# Patient Record
Sex: FEMALE | Race: WHITE | Employment: OTHER | URBAN - METROPOLITAN AREA
[De-identification: names, ages, dates, MRNs, and addresses within clinical notes are randomized per-mention and may not be internally consistent; named-entity substitution may affect disease eponyms.]

---

## 2017-01-23 ENCOUNTER — TELEPHONE (OUTPATIENT)
Dept: FAMILY MEDICINE CLINIC | Facility: CLINIC | Age: 62
End: 2017-01-23

## 2017-01-23 PROCEDURE — 85025 COMPLETE CBC W/AUTO DIFF WBC: CPT | Performed by: INTERNAL MEDICINE

## 2017-01-23 PROCEDURE — 36415 COLL VENOUS BLD VENIPUNCTURE: CPT | Performed by: INTERNAL MEDICINE

## 2017-01-23 PROCEDURE — 82310 ASSAY OF CALCIUM: CPT | Performed by: INTERNAL MEDICINE

## 2017-01-23 RX ORDER — RANITIDINE 150 MG/1
150 TABLET ORAL 2 TIMES DAILY
Qty: 60 TABLET | Refills: 0 | COMMUNITY
Start: 2017-01-23 | End: 2019-08-29 | Stop reason: ALTCHOICE

## 2017-01-23 NOTE — TELEPHONE ENCOUNTER
Patient notified that it is OK to stop the Omeprazole and to start the Zantac 150 mg BID. Her MAR was updated.

## 2017-02-08 ENCOUNTER — OFFICE VISIT (OUTPATIENT)
Dept: FAMILY MEDICINE CLINIC | Facility: CLINIC | Age: 62
End: 2017-02-08

## 2017-02-08 VITALS
BODY MASS INDEX: 34.72 KG/M2 | SYSTOLIC BLOOD PRESSURE: 122 MMHG | WEIGHT: 216 LBS | DIASTOLIC BLOOD PRESSURE: 76 MMHG | HEART RATE: 62 BPM | RESPIRATION RATE: 16 BRPM | TEMPERATURE: 98 F | HEIGHT: 66 IN

## 2017-02-08 DIAGNOSIS — M05.79 RHEUMATOID ARTHRITIS INVOLVING MULTIPLE SITES WITH POSITIVE RHEUMATOID FACTOR (HCC): ICD-10-CM

## 2017-02-08 DIAGNOSIS — M32.19 OTHER SYSTEMIC LUPUS ERYTHEMATOSUS WITH OTHER ORGAN INVOLVEMENT (HCC): ICD-10-CM

## 2017-02-08 DIAGNOSIS — M35.00 SJOGREN'S SYNDROME, WITH UNSPECIFIED ORGAN INVOLVEMENT (HCC): Chronic | ICD-10-CM

## 2017-02-08 DIAGNOSIS — B00.89 HERPETIC WHITLOW: Primary | ICD-10-CM

## 2017-02-08 DIAGNOSIS — L30.9 ECZEMA OF BOTH HANDS: ICD-10-CM

## 2017-02-08 PROCEDURE — 99214 OFFICE O/P EST MOD 30 MIN: CPT | Performed by: FAMILY MEDICINE

## 2017-02-08 RX ORDER — ACYCLOVIR 50 MG/G
1 CREAM TOPICAL
Qty: 5 G | Refills: 2 | Status: SHIPPED | OUTPATIENT
Start: 2017-02-08 | End: 2017-05-09

## 2017-02-08 RX ORDER — ACYCLOVIR 400 MG/1
400 TABLET ORAL DAILY
Qty: 90 TABLET | Refills: 1 | Status: SHIPPED | OUTPATIENT
Start: 2017-02-08 | End: 2017-07-13

## 2017-02-08 NOTE — PATIENT INSTRUCTIONS
Miriam Eaton you were seen for healing herpetic anabel and dermatitis --continue Acyclovir topically as suggested and get on daily Acyclovir 400mg tabs daily. No antibiotics needed. Have fun traveling.  Continue local care of wounds on hands and consider Derm i

## 2017-02-08 NOTE — PROGRESS NOTES
Jana Evangelista is a 59year old female. HPI:   Pt last seen on 12/5/16 for herpetic anabel on her hands repeatedly breaking out. She was put on oral Acyclovir and topical as well and is much better. She is immunocompromised with lupus, Sjogren's and RA.  Sh spacer/AeroChamber Disp: 1 Inhaler Rfl: 1   Diclofenac Sodium (VOLTAREN) 1 % Transdermal Gel Apply 4 g topically 4 (four) times daily.  Disp: 3 Tube Rfl: 1   EPINEPHrine (EPIPEN 2-RACHELE) 0.3 MG/0.3ML Injection Solution Auto-injector Inject 0.3 mL as directed Dyslipidemia    • Chronic foot pain      right   • Otalgia      left   • Chest pain    • Sinus bradycardia    • Bronchitis, acute, with bronchospasm    • Hematochezia    • H. pylori infection    • Dizziness    • Fasciitis    • GERD (gastroesophageal reflux normocephalic,ears and throat are clear  NECK: supple,no adenopathy,no bruits  LUNGS: clear to auscultation  CARDIO: RRR without murmur  GI: good BS's,no masses, HSM or tenderness  EXTREMITIES: no cyanosis, clubbing or edema    ASSESSMENT AND PLAN:   Eladio Apt

## 2017-03-16 PROCEDURE — 86160 COMPLEMENT ANTIGEN: CPT | Performed by: INTERNAL MEDICINE

## 2017-03-16 PROCEDURE — 82570 ASSAY OF URINE CREATININE: CPT | Performed by: INTERNAL MEDICINE

## 2017-03-16 PROCEDURE — 86225 DNA ANTIBODY NATIVE: CPT | Performed by: INTERNAL MEDICINE

## 2017-03-16 PROCEDURE — 81003 URINALYSIS AUTO W/O SCOPE: CPT | Performed by: INTERNAL MEDICINE

## 2017-03-16 PROCEDURE — 84156 ASSAY OF PROTEIN URINE: CPT | Performed by: INTERNAL MEDICINE

## 2017-03-22 ENCOUNTER — OFFICE VISIT (OUTPATIENT)
Dept: FAMILY MEDICINE CLINIC | Facility: CLINIC | Age: 62
End: 2017-03-22

## 2017-03-22 VITALS
TEMPERATURE: 98 F | HEIGHT: 66 IN | HEART RATE: 76 BPM | SYSTOLIC BLOOD PRESSURE: 138 MMHG | DIASTOLIC BLOOD PRESSURE: 80 MMHG | WEIGHT: 217 LBS | BODY MASS INDEX: 34.87 KG/M2 | RESPIRATION RATE: 16 BRPM

## 2017-03-22 DIAGNOSIS — M54.42 CHRONIC LEFT-SIDED LOW BACK PAIN WITH LEFT-SIDED SCIATICA: ICD-10-CM

## 2017-03-22 DIAGNOSIS — I10 ESSENTIAL HYPERTENSION: Primary | ICD-10-CM

## 2017-03-22 DIAGNOSIS — Z76.0 MEDICATION REFILL: ICD-10-CM

## 2017-03-22 DIAGNOSIS — M17.12 PRIMARY OSTEOARTHRITIS OF LEFT KNEE: ICD-10-CM

## 2017-03-22 DIAGNOSIS — M05.79 RHEUMATOID ARTHRITIS INVOLVING MULTIPLE SITES WITH POSITIVE RHEUMATOID FACTOR (HCC): ICD-10-CM

## 2017-03-22 DIAGNOSIS — G89.29 CHRONIC LEFT-SIDED LOW BACK PAIN WITH LEFT-SIDED SCIATICA: ICD-10-CM

## 2017-03-22 PROCEDURE — 99214 OFFICE O/P EST MOD 30 MIN: CPT | Performed by: FAMILY MEDICINE

## 2017-03-22 RX ORDER — QUINAPRIL 20 MG/1
TABLET ORAL
Qty: 90 TABLET | Refills: 0 | OUTPATIENT
Start: 2017-03-22

## 2017-03-22 RX ORDER — QUINAPRIL 20 MG/1
20 TABLET ORAL NIGHTLY
Qty: 90 TABLET | Refills: 1 | Status: SHIPPED | OUTPATIENT
Start: 2017-03-22 | End: 2017-10-14

## 2017-03-22 RX ORDER — QUINAPRIL 20 MG/1
TABLET ORAL
Qty: 30 TABLET | Refills: 0 | Status: SHIPPED | OUTPATIENT
Start: 2017-03-22 | End: 2017-04-15

## 2017-03-22 NOTE — PROGRESS NOTES
HPI:   Josie Arrington is a 59year old female presents for management of her hypertension and for Quinapril refill.  Pt has been taking medications as instructed, no medication side effects, home BP monitoring in the range of 016C systolic and 89'D diastoli capsule Rfl: 1   leflunomide (ARAVA) 20 MG Oral Tab Take 1 tablet (20 mg total) by mouth daily.  Disp: 90 tablet Rfl: 1   pregabalin (LYRICA) 75 MG Oral Cap 1 TABLET EVERY AM, 2 TABLETS EVERY PM Disp: 270 capsule Rfl: 1   Hydroxychloroquine Sulfate 200 MG O the metatarsals      right foot   • Vitamin D deficiency    • Encounter for long-term (current) use of other medications    • Sebaceous cyst    • Pedal edema      of legs   • Candidiasis of skin and nails    • Vaginitis and vulvovaginitis    • Pain in join decreased appetite; see HPI notes above for further details  SKIN: Denies rashes, skin wounds or ulcers.   EYES: Denies blurred vision or double vision  HENT: Denies congestion, rhinorrhea, sore throat or ear pain  CHEST: Denies chest pain, or palpitations; DOSE)  -     Quinapril HCl 20 MG Oral Tab; Take 1 tablet (20 mg total) by mouth nightly. Rheumatoid arthritis involving multiple sites with positive rheumatoid factor (HCC)  -     Quinapril HCl 20 MG Oral Tab;  Take 1 tablet (20 mg total) by mouth nightl

## 2017-03-22 NOTE — PATIENT INSTRUCTIONS
Sherren Roles you were seen for hypertension management and doing well. Switch pain care doctors based on recent experiences and consider Bridgett Thomas or Dr. Byron Desai locally. See rheumatology for management as well. Your medications were also refilled.   Work · Don’t add salt to your food at the table. · Use only small amounts of salt when cooking. · Begin an exercise program. Talk with your health care provider about the type of exercise program that would be best for you. It doesn't have to be hard.  Even br · Weakness of an arm or leg or one side of the face  · You have problems speaking or seeing   Date Last Reviewed: 11/25/2014  © 2777-6275 [employer] [address] [place] [address], [address] [place] [address]. All rights reserved.  This information is not int · Have a recent history of injury to the area  · Are female  Symptoms of knee pain  This type of knee pain is a dull, aching pain in the front of the knee in the area under and around the kneecap. This pain may start quickly or slowly.  Your pain might be w In some cases, you can prevent knee pain.  To help prevent a flare-up of knee pain, you do these things:  · Regularly do all the exercises your doctor or physical therapist advises  · Support your knee as advised by your doctor or physical therapist  · Incr

## 2017-03-23 ENCOUNTER — TELEPHONE (OUTPATIENT)
Dept: FAMILY MEDICINE CLINIC | Facility: CLINIC | Age: 62
End: 2017-03-23

## 2017-03-23 NOTE — TELEPHONE ENCOUNTER
LMOM and let pt know that [facility] spoke to Leobardo from [employer] and that quinapril has been shipped out and we cannot the order.

## 2017-03-23 NOTE — TELEPHONE ENCOUNTER
Spoke with Danny Clark from [employer] and she states that her Quinapril was shipped out today and there is no way of cancelling the order. Please contact patient.

## 2017-03-27 ENCOUNTER — TELEPHONE (OUTPATIENT)
Dept: FAMILY MEDICINE CLINIC | Facility: CLINIC | Age: 62
End: 2017-03-27

## 2017-03-27 DIAGNOSIS — M17.12 OSTEOARTHRITIS OF LEFT KNEE, UNSPECIFIED OSTEOARTHRITIS TYPE: ICD-10-CM

## 2017-03-27 DIAGNOSIS — M54.42 CHRONIC LEFT-SIDED LOW BACK PAIN WITH LEFT-SIDED SCIATICA: Primary | ICD-10-CM

## 2017-03-27 DIAGNOSIS — M05.79 RHEUMATOID ARTHRITIS INVOLVING MULTIPLE SITES WITH POSITIVE RHEUMATOID FACTOR (HCC): ICD-10-CM

## 2017-03-27 DIAGNOSIS — G89.29 CHRONIC LEFT-SIDED LOW BACK PAIN WITH LEFT-SIDED SCIATICA: Primary | ICD-10-CM

## 2017-03-27 NOTE — TELEPHONE ENCOUNTER
: Pain   PCP: Dustin Alcala   Refer to Provider (first and last name): Dr. Pinky Leone   Specialty: Pain Management   Patient Insurance: Payor: MEDICARE / Plan: MEDICARE PART B ONLY / Product Type: *No Product type* /   Duration/Summary of Symptoms: akbar

## 2017-04-04 ENCOUNTER — HOSPITAL ENCOUNTER (OUTPATIENT)
Dept: MRI IMAGING | Age: 62
Discharge: HOME | End: 2017-04-04
Attending: INTERNAL MEDICINE
Payer: MEDICARE

## 2017-04-04 ENCOUNTER — HOSPITAL ENCOUNTER (OUTPATIENT)
Dept: MRI IMAGING | Age: 62
Discharge: HOME | End: 2017-04-04
Attending: PHYSICIAN ASSISTANT
Payer: MEDICARE

## 2017-04-04 DIAGNOSIS — G89.29 CHRONIC PAIN OF LEFT KNEE: ICD-10-CM

## 2017-04-04 DIAGNOSIS — Z98.1 S/P LUMBAR SPINAL FUSION: ICD-10-CM

## 2017-04-04 DIAGNOSIS — M25.562 CHRONIC PAIN OF LEFT KNEE: ICD-10-CM

## 2017-04-04 DIAGNOSIS — M54.16 LUMBAR RADICULITIS: ICD-10-CM

## 2017-04-04 DIAGNOSIS — M32.19 OTHER SYSTEMIC LUPUS ERYTHEMATOSUS WITH OTHER ORGAN INVOLVEMENT (HCC): ICD-10-CM

## 2017-04-04 DIAGNOSIS — M47.816 LUMBAR FACET ARTHROPATHY: ICD-10-CM

## 2017-04-04 DIAGNOSIS — M05.79 RHEUMATOID ARTHRITIS INVOLVING MULTIPLE SITES WITH POSITIVE RHEUMATOID FACTOR (HCC): ICD-10-CM

## 2017-04-04 PROCEDURE — 73721 MRI JNT OF LWR EXTRE W/O DYE: CPT

## 2017-04-04 PROCEDURE — 72148 MRI LUMBAR SPINE W/O DYE: CPT

## 2017-04-07 NOTE — PROGRESS NOTES
Quick Note:    Mychart message sent to patient. Frederick Landrum,  MRI of the knee shows a significant tear of the meniscus. There is also probably a tear of the ACL. The ostoearthritis is mild-moderate.    And there is swelling of the joint (we had removed some o

## 2017-04-15 DIAGNOSIS — I10 ESSENTIAL HYPERTENSION: Primary | ICD-10-CM

## 2017-04-17 RX ORDER — QUINAPRIL 20 MG/1
TABLET ORAL
Qty: 30 TABLET | Refills: 0 | Status: SHIPPED | OUTPATIENT
Start: 2017-04-17 | End: 2017-05-30

## 2017-05-30 PROBLEM — Z01.810 PRE-OPERATIVE CARDIOVASCULAR EXAMINATION: Status: ACTIVE | Noted: 2017-05-30

## 2017-06-06 ENCOUNTER — OFFICE VISIT (OUTPATIENT)
Dept: FAMILY MEDICINE CLINIC | Facility: CLINIC | Age: 62
End: 2017-06-06

## 2017-06-06 VITALS
HEIGHT: 65 IN | OXYGEN SATURATION: 99 % | SYSTOLIC BLOOD PRESSURE: 116 MMHG | HEART RATE: 66 BPM | RESPIRATION RATE: 16 BRPM | BODY MASS INDEX: 36.32 KG/M2 | TEMPERATURE: 98 F | DIASTOLIC BLOOD PRESSURE: 78 MMHG | WEIGHT: 218 LBS

## 2017-06-06 DIAGNOSIS — M05.79 RHEUMATOID ARTHRITIS INVOLVING MULTIPLE SITES WITH POSITIVE RHEUMATOID FACTOR (HCC): ICD-10-CM

## 2017-06-06 DIAGNOSIS — S83.207S ACUTE MENISCAL TEAR OF LEFT KNEE, SEQUELA: Primary | ICD-10-CM

## 2017-06-06 DIAGNOSIS — D63.8 ANEMIA OF CHRONIC DISEASE: ICD-10-CM

## 2017-06-06 DIAGNOSIS — I10 ESSENTIAL HYPERTENSION: ICD-10-CM

## 2017-06-06 DIAGNOSIS — M81.0 OSTEOPOROSIS, SENILE: ICD-10-CM

## 2017-06-06 DIAGNOSIS — Z01.818 PREOPERATIVE CLEARANCE: ICD-10-CM

## 2017-06-06 DIAGNOSIS — M79.7 FIBROMYALGIA: ICD-10-CM

## 2017-06-06 DIAGNOSIS — M35.00 SJOGREN'S SYNDROME, WITH UNSPECIFIED ORGAN INVOLVEMENT (HCC): Chronic | ICD-10-CM

## 2017-06-06 DIAGNOSIS — M25.562 ACUTE PAIN OF LEFT KNEE: ICD-10-CM

## 2017-06-06 DIAGNOSIS — M17.12 OSTEOARTHRITIS OF LEFT KNEE, UNSPECIFIED OSTEOARTHRITIS TYPE: ICD-10-CM

## 2017-06-06 DIAGNOSIS — M32.19 OTHER SYSTEMIC LUPUS ERYTHEMATOSUS WITH OTHER ORGAN INVOLVEMENT (HCC): ICD-10-CM

## 2017-06-06 PROBLEM — Z01.810 PRE-OPERATIVE CARDIOVASCULAR EXAMINATION: Status: RESOLVED | Noted: 2017-05-30 | Resolved: 2017-06-06

## 2017-06-06 PROCEDURE — 99214 OFFICE O/P EST MOD 30 MIN: CPT | Performed by: FAMILY MEDICINE

## 2017-06-06 RX ORDER — TIZANIDINE 2 MG/1
1 TABLET ORAL 3 TIMES DAILY
Refills: 1 | COMMUNITY
Start: 2017-05-31 | End: 2017-10-18

## 2017-06-06 NOTE — PROGRESS NOTES
Del Davis is a 59year old female who presents for follow up on chronic debilitating left knee and for a pre-operative physical exam. Pt with chronic left knee pain and OA and RA and lupus and saw Dr. Aimee Chou of [employer] at [facility]  told mouth daily. Disp: 90 tablet Rfl: 1   pregabalin (LYRICA) 75 MG Oral Cap 1 TABLET EVERY AM, 2 TABLETS EVERY PM Disp: 270 capsule Rfl: 1   Hydroxychloroquine Sulfate 200 MG Oral Tab Take 1 tablet (200 mg total) by mouth 2 (two) times daily.  Disp: 180 tablet Diagnosis Date   • Asthma    • IBS (irritable bowel syndrome)    • Osteoporosis    • Foot fracture, left 10/08     left foot fx: excessive walking   • RESPIRATORY ABNORM NEC 2/26/2009   • MYALGIA AND MYOSITIS NOS 2/26/2009   • Systemic lupus erythematosu early signs, bilaterally, remained stable   • Duodenitis 12/18/08     peptic   • Irregular Z line of esophagus 12/18/08   • Internal hemorrhoids 12/18/08     Grade II   • Dysphagia    • Trigger finger      right ring   • Cancer ([facility] [address] 9/04     rt breast GENERAL: feels left knee pains day and night and also has OA and RA and saw ortho and told left medial meniscal tear and needs surgery repair. Scheduled for 6/14/17. see HPI notes above for further details. Here for clearance. Saw cardiology already. intact    ASSESSMENT AND PLAN:   Del Davis is a 59year old female who presents for chronic left knee pain due to a meniscal tear and a pre-operative physical exam. Pt with chronic left knee pain and OA and RA and lupus and told left knee meniscal tear rheumatology     Osteoarthritis of left knee, unspecified osteoarthritis type  Per rheumatology and ortho management    Essential hypertension  -     CBC W Differential W Platelet [E]; Future  -     Comp Metabolic Panel (14) [E];  Future  Stable with BP und

## 2017-06-06 NOTE — PATIENT INSTRUCTIONS
Scotty Mendoza you were seen and cleared for left knee surgery for planned menisectomy. Get labs done. Your EKG is stable. See rheumatology after surgery and me in two months.  See your new pain clinic as well for the Norco.   Take care, Dr. Gwendolyn Greenwood · Stay off the injured leg as much as possible until you can walk on it without pain. If you have a lot of pain when walking, crutches or a walker may be prescribed.  (These can be rented or bought at [employer] and surgical or orthopedic supply stores · Check with your healthcare provider before returning to sports or full work duties. Follow-up care  Follow up with your healthcare provider, or as advised. This is usually within 1-2 weeks.  Further testing may be required to check the extent of your inj To look inside your joint, your surgeon will use an arthroscope. This is a slender instrument about the size of a pencil that contains a lens and a light source. The arthroscope and other special tools are inserted into the joint through tiny incisions.  Us

## 2017-06-07 ENCOUNTER — LAB ENCOUNTER (OUTPATIENT)
Dept: LAB | Age: 62
End: 2017-06-07
Attending: FAMILY MEDICINE
Payer: MEDICARE

## 2017-06-07 DIAGNOSIS — M25.562 ACUTE PAIN OF LEFT KNEE: ICD-10-CM

## 2017-06-07 DIAGNOSIS — D63.8 ANEMIA OF CHRONIC DISEASE: ICD-10-CM

## 2017-06-07 DIAGNOSIS — M32.19 OTHER SYSTEMIC LUPUS ERYTHEMATOSUS WITH OTHER ORGAN INVOLVEMENT (HCC): ICD-10-CM

## 2017-06-07 DIAGNOSIS — I10 ESSENTIAL HYPERTENSION: ICD-10-CM

## 2017-06-07 DIAGNOSIS — S83.207S ACUTE MENISCAL TEAR OF LEFT KNEE, SEQUELA: ICD-10-CM

## 2017-06-07 DIAGNOSIS — Z01.818 PREOPERATIVE CLEARANCE: ICD-10-CM

## 2017-06-07 DIAGNOSIS — M05.79 RHEUMATOID ARTHRITIS INVOLVING MULTIPLE SITES WITH POSITIVE RHEUMATOID FACTOR (HCC): ICD-10-CM

## 2017-06-07 PROCEDURE — 85610 PROTHROMBIN TIME: CPT

## 2017-06-07 PROCEDURE — 85730 THROMBOPLASTIN TIME PARTIAL: CPT

## 2017-06-07 PROCEDURE — 80053 COMPREHEN METABOLIC PANEL: CPT

## 2017-06-07 PROCEDURE — 36415 COLL VENOUS BLD VENIPUNCTURE: CPT

## 2017-06-07 PROCEDURE — 85025 COMPLETE CBC W/AUTO DIFF WBC: CPT

## 2017-06-07 NOTE — PROGRESS NOTES
Quick Note:    Please call pt with normal preoperative lab results and print so can be faxed to surgeon in [facility] --- Dr. Lety Mas  ______

## 2017-06-11 PROBLEM — M54.42 CHRONIC LEFT-SIDED LOW BACK PAIN WITH LEFT-SIDED SCIATICA: Status: RESOLVED | Noted: 2017-03-22 | Resolved: 2017-06-11

## 2017-06-11 PROBLEM — G89.29 CHRONIC LEFT-SIDED LOW BACK PAIN WITH LEFT-SIDED SCIATICA: Status: RESOLVED | Noted: 2017-03-22 | Resolved: 2017-06-11

## 2017-06-14 ENCOUNTER — HOSPITAL ENCOUNTER (OUTPATIENT)
Facility: HOSPITAL | Age: 62
Setting detail: HOSPITAL OUTPATIENT SURGERY
Discharge: HOME | End: 2017-06-14
Attending: ORTHOPAEDIC SURGERY | Admitting: ORTHOPAEDIC SURGERY
Payer: MEDICARE

## 2017-06-14 ENCOUNTER — ANESTHESIA EVENT (OUTPATIENT)
Dept: SURGERY | Facility: HOSPITAL | Age: 62
End: 2017-06-14
Payer: MEDICARE

## 2017-06-14 ENCOUNTER — ANESTHESIA (OUTPATIENT)
Dept: SURGERY | Facility: HOSPITAL | Age: 62
End: 2017-06-14
Payer: MEDICARE

## 2017-06-14 ENCOUNTER — SURGERY (OUTPATIENT)
Age: 62
End: 2017-06-14

## 2017-06-14 VITALS
BODY MASS INDEX: 34.39 KG/M2 | HEART RATE: 61 BPM | WEIGHT: 214 LBS | RESPIRATION RATE: 16 BRPM | TEMPERATURE: 98 F | HEIGHT: 66 IN | OXYGEN SATURATION: 93 % | DIASTOLIC BLOOD PRESSURE: 88 MMHG | SYSTOLIC BLOOD PRESSURE: 130 MMHG

## 2017-06-14 DIAGNOSIS — S83.207A ACUTE MENISCAL TEAR OF KNEE, LEFT, INITIAL ENCOUNTER: Primary | ICD-10-CM

## 2017-06-14 PROCEDURE — 0SBD4ZZ EXCISION OF LEFT KNEE JOINT, PERCUTANEOUS ENDOSCOPIC APPROACH: ICD-10-PCS | Performed by: ORTHOPAEDIC SURGERY

## 2017-06-14 RX ORDER — HYDROCODONE BITARTRATE AND ACETAMINOPHEN 5; 325 MG/1; MG/1
1 TABLET ORAL AS NEEDED
Status: DISCONTINUED | OUTPATIENT
Start: 2017-06-14 | End: 2017-06-14

## 2017-06-14 RX ORDER — LABETALOL HYDROCHLORIDE 5 MG/ML
INJECTION, SOLUTION INTRAVENOUS AS NEEDED
Status: DISCONTINUED | OUTPATIENT
Start: 2017-06-14 | End: 2017-06-14 | Stop reason: SURG

## 2017-06-14 RX ORDER — LIDOCAINE HYDROCHLORIDE 10 MG/ML
INJECTION, SOLUTION EPIDURAL; INFILTRATION; INTRACAUDAL; PERINEURAL AS NEEDED
Status: DISCONTINUED | OUTPATIENT
Start: 2017-06-14 | End: 2017-06-14 | Stop reason: SURG

## 2017-06-14 RX ORDER — SODIUM CHLORIDE, SODIUM LACTATE, POTASSIUM CHLORIDE, CALCIUM CHLORIDE 600; 310; 30; 20 MG/100ML; MG/100ML; MG/100ML; MG/100ML
INJECTION, SOLUTION INTRAVENOUS CONTINUOUS
Status: DISCONTINUED | OUTPATIENT
Start: 2017-06-14 | End: 2017-06-14

## 2017-06-14 RX ORDER — HYDROMORPHONE HYDROCHLORIDE 1 MG/ML
0.6 INJECTION, SOLUTION INTRAMUSCULAR; INTRAVENOUS; SUBCUTANEOUS EVERY 5 MIN PRN
Status: DISCONTINUED | OUTPATIENT
Start: 2017-06-14 | End: 2017-06-14

## 2017-06-14 RX ORDER — MORPHINE SULFATE 2 MG/ML
2 INJECTION, SOLUTION INTRAMUSCULAR; INTRAVENOUS EVERY 10 MIN PRN
Status: DISCONTINUED | OUTPATIENT
Start: 2017-06-14 | End: 2017-06-14

## 2017-06-14 RX ORDER — HYDROCODONE BITARTRATE AND ACETAMINOPHEN 5; 325 MG/1; MG/1
2 TABLET ORAL AS NEEDED
Status: DISCONTINUED | OUTPATIENT
Start: 2017-06-14 | End: 2017-06-14

## 2017-06-14 RX ORDER — MORPHINE SULFATE 10 MG/ML
6 INJECTION, SOLUTION INTRAMUSCULAR; INTRAVENOUS EVERY 10 MIN PRN
Status: DISCONTINUED | OUTPATIENT
Start: 2017-06-14 | End: 2017-06-14

## 2017-06-14 RX ORDER — MIDAZOLAM HYDROCHLORIDE 1 MG/ML
INJECTION INTRAMUSCULAR; INTRAVENOUS AS NEEDED
Status: DISCONTINUED | OUTPATIENT
Start: 2017-06-14 | End: 2017-06-14 | Stop reason: SURG

## 2017-06-14 RX ORDER — ASPIRIN 325 MG
325 TABLET ORAL DAILY
Qty: 30 TABLET | Refills: 0 | Status: SHIPPED
Start: 2017-06-14 | End: 2017-10-18

## 2017-06-14 RX ORDER — ONDANSETRON 2 MG/ML
INJECTION INTRAMUSCULAR; INTRAVENOUS AS NEEDED
Status: DISCONTINUED | OUTPATIENT
Start: 2017-06-14 | End: 2017-06-14 | Stop reason: SURG

## 2017-06-14 RX ORDER — METOCLOPRAMIDE 10 MG/1
10 TABLET ORAL ONCE
Status: COMPLETED | OUTPATIENT
Start: 2017-06-14 | End: 2017-06-14

## 2017-06-14 RX ORDER — ONDANSETRON 2 MG/ML
4 INJECTION INTRAMUSCULAR; INTRAVENOUS ONCE AS NEEDED
Status: DISCONTINUED | OUTPATIENT
Start: 2017-06-14 | End: 2017-06-14

## 2017-06-14 RX ORDER — GLYCOPYRROLATE 0.2 MG/ML
INJECTION INTRAMUSCULAR; INTRAVENOUS AS NEEDED
Status: DISCONTINUED | OUTPATIENT
Start: 2017-06-14 | End: 2017-06-14 | Stop reason: SURG

## 2017-06-14 RX ORDER — ACETAMINOPHEN 325 MG/1
650 TABLET ORAL ONCE
Status: DISCONTINUED | OUTPATIENT
Start: 2017-06-14 | End: 2017-06-14 | Stop reason: HOSPADM

## 2017-06-14 RX ORDER — HYDROMORPHONE HYDROCHLORIDE 1 MG/ML
0.2 INJECTION, SOLUTION INTRAMUSCULAR; INTRAVENOUS; SUBCUTANEOUS EVERY 5 MIN PRN
Status: DISCONTINUED | OUTPATIENT
Start: 2017-06-14 | End: 2017-06-14

## 2017-06-14 RX ORDER — BUPIVACAINE HYDROCHLORIDE 2.5 MG/ML
INJECTION, SOLUTION EPIDURAL; INFILTRATION; INTRACAUDAL AS NEEDED
Status: DISCONTINUED | OUTPATIENT
Start: 2017-06-14 | End: 2017-06-14 | Stop reason: HOSPADM

## 2017-06-14 RX ORDER — MORPHINE SULFATE 4 MG/ML
4 INJECTION, SOLUTION INTRAMUSCULAR; INTRAVENOUS EVERY 10 MIN PRN
Status: DISCONTINUED | OUTPATIENT
Start: 2017-06-14 | End: 2017-06-14

## 2017-06-14 RX ORDER — NALOXONE HYDROCHLORIDE 0.4 MG/ML
80 INJECTION, SOLUTION INTRAMUSCULAR; INTRAVENOUS; SUBCUTANEOUS AS NEEDED
Status: DISCONTINUED | OUTPATIENT
Start: 2017-06-14 | End: 2017-06-14

## 2017-06-14 RX ORDER — FAMOTIDINE 20 MG/1
20 TABLET ORAL ONCE
Status: COMPLETED | OUTPATIENT
Start: 2017-06-14 | End: 2017-06-14

## 2017-06-14 RX ORDER — HALOPERIDOL 5 MG/ML
0.25 INJECTION INTRAMUSCULAR ONCE AS NEEDED
Status: DISCONTINUED | OUTPATIENT
Start: 2017-06-14 | End: 2017-06-14

## 2017-06-14 RX ORDER — HYDROMORPHONE HYDROCHLORIDE 1 MG/ML
0.4 INJECTION, SOLUTION INTRAMUSCULAR; INTRAVENOUS; SUBCUTANEOUS EVERY 5 MIN PRN
Status: DISCONTINUED | OUTPATIENT
Start: 2017-06-14 | End: 2017-06-14

## 2017-06-14 RX ORDER — DEXAMETHASONE SODIUM PHOSPHATE 4 MG/ML
VIAL (ML) INJECTION AS NEEDED
Status: DISCONTINUED | OUTPATIENT
Start: 2017-06-14 | End: 2017-06-14 | Stop reason: SURG

## 2017-06-14 RX ORDER — ONDANSETRON 4 MG/1
4 TABLET, FILM COATED ORAL EVERY 8 HOURS PRN
Qty: 10 TABLET | Refills: 1 | Status: SHIPPED
Start: 2017-06-14 | End: 2017-10-18 | Stop reason: ALTCHOICE

## 2017-06-14 RX ADMIN — ONDANSETRON 4 MG: 2 INJECTION INTRAMUSCULAR; INTRAVENOUS at 08:50:00

## 2017-06-14 RX ADMIN — LIDOCAINE HYDROCHLORIDE 20 MG: 10 INJECTION, SOLUTION EPIDURAL; INFILTRATION; INTRACAUDAL; PERINEURAL at 08:51:00

## 2017-06-14 RX ADMIN — DEXAMETHASONE SODIUM PHOSPHATE 4 MG: 4 MG/ML VIAL (ML) INJECTION at 08:50:00

## 2017-06-14 RX ADMIN — MIDAZOLAM HYDROCHLORIDE 2 MG: 1 INJECTION INTRAMUSCULAR; INTRAVENOUS at 08:50:00

## 2017-06-14 RX ADMIN — SODIUM CHLORIDE, SODIUM LACTATE, POTASSIUM CHLORIDE, CALCIUM CHLORIDE: 600; 310; 30; 20 INJECTION, SOLUTION INTRAVENOUS at 08:49:00

## 2017-06-14 RX ADMIN — LABETALOL HYDROCHLORIDE 10 MG: 5 INJECTION, SOLUTION INTRAVENOUS at 09:17:00

## 2017-06-14 RX ADMIN — LIDOCAINE HYDROCHLORIDE 30 MG: 10 INJECTION, SOLUTION EPIDURAL; INFILTRATION; INTRACAUDAL; PERINEURAL at 08:50:00

## 2017-06-14 RX ADMIN — GLYCOPYRROLATE 0.2 MG: 0.2 INJECTION INTRAMUSCULAR; INTRAVENOUS at 08:50:00

## 2017-06-14 NOTE — OPERATIVE REPORT
Pre-Operative Diagnosis: Left Knee medial meniscal tear      Post-Operative Diagnosis: Left Knee medial meniscal tear, patella chondrosis, synovitis     Procedure Performed:   Procedure(s):  Left knee arthroscopic lateral meniscal debridement, patellar

## 2017-06-14 NOTE — ANESTHESIA PREPROCEDURE EVALUATION
Anesthesia PreOp Note    HPI:     Mamadou Trinidad is a 59year old female who presents for preoperative consultation requested by: Kourtney Blake MD    Date of Surgery: 6/14/2017    Procedure(s):  KNEE ARTHROSCOPY  Indication: Knee medial meniscal tear and vagina 09/04/12     resolved   • Postmenopausal atrophic vaginitis    • Gardnerella vaginitis    • Peritonitis ([facility]    • Fibromyalgia 11/07   • Vitamin D deficiency    • Sjogren's syndrome ([facility] 10/07   • Hammertoe      second toe right foot   • Stres OTHER SURGICAL HISTORY  9/25/12    Comment left carpal tunnel surgery    OTHER SURGICAL HISTORY  1/11/13    Comment trigger thumb injections    OTHER SURGICAL HISTORY  4/13    Comment revision right foot surgery    RADIATION RIGHT  2004    Comment MAMMO weeks then PRN flares Disp: 30 g Rfl: 1 6/13/2017 at Unknown time   Probiotic Product (PROBIOTIC DAILY) Oral Cap Take by mouth.  Disp:  Rfl:  6/12/2017 at 0800   Albuterol Sulfate HFA (PROAIR HFA) 108 (90 BASE) MCG/ACT Inhalation Aero Soln Inhale 1-2 puffs Aortic Aneurysm[other] [OTHER]     • Fibromyalgia Sister    • Breast Cancer Self 48   • DCIS Self        Social History   Marital Status:   Spouse Name: N/A    Years of Education: N/A  Number of Children: N/A     Occupational History  None on file Weight: 97.523 kg (215 lb) 97.07 kg (214 lb)   SpO2:  96%        Anesthesia ROS/Med Hx and Physical Exam     Patient summary reviewed and Nursing notes reviewed    History of anesthetic complications   Airway   Mallampati: II  TM distance: >3 FB  Neck RO

## 2017-07-13 DIAGNOSIS — B00.89 HERPETIC WHITLOW: ICD-10-CM

## 2017-07-13 RX ORDER — ACYCLOVIR 400 MG/1
400 TABLET ORAL 2 TIMES DAILY
Qty: 90 TABLET | Refills: 1 | Status: SHIPPED | OUTPATIENT
Start: 2017-07-13 | End: 2017-10-19

## 2017-07-13 NOTE — TELEPHONE ENCOUNTER
ACYCLOVIR 400MG TABLETS  Future Appointments  Date Time Provider Brenda Tesha   8/24/2017 10:00 AM MD Pio Randolph   9/25/2017 11:00 AM Jessi Man,  [facility] [address] [facility] [address]     Medication pended if okay to refill.

## 2017-08-21 PROCEDURE — 86160 COMPLEMENT ANTIGEN: CPT | Performed by: INTERNAL MEDICINE

## 2017-08-21 PROCEDURE — 82570 ASSAY OF URINE CREATININE: CPT | Performed by: INTERNAL MEDICINE

## 2017-08-21 PROCEDURE — 81003 URINALYSIS AUTO W/O SCOPE: CPT | Performed by: INTERNAL MEDICINE

## 2017-08-21 PROCEDURE — 86225 DNA ANTIBODY NATIVE: CPT | Performed by: INTERNAL MEDICINE

## 2017-08-21 PROCEDURE — 84156 ASSAY OF PROTEIN URINE: CPT | Performed by: INTERNAL MEDICINE

## 2017-08-30 ENCOUNTER — OFFICE VISIT (OUTPATIENT)
Dept: FAMILY MEDICINE CLINIC | Facility: CLINIC | Age: 62
End: 2017-08-30

## 2017-08-30 VITALS
BODY MASS INDEX: 35.32 KG/M2 | SYSTOLIC BLOOD PRESSURE: 110 MMHG | WEIGHT: 212 LBS | DIASTOLIC BLOOD PRESSURE: 74 MMHG | HEIGHT: 65 IN | HEART RATE: 72 BPM | TEMPERATURE: 98 F | RESPIRATION RATE: 16 BRPM

## 2017-08-30 DIAGNOSIS — M25.562 CHRONIC PAIN OF LEFT KNEE: Primary | ICD-10-CM

## 2017-08-30 DIAGNOSIS — G89.29 CHRONIC PAIN OF LEFT KNEE: Primary | ICD-10-CM

## 2017-08-30 PROCEDURE — 99213 OFFICE O/P EST LOW 20 MIN: CPT | Performed by: FAMILY MEDICINE

## 2017-08-30 RX ORDER — OXYCODONE AND ACETAMINOPHEN 10; 325 MG/1; MG/1
1 TABLET ORAL 4 TIMES DAILY
Refills: 0 | COMMUNITY
Start: 2017-08-22 | End: 2017-10-18 | Stop reason: ALTCHOICE

## 2017-08-30 NOTE — PROGRESS NOTES
HPI:   Ed Soria is a 57year old female that presents for left knee pain. She had MRI in April showing ACL and PCL tear. She has trouble walking due to pain and she has a limp.   She plans to have surgery this year, TKA, but has to coordinate carefull

## 2017-10-05 ENCOUNTER — HOSPITAL ENCOUNTER (OUTPATIENT)
Age: 62
Discharge: HOME | End: 2017-10-05
Payer: MEDICARE

## 2017-10-05 ENCOUNTER — APPOINTMENT (OUTPATIENT)
Dept: GENERAL RADIOLOGY | Age: 62
End: 2017-10-05
Attending: PHYSICIAN ASSISTANT
Payer: MEDICARE

## 2017-10-05 VITALS
TEMPERATURE: 98 F | RESPIRATION RATE: 20 BRPM | OXYGEN SATURATION: 98 % | DIASTOLIC BLOOD PRESSURE: 83 MMHG | HEART RATE: 64 BPM | SYSTOLIC BLOOD PRESSURE: 144 MMHG

## 2017-10-05 DIAGNOSIS — S22.000G CLOSED COMPRESSION FRACTURE OF THORACIC VERTEBRA WITH DELAYED HEALING, SUBSEQUENT ENCOUNTER: Primary | ICD-10-CM

## 2017-10-05 DIAGNOSIS — M51.34 DEGENERATIVE DISC DISEASE, THORACIC: ICD-10-CM

## 2017-10-05 DIAGNOSIS — M62.838 SPASM OF MUSCLE: ICD-10-CM

## 2017-10-05 PROCEDURE — 99214 OFFICE O/P EST MOD 30 MIN: CPT

## 2017-10-05 PROCEDURE — 71020 XR CHEST PA + LAT CHEST (CPT=71020): CPT | Performed by: PHYSICIAN ASSISTANT

## 2017-10-05 PROCEDURE — 96372 THER/PROPH/DIAG INJ SC/IM: CPT

## 2017-10-05 RX ORDER — MELOXICAM 7.5 MG/1
7.5 TABLET ORAL DAILY
Qty: 30 TABLET | Refills: 0 | Status: SHIPPED | OUTPATIENT
Start: 2017-10-05 | End: 2017-10-05

## 2017-10-05 RX ORDER — KETOROLAC TROMETHAMINE 30 MG/ML
30 INJECTION, SOLUTION INTRAMUSCULAR; INTRAVENOUS ONCE
Status: COMPLETED | OUTPATIENT
Start: 2017-10-05 | End: 2017-10-05

## 2017-10-05 RX ORDER — CYCLOBENZAPRINE HCL 10 MG
10 TABLET ORAL NIGHTLY
Qty: 20 TABLET | Refills: 0 | Status: SHIPPED | OUTPATIENT
Start: 2017-10-05 | End: 2017-10-25

## 2017-10-05 RX ORDER — METHYLPREDNISOLONE 4 MG/1
TABLET ORAL
Qty: 1 PACKAGE | Refills: 0 | Status: SHIPPED | OUTPATIENT
Start: 2017-10-05 | End: 2017-10-18 | Stop reason: ALTCHOICE

## 2017-10-05 NOTE — ED INITIAL ASSESSMENT (HPI)
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 He is in the hospital now but she is still having pain. She said she  does pulling movements. Pt ambulates with wheeled walker, knee brace noted on left knee.

## 2017-10-05 NOTE — ED PROVIDER NOTES
Patient Seen in: [facility]    History   Patient presents with:  Back Pain (musculoskeletal)    Stated Complaint: Back Spasms    HPI      58 yo female here with c/o back spasm x 7-10 days to the mid back that catches her breath. • Localized primary carpometacarpal osteoarthritis 7/30/2012   • Macular degeneration 12/07/10    early signs, bilaterally, remained stable   • Mild mitral regurgitation    • MYALGIA AND MYOSITIS NOS 2/26/2009   • Osteoarthritis     left thumb, severe   • Comment: revision right foot surgery  2004: RADIATION RIGHT      Comment: MAMMOSITE  No date: SPINE SURGERY PROCEDURE UNLISTED  age 13: T&A      Comment: Ashwin Davila  12/18/08: UPPER GI ENDOSCOPY,EXAM    Family History   Problem Relation Age of Onset Left Ear: External ear normal.   Nose: Nose normal.   Mouth/Throat: Oropharynx is clear and moist.   Eyes: Conjunctivae and EOM are normal. Pupils are equal, round, and reactive to light. Neck: Normal range of motion. Neck supple.    Cardiovascular: Esa Moss PROCEDURE:  XR CHEST PA + LAT CHEST (CPT=71020)  INDICATIONS:  Back Spasms  COMPARISON:  [facility] XR CHEST PA + LAT CHEST (CPT=71020), 8/15/2016, 13:53. TECHNIQUE:  PA and lateral chest radiographs were obtained.   PATIENT STATED HISTORY: (As transcrib The patient is in good condition thru out treatment today and remains so upon discharge. I discussed the plan of care with the patient, who expresses understanding.  All questions and concerns are addressed to the patients satisfaction prior to discharge t

## 2017-10-09 ENCOUNTER — OFFICE VISIT (OUTPATIENT)
Dept: FAMILY MEDICINE CLINIC | Facility: CLINIC | Age: 62
End: 2017-10-09

## 2017-10-09 VITALS
DIASTOLIC BLOOD PRESSURE: 76 MMHG | BODY MASS INDEX: 32.78 KG/M2 | WEIGHT: 204 LBS | SYSTOLIC BLOOD PRESSURE: 112 MMHG | HEART RATE: 76 BPM | HEIGHT: 66 IN | RESPIRATION RATE: 16 BRPM | OXYGEN SATURATION: 99 %

## 2017-10-09 DIAGNOSIS — M81.0 OSTEOPOROSIS, SENILE: ICD-10-CM

## 2017-10-09 DIAGNOSIS — Z91.81 AT MODERATE RISK FOR FALL: ICD-10-CM

## 2017-10-09 DIAGNOSIS — S22.000G CLOSED COMPRESSION FRACTURE OF THORACIC VERTEBRA WITH DELAYED HEALING, SUBSEQUENT ENCOUNTER: Primary | ICD-10-CM

## 2017-10-09 DIAGNOSIS — M48.54XG PATHOLOGICAL COMPRESSION FRACTURE OF THORACIC VERTEBRA WITH DELAYED HEALING, SUBSEQUENT ENCOUNTER: ICD-10-CM

## 2017-10-09 DIAGNOSIS — M54.6 ACUTE BILATERAL THORACIC BACK PAIN: ICD-10-CM

## 2017-10-09 DIAGNOSIS — Z63.79 STRESS DUE TO ILLNESS OF FAMILY MEMBER: ICD-10-CM

## 2017-10-09 DIAGNOSIS — Z28.21 IMMUNIZATION REFUSED: ICD-10-CM

## 2017-10-09 DIAGNOSIS — M17.12 PRIMARY OSTEOARTHRITIS OF LEFT KNEE: ICD-10-CM

## 2017-10-09 DIAGNOSIS — Z09 FOLLOW UP: ICD-10-CM

## 2017-10-09 PROBLEM — S22.000A CLOSED COMPRESSION FRACTURE OF THORACIC VERTEBRA (HCC): Status: ACTIVE | Noted: 2017-10-09

## 2017-10-09 PROCEDURE — 99214 OFFICE O/P EST MOD 30 MIN: CPT | Performed by: FAMILY MEDICINE

## 2017-10-09 NOTE — PATIENT INSTRUCTIONS
Demi Espinosa you were seen for follow up after  for multiple thoracic compression fractures. See Dr. Tabatha More and consider kyphoplasty and get the MRI done as well. See me also if symptoms get worse. Stay on current meds including the [facility] as well.   See ortho fo As you improve, you may be given exercises to strengthen your bones. If you have osteoporosis, your healthcare provider may prescribe a medicine to treat it. Sometimes you may have pain even after the bone has healed.  In that case, your healthcare provider · Don’t use a heating pad for more than 15 minutes at a time. Never sleep on a heating pad. Date Last Reviewed: 9/1/2015  © 6391-1707 [address], [address] [place] [address]. All rights reserved.  This information is not intend

## 2017-10-09 NOTE — PROGRESS NOTES
Leidy Faustin is a 57year old female. HPI:   Pt here after going to  for middle back pains. Has been care taking for  Thiago Stoner who has spinal cancer and she is overwhelmed and getting help from the [place].  He is paralyzed from waist down and she has bee • Candidiasis of skin and nails    • Candidiasis of vulva and vagina 09/04/12    resolved   • Carpal tunnel syndrome 5/29/2009    bilateral   • Chest pain    • Chronic foot pain     right   • Colitis     MICROSCOPIC   • Dizziness    • Duodenitis 12/18/08 Social History:  Smoking status: Former Smoker                                                              Packs/day: 0.00      Years: 0.00         Quit date: 6/13/2007  Smokeless tobacco: Never Used                      Comment: quit in 2006  Alcohol u and get the MRI done of her thoracic spine as well. She is asked to see me also if symptoms get worse. She was advised to stay on current meds including the [facility] as well.   She should also consider seeing ortho for the left knee pain and swelling manageme and refused    Dr. Carisa Sullivan

## 2017-10-13 ENCOUNTER — HOSPITAL ENCOUNTER (OUTPATIENT)
Dept: MRI IMAGING | Age: 62
Discharge: HOME | End: 2017-10-13
Attending: FAMILY MEDICINE
Payer: MEDICARE

## 2017-10-13 DIAGNOSIS — M54.6 ACUTE BILATERAL THORACIC BACK PAIN: ICD-10-CM

## 2017-10-13 DIAGNOSIS — M48.54XG PATHOLOGICAL COMPRESSION FRACTURE OF THORACIC VERTEBRA WITH DELAYED HEALING, SUBSEQUENT ENCOUNTER: ICD-10-CM

## 2017-10-13 DIAGNOSIS — S22.000G CLOSED COMPRESSION FRACTURE OF THORACIC VERTEBRA WITH DELAYED HEALING, SUBSEQUENT ENCOUNTER: ICD-10-CM

## 2017-10-13 PROCEDURE — 72146 MRI CHEST SPINE W/O DYE: CPT | Performed by: FAMILY MEDICINE

## 2017-10-14 DIAGNOSIS — M05.79 RHEUMATOID ARTHRITIS INVOLVING MULTIPLE SITES WITH POSITIVE RHEUMATOID FACTOR (HCC): ICD-10-CM

## 2017-10-14 DIAGNOSIS — Z76.0 MEDICATION REFILL: ICD-10-CM

## 2017-10-14 DIAGNOSIS — I10 ESSENTIAL HYPERTENSION: Primary | ICD-10-CM

## 2017-10-16 RX ORDER — QUINAPRIL 20 MG/1
TABLET ORAL
Qty: 90 TABLET | Refills: 1 | Status: SHIPPED | OUTPATIENT
Start: 2017-10-16 | End: 2018-04-16

## 2017-10-16 NOTE — PROGRESS NOTES
Please call pt with ABNORMAL MRI of thoracic spine results:   CONCLUSION:    #1. There is an acute compression fracture of T9 with approximately 45% loss of vertebral body height.  There is mild posterior wall expansion with minimal epidural blood extending

## 2017-10-16 NOTE — TELEPHONE ENCOUNTER
QUINAPRIL TAB 20MG  Will file in chart as: QUINAPRIL HCL 20 MG Oral Tab  TAKE 1 TABLET NIGHTLY       Disp: 90 tablet Refills: 1    Class: Normal Start: 10/14/2017   For: Essential hypertension, Rheumatoid arthritis involving multiple sites with positive rh

## 2017-10-18 ENCOUNTER — TELEPHONE (OUTPATIENT)
Dept: NEUROLOGY | Facility: CLINIC | Age: 62
End: 2017-10-18

## 2017-10-18 ENCOUNTER — OFFICE VISIT (OUTPATIENT)
Dept: SURGERY | Facility: CLINIC | Age: 62
End: 2017-10-18

## 2017-10-18 ENCOUNTER — TELEPHONE (OUTPATIENT)
Dept: SURGERY | Facility: CLINIC | Age: 62
End: 2017-10-18

## 2017-10-18 VITALS — RESPIRATION RATE: 18 BRPM | HEART RATE: 58 BPM | SYSTOLIC BLOOD PRESSURE: 114 MMHG | DIASTOLIC BLOOD PRESSURE: 80 MMHG

## 2017-10-18 DIAGNOSIS — S22.000A COMPRESSION FRACTURE OF BODY OF THORACIC VERTEBRA (HCC): Primary | ICD-10-CM

## 2017-10-18 PROCEDURE — 99214 OFFICE O/P EST MOD 30 MIN: CPT | Performed by: ANESTHESIOLOGY

## 2017-10-18 RX ORDER — SODIUM FLUORIDE 6.1 MG/ML
1 GEL, DENTIFRICE DENTAL DAILY
COMMUNITY

## 2017-10-18 RX ORDER — HYDROCODONE BITARTRATE AND ACETAMINOPHEN 10; 325 MG/1; MG/1
TABLET ORAL
Refills: 0 | COMMUNITY
Start: 2017-10-10 | End: 2018-11-12 | Stop reason: ALTCHOICE

## 2017-10-18 NOTE — TELEPHONE ENCOUNTER
Provided pt with kyphoplaty information via [employer]. Also, per Dr. Ben Saab she should not lift anything for 3 days post procedure and always to wear her back brace even for walking. Encouraged pt to call our office if she has more questions.

## 2017-10-18 NOTE — TELEPHONE ENCOUNTER
Pt called and informed of Kyphoplasty scheduling process and brace ordering process. Pt verbalized understanding and appreciation. No further needs.

## 2017-10-18 NOTE — PROGRESS NOTES
HPI:    Patient ID: Gladys Andrews is a 57year old female.     HPI    Review of Systems         Current Outpatient Prescriptions:  PREVIDENT 5000 DRY MOUTH 1.1 % Dental Gel  Disp:  Rfl:    HYDROcodone-acetaminophen  MG Oral Tab TK 1 T PO  FID PRN Disp: Loratadine (CLARITIN) 10 MG Oral Cap Take  by mouth.  Disp:  Rfl:    VITAMIN D3 2000 UNIT OR CAPS 1 CAPSULE DAILY Disp:  Rfl:    CALCIUM + D 600-200 MG-UNIT OR TABS 1 TABLET TWICE DAILY WITH FOOD Disp:  Rfl:    MULTIVITAMINS OR TABS  1 po qday Disp:  Rfl:

## 2017-10-19 DIAGNOSIS — Z76.0 MEDICATION REFILL: ICD-10-CM

## 2017-10-19 DIAGNOSIS — B00.89 HERPETIC WHITLOW: Primary | ICD-10-CM

## 2017-10-21 RX ORDER — ACYCLOVIR 400 MG/1
400 TABLET ORAL DAILY
Qty: 90 TABLET | Refills: 1 | Status: SHIPPED | OUTPATIENT
Start: 2017-10-21 | End: 2018-04-19

## 2017-10-21 NOTE — TELEPHONE ENCOUNTER
ACYCLOVIR 400MG TABLETS  Will file in chart as: ACYCLOVIR 400 MG Oral Tab  TAKE 1 TABLET(400 MG) BY MOUTH TWICE DAILY       Disp: 90 tablet Refills: 0    Class: Normal Start: 10/19/2017   For: Herpetic anabel  Originally ordered: 8 months ago by Vince Suero

## 2017-10-22 NOTE — PROGRESS NOTES
Name: Rhonda Baer   : 1955   DOS: 10/18/2017     Chief complaint: Lower thoracic pain    History of present illness:  Rhonda Baer is a 57year old female complaining of  pain in the lower lower thoracic area for 1 month.  Pain started after she li Hypercalcemia    • IBS (irritable bowel syndrome)    • Inclusion cyst     epidermal   • Internal hemorrhoids 12/18/08    Grade II   • Irregular Z line of esophagus 12/18/08   • Lipid screening 03/30/12   • Localized primary carpometacarpal osteoarthritis 7 Take 1 tablet (200 mg total) by mouth 2 (two) times daily. Disp: 180 tablet Rfl: 1   RaNITidine HCl (ZANTAC) 150 MG Oral Tab Take 1 tablet (150 mg total) by mouth 2 (two) times daily.  Disp: 60 tablet Rfl: 0   Clobetasol Propionate 0.05 % External Ointment HISTORY      Comment: right carpal tunnel surgery  9/25/12: OTHER SURGICAL HISTORY      Comment: left carpal tunnel surgery  1/11/13: OTHER SURGICAL HISTORY      Comment: trigger thumb injections  4/13: OTHER SURGICAL HISTORY      Comment: revision right f Specifically denies phlebitis, DVT, PE, bleeding problems, hemophilia or any other vascular problems. Dermatology: Negative for all skin problems. Hematolgy/Lymph: negative for all the hematological problems.   Neuropsychiatric:  Denies, anxiety, depressi extension and lateral rotation of the spine makes the pain worse. Cole's test is negative bilaterally. Gowers’ sign is the negative. Dorsally pedis is palpable bilaterally.  Heel walking and toe walking is normal.  Motor Examination:   (R)   (L)   Hip put her on a brace. Thank you very much for referring the patient to me. Aakash Zuñiga M.D.   Pain Management

## 2017-10-25 ENCOUNTER — HOSPITAL ENCOUNTER (OUTPATIENT)
Dept: INTERVENTIONAL RADIOLOGY/VASCULAR | Facility: HOSPITAL | Age: 62
Discharge: HOME | End: 2017-10-25
Attending: ANESTHESIOLOGY | Admitting: ANESTHESIOLOGY
Payer: MEDICARE

## 2017-10-25 VITALS
RESPIRATION RATE: 22 BRPM | SYSTOLIC BLOOD PRESSURE: 146 MMHG | TEMPERATURE: 97 F | HEART RATE: 74 BPM | WEIGHT: 201 LBS | DIASTOLIC BLOOD PRESSURE: 73 MMHG | OXYGEN SATURATION: 93 % | HEIGHT: 65 IN | BODY MASS INDEX: 33.49 KG/M2

## 2017-10-25 DIAGNOSIS — S22.000A COMPRESSION FRACTURE OF BODY OF THORACIC VERTEBRA (HCC): ICD-10-CM

## 2017-10-25 PROCEDURE — 0PS43ZZ REPOSITION THORACIC VERTEBRA, PERCUTANEOUS APPROACH: ICD-10-PCS | Performed by: ANESTHESIOLOGY

## 2017-10-25 PROCEDURE — 0PU43JZ SUPPLEMENT THORACIC VERTEBRA WITH SYNTHETIC SUBSTITUTE, PERCUTANEOUS APPROACH: ICD-10-PCS | Performed by: ANESTHESIOLOGY

## 2017-10-25 PROCEDURE — 22513 PERQ VERTEBRAL AUGMENTATION: CPT | Performed by: ANESTHESIOLOGY

## 2017-10-25 RX ORDER — LABETALOL HYDROCHLORIDE 5 MG/ML
5 INJECTION, SOLUTION INTRAVENOUS EVERY 5 MIN PRN
Status: DISCONTINUED | OUTPATIENT
Start: 2017-10-25 | End: 2017-10-25

## 2017-10-25 RX ORDER — MIDAZOLAM HYDROCHLORIDE 1 MG/ML
1 INJECTION INTRAMUSCULAR; INTRAVENOUS EVERY 5 MIN PRN
Status: DISCONTINUED | OUTPATIENT
Start: 2017-10-25 | End: 2017-10-25

## 2017-10-25 RX ORDER — IBUPROFEN 200 MG
600 TABLET ORAL ONCE AS NEEDED
Status: DISCONTINUED | OUTPATIENT
Start: 2017-10-25 | End: 2017-10-25

## 2017-10-25 RX ORDER — SODIUM CHLORIDE, SODIUM LACTATE, POTASSIUM CHLORIDE, CALCIUM CHLORIDE 600; 310; 30; 20 MG/100ML; MG/100ML; MG/100ML; MG/100ML
INJECTION, SOLUTION INTRAVENOUS CONTINUOUS
Status: DISCONTINUED | OUTPATIENT
Start: 2017-10-25 | End: 2017-10-25

## 2017-10-25 RX ORDER — HYDROCODONE BITARTRATE AND ACETAMINOPHEN 5; 325 MG/1; MG/1
1 TABLET ORAL AS NEEDED
Status: DISCONTINUED | OUTPATIENT
Start: 2017-10-25 | End: 2017-10-25

## 2017-10-25 RX ORDER — NALOXONE HYDROCHLORIDE 0.4 MG/ML
80 INJECTION, SOLUTION INTRAMUSCULAR; INTRAVENOUS; SUBCUTANEOUS AS NEEDED
Status: DISCONTINUED | OUTPATIENT
Start: 2017-10-25 | End: 2017-10-25

## 2017-10-25 RX ORDER — HYDROCODONE BITARTRATE AND ACETAMINOPHEN 5; 325 MG/1; MG/1
2 TABLET ORAL AS NEEDED
Status: DISCONTINUED | OUTPATIENT
Start: 2017-10-25 | End: 2017-10-25

## 2017-10-25 RX ORDER — HYDROMORPHONE HYDROCHLORIDE 1 MG/ML
0.4 INJECTION, SOLUTION INTRAMUSCULAR; INTRAVENOUS; SUBCUTANEOUS EVERY 5 MIN PRN
Status: DISCONTINUED | OUTPATIENT
Start: 2017-10-25 | End: 2017-10-25

## 2017-10-25 RX ORDER — CEFAZOLIN SODIUM 1 G/3ML
2 INJECTION, POWDER, FOR SOLUTION INTRAMUSCULAR; INTRAVENOUS ONCE
Status: DISCONTINUED | OUTPATIENT
Start: 2017-10-25 | End: 2017-10-25

## 2017-10-25 RX ORDER — LIDOCAINE HYDROCHLORIDE 10 MG/ML
INJECTION, SOLUTION INFILTRATION; PERINEURAL
Status: COMPLETED
Start: 2017-10-25 | End: 2017-10-25

## 2017-10-25 NOTE — PROGRESS NOTES
Pt alert and stable for discharge. Pt ambulated and states that she \"feels great. \" Dressing on her back has one drop of old blood. IV d/adelina and pt escorted to lobby via wheelchair.

## 2017-10-26 NOTE — OPERATIVE REPORT
[facility]  Operative Report  10/26/2017     Pam Johnson Patient Status:  Outpatient in a Bed    1955 MRN [id number]   Location [address] [facility] Attending No att. providers found   [address] Day # 0 PCP Rosa Townsend (Inactive) guide pin was inserted through the ll-gauge needle to a 0.3 mm from the anterior cortex. AP and lateral images were taken to verify position and trajectory. Alongside of the guide pin a 0.5 cm paramedian incision was made.  The needle was then removed lejeevan incisions were closed with Dermabond. The patient was kept in the prone position for approximately 10 minutes post cement injection. The patient was then turned supine, monitored briefly and returned to the floor.  The patient was moving both his lower extr

## 2017-10-27 NOTE — H&P
Name: Michelle Milton   : 1955   DOS: 10/25/2017      Chief complaint: Lower thoracic pain     History of present illness:  Michelle Milton is a 57year old female complaining of  pain in the lower lower thoracic area for 1 month.  Pain started after she History of bone density study 10/08/12   • Hypercalcemia     • IBS (irritable bowel syndrome)     • Inclusion cyst       epidermal   • Internal hemorrhoids 12/18/08     Grade II   • Irregular Z line of esophagus 12/18/08   • Lipid screening 03/30/12   • Lo EVERY PM Disp: 270 capsule Rfl: 1   Hydroxychloroquine Sulfate 200 MG Oral Tab Take 1 tablet (200 mg total) by mouth 2 (two) times daily.  Disp: 180 tablet Rfl: 1   RaNITidine HCl (ZANTAC) 150 MG Oral Tab Take 1 tablet (150 mg total) by mouth 2 (two) times HISTORY      Comment: Shave biopsy, skin, right shoulder  9/11/12: OTHER SURGICAL HISTORY      Comment: right carpal tunnel surgery  9/25/12: OTHER SURGICAL HISTORY      Comment: left carpal tunnel surgery  1/11/13: OTHER SURGICAL HISTORY      Comment: tri hematuria. Musculoskeletal:  Negative for all musculoskeletal problems. Vascular: Negative. Specifically denies phlebitis, DVT, PE, bleeding problems, hemophilia or any other vascular problems. Dermatology: Negative for all skin problems.   Hematolgy/Lym 5     Deep Tendon Reflexes:                                                                                                                                  Biceps:                                                     5 (R)                                 (L)               Hip Abduction:                                      5                                     5               Hip Flexion:                               5                                     5 N                                 N               L5:                                                N                                 N                S1:                                                N                                 N

## 2017-11-07 ENCOUNTER — OFFICE VISIT (OUTPATIENT)
Dept: FAMILY MEDICINE CLINIC | Facility: CLINIC | Age: 62
End: 2017-11-07

## 2017-11-07 ENCOUNTER — LAB ENCOUNTER (OUTPATIENT)
Dept: LAB | Age: 62
End: 2017-11-07
Attending: FAMILY MEDICINE
Payer: MEDICARE

## 2017-11-07 VITALS
BODY MASS INDEX: 31.69 KG/M2 | TEMPERATURE: 98 F | HEART RATE: 64 BPM | HEIGHT: 66 IN | WEIGHT: 197.19 LBS | DIASTOLIC BLOOD PRESSURE: 70 MMHG | SYSTOLIC BLOOD PRESSURE: 130 MMHG | RESPIRATION RATE: 16 BRPM

## 2017-11-07 DIAGNOSIS — R94.6 ABNORMAL THYROID FUNCTION TEST: ICD-10-CM

## 2017-11-07 DIAGNOSIS — Z12.39 BREAST CANCER SCREENING: ICD-10-CM

## 2017-11-07 DIAGNOSIS — Z00.00 ANNUAL PHYSICAL EXAM: Primary | ICD-10-CM

## 2017-11-07 DIAGNOSIS — Z11.59 NEED FOR HEPATITIS C SCREENING TEST: ICD-10-CM

## 2017-11-07 DIAGNOSIS — Z00.00 ANNUAL PHYSICAL EXAM: ICD-10-CM

## 2017-11-07 DIAGNOSIS — Z28.21 INFLUENZA VACCINATION DECLINED BY PATIENT: ICD-10-CM

## 2017-11-07 PROCEDURE — 85025 COMPLETE CBC W/AUTO DIFF WBC: CPT

## 2017-11-07 PROCEDURE — 84443 ASSAY THYROID STIM HORMONE: CPT

## 2017-11-07 PROCEDURE — 86803 HEPATITIS C AB TEST: CPT

## 2017-11-07 PROCEDURE — 36415 COLL VENOUS BLD VENIPUNCTURE: CPT

## 2017-11-07 PROCEDURE — 80061 LIPID PANEL: CPT

## 2017-11-07 PROCEDURE — G0439 PPPS, SUBSEQ VISIT: HCPCS | Performed by: FAMILY MEDICINE

## 2017-11-07 PROCEDURE — 84439 ASSAY OF FREE THYROXINE: CPT

## 2017-11-07 NOTE — PROGRESS NOTES
HPI:   Roscoe Jennings is a 57year old female who presents for a Medicare Subsequent Annual Wellness visit (Pt already had Initial Annual Wellness). She is doing well. She is primary caregiver for ill . She is working to get home healthy aid. WBC 4.47 08/21/2017   HGB 11.5 (L) 08/21/2017    08/21/2017        ALLERGIES:   She is allergic to ioxaglate; radiology contrast iodinated dyes; erythromycin base; and allergy.     CURRENT MEDICATIONS:     Outpatient Prescriptions Marked as Taking Anesthesia complication; Asthma; Atelectasis; Atherosclerosis; Bilateral wrist pain; Bronchitis, acute, with bronchospasm; BV (bacterial vaginosis); Cancer ([facility] (9/04); Candidiasis of skin and nails; Candidiasis of vulva and vagina (09/04/12);  Carpal tunn surgical history (9/25/12); other surgical history (1/11/13); other surgical history (4/13); radiation right (2004); lumpectomy right (2004); barry biopsy stereotactic nodule 1 site right (2004); back surgery (8/31/16); spine surgery procedure unlisted; and without obvious abnormality, atraumatic   Eyes:  PERRL, conjunctiva/corneas clear, EOM's intact both eyes   Ears:  Normal TM's and external ear canals, both ears   Nose: Nares normal, septum midline,mucosa normal, no drainage or sinus tenderness   Throat: and all orders for this visit:    Annual physical exam  -     CBC WITH DIFFERENTIAL WITH PLATELET; Future  -     LIPID PANEL; Future  -     Cancel: HEMOGLOBIN A1C; Future    Breast cancer screening  -     LUDY SCREENING BILAT (CPT=77067);  Future    Need for state?: Good    How do you maintain positive mental well-being?: Social Interaction    If you are a male age 38-65 or a female age 47-67, do you take aspirin?: No    Have you had any immunizations at another office such as Influenza, Hepatitis B, Tetanus, or Procedure   Diabetes Screening      HbgA1C   Annually No results found for: A1C No flowsheet data found.     Fasting Blood Sugar (FSB)Annually   Glucose (mg/dL)   Date Value   10/25/2017 90   10/25/2012 88   ----------  GLUCOSE (mg/dL)   Date Value   03/ 23 (Pneumovax)  Covered Once after 65 10/01/2008 Please get once after your 65th birthday    Hepatitis B for Moderate/High Risk No vaccine history found Medium/high risk factors:   End-stage renal disease   Hemophiliacs who received Factor VIII or IX armaan Annually No flowsheet data found. No flowsheet data found. COPD      Spirometry Testing Annually Spirometry date:  No flowsheet data found.          Template: RAUL NORTON MEDICARE ANNUAL ASSESSMENT FEMALE [[id number]]

## 2017-11-07 NOTE — PATIENT INSTRUCTIONS
Thank you for allowing me to participate in your care today. I will contact you with any results from today's visit. Lab results are typically available in 2-3 days for blood tests, and 3-5 days for any cultures or Paps.    Please let me know if you hav increased risk for infection At routine exams   Hepatitis C Anyone at increased risk; 1 time for those born between [facility] At routine exams   High cholesterol or triglycerides All women in this age group who are at risk for coronary artery disease A vaccine (PCV13) and pneumococcal polysaccharide vaccine (PPSV23) Women at increased risk for infection – talk with your healthcare provider PCV13: 1 dose ages 23 to 72 (protects against 13 types of pneumococcal bacteria)  PPSV23: 1 to 2 doses through age 10

## 2017-11-20 ENCOUNTER — TELEPHONE (OUTPATIENT)
Dept: SURGERY | Facility: CLINIC | Age: 62
End: 2017-11-20

## 2017-11-27 NOTE — TELEPHONE ENCOUNTER
Addended chart note faxed to Rajwinder Umaña at [facility] for braced necessity. Confirmation received.

## 2017-11-28 ENCOUNTER — HOSPITAL ENCOUNTER (OUTPATIENT)
Dept: MAMMOGRAPHY | Age: 62
Discharge: HOME | End: 2017-11-28
Attending: FAMILY MEDICINE
Payer: MEDICARE

## 2017-11-28 DIAGNOSIS — Z12.39 BREAST CANCER SCREENING: ICD-10-CM

## 2017-11-28 PROCEDURE — 77067 SCR MAMMO BI INCL CAD: CPT | Performed by: FAMILY MEDICINE

## 2018-01-23 ENCOUNTER — MED REC SCAN ONLY (OUTPATIENT)
Dept: FAMILY MEDICINE CLINIC | Facility: CLINIC | Age: 63
End: 2018-01-23

## 2018-01-29 ENCOUNTER — LAB REQUISITION (OUTPATIENT)
Dept: LAB | Facility: HOSPITAL | Age: 63
End: 2018-01-29
Payer: MEDICARE

## 2018-01-29 DIAGNOSIS — M86.9 OSTEOMYELITIS (HCC): ICD-10-CM

## 2018-01-29 LAB
ALBUMIN SERPL-MCNC: 3.3 G/DL (ref 3.5–4.8)
ALP LIVER SERPL-CCNC: 112 U/L (ref 50–130)
ALT SERPL-CCNC: 6 U/L (ref 14–54)
AST SERPL-CCNC: 17 U/L (ref 15–41)
BASOPHILS # BLD AUTO: 0.08 X10(3) UL (ref 0–0.1)
BASOPHILS NFR BLD AUTO: 1.5 %
BILIRUB SERPL-MCNC: 0.2 MG/DL (ref 0.1–2)
BUN BLD-MCNC: 15 MG/DL (ref 8–20)
C-REACTIVE PROTEIN: 0.58 MG/DL (ref ?–1)
CALCIUM BLD-MCNC: 9.2 MG/DL (ref 8.3–10.3)
CHLORIDE: 104 MMOL/L (ref 101–111)
CO2: 29 MMOL/L (ref 22–32)
CREAT BLD-MCNC: 0.73 MG/DL (ref 0.55–1.02)
EOSINOPHIL # BLD AUTO: 0.59 X10(3) UL (ref 0–0.3)
EOSINOPHIL NFR BLD AUTO: 11.1 %
ERYTHROCYTE [DISTWIDTH] IN BLOOD BY AUTOMATED COUNT: 15.5 % (ref 11.5–16)
GLUCOSE BLD-MCNC: 79 MG/DL (ref 70–99)
HCT VFR BLD AUTO: 36.3 % (ref 34–50)
HGB BLD-MCNC: 11.2 G/DL (ref 12–16)
IMMATURE GRANULOCYTE COUNT: 0.01 X10(3) UL (ref 0–1)
IMMATURE GRANULOCYTE RATIO %: 0.2 %
LYMPHOCYTES # BLD AUTO: 2.06 X10(3) UL (ref 0.9–4)
LYMPHOCYTES NFR BLD AUTO: 38.8 %
M PROTEIN MFR SERPL ELPH: 7.6 G/DL (ref 6.1–8.3)
MCH RBC QN AUTO: 28.3 PG (ref 27–33.2)
MCHC RBC AUTO-ENTMCNC: 30.9 G/DL (ref 31–37)
MCV RBC AUTO: 91.7 FL (ref 81–100)
MONOCYTES # BLD AUTO: 0.63 X10(3) UL (ref 0.1–0.6)
MONOCYTES NFR BLD AUTO: 11.9 %
NEUTROPHIL ABS PRELIM: 1.94 X10 (3) UL (ref 1.3–6.7)
NEUTROPHILS # BLD AUTO: 1.94 X10(3) UL (ref 1.3–6.7)
NEUTROPHILS NFR BLD AUTO: 36.5 %
PLATELET # BLD AUTO: 201 10(3)UL (ref 150–450)
POTASSIUM SERPL-SCNC: 4.5 MMOL/L (ref 3.6–5.1)
RBC # BLD AUTO: 3.96 X10(6)UL (ref 3.8–5.1)
RED CELL DISTRIBUTION WIDTH-SD: 51.3 FL (ref 35.1–46.3)
SED RATE-ML: 31 MM/HR (ref 0–25)
SODIUM SERPL-SCNC: 139 MMOL/L (ref 136–144)
WBC # BLD AUTO: 5.3 X10(3) UL (ref 4–13)

## 2018-01-29 PROCEDURE — 86140 C-REACTIVE PROTEIN: CPT | Performed by: FAMILY MEDICINE

## 2018-01-29 PROCEDURE — 80053 COMPREHEN METABOLIC PANEL: CPT | Performed by: FAMILY MEDICINE

## 2018-01-29 PROCEDURE — 85025 COMPLETE CBC W/AUTO DIFF WBC: CPT | Performed by: FAMILY MEDICINE

## 2018-01-29 PROCEDURE — 85652 RBC SED RATE AUTOMATED: CPT | Performed by: FAMILY MEDICINE

## 2018-02-03 NOTE — PROGRESS NOTES
Pt has OV on 2/5/18:   Sed rate up to 31 which is an inflammation marker. CMP normal other than low liver enzymes and albumin due to fasting state. CRP stable. CBC unchanged in past two months with anemia of chronic disease noted.  Will review with pt at [facility]

## 2018-02-05 ENCOUNTER — LAB REQUISITION (OUTPATIENT)
Dept: LAB | Age: 63
End: 2018-02-05
Attending: FAMILY MEDICINE
Payer: MEDICARE

## 2018-02-05 DIAGNOSIS — I10 ESSENTIAL (PRIMARY) HYPERTENSION: ICD-10-CM

## 2018-02-05 DIAGNOSIS — T84.418D: ICD-10-CM

## 2018-02-05 DIAGNOSIS — B95.61 METHICILLIN SUSCEPTIBLE STAPHYLOCOCCUS AUREUS INFECTION AS THE CAUSE OF DISEASES CLASSIFIED ELSEWHERE: ICD-10-CM

## 2018-02-05 DIAGNOSIS — M86.9 OSTEOMYELITIS (HCC): ICD-10-CM

## 2018-02-05 LAB
ALBUMIN SERPL-MCNC: 3.4 G/DL (ref 3.5–4.8)
ALP LIVER SERPL-CCNC: 103 U/L (ref 50–130)
ALT SERPL-CCNC: 10 U/L (ref 14–54)
AST SERPL-CCNC: 20 U/L (ref 15–41)
BASOPHILS # BLD AUTO: 0.09 X10(3) UL (ref 0–0.1)
BASOPHILS NFR BLD AUTO: 1.9 %
BILIRUB SERPL-MCNC: 0.3 MG/DL (ref 0.1–2)
BUN BLD-MCNC: 17 MG/DL (ref 8–20)
C-REACTIVE PROTEIN: 0.9 MG/DL (ref ?–1)
CALCIUM BLD-MCNC: 9.6 MG/DL (ref 8.3–10.3)
CHLORIDE: 103 MMOL/L (ref 101–111)
CO2: 30 MMOL/L (ref 22–32)
CREAT BLD-MCNC: 0.85 MG/DL (ref 0.55–1.02)
EOSINOPHIL # BLD AUTO: 0.42 X10(3) UL (ref 0–0.3)
EOSINOPHIL NFR BLD AUTO: 9 %
ERYTHROCYTE [DISTWIDTH] IN BLOOD BY AUTOMATED COUNT: 15.6 % (ref 11.5–16)
GLUCOSE BLD-MCNC: 84 MG/DL (ref 70–99)
HCT VFR BLD AUTO: 37.5 % (ref 34–50)
HGB BLD-MCNC: 11.6 G/DL (ref 12–16)
IMMATURE GRANULOCYTE COUNT: 0.01 X10(3) UL (ref 0–1)
IMMATURE GRANULOCYTE RATIO %: 0.2 %
LYMPHOCYTES # BLD AUTO: 1.56 X10(3) UL (ref 0.9–4)
LYMPHOCYTES NFR BLD AUTO: 33.5 %
M PROTEIN MFR SERPL ELPH: 7.6 G/DL (ref 6.1–8.3)
MCH RBC QN AUTO: 28.4 PG (ref 27–33.2)
MCHC RBC AUTO-ENTMCNC: 30.9 G/DL (ref 31–37)
MCV RBC AUTO: 91.9 FL (ref 81–100)
MONOCYTES # BLD AUTO: 0.58 X10(3) UL (ref 0.1–0.6)
MONOCYTES NFR BLD AUTO: 12.5 %
NEUTROPHIL ABS PRELIM: 1.99 X10 (3) UL (ref 1.3–6.7)
NEUTROPHILS # BLD AUTO: 1.99 X10(3) UL (ref 1.3–6.7)
NEUTROPHILS NFR BLD AUTO: 42.9 %
PLATELET # BLD AUTO: 221 10(3)UL (ref 150–450)
POTASSIUM SERPL-SCNC: 4.2 MMOL/L (ref 3.6–5.1)
RBC # BLD AUTO: 4.08 X10(6)UL (ref 3.8–5.1)
RED CELL DISTRIBUTION WIDTH-SD: 51.7 FL (ref 35.1–46.3)
SED RATE-ML: 28 MM/HR (ref 0–25)
SODIUM SERPL-SCNC: 139 MMOL/L (ref 136–144)
WBC # BLD AUTO: 4.7 X10(3) UL (ref 4–13)

## 2018-02-05 PROCEDURE — 85025 COMPLETE CBC W/AUTO DIFF WBC: CPT | Performed by: FAMILY MEDICINE

## 2018-02-05 PROCEDURE — 85652 RBC SED RATE AUTOMATED: CPT | Performed by: FAMILY MEDICINE

## 2018-02-05 PROCEDURE — 86140 C-REACTIVE PROTEIN: CPT | Performed by: FAMILY MEDICINE

## 2018-02-05 PROCEDURE — 80053 COMPREHEN METABOLIC PANEL: CPT | Performed by: FAMILY MEDICINE

## 2018-02-13 ENCOUNTER — LAB REQUISITION (OUTPATIENT)
Dept: LAB | Facility: HOSPITAL | Age: 63
End: 2018-02-13
Payer: MEDICARE

## 2018-02-13 DIAGNOSIS — I10 ESSENTIAL (PRIMARY) HYPERTENSION: ICD-10-CM

## 2018-02-13 DIAGNOSIS — M32.9 SYSTEMIC LUPUS ERYTHEMATOSUS (HCC): ICD-10-CM

## 2018-02-13 DIAGNOSIS — M86.9 OSTEOMYELITIS (HCC): ICD-10-CM

## 2018-02-13 LAB
ALBUMIN SERPL-MCNC: 3.4 G/DL (ref 3.5–4.8)
ALP LIVER SERPL-CCNC: 108 U/L (ref 50–130)
ALT SERPL-CCNC: <6 U/L (ref 14–54)
AST SERPL-CCNC: 17 U/L (ref 15–41)
BASOPHILS # BLD AUTO: 0.1 X10(3) UL (ref 0–0.1)
BASOPHILS NFR BLD AUTO: 1.8 %
BILIRUB SERPL-MCNC: 0.2 MG/DL (ref 0.1–2)
BUN BLD-MCNC: 21 MG/DL (ref 8–20)
C-REACTIVE PROTEIN: 0.83 MG/DL (ref ?–1)
CALCIUM BLD-MCNC: 9.4 MG/DL (ref 8.3–10.3)
CHLORIDE: 102 MMOL/L (ref 101–111)
CO2: 31 MMOL/L (ref 22–32)
CREAT BLD-MCNC: 0.91 MG/DL (ref 0.55–1.02)
EOSINOPHIL # BLD AUTO: 0.57 X10(3) UL (ref 0–0.3)
EOSINOPHIL NFR BLD AUTO: 10.4 %
ERYTHROCYTE [DISTWIDTH] IN BLOOD BY AUTOMATED COUNT: 15.8 % (ref 11.5–16)
GLUCOSE BLD-MCNC: 87 MG/DL (ref 70–99)
HCT VFR BLD AUTO: 35.3 % (ref 34–50)
HGB BLD-MCNC: 11.1 G/DL (ref 12–16)
IMMATURE GRANULOCYTE COUNT: 0 X10(3) UL (ref 0–1)
IMMATURE GRANULOCYTE RATIO %: 0 %
LYMPHOCYTES # BLD AUTO: 1.79 X10(3) UL (ref 0.9–4)
LYMPHOCYTES NFR BLD AUTO: 32.7 %
M PROTEIN MFR SERPL ELPH: 7.6 G/DL (ref 6.1–8.3)
MCH RBC QN AUTO: 28.9 PG (ref 27–33.2)
MCHC RBC AUTO-ENTMCNC: 31.4 G/DL (ref 31–37)
MCV RBC AUTO: 91.9 FL (ref 81–100)
MONOCYTES # BLD AUTO: 0.68 X10(3) UL (ref 0.1–1)
MONOCYTES NFR BLD AUTO: 12.4 %
NEUTROPHIL ABS PRELIM: 2.34 X10 (3) UL (ref 1.3–6.7)
NEUTROPHILS # BLD AUTO: 2.34 X10(3) UL (ref 1.3–6.7)
NEUTROPHILS NFR BLD AUTO: 42.7 %
PLATELET # BLD AUTO: 268 10(3)UL (ref 150–450)
POTASSIUM SERPL-SCNC: 4.2 MMOL/L (ref 3.6–5.1)
RBC # BLD AUTO: 3.84 X10(6)UL (ref 3.8–5.1)
RED CELL DISTRIBUTION WIDTH-SD: 53.3 FL (ref 35.1–46.3)
SED RATE-ML: 33 MM/HR (ref 0–25)
SODIUM SERPL-SCNC: 139 MMOL/L (ref 136–144)
WBC # BLD AUTO: 5.5 X10(3) UL (ref 4–13)

## 2018-02-13 PROCEDURE — 86140 C-REACTIVE PROTEIN: CPT | Performed by: ORTHOPAEDIC SURGERY

## 2018-02-13 PROCEDURE — 80053 COMPREHEN METABOLIC PANEL: CPT | Performed by: ORTHOPAEDIC SURGERY

## 2018-02-13 PROCEDURE — 85025 COMPLETE CBC W/AUTO DIFF WBC: CPT | Performed by: ORTHOPAEDIC SURGERY

## 2018-02-13 PROCEDURE — 85652 RBC SED RATE AUTOMATED: CPT | Performed by: ORTHOPAEDIC SURGERY

## 2018-02-23 ENCOUNTER — TELEPHONE (OUTPATIENT)
Dept: FAMILY MEDICINE CLINIC | Facility: CLINIC | Age: 63
End: 2018-02-23

## 2018-02-23 ENCOUNTER — OFFICE VISIT (OUTPATIENT)
Dept: FAMILY MEDICINE CLINIC | Facility: CLINIC | Age: 63
End: 2018-02-23

## 2018-02-23 VITALS
HEART RATE: 70 BPM | RESPIRATION RATE: 16 BRPM | SYSTOLIC BLOOD PRESSURE: 126 MMHG | WEIGHT: 193 LBS | BODY MASS INDEX: 31.02 KG/M2 | HEIGHT: 66 IN | TEMPERATURE: 97 F | DIASTOLIC BLOOD PRESSURE: 82 MMHG

## 2018-02-23 DIAGNOSIS — M86.171 ACUTE OSTEOMYELITIS OF METATARSAL BONE OF RIGHT FOOT (HCC): Primary | ICD-10-CM

## 2018-02-23 PROCEDURE — 86160 COMPLEMENT ANTIGEN: CPT | Performed by: INTERNAL MEDICINE

## 2018-02-23 PROCEDURE — 86225 DNA ANTIBODY NATIVE: CPT | Performed by: INTERNAL MEDICINE

## 2018-02-23 PROCEDURE — 99213 OFFICE O/P EST LOW 20 MIN: CPT | Performed by: FAMILY MEDICINE

## 2018-02-23 NOTE — PROGRESS NOTES
[facility] Visit Note  2/23/2018      Subjective:      Patient ID: Dominic Hernandez is a 57year old female. Chief Complaint:  Patient presents with:  Post-Op: S/P Right Foot surgery, Hardware Removal 1/4/18 with Dr. Rachna Oquendo.    Imm/Inj: declines medical conditions.

## 2018-02-23 NOTE — TELEPHONE ENCOUNTER
Completed in office parking placard form. Copies made by Mao Pavon, one given to pt, one sent to scan and one placed in triage.

## 2018-02-27 PROCEDURE — 36415 COLL VENOUS BLD VENIPUNCTURE: CPT | Performed by: INTERNAL MEDICINE

## 2018-02-27 PROCEDURE — 82570 ASSAY OF URINE CREATININE: CPT | Performed by: INTERNAL MEDICINE

## 2018-02-27 PROCEDURE — 84156 ASSAY OF PROTEIN URINE: CPT | Performed by: INTERNAL MEDICINE

## 2018-02-27 PROCEDURE — 81003 URINALYSIS AUTO W/O SCOPE: CPT | Performed by: INTERNAL MEDICINE

## 2018-03-19 ENCOUNTER — LAB ENCOUNTER (OUTPATIENT)
Dept: LAB | Age: 63
End: 2018-03-19
Attending: INTERNAL MEDICINE
Payer: MEDICARE

## 2018-03-19 DIAGNOSIS — M86.671 CHRONIC OSTEOMYELITIS OF HINDFOOT, RIGHT (HCC): Primary | ICD-10-CM

## 2018-03-19 LAB
ALBUMIN SERPL-MCNC: 3.3 G/DL (ref 3.5–4.8)
ALP LIVER SERPL-CCNC: 90 U/L (ref 50–130)
ALT SERPL-CCNC: 14 U/L (ref 14–54)
AST SERPL-CCNC: 14 U/L (ref 15–41)
BASOPHILS # BLD AUTO: 0.07 X10(3) UL (ref 0–0.1)
BASOPHILS NFR BLD AUTO: 1.7 %
BILIRUB SERPL-MCNC: 0.3 MG/DL (ref 0.1–2)
BUN BLD-MCNC: 16 MG/DL (ref 8–20)
C-REACTIVE PROTEIN: 0.55 MG/DL (ref ?–1)
CALCIUM BLD-MCNC: 8.6 MG/DL (ref 8.3–10.3)
CHLORIDE: 108 MMOL/L (ref 101–111)
CO2: 27 MMOL/L (ref 22–32)
CREAT BLD-MCNC: 0.71 MG/DL (ref 0.55–1.02)
EOSINOPHIL # BLD AUTO: 0.47 X10(3) UL (ref 0–0.3)
EOSINOPHIL NFR BLD AUTO: 11.7 %
ERYTHROCYTE [DISTWIDTH] IN BLOOD BY AUTOMATED COUNT: 16 % (ref 11.5–16)
GLUCOSE BLD-MCNC: 78 MG/DL (ref 70–99)
HCT VFR BLD AUTO: 34.2 % (ref 34–50)
HGB BLD-MCNC: 10.7 G/DL (ref 12–16)
IMMATURE GRANULOCYTE COUNT: 0.01 X10(3) UL (ref 0–1)
IMMATURE GRANULOCYTE RATIO %: 0.2 %
LYMPHOCYTES # BLD AUTO: 1.32 X10(3) UL (ref 0.9–4)
LYMPHOCYTES NFR BLD AUTO: 32.8 %
M PROTEIN MFR SERPL ELPH: 7 G/DL (ref 6.1–8.3)
MCH RBC QN AUTO: 29.2 PG (ref 27–33.2)
MCHC RBC AUTO-ENTMCNC: 31.3 G/DL (ref 31–37)
MCV RBC AUTO: 93.2 FL (ref 81–100)
MONOCYTES # BLD AUTO: 0.58 X10(3) UL (ref 0.1–1)
MONOCYTES NFR BLD AUTO: 14.4 %
NEUTROPHIL ABS PRELIM: 1.57 X10 (3) UL (ref 1.3–6.7)
NEUTROPHILS # BLD AUTO: 1.57 X10(3) UL (ref 1.3–6.7)
NEUTROPHILS NFR BLD AUTO: 39.2 %
PLATELET # BLD AUTO: 228 10(3)UL (ref 150–450)
POTASSIUM SERPL-SCNC: 4.6 MMOL/L (ref 3.6–5.1)
RBC # BLD AUTO: 3.67 X10(6)UL (ref 3.8–5.1)
RED CELL DISTRIBUTION WIDTH-SD: 55 FL (ref 35.1–46.3)
SED RATE-ML: 33 MM/HR (ref 0–25)
SODIUM SERPL-SCNC: 140 MMOL/L (ref 136–144)
WBC # BLD AUTO: 4 X10(3) UL (ref 4–13)

## 2018-03-19 PROCEDURE — 85652 RBC SED RATE AUTOMATED: CPT

## 2018-03-19 PROCEDURE — 85025 COMPLETE CBC W/AUTO DIFF WBC: CPT

## 2018-03-19 PROCEDURE — 86140 C-REACTIVE PROTEIN: CPT

## 2018-03-19 PROCEDURE — 80053 COMPREHEN METABOLIC PANEL: CPT

## 2018-04-13 ENCOUNTER — LAB ENCOUNTER (OUTPATIENT)
Dept: LAB | Age: 63
End: 2018-04-13
Attending: INTERNAL MEDICINE
Payer: MEDICARE

## 2018-04-13 DIAGNOSIS — M86.071 ACUTE HEMATOGENOUS OSTEOMYELITIS OF RIGHT FOOT (HCC): Primary | ICD-10-CM

## 2018-04-13 PROCEDURE — 85652 RBC SED RATE AUTOMATED: CPT

## 2018-04-13 PROCEDURE — 85025 COMPLETE CBC W/AUTO DIFF WBC: CPT

## 2018-04-13 PROCEDURE — 80053 COMPREHEN METABOLIC PANEL: CPT

## 2018-04-13 PROCEDURE — 86140 C-REACTIVE PROTEIN: CPT

## 2018-04-16 ENCOUNTER — TELEPHONE (OUTPATIENT)
Dept: FAMILY MEDICINE CLINIC | Facility: CLINIC | Age: 63
End: 2018-04-16

## 2018-04-16 DIAGNOSIS — I10 ESSENTIAL HYPERTENSION: ICD-10-CM

## 2018-04-16 DIAGNOSIS — Z76.0 MEDICATION REFILL: ICD-10-CM

## 2018-04-16 RX ORDER — QUINAPRIL 20 MG/1
TABLET ORAL
Qty: 90 TABLET | Refills: 1 | Status: SHIPPED | OUTPATIENT
Start: 2018-04-16 | End: 2018-12-07

## 2018-04-16 NOTE — TELEPHONE ENCOUNTER
Requesting Quinapril 20 mg  LOV: 2/23/18  RTC: 6 months  Last Relevant Labs: 4/13/18  Filled: 10/16/17 #90 with 1 refills    Future Appointments  Date Time Provider Brenda Parkinson   8/23/2018 11:00 AM Deann Hernandez MD [facility] [address] [facility] [address]   8/28/2

## 2018-04-16 NOTE — TELEPHONE ENCOUNTER
Received refill request from [facility] for Quinapril Tab 20 mg for quantity of 90. Reference number [id number].

## 2018-05-07 NOTE — TELEPHONE ENCOUNTER
acyclovir 400 MG Oral Tab  Take 1 tablet (400 mg total) by mouth every 4 (four) hours while awake. Disp: 90 tablet Refills: 0    Class: Normal Start: 5/7/2018     Medication last refilled on 10/21/2017 with a QTY: 90 with 1 refills.     LYI:9/87/6401 mary/

## 2018-05-08 RX ORDER — ACYCLOVIR 400 MG/1
400 TABLET ORAL
Qty: 90 TABLET | Refills: 0 | Status: SHIPPED | OUTPATIENT
Start: 2018-05-08 | End: 2018-05-17

## 2018-05-16 ENCOUNTER — TELEPHONE (OUTPATIENT)
Dept: FAMILY MEDICINE CLINIC | Facility: CLINIC | Age: 63
End: 2018-05-16

## 2018-05-16 NOTE — TELEPHONE ENCOUNTER
Pt called in regards to getting a refill on     acyclovir 400 MG Oral Tab 90 tablet 0 5/8/2018    Sig :  Take 1 tablet (400 mg total) by mouth every 4 (four) hours while awake.      Route: Hilario Prima     Order #: [id number]       Pt stated that brent tried

## 2018-05-16 NOTE — TELEPHONE ENCOUNTER
Patient states that she will contact her mail order for a refill since the rx was sent to her mail order instead of her local pharmacy. PT states it will be cheaper for her if she gets it through mail order.  Patient will call if she has any issues

## 2018-05-17 ENCOUNTER — TELEPHONE (OUTPATIENT)
Dept: FAMILY MEDICINE CLINIC | Facility: CLINIC | Age: 63
End: 2018-05-17

## 2018-05-17 NOTE — TELEPHONE ENCOUNTER
Requesting Acyclovir 400 mg  LOV: 2/23/18  RTC: 6 months  Last Relevant Labs: n/a  Filled: 5/8/18 #90 with 0 refills    acyclovir 400 MG Oral Tab 90 tablet 0 5/8/2018     Sig - Route: Take 1 tablet (400 mg total) by mouth every 4 (four) hours while awake.

## 2018-05-17 NOTE — TELEPHONE ENCOUNTER
Please clarify dosaging as pt was given #90, but sig states to take 4x a day while awake. Per PT she is taking Acyclovir once a day.      Pended RX for once a day with note to pharmacy to cancel previous rx

## 2018-05-17 NOTE — TELEPHONE ENCOUNTER
Pt is stating the [facility] has sent us refill requests for acyclovir 400 MG Oral Tab #90 but I am not seeing it.  She is asking if we can refill her medicaiton to the [facility]. - [phone]

## 2018-05-17 NOTE — TELEPHONE ENCOUNTER
Patient will need a refill of the Acyclovir sent to [facility]. She called the pharmacy who told her that they only received the prescription for 15 days, not 90 days.

## 2018-05-18 RX ORDER — ACYCLOVIR 400 MG/1
400 TABLET ORAL DAILY
Qty: 90 TABLET | Refills: 0 | Status: SHIPPED | OUTPATIENT
Start: 2018-05-18 | End: 2018-05-21

## 2018-05-21 ENCOUNTER — TELEPHONE (OUTPATIENT)
Dept: FAMILY MEDICINE CLINIC | Facility: CLINIC | Age: 63
End: 2018-05-21

## 2018-05-21 RX ORDER — ACYCLOVIR 400 MG/1
400 TABLET ORAL 2 TIMES DAILY
Qty: 180 TABLET | Refills: 0 | Status: SHIPPED | OUTPATIENT
Start: 2018-05-21 | End: 2018-07-29

## 2018-05-21 NOTE — TELEPHONE ENCOUNTER
Patient notified, verbalized understanding. Patient states she currently has a break out so she definitely needs it right now.

## 2018-05-21 NOTE — TELEPHONE ENCOUNTER
If used for daily suppression BID dosing is correct. Also if a person has frequent recurrence this should be used, but should reassess 1yr later if still needed.  If she hasn't done a trial off the med after 1 yr on it, to do so for a couple months, if stil

## 2018-05-21 NOTE — TELEPHONE ENCOUNTER
Patient gets topical herpetic sores (hands and fingers)  and uses acylovir 400 mg bid for years. I check the med history and it has been given by Dr. Miguelangel Thomas as daily and bid? I called her to clarify and she said she uses bid.   She told me she had an old

## 2018-07-06 DIAGNOSIS — I10 ESSENTIAL HYPERTENSION: ICD-10-CM

## 2018-07-06 DIAGNOSIS — Z76.0 MEDICATION REFILL: ICD-10-CM

## 2018-07-09 RX ORDER — QUINAPRIL 20 MG/1
TABLET ORAL
Qty: 90 TABLET | Refills: 1 | OUTPATIENT
Start: 2018-07-09

## 2018-07-09 NOTE — TELEPHONE ENCOUNTER
Pharmacy     [facility], [address] [facility], [address] [phone], [phone]   Medication Detail      Disp Refills Start End    Quinapril HCl 20 MG Oral Tab 90 tablet 1 4/16/2018     Sig: TAKE 1 TABLET NIGHTLY    E-Prescribin

## 2018-07-19 NOTE — ANESTHESIA POSTPROCEDURE EVALUATION
Patient: Pam Johnson    Procedure Summary     Date Anesthesia Start Anesthesia Stop Room / Location    06/14/17 0849  [facility] OR 10 / [facility] MAIN OR       Procedure Diagnosis Surgeon Responsible Provider    KNEE ARTHROSCOPY (Left ) (Knee medial meniscal tea Pt states that he was driving back from [place] and it was dark and no problem. States once the sun came out he noticed that he had blurred vision. Denies any history of blurred vision in the past.  Pt also states that he feels weak and lethargic, states onset several days ago. States that he was driving back from [place] today and the lines on the road were real wavy and he could barely keep his eyes open for hundreds of miles. States went to [facility] and was told that he needed a head CT and sent to ER. States he fell 12 feet back in June and landed on head and denies loc at that time or any s/s afterwards.

## 2018-07-29 ENCOUNTER — HOSPITAL ENCOUNTER (EMERGENCY)
Age: 63
Discharge: HOME | End: 2018-07-29
Attending: EMERGENCY MEDICINE
Payer: MEDICARE

## 2018-07-29 ENCOUNTER — APPOINTMENT (OUTPATIENT)
Dept: GENERAL RADIOLOGY | Age: 63
End: 2018-07-29
Attending: EMERGENCY MEDICINE
Payer: MEDICARE

## 2018-07-29 VITALS
SYSTOLIC BLOOD PRESSURE: 129 MMHG | HEIGHT: 65 IN | WEIGHT: 210 LBS | RESPIRATION RATE: 18 BRPM | OXYGEN SATURATION: 97 % | HEART RATE: 58 BPM | BODY MASS INDEX: 34.99 KG/M2 | TEMPERATURE: 98 F | DIASTOLIC BLOOD PRESSURE: 80 MMHG

## 2018-07-29 DIAGNOSIS — M54.6 ACUTE LEFT-SIDED THORACIC BACK PAIN: Primary | ICD-10-CM

## 2018-07-29 LAB
ANION GAP SERPL CALC-SCNC: 7 MMOL/L (ref 0–18)
ATRIAL RATE: 64 BPM
BASOPHILS # BLD AUTO: 0.1 X10(3) UL (ref 0–0.1)
BASOPHILS NFR BLD AUTO: 2 %
BUN BLD-MCNC: 12 MG/DL (ref 8–20)
BUN/CREAT SERPL: 17.6 (ref 10–20)
CALCIUM BLD-MCNC: 8.6 MG/DL (ref 8.3–10.3)
CHLORIDE SERPL-SCNC: 108 MMOL/L (ref 101–111)
CO2 SERPL-SCNC: 28 MMOL/L (ref 22–32)
CREAT BLD-MCNC: 0.68 MG/DL (ref 0.55–1.02)
EOSINOPHIL # BLD AUTO: 0.84 X10(3) UL (ref 0–0.3)
EOSINOPHIL NFR BLD AUTO: 16.5 %
ERYTHROCYTE [DISTWIDTH] IN BLOOD BY AUTOMATED COUNT: 15.9 % (ref 11.5–16)
GLUCOSE BLD-MCNC: 93 MG/DL (ref 70–99)
HCT VFR BLD AUTO: 35.9 % (ref 34–50)
HGB BLD-MCNC: 10.7 G/DL (ref 12–16)
IMMATURE GRANULOCYTE COUNT: 0.01 X10(3) UL (ref 0–1)
IMMATURE GRANULOCYTE RATIO %: 0.2 %
LYMPHOCYTES # BLD AUTO: 1.63 X10(3) UL (ref 0.9–4)
LYMPHOCYTES NFR BLD AUTO: 32 %
MCH RBC QN AUTO: 28 PG (ref 27–33.2)
MCHC RBC AUTO-ENTMCNC: 29.8 G/DL (ref 31–37)
MCV RBC AUTO: 94 FL (ref 81–100)
MONOCYTES # BLD AUTO: 0.57 X10(3) UL (ref 0.1–1)
MONOCYTES NFR BLD AUTO: 11.2 %
NEUTROPHIL ABS PRELIM: 1.94 X10 (3) UL (ref 1.3–6.7)
NEUTROPHILS # BLD AUTO: 1.94 X10(3) UL (ref 1.3–6.7)
NEUTROPHILS NFR BLD AUTO: 38.1 %
OSMOLALITY SERPL CALC.SUM OF ELEC: 295 MOSM/KG (ref 275–295)
P AXIS: 16 DEGREES
P-R INTERVAL: 144 MS
PLATELET # BLD AUTO: 218 10(3)UL (ref 150–450)
POTASSIUM SERPL-SCNC: 3.9 MMOL/L (ref 3.6–5.1)
Q-T INTERVAL: 414 MS
QRS DURATION: 90 MS
QTC CALCULATION (BEZET): 427 MS
R AXIS: 17 DEGREES
RBC # BLD AUTO: 3.82 X10(6)UL (ref 3.8–5.1)
RED CELL DISTRIBUTION WIDTH-SD: 54.6 FL (ref 35.1–46.3)
SODIUM SERPL-SCNC: 143 MMOL/L (ref 136–144)
T AXIS: 53 DEGREES
TROPONIN I SERPL-MCNC: <0.046 NG/ML (ref ?–0.05)
VENTRICULAR RATE: 64 BPM
WBC # BLD AUTO: 5.1 X10(3) UL (ref 4–13)

## 2018-07-29 PROCEDURE — 93005 ELECTROCARDIOGRAM TRACING: CPT

## 2018-07-29 PROCEDURE — 93010 ELECTROCARDIOGRAM REPORT: CPT

## 2018-07-29 PROCEDURE — 85025 COMPLETE CBC W/AUTO DIFF WBC: CPT | Performed by: EMERGENCY MEDICINE

## 2018-07-29 PROCEDURE — 99285 EMERGENCY DEPT VISIT HI MDM: CPT

## 2018-07-29 PROCEDURE — 71046 X-RAY EXAM CHEST 2 VIEWS: CPT | Performed by: EMERGENCY MEDICINE

## 2018-07-29 PROCEDURE — 84484 ASSAY OF TROPONIN QUANT: CPT | Performed by: EMERGENCY MEDICINE

## 2018-07-29 PROCEDURE — 80048 BASIC METABOLIC PNL TOTAL CA: CPT | Performed by: EMERGENCY MEDICINE

## 2018-07-29 PROCEDURE — 94640 AIRWAY INHALATION TREATMENT: CPT

## 2018-07-29 PROCEDURE — 36415 COLL VENOUS BLD VENIPUNCTURE: CPT

## 2018-07-29 RX ORDER — PREDNISONE 20 MG/1
40 TABLET ORAL DAILY
Qty: 10 TABLET | Refills: 0 | Status: SHIPPED | OUTPATIENT
Start: 2018-07-29 | End: 2018-08-03

## 2018-07-29 RX ORDER — IPRATROPIUM BROMIDE AND ALBUTEROL SULFATE 2.5; .5 MG/3ML; MG/3ML
3 SOLUTION RESPIRATORY (INHALATION) ONCE
Status: COMPLETED | OUTPATIENT
Start: 2018-07-29 | End: 2018-07-29

## 2018-07-29 NOTE — ED PROVIDER NOTES
Patient Seen in: THE [facility] Emergency Department In [place]    History   Patient presents with:  Cough/URI    Stated Complaint: cough, left-sided rib pain, denies chest pain    HPI    52-year-old female here for evaluation of left-sided rib pain.   She has Ashwin Davila  8/31/16: BACK SURGERY      Comment: L45 posterior inerbody fusion   No date: CARPAL TUNNEL RELEASE  09/2011: FOOT/TOES SURGERY PROC UNLISTED      Comment: first MTM joint fusion with ORIF of second and               third MTs with correction SpO2 95%   BMI 34.95 kg/m²         Physical Exam      Constitutional: Pt is oriented to person, place, and time. Appears well-developed and well-nourished. Head: Normocephalic and atraumatic.    Nose: Nose normal.   Eyes: EOM are normal. Pupils are [place] I will consider ACS ruled out. There is no pleuritic component to chest pain, doubt DVT or PE. Most likely it is muscle skeletal chest pain as result of coughing.   She is given albuterol per nebulizer treatment and did help her cough, advised to continue

## 2018-07-29 NOTE — ED INITIAL ASSESSMENT (HPI)
Pt states she was having back pain that now radiates to left chest pt has also had a cough.  No nvd, not sure of fever

## 2018-07-31 ENCOUNTER — OFFICE VISIT (OUTPATIENT)
Dept: FAMILY MEDICINE CLINIC | Facility: CLINIC | Age: 63
End: 2018-07-31
Payer: MEDICARE

## 2018-07-31 VITALS
SYSTOLIC BLOOD PRESSURE: 130 MMHG | DIASTOLIC BLOOD PRESSURE: 80 MMHG | BODY MASS INDEX: 34.2 KG/M2 | HEIGHT: 66 IN | HEART RATE: 67 BPM | WEIGHT: 212.81 LBS | RESPIRATION RATE: 16 BRPM | OXYGEN SATURATION: 97 % | TEMPERATURE: 98 F

## 2018-07-31 DIAGNOSIS — J06.9 VIRAL URI WITH COUGH: ICD-10-CM

## 2018-07-31 DIAGNOSIS — J06.9 VIRAL URI WITH COUGH: Primary | ICD-10-CM

## 2018-07-31 DIAGNOSIS — M35.00 SJOGREN'S SYNDROME, WITH UNSPECIFIED ORGAN INVOLVEMENT (HCC): Chronic | ICD-10-CM

## 2018-07-31 DIAGNOSIS — J84.10 FIBROSIS OF LUNG (HCC): ICD-10-CM

## 2018-07-31 PROCEDURE — 99213 OFFICE O/P EST LOW 20 MIN: CPT | Performed by: FAMILY MEDICINE

## 2018-07-31 RX ORDER — FLUTICASONE PROPIONATE 50 MCG
2 SPRAY, SUSPENSION (ML) NASAL DAILY
Qty: 1 BOTTLE | Refills: 0 | Status: SHIPPED | OUTPATIENT
Start: 2018-07-31 | End: 2018-11-12

## 2018-07-31 RX ORDER — FLUTICASONE PROPIONATE 50 MCG
SPRAY, SUSPENSION (ML) NASAL
Refills: 0 | OUTPATIENT
Start: 2018-07-31

## 2018-07-31 NOTE — PROGRESS NOTES
[facility] Group Visit Note  7/31/2018      Subjective:      Patient ID: Natalee Kelly is a 70year old female. Chief Complaint:  Patient presents with:  Sinus Problem: C/O sinus headache, ears feel full, sinus drainage & SOB since Saturday.   States Suspension; 2 sprays by Each Nare route daily. Dispense: 1 Bottle; Refill: 0  -cont steroid, albuterol inhaler, antihistamine prn    2. Fibrosis of lung ([facility]  - PULMONARY - INTERNAL  -?2/2 Sjogren's?  And may be why pt [facility] having [facility]

## 2018-07-31 NOTE — TELEPHONE ENCOUNTER
Requesting fluticasone  LOV: 7/31/18  RTC: 3 months  Last Relevant Labs: n/a  Filled: 7/31/18 #1 with 0 refills    Future Appointments  11/9/2018 8:00 AM Prasanna Hudson MD [facility] [address] [facility] [address]       90 day request from pharmacy - denied

## 2018-08-06 ENCOUNTER — OFFICE VISIT (OUTPATIENT)
Dept: FAMILY MEDICINE CLINIC | Facility: CLINIC | Age: 63
End: 2018-08-06
Payer: MEDICARE

## 2018-08-06 VITALS
WEIGHT: 212.63 LBS | HEIGHT: 63 IN | DIASTOLIC BLOOD PRESSURE: 82 MMHG | BODY MASS INDEX: 37.68 KG/M2 | TEMPERATURE: 99 F | HEART RATE: 72 BPM | SYSTOLIC BLOOD PRESSURE: 126 MMHG | RESPIRATION RATE: 16 BRPM | OXYGEN SATURATION: 97 %

## 2018-08-06 DIAGNOSIS — Z12.39 SCREENING FOR MALIGNANT NEOPLASM OF BREAST: ICD-10-CM

## 2018-08-06 DIAGNOSIS — J84.10 FIBROSIS OF LUNG (HCC): ICD-10-CM

## 2018-08-06 DIAGNOSIS — Z85.3 HISTORY OF BREAST CANCER: ICD-10-CM

## 2018-08-06 DIAGNOSIS — M86.9 OSTEOMYELITIS OF RIGHT FOOT, UNSPECIFIED TYPE (HCC): ICD-10-CM

## 2018-08-06 DIAGNOSIS — F17.210 SMOKING GREATER THAN 25 PACK YEARS: ICD-10-CM

## 2018-08-06 DIAGNOSIS — J45.21 MILD INTERMITTENT ASTHMA WITH EXACERBATION: Primary | ICD-10-CM

## 2018-08-06 PROCEDURE — 99214 OFFICE O/P EST MOD 30 MIN: CPT | Performed by: FAMILY MEDICINE

## 2018-08-06 RX ORDER — PREDNISONE 20 MG/1
TABLET ORAL
Qty: 30 TABLET | Refills: 0 | Status: SHIPPED | OUTPATIENT
Start: 2018-08-06 | End: 2018-08-06

## 2018-08-06 RX ORDER — PREDNISONE 20 MG/1
TABLET ORAL
Qty: 30 TABLET | Refills: 0 | Status: SHIPPED | OUTPATIENT
Start: 2018-08-06 | End: 2018-10-25 | Stop reason: ALTCHOICE

## 2018-08-06 NOTE — PATIENT INSTRUCTIONS
May use Mucinex DM 12 hour extended relief or generic equivalent 1 tablet every 12 hours to help with cough and phlegm  Take your prednisone  with a full meal and early in the day. Take  prednisone 20 mg steroid taper-follow instructions on the bottle.   D is delivered to all of the cells of your body. This makes it hard to breathe.     Damage to cilia    Cilia are small hairs that line and protect the airways. Smoking damages the cilia. Damaged cilia can’t sweep mucus and particles away.  Some of the cilia a healthcare provider about ways to help you quit smoking. · Avoiding infections. Infections, like a cold or the flu, can cause your symptoms to worsen. Try to stay away from people who are sick. Wash your hands often.  And, ask your healthcare provider abou Tell your healthcare provider about your symptoms. Also tell him or her how long you have had them. · Past medical and surgical history. Share other health problems and surgery you have had. · Family history.  Report serious health problems in close famil airways to become irritated and narrower. This makes it harder for you to breathe. Emphysema and chronic bronchitis are both types of COPD. This is a chronic condition, which means you always have it. Sometimes it gets worse.  When this happens, it is muñoz given prednisone or another steroid, finish it even if you feel better. Preventing a flare-up  Even though flare-ups happen, the best way to treat one is to prevent it before it starts.  Here are some pointers:  · Don’t smoke or be around others who are sm healthcare professional's instructions.

## 2018-08-07 NOTE — PROGRESS NOTES
CHIEF COMPLAINT: Patient presents with:  Establish Care: pulmonary fibrosis    HPI:     Ruth Barnhart is a 70year old female presents for establish care and follow-up on ICU visit/pulmonary fibrosis.   Has an appointment to see a pulmonologist in a few week Osteoarthritis     left thumb, severe   • Osteomyelitis of right foot ([facility] 01/2018    Balaji OSCAR, Dr. Bryan Lowry   • Osteoporosis    • Postmenopausal atrophic vaginitis    • Pulmonary fibrosis ([facility] 07/2018    referring to pulm, possible Sjogren's involvement Sister    • Breast Cancer Self 46   • DCIS Self    • No Known Problems Son    • No Known Problems Brother       Social History: Smoking status: Former Smoker                                                              Packs/day: 0.00      Years: 0.00 Gel Apply 4 g topically 4 (four) times daily. Disp: 3 Tube Rfl: 1   EPINEPHrine (EPIPEN 2-RACHELE) 0.3 MG/0.3ML Injection Solution Auto-injector Inject 0.3 mL as directed as needed.  Disp: 1 each Rfl: 1   Loratadine (CLARITIN) 10 MG Oral Cap Take 1 tablet by mo rhonchi or wheezes. Good inspiratory and expiratory effort. No costosternal retractions. Abdomen: soft, nontender, nondistended.   No hepatosplenomegaly  Extremities: No cyanosis, clubbing, or edema bilaterally   Skin: no acute  rashes      LABS     Admiss cough, left-sided rib pain, denies chest pain  COMPARISON:  TIMA, XR CHEST PA + LAT CHEST (CPT=71020), 10/05/2017, 16:22. TIMA, XR CHEST PA + LAT CHEST (CPT=71020), 8/15/2016, 13:53.   TECHNIQUE:  PA and lateral chest radiographs were obtaine greater than 25 pack years  Suspect COPD with asthma-keep appointment with pulmonologist    4.  Fibrosis of lung ([facility]  Suspect due to smoking reviewed common causes-idiopathic, toxic or occupational exposures, infection, autoimmune etc.  Patient has mild c Fibrosis of lung ([facility]     Smoking greater than 25 pack years      Imaging & Referrals:  [facility] LOIDA 2D+3D SCREENING BILAT (UCB=66752/50331)     8/6/2018  Jack Gentile, DO      Patient understands plan and follow-up.   Return in about 3 weeks (around 8/27/201

## 2018-08-27 ENCOUNTER — OFFICE VISIT (OUTPATIENT)
Dept: FAMILY MEDICINE CLINIC | Facility: CLINIC | Age: 63
End: 2018-08-27
Payer: MEDICARE

## 2018-08-27 VITALS
BODY MASS INDEX: 35.59 KG/M2 | RESPIRATION RATE: 15 BRPM | TEMPERATURE: 98 F | SYSTOLIC BLOOD PRESSURE: 132 MMHG | OXYGEN SATURATION: 95 % | HEIGHT: 64.5 IN | WEIGHT: 211 LBS | HEART RATE: 77 BPM | DIASTOLIC BLOOD PRESSURE: 82 MMHG

## 2018-08-27 DIAGNOSIS — J40 BRONCHITIS: ICD-10-CM

## 2018-08-27 DIAGNOSIS — M54.6 ACUTE LEFT-SIDED THORACIC BACK PAIN: Primary | ICD-10-CM

## 2018-08-27 DIAGNOSIS — J06.9 VIRAL URI WITH COUGH: ICD-10-CM

## 2018-08-27 DIAGNOSIS — K58.0 IRRITABLE BOWEL SYNDROME WITH DIARRHEA: ICD-10-CM

## 2018-08-27 PROBLEM — J84.9 ILD (INTERSTITIAL LUNG DISEASE) (HCC): Status: ACTIVE | Noted: 2018-08-27

## 2018-08-27 LAB
APPEARANCE: CLEAR
BILIRUBIN: NEGATIVE
GLUCOSE (URINE DIPSTICK): NEGATIVE MG/DL
KETONES (URINE DIPSTICK): NEGATIVE MG/DL
LEUKOCYTES: NEGATIVE
MULTISTIX LOT#: ABNORMAL NUMERIC
NITRITE, URINE: NEGATIVE
OCCULT BLOOD: NEGATIVE
PH, URINE: 5.5 (ref 4.5–8)
PROTEIN (URINE DIPSTICK): 30 MG/DL
SPECIFIC GRAVITY: 1.03 (ref 1–1.03)
UROBILINOGEN,SEMI-QN: 0.2 MG/DL (ref 0–1.9)

## 2018-08-27 PROCEDURE — 86160 COMPLEMENT ANTIGEN: CPT | Performed by: INTERNAL MEDICINE

## 2018-08-27 PROCEDURE — 86225 DNA ANTIBODY NATIVE: CPT | Performed by: INTERNAL MEDICINE

## 2018-08-27 PROCEDURE — 81003 URINALYSIS AUTO W/O SCOPE: CPT | Performed by: EMERGENCY MEDICINE

## 2018-08-27 PROCEDURE — 99214 OFFICE O/P EST MOD 30 MIN: CPT | Performed by: EMERGENCY MEDICINE

## 2018-08-27 RX ORDER — CYCLOBENZAPRINE HCL 10 MG
10 TABLET ORAL 3 TIMES DAILY PRN
Qty: 20 TABLET | Refills: 0 | Status: SHIPPED | OUTPATIENT
Start: 2018-08-27 | End: 2018-09-06

## 2018-08-27 RX ORDER — HYDROCODONE BITARTRATE AND ACETAMINOPHEN 5; 325 MG/1; MG/1
1 TABLET ORAL EVERY 6 HOURS PRN
Qty: 30 TABLET | Refills: 0 | Status: SHIPPED | OUTPATIENT
Start: 2018-08-27 | End: 2018-10-25 | Stop reason: ALTCHOICE

## 2018-08-27 RX ORDER — DICYCLOMINE HYDROCHLORIDE 10 MG/1
10 CAPSULE ORAL EVERY EVENING
Qty: 30 CAPSULE | Refills: 2 | Status: SHIPPED | OUTPATIENT
Start: 2018-08-27 | End: 2018-10-02

## 2018-08-27 NOTE — PATIENT INSTRUCTIONS
Thank you for choosing [facility]  To Do:  FOR PJ BROWN    · Xray today  · Arrange for physical therapy if not better in 2 weeks  · Take flexeril as needed for muscle spasm  · May continue with norco for severe pain  · Follow up with Dr. Javier Dk to disk disease, arthritis in the spinal joints or spinal stenosis (narrowing of the spinal canal) can become chronic and last for months or years.   Unless you had a physical injury (for example, a car accident or fall) X-rays are usually not needed for th safe lifting methods and do not lift anything without stretching first.  Medicines  Talk to your doctor before using medicine, especially if you have other medical problems or are taking other medicines.   · You may use over-the-counter medicine as directed pain.  · Exercise is an important part of recovery from most types of back pain. The muscles behind and in front of the spine support the back.  This means strengthening both the back muscles and the abdominal muscles will provide better support for your sp only as prescribed by your healthcare provider. Lumbar stretch  Here is a simple stretching exercise that will help relax muscle spasm and keep your back more limber. If exercise makes your back pain worse, don’t do it.   · Lie on your back with your knees advised. If X-rays, a CT scan or an MRI scan were taken, they will be reviewed by a radiologist. Mark Bhatti will be notified of any new findings that may affect your care.   Call 911  Call 911 if any of the following occur:  · Trouble breathing  · Confusion  · Ve

## 2018-08-27 NOTE — PROGRESS NOTES
Chief Complaint:   Patient presents with:  Bronchitis: NP, shortness of breath   Spasms: Pt c/o muscle spasms on mid back     HPI:   This is a 70year old female       SOB/CONGESTION  Seen recently for cough and congestion.  Currently on abx for foot infect referring to pulm, possible Sjogren's involvement?    • Rheumatoid arthritis(714.0)    • Sicca syndrome ([facility] 2/26/2009   • Sjogren's syndrome ([facility] 10/07   • Stress fracture of the metatarsals     right foot   • Systemic lupus erythematosus ([facility] 2/26/2 Pulmonary Embolism [OTHER] Father    • Diabetes Mother    • Asthma Mother    • Other Ez Dy Mother    • Abdominal Aortic Aneurysm [OTHER] Other    • Fibromyalgia Sister    • Asthma Sister    • Breast Cancer Self 46   • DCIS Self    • No Known Problems Son Inhalation Aero Soln Inhale 1-2 puffs into the lungs every 4 (four) hours as needed for Wheezing or Shortness of Breath (or cough).  Use with spacer/AeroChamber Disp: 1 Inhaler Rfl: 1   Diclofenac Sodium (VOLTAREN) 1 % Transdermal Gel Apply 4 g topically 4 index is 35.66 kg/m² as calculated from the following:    Height as of this encounter: 64.5\". Weight as of this encounter: 211 lb. Vital signs reviewed. Appears stated age, well groomed.   GENERAL: well developed, well nourished, well hydrated, no dist Hematocrit 41.4 34.0 - 50.0 %   MCV 93.7 81.0 - 100.0 fL   MCH 29.0 27.0 - 33.2 pg   MCHC 30.9 (L) 31.0 - 37.0 g/dL   Platelet Count 058 613 - 450 10*3/uL   RDW 15.3 11.5 - 16.0 %   MPV 10.4 7.0 - 11.5 fL   Neutrophils Absolute 3.72 1.30 - 6.70 10ˆ3/µL

## 2018-08-28 RX ORDER — FLUTICASONE PROPIONATE 50 MCG
SPRAY, SUSPENSION (ML) NASAL
Refills: 0 | OUTPATIENT
Start: 2018-08-28

## 2018-09-05 ENCOUNTER — TELEPHONE (OUTPATIENT)
Dept: FAMILY MEDICINE CLINIC | Facility: CLINIC | Age: 63
End: 2018-09-05

## 2018-09-05 NOTE — TELEPHONE ENCOUNTER
Please read message below. Pt requesting a referral to an endocrinologist in the area (pt was referred to endo by rheum). Please advise.  Thank you

## 2018-09-05 NOTE — TELEPHONE ENCOUNTER
Patient Hamden Mino kwong stating that her rheumatologist has suggested she see endocrinologist Dr Ivan Moreau, however, patient does not want to drive to where this provider sees patients, she is calling to see if there is endocrinologist in the local area that she can b

## 2018-09-05 NOTE — TELEPHONE ENCOUNTER
Sandie Molina MD, [address], [facility] [place], [address]  [phone]

## 2018-09-05 NOTE — TELEPHONE ENCOUNTER
Called and spoke with pt. Pt informed of MD recommendation below. Pt states understanding and has no further questions at this time.

## 2018-09-06 DIAGNOSIS — M54.6 ACUTE LEFT-SIDED THORACIC BACK PAIN: ICD-10-CM

## 2018-09-06 PROCEDURE — 82330 ASSAY OF CALCIUM: CPT | Performed by: INTERNAL MEDICINE

## 2018-09-06 PROCEDURE — 83883 ASSAY NEPHELOMETRY NOT SPEC: CPT | Performed by: INTERNAL MEDICINE

## 2018-09-06 PROCEDURE — 84165 PROTEIN E-PHORESIS SERUM: CPT | Performed by: INTERNAL MEDICINE

## 2018-09-06 PROCEDURE — 84445 ASSAY OF TSI GLOBULIN: CPT | Performed by: INTERNAL MEDICINE

## 2018-09-06 PROCEDURE — 86334 IMMUNOFIX E-PHORESIS SERUM: CPT | Performed by: INTERNAL MEDICINE

## 2018-09-06 RX ORDER — CYCLOBENZAPRINE HCL 10 MG
TABLET ORAL
Qty: 20 TABLET | Refills: 0 | Status: SHIPPED | OUTPATIENT
Start: 2018-09-06 | End: 2018-10-25 | Stop reason: ALTCHOICE

## 2018-09-06 NOTE — TELEPHONE ENCOUNTER
Medication(s) to Refill:   Pending Prescriptions Disp Refills    CYCLOBENZAPRINE HCL 10 MG Oral Tab [Pharmacy Med Name: CYCLOBENZAPRINE 10MG TABLETS] 20 tablet 0     Sig: TAKE 1 TABLET(10 MG) BY MOUTH THREE TIMES DAILY AS NEEDED FOR MUSCLE SPASMS

## 2018-09-11 ENCOUNTER — HOSPITAL ENCOUNTER (OUTPATIENT)
Dept: CT IMAGING | Age: 63
Discharge: HOME | End: 2018-09-11
Attending: INTERNAL MEDICINE
Payer: MEDICARE

## 2018-09-11 DIAGNOSIS — R06.00 DYSPNEA, UNSPECIFIED TYPE: ICD-10-CM

## 2018-09-11 DIAGNOSIS — J45.20 MILD INTERMITTENT ASTHMA WITHOUT COMPLICATION: ICD-10-CM

## 2018-09-11 DIAGNOSIS — Z87.891 HISTORY OF TOBACCO ABUSE: ICD-10-CM

## 2018-09-11 DIAGNOSIS — J84.9 ILD (INTERSTITIAL LUNG DISEASE) (HCC): ICD-10-CM

## 2018-09-11 PROCEDURE — 36415 COLL VENOUS BLD VENIPUNCTURE: CPT | Performed by: INTERNAL MEDICINE

## 2018-09-11 PROCEDURE — 82570 ASSAY OF URINE CREATININE: CPT | Performed by: INTERNAL MEDICINE

## 2018-09-11 PROCEDURE — 82340 ASSAY OF CALCIUM IN URINE: CPT | Performed by: INTERNAL MEDICINE

## 2018-09-11 PROCEDURE — 71250 CT THORAX DX C-: CPT | Performed by: INTERNAL MEDICINE

## 2018-09-26 ENCOUNTER — MED REC SCAN ONLY (OUTPATIENT)
Dept: FAMILY MEDICINE CLINIC | Facility: CLINIC | Age: 63
End: 2018-09-26

## 2018-09-26 NOTE — PROGRESS NOTES
Patient Result Comments     Viewed by Michelle Milton on 9/19/2018  3:30 PM   Written by Maribeth Klein MD on 9/18/2018  1:02 PM

## 2018-10-02 ENCOUNTER — LAB ENCOUNTER (OUTPATIENT)
Dept: LAB | Age: 63
End: 2018-10-02
Attending: INTERNAL MEDICINE
Payer: MEDICARE

## 2018-10-02 DIAGNOSIS — A09 INFECTIOUS COLITIS, ENTERITIS, AND GASTROENTERITIS: Primary | ICD-10-CM

## 2018-10-02 DIAGNOSIS — K58.0 IRRITABLE BOWEL SYNDROME WITH DIARRHEA: ICD-10-CM

## 2018-10-02 PROCEDURE — 87493 C DIFF AMPLIFIED PROBE: CPT

## 2018-10-02 RX ORDER — DICYCLOMINE HYDROCHLORIDE 10 MG/1
10 CAPSULE ORAL EVERY EVENING
Qty: 30 CAPSULE | Refills: 2 | Status: SHIPPED | OUTPATIENT
Start: 2018-10-02 | End: 2018-12-26

## 2018-10-02 NOTE — TELEPHONE ENCOUNTER
Patient needs the following medication sent to the [facility]:   Dicyclomine HCl 10 MG Oral Cap 30 capsule 2 8/27/2018    Sig :  Take 1 capsule (10 mg total) by mouth every evening.

## 2018-10-03 ENCOUNTER — LAB ENCOUNTER (OUTPATIENT)
Dept: LAB | Age: 63
End: 2018-10-03
Attending: INTERNAL MEDICINE
Payer: MEDICARE

## 2018-10-03 DIAGNOSIS — A09 INFECTIOUS COLITIS, ENTERITIS, AND GASTROENTERITIS: Primary | ICD-10-CM

## 2018-10-03 PROCEDURE — 87045 FECES CULTURE AEROBIC BACT: CPT

## 2018-10-03 PROCEDURE — 87046 STOOL CULTR AEROBIC BACT EA: CPT

## 2018-10-03 PROCEDURE — 87272 CRYPTOSPORIDIUM AG IF: CPT

## 2018-10-03 PROCEDURE — 87329 GIARDIA AG IA: CPT

## 2018-10-22 ENCOUNTER — HOSPITAL ENCOUNTER (OUTPATIENT)
Dept: BONE DENSITY | Age: 63
Discharge: HOME | End: 2018-10-22
Attending: INTERNAL MEDICINE
Payer: MEDICARE

## 2018-10-22 DIAGNOSIS — R79.89 HIGH SERUM PARATHYROID HORMONE (PTH): ICD-10-CM

## 2018-10-22 DIAGNOSIS — E55.9 VITAMIN D DEFICIENCY: ICD-10-CM

## 2018-10-22 DIAGNOSIS — R79.89 LOW TSH LEVEL: ICD-10-CM

## 2018-10-22 DIAGNOSIS — M81.8 OTHER OSTEOPOROSIS WITHOUT CURRENT PATHOLOGICAL FRACTURE: ICD-10-CM

## 2018-10-22 PROCEDURE — 77080 DXA BONE DENSITY AXIAL: CPT | Performed by: INTERNAL MEDICINE

## 2018-10-23 NOTE — PROGRESS NOTES
Primary patient preferred number  [phone] Home    Patient informed of Dr. Miriam Plunkett note. Patient verbalized understanding and agrees with plan.

## 2018-11-12 ENCOUNTER — OFFICE VISIT (OUTPATIENT)
Dept: FAMILY MEDICINE CLINIC | Facility: CLINIC | Age: 63
End: 2018-11-12
Payer: MEDICARE

## 2018-11-12 VITALS
HEART RATE: 70 BPM | DIASTOLIC BLOOD PRESSURE: 90 MMHG | HEIGHT: 64 IN | TEMPERATURE: 98 F | RESPIRATION RATE: 15 BRPM | BODY MASS INDEX: 38.24 KG/M2 | SYSTOLIC BLOOD PRESSURE: 142 MMHG | OXYGEN SATURATION: 95 % | WEIGHT: 224 LBS

## 2018-11-12 DIAGNOSIS — Z13.0 SCREENING FOR ENDOCRINE, NUTRITIONAL, METABOLIC AND IMMUNITY DISORDER: ICD-10-CM

## 2018-11-12 DIAGNOSIS — M81.0 OSTEOPOROSIS, SENILE: Chronic | ICD-10-CM

## 2018-11-12 DIAGNOSIS — J84.9 ILD (INTERSTITIAL LUNG DISEASE) (HCC): ICD-10-CM

## 2018-11-12 DIAGNOSIS — Z23 NEEDS FLU SHOT: ICD-10-CM

## 2018-11-12 DIAGNOSIS — Z13.228 SCREENING FOR ENDOCRINE, NUTRITIONAL, METABOLIC AND IMMUNITY DISORDER: ICD-10-CM

## 2018-11-12 DIAGNOSIS — J01.90 ACUTE NON-RECURRENT SINUSITIS, UNSPECIFIED LOCATION: ICD-10-CM

## 2018-11-12 DIAGNOSIS — Z13.29 SCREENING FOR ENDOCRINE, NUTRITIONAL, METABOLIC AND IMMUNITY DISORDER: ICD-10-CM

## 2018-11-12 DIAGNOSIS — M06.9 RHEUMATOID ARTHRITIS, INVOLVING UNSPECIFIED SITE, UNSPECIFIED RHEUMATOID FACTOR PRESENCE: Chronic | ICD-10-CM

## 2018-11-12 DIAGNOSIS — Z23 NEED FOR VACCINATION FOR STREP PNEUMONIAE: ICD-10-CM

## 2018-11-12 DIAGNOSIS — Z13.21 SCREENING FOR ENDOCRINE, NUTRITIONAL, METABOLIC AND IMMUNITY DISORDER: ICD-10-CM

## 2018-11-12 DIAGNOSIS — Z12.39 SCREENING FOR BREAST CANCER: ICD-10-CM

## 2018-11-12 DIAGNOSIS — Z00.00 MEDICARE ANNUAL WELLNESS VISIT, SUBSEQUENT: Primary | ICD-10-CM

## 2018-11-12 PROBLEM — M17.12 PRIMARY OSTEOARTHRITIS OF LEFT KNEE: Status: ACTIVE | Noted: 2018-05-21

## 2018-11-12 PROBLEM — J30.2 SEASONAL ALLERGIC REACTION: Status: ACTIVE | Noted: 2018-05-21

## 2018-11-12 PROBLEM — Z96.659 S/P TOTAL KNEE ARTHROPLASTY: Status: ACTIVE | Noted: 2018-05-24

## 2018-11-12 PROCEDURE — 90670 PCV13 VACCINE IM: CPT | Performed by: EMERGENCY MEDICINE

## 2018-11-12 PROCEDURE — G0444 DEPRESSION SCREEN ANNUAL: HCPCS | Performed by: EMERGENCY MEDICINE

## 2018-11-12 PROCEDURE — G0008 ADMIN INFLUENZA VIRUS VAC: HCPCS | Performed by: EMERGENCY MEDICINE

## 2018-11-12 PROCEDURE — G0009 ADMIN PNEUMOCOCCAL VACCINE: HCPCS | Performed by: EMERGENCY MEDICINE

## 2018-11-12 PROCEDURE — G0439 PPPS, SUBSEQ VISIT: HCPCS | Performed by: EMERGENCY MEDICINE

## 2018-11-12 PROCEDURE — 90653 IIV ADJUVANT VACCINE IM: CPT | Performed by: EMERGENCY MEDICINE

## 2018-11-12 RX ORDER — AZITHROMYCIN 250 MG/1
TABLET, FILM COATED ORAL
Qty: 1 PACKAGE | Refills: 0 | Status: SHIPPED | OUTPATIENT
Start: 2018-11-12 | End: 2018-12-12 | Stop reason: ALTCHOICE

## 2018-11-12 RX ORDER — FLUTICASONE PROPIONATE 50 MCG
2 SPRAY, SUSPENSION (ML) NASAL DAILY
Qty: 1 BOTTLE | Refills: 0 | Status: SHIPPED | OUTPATIENT
Start: 2018-11-12 | End: 2019-03-04 | Stop reason: ALTCHOICE

## 2018-11-12 RX ORDER — FLUTICASONE PROPIONATE 50 MCG
2 SPRAY, SUSPENSION (ML) NASAL DAILY
Qty: 16 G | Refills: 2 | Status: SHIPPED | OUTPATIENT
Start: 2018-11-12 | End: 2019-02-08

## 2018-11-12 NOTE — PATIENT INSTRUCTIONS
Thank you for choosing [facility]  To Do:  FOR PJ BROWN    · Flu shot yearly  · Check about Tetanus shot, ok to get tenatus with local pharmacy, or may come back for nurse visit  · Follow up yearly or as needed  · Arrange for mammogram after Gonorrhea Sexually active women at increased risk for infection At routine exams   Hepatitis C Anyone at increased risk; 1 time for those born between [facility] At routine exams   High cholesterol or triglycerides All women in this age group who are women in this age group Once a year   Pneumococcal conjugate vaccine (PCV13) and pneumococcal polysaccharide vaccine (PPSV23) All women in this age group 1 dose of each vaccine   Tetanus/diphtheria/pertussis (Td/Tdap) booster All women in this age group Td yogurt, plain   415 mg/8 oz.   Sardines, Atlantic, canned, with bones   351 mg/3 oz.   Oatmeal, instant, fortified   215 mg/1 cup   Nonfat milk   302 mg/1 cup   [place], sockeye, canned, with bones   239 mg/3 oz.   Tofu made with calcium sulfate   204 mg/3 aren't active are at a high risk for osteoporosis. · Certain medicines, such as cortisone, increase bone loss. They also decrease bone growth. Ask your healthcare provider about any side effects of your medicines, and how to prevent them.   · Protein-rich block nasal passages. This is often the result of an injury. Date Last Reviewed: 11/1/2016  © 8438-4666 [employer] [address]. [address] [facility], [address] [place] [address]. All rights reserved.  This information is not intended as a substitute for profess shoulder, and hip. That’s why bone density testing may be done at one or more of these sites. Understanding your results  The results of your test may seem confusing at first. Don’t be afraid to ask your provider to explain.  Your healthcare provider will

## 2018-11-12 NOTE — PROGRESS NOTES
HPI:   Dalia Fernandez is a 70year old female who presents for a Medicare Subsequent Annual Wellness visit (Pt already had Initial Annual Wellness). Congestion and runny nose for 1-2 weeks. Persistent post nasal drip. No fever. + chills but none recently. Quit date: 2007        Years since quittin.4      Smokeless tobacco: Never Used      Tobacco comment: quit in          CAGE Alcohol screening   Elida Brewer was screened for Alcohol abuse and had a score of 0 so is at low risk.     Patient Ca TSH 0.476 10/23/2018    CREATSERUM 0.87 11/16/2018    GLU 91 11/16/2018        CBC  (most recent labs)   Lab Results   Component Value Date    WBC 5.61 08/27/2018    HGB 12.8 08/27/2018     08/27/2018        ALLERGIES:   She is allergic to allergy; EPINEPHrine (EPIPEN 2-RACHELE) 0.3 MG/0.3ML Injection Solution Auto-injector Inject 0.3 mL as directed as needed. Loratadine (CLARITIN) 10 MG Oral Cap Take 1 tablet by mouth daily as needed.      VITAMIN D3 2000 UNIT OR CAPS 1 CAPSULE DAILY   CALCIUM + D 60 ARTHROSCOPY (Left, 6/14/2017); KNEE JOINT INJECTION UNDER FLUOROSCOPY RIGHT OR LEFT (Right, 1/8/2015); TRANSFORAMINAL LUMBAR EPIDURAL STEROID INJECTION MULTIPLE LEVEL (Right, 11/10/2014);  TRANSFORAMINAL LUMBAR EPIDURAL STEROID INJECTION MULTIPLE LEVEL (Rig this encounter: 64\". Weight as of this encounter: 224 lb.     Medicare Hearing Assessment  (Required for AWV/SWV)    Whispered Voice         General Appearance:  Alert, cooperative, no distress, appears stated age   Head:  Normocephalic, without obvious Pneumococcal (Prevnar 13) 11/12/2018   • Pneumovax 10/01/2008   • Prolia Im Inj, Up To 60 Mg 11/17/2016, 05/19/2017, 02/27/2018, 08/28/2018   • TDAP 09/13/2006   • Zoster Vaccine Live (Zostavax) 10/19/2016        ASSESSMENT AND OTHER RELEVANT CHRONIC CONDI 10 pounds without trying?: 2 - No  Has your appetite been poor?: No  How does the patient maintain a good energy level?: Daily Walks  How would you describe your daily physical activity?: Moderate  How would you describe your current health state?: Good  H reminders to display for this patient. Update Health Maintenance if applicable    Chlamydia  Annually if high risk No results found for: CHLAMYDIA No flowsheet data found.     Screening Mammogram      Mammogram Annually to 74, then as discussed Mammogram du data found.                Template: RAUL NORTON MEDICARE ANNUAL ASSESSMENT FEMALE [[id number]]

## 2018-11-16 ENCOUNTER — APPOINTMENT (OUTPATIENT)
Dept: LAB | Age: 63
End: 2018-11-16
Attending: EMERGENCY MEDICINE
Payer: MEDICARE

## 2018-11-16 DIAGNOSIS — Z13.228 SCREENING FOR ENDOCRINE, NUTRITIONAL, METABOLIC AND IMMUNITY DISORDER: ICD-10-CM

## 2018-11-16 DIAGNOSIS — Z13.21 SCREENING FOR ENDOCRINE, NUTRITIONAL, METABOLIC AND IMMUNITY DISORDER: ICD-10-CM

## 2018-11-16 DIAGNOSIS — Z13.29 SCREENING FOR ENDOCRINE, NUTRITIONAL, METABOLIC AND IMMUNITY DISORDER: ICD-10-CM

## 2018-11-16 DIAGNOSIS — Z13.0 SCREENING FOR ENDOCRINE, NUTRITIONAL, METABOLIC AND IMMUNITY DISORDER: ICD-10-CM

## 2018-11-16 PROCEDURE — 80061 LIPID PANEL: CPT

## 2018-11-16 PROCEDURE — 80053 COMPREHEN METABOLIC PANEL: CPT

## 2018-11-16 PROCEDURE — 83036 HEMOGLOBIN GLYCOSYLATED A1C: CPT

## 2018-11-16 PROCEDURE — 36415 COLL VENOUS BLD VENIPUNCTURE: CPT

## 2018-11-29 ENCOUNTER — HOSPITAL ENCOUNTER (OUTPATIENT)
Dept: CT IMAGING | Age: 63
Discharge: HOME | End: 2018-11-29
Attending: INTERNAL MEDICINE
Payer: MEDICARE

## 2018-11-29 DIAGNOSIS — J84.9 ILD (INTERSTITIAL LUNG DISEASE) (HCC): ICD-10-CM

## 2018-11-29 PROCEDURE — 71250 CT THORAX DX C-: CPT | Performed by: INTERNAL MEDICINE

## 2018-11-30 DIAGNOSIS — I10 ESSENTIAL HYPERTENSION: ICD-10-CM

## 2018-11-30 DIAGNOSIS — Z76.0 MEDICATION REFILL: ICD-10-CM

## 2018-11-30 PROCEDURE — 81003 URINALYSIS AUTO W/O SCOPE: CPT | Performed by: INTERNAL MEDICINE

## 2018-11-30 PROCEDURE — 84156 ASSAY OF PROTEIN URINE: CPT | Performed by: INTERNAL MEDICINE

## 2018-11-30 PROCEDURE — 82570 ASSAY OF URINE CREATININE: CPT | Performed by: INTERNAL MEDICINE

## 2018-11-30 RX ORDER — QUINAPRIL 20 MG/1
TABLET ORAL
Qty: 90 TABLET | Refills: 1 | OUTPATIENT
Start: 2018-11-30

## 2018-12-04 ENCOUNTER — TELEPHONE (OUTPATIENT)
Dept: FAMILY MEDICINE CLINIC | Facility: CLINIC | Age: 63
End: 2018-12-04

## 2018-12-04 DIAGNOSIS — Z76.0 MEDICATION REFILL: ICD-10-CM

## 2018-12-04 DIAGNOSIS — I10 ESSENTIAL HYPERTENSION: ICD-10-CM

## 2018-12-04 NOTE — TELEPHONE ENCOUNTER
Patient called she is requesting a new prescription for Dr Terry Saavedra to approve. Patient [facility] to be [facility] that prescribed this medication QUINAPRIL HCL 20 MG Oral Tab. Patient just starting seeing Dr Terry Saavedra on 11/12/18.  Patient would like

## 2018-12-04 NOTE — TELEPHONE ENCOUNTER
LOV:11/12/18  LF:Rx has not been prescribed by provider  Please approve or deny pending Rx request.   Thank you

## 2018-12-07 RX ORDER — QUINAPRIL 20 MG/1
TABLET ORAL
Qty: 90 TABLET | Refills: 0 | Status: SHIPPED | OUTPATIENT
Start: 2018-12-07 | End: 2019-03-04

## 2018-12-26 ENCOUNTER — HOSPITAL ENCOUNTER (OUTPATIENT)
Dept: MAMMOGRAPHY | Age: 63
Discharge: HOME | End: 2018-12-26
Attending: FAMILY MEDICINE
Payer: MEDICARE

## 2018-12-26 DIAGNOSIS — K58.0 IRRITABLE BOWEL SYNDROME WITH DIARRHEA: ICD-10-CM

## 2018-12-26 DIAGNOSIS — Z85.3 HISTORY OF BREAST CANCER: ICD-10-CM

## 2018-12-26 DIAGNOSIS — Z12.39 SCREENING FOR MALIGNANT NEOPLASM OF BREAST: ICD-10-CM

## 2018-12-26 PROCEDURE — 77063 BREAST TOMOSYNTHESIS BI: CPT | Performed by: FAMILY MEDICINE

## 2018-12-26 PROCEDURE — 77067 SCR MAMMO BI INCL CAD: CPT | Performed by: FAMILY MEDICINE

## 2018-12-26 RX ORDER — DICYCLOMINE HYDROCHLORIDE 10 MG/1
CAPSULE ORAL
Qty: 30 CAPSULE | Refills: 0 | Status: SHIPPED | OUTPATIENT
Start: 2018-12-26 | End: 2019-01-29

## 2019-01-02 ENCOUNTER — MED REC SCAN ONLY (OUTPATIENT)
Dept: FAMILY MEDICINE CLINIC | Facility: CLINIC | Age: 64
End: 2019-01-02

## 2019-01-07 ENCOUNTER — ANESTHESIA EVENT (OUTPATIENT)
Dept: ENDOSCOPY | Facility: HOSPITAL | Age: 64
End: 2019-01-07
Payer: MEDICARE

## 2019-01-07 ENCOUNTER — APPOINTMENT (OUTPATIENT)
Dept: GENERAL RADIOLOGY | Facility: HOSPITAL | Age: 64
End: 2019-01-07
Attending: INTERNAL MEDICINE
Payer: MEDICARE

## 2019-01-07 ENCOUNTER — ANESTHESIA (OUTPATIENT)
Dept: ENDOSCOPY | Facility: HOSPITAL | Age: 64
End: 2019-01-07
Payer: MEDICARE

## 2019-01-07 ENCOUNTER — HOSPITAL ENCOUNTER (OUTPATIENT)
Facility: HOSPITAL | Age: 64
Setting detail: HOSPITAL OUTPATIENT SURGERY
Discharge: HOME | End: 2019-01-07
Attending: INTERNAL MEDICINE | Admitting: INTERNAL MEDICINE
Payer: MEDICARE

## 2019-01-07 VITALS
DIASTOLIC BLOOD PRESSURE: 74 MMHG | SYSTOLIC BLOOD PRESSURE: 94 MMHG | OXYGEN SATURATION: 95 % | WEIGHT: 220 LBS | RESPIRATION RATE: 18 BRPM | HEIGHT: 64 IN | HEART RATE: 59 BPM | BODY MASS INDEX: 37.56 KG/M2 | TEMPERATURE: 98 F

## 2019-01-07 DIAGNOSIS — I10 ESSENTIAL (PRIMARY) HYPERTENSION: Primary | ICD-10-CM

## 2019-01-07 LAB
BASOPHIL BRONCHIAL WASHING: 0 %
EOSINOPHIL BRONCHIAL WASHING: 6 %
LYMPHOCYTE BRONCHIAL WASHING: 55 %
MON/MACROPHAGE BRONCHIAL WASH: 26 %
NEUTROPHILS BRONCHIAL WASHING: 13 %
RBC BRONCHIAL WASHING: 50 /MM3
TOTAL CELLS COUNTED: 100
WBC BRONCHIAL WASHING: 56 /MM3

## 2019-01-07 PROCEDURE — 87070 CULTURE OTHR SPECIMN AEROBIC: CPT | Performed by: INTERNAL MEDICINE

## 2019-01-07 PROCEDURE — 89050 BODY FLUID CELL COUNT: CPT | Performed by: INTERNAL MEDICINE

## 2019-01-07 PROCEDURE — 88305 TISSUE EXAM BY PATHOLOGIST: CPT | Performed by: INTERNAL MEDICINE

## 2019-01-07 PROCEDURE — 0BBC8ZX EXCISION OF RIGHT UPPER LUNG LOBE, VIA NATURAL OR ARTIFICIAL OPENING ENDOSCOPIC, DIAGNOSTIC: ICD-10-PCS | Performed by: INTERNAL MEDICINE

## 2019-01-07 PROCEDURE — 87206 SMEAR FLUORESCENT/ACID STAI: CPT | Performed by: INTERNAL MEDICINE

## 2019-01-07 PROCEDURE — 87176 TISSUE HOMOGENIZATION CULTR: CPT | Performed by: INTERNAL MEDICINE

## 2019-01-07 PROCEDURE — 87102 FUNGUS ISOLATION CULTURE: CPT | Performed by: INTERNAL MEDICINE

## 2019-01-07 PROCEDURE — 89051 BODY FLUID CELL COUNT: CPT | Performed by: INTERNAL MEDICINE

## 2019-01-07 PROCEDURE — 87205 SMEAR GRAM STAIN: CPT | Performed by: INTERNAL MEDICINE

## 2019-01-07 PROCEDURE — 87075 CULTR BACTERIA EXCEPT BLOOD: CPT | Performed by: INTERNAL MEDICINE

## 2019-01-07 PROCEDURE — 87071 CULTURE AEROBIC QUANT OTHER: CPT | Performed by: INTERNAL MEDICINE

## 2019-01-07 PROCEDURE — 88104 CYTOPATH FL NONGYN SMEARS: CPT | Performed by: INTERNAL MEDICINE

## 2019-01-07 PROCEDURE — 0B9C8ZX DRAINAGE OF RIGHT UPPER LUNG LOBE, VIA NATURAL OR ARTIFICIAL OPENING ENDOSCOPIC, DIAGNOSTIC: ICD-10-PCS | Performed by: INTERNAL MEDICINE

## 2019-01-07 PROCEDURE — 87076 CULTURE ANAEROBE IDENT EACH: CPT | Performed by: INTERNAL MEDICINE

## 2019-01-07 PROCEDURE — 87116 MYCOBACTERIA CULTURE: CPT | Performed by: INTERNAL MEDICINE

## 2019-01-07 PROCEDURE — 71045 X-RAY EXAM CHEST 1 VIEW: CPT | Performed by: INTERNAL MEDICINE

## 2019-01-07 RX ORDER — HYDROMORPHONE HYDROCHLORIDE 1 MG/ML
0.4 INJECTION, SOLUTION INTRAMUSCULAR; INTRAVENOUS; SUBCUTANEOUS EVERY 5 MIN PRN
Status: DISCONTINUED | OUTPATIENT
Start: 2019-01-07 | End: 2019-01-07

## 2019-01-07 RX ORDER — SODIUM CHLORIDE, SODIUM LACTATE, POTASSIUM CHLORIDE, CALCIUM CHLORIDE 600; 310; 30; 20 MG/100ML; MG/100ML; MG/100ML; MG/100ML
INJECTION, SOLUTION INTRAVENOUS CONTINUOUS
Status: DISCONTINUED | OUTPATIENT
Start: 2019-01-07 | End: 2019-01-07

## 2019-01-07 NOTE — ANESTHESIA PREPROCEDURE EVALUATION
PRE-OP EVALUATION    Patient Name: Karin Hall    Pre-op Diagnosis: interstitial lung disease    Procedure(s):  BRONCHOSCOPY WITH BRONCHIAL ALVEOLAR LAVAGE AND TRANSBRONCHIAL BIOPSIES    Surgeon(s) and Role:     * Olga Tineo MD - Primary    Pre Transdermal Gel Apply 4 g topically 4 (four) times daily. Disp: 3 Tube Rfl: 1   EPINEPHrine (EPIPEN 2-RACHELE) 0.3 MG/0.3ML Injection Solution Auto-injector Inject 0.3 mL as directed as needed.  Disp: 1 each Rfl: 1   Loratadine (CLARITIN) 10 MG Oral Cap Take 1 JOINT INJECTIONS UNDER FLUOROSCOPY BILATERAL Bilateral 9/8/2014    Performed by David Silver MD at [facility] MAIN OR   • LUMPECTOMY RIGHT  2004    rt breast lumpectomy, [facility]   • LUDY BIOPSY STEREOTACTIC NODULE 1 SITE RIGHT  2004   • OTHER SURGICAL history. Pulmonary    Pulmonary exam normal.  Breath sounds clear to auscultation bilaterally. Other findings            ASA: 3   Plan: MAC  NPO status verified and patient meets guidelines.           Plan/risks discussed with: patient

## 2019-01-07 NOTE — ANESTHESIA POSTPROCEDURE EVALUATION
[facility] Patient Status:  Hospital Outpatient Surgery   Age/Gender 70year old female MRN [id number]   Location [facility]. Attending Sidney Freeman MD   Hosp Day # 0 PCP Corinne Orr MD       Anesthesia Post

## 2019-01-07 NOTE — OPERATIVE REPORT
Bronchoscopy procedure report    Preop diagnosis: abnl CT chest  Postop diagnosis:  abnl CT chest  Procedure performed: Bronchoscopy, Diagnostic  Bronchoalveolar lavage, BAL  Transbronchial biopsy    Sedation used: MAC- please see their documentation  Mode

## 2019-01-07 NOTE — H&P
Clinic note from 10/25 reviewed  No significant changes in health over the past 2 mths. Still notes some WEST related to activity and sometimes influenced by the weather.   Denies chronic cough    /85 (BP Location: Left arm)   Pulse 76   Temp 98.1 °F

## 2019-01-08 LAB — NON GYNE INTERPRETATION: NEGATIVE

## 2019-01-29 DIAGNOSIS — K58.0 IRRITABLE BOWEL SYNDROME WITH DIARRHEA: ICD-10-CM

## 2019-01-29 RX ORDER — DICYCLOMINE HYDROCHLORIDE 10 MG/1
CAPSULE ORAL
Qty: 30 CAPSULE | Refills: 0 | Status: SHIPPED | OUTPATIENT
Start: 2019-01-29 | End: 2020-10-28

## 2019-02-08 DIAGNOSIS — J01.90 ACUTE NON-RECURRENT SINUSITIS, UNSPECIFIED LOCATION: ICD-10-CM

## 2019-02-08 RX ORDER — FLUTICASONE PROPIONATE 50 MCG
SPRAY, SUSPENSION (ML) NASAL
Qty: 1 BOTTLE | Refills: 2 | Status: SHIPPED | OUTPATIENT
Start: 2019-02-08 | End: 2019-03-04

## 2019-02-26 DIAGNOSIS — I10 ESSENTIAL HYPERTENSION: ICD-10-CM

## 2019-02-26 DIAGNOSIS — Z76.0 MEDICATION REFILL: ICD-10-CM

## 2019-02-27 PROCEDURE — 84156 ASSAY OF PROTEIN URINE: CPT | Performed by: INTERNAL MEDICINE

## 2019-02-27 PROCEDURE — 86160 COMPLEMENT ANTIGEN: CPT | Performed by: INTERNAL MEDICINE

## 2019-02-27 PROCEDURE — 86225 DNA ANTIBODY NATIVE: CPT | Performed by: INTERNAL MEDICINE

## 2019-02-27 PROCEDURE — 81003 URINALYSIS AUTO W/O SCOPE: CPT | Performed by: INTERNAL MEDICINE

## 2019-02-27 RX ORDER — QUINAPRIL 20 MG/1
TABLET ORAL
Qty: 90 TABLET | Refills: 0 | OUTPATIENT
Start: 2019-02-27

## 2019-03-04 ENCOUNTER — OFFICE VISIT (OUTPATIENT)
Dept: FAMILY MEDICINE CLINIC | Facility: CLINIC | Age: 64
End: 2019-03-04
Payer: MEDICARE

## 2019-03-04 VITALS
RESPIRATION RATE: 15 BRPM | OXYGEN SATURATION: 97 % | HEART RATE: 70 BPM | HEIGHT: 64 IN | BODY MASS INDEX: 39.44 KG/M2 | DIASTOLIC BLOOD PRESSURE: 85 MMHG | WEIGHT: 231 LBS | SYSTOLIC BLOOD PRESSURE: 125 MMHG

## 2019-03-04 DIAGNOSIS — I10 ESSENTIAL HYPERTENSION: ICD-10-CM

## 2019-03-04 PROCEDURE — 99213 OFFICE O/P EST LOW 20 MIN: CPT | Performed by: EMERGENCY MEDICINE

## 2019-03-04 RX ORDER — QUINAPRIL 20 MG/1
TABLET ORAL
Qty: 90 TABLET | Refills: 1 | Status: SHIPPED | OUTPATIENT
Start: 2019-03-04 | End: 2019-07-28

## 2019-03-04 NOTE — PATIENT INSTRUCTIONS
Thank you for choosing [facility]  To Do:  FOR PJ BROWN    · Continue with quinipril  · Low salt diet  · Follow up in 6 months for reheck  · Recommend SHINGRIX vaccine for shingles x 2 doses, check with local pharmacy  · Continue follow up wi [facility], [address]. All rights reserved. This information is not intended as a substitute for professional medical care. Always follow your healthcare professional's instructions.         Step-by-Step  Checking Your Blood Pressure    Date

## 2019-03-04 NOTE — PROGRESS NOTES
Chief Complaint:   Patient presents with:  Blood Pressure: Med f/u     HPI:   This is a 70year old female       HYPERTENSION  On QUinipri 20 mg. No Home BP readings. Feels well. No new Sx. No COugh.  Compliant with meds        Blood Pressure 3/4/2019 3/4/ Procedure Laterality Date   • ADENOIDECTOMY     • APPENDECTOMY  age 6    [place]   • BRONCHOSCOPY N/A 1/7/2019    Performed by Bartolo Guerrero MD at [address] [place] [address] Bilateral    • DERMATOLOGY SHAVE BIOPSY (D1K.1)  12/29 Years since quittin.7      Smokeless tobacco: Never Used      Tobacco comment: quit in     Alcohol use:  Yes      Alcohol/week: 0.0 - 0.6 oz    Drug use: No    Family History:  Family History   Problem Relation Age of Onset   • Heart Disorder Fath then PRN flares Disp: 30 g Rfl: 1   Albuterol Sulfate HFA (PROAIR HFA) 108 (90 BASE) MCG/ACT Inhalation Aero Soln Inhale 1-2 puffs into the lungs every 4 (four) hours as needed for Wheezing or Shortness of Breath (or cough).  Use with spacer/AeroChamber Dis Estimated body mass index is 39.65 kg/m² as calculated from the following:    Height as of this encounter: 64\". Weight as of this encounter: 231 lb. Vital signs reviewed. Appears stated age, well groomed.   GENERAL: well developed, well nourished, well Urine Negative Negative    Leukocyte Esterase Urine Negative Negative    Microscopic Microscopic not indicated    DSDNA (CRITHIDIA LUCILIAE) ANTIBODY IGG BY IFA    Collection Time: 02/27/19 11:13 AM   Result Value Ref Range    Double-Stranded DNA (dsDNA) A INSTRUCTIONS  · Continue with quinipril  · Low salt diet  · Follow up in 6 months for reheck  · Recommend SHINGRIX vaccine for shingles x 2 doses, check with local pharmacy   · Continue follow up with Dr. Basil Escobedo and Dr. Melissa Gabriel.   · Monitor BP at home 2-3 times a

## 2019-03-13 PROBLEM — R79.89 LOW TSH LEVEL: Status: ACTIVE | Noted: 2019-03-13

## 2019-03-13 PROBLEM — R79.89 HIGH SERUM PARATHYROID HORMONE (PTH): Status: ACTIVE | Noted: 2019-03-13

## 2019-03-13 PROBLEM — E83.52 FHH (FAMILIAL HYPOCALCIURIC HYPERCALCEMIA): Status: ACTIVE | Noted: 2019-03-13

## 2019-03-13 PROBLEM — M81.8 OTHER OSTEOPOROSIS WITHOUT CURRENT PATHOLOGICAL FRACTURE: Status: ACTIVE | Noted: 2019-03-13

## 2019-03-16 ENCOUNTER — OFFICE VISIT (OUTPATIENT)
Dept: FAMILY MEDICINE CLINIC | Facility: CLINIC | Age: 64
End: 2019-03-16
Payer: MEDICARE

## 2019-03-16 VITALS
HEART RATE: 64 BPM | HEIGHT: 64 IN | RESPIRATION RATE: 16 BRPM | OXYGEN SATURATION: 96 % | DIASTOLIC BLOOD PRESSURE: 80 MMHG | WEIGHT: 230.63 LBS | BODY MASS INDEX: 39.37 KG/M2 | TEMPERATURE: 98 F | SYSTOLIC BLOOD PRESSURE: 138 MMHG

## 2019-03-16 DIAGNOSIS — J01.00 ACUTE NON-RECURRENT MAXILLARY SINUSITIS: Primary | ICD-10-CM

## 2019-03-16 PROCEDURE — 99213 OFFICE O/P EST LOW 20 MIN: CPT | Performed by: FAMILY MEDICINE

## 2019-03-16 RX ORDER — AMOXICILLIN AND CLAVULANATE POTASSIUM 875; 125 MG/1; MG/1
1 TABLET, FILM COATED ORAL 2 TIMES DAILY
Qty: 20 TABLET | Refills: 0 | Status: SHIPPED | OUTPATIENT
Start: 2019-03-16 | End: 2019-03-26

## 2019-03-16 NOTE — PATIENT INSTRUCTIONS
Sinusitis (Antibiotic Treatment)    The sinuses are air-filled spaces within the bones of the face. They connect to the inside of the nose. Sinusitis is an inflammation of the tissue that lines the sinuses. Sinusitis can occur during a cold.  It can also · Do not use nasal rinses or irrigation during an acute sinus infection, unless your healthcare provider tells you to. Rinsing may spread the infection to other areas in your sinuses.   · Use acetaminophen or ibuprofen to control pain, unless another pain m Colds, flu, and allergies make it more likely for you to get sinusitis. Do your best to prevent sinusitis by preventing these problems. Do what you can to avoid getting colds and other infections. Stay away from things that cause allergies (allergens).  Fifi Borrego · Stay away from all types of smoke, which dries out sinus linings. This includes tobacco smoke and chemical smoke in workplace settings. · Use saltwater rinses. Date Last Reviewed: 10/1/2016  © 5434-7648 [employer] [address].  [facility]

## 2019-03-16 NOTE — PROGRESS NOTES
Tanmay Medeiros is a 70year old female. S:  Patient presents today with the following concerns:  · Nasal congestion for weeks. Slight sneezing. 3 days of sinus pain and pressure and bilateral ear pain. Thick PND. No cough or fever. Some nausea.   No vo Oral Cap Take 1 tablet by mouth daily as needed.    Disp:  Rfl:    VITAMIN D3 2000 UNIT OR CAPS 1 CAPSULE DAILY Disp:  Rfl:    CALCIUM + D 600-200 MG-UNIT OR TABS 1 TABLET TWICE DAILY WITH FOOD Disp:  Rfl:    MULTIVITAMINS OR TABS  1 po qday Disp:  Rfl: denies dysuria  MUSCULOSKELETAL: denies back pain  NEURO: denies headaches    EXAM:  /80 (BP Location: Right arm, Patient Position: Sitting, Cuff Size: large)   Pulse 64   Temp 97.7 °F (36.5 °C) (Oral)   Resp 16   Ht 64\"   Wt 230 lb 9.6 oz   SpO2 96

## 2019-06-03 PROCEDURE — 84156 ASSAY OF PROTEIN URINE: CPT | Performed by: INTERNAL MEDICINE

## 2019-06-03 PROCEDURE — 86160 COMPLEMENT ANTIGEN: CPT | Performed by: INTERNAL MEDICINE

## 2019-06-03 PROCEDURE — 81003 URINALYSIS AUTO W/O SCOPE: CPT | Performed by: INTERNAL MEDICINE

## 2019-06-06 ENCOUNTER — MED REC SCAN ONLY (OUTPATIENT)
Dept: FAMILY MEDICINE CLINIC | Facility: CLINIC | Age: 64
End: 2019-06-06

## 2019-06-13 ENCOUNTER — OFFICE VISIT (OUTPATIENT)
Dept: FAMILY MEDICINE CLINIC | Facility: CLINIC | Age: 64
End: 2019-06-13
Payer: MEDICARE

## 2019-06-13 VITALS
BODY MASS INDEX: 38.07 KG/M2 | HEIGHT: 64 IN | RESPIRATION RATE: 16 BRPM | SYSTOLIC BLOOD PRESSURE: 124 MMHG | WEIGHT: 223 LBS | OXYGEN SATURATION: 98 % | HEART RATE: 68 BPM | DIASTOLIC BLOOD PRESSURE: 80 MMHG

## 2019-06-13 DIAGNOSIS — M54.6 DORSALGIA OF THORACIC REGION: Primary | ICD-10-CM

## 2019-06-13 PROCEDURE — 99214 OFFICE O/P EST MOD 30 MIN: CPT | Performed by: NURSE PRACTITIONER

## 2019-06-13 RX ORDER — METHOCARBAMOL 500 MG/1
500 TABLET, FILM COATED ORAL 3 TIMES DAILY
Qty: 60 TABLET | Refills: 0 | Status: SHIPPED | OUTPATIENT
Start: 2019-06-13 | End: 2019-07-19

## 2019-06-13 RX ORDER — METHYLPREDNISOLONE 4 MG/1
TABLET ORAL
Qty: 1 KIT | Refills: 0 | Status: SHIPPED | OUTPATIENT
Start: 2019-06-13 | End: 2019-06-24 | Stop reason: ALTCHOICE

## 2019-06-13 NOTE — PROGRESS NOTES
Patient presents with:  Spasms: back spasms, right side        HPI:  Upper back has been hurting for 2 months dull , spasms   in character, radiates to right side . Reports have been doing lifting and walking the dog.  Reports dog sometimes pulls her right MG/0.3ML Injection Solution Auto-injector Inject 0.3 mL as directed as needed. Disp: 1 each Rfl: 1   Loratadine (CLARITIN) 10 MG Oral Cap Take 1 tablet by mouth daily as needed.    Disp:  Rfl:    VITAMIN D3 2000 UNIT OR CAPS 1 CAPSULE DAILY Disp:  Rfl:    C deficiency       Past Surgical History:   Procedure Laterality Date   • ADENOIDECTOMY     • APPENDECTOMY  age 6    [place]   • BRONCHOSCOPY N/A 1/7/2019    Performed by Amanda Zepeda MD at [address] [place] [address] Bilateral    • years: 27        Quit date: 2007        Years since quittin.0      Smokeless tobacco: Never Used      Tobacco comment: quit in     Alcohol use:  Yes      Alcohol/week: 0.0 - 0.6 oz    Drug use: No            ROS:  GEN: no fever, no chills, no

## 2019-06-14 ENCOUNTER — TELEPHONE (OUTPATIENT)
Dept: FAMILY MEDICINE CLINIC | Facility: CLINIC | Age: 64
End: 2019-06-14

## 2019-06-14 ENCOUNTER — HOSPITAL ENCOUNTER (OUTPATIENT)
Dept: GENERAL RADIOLOGY | Age: 64
Discharge: HOME | End: 2019-06-14
Attending: NURSE PRACTITIONER
Payer: MEDICARE

## 2019-06-14 DIAGNOSIS — M54.6 DORSALGIA OF THORACIC REGION: ICD-10-CM

## 2019-06-14 PROCEDURE — 71046 X-RAY EXAM CHEST 2 VIEWS: CPT | Performed by: NURSE PRACTITIONER

## 2019-06-14 PROCEDURE — 72072 X-RAY EXAM THORAC SPINE 3VWS: CPT | Performed by: NURSE PRACTITIONER

## 2019-06-14 NOTE — TELEPHONE ENCOUNTER
Spoke with pharmacist. Patient is on leflunomide-prescribed by her rheumatologist. Was prescribed Medrol Dosepak yesterday for back pain. WBC on 6/3 was 3.38. Prednisone could make this worse.  Please advise if you would like patient to proceed with Medrol

## 2019-06-14 NOTE — TELEPHONE ENCOUNTER
LEFLUNOMIDE 20 MG Oral Tab      We just ordered Medrol for the patient however there is a drug interaction between the two. Pharmacy would like to talk to a nurse.

## 2019-06-14 NOTE — TELEPHONE ENCOUNTER
Received call from [facility] at [facility] office. She says that per  it is OK for patient to take both Medrol and leflunomide together. The WBC should not be an issue.    I spoke to pharmacist Gay Peng at [place] and advised him OK to take Medrol an

## 2019-06-14 NOTE — TELEPHONE ENCOUNTER
Discussed with Pritesh Stafford. Patient may take the Medrol Dosepak if OK with rheumatology. However, Medrol would raise her WBC. Patient should continue with muscle relaxant. Patient can not take anti-inflammatory.   Rheumatologist is   I did call her office

## 2019-06-18 ENCOUNTER — TELEPHONE (OUTPATIENT)
Dept: FAMILY MEDICINE CLINIC | Facility: CLINIC | Age: 64
End: 2019-06-18

## 2019-06-18 NOTE — TELEPHONE ENCOUNTER
Spoke with pt. Pt informed of test results below. Pt states understanding and agrees to plan. Pt is scheduled for OV.    Future Appointments   Date Time Provider Brenda Parkinson   6/20/2019 11:40 AM Barbara Benedict MD [facility]   8/29/2019  9

## 2019-06-18 NOTE — TELEPHONE ENCOUNTER
----- Message from NAGI Horta sent at 6/17/2019  7:39 AM CDT -----  Chest xray - no acute disease , thoracic spine moderate arthritic -schedule appointment with Dr. Juan Jose Bustillos to discuss further in detail Chest Xray results.

## 2019-06-18 NOTE — TELEPHONE ENCOUNTER
----- Message from NAGI Velasco sent at 6/17/2019  7:36 AM CDT -----  No new findings in comparison to CT scan - arthritic changes

## 2019-06-24 ENCOUNTER — OFFICE VISIT (OUTPATIENT)
Dept: FAMILY MEDICINE CLINIC | Facility: CLINIC | Age: 64
End: 2019-06-24
Payer: MEDICARE

## 2019-06-24 VITALS
HEART RATE: 69 BPM | WEIGHT: 216 LBS | DIASTOLIC BLOOD PRESSURE: 88 MMHG | OXYGEN SATURATION: 95 % | BODY MASS INDEX: 37 KG/M2 | SYSTOLIC BLOOD PRESSURE: 116 MMHG | RESPIRATION RATE: 17 BRPM

## 2019-06-24 DIAGNOSIS — Z13.29 SCREENING FOR ENDOCRINE, NUTRITIONAL, METABOLIC AND IMMUNITY DISORDER: ICD-10-CM

## 2019-06-24 DIAGNOSIS — I77.1 TORTUOUS AORTA (HCC): ICD-10-CM

## 2019-06-24 DIAGNOSIS — Z13.228 SCREENING FOR ENDOCRINE, NUTRITIONAL, METABOLIC AND IMMUNITY DISORDER: ICD-10-CM

## 2019-06-24 DIAGNOSIS — I10 ESSENTIAL (PRIMARY) HYPERTENSION: Primary | ICD-10-CM

## 2019-06-24 DIAGNOSIS — Z13.0 SCREENING FOR ENDOCRINE, NUTRITIONAL, METABOLIC AND IMMUNITY DISORDER: ICD-10-CM

## 2019-06-24 DIAGNOSIS — Z13.21 SCREENING FOR ENDOCRINE, NUTRITIONAL, METABOLIC AND IMMUNITY DISORDER: ICD-10-CM

## 2019-06-24 DIAGNOSIS — E55.9 VITAMIN D DEFICIENCY: Chronic | ICD-10-CM

## 2019-06-24 DIAGNOSIS — Z91.030 BEE STING ALLERGY: ICD-10-CM

## 2019-06-24 PROBLEM — R79.89 HIGH SERUM PARATHYROID HORMONE (PTH): Status: RESOLVED | Noted: 2019-03-13 | Resolved: 2019-06-24

## 2019-06-24 PROCEDURE — 99214 OFFICE O/P EST MOD 30 MIN: CPT | Performed by: EMERGENCY MEDICINE

## 2019-06-24 RX ORDER — EPINEPHRINE 0.3 MG/.3ML
0.3 INJECTION SUBCUTANEOUS AS NEEDED
Qty: 1 EACH | Refills: 1 | Status: SHIPPED | OUTPATIENT
Start: 2019-06-24

## 2019-06-24 NOTE — PROGRESS NOTES
Chief Complaint:   Patient presents with:  Test Results: F/u labs and XRay results     HPI:   This is a 75year old female     [facility] on Quinapril. No new symptoms feels well. No new complaints.   Recently seen for back pa Procedure Laterality Date   • ADENOIDECTOMY     • APPENDECTOMY  age 6    [place]   • BRONCHOSCOPY N/A 1/7/2019    Performed by Amanda Zepeda MD at [address] [place] [address] Bilateral    • DERMATOLOGY SHAVE BIOPSY (D1K.1)  12/29 Years since quittin.0      Smokeless tobacco: Never Used      Tobacco comment: quit in     Alcohol use:  Yes      Alcohol/week: 0.0 - 0.6 oz    Drug use: No    Family History:  Family History   Problem Relation Age of Onset   • Heart Disorder Fath RaNITidine HCl (ZANTAC) 150 MG Oral Tab Take 1 tablet (150 mg total) by mouth 2 (two) times daily.  Disp: 60 tablet Rfl: 0   Clobetasol Propionate 0.05 % External Ointment Apply to the AA of hands BID x 2 weeks then PRN flares Disp: 30 g Rfl: 1   Albutero significant for improving back pain  The rest of the review of systems is negative except those stated as above    PHYSICAL EXAM:   /88   Pulse 69   Resp 17   Wt 216 lb   SpO2 95%   BMI 37.08 kg/m²  Estimated body mass index is 37.08 kg/m² as calcula Collection Time: 06/03/19  9:55 AM   Result Value Ref Range    Urine Color Straw Yellow    Clarity Urine Clear Clear    Spec Gravity 1.008 1.001 - 1.030    Glucose Urine Negative Negative mg/dl    Bilirubin Urine Negative Negative    Ketones Urine Negative Result Value Ref Range    Total Protein Urine Random <5.0 mg/dL   CREATININE, URINE, RANDOM    Collection Time: 06/03/19  9:55 AM   Result Value Ref Range    Creatinine Ur Random 35.08 15.00 - 278.00 mg/dL     Xr Chest Pa + Lat Chest (cpt=71046)    Resul and have been progressively worsening. Patient had a kyphoplasty 2 years ago in the thoracic region. FINDINGS:    Alignment and positioning of the thoracic spine appear similar to the CT scan, with accentuation of the thoracic kyphosis.   No new subluxat

## 2019-06-24 NOTE — PATIENT INSTRUCTIONS
Thank you for choosing [facility]  To Do:  FOR PJ BROWN    · Continue follow up with Dr. Ge Heredia regarding osteoporosis  · Follow up in November for annual [address]  · Arrange for abdominal US  · Have blood tests done be [address], [address] [place] [address]. All rights reserved. This information is not intended as a substitute for professional medical care. Always follow your healthcare professional's instructions.

## 2019-07-12 ENCOUNTER — APPOINTMENT (OUTPATIENT)
Dept: GENERAL RADIOLOGY | Age: 64
End: 2019-07-12
Attending: EMERGENCY MEDICINE
Payer: MEDICARE

## 2019-07-12 ENCOUNTER — HOSPITAL ENCOUNTER (EMERGENCY)
Age: 64
Discharge: HOME | End: 2019-07-12
Attending: EMERGENCY MEDICINE
Payer: MEDICARE

## 2019-07-12 VITALS
WEIGHT: 213 LBS | HEART RATE: 87 BPM | DIASTOLIC BLOOD PRESSURE: 75 MMHG | OXYGEN SATURATION: 94 % | HEIGHT: 66 IN | SYSTOLIC BLOOD PRESSURE: 134 MMHG | RESPIRATION RATE: 16 BRPM | BODY MASS INDEX: 34.23 KG/M2 | TEMPERATURE: 98 F

## 2019-07-12 DIAGNOSIS — S90.111A CONTUSION OF RIGHT GREAT TOE WITHOUT DAMAGE TO NAIL, INITIAL ENCOUNTER: Primary | ICD-10-CM

## 2019-07-12 PROCEDURE — 99283 EMERGENCY DEPT VISIT LOW MDM: CPT

## 2019-07-12 PROCEDURE — 73630 X-RAY EXAM OF FOOT: CPT | Performed by: EMERGENCY MEDICINE

## 2019-07-12 NOTE — ED PROVIDER NOTES
Patient Seen in: THE [facility] Emergency Department In [place]    History   Patient presents with:  Lower Extremity Injury (musculoskeletal)    Stated Complaint: right foot great toe injury    HPI    24-year-old female comes to the hospital complaint of havi Sjogren's syndrome ([facility] 10/07   • Stress fracture of the metatarsals     right foot   • Systemic lupus erythematosus ([facility] 2/26/2009   • Trochanteric bursitis 5/29/2009   • Visual impairment     glasses   • Vitamin D deficiency        Past Surgical Histor by Lugenia Cowden, MD at [facility] MAIN OR   • UPPER GI ENDOSCOPY,EXAM  12/18/08           Social History    Tobacco Use      Smoking status: Former Smoker        Packs/day: 1.00        Years: 30.00        Pack years: 27        Quit date: 6/13/2007        Years pneumothorax. No lobar consolidation. Stable chronic interstitial changes in the lungs. Moderate tortuosity of the thoracic aorta with calcified plaque      CONCLUSION:  No lobar pneumonia or overt congestive failure.   Moderate tortuosity of the thoraci arthropathy.    Dictated by: Ewa Faustin MD on 7/12/2019 at 13:53     Approved by: Ewa Faustin MD            Xr Thoracic Spine (3 Views) (cpt=72072)    Result Date: 6/14/2019  PROCEDURE:  XR ROUTINE THORACIC SPINE (3 VIEWS) (CPT=72072)  TECHNIQUE:  AP

## 2019-07-12 NOTE — ED INITIAL ASSESSMENT (HPI)
Pt c/o right great toe pain from injury stating, \"I got caught up with my puppy's leash\". +CMS noted.

## 2019-07-19 ENCOUNTER — OFFICE VISIT (OUTPATIENT)
Dept: FAMILY MEDICINE CLINIC | Facility: CLINIC | Age: 64
End: 2019-07-19
Payer: MEDICARE

## 2019-07-19 VITALS
HEART RATE: 68 BPM | BODY MASS INDEX: 35.85 KG/M2 | OXYGEN SATURATION: 97 % | SYSTOLIC BLOOD PRESSURE: 106 MMHG | RESPIRATION RATE: 17 BRPM | WEIGHT: 210 LBS | TEMPERATURE: 99 F | HEIGHT: 64 IN | DIASTOLIC BLOOD PRESSURE: 64 MMHG

## 2019-07-19 DIAGNOSIS — R19.7 DIARRHEA, UNSPECIFIED TYPE: Primary | ICD-10-CM

## 2019-07-19 PROCEDURE — 99214 OFFICE O/P EST MOD 30 MIN: CPT | Performed by: EMERGENCY MEDICINE

## 2019-07-19 NOTE — PATIENT INSTRUCTIONS
Thank you for choosing [facility]  To Do:  FOR PJ BROWN    · Have stool tests done  · May take OTC Immodium as needed  · Continue with probiotic  · Trial of stopping cefadroxil for 1 week.  Resume after 1 errk  · To ER if with any worsening of medicines:  · Over-the-counter nausea and diarrhea medicines are generally OK unless you experience fever or blood stool. Check with your doctor first in those circumstances.   · You may use acetaminophen or NSAID medicines like ibuprofen or naproxen to red are more important than food right now. · Drink only small amounts of liquids at a time. · Don’t force yourself to eat, especially if you are having cramping, vomiting, or diarrhea. Don’t eat large amounts at a time, even if you are hungry.   · If you eat advice  Call your healthcare provider right away if any of these occur:  · Abdominal pain that gets worse  · Constant lower right abdominal pain  · Continued vomiting and inability to keep liquids down  · Diarrhea more than 5 times a day  · Blood in vomit any other foods you should avoid. · If you were prescribed antibiotics, take them as directed.   · Do not take anti-diarrhea medicines without asking your healthcare provider first.  Call your healthcare provider   Call your healthcare provider if you have

## 2019-07-19 NOTE — PROGRESS NOTES
Chief Complaint:   Patient presents with:  Change of Bowel Habits: watery stool, gas x3-4 days. Denies cramping, mucous or blood in stool. HPI:   This is a 75year old female     Watery stools x 3-4 days. Diarrhea x 2-3 times a day. NO blood.  No feve • Stress fracture of the metatarsals     right foot   • Systemic lupus erythematosus ([facility] 2/26/2009   • Trochanteric bursitis 5/29/2009   • Visual impairment     glasses   • Vitamin D deficiency      Past Surgical History:   Procedure Laterality Date OR   • UPPER GI ENDOSCOPY,EXAM  08     Social History:  Social History    Tobacco Use      Smoking status: Former Smoker        Packs/day: 1.00        Years: 30.00        Pack years: 27        Quit date: 2007        Years since quittin.1 Disp:  Rfl:    CEVIMELINE HCL 30 MG Oral Cap TAKE 1 CAPSULE 3 TIMES A   DAY Disp: 270 capsule Rfl: 1   PREVIDENT 5000 DRY MOUTH 1.1 % Dental Gel  Disp:  Rfl:    RaNITidine HCl (ZANTAC) 150 MG Oral Tab Take 1 tablet (150 mg total) by mouth 2 (two) times randi ILD (interstitial lung disease) (HCC)     Seasonal allergic reaction     Obesity (BMI 30-39. 9)     Primary osteoarthritis of left knee     S/P total knee arthroplasty     Other osteoporosis without current pathological fracture     Low TSH level     [address] (f Basophils Absolute 0.09 0.00 - 0.10 10ˆ3/µL    nRBC Absolute 0.000 0.000 - 0.012 10ˆ3/µL    Neutrophils % 37.4 %    Lymphocytes % 36.1 %    Monocytes % 17.3 %    Eosinophils % 6.9 %    Basophils % 2.3 %    nRBC/100 WBC 0.00 %           ASSESSMENT AND PLAN:

## 2019-07-22 ENCOUNTER — LAB ENCOUNTER (OUTPATIENT)
Dept: LAB | Age: 64
End: 2019-07-22
Attending: EMERGENCY MEDICINE
Payer: MEDICARE

## 2019-07-22 DIAGNOSIS — R19.7 DIARRHEA, UNSPECIFIED TYPE: ICD-10-CM

## 2019-07-22 PROCEDURE — 87046 STOOL CULTR AEROBIC BACT EA: CPT

## 2019-07-22 PROCEDURE — 87493 C DIFF AMPLIFIED PROBE: CPT

## 2019-07-22 PROCEDURE — 87329 GIARDIA AG IA: CPT

## 2019-07-22 PROCEDURE — 87177 OVA AND PARASITES SMEARS: CPT

## 2019-07-22 PROCEDURE — 89055 LEUKOCYTE ASSESSMENT FECAL: CPT

## 2019-07-22 PROCEDURE — 87427 SHIGA-LIKE TOXIN AG IA: CPT

## 2019-07-22 PROCEDURE — 87045 FECES CULTURE AEROBIC BACT: CPT

## 2019-07-22 PROCEDURE — 87272 CRYPTOSPORIDIUM AG IF: CPT

## 2019-07-22 PROCEDURE — 87209 SMEAR COMPLEX STAIN: CPT

## 2019-07-22 PROCEDURE — 87269 GIARDIA AG IF: CPT

## 2019-07-23 LAB
C DIFF TOX B STL QL: NEGATIVE
CRYPTOSP AG STL QL IA: NEGATIVE
G LAMBLIA AG STL QL IA: NEGATIVE

## 2019-07-25 ENCOUNTER — TELEPHONE (OUTPATIENT)
Dept: FAMILY MEDICINE CLINIC | Facility: CLINIC | Age: 64
End: 2019-07-25

## 2019-07-25 NOTE — TELEPHONE ENCOUNTER
Patient received a letter via My Chart regarding outstanding labs, she said that she already has discussed this with Dr. Sp Alaniz at the last office visit, she will have these labs done in October, prior to her next OV.

## 2019-07-26 LAB
OVA AND PARASITE, TRICHROME STAIN: NEGATIVE
OVA AND PARASITE, WET MOUNT: NEGATIVE

## 2019-07-28 DIAGNOSIS — I10 ESSENTIAL HYPERTENSION: ICD-10-CM

## 2019-07-29 ENCOUNTER — TELEPHONE (OUTPATIENT)
Dept: FAMILY MEDICINE CLINIC | Facility: CLINIC | Age: 64
End: 2019-07-29

## 2019-07-29 DIAGNOSIS — K52.9 CHRONIC DIARRHEA: Primary | ICD-10-CM

## 2019-07-29 RX ORDER — QUINAPRIL 20 MG/1
TABLET ORAL
Qty: 90 TABLET | Refills: 1 | Status: SHIPPED | OUTPATIENT
Start: 2019-07-29 | End: 2019-12-23

## 2019-07-29 NOTE — TELEPHONE ENCOUNTER
----- Message from Earl Davidson MD sent at 7/26/2019  7:10 PM CDT -----  All stool cultures negative  c diff negative  Ok to continue with antibiotics  Pls refer patient to Gi for chronic diarrhea, Dr. Leeann Espinoza

## 2019-09-04 PROCEDURE — 81003 URINALYSIS AUTO W/O SCOPE: CPT | Performed by: INTERNAL MEDICINE

## 2019-09-04 PROCEDURE — 86160 COMPLEMENT ANTIGEN: CPT | Performed by: INTERNAL MEDICINE

## 2019-09-04 PROCEDURE — 84156 ASSAY OF PROTEIN URINE: CPT | Performed by: INTERNAL MEDICINE

## 2019-09-04 PROCEDURE — 82570 ASSAY OF URINE CREATININE: CPT | Performed by: INTERNAL MEDICINE

## 2019-11-20 ENCOUNTER — OFFICE VISIT (OUTPATIENT)
Dept: FAMILY MEDICINE CLINIC | Facility: CLINIC | Age: 64
End: 2019-11-20
Payer: MEDICARE

## 2019-11-20 VITALS
BODY MASS INDEX: 34.57 KG/M2 | OXYGEN SATURATION: 95 % | WEIGHT: 205 LBS | SYSTOLIC BLOOD PRESSURE: 130 MMHG | RESPIRATION RATE: 18 BRPM | HEIGHT: 64.5 IN | DIASTOLIC BLOOD PRESSURE: 80 MMHG | TEMPERATURE: 98 F | HEART RATE: 77 BPM

## 2019-11-20 DIAGNOSIS — B97.89 ACUTE VIRAL SINUSITIS: Primary | ICD-10-CM

## 2019-11-20 DIAGNOSIS — J01.90 ACUTE VIRAL SINUSITIS: Primary | ICD-10-CM

## 2019-11-20 PROCEDURE — 99213 OFFICE O/P EST LOW 20 MIN: CPT | Performed by: NURSE PRACTITIONER

## 2019-11-20 NOTE — PROGRESS NOTES
HPI:   Jose Santana is a 75year old female who presents with ill symptoms for  2  days.  Patient reports sore throat only at the beginning of sx's, congestion, OTC cold meds have not been helping, prior history of sinusitis, cough just in the morning with Clobetasol Propionate 0.05 % External Ointment, Apply to the AA of hands BID x 2 weeks then PRN flares, Disp: 30 g, Rfl: 1  Albuterol Sulfate HFA (PROAIR HFA) 108 (90 BASE) MCG/ACT Inhalation Aero Soln, Inhale 1-2 puffs into the lungs every 4 (four) hours left thumb, severe   • Osteomyelitis of right foot ([facility] 01/2018    Balaji OSCAR, Dr. Park Less   • Osteoporosis    • Pain in joints    • Postmenopausal atrophic vaginitis    • Pulmonary fibrosis ([facility] 07/2018    referring to pulm, possible Sjogren's involveme L4-5   • TONSILLECTOMY     • TOTAL KNEE REPLACEMENT Left 05/24/2018   • TRANSFORAMINAL LUMBAR EPIDURAL STEROID INJECTION MULTIPLE LEVEL Right 11/10/2014    Performed by Katalina Mcleod MD at [facility] MAIN OR   • TRANSFORAMINAL LUMBAR EPIDURAL STEROID INJECTIO HEENT: atraumatic, normocephalic,ears clear with full appearing TMs bilaterally, nares with minimal mucus, throat with no erythema, edema or exudate. Uvuvla midline. Mild frontal sinus tenderness with palpation.   NECK: supple,no adenopathy  LUNGS: clear t · Saline nasal spray in the nasal passages may help remove post nasal drainage and soothe the nose. Humidity in the bedroom for sleep and elevating the head of the bed will help reduce drip.   · Over-the-counter antihistamines may help if allergies contribu

## 2019-11-20 NOTE — PATIENT INSTRUCTIONS
Sinusitis (No Antibiotics)    The sinuses are air-filled spaces within the bones of the face. They connect to the inside of the nose. Sinusitis is an inflammation of the tissue that lines the sinuses.  Sinusitis can occur during a cold. It can also happen · Facial pain or headache that gets worse  · Stiff neck  · Unusual drowsiness or confusion  · Swelling of your forehead or eyelids  · Vision problems, such as blurred or double vision  · Fever of 100.4ºF (38ºC) or higher, or as directed by your healthcare

## 2019-12-03 ENCOUNTER — LAB ENCOUNTER (OUTPATIENT)
Dept: LAB | Age: 64
End: 2019-12-03
Attending: EMERGENCY MEDICINE
Payer: MEDICARE

## 2019-12-03 ENCOUNTER — HOSPITAL ENCOUNTER (OUTPATIENT)
Dept: ULTRASOUND IMAGING | Age: 64
Discharge: HOME | End: 2019-12-03
Attending: EMERGENCY MEDICINE
Payer: MEDICARE

## 2019-12-03 DIAGNOSIS — I77.1 TORTUOUS AORTA (HCC): ICD-10-CM

## 2019-12-03 DIAGNOSIS — Z13.29 SCREENING FOR ENDOCRINE, NUTRITIONAL, METABOLIC AND IMMUNITY DISORDER: ICD-10-CM

## 2019-12-03 DIAGNOSIS — Z13.228 SCREENING FOR ENDOCRINE, NUTRITIONAL, METABOLIC AND IMMUNITY DISORDER: ICD-10-CM

## 2019-12-03 DIAGNOSIS — E55.9 VITAMIN D DEFICIENCY: Chronic | ICD-10-CM

## 2019-12-03 DIAGNOSIS — Z13.0 SCREENING FOR ENDOCRINE, NUTRITIONAL, METABOLIC AND IMMUNITY DISORDER: ICD-10-CM

## 2019-12-03 DIAGNOSIS — Z13.21 SCREENING FOR ENDOCRINE, NUTRITIONAL, METABOLIC AND IMMUNITY DISORDER: ICD-10-CM

## 2019-12-03 DIAGNOSIS — R19.7 DIARRHEA, UNSPECIFIED TYPE: ICD-10-CM

## 2019-12-03 PROCEDURE — 80053 COMPREHEN METABOLIC PANEL: CPT

## 2019-12-03 PROCEDURE — 84443 ASSAY THYROID STIM HORMONE: CPT

## 2019-12-03 PROCEDURE — 85025 COMPLETE CBC W/AUTO DIFF WBC: CPT

## 2019-12-03 PROCEDURE — 82306 VITAMIN D 25 HYDROXY: CPT

## 2019-12-03 PROCEDURE — 76770 US EXAM ABDO BACK WALL COMP: CPT | Performed by: EMERGENCY MEDICINE

## 2019-12-03 PROCEDURE — 80061 LIPID PANEL: CPT

## 2019-12-03 PROCEDURE — 36415 COLL VENOUS BLD VENIPUNCTURE: CPT

## 2019-12-03 PROCEDURE — 86003 ALLG SPEC IGE CRUDE XTRC EA: CPT

## 2019-12-10 ENCOUNTER — OFFICE VISIT (OUTPATIENT)
Dept: FAMILY MEDICINE CLINIC | Facility: CLINIC | Age: 64
End: 2019-12-10
Payer: MEDICARE

## 2019-12-10 VITALS
SYSTOLIC BLOOD PRESSURE: 136 MMHG | HEART RATE: 73 BPM | TEMPERATURE: 98 F | WEIGHT: 215 LBS | BODY MASS INDEX: 35.82 KG/M2 | HEIGHT: 65 IN | RESPIRATION RATE: 16 BRPM | DIASTOLIC BLOOD PRESSURE: 88 MMHG

## 2019-12-10 DIAGNOSIS — J84.9 ILD (INTERSTITIAL LUNG DISEASE) (HCC): ICD-10-CM

## 2019-12-10 DIAGNOSIS — M35.09 SJOGREN'S SYNDROME WITH OTHER ORGAN INVOLVEMENT (HCC): Chronic | ICD-10-CM

## 2019-12-10 DIAGNOSIS — M15.9 PRIMARY OSTEOARTHRITIS INVOLVING MULTIPLE JOINTS: Chronic | ICD-10-CM

## 2019-12-10 DIAGNOSIS — J30.2 SEASONAL ALLERGIC REACTION: ICD-10-CM

## 2019-12-10 DIAGNOSIS — Z01.419 ENCOUNTER FOR GYNECOLOGICAL EXAMINATION WITHOUT ABNORMAL FINDING: ICD-10-CM

## 2019-12-10 DIAGNOSIS — Z12.31 ENCOUNTER FOR SCREENING MAMMOGRAM FOR HIGH-RISK PATIENT: ICD-10-CM

## 2019-12-10 DIAGNOSIS — M05.741 RHEUMATOID ARTHRITIS INVOLVING BOTH HANDS WITH POSITIVE RHEUMATOID FACTOR (HCC): Chronic | ICD-10-CM

## 2019-12-10 DIAGNOSIS — F17.210 SMOKING GREATER THAN 25 PACK YEARS: ICD-10-CM

## 2019-12-10 DIAGNOSIS — M81.0 OSTEOPOROSIS, SENILE: Chronic | ICD-10-CM

## 2019-12-10 DIAGNOSIS — Z23 NEED FOR VACCINATION: ICD-10-CM

## 2019-12-10 DIAGNOSIS — M05.742 RHEUMATOID ARTHRITIS INVOLVING BOTH HANDS WITH POSITIVE RHEUMATOID FACTOR (HCC): Chronic | ICD-10-CM

## 2019-12-10 DIAGNOSIS — I34.0 MILD MITRAL REGURGITATION: ICD-10-CM

## 2019-12-10 DIAGNOSIS — Z13.31 DEPRESSION SCREENING: ICD-10-CM

## 2019-12-10 DIAGNOSIS — K58.0 IRRITABLE BOWEL SYNDROME WITH DIARRHEA: Chronic | ICD-10-CM

## 2019-12-10 DIAGNOSIS — S22.000S CLOSED COMPRESSION FRACTURE OF THORACIC VERTEBRA, SEQUELA: ICD-10-CM

## 2019-12-10 DIAGNOSIS — I10 ESSENTIAL (PRIMARY) HYPERTENSION: ICD-10-CM

## 2019-12-10 DIAGNOSIS — M79.7 FIBROMYALGIA: Chronic | ICD-10-CM

## 2019-12-10 DIAGNOSIS — M32.9 SYSTEMIC LUPUS ERYTHEMATOSUS, UNSPECIFIED SLE TYPE, UNSPECIFIED ORGAN INVOLVEMENT STATUS (HCC): Chronic | ICD-10-CM

## 2019-12-10 DIAGNOSIS — E55.9 VITAMIN D DEFICIENCY: Chronic | ICD-10-CM

## 2019-12-10 DIAGNOSIS — D63.8 ANEMIA OF CHRONIC DISEASE: ICD-10-CM

## 2019-12-10 DIAGNOSIS — Z00.00 ENCOUNTER FOR ANNUAL HEALTH EXAMINATION: Primary | ICD-10-CM

## 2019-12-10 DIAGNOSIS — D70.9 NEUTROPENIA, UNSPECIFIED TYPE (HCC): ICD-10-CM

## 2019-12-10 DIAGNOSIS — I70.90 ATHEROSCLEROSIS: ICD-10-CM

## 2019-12-10 PROBLEM — R19.7 DIARRHEA: Status: RESOLVED | Noted: 2019-07-19 | Resolved: 2019-12-10

## 2019-12-10 PROBLEM — M86.9 OSTEOMYELITIS OF RIGHT FOOT (HCC): Status: RESOLVED | Noted: 2018-01-06 | Resolved: 2019-12-10

## 2019-12-10 PROBLEM — E83.52 FHH (FAMILIAL HYPOCALCIURIC HYPERCALCEMIA): Status: RESOLVED | Noted: 2019-03-13 | Resolved: 2019-12-10

## 2019-12-10 PROBLEM — J84.10 FIBROSIS OF LUNG (HCC): Status: RESOLVED | Noted: 2018-07-31 | Resolved: 2019-12-10

## 2019-12-10 PROBLEM — R79.89 LOW TSH LEVEL: Status: RESOLVED | Noted: 2019-03-13 | Resolved: 2019-12-10

## 2019-12-10 PROBLEM — M17.12 PRIMARY OSTEOARTHRITIS OF LEFT KNEE: Status: RESOLVED | Noted: 2018-05-21 | Resolved: 2019-12-10

## 2019-12-10 PROBLEM — Z96.659 S/P TOTAL KNEE ARTHROPLASTY: Status: RESOLVED | Noted: 2018-05-24 | Resolved: 2019-12-10

## 2019-12-10 PROBLEM — M81.8 OTHER OSTEOPOROSIS WITHOUT CURRENT PATHOLOGICAL FRACTURE: Status: RESOLVED | Noted: 2019-03-13 | Resolved: 2019-12-10

## 2019-12-10 PROCEDURE — 90662 IIV NO PRSV INCREASED AG IM: CPT | Performed by: FAMILY MEDICINE

## 2019-12-10 PROCEDURE — G0008 ADMIN INFLUENZA VIRUS VAC: HCPCS | Performed by: FAMILY MEDICINE

## 2019-12-10 PROCEDURE — G0439 PPPS, SUBSEQ VISIT: HCPCS | Performed by: FAMILY MEDICINE

## 2019-12-10 PROCEDURE — 99213 OFFICE O/P EST LOW 20 MIN: CPT | Performed by: FAMILY MEDICINE

## 2019-12-10 PROCEDURE — G0444 DEPRESSION SCREEN ANNUAL: HCPCS | Performed by: FAMILY MEDICINE

## 2019-12-10 PROCEDURE — G0101 CA SCREEN;PELVIC/BREAST EXAM: HCPCS | Performed by: FAMILY MEDICINE

## 2019-12-10 NOTE — PROGRESS NOTES
HPI:   Roscoe Jennings is a 75year old female who presents for a Medicare Subsequent Annual Wellness visit (Pt already had Initial Annual Wellness).     Pt also here to discuss     Systemic lupus erythematosus ([address]     Vitamin D deficiency     Osteoporosis Packs/day: 1.00        Years: 37.00        Pack years: 40        Start date: 1970        Quit date: 2007        Years since quittin.5      Smokeless tobacco: Never Used      Tobacco comment: quit in          CAGE Alcohol screening kg)     Last Cholesterol Labs:   Lab Results   Component Value Date    CHOLEST 162 12/03/2019    HDL 79 (H) 12/03/2019    LDL 63 12/03/2019    TRIG 102 12/03/2019          Last Chemistry Labs:   Lab Results   Component Value Date    AST 17 12/03/2019    AL (5/29/2009), Visual impairment, and Vitamin D deficiency.     She  has a past surgical history that includes appendectomy (age 6); foot/toes surgery proc unlisted (09/2011); upper gi endoscopy,exam (12/18/08); other surgical history (1/11/13); radiation ri Ht 65\"   Wt 215 lb (97.5 kg)   BMI 35.78 kg/m²  Estimated body mass index is 35.78 kg/m² as calculated from the following:    Height as of this encounter: 65\". Weight as of this encounter: 215 lb (97.5 kg).     Medicare Hearing Assessment  (Required f abnormality or discharge and uterus normal size, shape, and consistency    Vaccination History     Immunization History   Administered Date(s) Administered   • >=9 YRS AFLURIA TRI PRESERV FREE SINGLE DOSE (57390) FLU CLINIC 10/07/2015   • Depo-Medrol 80mg, involving multiple joints- stable monitor   -     OFFICE/OUTPT VISIT,XIAO NAVARRETE III    Sjogren's syndrome with other organ involvement ([facility]- see rheum   -     OFFICE/OUTPT VISIT,XIAO NAVARRETE III    Essential (primary) hypertension- stable cpm   -     OFFICE/OUT Interaction; Visiting Family; Visiting Friends      This section provided for quick review of chart, separate sheet to patient  [address] Internal Lab or Procedure External Lab or Procedure   Diabetes Screening      HbgA applicable     Immunizations (Update Immunization Activity if applicable)     Influenza  Covered Annually 11/12/2018 Please get every year    Pneumococcal 13 (Prevnar)  Covered Once after 65 11/12/2018 Please get once after your 65th birthday    Luis Enriquez

## 2019-12-10 NOTE — PATIENT INSTRUCTIONS
Irvin Collazo's SCREENING SCHEDULE   Tests on this list are recommended by your physician but may not be covered, or covered at this frequency, by your insurer. Please check with your insurance carrier before scheduling to verify coverage.    PREVENTATIVE 65-75) IPPE only No results found for this or any previous visit.  Limited to patients who meet one of the following criteria:   • Men who are 73-68 years old and have smoked more than 100 cigarettes in their lifetime   • Anyone with a family history    Col needed after 75 Mammogram due on 12/26/2019 Please get this Mammogram regularly   Immunizations      Influenza  Covered Annually Orders placed or performed in visit on 10/19/16   • FLU VACC [address] ANTIG   Orders placed or performed in [facility]. [email or web address]  A link to the [employer]  This site has a lot of good information including definitions of the different types of Advance Directive

## 2019-12-20 DIAGNOSIS — I10 ESSENTIAL HYPERTENSION: ICD-10-CM

## 2019-12-21 RX ORDER — QUINAPRIL 20 MG/1
TABLET ORAL
Qty: 90 TABLET | Refills: 1 | OUTPATIENT
Start: 2019-12-21

## 2019-12-23 ENCOUNTER — TELEPHONE (OUTPATIENT)
Dept: FAMILY MEDICINE CLINIC | Facility: CLINIC | Age: 64
End: 2019-12-23

## 2019-12-23 DIAGNOSIS — I10 ESSENTIAL HYPERTENSION: ICD-10-CM

## 2019-12-23 RX ORDER — QUINAPRIL 20 MG/1
20 TABLET ORAL DAILY
Qty: 90 TABLET | Refills: 1 | Status: SHIPPED | OUTPATIENT
Start: 2019-12-23 | End: 2020-03-05

## 2020-01-07 ENCOUNTER — HOSPITAL ENCOUNTER (OUTPATIENT)
Dept: MAMMOGRAPHY | Age: 65
Discharge: HOME | End: 2020-01-07
Attending: FAMILY MEDICINE
Payer: MEDICARE

## 2020-01-07 ENCOUNTER — HOSPITAL ENCOUNTER (OUTPATIENT)
Dept: CV DIAGNOSTICS | Age: 65
Discharge: HOME | End: 2020-01-07
Attending: FAMILY MEDICINE
Payer: MEDICARE

## 2020-01-07 DIAGNOSIS — Z12.31 ENCOUNTER FOR SCREENING MAMMOGRAM FOR HIGH-RISK PATIENT: ICD-10-CM

## 2020-01-07 DIAGNOSIS — I34.0 MILD MITRAL REGURGITATION: ICD-10-CM

## 2020-01-07 PROCEDURE — 93306 TTE W/DOPPLER COMPLETE: CPT | Performed by: FAMILY MEDICINE

## 2020-01-07 PROCEDURE — 77063 BREAST TOMOSYNTHESIS BI: CPT | Performed by: FAMILY MEDICINE

## 2020-01-07 PROCEDURE — 77067 SCR MAMMO BI INCL CAD: CPT | Performed by: FAMILY MEDICINE

## 2020-03-04 DIAGNOSIS — I10 ESSENTIAL HYPERTENSION: ICD-10-CM

## 2020-03-05 RX ORDER — QUINAPRIL 20 MG/1
TABLET ORAL
Qty: 90 TABLET | Refills: 1 | Status: ON HOLD | OUTPATIENT
Start: 2020-03-05 | End: 2020-03-30

## 2020-03-29 ENCOUNTER — HOSPITAL ENCOUNTER (INPATIENT)
Facility: HOSPITAL | Age: 65
LOS: 1 days | Discharge: HOME | DRG: 195 | End: 2020-03-30
Attending: EMERGENCY MEDICINE | Admitting: INTERNAL MEDICINE
Payer: MEDICARE

## 2020-03-29 ENCOUNTER — APPOINTMENT (OUTPATIENT)
Dept: GENERAL RADIOLOGY | Age: 65
DRG: 195 | End: 2020-03-29
Attending: EMERGENCY MEDICINE
Payer: MEDICARE

## 2020-03-29 DIAGNOSIS — J18.9 PNEUMONIA DUE TO INFECTIOUS ORGANISM, UNSPECIFIED LATERALITY, UNSPECIFIED PART OF LUNG: Primary | ICD-10-CM

## 2020-03-29 PROBLEM — R73.9 HYPERGLYCEMIA: Status: ACTIVE | Noted: 2020-03-29

## 2020-03-29 PROCEDURE — 99223 1ST HOSP IP/OBS HIGH 75: CPT | Performed by: HOSPITALIST

## 2020-03-29 PROCEDURE — 71045 X-RAY EXAM CHEST 1 VIEW: CPT | Performed by: EMERGENCY MEDICINE

## 2020-03-29 RX ORDER — PREGABALIN 75 MG/1
150 CAPSULE ORAL NIGHTLY
Status: DISCONTINUED | OUTPATIENT
Start: 2020-03-29 | End: 2020-03-30

## 2020-03-29 RX ORDER — CEPHALEXIN 250 MG/1
250 CAPSULE ORAL EVERY 6 HOURS
Status: DISCONTINUED | OUTPATIENT
Start: 2020-03-29 | End: 2020-03-29

## 2020-03-29 RX ORDER — PREGABALIN 75 MG/1
75 CAPSULE ORAL DAILY
Status: DISCONTINUED | OUTPATIENT
Start: 2020-03-30 | End: 2020-03-30

## 2020-03-29 RX ORDER — ALBUTEROL SULFATE 90 UG/1
4 AEROSOL, METERED RESPIRATORY (INHALATION)
Status: DISCONTINUED | OUTPATIENT
Start: 2020-03-29 | End: 2020-03-30

## 2020-03-29 RX ORDER — AZITHROMYCIN 250 MG/1
250 TABLET, FILM COATED ORAL DAILY
Status: DISCONTINUED | OUTPATIENT
Start: 2020-03-30 | End: 2020-03-29

## 2020-03-29 RX ORDER — METOCLOPRAMIDE HYDROCHLORIDE 5 MG/ML
10 INJECTION INTRAMUSCULAR; INTRAVENOUS EVERY 8 HOURS PRN
Status: DISCONTINUED | OUTPATIENT
Start: 2020-03-29 | End: 2020-03-30

## 2020-03-29 RX ORDER — METHYLPREDNISOLONE SODIUM SUCCINATE 40 MG/ML
80 INJECTION, POWDER, LYOPHILIZED, FOR SOLUTION INTRAMUSCULAR; INTRAVENOUS ONCE
Status: COMPLETED | OUTPATIENT
Start: 2020-03-29 | End: 2020-03-29

## 2020-03-29 RX ORDER — ONDANSETRON 2 MG/ML
4 INJECTION INTRAMUSCULAR; INTRAVENOUS EVERY 6 HOURS PRN
Status: DISCONTINUED | OUTPATIENT
Start: 2020-03-29 | End: 2020-03-30

## 2020-03-29 RX ORDER — CEVIMELINE HYDROCHLORIDE 30 MG/1
CAPSULE ORAL 2 TIMES DAILY
Status: DISCONTINUED | OUTPATIENT
Start: 2020-03-29 | End: 2020-03-30

## 2020-03-29 RX ORDER — AZITHROMYCIN 250 MG/1
500 TABLET, FILM COATED ORAL ONCE
Status: COMPLETED | OUTPATIENT
Start: 2020-03-29 | End: 2020-03-29

## 2020-03-29 RX ORDER — LISINOPRIL 40 MG/1
40 TABLET ORAL DAILY
Status: DISCONTINUED | OUTPATIENT
Start: 2020-03-29 | End: 2020-03-30

## 2020-03-29 RX ORDER — ACETAMINOPHEN 325 MG/1
650 TABLET ORAL EVERY 6 HOURS PRN
Status: DISCONTINUED | OUTPATIENT
Start: 2020-03-29 | End: 2020-03-30

## 2020-03-29 RX ORDER — CEPHALEXIN 250 MG/1
250 CAPSULE ORAL EVERY 6 HOURS
Status: DISCONTINUED | OUTPATIENT
Start: 2020-03-29 | End: 2020-03-30

## 2020-03-29 RX ORDER — HYDROXYCHLOROQUINE SULFATE 200 MG/1
200 TABLET, FILM COATED ORAL 2 TIMES DAILY
Status: DISCONTINUED | OUTPATIENT
Start: 2020-03-29 | End: 2020-03-30

## 2020-03-29 RX ORDER — ACETAMINOPHEN 160 MG
2000 TABLET,DISINTEGRATING ORAL
Status: DISCONTINUED | OUTPATIENT
Start: 2020-03-30 | End: 2020-03-30

## 2020-03-29 NOTE — PLAN OF CARE
Pt alert and oriented x4. On RA, afebrile. Pt c/o headache, tylenol given as ordered. Non-productive cough. Diminished lung sounds. SOB on exertion. IS encouraged hourly while awake. COVID results pending. BP elevated, medication given (see MAR). Tele NSR.

## 2020-03-29 NOTE — PROGRESS NOTES
NURSING ADMISSION NOTE      Patient admitted via Ambulance  Oriented to room. BP elevated, HR stable on RA. Isolation for R/O COVID19 initiated. Safety precautions initiated. Bed in low position. Call light in reach.

## 2020-03-29 NOTE — PROGRESS NOTES
Paged Dr. Alina Martinez to clarify if ID consult is needed for r/o COVID19. Awaiting response at this time. ID MD aware of pt.

## 2020-03-29 NOTE — ED PROVIDER NOTES
Patient Seen in: [address] [facility] In [place]      History   Patient presents with:  Hypertension    Stated Complaint: elevated BP after taking allegra for past 2 days    HPI    This is a 69-year-old woman, here for evaluation of elevated bl • Hammertoe     second toe right foot   • Heart valve disease    • Hemorrhoids    • High blood pressure    • Hypercalcemia    • IBS (irritable bowel syndrome)    • Internal hemorrhoids 12/18/08    Grade II   • Irregular Z line of esophagus 12/18/08   • [facility] MAIN OR   • KNEE JOINT INJECTIONS UNDER FLUOROSCOPY BILATERAL Bilateral 9/8/2014    Performed by Catherine Perez MD at [facility] MAIN OR   • KNEE REPLACEMENT SURGERY     • LUMPECTOMY RIGHT  2004    rt breast lumpectomy, [facility]   • LUDY BIOPSY STEREO kg/m²         Physical Exam        Physical Exam  Vitals signs and nursing note reviewed. General: This is well-appearing woman sitting in the bed in no acute distress. Head: Normocephalic and atraumatic.    Mouth/Throat:  Mucous membranes are moist.   C COMPARISON:  [facility], XR, XR CHEST PA + LAT CHEST (CPT=71046), 6/14/2019, 10:43 AM.  INDICATIONS:  elevated BP after taking allegra for past 2 days  PATIENT STATED HISTORY: (As transcribed by Technologist)  For past 2 weeks elevated blood pressure, when due to infectious organism, unspecified laterality, unspecified part of lung  (primary encounter diagnosis)    Disposition:  Admit  3/29/2020 10:42 am    Follow-up:  No follow-up provider specified.       Medications Prescribed:  Current Discharge Medicatio

## 2020-03-30 VITALS
OXYGEN SATURATION: 94 % | HEIGHT: 65 IN | WEIGHT: 218.5 LBS | TEMPERATURE: 98 F | SYSTOLIC BLOOD PRESSURE: 126 MMHG | DIASTOLIC BLOOD PRESSURE: 67 MMHG | BODY MASS INDEX: 36.4 KG/M2 | RESPIRATION RATE: 21 BRPM | HEART RATE: 69 BPM

## 2020-03-30 PROCEDURE — 99239 HOSP IP/OBS DSCHRG MGMT >30: CPT | Performed by: HOSPITALIST

## 2020-03-30 RX ORDER — QUINAPRIL 20 MG/1
40 TABLET ORAL NIGHTLY
Refills: 0 | Status: SHIPPED | COMMUNITY
Start: 2020-03-30 | End: 2020-04-13

## 2020-03-30 RX ORDER — PREGABALIN 75 MG/1
75 CAPSULE ORAL EVERY MORNING
COMMUNITY
End: 2020-06-10

## 2020-03-30 RX ORDER — PREGABALIN 75 MG/1
150 CAPSULE ORAL NIGHTLY
COMMUNITY
End: 2020-09-03 | Stop reason: ALTCHOICE

## 2020-03-30 RX ORDER — LEFLUNOMIDE 20 MG/1
20 TABLET ORAL DAILY
COMMUNITY
End: 2020-09-03 | Stop reason: ALTCHOICE

## 2020-03-30 RX ORDER — GUARN/MA-HUANG/P.GIN/S.GINSENG
1 TABLET ORAL 2 TIMES DAILY
COMMUNITY

## 2020-03-30 RX ORDER — MULTIVITAMIN WITH FOLIC ACID 400 MCG
1 TABLET ORAL DAILY
COMMUNITY

## 2020-03-30 RX ORDER — CHOLECALCIFEROL (VITAMIN D3) 125 MCG
1 CAPSULE ORAL DAILY
COMMUNITY

## 2020-03-30 RX ORDER — QUINAPRIL 20 MG/1
20 TABLET ORAL NIGHTLY
Status: ON HOLD | COMMUNITY
End: 2020-03-30

## 2020-03-30 RX ORDER — ENOXAPARIN SODIUM 100 MG/ML
40 INJECTION SUBCUTANEOUS DAILY
Status: DISCONTINUED | OUTPATIENT
Start: 2020-03-30 | End: 2020-03-30

## 2020-03-30 RX ORDER — CEVIMELINE HYDROCHLORIDE 30 MG/1
30 CAPSULE ORAL 3 TIMES DAILY
COMMUNITY
End: 2020-08-03

## 2020-03-30 NOTE — PLAN OF CARE
Assumed care at [address] pt on bed, on room air  Antibiotics  SBP 140s  Afebrile  Up ad champ  Labs in am  Will continue to monitor    Problem: Patient/Family Goals  Goal: Patient/Family Long Term Goal  Description  Patient's Long Term Goal: \"To get well\ for coordinating discharge planning if the patient needs post-hospital services based on physician/LIP order or complex needs related to functional status, cognitive ability or social support system  Outcome: Progressing     Problem: CARDIOVASCULAR - ADULT signs/symptoms of CO2 retention  Outcome: Progressing

## 2020-03-30 NOTE — PROGRESS NOTES
03/30/20 0998   Clinical Encounter Type   Visited With Patient   Routine Visit   (Patient anticipates discharge today.)   Continue Visiting No   Patient's Supportive Strategies/Resources Followers of [employer] in [place].   Patient's past

## 2020-03-30 NOTE — PROGRESS NOTES
[facility]                INFECTIOUS DISEASE PROGRESS NOTE    Karin Hall Patient Status:  Inpatient    1955 MRN [id number]   [facility] 3SW-A Attending Lamar Franks MD   [facility] Day # 1 PCP DO Ar[facility])     Lumbar facet arthropathy     Essential (primary) hypertension     Anemia of chronic disease     Closed compression fracture of thoracic vertebra (HCC)     Smoking greater than 25 pack years     ILD (interstitial lung disease) ([facility]     Seasonal a

## 2020-03-30 NOTE — PLAN OF CARE
Pt alert and oriented x4. On RA, afebrile. Pt c/o headache, tylenol given as ordered. Non-productive cough. Diminished lung sounds. SOB on exertion. IS encouraged hourly while awake. COVID results pending. BP elevated this AM, medication given (see MAR).  B

## 2020-03-30 NOTE — DISCHARGE SUMMARY
LOI HOSPITALIST  DISCHARGE SUMMARY     Sonu Bro Patient Status:  Inpatient    1955 MRN [id number]   [facility] 3SW-A Attending Job Dial, MD   [facility] Day # 1 PCP Damian Aldana DO     Date of Admission: 3/29/2020  Date of Disc new instructions. TAKE 1 CAPSULE(10 MG) BY MOUTH EVERY EVENING   Quantity:  30 capsule  Refills:  0     Quinapril HCl 20 MG Tabs  Commonly known as:  ACCUPRIL  What changed:  how much to take      Take 2 tablets (40 mg total) by mouth nightly.    Refil tablet  Refills:  2     leflunomide 20 MG Tabs  Commonly known as:  ARAVA      Take 20 mg by mouth daily. Refills:  0     pregabalin 75 MG Caps  Commonly known as:  LYRICA      Take 75 mg by mouth every morning.    Refills:  0     pregabalin 75 MG Caps  C

## 2020-03-30 NOTE — CONSULTS
INFECTIOUS DISEASE [address] Patient Status:  Inpatient    1955 MRN [id number]   [facility] 3SW-A Attending Silvio Castro MD   Hosp Day # 0 PCP Snow Tran DO arthritis(714.0)    • Sicca syndrome ([facility] 2/26/2009   • Sjogren's syndrome ([facility] 10/07   • Stress fracture of the metatarsals     right foot   • Systemic lupus erythematosus ([facility] 2/26/2009   • Trochanteric bursitis 5/29/2009   • Visual impairment     gla Nya Young MD at [facility] MAIN OR   • TRANSFORAMINAL LUMBAR EPIDURAL STEROID INJECTION MULTIPLE LEVEL Right 10/20/2014    Performed by Becka Seth MD at [facility] MAIN OR   • UPPER GI ENDOSCOPY,EXAM  12/18/08     Family History   Problem Relation Age of Onset ondansetron HCl (ZOFRAN) injection 4 mg, 4 mg, Intravenous, Q6H PRN  •  Metoclopramide HCl (REGLAN) injection 10 mg, 10 mg, Intravenous, Q8H PRN  •  cephALEXin (KEFLEX) cap 250 mg, 250 mg, Oral, Q6H  •  [START ON 3/30/2020] azithromycin (ZITHROMAX) tab 250 NEEDED FOR MORE REFILLS), Disp: 3 Inhaler, Rfl: 3  Denosumab 60 MG/ML Subcutaneous Solution Prefilled Syringe, Inject 1 mL (60 mg total) into the skin once.  Historical documentation - see Epic Immunization Activity for administration details, Disp: 1 mL, R 4. 4   .0       Recent Labs   Lab 03/29/20  0946   *   BUN 13   CREATSERUM 0.73   GFRAA 99   GFRNAA 86   CA 9.2   ALB 3.5      K 3.9      CO2 30.0   ALKPHO 86   AST 18   ALT 22   BILT 0.4   TP 7.1         Lab Results   Component Va

## 2020-03-30 NOTE — PROGRESS NOTES
LOI HOSPITALIST  Progress Note     Rhonda Baer Patient Status:  Inpatient    1955 MRN [id number]   [facility] 3SW-A Attending Harry Pizarro MD   Hosp Day # 1 PCP Arnel Bullard DO     Chief Complaint: Hypertension    S: Patient de 1 puff Inhalation Daily   • Cevimeline HCl   Oral BID   • Hydroxychloroquine Sulfate  200 mg Oral BID   • pregabalin  75 mg Oral Daily   • pregabalin  150 mg Oral Nightly   • lisinopril  40 mg Oral Daily   • Vitamin D3  2,000 Units Oral Daily   • cephALEXi

## 2020-03-30 NOTE — PROGRESS NOTES
Pt cleared by all MD's for discharge. Discharge education completed at bedside, all questions answered. PIV removed, belongings packed. Pt discharging to home via wheelchair.

## 2020-03-31 ENCOUNTER — TELEPHONE (OUTPATIENT)
Dept: FAMILY MEDICINE CLINIC | Facility: CLINIC | Age: 65
End: 2020-03-31

## 2020-03-31 ENCOUNTER — VIRTUAL PHONE E/M (OUTPATIENT)
Dept: FAMILY MEDICINE CLINIC | Facility: CLINIC | Age: 65
End: 2020-03-31
Payer: MEDICARE

## 2020-03-31 ENCOUNTER — PATIENT OUTREACH (OUTPATIENT)
Dept: CASE MANAGEMENT | Age: 65
End: 2020-03-31

## 2020-03-31 DIAGNOSIS — J15.211 PNEUMONIA OF RIGHT LOWER LOBE DUE TO METHICILLIN SUSCEPTIBLE STAPHYLOCOCCUS AUREUS (MSSA) (HCC): Primary | ICD-10-CM

## 2020-03-31 DIAGNOSIS — Z02.9 ENCOUNTERS FOR ADMINISTRATIVE PURPOSE: ICD-10-CM

## 2020-03-31 PROCEDURE — G2012 BRIEF CHECK IN BY MD/QHP: HCPCS | Performed by: FAMILY MEDICINE

## 2020-03-31 RX ORDER — AMOXICILLIN AND CLAVULANATE POTASSIUM 500; 125 MG/1; MG/1
1 TABLET, FILM COATED ORAL 2 TIMES DAILY
Qty: 20 TABLET | Refills: 0 | Status: SHIPPED | OUTPATIENT
Start: 2020-03-31 | End: 2020-04-10

## 2020-03-31 NOTE — TELEPHONE ENCOUNTER
Pt would like billable telephone call to discuss cough- post hospital  Added to [facility] schedule    Pt released from hospital for pneumonia, tested for corona virus-negative  On exertion still with SOB and cough  Using albuterol with no relief.

## 2020-03-31 NOTE — PROGRESS NOTES
Virtual/Telephone Check-In    Sonu Bro verbally consents to a Virtual/Telephone Check-In service on 03/30/20. Patient understands and accepts financial responsibility for any deductible, co-insurance and/or co-pays associated with this service.     Dur

## 2020-03-31 NOTE — TELEPHONE ENCOUNTER
Released form hospital with pneumonia, was on covid floor.  She says her breathing still  is not the best, please call

## 2020-03-31 NOTE — PROGRESS NOTES
1st attempt [facility] apt request     [facility]   [phone].  [address], [address]   ( parking available, M – F, 8 am – 6 pm)*

## 2020-04-13 ENCOUNTER — VIRTUAL PHONE E/M (OUTPATIENT)
Dept: FAMILY MEDICINE CLINIC | Facility: CLINIC | Age: 65
End: 2020-04-13
Payer: MEDICARE

## 2020-04-13 DIAGNOSIS — I10 ESSENTIAL HYPERTENSION: Primary | ICD-10-CM

## 2020-04-13 DIAGNOSIS — J15.211 PNEUMONIA OF RIGHT LOWER LOBE DUE TO METHICILLIN SUSCEPTIBLE STAPHYLOCOCCUS AUREUS (MSSA) (HCC): ICD-10-CM

## 2020-04-13 PROCEDURE — 99441 PHONE E/M BY PHYS 5-10 MIN: CPT | Performed by: FAMILY MEDICINE

## 2020-04-13 RX ORDER — QUINAPRIL 40 MG/1
40 TABLET ORAL NIGHTLY
Qty: 60 TABLET | Refills: 0 | Status: SHIPPED | OUTPATIENT
Start: 2020-04-13 | End: 2020-04-15

## 2020-04-13 NOTE — PROGRESS NOTES
Virtual Telephone Check-In    Rachel Paredes verbally consents to a Virtual/Telephone Check-In visit on 04/13/20. Patient understands and accepts financial responsibility for any deductible, co-insurance and/or co-pays associated with this service.     Dur

## 2020-04-15 ENCOUNTER — TELEPHONE (OUTPATIENT)
Dept: FAMILY MEDICINE CLINIC | Facility: CLINIC | Age: 65
End: 2020-04-15

## 2020-04-15 DIAGNOSIS — I10 ESSENTIAL HYPERTENSION: ICD-10-CM

## 2020-04-15 RX ORDER — QUINAPRIL 40 MG/1
40 TABLET ORAL 2 TIMES DAILY
Qty: 180 TABLET | Refills: 0 | Status: SHIPPED | OUTPATIENT
Start: 2020-04-15 | End: 2020-06-18

## 2020-04-15 NOTE — TELEPHONE ENCOUNTER
Patient called and stated she had a televisit with LE regarding her BP. Stated LE increased her med from once a day to twice a day. Directions on medication do no say that.   She would like to confirm instructions and if she has doubled her dose, can

## 2020-04-15 NOTE — TELEPHONE ENCOUNTER
See previous message. Please verify correct dose of Quinapril and should it be daily or BID? Please advise

## 2020-05-05 ENCOUNTER — HOSPITAL ENCOUNTER (OUTPATIENT)
Dept: GENERAL RADIOLOGY | Age: 65
Discharge: HOME | End: 2020-05-05
Attending: FAMILY MEDICINE
Payer: MEDICARE

## 2020-05-05 DIAGNOSIS — J15.211 PNEUMONIA OF RIGHT LOWER LOBE DUE TO METHICILLIN SUSCEPTIBLE STAPHYLOCOCCUS AUREUS (MSSA) (HCC): ICD-10-CM

## 2020-05-05 PROCEDURE — 71046 X-RAY EXAM CHEST 2 VIEWS: CPT | Performed by: FAMILY MEDICINE

## 2020-05-06 ENCOUNTER — VIRTUAL PHONE E/M (OUTPATIENT)
Dept: FAMILY MEDICINE CLINIC | Facility: CLINIC | Age: 65
End: 2020-05-06
Payer: MEDICARE

## 2020-05-06 DIAGNOSIS — I10 ESSENTIAL HYPERTENSION: ICD-10-CM

## 2020-05-06 DIAGNOSIS — J42 CHRONIC BRONCHITIS, UNSPECIFIED CHRONIC BRONCHITIS TYPE (HCC): Primary | ICD-10-CM

## 2020-05-06 PROCEDURE — G2012 BRIEF CHECK IN BY MD/QHP: HCPCS | Performed by: FAMILY MEDICINE

## 2020-05-06 RX ORDER — AZITHROMYCIN 250 MG/1
TABLET, FILM COATED ORAL
Qty: 6 TABLET | Refills: 0 | Status: SHIPPED | OUTPATIENT
Start: 2020-05-06 | End: 2020-05-11

## 2020-05-06 NOTE — PROGRESS NOTES
Virtual Telephone Check-In    Jose Santana verbally consents to a Virtual/Telephone Check-In visit on 05/06/20. Patient understands and accepts financial responsibility for any deductible, co-insurance and/or co-pays associated with this service.     Dur

## 2020-05-13 ENCOUNTER — VIRTUAL PHONE E/M (OUTPATIENT)
Dept: FAMILY MEDICINE CLINIC | Facility: CLINIC | Age: 65
End: 2020-05-13
Payer: MEDICARE

## 2020-05-13 ENCOUNTER — TELEPHONE (OUTPATIENT)
Dept: FAMILY MEDICINE CLINIC | Facility: CLINIC | Age: 65
End: 2020-05-13

## 2020-05-13 DIAGNOSIS — L27.0 DRUG RASH: Primary | ICD-10-CM

## 2020-05-13 DIAGNOSIS — J30.9 ALLERGIC RHINITIS, UNSPECIFIED SEASONALITY, UNSPECIFIED TRIGGER: ICD-10-CM

## 2020-05-13 PROCEDURE — 99441 PHONE E/M BY PHYS 5-10 MIN: CPT | Performed by: FAMILY MEDICINE

## 2020-05-13 RX ORDER — MONTELUKAST SODIUM 10 MG/1
10 TABLET ORAL NIGHTLY
Qty: 90 TABLET | Refills: 0 | COMMUNITY
Start: 2020-05-13 | End: 2020-08-25

## 2020-05-13 RX ORDER — PREDNISONE 20 MG/1
40 TABLET ORAL DAILY
Qty: 8 TABLET | Refills: 0 | Status: SHIPPED | OUTPATIENT
Start: 2020-05-13 | End: 2020-05-17

## 2020-05-13 RX ORDER — MONTELUKAST SODIUM 10 MG/1
10 TABLET ORAL NIGHTLY
Qty: 30 TABLET | Refills: 0 | Status: SHIPPED | OUTPATIENT
Start: 2020-05-13 | End: 2020-05-13

## 2020-05-13 NOTE — PROGRESS NOTES
Virtual Telephone Check-In    Michelle Milton verbally consents to a Virtual/Telephone Check-In visit on 05/13/20. Patient understands and accepts financial responsibility for any deductible, co-insurance and/or co-pays associated with this service.     Dur

## 2020-05-13 NOTE — TELEPHONE ENCOUNTER
Pt calling to let Dr MOON know how she is doing with her bronchitis. Pt feels she is allergic to Z Pack. Has a rash after taking the med. Rash on arms and chest and itchy. Pt is done with Z Pack 2 days ago. Wants med for the itch.     Pt states she is

## 2020-05-13 NOTE — TELEPHONE ENCOUNTER
Spoke with patient regarding possible medication reaction:  Patient states rash has been gradually showing up over the past 4 days. Denies swelling in face, tolerating food/fluids. Using Benadryl cream, improvement.  Did not want to take Benadryl due to p

## 2020-06-15 DIAGNOSIS — I10 ESSENTIAL HYPERTENSION: ICD-10-CM

## 2020-06-17 ENCOUNTER — TELEPHONE (OUTPATIENT)
Dept: FAMILY MEDICINE CLINIC | Facility: CLINIC | Age: 65
End: 2020-06-17

## 2020-06-17 DIAGNOSIS — I10 ESSENTIAL HYPERTENSION: ICD-10-CM

## 2020-06-17 RX ORDER — QUINAPRIL 40 MG/1
40 TABLET ORAL 2 TIMES DAILY
Qty: 180 TABLET | Refills: 0 | Status: CANCELLED | OUTPATIENT
Start: 2020-06-17

## 2020-06-18 RX ORDER — QUINAPRIL 40 MG/1
80 TABLET ORAL NIGHTLY
Qty: 60 TABLET | Refills: 0 | Status: SHIPPED | OUTPATIENT
Start: 2020-06-18 | End: 2020-06-29

## 2020-06-18 NOTE — TELEPHONE ENCOUNTER
5-13-20    LR 4-15-20      Next Appt:    With [address] [place] [address] Narcisa Nicole DO)  12/01/2020 at 10:00 AM

## 2020-06-29 NOTE — PROGRESS NOTES
Virtual Telephone Check-In    Jose Santana verbally consents to a Virtual/Telephone Check-In visit on 06/29/20. Patient has been referred to the [facility] website at [email or web address] to review the yearly Consent to Treat document.     Patient understand

## 2020-07-24 ENCOUNTER — TELEPHONE (OUTPATIENT)
Dept: FAMILY MEDICINE CLINIC | Facility: CLINIC | Age: 65
End: 2020-07-24

## 2020-07-24 NOTE — TELEPHONE ENCOUNTER
Spoke with patient herpetic whitow outbreak:  Reports she went to Dermatologist- was prescribed two antibiotics and 5 weeks of prednisone titration dose. Patient states her Index finger has a cut, because she was stressed yesterday. Not starting with draining pimples like before. Patient will try calling dermatologist too.

## 2020-07-25 NOTE — TELEPHONE ENCOUNTER
Pt states she did not get a hold of derm. She does not want RX, she states it has cleared up. She will call Monday with update.

## 2020-08-07 DIAGNOSIS — I10 ESSENTIAL HYPERTENSION: ICD-10-CM

## 2020-08-10 RX ORDER — QUINAPRIL 40 MG/1
TABLET ORAL
Qty: 180 TABLET | Refills: 0 | Status: SHIPPED | OUTPATIENT
Start: 2020-08-10 | End: 2020-10-08 | Stop reason: ALTCHOICE

## 2020-08-14 ENCOUNTER — TELEPHONE (OUTPATIENT)
Dept: FAMILY MEDICINE CLINIC | Facility: CLINIC | Age: 65
End: 2020-08-14

## 2020-08-14 NOTE — TELEPHONE ENCOUNTER
[facility] Mailorder     3 month supply  Voltaren Gel. Stated she stated she just ran out and doesn't think DAVE has ever ordered this for her.

## 2020-08-14 NOTE — TELEPHONE ENCOUNTER
See previous message pt states she takes Voltaren gel for aches and pains due to arthritis . Approve/deny  Virtual visit 6/2020

## 2020-08-18 ENCOUNTER — TELEPHONE (OUTPATIENT)
Dept: FAMILY MEDICINE CLINIC | Facility: CLINIC | Age: 65
End: 2020-08-18

## 2020-08-18 NOTE — TELEPHONE ENCOUNTER
[phone]  Option 2  For call back    Reference [id number]    Diclofenac Sodium (VOLTAREN) 1 % Transdermal Gel    Sensitive to NSAIDS    Ok to dispense

## 2020-08-22 ENCOUNTER — HOSPITAL ENCOUNTER (OUTPATIENT)
Age: 65
Discharge: HOME | End: 2020-08-22
Attending: EMERGENCY MEDICINE
Payer: MEDICARE

## 2020-08-22 VITALS
SYSTOLIC BLOOD PRESSURE: 183 MMHG | HEIGHT: 65 IN | RESPIRATION RATE: 20 BRPM | OXYGEN SATURATION: 97 % | DIASTOLIC BLOOD PRESSURE: 88 MMHG | HEART RATE: 88 BPM | TEMPERATURE: 98 F | WEIGHT: 230 LBS | BODY MASS INDEX: 38.32 KG/M2

## 2020-08-22 DIAGNOSIS — Z51.89 ENCOUNTER FOR WOUND RE-CHECK: Primary | ICD-10-CM

## 2020-08-22 PROCEDURE — 87077 CULTURE AEROBIC IDENTIFY: CPT | Performed by: EMERGENCY MEDICINE

## 2020-08-22 PROCEDURE — 87186 SC STD MICRODIL/AGAR DIL: CPT | Performed by: EMERGENCY MEDICINE

## 2020-08-22 PROCEDURE — 99214 OFFICE O/P EST MOD 30 MIN: CPT

## 2020-08-22 PROCEDURE — 87070 CULTURE OTHR SPECIMN AEROBIC: CPT | Performed by: EMERGENCY MEDICINE

## 2020-08-22 PROCEDURE — 87205 SMEAR GRAM STAIN: CPT | Performed by: EMERGENCY MEDICINE

## 2020-08-22 NOTE — ED INITIAL ASSESSMENT (HPI)
Pt presents with laceration cutting on  door 1 week ago, area around laceration redened approx lime size, no drainage noted

## 2020-08-22 NOTE — ED PROVIDER NOTES
Patient Seen in: THE [facility] In [facility]      History   Patient presents with:  Laceration Abrasion    Stated Complaint: Right ankle laceration 1 wk    HPI    Patient is a 49-year-old female who presents for evaluation of a wound on her righ Dr. Kareen Bernal   • Osteoporosis    • Pain in joints    • Postmenopausal atrophic vaginitis    • Pulmonary fibrosis ([email or web address] 07/2018    referring to pulm, possible Sjogren's involvement?    • Rheumatoid arthritis ([email or web address]    • Rheumatoid arthritis(714.0)    • Sicca TRANSFORAMINAL LUMBAR EPIDURAL STEROID INJECTION MULTIPLE LEVEL Right 11/10/2014    Performed by Shima Montilla MD at [address]   • TRANSFORAMINAL LUMBAR EPIDURAL STEROID INJECTION MULTIPLE LEVEL Right 10/27/2014    Performed by Shima Montilla MD at Heart sounds: Normal heart sounds. Pulmonary:      Effort: Pulmonary effort is normal.      Breath sounds: Normal breath sounds. Abdominal:      General: Bowel sounds are normal.      Palpations: Abdomen is soft.    Musculoskeletal: Normal range of medhat diagnosis)    Disposition:  Discharge  8/22/2020  3:55 pm    Follow-up:  Abram Sharp DO  [address] [facility] [address] [facility] [address] [address]  [phone]                Medications Prescribed:  Current Discharge Medication List    START taking these medications

## 2020-08-24 RX ORDER — CIPROFLOXACIN 500 MG/1
500 TABLET, FILM COATED ORAL 2 TIMES DAILY
Qty: 14 TABLET | Refills: 0 | Status: SHIPPED | OUTPATIENT
Start: 2020-08-24 | End: 2020-08-25 | Stop reason: ALTCHOICE

## 2020-08-25 DIAGNOSIS — J30.9 ALLERGIC RHINITIS, UNSPECIFIED SEASONALITY, UNSPECIFIED TRIGGER: ICD-10-CM

## 2020-08-25 RX ORDER — MONTELUKAST SODIUM 10 MG/1
10 TABLET ORAL NIGHTLY
Qty: 90 TABLET | Refills: 0 | Status: SHIPPED | OUTPATIENT
Start: 2020-08-25 | End: 2020-08-27

## 2020-08-25 RX ORDER — SULFAMETHOXAZOLE AND TRIMETHOPRIM 800; 160 MG/1; MG/1
1 TABLET ORAL 2 TIMES DAILY
Qty: 14 TABLET | Refills: 0 | Status: SHIPPED | OUTPATIENT
Start: 2020-08-25 | End: 2020-09-01

## 2020-08-25 NOTE — ED NOTES
Patient contacted and aware of Aerobic culture results- MRSA and Pseudomonas aeruginosa. Patient informed to continue on Mupirocin but should be switched to Bactrim DS BID for 7 days and informed to stop Ciprofloxacin as it is resistant.     Patient verb

## 2020-08-25 NOTE — ED NOTES
I spoke with Adry Rios and informed her of her preliminary report. We will be starting her on cipro until the final comes back, Md spoke with Pt as well to explain the course of treatment.   Medication sent to her pharmacy

## 2020-08-27 DIAGNOSIS — J30.9 ALLERGIC RHINITIS, UNSPECIFIED SEASONALITY, UNSPECIFIED TRIGGER: ICD-10-CM

## 2020-08-27 RX ORDER — MONTELUKAST SODIUM 10 MG/1
TABLET ORAL
Qty: 90 TABLET | Refills: 0 | Status: SHIPPED | OUTPATIENT
Start: 2020-08-27 | End: 2020-10-26

## 2020-08-27 NOTE — TELEPHONE ENCOUNTER
Rx Request  Montelukast Sodium (SINGULAIR) 10 MG Oral Tab    Disp:   90                 R: 0    Associated Dx: Aleergy rhinitis    Last Visit: 06/29/20 (Virtual)    Last Refilled: 08/25/2020    Protocol Passed?  Yes[  ]       No[ x ]

## 2020-08-31 DIAGNOSIS — J30.9 ALLERGIC RHINITIS, UNSPECIFIED SEASONALITY, UNSPECIFIED TRIGGER: ICD-10-CM

## 2020-08-31 RX ORDER — MONTELUKAST SODIUM 10 MG/1
TABLET ORAL
Qty: 90 TABLET | Refills: 0 | OUTPATIENT
Start: 2020-08-31

## 2020-09-07 ENCOUNTER — HOSPITAL ENCOUNTER (OUTPATIENT)
Age: 65
Discharge: HOME | End: 2020-09-07
Payer: MEDICARE

## 2020-09-07 VITALS
OXYGEN SATURATION: 99 % | SYSTOLIC BLOOD PRESSURE: 151 MMHG | TEMPERATURE: 99 F | HEIGHT: 65 IN | WEIGHT: 230 LBS | RESPIRATION RATE: 20 BRPM | HEART RATE: 79 BPM | BODY MASS INDEX: 38.32 KG/M2 | DIASTOLIC BLOOD PRESSURE: 85 MMHG

## 2020-09-07 DIAGNOSIS — T30.0 BURN: Primary | ICD-10-CM

## 2020-09-07 PROCEDURE — 99214 OFFICE O/P EST MOD 30 MIN: CPT

## 2020-09-07 PROCEDURE — 99213 OFFICE O/P EST LOW 20 MIN: CPT

## 2020-09-07 RX ORDER — CLINDAMYCIN HYDROCHLORIDE 300 MG/1
300 CAPSULE ORAL 3 TIMES DAILY
Qty: 15 CAPSULE | Refills: 0 | Status: SHIPPED | OUTPATIENT
Start: 2020-09-07 | End: 2020-09-12

## 2020-09-07 RX ORDER — CEFADROXIL 500 MG/1
500 CAPSULE ORAL 2 TIMES DAILY
Qty: 10 CAPSULE | Refills: 0 | Status: SHIPPED | OUTPATIENT
Start: 2020-09-07 | End: 2020-09-07

## 2020-09-07 NOTE — ED PROVIDER NOTES
Patient Seen in: [facility] In [facility]      History   Patient presents with:  Burn    Stated Complaint: RIGHT HAND BURN    89year-old female presents the immediate care for a burn to her right hand.   Patient states she burned her hand on • Rheumatoid arthritis(714.0)    • Sicca syndrome ([facility] 2/26/2009   • Sjogren's syndrome ([facility] 10/07   • Stress fracture of the metatarsals     right foot   • Systemic lupus erythematosus ([facility] 2/26/2009   • Trochanteric bursitis 5/29/2009   • Visual impai • TRANSFORAMINAL LUMBAR EPIDURAL STEROID INJECTION MULTIPLE LEVEL Right 10/27/2014    Performed by Jennifer Barrios MD at [address]   • TRANSFORAMINAL LUMBAR EPIDURAL STEROID INJECTION MULTIPLE LEVEL Right 10/20/2014    Performed by Jennifer Barrios MD Effort: Pulmonary effort is normal.      Breath sounds: Normal breath sounds. Abdominal:      General: Abdomen is flat. Bowel sounds are normal.      Palpations: Abdomen is soft. Musculoskeletal: Normal range of motion.    Skin:     General: Skin is

## 2020-09-15 PROBLEM — D84.9 IMMUNOSUPPRESSED STATUS (HCC): Status: ACTIVE | Noted: 2020-09-15

## 2020-10-08 ENCOUNTER — OFFICE VISIT (OUTPATIENT)
Dept: PAIN CLINIC | Facility: CLINIC | Age: 65
End: 2020-10-08
Payer: MEDICARE

## 2020-10-08 VITALS
DIASTOLIC BLOOD PRESSURE: 88 MMHG | SYSTOLIC BLOOD PRESSURE: 144 MMHG | WEIGHT: 230 LBS | HEIGHT: 65 IN | HEART RATE: 67 BPM | OXYGEN SATURATION: 95 % | BODY MASS INDEX: 38.32 KG/M2

## 2020-10-08 DIAGNOSIS — M54.16 LUMBAR RADICULITIS: Primary | ICD-10-CM

## 2020-10-08 PROCEDURE — 99214 OFFICE O/P EST MOD 30 MIN: CPT | Performed by: ANESTHESIOLOGY

## 2020-10-08 RX ORDER — METHOCARBAMOL 500 MG/1
500 TABLET, FILM COATED ORAL 3 TIMES DAILY
Qty: 90 TABLET | Refills: 0 | Status: SHIPPED | OUTPATIENT
Start: 2020-10-08 | End: 2020-10-28

## 2020-10-08 RX ORDER — MOMETASONE FUROATE 1 MG/G
OINTMENT TOPICAL
COMMUNITY
Start: 2020-09-28

## 2020-10-08 NOTE — PROGRESS NOTES
PHYSICAL EXAM:   Physical Exam   Constitutional:           Patient presents in office today with reported pain in lower back, right groin, right leg and knee, thoracic back spasms    Current pain level reported = 2/10    Location of Pain:  lower back, ri

## 2020-10-15 ENCOUNTER — HOSPITAL ENCOUNTER (OUTPATIENT)
Dept: MRI IMAGING | Age: 65
Discharge: HOME | End: 2020-10-15
Attending: ANESTHESIOLOGY
Payer: MEDICARE

## 2020-10-15 DIAGNOSIS — M54.16 LUMBAR RADICULITIS: ICD-10-CM

## 2020-10-15 PROCEDURE — 72148 MRI LUMBAR SPINE W/O DYE: CPT | Performed by: ANESTHESIOLOGY

## 2020-10-15 NOTE — PROGRESS NOTES
Name: Rhonda Baer   : 1955   DOS: 10/08/2020     Chief complaint: Low back pain    History of present illness:  Rhonda Baer is a 77year old female complaining of  pain in the lower back for 4 years.   Pain started after she lifted her  who Hematochezia     • High blood pressure     • History of bone density study 10/08/12   • Hypercalcemia     • IBS (irritable bowel syndrome)     • Inclusion cyst       epidermal   • Internal hemorrhoids 12/18/08     Grade II   • Irregular Z line of esophagus MG Oral Cap 1 TABLET EVERY AM, 2 TABLETS EVERY PM Disp: 270 capsule Rfl: 1   Hydroxychloroquine Sulfate 200 MG Oral Tab Take 1 tablet (200 mg total) by mouth 2 (two) times daily.  Disp: 180 tablet Rfl: 1   RaNITidine HCl (ZANTAC) 150 MG Oral Tab Take 1 tabl SITE RIGHT  12/29/11: OTHER SURGICAL HISTORY      Comment: Shave biopsy, skin, right shoulder  9/11/12: OTHER SURGICAL HISTORY      Comment: right carpal tunnel surgery  9/25/12: OTHER SURGICAL HISTORY      Comment: left carpal tunnel surgery  1/11/13: OTKIMBERLEE problems. Genitourinary:  Denies dysuria, hematuria. Musculoskeletal:  Negative for all musculoskeletal problems. Vascular: Negative. Specifically denies phlebitis, DVT, PE, bleeding problems, hemophilia or any other vascular problems.   Dermatology: Justyna Camarillo 5                                     5     Deep Tendon Reflexes:                                                                                                                                  Biceps: Examination:                              (R)                                 (L)               Hip Abduction:                                      5                                     5               Hip Flexion:                               5 L4:                                                N                                 N               L5:                                                N                                 N                S1:                                                N

## 2020-10-22 ENCOUNTER — TELEPHONE (OUTPATIENT)
Dept: PAIN CLINIC | Facility: CLINIC | Age: 65
End: 2020-10-22

## 2020-10-22 DIAGNOSIS — M54.16 LUMBAR RADICULITIS: Primary | ICD-10-CM

## 2020-10-22 NOTE — TELEPHONE ENCOUNTER
Dr. Laura Ray has reviewed lumbar MRI which reveals bulging disks which are likely contributing to her pain. Dr. Laura Ray recommends right-sided transforaminal lumbar epidural steroid injection at L2-L3 and L3-L4, up to a series of 3. We will contact you to schedule the injections. Results and recommendations sent to patient by my chart. Cases placed.

## 2020-10-22 NOTE — TELEPHONE ENCOUNTER
Medical clearance needed- No    Implanted cardiac device/SCS/PNS: NA    Pt seen in OV 10/8 by Dr. Zoie Kellogg and after reviewing MRI results and recommended for TLESI (X 3) with Dr. Zoie Kellogg. Please begin PA process for procedure(s).      Laterality: Right  Level(s): L2-3, L3-4    Pt informed callback will be given when PA feedback received and procedure date to be scheduled after approval.

## 2020-10-23 NOTE — TELEPHONE ENCOUNTER
Spoke with patient to advise on ability to schedule injections. Patient stated she just recently had a herpes anabel flare-up on the base of her spine on her low back, now starting to crust over. Patient stated she is also having \"minor eye surgery\" (blepharoplasty) on 11/9. Patient was advised the provider would need to be contacted to advise on when patient can proceed with injections and once we receive the information she will be contacted to schedule. Patient verbalized understanding and was appreciative of the call. No further needs.

## 2020-10-26 NOTE — TELEPHONE ENCOUNTER
I would recommend that she wait at least 6 weeks after her eye surgery. She should make sure that she is completely healed from the eye surgery as well as no herpes lesions left at that time prior to proceeding with injections.   Thank you

## 2020-10-26 NOTE — TELEPHONE ENCOUNTER
Spoke with patient to relay recommendation from Ada Alarcon on needing to wait 6 weeks after eye surgery to schedule injections, also there needs to be no flare of the herpes anabel. Patient advised if it appears complete healing will have occurred within the 6 weeks she can call to schedule injections in advance. Patient stated she will wait until the first of the year to schedule if she chooses to proceed with injections, states she has had some for back and knee pain in the past and none have relieved her pain. Patient stated \"I have some time to think about it I guess\". Patient request OV with Dr. Miya Terrell for 10/27/2020 to discuss MRI results be cancelled. Stated \"since I am not going to be having the injections at this time I don't thin I need to come in to discuss the MRI\". Appointment was cancelled, per patient request.     Patient verbalized understanding of all information and was appreciative of the call. No further needs.

## 2020-10-27 ENCOUNTER — TELEPHONE (OUTPATIENT)
Dept: FAMILY MEDICINE CLINIC | Facility: CLINIC | Age: 65
End: 2020-10-27

## 2020-10-28 ENCOUNTER — OFFICE VISIT (OUTPATIENT)
Dept: FAMILY MEDICINE CLINIC | Facility: CLINIC | Age: 65
End: 2020-10-28
Payer: MEDICARE

## 2020-10-28 VITALS
WEIGHT: 230 LBS | OXYGEN SATURATION: 95 % | DIASTOLIC BLOOD PRESSURE: 86 MMHG | HEIGHT: 65 IN | BODY MASS INDEX: 38.32 KG/M2 | RESPIRATION RATE: 18 BRPM | HEART RATE: 84 BPM | SYSTOLIC BLOOD PRESSURE: 138 MMHG

## 2020-10-28 DIAGNOSIS — Z86.14 HISTORY OF MRSA INFECTION: ICD-10-CM

## 2020-10-28 DIAGNOSIS — M79.7 FIBROMYALGIA: ICD-10-CM

## 2020-10-28 DIAGNOSIS — M05.741 RHEUMATOID ARTHRITIS INVOLVING BOTH HANDS WITH POSITIVE RHEUMATOID FACTOR (HCC): ICD-10-CM

## 2020-10-28 DIAGNOSIS — H02.831 DERMATOCHALASIS OF BOTH UPPER EYELIDS: ICD-10-CM

## 2020-10-28 DIAGNOSIS — H02.834 DERMATOCHALASIS OF BOTH UPPER EYELIDS: ICD-10-CM

## 2020-10-28 DIAGNOSIS — M32.9 SYSTEMIC LUPUS ERYTHEMATOSUS, UNSPECIFIED SLE TYPE, UNSPECIFIED ORGAN INVOLVEMENT STATUS (HCC): ICD-10-CM

## 2020-10-28 DIAGNOSIS — M35.09 SJOGREN'S SYNDROME WITH OTHER ORGAN INVOLVEMENT (HCC): ICD-10-CM

## 2020-10-28 DIAGNOSIS — I70.90 ATHEROSCLEROSIS: ICD-10-CM

## 2020-10-28 DIAGNOSIS — I10 ESSENTIAL HYPERTENSION: ICD-10-CM

## 2020-10-28 DIAGNOSIS — M05.742 RHEUMATOID ARTHRITIS INVOLVING BOTH HANDS WITH POSITIVE RHEUMATOID FACTOR (HCC): ICD-10-CM

## 2020-10-28 DIAGNOSIS — I34.0 MILD MITRAL REGURGITATION: ICD-10-CM

## 2020-10-28 DIAGNOSIS — Z01.818 PREOPERATIVE CLEARANCE: Primary | ICD-10-CM

## 2020-10-28 DIAGNOSIS — J84.9 ILD (INTERSTITIAL LUNG DISEASE) (HCC): ICD-10-CM

## 2020-10-28 PROBLEM — J18.9 PNEUMONIA DUE TO INFECTIOUS ORGANISM, UNSPECIFIED LATERALITY, UNSPECIFIED PART OF LUNG: Status: RESOLVED | Noted: 2020-03-29 | Resolved: 2020-10-28

## 2020-10-28 PROBLEM — J18.9 PNEUMONIA DUE TO INFECTIOUS ORGANISM: Status: RESOLVED | Noted: 2020-03-29 | Resolved: 2020-10-28

## 2020-10-28 PROCEDURE — 99214 OFFICE O/P EST MOD 30 MIN: CPT | Performed by: FAMILY MEDICINE

## 2020-10-28 RX ORDER — ERYTHROMYCIN 5 MG/G
OINTMENT OPHTHALMIC
COMMUNITY
Start: 2020-10-19 | End: 2021-03-11

## 2020-10-28 RX ORDER — VALACYCLOVIR HYDROCHLORIDE 500 MG/1
500 TABLET, FILM COATED ORAL DAILY
COMMUNITY
Start: 2020-10-15 | End: 2021-03-11

## 2020-10-28 NOTE — PROGRESS NOTES
Elida Brewer is a 77year old female who presents for preop clearance  Dr. Nikko Kenyon reqesting clearance for bilateral upper eyelid blepharoplasty under local / mac sedation.   11/9/2020  At [facility] [place] in [facility]  HPI:   Pt complains of loss of vis MCG/INH Inhalation Aerosol Powder, Breath Activated Inhale 1 puff into the lungs daily.  1 each 1   • mometasone 0.1 % External Ointment APPLY TO RIGHT HAND BID UTD     • Denosumab 60 MG/ML Subcutaneous Solution Prefilled Syringe Inject 1 mL (60 mg total) i tunnel syndrome 5/29/2009    bilateral   • Chronic foot pain     right   • Diarrhea, unspecified    • Duodenitis 12/18/08    peptic   • Dyslipidemia    • Dysphagia    • Essential hypertension    • Exposure to radiation    • Fasciitis    • Fatigue    • Fibr the second hammertoe of the right foot by Dr. Phillip Che Bilateral 9/15/2014    Performed by Gabriel Lagos MD at [facility] MAIN OR   • KNEE ARTHROSCOPY Left 6/14/2017    Performed by Francine Sim MD at [place] [address] since quittin.3      Smokeless tobacco: Never Used      Tobacco comment: quit in 2006    Alcohol use:  Yes      Alcohol/week: 0.0 - 1.0 standard drinks      Comment: Rare    Drug use: No     Occ: Retired  Exercise: minimal.  Diet: watches minimally ([facility]      5. Rheumatoid arthritis involving both hands with positive rheumatoid factor (HCC)      6. Sjogren's syndrome with other organ involvement ([facility]      7. Fibromyalgia      8. ILD (interstitial lung disease) ([facility]      9. Atherosclerosis      10.

## 2020-11-06 ENCOUNTER — TELEPHONE (OUTPATIENT)
Dept: FAMILY MEDICINE CLINIC | Facility: CLINIC | Age: 65
End: 2020-11-06

## 2020-11-06 NOTE — TELEPHONE ENCOUNTER
Dr. Morro Yung, please see update from patient, would you like virtual visit to follow up on patient?

## 2020-11-06 NOTE — TELEPHONE ENCOUNTER
FYI - Pt tested +COVID - she's okay except for some sinus discomfort - she will call for an appt if things worsen and needs advise. She will go to the ER if she starts to experience any breathing difficulty.

## 2020-11-09 ENCOUNTER — VIRTUAL PHONE E/M (OUTPATIENT)
Dept: FAMILY MEDICINE CLINIC | Facility: CLINIC | Age: 65
End: 2020-11-09
Payer: MEDICARE

## 2020-11-09 ENCOUNTER — PATIENT MESSAGE (OUTPATIENT)
Dept: FAMILY MEDICINE CLINIC | Facility: CLINIC | Age: 65
End: 2020-11-09

## 2020-11-09 DIAGNOSIS — U07.1 COVID-19 VIRUS INFECTION: Primary | ICD-10-CM

## 2020-11-09 PROCEDURE — 99213 OFFICE O/P EST LOW 20 MIN: CPT | Performed by: FAMILY MEDICINE

## 2020-11-09 RX ORDER — PREDNISONE 20 MG/1
60 TABLET ORAL DAILY
Qty: 15 TABLET | Refills: 0 | Status: SHIPPED | OUTPATIENT
Start: 2020-11-09 | End: 2020-11-14

## 2020-11-09 RX ORDER — AZITHROMYCIN 250 MG/1
TABLET, FILM COATED ORAL
Qty: 6 TABLET | Refills: 0 | Status: SHIPPED | OUTPATIENT
Start: 2020-11-09 | End: 2020-11-14

## 2020-11-09 NOTE — PROGRESS NOTES
Virtual Telephone Check-In    Tanmaygelacio Medeiros verbally consents to a Virtual/Telephone Check-In visit on 11/09/20. Patient has been referred to the [facility] website at [email or web address] to review the yearly Consent to Treat document.     Patient understand [facility] is committed to the safety and well-being of our patients, members, employees, and communities.  As concerns arise about the new strain of coronavirus that causes COVID-19, Tara Cartwright is here to provide community members reliable provider ahead of time and tell them that you have or may have COVID-19.  5. For medical emergencies, call 911 and notify the dispatch personnel that you have or may have COVID-19.   6. Cover your cough and sneezes.    7. Wash your hands often with soap and appeared OR if asymptomatic patient or date of symptom onset is unclear then use 10 days post COVID test date. · At least 20 days have passed for severe illness (requiring hospitalization) OR if you are immunocompromised.   If you have a fever with cough in order to be eligible to donate. If you’re instructed by Mona Form that a repeat test is required, please contact the [facility] COVID-19 Nurse Triage Line at [phone].     Additional Information      You can also get more information at the follow

## 2020-11-10 ENCOUNTER — PATIENT MESSAGE (OUTPATIENT)
Dept: FAMILY MEDICINE CLINIC | Facility: CLINIC | Age: 65
End: 2020-11-10

## 2020-11-10 NOTE — TELEPHONE ENCOUNTER
From: Michelle Milton  To: Paz Turner DO  Sent: 11/9/2020 5:48 PM CST  Subject: Prescription Question    Unless I misunderstood you, in our telephone call today at 1:30pm, you prescribed for me Zithromax and Prednisone plus I thought you mentioned that you

## 2020-11-10 NOTE — TELEPHONE ENCOUNTER
From: Zion Berman  To: Kaitlin Feliz DO  Sent: 11/10/2020 11:49 AM CST  Subject: Other    Oops! I found the list in the after visit summary. My apologies.

## 2020-12-01 ENCOUNTER — OFFICE VISIT (OUTPATIENT)
Dept: FAMILY MEDICINE CLINIC | Facility: CLINIC | Age: 65
End: 2020-12-01
Payer: MEDICARE

## 2020-12-01 VITALS
HEART RATE: 94 BPM | SYSTOLIC BLOOD PRESSURE: 134 MMHG | HEIGHT: 65 IN | OXYGEN SATURATION: 96 % | TEMPERATURE: 98 F | RESPIRATION RATE: 18 BRPM | BODY MASS INDEX: 38.49 KG/M2 | WEIGHT: 231 LBS | DIASTOLIC BLOOD PRESSURE: 78 MMHG

## 2020-12-01 DIAGNOSIS — M15.8 OTHER OSTEOARTHRITIS INVOLVING MULTIPLE JOINTS: Chronic | ICD-10-CM

## 2020-12-01 DIAGNOSIS — D63.8 ANEMIA OF CHRONIC DISEASE: ICD-10-CM

## 2020-12-01 DIAGNOSIS — M79.7 FIBROMYALGIA: Chronic | ICD-10-CM

## 2020-12-01 DIAGNOSIS — J84.9 ILD (INTERSTITIAL LUNG DISEASE) (HCC): ICD-10-CM

## 2020-12-01 DIAGNOSIS — D84.9 IMMUNOSUPPRESSED STATUS (HCC): ICD-10-CM

## 2020-12-01 DIAGNOSIS — Z13.820 SCREENING FOR OSTEOPOROSIS: ICD-10-CM

## 2020-12-01 DIAGNOSIS — U07.1 COVID-19 VIRUS INFECTION: ICD-10-CM

## 2020-12-01 DIAGNOSIS — S22.000S CLOSED COMPRESSION FRACTURE OF THORACIC VERTEBRA, SEQUELA: ICD-10-CM

## 2020-12-01 DIAGNOSIS — E55.9 VITAMIN D DEFICIENCY: Chronic | ICD-10-CM

## 2020-12-01 DIAGNOSIS — I34.0 MILD MITRAL REGURGITATION: ICD-10-CM

## 2020-12-01 DIAGNOSIS — R73.9 HYPERGLYCEMIA: ICD-10-CM

## 2020-12-01 DIAGNOSIS — R53.83 OTHER FATIGUE: ICD-10-CM

## 2020-12-01 DIAGNOSIS — I70.90 ATHEROSCLEROSIS: ICD-10-CM

## 2020-12-01 DIAGNOSIS — Z78.0 ASYMPTOMATIC MENOPAUSE: ICD-10-CM

## 2020-12-01 DIAGNOSIS — M45.9 RHEUMATOID ARTHRITIS INVOLVING VERTEBRA, UNSPECIFIED WHETHER RHEUMATOID FACTOR PRESENT (HCC): Chronic | ICD-10-CM

## 2020-12-01 DIAGNOSIS — K58.8 OTHER IRRITABLE BOWEL SYNDROME: Chronic | ICD-10-CM

## 2020-12-01 DIAGNOSIS — D70.9 NEUTROPENIA, UNSPECIFIED TYPE (HCC): ICD-10-CM

## 2020-12-01 DIAGNOSIS — M81.0 OSTEOPOROSIS, SENILE: Chronic | ICD-10-CM

## 2020-12-01 DIAGNOSIS — Z00.00 ENCOUNTER FOR ANNUAL HEALTH EXAMINATION: Primary | ICD-10-CM

## 2020-12-01 DIAGNOSIS — Z12.31 ENCOUNTER FOR SCREENING MAMMOGRAM FOR HIGH-RISK PATIENT: ICD-10-CM

## 2020-12-01 PROBLEM — I77.1 TORTUOUS AORTA: Status: ACTIVE | Noted: 2020-12-01

## 2020-12-01 PROBLEM — I77.1 TORTUOUS AORTA (HCC): Status: ACTIVE | Noted: 2020-12-01

## 2020-12-01 PROCEDURE — G0439 PPPS, SUBSEQ VISIT: HCPCS | Performed by: FAMILY MEDICINE

## 2020-12-01 PROCEDURE — 99213 OFFICE O/P EST LOW 20 MIN: CPT | Performed by: FAMILY MEDICINE

## 2020-12-01 RX ORDER — FLUTICASONE PROPIONATE 50 MCG
2 SPRAY, SUSPENSION (ML) NASAL DAILY
Qty: 3 BOTTLE | Refills: 0 | Status: SHIPPED | OUTPATIENT
Start: 2020-12-01 | End: 2021-08-17 | Stop reason: ALTCHOICE

## 2020-12-01 RX ORDER — FLUTICASONE PROPIONATE 50 MCG
2 SPRAY, SUSPENSION (ML) NASAL DAILY
Qty: 1 BOTTLE | Refills: 0 | Status: SHIPPED | OUTPATIENT
Start: 2020-12-01 | End: 2020-12-01

## 2020-12-01 RX ORDER — AMOXICILLIN AND CLAVULANATE POTASSIUM 875; 125 MG/1; MG/1
1 TABLET, FILM COATED ORAL 2 TIMES DAILY
Qty: 20 TABLET | Refills: 0 | Status: SHIPPED | OUTPATIENT
Start: 2020-12-01 | End: 2020-12-09

## 2020-12-01 RX ORDER — FLUTICASONE FUROATE AND VILANTEROL TRIFENATATE 100; 25 UG/1; UG/1
1 POWDER RESPIRATORY (INHALATION) DAILY
Qty: 1 EACH | Refills: 0 | Status: SHIPPED | OUTPATIENT
Start: 2020-12-01 | End: 2020-12-01

## 2020-12-01 NOTE — PATIENT INSTRUCTIONS
Ede Collazo's SCREENING SCHEDULE   Tests on this list are recommended by your physician but may not be covered, or covered at this frequency, by your insurer. Please check with your insurance carrier before scheduling to verify coverage.    PREVENTATIVE only No results found for this or any previous visit.  Limited to patients who meet one of the following criteria:   • Men who are 73-68 years old and have smoked more than 100 cigarettes in their lifetime   • Anyone with a family history    Colorectal Canc 75 Mammogram due on 01/07/2021 Please get this Mammogram regularly   Immunizations      Influenza  Covered Annually Orders placed or performed in visit on 09/03/20   • FLU VACC HIGH DOSE PRSV FREE   • ADMIN INFLUENZA VIRUS VAC   Orders placed or performed prescription benefits     Recommended Websites for Advanced Directives    [email or web address]  An information packet, including necessary form from the [employer] website. http://[email or web address] idph.stat

## 2020-12-01 NOTE — PROGRESS NOTES
HPI:   Dominic Hernandez is a 77year old female who presents for a Medicare Subsequent Annual Wellness visit (Pt already had Initial Annual Wellness).     Pt also here with    Systemic lupus erythematosus ([facility]     Vitamin D deficiency     Osteoporosis, senil available to patient in AVS       She smoked tobacco in the past but quit greater than 12 months ago.   Social History    Tobacco Use      Smoking status: Former Smoker        Packs/day: 1.00        Years: 37.00        Pack years: 40        Start date: 1/26 Hyperglycemia- monitor      Immunosuppressed status ([address]- see rheum      Tortuous aorta ([address]- monitor     Wt Readings from Last 3 Encounters:  12/01/20 : 231 lb (104.8 kg)  10/28/20 : 230 lb (104.3 kg)  10/26/20 : 228 lb 9.6 oz (103.7 kg)     Last Choles and 2 cap qhs    •  Multiple Vitamin (TAB-A-RIYA) Oral Tab, Take 1 tablet by mouth daily. •  Cholecalciferol (VITAMIN D3) 50 MCG (2000 UT) Oral Tab, Take 1 tablet by mouth daily.     •  Calcium 600-200 MG-UNIT Oral Tab, Take 1 tablet by mouth 2 (two) tammi (2/26/2009), Visual impairment, and Vitamin D deficiency.     She  has a past surgical history that includes appendectomy (age 6); foot/toes surgery proc unlisted (09/2011); upper gi endoscopy,exam (12/18/08); other surgical history (1/11/13); radiation ri 16   Ht 5' 5\" (1.651 m)   Wt 231 lb (104.8 kg)   SpO2 96%   BMI 38.44 kg/m²  Estimated body mass index is 38.44 kg/m² as calculated from the following:    Height as of this encounter: 5' 5\" (1.651 m). Weight as of this encounter: 231 lb (104.8 kg). VAC High Dose 65 YRS & Older PRSV Free (95748) 12/10/2019, 09/03/2020   • FLUAD High Dose 65 yr and older (68826) 11/12/2018   • FLUZONE 6 months and older PFS 0.5 ml (27116) 10/09/2014   • HIGH DOSE FLU 65 YRS AND OLDER PRSV FREE SINGLE D (29679) FLU CLIN III    Neutropenia, unspecified type ([facility]- monitor   -     CBC WITH DIFFERENTIAL WITH PLATELET;  Future  -     OFFICE/OUTPT VISIT,ESTLEVL III    Other osteoarthritis involving multiple joints- monitor   -     OFFICE/OUTPT VISIT,ESTLEVL III    Osteoporosi No  Has your appetite been poor?: No  How does the patient maintain a good energy level?: Appropriate Exercise  How would you describe your daily physical activity?: Light  How would you describe your current health state?: Fair  How do you maintain positi patient. Update Health Maintenance if applicable    Chlamydia  Annually if high risk No results found for: CHLAMYDIA No flowsheet data found.     Screening Mammogram      Mammogram Annually to 74, then as discussed Mammogram due on 01/07/2021 Update Health [63562]

## 2020-12-07 ENCOUNTER — LAB ENCOUNTER (OUTPATIENT)
Dept: LAB | Age: 65
End: 2020-12-07
Attending: FAMILY MEDICINE
Payer: MEDICARE

## 2020-12-07 DIAGNOSIS — D63.8 ANEMIA OF CHRONIC DISEASE: ICD-10-CM

## 2020-12-07 DIAGNOSIS — R80.9 PROTEINURIA, UNSPECIFIED TYPE: ICD-10-CM

## 2020-12-07 DIAGNOSIS — Z79.899 ENCOUNTER FOR LONG-TERM (CURRENT) USE OF HIGH-RISK MEDICATION: ICD-10-CM

## 2020-12-07 DIAGNOSIS — M25.531 BILATERAL WRIST PAIN: ICD-10-CM

## 2020-12-07 DIAGNOSIS — Z23 NEED FOR INFLUENZA VACCINATION: ICD-10-CM

## 2020-12-07 DIAGNOSIS — M81.0 OSTEOPOROSIS, SENILE: ICD-10-CM

## 2020-12-07 DIAGNOSIS — I70.90 ATHEROSCLEROSIS: ICD-10-CM

## 2020-12-07 DIAGNOSIS — D70.9 NEUTROPENIA, UNSPECIFIED TYPE (HCC): ICD-10-CM

## 2020-12-07 DIAGNOSIS — E55.9 VITAMIN D DEFICIENCY: Chronic | ICD-10-CM

## 2020-12-07 DIAGNOSIS — M79.7 FIBROMYALGIA: ICD-10-CM

## 2020-12-07 DIAGNOSIS — M32.19 OTHER SYSTEMIC LUPUS ERYTHEMATOSUS WITH OTHER ORGAN INVOLVEMENT (HCC): ICD-10-CM

## 2020-12-07 DIAGNOSIS — M35.00 SICCA SYNDROME (HCC): ICD-10-CM

## 2020-12-07 DIAGNOSIS — R53.83 OTHER FATIGUE: ICD-10-CM

## 2020-12-07 DIAGNOSIS — R73.9 HYPERGLYCEMIA: ICD-10-CM

## 2020-12-07 DIAGNOSIS — M25.532 BILATERAL WRIST PAIN: ICD-10-CM

## 2020-12-07 PROCEDURE — 86160 COMPLEMENT ANTIGEN: CPT

## 2020-12-07 PROCEDURE — 85652 RBC SED RATE AUTOMATED: CPT

## 2020-12-07 PROCEDURE — 82607 VITAMIN B-12: CPT

## 2020-12-07 PROCEDURE — 84439 ASSAY OF FREE THYROXINE: CPT

## 2020-12-07 PROCEDURE — 80053 COMPREHEN METABOLIC PANEL: CPT

## 2020-12-07 PROCEDURE — 36415 COLL VENOUS BLD VENIPUNCTURE: CPT

## 2020-12-07 PROCEDURE — 82248 BILIRUBIN DIRECT: CPT

## 2020-12-07 PROCEDURE — 85025 COMPLETE CBC W/AUTO DIFF WBC: CPT

## 2020-12-07 PROCEDURE — 86225 DNA ANTIBODY NATIVE: CPT

## 2020-12-07 PROCEDURE — 86140 C-REACTIVE PROTEIN: CPT

## 2020-12-07 PROCEDURE — 80061 LIPID PANEL: CPT

## 2020-12-07 PROCEDURE — 84443 ASSAY THYROID STIM HORMONE: CPT

## 2020-12-07 PROCEDURE — 82306 VITAMIN D 25 HYDROXY: CPT

## 2020-12-08 NOTE — PROGRESS NOTES
[employer] message sent to patient. Raisaruss orozco, your labs are overall stable. arava labs are stable. One inflammation marker is mildly elevated but the other is normal.   One test (dsDNA) is still pending; will send you the result when it returns.   Pl

## 2020-12-09 ENCOUNTER — TELEPHONE (OUTPATIENT)
Dept: FAMILY MEDICINE CLINIC | Facility: CLINIC | Age: 65
End: 2020-12-09

## 2020-12-09 ENCOUNTER — OFFICE VISIT (OUTPATIENT)
Dept: FAMILY MEDICINE CLINIC | Facility: CLINIC | Age: 65
End: 2020-12-09
Payer: MEDICARE

## 2020-12-09 VITALS
OXYGEN SATURATION: 96 % | BODY MASS INDEX: 38.32 KG/M2 | WEIGHT: 230 LBS | HEART RATE: 62 BPM | TEMPERATURE: 97 F | RESPIRATION RATE: 16 BRPM | HEIGHT: 65 IN

## 2020-12-09 DIAGNOSIS — H02.831 DERMATOCHALASIS OF BOTH UPPER EYELIDS: ICD-10-CM

## 2020-12-09 DIAGNOSIS — M32.9 SYSTEMIC LUPUS ERYTHEMATOSUS, UNSPECIFIED SLE TYPE, UNSPECIFIED ORGAN INVOLVEMENT STATUS (HCC): ICD-10-CM

## 2020-12-09 DIAGNOSIS — Z86.14 HISTORY OF MRSA INFECTION: ICD-10-CM

## 2020-12-09 DIAGNOSIS — I10 ESSENTIAL HYPERTENSION: ICD-10-CM

## 2020-12-09 DIAGNOSIS — M35.09 SJOGREN'S SYNDROME WITH OTHER ORGAN INVOLVEMENT (HCC): ICD-10-CM

## 2020-12-09 DIAGNOSIS — H02.834 DERMATOCHALASIS OF BOTH UPPER EYELIDS: ICD-10-CM

## 2020-12-09 DIAGNOSIS — Z01.818 PREOP EXAMINATION: Primary | ICD-10-CM

## 2020-12-09 DIAGNOSIS — M05.741 RHEUMATOID ARTHRITIS INVOLVING BOTH HANDS WITH POSITIVE RHEUMATOID FACTOR (HCC): ICD-10-CM

## 2020-12-09 DIAGNOSIS — I34.0 MILD MITRAL REGURGITATION: ICD-10-CM

## 2020-12-09 DIAGNOSIS — M79.7 FIBROMYALGIA: ICD-10-CM

## 2020-12-09 DIAGNOSIS — I70.90 ATHEROSCLEROSIS: ICD-10-CM

## 2020-12-09 DIAGNOSIS — J84.9 ILD (INTERSTITIAL LUNG DISEASE) (HCC): ICD-10-CM

## 2020-12-09 DIAGNOSIS — M05.742 RHEUMATOID ARTHRITIS INVOLVING BOTH HANDS WITH POSITIVE RHEUMATOID FACTOR (HCC): ICD-10-CM

## 2020-12-09 PROCEDURE — 99214 OFFICE O/P EST MOD 30 MIN: CPT | Performed by: FAMILY MEDICINE

## 2020-12-09 NOTE — PROGRESS NOTES
Josie Arrington is a 77year old female who presents for preop clearance  Dr. Sidra Williamson reqesting clearance for bilateral upper eyelid blepharoplasty under local / mac sedation.   12/14/2020  At [facility] surgery center in [place]  HPI:   Pt complains of loss of vi Please let patient know that we can refer to neurology.  Please send chart back so I can make a referral. mouth 2 (two) times daily for 10 days. 20 tablet 0   • Fluticasone Propionate 50 MCG/ACT Nasal Suspension 2 sprays by Each Nare route daily. 3 Bottle 0   • valACYclovir HCl 500 MG Oral Tab Take 500 mg by mouth daily.      • erythromycin 5 MG/GM Ophthalmic O surgery lumbar   • [facility]    rt breast dcis   • Carpal tunnel syndrome 5/29/2009    bilateral   • Chronic foot pain     right   • Diarrhea, unspecified    • Duodenitis 12/18/08    peptic   • Dyslipidemia    • Dysphagia    • Essential hypertensio and third MTs with correction of the second hammertoe of the right foot by Dr. Claudean Stabler Bilateral 9/15/2014    Performed by Nicole Ch MD at [facility] MAIN OR   • KNEE ARTHROSCOPY Left 6/14/2017    Performed date: 2007        Years since quittin.5      Smokeless tobacco: Never Used      Tobacco comment: quit in     Alcohol use:  Yes      Alcohol/week: 0.0 - 1.0 standard drinks      Comment: Rare    Drug use: No     Occ: Retired  Exercise: minimal. unspecified SLE type, unspecified organ involvement status ([facility]      5. Rheumatoid arthritis involving both hands with positive rheumatoid factor (HCC)      6. Sjogren's syndrome with other organ involvement ([facility]      7. Fibromyalgia      8.  ILD (interst

## 2021-01-04 ENCOUNTER — HOSPITAL ENCOUNTER (OUTPATIENT)
Dept: BONE DENSITY | Age: 66
Discharge: HOME | End: 2021-01-04
Attending: FAMILY MEDICINE
Payer: MEDICARE

## 2021-01-04 DIAGNOSIS — Z78.0 ASYMPTOMATIC MENOPAUSE: ICD-10-CM

## 2021-01-04 DIAGNOSIS — Z13.820 SCREENING FOR OSTEOPOROSIS: ICD-10-CM

## 2021-01-04 PROCEDURE — 77080 DXA BONE DENSITY AXIAL: CPT | Performed by: FAMILY MEDICINE

## 2021-01-12 ENCOUNTER — VIRTUAL PHONE E/M (OUTPATIENT)
Dept: FAMILY MEDICINE CLINIC | Facility: CLINIC | Age: 66
End: 2021-01-12
Payer: MEDICARE

## 2021-01-12 DIAGNOSIS — J01.40 SUBACUTE PANSINUSITIS: Primary | ICD-10-CM

## 2021-01-12 PROCEDURE — 99213 OFFICE O/P EST LOW 20 MIN: CPT | Performed by: FAMILY MEDICINE

## 2021-01-12 RX ORDER — AMOXICILLIN AND CLAVULANATE POTASSIUM 875; 125 MG/1; MG/1
1 TABLET, FILM COATED ORAL 2 TIMES DAILY
Qty: 20 TABLET | Refills: 0 | Status: SHIPPED | OUTPATIENT
Start: 2021-01-12 | End: 2021-01-22

## 2021-01-12 NOTE — PROGRESS NOTES
Jose Max verbally consents to a Virtual/Telephone Check-In service on 01/12/21. Duration of Service:  12 minutes    Patient has been referred to the [facility] website at [email or web address] to review the yearly Consent to Treat document.     Patient

## 2021-01-22 ENCOUNTER — LAB ENCOUNTER (OUTPATIENT)
Dept: LAB | Age: 66
End: 2021-01-22
Attending: FAMILY MEDICINE
Payer: MEDICARE

## 2021-01-22 DIAGNOSIS — U07.1 COVID-19 VIRUS INFECTION: ICD-10-CM

## 2021-01-22 LAB — SARS-COV-2 IGG+IGM SERPL QL IA: NONREACTIVE

## 2021-01-22 PROCEDURE — 36415 COLL VENOUS BLD VENIPUNCTURE: CPT

## 2021-01-22 PROCEDURE — 86769 SARS-COV-2 COVID-19 ANTIBODY: CPT

## 2021-01-23 ENCOUNTER — PATIENT MESSAGE (OUTPATIENT)
Dept: FAMILY MEDICINE CLINIC | Facility: CLINIC | Age: 66
End: 2021-01-23

## 2021-01-25 NOTE — TELEPHONE ENCOUNTER
From: Jonathan Gutierrez  To: Anthony Crawley DO  Sent: 1/23/2021 4:25 PM CST  Subject: Non-Urgent Medical Question    I have a question about SARS-COV-2 TOTAL ANTIBODY resulted on 1/22/21, 4:01 PM.    Is it a blood test? If so and Medicare will cover, I'll get th

## 2021-02-03 ENCOUNTER — HOSPITAL ENCOUNTER (OUTPATIENT)
Dept: MAMMOGRAPHY | Age: 66
Discharge: HOME | End: 2021-02-03
Attending: FAMILY MEDICINE
Payer: MEDICARE

## 2021-02-03 DIAGNOSIS — Z23 NEED FOR VACCINATION: ICD-10-CM

## 2021-02-03 DIAGNOSIS — Z12.31 ENCOUNTER FOR SCREENING MAMMOGRAM FOR HIGH-RISK PATIENT: ICD-10-CM

## 2021-02-03 PROCEDURE — 77067 SCR MAMMO BI INCL CAD: CPT | Performed by: FAMILY MEDICINE

## 2021-02-03 PROCEDURE — 77063 BREAST TOMOSYNTHESIS BI: CPT | Performed by: FAMILY MEDICINE

## 2021-02-10 ENCOUNTER — VIRTUAL PHONE E/M (OUTPATIENT)
Dept: FAMILY MEDICINE CLINIC | Facility: CLINIC | Age: 66
End: 2021-02-10
Payer: MEDICARE

## 2021-02-10 DIAGNOSIS — Z91.030 BEE STING ALLERGY: ICD-10-CM

## 2021-02-10 DIAGNOSIS — J01.40 SUBACUTE PANSINUSITIS: Primary | ICD-10-CM

## 2021-02-10 PROCEDURE — 99213 OFFICE O/P EST LOW 20 MIN: CPT | Performed by: FAMILY MEDICINE

## 2021-02-10 RX ORDER — CEFDINIR 300 MG/1
300 CAPSULE ORAL 2 TIMES DAILY
Qty: 28 CAPSULE | Refills: 0 | Status: SHIPPED | OUTPATIENT
Start: 2021-02-10 | End: 2021-02-24

## 2021-02-10 RX ORDER — EPINEPHRINE 0.3 MG/.3ML
0.3 INJECTION SUBCUTANEOUS AS NEEDED
Qty: 1 EACH | Refills: 1 | Status: SHIPPED | OUTPATIENT
Start: 2021-02-10 | End: 2021-04-07 | Stop reason: ALTCHOICE

## 2021-02-10 NOTE — PROGRESS NOTES
Virtual Telephone Check-In    Elida Been verbally consents to a Virtual/Telephone Check-In visit on 02/10/21. Patient has been referred to the [facility] website at [email or web address] to review the yearly Consent to Treat document.     Patient understand each total) as directed as needed. Dispense: 1 each;  Refill: 1      RTC  in one week if not better or sooner if needed  Questions answered and pt expressed understanding

## 2021-03-01 ENCOUNTER — PATIENT MESSAGE (OUTPATIENT)
Dept: FAMILY MEDICINE CLINIC | Facility: CLINIC | Age: 66
End: 2021-03-01

## 2021-03-01 NOTE — TELEPHONE ENCOUNTER
From: Michelle Milton  To: Paz Turner DO  Sent: 3/1/2021 3:03 PM CST  Subject: Non-Urgent Medical Question    I'm sorry to bother you but I need your advice on whether I should get the vaccine even though I had COVID.

## 2021-03-04 ENCOUNTER — IMMUNIZATION (OUTPATIENT)
Dept: LAB | Facility: HOSPITAL | Age: 66
End: 2021-03-04
Attending: HOSPITALIST
Payer: MEDICARE

## 2021-03-04 DIAGNOSIS — Z23 NEED FOR VACCINATION: Primary | ICD-10-CM

## 2021-03-04 PROCEDURE — 0011A SARSCOV2 VAC 100MCG/0.5ML IM: CPT

## 2021-03-11 ENCOUNTER — OFFICE VISIT (OUTPATIENT)
Dept: RHEUMATOLOGY | Facility: CLINIC | Age: 66
End: 2021-03-11
Payer: MEDICARE

## 2021-03-11 VITALS
HEART RATE: 60 BPM | HEIGHT: 65 IN | TEMPERATURE: 98 F | BODY MASS INDEX: 39.99 KG/M2 | RESPIRATION RATE: 16 BRPM | SYSTOLIC BLOOD PRESSURE: 128 MMHG | WEIGHT: 240 LBS | DIASTOLIC BLOOD PRESSURE: 88 MMHG

## 2021-03-11 DIAGNOSIS — Z79.899 HIGH RISK MEDICATION USE: ICD-10-CM

## 2021-03-11 DIAGNOSIS — M35.00 SJOGREN'S SYNDROME WITHOUT EXTRAGLANDULAR INVOLVEMENT (HCC): ICD-10-CM

## 2021-03-11 DIAGNOSIS — D84.9 IMMUNOSUPPRESSED STATUS (HCC): ICD-10-CM

## 2021-03-11 DIAGNOSIS — M79.7 FIBROMYALGIA: ICD-10-CM

## 2021-03-11 DIAGNOSIS — M81.0 OSTEOPOROSIS, SENILE: ICD-10-CM

## 2021-03-11 DIAGNOSIS — M06.9 RHEUMATOID ARTHRITIS INVOLVING BOTH HANDS, UNSPECIFIED WHETHER RHEUMATOID FACTOR PRESENT (HCC): ICD-10-CM

## 2021-03-11 DIAGNOSIS — E55.9 VITAMIN D DEFICIENCY: ICD-10-CM

## 2021-03-11 DIAGNOSIS — M32.19 OTHER SYSTEMIC LUPUS ERYTHEMATOSUS WITH OTHER ORGAN INVOLVEMENT (HCC): Primary | ICD-10-CM

## 2021-03-11 DIAGNOSIS — J84.9 ILD (INTERSTITIAL LUNG DISEASE) (HCC): ICD-10-CM

## 2021-03-11 DIAGNOSIS — M35.00 SICCA SYNDROME (HCC): ICD-10-CM

## 2021-03-11 PROCEDURE — 99205 OFFICE O/P NEW HI 60 MIN: CPT | Performed by: INTERNAL MEDICINE

## 2021-03-11 RX ORDER — CEVIMELINE HYDROCHLORIDE 30 MG/1
30 CAPSULE ORAL 3 TIMES DAILY
Qty: 270 CAPSULE | Refills: 2 | Status: SHIPPED | OUTPATIENT
Start: 2021-03-11 | End: 2021-06-10

## 2021-03-11 RX ORDER — HYDROXYCHLOROQUINE SULFATE 200 MG/1
200 TABLET, FILM COATED ORAL 2 TIMES DAILY
Qty: 180 TABLET | Refills: 1 | Status: SHIPPED | OUTPATIENT
Start: 2021-03-11 | End: 2021-06-10

## 2021-03-11 RX ORDER — LEFLUNOMIDE 20 MG/1
20 TABLET ORAL DAILY
Qty: 90 TABLET | Refills: 1 | Status: SHIPPED | OUTPATIENT
Start: 2021-03-11 | End: 2021-06-10

## 2021-03-11 RX ORDER — PREGABALIN 75 MG/1
CAPSULE ORAL
Qty: 270 CAPSULE | Refills: 1 | Status: SHIPPED | OUTPATIENT
Start: 2021-03-11 | End: 2021-06-10

## 2021-03-11 NOTE — PROGRESS NOTES
?  RHEUMATOLOGY NEW PATIENT   Date of visit: 3/11/2021  ? Patient presents with:  Establish Care: new pt. Saw Dr. Anatoliy Salmeron for about 10 years while pt was working out by her. Now that retired, needed one closer to home.  Diagnosed with lupus, sjogrens, RA, f I will be out of the office so I recommended follow up with one of my partners in 3 months and then with me in 6 months     Patient verbalized understanding of above instructions. No further questions at this time.     Code selection for this visit was base present ([facility]  -     RHEUMATOID ARTHRITIS FACTOR; Future  -     CYCLIC CITRULLINATE PEP. IGG; Future  -     SEVERO BY IFA, IGG; Future  -     SED RATE, JASBIRREN (AUTOMATED);  Future  -     C-REACTIVE PROTEIN; Future  -     COMPLEMENT C3, SERUM; Future  - referred rheumatologist but didn't go initially. Has been diagnosed with rheumatoid arthritis, lupus, sjogren's as well as osteoarthritis and osteoporosis. As well as fibromyalgia.   Hx of possible ILD/pulmonary fibrosis     Currently taking:  Leflunomide lymphadenopathy, night sweats, unexpected weight loss, or easy bruising or bleeding. Denies epistaxis. Denies chronic cough or hemoptysis. Denies obvious blood in the urine.      Past Medical History:  Past Medical History:   Diagnosis Date   • Achilles SURGERY     • BRONCHOSCOPY N/A 1/7/2019    Performed by Danny Laura MD at [address] Bilateral    • COLONOSCOPY     • DERMATOLOGY SHAVE BIOPSY (D1K.1)  12/29/11    Shave biopsy, skin, right shoulder   • FOOT SURGERY  4 Other (Other) Mother    • Other (Abdominal Aortic Aneurysm) Other    • Fibromyalgia Sister    • Asthma Sister    • Breast Cancer Self 48   • DCIS Self    • No Known Problems Son    • No Known Problems Brother      Social History:  Social History    Tobacco (two) times daily as needed. , Disp: 150 g, Rfl: 0  Multiple Vitamin (TAB-A-RIYA) Oral Tab, Take 1 tablet by mouth daily. , Disp: , Rfl:   Cholecalciferol (VITAMIN D3) 50 MCG (2000 UT) Oral Tab, Take 1 tablet by mouth daily. , Disp: , Rfl:   Calcium 600-200 M reaction(s):  Other (See Comments)             Affects kidneys  Radiology Contrast *    DIZZINESS  Erythromycin Base       NAUSEA ONLY  Vicryl Sutures          OTHER (SEE COMMENTS)    Comment:Cat gut sutures  Azithromycin            RASH  Ciprofloxacin light.   Neck:      Vascular: No JVD. Trachea: No tracheal deviation. Cardiovascular:      Rate and Rhythm: Normal rate and regular rhythm. Heart sounds: Normal heart sounds. No murmur.    Pulmonary:      Effort: Pulmonary effort is normal. No r comparison cannot be made.  [facility]) classification of osteopenia                Left TOTAL HIP ANALYSIS RESULTS:         Bone mineral density (BMD) (g/cm2):  1.017       Total Hip T-Score:  0.6       % young n VIEWS) (CPT=72072), 6/14/2019, 10:34 AM. Alena Arellano, , MRI SPINE LUMBAR (CPT=72148), 4/04/2017, 3:03 PM.       INDICATIONS:  M54.16 Radiculopathy, lumbar region       TECHNIQUE:  Multiplanar T1 and T2 weighted images including fat suppression sequences stenosis and moderate central canal stenosis. 2. Degenerative changes at other levels are described above.    3. Previous kyphoplasty treatment at T9.             Dictated by (CST): Landon Hou MD on 10/15/2020 at 5:48 PM      PROCEDURE:  CT CHEST (CPT= WBC 4.2 03/13/2021    RBC 4.07 03/13/2021    HGB 12.4 03/13/2021    HCT 39.8 03/13/2021    .0 03/13/2021    MPV 11.0 (H) 09/16/2011    MCV 97.8 03/13/2021    MCH 30.5 03/13/2021    MCHC 31.2 03/13/2021    RDW 15.0 03/13/2021    NEPRELIM 1.83 03/13/2

## 2021-03-13 ENCOUNTER — LAB ENCOUNTER (OUTPATIENT)
Dept: LAB | Age: 66
End: 2021-03-13
Attending: INTERNAL MEDICINE
Payer: MEDICARE

## 2021-03-13 DIAGNOSIS — M32.19 OTHER SYSTEMIC LUPUS ERYTHEMATOSUS WITH OTHER ORGAN INVOLVEMENT (HCC): ICD-10-CM

## 2021-03-13 DIAGNOSIS — M35.00 SJOGREN'S SYNDROME WITHOUT EXTRAGLANDULAR INVOLVEMENT (HCC): ICD-10-CM

## 2021-03-13 DIAGNOSIS — M06.9 RHEUMATOID ARTHRITIS INVOLVING BOTH HANDS, UNSPECIFIED WHETHER RHEUMATOID FACTOR PRESENT (HCC): ICD-10-CM

## 2021-03-13 DIAGNOSIS — J84.9 ILD (INTERSTITIAL LUNG DISEASE) (HCC): ICD-10-CM

## 2021-03-13 DIAGNOSIS — Z79.899 HIGH RISK MEDICATION USE: ICD-10-CM

## 2021-03-13 DIAGNOSIS — M81.0 OSTEOPOROSIS, SENILE: ICD-10-CM

## 2021-03-13 DIAGNOSIS — E55.9 VITAMIN D DEFICIENCY: ICD-10-CM

## 2021-03-13 LAB
ALBUMIN SERPL-MCNC: 3.6 G/DL (ref 3.4–5)
ALBUMIN/GLOB SERPL: 1 {RATIO} (ref 1–2)
ALP LIVER SERPL-CCNC: 70 U/L
ALT SERPL-CCNC: 25 U/L
ANION GAP SERPL CALC-SCNC: 4 MMOL/L (ref 0–18)
AST SERPL-CCNC: 24 U/L (ref 15–37)
BASOPHILS # BLD AUTO: 0.07 X10(3) UL (ref 0–0.2)
BASOPHILS NFR BLD AUTO: 1.7 %
BILIRUB SERPL-MCNC: 0.5 MG/DL (ref 0.1–2)
BILIRUB UR QL STRIP.AUTO: NEGATIVE
BUN BLD-MCNC: 13 MG/DL (ref 7–18)
BUN/CREAT SERPL: 16.7 (ref 10–20)
C3 SERPL-MCNC: 129 MG/DL (ref 90–180)
C4 SERPL-MCNC: 38.2 MG/DL (ref 10–40)
CALCIUM BLD-MCNC: 9.4 MG/DL (ref 8.5–10.1)
CHLORIDE SERPL-SCNC: 106 MMOL/L (ref 98–112)
CO2 SERPL-SCNC: 31 MMOL/L (ref 21–32)
COLOR UR AUTO: YELLOW
CREAT BLD-MCNC: 0.78 MG/DL
CRP SERPL-MCNC: <0.29 MG/DL (ref ?–0.3)
DEPRECATED RDW RBC AUTO: 54.2 FL (ref 35.1–46.3)
EOSINOPHIL # BLD AUTO: 0.47 X10(3) UL (ref 0–0.7)
EOSINOPHIL NFR BLD AUTO: 11.1 %
ERYTHROCYTE [DISTWIDTH] IN BLOOD BY AUTOMATED COUNT: 15 % (ref 11–15)
GLOBULIN PLAS-MCNC: 3.5 G/DL (ref 2.8–4.4)
GLUCOSE BLD-MCNC: 91 MG/DL (ref 70–99)
GLUCOSE UR STRIP.AUTO-MCNC: NEGATIVE MG/DL
HAV IGM SER QL: 2.2 MG/DL (ref 1.6–2.6)
HCT VFR BLD AUTO: 39.8 %
HGB BLD-MCNC: 12.4 G/DL
IMM GRANULOCYTES # BLD AUTO: 0 X10(3) UL (ref 0–1)
IMM GRANULOCYTES NFR BLD: 0 %
KETONES UR STRIP.AUTO-MCNC: NEGATIVE MG/DL
LEUKOCYTE ESTERASE UR QL STRIP.AUTO: NEGATIVE
LYMPHOCYTES # BLD AUTO: 1.17 X10(3) UL (ref 1–4)
LYMPHOCYTES NFR BLD AUTO: 27.7 %
M PROTEIN MFR SERPL ELPH: 7.1 G/DL (ref 6.4–8.2)
MCH RBC QN AUTO: 30.5 PG (ref 26–34)
MCHC RBC AUTO-ENTMCNC: 31.2 G/DL (ref 31–37)
MCV RBC AUTO: 97.8 FL
MONOCYTES # BLD AUTO: 0.68 X10(3) UL (ref 0.1–1)
MONOCYTES NFR BLD AUTO: 16.1 %
NEUTROPHILS # BLD AUTO: 1.83 X10 (3) UL (ref 1.5–7.7)
NEUTROPHILS # BLD AUTO: 1.83 X10(3) UL (ref 1.5–7.7)
NEUTROPHILS NFR BLD AUTO: 43.4 %
NITRITE UR QL STRIP.AUTO: NEGATIVE
OSMOLALITY SERPL CALC.SUM OF ELEC: 292 MOSM/KG (ref 275–295)
PATIENT FASTING Y/N/NP: NO
PH UR STRIP.AUTO: 5 [PH] (ref 5–8)
PHOSPHATE SERPL-MCNC: 3.3 MG/DL (ref 2.5–4.9)
PLATELET # BLD AUTO: 178 10(3)UL (ref 150–450)
POTASSIUM SERPL-SCNC: 3.9 MMOL/L (ref 3.5–5.1)
PROT UR STRIP.AUTO-MCNC: NEGATIVE MG/DL
RBC # BLD AUTO: 4.07 X10(6)UL
RBC UR QL AUTO: NEGATIVE
RHEUMATOID FACT SERPL-ACNC: <10 IU/ML (ref ?–15)
SED RATE-ML: 14 MM/HR
SODIUM SERPL-SCNC: 141 MMOL/L (ref 136–145)
SP GR UR STRIP.AUTO: 1.02 (ref 1–1.03)
UROBILINOGEN UR STRIP.AUTO-MCNC: <2 MG/DL
VIT D+METAB SERPL-MCNC: 57.1 NG/ML (ref 30–100)
WBC # BLD AUTO: 4.2 X10(3) UL (ref 4–11)

## 2021-03-13 PROCEDURE — 83516 IMMUNOASSAY NONANTIBODY: CPT

## 2021-03-13 PROCEDURE — 85025 COMPLETE CBC W/AUTO DIFF WBC: CPT

## 2021-03-13 PROCEDURE — 86235 NUCLEAR ANTIGEN ANTIBODY: CPT

## 2021-03-13 PROCEDURE — 86160 COMPLEMENT ANTIGEN: CPT

## 2021-03-13 PROCEDURE — 86200 CCP ANTIBODY: CPT

## 2021-03-13 PROCEDURE — 85652 RBC SED RATE AUTOMATED: CPT

## 2021-03-13 PROCEDURE — 83735 ASSAY OF MAGNESIUM: CPT

## 2021-03-13 PROCEDURE — 36415 COLL VENOUS BLD VENIPUNCTURE: CPT

## 2021-03-13 PROCEDURE — 86140 C-REACTIVE PROTEIN: CPT

## 2021-03-13 PROCEDURE — 81001 URINALYSIS AUTO W/SCOPE: CPT

## 2021-03-13 PROCEDURE — 86038 ANTINUCLEAR ANTIBODIES: CPT

## 2021-03-13 PROCEDURE — 82306 VITAMIN D 25 HYDROXY: CPT

## 2021-03-13 PROCEDURE — 84100 ASSAY OF PHOSPHORUS: CPT

## 2021-03-13 PROCEDURE — 80053 COMPREHEN METABOLIC PANEL: CPT

## 2021-03-13 PROCEDURE — 86431 RHEUMATOID FACTOR QUANT: CPT

## 2021-03-15 LAB — ANA SCREEN: NEGATIVE

## 2021-03-16 LAB — CCP IGG SERPL-ACNC: 0.5 U/ML (ref 0–6.9)

## 2021-03-24 ENCOUNTER — TELEPHONE (OUTPATIENT)
Dept: RHEUMATOLOGY | Facility: CLINIC | Age: 66
End: 2021-03-24

## 2021-03-24 NOTE — TELEPHONE ENCOUNTER
Called patient. Discussed test results in detail  Labs all grossly negative. No sign of active inflammation. Also serologies for RA/lupus all negative.   Seems like PI was started for Prolia however no update yet I will have my nurses look into this and

## 2021-03-25 ENCOUNTER — NURSE ONLY (OUTPATIENT)
Dept: RHEUMATOLOGY | Facility: CLINIC | Age: 66
End: 2021-03-25
Payer: MEDICARE

## 2021-03-25 VITALS — TEMPERATURE: 98 F

## 2021-03-25 DIAGNOSIS — M06.9 RHEUMATOID ARTHRITIS INVOLVING BOTH HANDS, UNSPECIFIED WHETHER RHEUMATOID FACTOR PRESENT (HCC): ICD-10-CM

## 2021-03-25 DIAGNOSIS — Z79.899 HIGH RISK MEDICATION USE: ICD-10-CM

## 2021-03-25 DIAGNOSIS — M81.0 OSTEOPOROSIS, SENILE: Primary | ICD-10-CM

## 2021-03-25 DIAGNOSIS — M32.19 OTHER SYSTEMIC LUPUS ERYTHEMATOSUS WITH OTHER ORGAN INVOLVEMENT (HCC): Primary | ICD-10-CM

## 2021-03-25 PROCEDURE — 96372 THER/PROPH/DIAG INJ SC/IM: CPT | Performed by: INTERNAL MEDICINE

## 2021-03-28 LAB
EJ (GLYCYL - TRNA SYNTHETASE) ANTIBODY: NEGATIVE
FIBRILLARIN (U3 RNP) AB, IGG: NEGATIVE
JO-1 (HISTIDY1-TRNA SYNTHETASE) AB, IGG: 0 AU/ML
KU ANTIBODY: NEGATIVE
MDA5 (CADM-140) ANTIBODY: NEGATIVE
MI-2 (NUCLEAR HELICASE PROTEIN) ANTIBODY: NEGATIVE
NXP-2 (NUCLEAR MATRIX PROTEIN-2) AB: NEGATIVE
OJ (ISOLEUCYL-TRNA SYNTHETASE) ANTIBODY: NEGATIVE
P155/140 (TIF-1 GAMMA) ANTIBODY: NEGATIVE
PL-12 (ALANYL-TRNA SYNTHETASE) ANTIBODY: NEGATIVE
PL-7 (THREONYL-TRNA SYNTHETASE) ANTIBODY: NEGATIVE
PM_SCL 100 ANTIBODY, IGG: NEGATIVE
SAE1 (SUMO ACTIVATING ENZYME) ANTIBODY: NEGATIVE
SRP (SINGLE RECOGNITION PARTICLE) AB: NEGATIVE
SSA 52 (RO) (ENA) ANTIBODY, IGG: 1 AU/ML
SSA 60 (RO) (ENA) ANTIBODY, IGG: 2 AU/ML
TIF1-GAMMA ANTIBODY: NEGATIVE

## 2021-03-29 ENCOUNTER — TELEPHONE (OUTPATIENT)
Dept: RHEUMATOLOGY | Facility: CLINIC | Age: 66
End: 2021-03-29

## 2021-03-29 NOTE — TELEPHONE ENCOUNTER
Pt called back at the [place] office and I read her her test results. Documented in result notes.

## 2021-04-01 ENCOUNTER — IMMUNIZATION (OUTPATIENT)
Dept: LAB | Facility: HOSPITAL | Age: 66
End: 2021-04-01
Attending: EMERGENCY MEDICINE
Payer: MEDICARE

## 2021-04-01 DIAGNOSIS — Z23 NEED FOR VACCINATION: Primary | ICD-10-CM

## 2021-04-01 PROCEDURE — 0012A SARSCOV2 VAC 100MCG/0.5ML IM: CPT

## 2021-04-18 ENCOUNTER — E-VISIT (OUTPATIENT)
Dept: TELEHEALTH | Age: 66
End: 2021-04-18

## 2021-04-18 DIAGNOSIS — Z02.9 ADMINISTRATIVE ENCOUNTER: Primary | ICD-10-CM

## 2021-04-18 NOTE — PROGRESS NOTES
Patient requested an E-Visit. Unable to (1) prescribed narcotic and (2) Unable to prescribe Physical therapy consult. No Charges assessed for this E-Visit.

## 2021-04-19 ENCOUNTER — VIRTUAL PHONE E/M (OUTPATIENT)
Dept: FAMILY MEDICINE CLINIC | Facility: CLINIC | Age: 66
End: 2021-04-19
Payer: MEDICARE

## 2021-04-19 DIAGNOSIS — M48.061 SPINAL STENOSIS OF LUMBAR REGION, UNSPECIFIED WHETHER NEUROGENIC CLAUDICATION PRESENT: Primary | ICD-10-CM

## 2021-04-19 DIAGNOSIS — M47.816 ARTHRITIS OF FACET JOINT OF LUMBAR SPINE: ICD-10-CM

## 2021-04-19 PROCEDURE — 99442 PHONE E/M BY PHYS 11-20 MIN: CPT | Performed by: FAMILY MEDICINE

## 2021-04-19 RX ORDER — HYDROCODONE BITARTRATE AND ACETAMINOPHEN 5; 325 MG/1; MG/1
1 TABLET ORAL EVERY 6 HOURS PRN
Qty: 30 TABLET | Refills: 0 | Status: SHIPPED | OUTPATIENT
Start: 2021-04-19 | End: 2021-04-28

## 2021-04-19 RX ORDER — PREDNISONE 20 MG/1
60 TABLET ORAL DAILY
Qty: 15 TABLET | Refills: 0 | Status: SHIPPED | OUTPATIENT
Start: 2021-04-19 | End: 2021-04-24

## 2021-04-19 NOTE — PROGRESS NOTES
Virtual Telephone Check-In    Anajosefina Odonnell verbally consents to a Virtual/Telephone Check-In visit on 04/19/21. Patient has been referred to the [facility] website at [email or web address] to review the yearly Consent to Treat document.     Patient understand BEDTIME 270 capsule 1   • leflunomide 20 MG Oral Tab Take 1 tablet (20 mg total) by mouth daily. 90 tablet 1   • Hydroxychloroquine Sulfate 200 MG Oral Tab Take 1 tablet (200 mg total) by mouth 2 (two) times daily.  180 tablet 1   • Cevimeline HCl 30 MG Ora Cancer ([facility] 9[facility]    rt breast dcis   • Carpal tunnel syndrome 5/29/2009    bilateral   • Chronic foot pain     right   • Diarrhea, unspecified    • Duodenitis 12/18/08    peptic   • Dyslipidemia    • Dysphagia    • Essential hypertension    • Exposure to SURGERY     • LUMPECTOMY RIGHT  2004    rt breast lumpectomy, [facility]   • LUDY BIOPSY STEREO NODULE 1 SITE RIGHT (WBS=44786)  2004   • OTHER SURGICAL HISTORY  1/11/13    trigger thumb injections   • RADIATION RIGHT  2004    MAMMOSITE   • SPINE SURGE normal. No rashes or lesions and age appropriate, normal, logical connections, person, place and time/date and no suicidal ideation      ASSESSMENT AND PLAN:   Dalia Fernandez is a 77year old female who presents with     1. Spinal stenosis of lumbar region,

## 2021-04-22 ENCOUNTER — LAB ENCOUNTER (OUTPATIENT)
Dept: LAB | Age: 66
End: 2021-04-22
Attending: INTERNAL MEDICINE
Payer: MEDICARE

## 2021-04-22 DIAGNOSIS — N17.9 AKI (ACUTE KIDNEY INJURY) (HCC): ICD-10-CM

## 2021-04-22 DIAGNOSIS — D84.9 IMMUNOSUPPRESSED STATUS (HCC): ICD-10-CM

## 2021-04-22 DIAGNOSIS — I95.2 HYPOTENSION DUE TO DRUGS: ICD-10-CM

## 2021-04-22 DIAGNOSIS — I10 ESSENTIAL HYPERTENSION: ICD-10-CM

## 2021-04-22 DIAGNOSIS — Z79.899 HIGH RISK MEDICATION USE: ICD-10-CM

## 2021-04-22 DIAGNOSIS — M32.19 OTHER SYSTEMIC LUPUS ERYTHEMATOSUS WITH OTHER ORGAN INVOLVEMENT (HCC): ICD-10-CM

## 2021-04-22 DIAGNOSIS — M35.09 SJOGREN'S SYNDROME WITH OTHER ORGAN INVOLVEMENT (HCC): ICD-10-CM

## 2021-04-22 DIAGNOSIS — M06.9 RHEUMATOID ARTHRITIS INVOLVING BOTH HANDS, UNSPECIFIED WHETHER RHEUMATOID FACTOR PRESENT (HCC): ICD-10-CM

## 2021-04-22 DIAGNOSIS — M32.9 SYSTEMIC LUPUS ERYTHEMATOSUS, UNSPECIFIED SLE TYPE, UNSPECIFIED ORGAN INVOLVEMENT STATUS (HCC): ICD-10-CM

## 2021-04-22 PROCEDURE — 36415 COLL VENOUS BLD VENIPUNCTURE: CPT

## 2021-04-22 PROCEDURE — 80053 COMPREHEN METABOLIC PANEL: CPT

## 2021-04-22 PROCEDURE — 85025 COMPLETE CBC W/AUTO DIFF WBC: CPT

## 2021-04-26 ENCOUNTER — OFFICE VISIT (OUTPATIENT)
Dept: PHYSICAL THERAPY | Age: 66
End: 2021-04-26
Attending: FAMILY MEDICINE
Payer: MEDICARE

## 2021-04-26 ENCOUNTER — TELEPHONE (OUTPATIENT)
Dept: PHYSICAL THERAPY | Facility: HOSPITAL | Age: 66
End: 2021-04-26

## 2021-04-26 PROCEDURE — 97110 THERAPEUTIC EXERCISES: CPT | Performed by: PHYSICAL THERAPIST

## 2021-04-26 PROCEDURE — 97162 PT EVAL MOD COMPLEX 30 MIN: CPT | Performed by: PHYSICAL THERAPIST

## 2021-04-26 NOTE — PROGRESS NOTES
SPINE EVALUATION:   Referring Physician: Dr. Kassidy High  Diagnosis:  Spinal stenosis of lumbar region, unspecified whether neurogenic claudication present (M48.061)  Arthritis of facet joint of lumbar spine ([facility] (M46.96)     Date of Service: 4/26/2021     P foot pain. Altered gait may also be contributing to lumbar symptoms. She was unable to perform single leg balance right or left LE. Difficulty with right leg relates to right foot pain.   Functional deficits include but are not limited to difficulty perfor lumbar pain   Bilateral quad tightness  Special tests:   Slump/SLR/femoral nerve - negative     Gait: pt ambulates on level ground with antalgia with stance right LE  Balance: SLS: unable to perform right or left LE     Today’s Treatment and Response:   Pt treatment and while under my care.     X___________________________________________________ Date____________________    Certification From: 2/85/8190  To:7/25/2021

## 2021-04-28 ENCOUNTER — OFFICE VISIT (OUTPATIENT)
Dept: PHYSICAL THERAPY | Age: 66
End: 2021-04-28
Attending: FAMILY MEDICINE
Payer: MEDICARE

## 2021-04-28 DIAGNOSIS — M48.061 SPINAL STENOSIS OF LUMBAR REGION, UNSPECIFIED WHETHER NEUROGENIC CLAUDICATION PRESENT: ICD-10-CM

## 2021-04-28 DIAGNOSIS — M47.816 ARTHRITIS OF FACET JOINT OF LUMBAR SPINE: ICD-10-CM

## 2021-04-28 PROCEDURE — 97110 THERAPEUTIC EXERCISES: CPT | Performed by: PHYSICAL THERAPIST

## 2021-04-28 PROCEDURE — 97140 MANUAL THERAPY 1/> REGIONS: CPT | Performed by: PHYSICAL THERAPIST

## 2021-04-28 RX ORDER — HYDROCODONE BITARTRATE AND ACETAMINOPHEN 5; 325 MG/1; MG/1
1 TABLET ORAL EVERY 6 HOURS PRN
Qty: 30 TABLET | Refills: 0 | Status: SHIPPED | OUTPATIENT
Start: 2021-04-28 | End: 2021-05-10

## 2021-04-28 NOTE — TELEPHONE ENCOUNTER
Pt started PT - she is asking if Dr. Marisa Short would refill her pain medication for another week or two?  [address]

## 2021-04-28 NOTE — TELEPHONE ENCOUNTER
Last refill:  HYDROcodone-acetaminophen (NORCO) 5-325 MG Oral Tab 30 tablet 0 4/19/2021    Sig:   Take 1 tablet by mouth every 6 (six) hours as needed for Pain.        4/19/21 last telemed: Spinal stenosis of lumbar region, unspecified whether neurogenic cl

## 2021-04-28 NOTE — PROGRESS NOTES
Dx: Spinal stenosis of lumbar region, unspecified whether neurogenic claudication present (M48.061)  Arthritis of facet joint of lumbar spine ([facility] (M46.96)           Authorized # of Visits:  8  Fall Risk: standard         Precautions: n/a             Subj

## 2021-05-03 ENCOUNTER — OFFICE VISIT (OUTPATIENT)
Dept: PHYSICAL THERAPY | Age: 66
End: 2021-05-03
Attending: FAMILY MEDICINE
Payer: MEDICARE

## 2021-05-03 PROCEDURE — 97110 THERAPEUTIC EXERCISES: CPT | Performed by: PHYSICAL THERAPIST

## 2021-05-03 NOTE — PROGRESS NOTES
Dx: Spinal stenosis of lumbar region, unspecified whether neurogenic claudication present (M48.061)  Arthritis of facet joint of lumbar spine ([facility] (M46.96)           Authorized # of Visits:  8  Fall Risk: standard         Precautions: n/a             Subj reps each         SL rhythmic stabilization contact points pelvis and shoulders 20 each right and left sides Squats 10 reps x 2  (HEP)        hooklying transverse plane isometric holds 15 reps each  Single leg stance with UE support right and left LE

## 2021-05-06 ENCOUNTER — OFFICE VISIT (OUTPATIENT)
Dept: PHYSICAL THERAPY | Age: 66
End: 2021-05-06
Attending: FAMILY MEDICINE
Payer: MEDICARE

## 2021-05-06 PROCEDURE — 97110 THERAPEUTIC EXERCISES: CPT | Performed by: PHYSICAL THERAPIST

## 2021-05-06 NOTE — PROGRESS NOTES
Dx: Spinal stenosis of lumbar region, unspecified whether neurogenic claudication present (M48.061)  Arthritis of facet joint of lumbar spine ([facility] (M46.96)           Authorized # of Visits:  8  Fall Risk: standard         Precautions: n/a             Subj isometric hold - 5 reps each hip flexion        SL rhythmic stabilization contact points pelvis and shoulders 20 each right and left sides Squats 10 reps x 2  (HEP)        hooklying transverse plane isometric holds 15 reps each  Single leg stance with UE s

## 2021-05-10 ENCOUNTER — OFFICE VISIT (OUTPATIENT)
Dept: PHYSICAL THERAPY | Age: 66
End: 2021-05-10
Attending: FAMILY MEDICINE
Payer: MEDICARE

## 2021-05-10 DIAGNOSIS — M47.816 ARTHRITIS OF FACET JOINT OF LUMBAR SPINE: ICD-10-CM

## 2021-05-10 DIAGNOSIS — M48.061 SPINAL STENOSIS OF LUMBAR REGION, UNSPECIFIED WHETHER NEUROGENIC CLAUDICATION PRESENT: ICD-10-CM

## 2021-05-10 PROCEDURE — 97110 THERAPEUTIC EXERCISES: CPT | Performed by: PHYSICAL THERAPIST

## 2021-05-10 RX ORDER — HYDROCODONE BITARTRATE AND ACETAMINOPHEN 5; 325 MG/1; MG/1
1 TABLET ORAL EVERY 6 HOURS PRN
Qty: 30 TABLET | Refills: 0 | Status: SHIPPED | OUTPATIENT
Start: 2021-05-10 | End: 2021-05-11

## 2021-05-10 NOTE — PROGRESS NOTES
Dx: Spinal stenosis of lumbar region, unspecified whether neurogenic claudication present (M48.061)  Arthritis of facet joint of lumbar spine ([facility] (M46.96)           Authorized # of Visits:  8  Fall Risk: standard         Precautions: n/a             Subj Thoracic mobility transverse plane in SL (HEP) Seated rhythmic stabilization with contact points at shoulders 15 reps each  Rhythmic stabilization contact points shoulders, pelvis, shoulders/pelvis 10 reps each  Scap retraction with green band 10 rep

## 2021-05-11 ENCOUNTER — TELEPHONE (OUTPATIENT)
Dept: FAMILY MEDICINE CLINIC | Facility: CLINIC | Age: 66
End: 2021-05-11

## 2021-05-11 DIAGNOSIS — M47.816 ARTHRITIS OF FACET JOINT OF LUMBAR SPINE: ICD-10-CM

## 2021-05-11 DIAGNOSIS — M48.061 SPINAL STENOSIS OF LUMBAR REGION, UNSPECIFIED WHETHER NEUROGENIC CLAUDICATION PRESENT: ICD-10-CM

## 2021-05-11 RX ORDER — HYDROCODONE BITARTRATE AND ACETAMINOPHEN 5; 325 MG/1; MG/1
1 TABLET ORAL EVERY 6 HOURS PRN
Qty: 30 TABLET | Refills: 0 | OUTPATIENT
Start: 2021-05-11

## 2021-05-11 RX ORDER — HYDROCODONE BITARTRATE AND ACETAMINOPHEN 5; 325 MG/1; MG/1
1 TABLET ORAL EVERY 6 HOURS PRN
Qty: 30 TABLET | Refills: 0 | Status: SHIPPED | OUTPATIENT
Start: 2021-05-11 | End: 2021-05-19

## 2021-05-11 NOTE — TELEPHONE ENCOUNTER
I called [facility] and cancelled mail order. Dr. Cherie Boogie, please sign off on medication order for local pharmacy.

## 2021-05-11 NOTE — TELEPHONE ENCOUNTER
Please cancel the [employer] mail order and call it to  [facility]    [address] Mail order will take too long to get to her. Pls call her back to advise.

## 2021-05-12 ENCOUNTER — APPOINTMENT (OUTPATIENT)
Dept: PHYSICAL THERAPY | Age: 66
End: 2021-05-12
Attending: FAMILY MEDICINE
Payer: MEDICARE

## 2021-05-17 ENCOUNTER — OFFICE VISIT (OUTPATIENT)
Dept: PHYSICAL THERAPY | Age: 66
End: 2021-05-17
Attending: FAMILY MEDICINE
Payer: MEDICARE

## 2021-05-17 PROCEDURE — 97110 THERAPEUTIC EXERCISES: CPT | Performed by: PHYSICAL THERAPIST

## 2021-05-17 NOTE — PROGRESS NOTES
Dx: Spinal stenosis of lumbar region, unspecified whether neurogenic claudication present (M48.061)  Arthritis of facet joint of lumbar spine ([facility] (M46.96)           Authorized # of Visits:  8  Fall Risk: standard         Precautions: n/a             Subj Split stance thoracic rotation 10 reps each with SB UE support single leg stance with opposite leg swings across midline 10 reps each      SL right quadratus soft tissue release, manual distraction pelvis  Hooklying hip abd/add 10 reps each  Ascend 6 inch

## 2021-05-19 ENCOUNTER — OFFICE VISIT (OUTPATIENT)
Dept: PHYSICAL THERAPY | Age: 66
End: 2021-05-19
Attending: FAMILY MEDICINE
Payer: MEDICARE

## 2021-05-19 ENCOUNTER — OFFICE VISIT (OUTPATIENT)
Dept: FAMILY MEDICINE CLINIC | Facility: CLINIC | Age: 66
End: 2021-05-19
Payer: MEDICARE

## 2021-05-19 VITALS
WEIGHT: 241 LBS | RESPIRATION RATE: 16 BRPM | HEART RATE: 71 BPM | HEIGHT: 65 IN | BODY MASS INDEX: 40.15 KG/M2 | DIASTOLIC BLOOD PRESSURE: 70 MMHG | OXYGEN SATURATION: 96 % | SYSTOLIC BLOOD PRESSURE: 128 MMHG

## 2021-05-19 DIAGNOSIS — Z20.822 CLOSE EXPOSURE TO COVID-19 VIRUS: Primary | ICD-10-CM

## 2021-05-19 DIAGNOSIS — M48.061 SPINAL STENOSIS OF LUMBAR REGION, UNSPECIFIED WHETHER NEUROGENIC CLAUDICATION PRESENT: ICD-10-CM

## 2021-05-19 DIAGNOSIS — R52 PAIN MANAGEMENT: ICD-10-CM

## 2021-05-19 DIAGNOSIS — J30.9 ALLERGIC RHINITIS, UNSPECIFIED SEASONALITY, UNSPECIFIED TRIGGER: ICD-10-CM

## 2021-05-19 DIAGNOSIS — M47.816 ARTHRITIS OF FACET JOINT OF LUMBAR SPINE: ICD-10-CM

## 2021-05-19 PROCEDURE — 99214 OFFICE O/P EST MOD 30 MIN: CPT | Performed by: FAMILY MEDICINE

## 2021-05-19 PROCEDURE — 97110 THERAPEUTIC EXERCISES: CPT | Performed by: PHYSICAL THERAPIST

## 2021-05-19 RX ORDER — FLUTICASONE PROPIONATE 50 MCG
2 SPRAY, SUSPENSION (ML) NASAL NIGHTLY
Qty: 3 BOTTLE | Refills: 0 | Status: SHIPPED | OUTPATIENT
Start: 2021-05-19 | End: 2021-08-16

## 2021-05-19 RX ORDER — HYDROCODONE BITARTRATE AND ACETAMINOPHEN 5; 325 MG/1; MG/1
1 TABLET ORAL EVERY 6 HOURS PRN
Qty: 30 TABLET | Refills: 0 | Status: SHIPPED | OUTPATIENT
Start: 2021-05-19 | End: 2021-05-27

## 2021-05-19 NOTE — PROGRESS NOTES
Dx: Spinal stenosis of lumbar region, unspecified whether neurogenic claudication present (M48.061)  Arthritis of facet joint of lumbar spine ([email or web address] (M46.96)           Authorized # of Visits:  8  Fall Risk: standard         Precautions: n/a             Subj 3 Forward step 6 inch  Step touch only 5 reps each without UE support  Split stance alternate arm raises (HEP)    hooklying hip abduction 10 reps each  SLR 10 reps each  Horizontal abduction green band in sitting 10 reps x 3 (HEP) Split stance thoracic rot Total Timed Treatment: 45 min  Total Treatment Time: 45 min

## 2021-05-19 NOTE — PROGRESS NOTES
HPI:   Dominic Hernandez is a 77year old female who presents with radicular lumbar pain, pain management and HTN     Pt is much better after starting PT   Pt is very happy with the therapists and the results  Does not want to return to pain specialist   Pt f administration details 1 mL 0   • Diclofenac Sodium (VOLTAREN) 1 % Transdermal Gel Apply 2 g topically 2 (two) times daily as needed. 150 g 0   • Multiple Vitamin (TAB-A-RIYA) Oral Tab Take 1 tablet by mouth daily.      • Cholecalciferol (VITAMIN D3) 50 MCG Osteoporosis    • Pain in joints    • Postmenopausal atrophic vaginitis    • [facility] 07[facility]2018    referring to pulm, possible Sjogren's involvement?    • Rheumatoid arthritis ([facility]    • Rheumatoid arthritis(714.0)    • Sicca syndrome ([facility] 2/ 1970        Quit date: 2007        Years since quittin.9      Smokeless tobacco: Never Used      Tobacco comment: quit in     Vaping Use      Vaping Use: Never used    Alcohol use:  Yes      Alcohol/week: 0.0 - 1.0 standard drinks      Co 0    3. Close exposure to COVID-19 virus  Check due to h/o immunocompromise   - SARS-COV-2 TOTAL ANTIBODY; Future    4.  Allergic rhinitis, unspecified seasonality, unspecified trigger    - Fluticasone Propionate 50 MCG/ACT Nasal Suspension; 2 sprays by Darrick

## 2021-05-21 ENCOUNTER — LAB ENCOUNTER (OUTPATIENT)
Dept: LAB | Age: 66
End: 2021-05-21
Attending: INTERNAL MEDICINE
Payer: MEDICARE

## 2021-05-21 DIAGNOSIS — Z20.822 CLOSE EXPOSURE TO COVID-19 VIRUS: ICD-10-CM

## 2021-05-21 PROCEDURE — 36415 COLL VENOUS BLD VENIPUNCTURE: CPT

## 2021-05-21 PROCEDURE — 86769 SARS-COV-2 COVID-19 ANTIBODY: CPT

## 2021-05-24 ENCOUNTER — OFFICE VISIT (OUTPATIENT)
Dept: PHYSICAL THERAPY | Age: 66
End: 2021-05-24
Attending: FAMILY MEDICINE
Payer: MEDICARE

## 2021-05-24 PROCEDURE — 97110 THERAPEUTIC EXERCISES: CPT | Performed by: PHYSICAL THERAPIST

## 2021-05-24 NOTE — PROGRESS NOTES
Dx: Spinal stenosis of lumbar region, unspecified whether neurogenic claudication present (M48.061)  Arthritis of facet joint of lumbar spine [facility] (M46.96)           Authorized # of Visits:  8  Fall Risk: standard         Precautions: n/a            Progr pain with left hip ROM - partially Met  · Patient will be able to squat without UE support - no pain - partially Met  · Independent with HEP - Met      Plan: Continue skilled Physical Therapy 2 x/week or a total of 4-6 visits over a 90 day period.  Treatmen Forward step 6 inch  Step touch only 5 reps each without UE support  Split stance alternate arm raises (HEP) Forward step bilateral same side reach shoulder level 10 reps each    hooklying hip abduction 10 reps each  SLR 10 reps each  Horizontal abduction plane  Bridge 20 reps  Bilateral shoulder pulldown isometric 10 reps seated  Bridge 20 reps    hooklying transverse plane isometric holds 15 reps each  Single leg stance with UE support right and left LE     Resisted forward walk with superband at waist in

## 2021-05-26 ENCOUNTER — TELEPHONE (OUTPATIENT)
Dept: PHYSICAL THERAPY | Age: 66
End: 2021-05-26

## 2021-05-26 ENCOUNTER — APPOINTMENT (OUTPATIENT)
Dept: PHYSICAL THERAPY | Age: 66
End: 2021-05-26
Attending: FAMILY MEDICINE
Payer: MEDICARE

## 2021-05-27 DIAGNOSIS — R52 PAIN MANAGEMENT: ICD-10-CM

## 2021-05-27 DIAGNOSIS — M47.816 ARTHRITIS OF FACET JOINT OF LUMBAR SPINE: ICD-10-CM

## 2021-05-27 DIAGNOSIS — M48.061 SPINAL STENOSIS OF LUMBAR REGION, UNSPECIFIED WHETHER NEUROGENIC CLAUDICATION PRESENT: ICD-10-CM

## 2021-05-27 RX ORDER — HYDROCODONE BITARTRATE AND ACETAMINOPHEN 5; 325 MG/1; MG/1
1 TABLET ORAL EVERY 6 HOURS PRN
Qty: 30 TABLET | Refills: 0 | Status: SHIPPED | OUTPATIENT
Start: 2021-05-27 | End: 2021-06-05

## 2021-06-01 ENCOUNTER — OFFICE VISIT (OUTPATIENT)
Dept: PHYSICAL THERAPY | Age: 66
End: 2021-06-01
Attending: FAMILY MEDICINE
Payer: MEDICARE

## 2021-06-01 PROCEDURE — 97110 THERAPEUTIC EXERCISES: CPT | Performed by: PHYSICAL THERAPIST

## 2021-06-01 NOTE — PROGRESS NOTES
Dx: Spinal stenosis of lumbar region, unspecified whether neurogenic claudication present (M48.061)  Arthritis of facet joint of lumbar spine [facility] (M46.96)           Authorized # of Visits:  14  Fall Risk: standard         Precautions: n/a             Sub (HEP) Forward step bilateral same side reach shoulder level 10 reps each  Bilateral UE support single leg stance 15 seconds x 3 each    hooklying hip abduction 10 reps each  SLR 10 reps each  Horizontal abduction green band in sitting 10 reps x 3 (HEP) Spl stabilization contact points pelvis and shoulders 20 each right and left sides Squats 10 reps x 2  (HEP)  Perturbations with superband parallel stance and split stance transverse plane  Bridge 20 reps  Bilateral shoulder pulldown isometric 10 reps seated

## 2021-06-04 DIAGNOSIS — M48.061 SPINAL STENOSIS OF LUMBAR REGION, UNSPECIFIED WHETHER NEUROGENIC CLAUDICATION PRESENT: ICD-10-CM

## 2021-06-04 DIAGNOSIS — M47.816 ARTHRITIS OF FACET JOINT OF LUMBAR SPINE: ICD-10-CM

## 2021-06-04 DIAGNOSIS — R52 PAIN MANAGEMENT: ICD-10-CM

## 2021-06-05 RX ORDER — HYDROCODONE BITARTRATE AND ACETAMINOPHEN 5; 325 MG/1; MG/1
1 TABLET ORAL EVERY 6 HOURS PRN
Qty: 30 TABLET | Refills: 0 | Status: SHIPPED | OUTPATIENT
Start: 2021-06-05 | End: 2021-06-15

## 2021-06-09 ENCOUNTER — APPOINTMENT (OUTPATIENT)
Dept: PHYSICAL THERAPY | Age: 66
End: 2021-06-09
Attending: FAMILY MEDICINE
Payer: MEDICARE

## 2021-06-09 ENCOUNTER — OFFICE VISIT (OUTPATIENT)
Dept: PHYSICAL THERAPY | Age: 66
End: 2021-06-09
Attending: FAMILY MEDICINE
Payer: MEDICARE

## 2021-06-09 PROCEDURE — 97110 THERAPEUTIC EXERCISES: CPT | Performed by: PHYSICAL THERAPIST

## 2021-06-09 NOTE — PROGRESS NOTES
Dx: Spinal stenosis of lumbar region, unspecified whether neurogenic claudication present (M48.061)  Arthritis [facility] facet [facility] lumbar [facility] (M46.96)           Authorized # of Visits:  14  Fall Risk: standard         Precautions: n/a             Sub arm raises (HEP) Forward step bilateral same side reach shoulder level 10 reps each  Bilateral UE support single leg stance 15 seconds x 3 each  Single leg stance with UE support - no pelvic drop with stance right LE (HEP)   hooklying hip abduction 10 reps fallout 10 reps bilat, 10 reps right and left LE Seated transverse plane core training through UE isometrics 10 reps x 2  Rhythmic stabilization contact points shoulders and pelvis in SL 12 reps each  Single knee to chest (HEP) Lateral step with opposite r

## 2021-06-10 ENCOUNTER — OFFICE VISIT (OUTPATIENT)
Dept: RHEUMATOLOGY | Facility: CLINIC | Age: 66
End: 2021-06-10
Payer: MEDICARE

## 2021-06-10 VITALS
WEIGHT: 242 LBS | RESPIRATION RATE: 18 BRPM | HEART RATE: 80 BPM | BODY MASS INDEX: 41.32 KG/M2 | HEIGHT: 64 IN | SYSTOLIC BLOOD PRESSURE: 132 MMHG | DIASTOLIC BLOOD PRESSURE: 84 MMHG | TEMPERATURE: 98 F

## 2021-06-10 DIAGNOSIS — Z51.81 THERAPEUTIC DRUG MONITORING: ICD-10-CM

## 2021-06-10 DIAGNOSIS — M32.19 OTHER SYSTEMIC LUPUS ERYTHEMATOSUS WITH OTHER ORGAN INVOLVEMENT (HCC): ICD-10-CM

## 2021-06-10 DIAGNOSIS — J84.9 ILD (INTERSTITIAL LUNG DISEASE) (HCC): ICD-10-CM

## 2021-06-10 DIAGNOSIS — R68.2 DRY MOUTH: ICD-10-CM

## 2021-06-10 DIAGNOSIS — M35.00 SJOGREN'S SYNDROME WITHOUT EXTRAGLANDULAR INVOLVEMENT (HCC): ICD-10-CM

## 2021-06-10 DIAGNOSIS — M79.7 FIBROMYALGIA: ICD-10-CM

## 2021-06-10 DIAGNOSIS — M06.9 RHEUMATOID ARTHRITIS INVOLVING BOTH HANDS, UNSPECIFIED WHETHER RHEUMATOID FACTOR PRESENT (HCC): Primary | ICD-10-CM

## 2021-06-10 PROCEDURE — 99214 OFFICE O/P EST MOD 30 MIN: CPT | Performed by: INTERNAL MEDICINE

## 2021-06-10 RX ORDER — PREGABALIN 75 MG/1
CAPSULE ORAL
Qty: 270 CAPSULE | Refills: 1 | Status: SHIPPED | OUTPATIENT
Start: 2021-06-10 | End: 2021-09-24

## 2021-06-10 RX ORDER — HYDROXYCHLOROQUINE SULFATE 200 MG/1
200 TABLET, FILM COATED ORAL 2 TIMES DAILY
Qty: 180 TABLET | Refills: 1 | Status: SHIPPED | OUTPATIENT
Start: 2021-06-10 | End: 2021-09-24

## 2021-06-10 RX ORDER — CEVIMELINE HYDROCHLORIDE 30 MG/1
30 CAPSULE ORAL 3 TIMES DAILY
Qty: 270 CAPSULE | Refills: 2 | Status: SHIPPED | OUTPATIENT
Start: 2021-06-10 | End: 2021-09-24

## 2021-06-10 RX ORDER — LEFLUNOMIDE 20 MG/1
20 TABLET ORAL DAILY
Qty: 90 TABLET | Refills: 0 | Status: SHIPPED | OUTPATIENT
Start: 2021-06-10 | End: 2021-09-24

## 2021-06-10 NOTE — PROGRESS NOTES
Rheumatology Follow-up Note  =====================================================================================================      Date of visit: 6/10/2021  ? Patient presents with:  SLE: 3 month f/u of dr. Marian Young pt. Feeling fatigued.  Starting t 2  leflunomide 20 MG Oral Tab, Take 1 tablet (20 mg total) by mouth daily. , Disp: 90 tablet, Rfl: 0  Hydroxychloroquine Sulfate 200 MG Oral Tab, Take 1 tablet (200 mg total) by mouth 2 (two) times daily. , Disp: 180 tablet, Rfl: 1  pregabalin 75 MG Oral Cap Disp: 1 each, Rfl: 1      Modified Medications    Modified Medication Previous Medication    CEVIMELINE HCL 30 MG ORAL CAP Cevimeline HCl 30 MG Oral Cap       Take 1 capsule (30 mg total) by mouth 3 (three) times daily.     Take 1 capsule (30 mg total) by m carpometacarpal osteoarthritis 7/30/2012   • Lupus ([facility]    • Macular degeneration 12/07/10    early signs, bilaterally, remained stable   • Osteoarthritis     left thumb, severe   • Osteomyelitis of right foot ([facility] 01/2018    [place] ID, Dr. Frank Saavedra   • Ely Jose Sister    • Asthma Sister    • Breast Cancer Self 46   • DCIS Self    • No Known Problems Son    • No Known Problems Brother      Social History:  Social History    Tobacco Use      Smoking status: Former Smoker        Packs/day: 1.00        Years: 37.00 04/22/2021    MPV 11.0 (H) 09/16/2011    MCV 94.7 04/22/2021    MCH 30.6 04/22/2021    MCHC 32.4 04/22/2021    RDW 14.8 04/22/2021    NEPRELIM 2.85 04/22/2021    NEPERCENT 62.7 04/22/2021    LYPERCENT 26.9 04/22/2021    MOPERCENT 8.4 04/22/2021    EOPERCEN elevated     09/2019  C3 and C4 normal  ESR and CRP normal  dsDNA negative     02/2009  GOSIA by IFA negative  LUISA panel negative     11/07 (per prior rheum chart)  Gosia +  DsDNA 275 (rr 0-99)  Ssa 947  Ssb 727  Histone 173  Epperson, rnp, scl 70, jo1 neg  Ccp, ([facility]  Sjogren's syndrome without extraglandular involvement ([facility]  Fibromyalgia  Dry mouth    In terms of overlap RA and SLE, patient appears to be doing well from arthritis standpoint. Some lethargy in the morning.   Dyspnea on exertion subjectively wors DIFFERENTIAL WITH PLATELET; Future  -     C-REACTIVE PROTEIN; Future  -     SED RATE, WESTERGREN (AUTOMATED); Future    Other systemic lupus erythematosus with other organ involvement (HCC)  -     leflunomide 20 MG Oral Tab;  Take 1 tablet (20 mg total) by

## 2021-06-10 NOTE — PATIENT INSTRUCTIONS
1) Get bloodwork in mid/end of July  2) Make sure to come back for prolia in Sept  3) schedule CT Chest. Max Rodriguez scheduling line to set the appointment up. Phone number is [phone].  4) morning dose of Lyrica continue nightly 150 mg of Lyrica.   Cont
Yes

## 2021-06-15 DIAGNOSIS — M48.061 SPINAL STENOSIS OF LUMBAR REGION, UNSPECIFIED WHETHER NEUROGENIC CLAUDICATION PRESENT: ICD-10-CM

## 2021-06-15 DIAGNOSIS — R52 PAIN MANAGEMENT: ICD-10-CM

## 2021-06-15 DIAGNOSIS — M47.816 ARTHRITIS OF FACET JOINT OF LUMBAR SPINE: ICD-10-CM

## 2021-06-15 RX ORDER — HYDROCODONE BITARTRATE AND ACETAMINOPHEN 5; 325 MG/1; MG/1
1 TABLET ORAL EVERY 6 HOURS PRN
Qty: 30 TABLET | Refills: 0 | Status: SHIPPED | OUTPATIENT
Start: 2021-06-15 | End: 2021-06-23

## 2021-06-15 NOTE — TELEPHONE ENCOUNTER
HYDROcodone-acetaminophen (NORCO) 5-325 MG Oral Tab    [facility] [place] [address] [address] [facility]  & , [phone], [phone]    Patient said she is getting better, taking less but still needs them.

## 2021-06-16 ENCOUNTER — OFFICE VISIT (OUTPATIENT)
Dept: PHYSICAL THERAPY | Age: 66
End: 2021-06-16
Attending: FAMILY MEDICINE
Payer: MEDICARE

## 2021-06-16 PROCEDURE — 97110 THERAPEUTIC EXERCISES: CPT | Performed by: PHYSICAL THERAPIST

## 2021-06-16 NOTE — PROGRESS NOTES
Dx: Spinal stenosis of lumbar region, unspecified whether neurogenic claudication present (M48.061)  Arthritis of facet joint of lumbar spine ([facility] (M46.96)           Authorized # of Visits:  14  Fall Risk: standard         Precautions: n/a             Sub reps each  Wall push ups - 10 reps x 2 (HEP) TRX squats 10 reps x 3 Forward step 6 inch  Step touch only 5 reps each without UE support  Split stance alternate arm raises (HEP) Forward step bilateral same side reach shoulder level 10 reps each  Bilateral U reach knee level opposite arm support 5 reps each (HEP) Forward step with same side reach 10 reps each  Bent knee fallouts - right no pain, left pain at lateral leg - mild improvement with abdominal bracing    Standing split stance alternate arm swings (HE

## 2021-06-18 ENCOUNTER — LAB ENCOUNTER (OUTPATIENT)
Dept: LAB | Age: 66
End: 2021-06-18
Attending: INTERNAL MEDICINE
Payer: MEDICARE

## 2021-06-18 DIAGNOSIS — M06.9 RHEUMATOID ARTHRITIS INVOLVING BOTH HANDS, UNSPECIFIED WHETHER RHEUMATOID FACTOR PRESENT (HCC): ICD-10-CM

## 2021-06-18 DIAGNOSIS — Z51.81 THERAPEUTIC DRUG MONITORING: ICD-10-CM

## 2021-06-18 DIAGNOSIS — J84.9 ILD (INTERSTITIAL LUNG DISEASE) (HCC): ICD-10-CM

## 2021-06-18 DIAGNOSIS — M32.9 SYSTEMIC LUPUS ERYTHEMATOSUS, UNSPECIFIED SLE TYPE, UNSPECIFIED ORGAN INVOLVEMENT STATUS (HCC): ICD-10-CM

## 2021-06-18 DIAGNOSIS — I10 ESSENTIAL HYPERTENSION: ICD-10-CM

## 2021-06-18 DIAGNOSIS — M35.09 SJOGREN'S SYNDROME WITH OTHER ORGAN INVOLVEMENT (HCC): ICD-10-CM

## 2021-06-18 DIAGNOSIS — N17.9 AKI (ACUTE KIDNEY INJURY) (HCC): ICD-10-CM

## 2021-06-18 DIAGNOSIS — D84.9 IMMUNOSUPPRESSED STATUS (HCC): ICD-10-CM

## 2021-06-18 DIAGNOSIS — I95.2 HYPOTENSION DUE TO DRUGS: ICD-10-CM

## 2021-06-18 PROCEDURE — 81003 URINALYSIS AUTO W/O SCOPE: CPT

## 2021-06-18 PROCEDURE — 85025 COMPLETE CBC W/AUTO DIFF WBC: CPT

## 2021-06-18 PROCEDURE — 36415 COLL VENOUS BLD VENIPUNCTURE: CPT

## 2021-06-18 PROCEDURE — 80053 COMPREHEN METABOLIC PANEL: CPT

## 2021-06-18 PROCEDURE — 86140 C-REACTIVE PROTEIN: CPT

## 2021-06-18 PROCEDURE — 82570 ASSAY OF URINE CREATININE: CPT

## 2021-06-18 PROCEDURE — 84156 ASSAY OF PROTEIN URINE: CPT

## 2021-06-18 PROCEDURE — 86160 COMPLEMENT ANTIGEN: CPT

## 2021-06-18 PROCEDURE — 82043 UR ALBUMIN QUANTITATIVE: CPT

## 2021-06-18 PROCEDURE — 86256 FLUORESCENT ANTIBODY TITER: CPT

## 2021-06-18 PROCEDURE — 85652 RBC SED RATE AUTOMATED: CPT

## 2021-06-18 PROCEDURE — 84100 ASSAY OF PHOSPHORUS: CPT

## 2021-06-23 ENCOUNTER — OFFICE VISIT (OUTPATIENT)
Dept: PHYSICAL THERAPY | Age: 66
End: 2021-06-23
Attending: FAMILY MEDICINE
Payer: MEDICARE

## 2021-06-23 DIAGNOSIS — R52 PAIN MANAGEMENT: ICD-10-CM

## 2021-06-23 DIAGNOSIS — M48.061 SPINAL STENOSIS OF LUMBAR REGION, UNSPECIFIED WHETHER NEUROGENIC CLAUDICATION PRESENT: ICD-10-CM

## 2021-06-23 DIAGNOSIS — M47.816 ARTHRITIS OF FACET JOINT OF LUMBAR SPINE: ICD-10-CM

## 2021-06-23 PROCEDURE — 97110 THERAPEUTIC EXERCISES: CPT | Performed by: PHYSICAL THERAPIST

## 2021-06-23 PROCEDURE — 97112 NEUROMUSCULAR REEDUCATION: CPT | Performed by: PHYSICAL THERAPIST

## 2021-06-23 RX ORDER — HYDROCODONE BITARTRATE AND ACETAMINOPHEN 5; 325 MG/1; MG/1
1 TABLET ORAL EVERY 6 HOURS PRN
Qty: 30 TABLET | Refills: 0 | Status: SHIPPED | OUTPATIENT
Start: 2021-06-23 | End: 2021-07-01

## 2021-06-23 NOTE — TELEPHONE ENCOUNTER
Dr. Ivon Quiles,  Patient requesting refill of Norco.  Last refill  06/15/21 #30  Last office visit  5/19/21    Medication pended

## 2021-06-23 NOTE — PROGRESS NOTES
Dx: Spinal stenosis of lumbar region, unspecified whether neurogenic claudication present (M48.061)  Arthritis of facet joint of lumbar spine [facility] (M46.96)           Authorized # of Visits:  14  Fall Risk: standard         Precautions: n/a             Sub with UE support - no pelvic drop with stance right LE (HEP) Split stance transverse plane isometrics though UE 12 reps each  Neruo re-ed step matrix, step over 6 inch obstacle    Split stance thoracic rotation 10 reps each with SB UE support single leg sta seconds each  hookying transverse plane isometrics 12 reps each       Resisted forward walk with superband at waist in parallel bars     SL rhythmic stabilization contact points pelvis shoulders 12 each right and left SL        Charges: Neuro re-ed  2.  The

## 2021-06-24 ENCOUNTER — HOSPITAL ENCOUNTER (OUTPATIENT)
Dept: CT IMAGING | Age: 66
Discharge: HOME | End: 2021-06-24
Attending: INTERNAL MEDICINE
Payer: MEDICARE

## 2021-06-24 DIAGNOSIS — M06.9 RHEUMATOID ARTHRITIS INVOLVING BOTH HANDS, UNSPECIFIED WHETHER RHEUMATOID FACTOR PRESENT (HCC): ICD-10-CM

## 2021-06-24 DIAGNOSIS — Z51.81 THERAPEUTIC DRUG MONITORING: ICD-10-CM

## 2021-06-24 DIAGNOSIS — J84.9 ILD (INTERSTITIAL LUNG DISEASE) (HCC): ICD-10-CM

## 2021-06-24 PROCEDURE — 71250 CT THORAX DX C-: CPT | Performed by: INTERNAL MEDICINE

## 2021-06-25 ENCOUNTER — TELEPHONE (OUTPATIENT)
Dept: RHEUMATOLOGY | Facility: CLINIC | Age: 66
End: 2021-06-25

## 2021-06-25 NOTE — TELEPHONE ENCOUNTER
Called pt, ILD is minimal, and only minimally worse than before. Chronic compression fractures are stable.

## 2021-06-30 ENCOUNTER — OFFICE VISIT (OUTPATIENT)
Dept: PHYSICAL THERAPY | Age: 66
End: 2021-06-30
Attending: FAMILY MEDICINE
Payer: MEDICARE

## 2021-06-30 PROCEDURE — 97110 THERAPEUTIC EXERCISES: CPT | Performed by: PHYSICAL THERAPIST

## 2021-06-30 NOTE — PROGRESS NOTES
Dx: Spinal stenosis of lumbar region, unspecified whether neurogenic claudication present (M48.061)  Arthritis [facility] facet [facility] (M46.96)           Authorized # of Visits:  14  Fall Risk: standard         Precautions: n/a             Sub reps x 3 Forward step 6 inch  Step touch only 5 reps each without UE support  Split stance alternate arm raises (HEP) Forward step bilateral same side reach shoulder level 10 reps each  Bilateral UE support single leg stance 15 seconds x 3 each  Single leg knee fallout 10 reps bilat, 10 reps right and left LE Seated transverse plane core training through UE isometrics 10 reps x 2  Rhythmic stabilization contact points shoulders and pelvis in SL 12 reps each  Single knee to chest (HEP) Lateral step with oppos

## 2021-07-01 ENCOUNTER — PATIENT MESSAGE (OUTPATIENT)
Dept: FAMILY MEDICINE CLINIC | Facility: CLINIC | Age: 66
End: 2021-07-01

## 2021-07-01 DIAGNOSIS — R52 PAIN MANAGEMENT: ICD-10-CM

## 2021-07-01 DIAGNOSIS — M47.816 ARTHRITIS OF FACET JOINT OF LUMBAR SPINE: ICD-10-CM

## 2021-07-01 DIAGNOSIS — M48.061 SPINAL STENOSIS OF LUMBAR REGION, UNSPECIFIED WHETHER NEUROGENIC CLAUDICATION PRESENT: ICD-10-CM

## 2021-07-01 RX ORDER — HYDROCODONE BITARTRATE AND ACETAMINOPHEN 5; 325 MG/1; MG/1
1 TABLET ORAL EVERY 6 HOURS PRN
Qty: 30 TABLET | Refills: 0 | Status: SHIPPED | OUTPATIENT
Start: 2021-07-01 | End: 2021-07-08

## 2021-07-01 NOTE — TELEPHONE ENCOUNTER
From: Rhonda Baer  To: Arnel Bullard DO  Sent: 7/1/2021 9:21 AM CDT  Subject: Prescription Question    Could you please renew my subscription for [address] at [place], [address]. I am continuing to go to physical therapy with some results.  I'm havi

## 2021-07-08 ENCOUNTER — PATIENT MESSAGE (OUTPATIENT)
Dept: FAMILY MEDICINE CLINIC | Facility: CLINIC | Age: 66
End: 2021-07-08

## 2021-07-08 DIAGNOSIS — M47.816 ARTHRITIS OF FACET JOINT OF LUMBAR SPINE: ICD-10-CM

## 2021-07-08 DIAGNOSIS — M48.061 SPINAL STENOSIS OF LUMBAR REGION, UNSPECIFIED WHETHER NEUROGENIC CLAUDICATION PRESENT: ICD-10-CM

## 2021-07-08 DIAGNOSIS — R52 PAIN MANAGEMENT: ICD-10-CM

## 2021-07-08 RX ORDER — HYDROCODONE BITARTRATE AND ACETAMINOPHEN 5; 325 MG/1; MG/1
1 TABLET ORAL EVERY 6 HOURS PRN
Qty: 30 TABLET | Refills: 0 | Status: SHIPPED | OUTPATIENT
Start: 2021-07-08 | End: 2021-07-09

## 2021-07-08 NOTE — TELEPHONE ENCOUNTER
Approve/deny ? Last filled 7/1/21  Pt taking 3-4 per day, wants to be sure she has enough for weekend.

## 2021-07-08 NOTE — TELEPHONE ENCOUNTER
From: Gladys Andrews  To: Ayla Baptiste DO  Sent: 7/8/2021 5:31 AM CDT  Subject: Prescription Question    Please renew my subscription for [facility] at [place].  I continue to experience back pain but am gaining my strength back and balance has improved with th

## 2021-07-09 ENCOUNTER — PATIENT MESSAGE (OUTPATIENT)
Dept: FAMILY MEDICINE CLINIC | Facility: CLINIC | Age: 66
End: 2021-07-09

## 2021-07-09 DIAGNOSIS — R52 PAIN MANAGEMENT: ICD-10-CM

## 2021-07-09 DIAGNOSIS — M48.061 SPINAL STENOSIS OF LUMBAR REGION, UNSPECIFIED WHETHER NEUROGENIC CLAUDICATION PRESENT: ICD-10-CM

## 2021-07-09 DIAGNOSIS — M47.816 ARTHRITIS OF FACET JOINT OF LUMBAR SPINE: ICD-10-CM

## 2021-07-09 RX ORDER — HYDROCODONE BITARTRATE AND ACETAMINOPHEN 5; 325 MG/1; MG/1
1 TABLET ORAL EVERY 6 HOURS PRN
Qty: 30 TABLET | Refills: 0 | Status: SHIPPED | OUTPATIENT
Start: 2021-07-09 | End: 2021-07-30

## 2021-07-09 NOTE — TELEPHONE ENCOUNTER
Rx was sent to mail order, She needs it sent it her local pharmacy. It will be 10 days before she receives her Rx.

## 2021-07-09 NOTE — TELEPHONE ENCOUNTER
From: Pham Griggs  To: Diane Salter DO  Sent: 7/9/2021 6:01 AM CDT  Subject: Prescription Question    Could Dr. José Miguel Ramirez please call my prescription into [facility] on [address]. I thank Dr. José Miguel Ramirez for renewing my prescription but she renewed it

## 2021-07-12 ENCOUNTER — OFFICE VISIT (OUTPATIENT)
Dept: PHYSICAL THERAPY | Age: 66
End: 2021-07-12
Attending: FAMILY MEDICINE
Payer: MEDICARE

## 2021-07-12 PROCEDURE — 97110 THERAPEUTIC EXERCISES: CPT | Performed by: PHYSICAL THERAPIST

## 2021-07-12 NOTE — PROGRESS NOTES
Dx: Spinal stenosis of lumbar region, unspecified whether neurogenic claudication present (M48.061)  Arthritis of facet joint of lumbar spine ([facility] (M46.96)           Authorized # of Visits:  14  Fall Risk: standard         Precautions: n/a             Sub LE (HEP) Split stance transverse plane isometrics though UE 12 reps each  Neruo re-ed step matrix, step over 6 inch obstacle  Shuttle 5 bands squats 25 reps  Tandem stand (HEP)   Lateral step with opposite side reach 10 reps each Lateral step up/down 6 inc min

## 2021-07-14 ENCOUNTER — OFFICE VISIT (OUTPATIENT)
Dept: PHYSICAL THERAPY | Age: 66
End: 2021-07-14
Attending: FAMILY MEDICINE
Payer: MEDICARE

## 2021-07-14 PROCEDURE — 97110 THERAPEUTIC EXERCISES: CPT | Performed by: PHYSICAL THERAPIST

## 2021-07-14 NOTE — PROGRESS NOTES
Dx: Spinal stenosis of lumbar region, unspecified whether neurogenic claudication present (M48.061)  Arth[facility] (M46.96)           Authorized # of Visits:  14  Fall Risk: standard         Precautions: n/a            Disc patient has a comprehensive HEP. She appears motivated to continued with exercises.      Goals: (to be met in 8 visits)   · Patient will report improved ability to perform household chores without increased pain - partially Met  · No lumbar pain with left h (HEP) Rhythmic stabilization contact points pelvis, shoulders, pelvis/shoulders    Lateral step with opposite side reach 10 reps each Lateral step up/down 6 inch 5 reps x 5 each  Shoulder flexion on wall (HEP) Squat with bilateral EU support 10 reps   Rhyt min

## 2021-07-19 ENCOUNTER — APPOINTMENT (OUTPATIENT)
Dept: PHYSICAL THERAPY | Age: 66
End: 2021-07-19
Attending: FAMILY MEDICINE
Payer: MEDICARE

## 2021-07-21 ENCOUNTER — APPOINTMENT (OUTPATIENT)
Dept: PHYSICAL THERAPY | Age: 66
End: 2021-07-21
Attending: FAMILY MEDICINE
Payer: MEDICARE

## 2021-07-29 ENCOUNTER — PATIENT MESSAGE (OUTPATIENT)
Dept: FAMILY MEDICINE CLINIC | Facility: CLINIC | Age: 66
End: 2021-07-29

## 2021-07-29 DIAGNOSIS — R52 PAIN MANAGEMENT: ICD-10-CM

## 2021-07-29 DIAGNOSIS — M48.061 SPINAL STENOSIS OF LUMBAR REGION, UNSPECIFIED WHETHER NEUROGENIC CLAUDICATION PRESENT: ICD-10-CM

## 2021-07-29 DIAGNOSIS — M47.816 ARTHRITIS OF FACET JOINT OF LUMBAR SPINE: ICD-10-CM

## 2021-07-30 RX ORDER — HYDROCODONE BITARTRATE AND ACETAMINOPHEN 5; 325 MG/1; MG/1
1 TABLET ORAL EVERY 6 HOURS PRN
Qty: 30 TABLET | Refills: 0 | Status: SHIPPED | OUTPATIENT
Start: 2021-07-30 | End: 2021-08-10

## 2021-07-30 NOTE — TELEPHONE ENCOUNTER
From: Ruth Barnhart  To: Marisel Minor DO  Sent: 7/29/2021 6:25 PM CDT  Subject: Prescription Question    I am writing to ask if Dr. Cherie Boogie will call in a renewal for my prescription for [place] 5mg to [facility] on [address].     Two weeks

## 2021-08-09 ENCOUNTER — PATIENT MESSAGE (OUTPATIENT)
Dept: FAMILY MEDICINE CLINIC | Facility: CLINIC | Age: 66
End: 2021-08-09

## 2021-08-09 DIAGNOSIS — M47.816 ARTHRITIS OF FACET JOINT OF LUMBAR SPINE: ICD-10-CM

## 2021-08-09 DIAGNOSIS — R52 PAIN MANAGEMENT: ICD-10-CM

## 2021-08-09 DIAGNOSIS — M48.061 SPINAL STENOSIS OF LUMBAR REGION, UNSPECIFIED WHETHER NEUROGENIC CLAUDICATION PRESENT: ICD-10-CM

## 2021-08-09 NOTE — TELEPHONE ENCOUNTER
From: Lee Ann Sagastume  To: Rhett Potts DO  Sent: 8/9/2021 6:49 AM CDT  Subject: Prescription Question    Would you please renew my [place] at [place] on [address]. Thank you.

## 2021-08-10 RX ORDER — HYDROCODONE BITARTRATE AND ACETAMINOPHEN 5; 325 MG/1; MG/1
1 TABLET ORAL EVERY 6 HOURS PRN
Qty: 30 TABLET | Refills: 0 | Status: SHIPPED | OUTPATIENT
Start: 2021-08-10 | End: 2021-08-17

## 2021-08-14 DIAGNOSIS — J30.9 ALLERGIC RHINITIS, UNSPECIFIED SEASONALITY, UNSPECIFIED TRIGGER: ICD-10-CM

## 2021-08-16 RX ORDER — FLUTICASONE PROPIONATE 50 MCG
SPRAY, SUSPENSION (ML) NASAL
Qty: 48 G | Refills: 0 | Status: SHIPPED | OUTPATIENT
Start: 2021-08-16

## 2021-08-17 ENCOUNTER — OFFICE VISIT (OUTPATIENT)
Dept: FAMILY MEDICINE CLINIC | Facility: CLINIC | Age: 66
End: 2021-08-17
Payer: MEDICARE

## 2021-08-17 VITALS
SYSTOLIC BLOOD PRESSURE: 132 MMHG | WEIGHT: 240 LBS | DIASTOLIC BLOOD PRESSURE: 80 MMHG | BODY MASS INDEX: 40.97 KG/M2 | RESPIRATION RATE: 18 BRPM | HEART RATE: 62 BPM | TEMPERATURE: 98 F | HEIGHT: 64 IN | OXYGEN SATURATION: 92 %

## 2021-08-17 DIAGNOSIS — M47.816 ARTHRITIS OF FACET JOINT OF LUMBAR SPINE: ICD-10-CM

## 2021-08-17 DIAGNOSIS — I73.9 CLAUDICATION (HCC): Primary | ICD-10-CM

## 2021-08-17 DIAGNOSIS — Z20.822 CLOSE EXPOSURE TO COVID-19 VIRUS: ICD-10-CM

## 2021-08-17 DIAGNOSIS — M48.061 SPINAL STENOSIS OF LUMBAR REGION, UNSPECIFIED WHETHER NEUROGENIC CLAUDICATION PRESENT: ICD-10-CM

## 2021-08-17 DIAGNOSIS — R52 PAIN MANAGEMENT: ICD-10-CM

## 2021-08-17 DIAGNOSIS — M79.89 LEG SWELLING: ICD-10-CM

## 2021-08-17 PROCEDURE — 99214 OFFICE O/P EST MOD 30 MIN: CPT | Performed by: FAMILY MEDICINE

## 2021-08-17 RX ORDER — HYDROCODONE BITARTRATE AND ACETAMINOPHEN 5; 325 MG/1; MG/1
1 TABLET ORAL EVERY 6 HOURS PRN
Qty: 30 TABLET | Refills: 0 | Status: SHIPPED | OUTPATIENT
Start: 2021-08-17 | End: 2021-08-20

## 2021-08-17 NOTE — PROGRESS NOTES
HPI:   June Ulloa is a 74year old female who presents with radicular lumbar pain, pain management and HTN     PT helped the pain radiating into left leg  Pt will has lumbar pain   Strength and balance are better   Pt not crazy about being on norco   S Transdermal Gel Apply 2 g topically 2 (two) times daily as needed. 150 g 0   • Multiple Vitamin (TAB-A-RIYA) Oral Tab Take 1 tablet by mouth daily. • Cholecalciferol (VITAMIN D3) 50 MCG (2000 UT) Oral Tab Take 1 tablet by mouth daily.      • Calcium 600 Pulmonary fibrosis ([facility] 07/2018    referring to pulm, possible Sjogren's involvement?    • Rheumatoid arthritis ([facility]    • Rheumatoid arthritis(714.0)    • Sicca syndrome ([facility] 2/26/2009   • Sjogren's syndrome ([facility] 10/07   • Stress fracture of the metatar Smokeless tobacco: Never Used      Tobacco comment: quit in 2006    Vaping Use      Vaping Use: Never used    Alcohol use:  Yes      Alcohol/week: 0.0 - 1.0 standard drinks      Comment: Rare    Drug use: No       REVIEW OF SYSTEMS:   GENERAL: feels well ot 5-325 MG Oral Tab; Take 1 tablet by mouth every 6 (six) hours as needed for Pain. Dispense: 30 tablet; Refill: 0    3. Pain management    - PHYSIATRY - INTERNAL  - OP REFERRAL PAIN MANGEMENT  - HYDROcodone-acetaminophen (NORCO) 5-325 MG Oral Tab;  Take 1 t

## 2021-08-20 ENCOUNTER — LAB ENCOUNTER (OUTPATIENT)
Dept: LAB | Age: 66
End: 2021-08-20
Attending: FAMILY MEDICINE
Payer: MEDICARE

## 2021-08-20 DIAGNOSIS — M48.061 SPINAL STENOSIS OF LUMBAR REGION, UNSPECIFIED WHETHER NEUROGENIC CLAUDICATION PRESENT: ICD-10-CM

## 2021-08-20 DIAGNOSIS — Z20.822 CLOSE EXPOSURE TO COVID-19 VIRUS: ICD-10-CM

## 2021-08-20 DIAGNOSIS — R52 PAIN MANAGEMENT: ICD-10-CM

## 2021-08-20 DIAGNOSIS — M47.816 ARTHRITIS OF FACET JOINT OF LUMBAR SPINE: ICD-10-CM

## 2021-08-20 LAB — SARS-COV-2 IGG+IGM SERPL QL IA: REACTIVE

## 2021-08-20 PROCEDURE — 36415 COLL VENOUS BLD VENIPUNCTURE: CPT

## 2021-08-20 PROCEDURE — 86769 SARS-COV-2 COVID-19 ANTIBODY: CPT

## 2021-08-20 RX ORDER — HYDROCODONE BITARTRATE AND ACETAMINOPHEN 5; 325 MG/1; MG/1
1 TABLET ORAL EVERY 6 HOURS PRN
Qty: 30 TABLET | Refills: 0 | Status: SHIPPED | OUTPATIENT
Start: 2021-08-20 | End: 2021-08-30

## 2021-08-20 NOTE — TELEPHONE ENCOUNTER
Last refill:  HYDROcodone-acetaminophen (NORCO) 5-325 MG Oral Tab 30 tablet 0 8/17/2021    Sig:   Take 1 tablet by mouth every 6 (six) hours as needed for Pain       Last OV: 8/17/21.     Approve/deny:

## 2021-08-30 ENCOUNTER — PATIENT MESSAGE (OUTPATIENT)
Dept: FAMILY MEDICINE CLINIC | Facility: CLINIC | Age: 66
End: 2021-08-30

## 2021-08-30 DIAGNOSIS — M48.061 SPINAL STENOSIS OF LUMBAR REGION, UNSPECIFIED WHETHER NEUROGENIC CLAUDICATION PRESENT: ICD-10-CM

## 2021-08-30 DIAGNOSIS — M47.816 ARTHRITIS OF FACET JOINT OF LUMBAR SPINE: ICD-10-CM

## 2021-08-30 DIAGNOSIS — R52 PAIN MANAGEMENT: ICD-10-CM

## 2021-08-30 RX ORDER — HYDROCODONE BITARTRATE AND ACETAMINOPHEN 5; 325 MG/1; MG/1
1 TABLET ORAL EVERY 6 HOURS PRN
Qty: 30 TABLET | Refills: 0 | Status: SHIPPED | OUTPATIENT
Start: 2021-08-30 | End: 2021-09-08

## 2021-08-30 NOTE — TELEPHONE ENCOUNTER
From: Tanmay Medeiros  To: Narcisa Nicole DO  Sent: 8/30/2021 11:20 AM CDT  Subject: Prescription Question    Hi Dr. Bel Rice,   Please renew my [place] prescription at the [facility] on [address]. Thanks and have a great day.

## 2021-09-02 ENCOUNTER — TELEPHONE (OUTPATIENT)
Dept: PAIN CLINIC | Facility: CLINIC | Age: 66
End: 2021-09-02

## 2021-09-02 ENCOUNTER — OFFICE VISIT (OUTPATIENT)
Dept: PAIN CLINIC | Facility: CLINIC | Age: 66
End: 2021-09-02
Payer: MEDICARE

## 2021-09-02 VITALS
HEART RATE: 68 BPM | DIASTOLIC BLOOD PRESSURE: 86 MMHG | BODY MASS INDEX: 40.97 KG/M2 | WEIGHT: 240 LBS | RESPIRATION RATE: 16 BRPM | HEIGHT: 64 IN | SYSTOLIC BLOOD PRESSURE: 140 MMHG

## 2021-09-02 DIAGNOSIS — F11.90 CHRONIC NARCOTIC USE: ICD-10-CM

## 2021-09-02 DIAGNOSIS — M71.38 SYNOVIAL CYST OF LUMBAR FACET JOINT: Primary | ICD-10-CM

## 2021-09-02 DIAGNOSIS — M54.16 LUMBAR RADICULITIS: Primary | ICD-10-CM

## 2021-09-02 DIAGNOSIS — M54.16 LUMBAR RADICULITIS: ICD-10-CM

## 2021-09-02 PROCEDURE — 99215 OFFICE O/P EST HI 40 MIN: CPT | Performed by: ANESTHESIOLOGY

## 2021-09-02 RX ORDER — CEFADROXIL 500 MG/1
500 CAPSULE ORAL 2 TIMES DAILY
COMMUNITY
Start: 2021-08-10

## 2021-09-02 NOTE — TELEPHONE ENCOUNTER
Patient scheduled for procedure, pre-procedure instructions reviewed. Patient prefers local sedation. Reviewed sedation instructions including no need to fast and no  required. Patient advised to hold Omega 3 for 5 prior to procedure.  Patient verbal Aggrenox 10 days   • Agrylin (Anagrelide) 10 days  • Brilinta (Ticagrelor) 7 days  • Imbruvica (Ibrutinib) 3 days   • Enbrel (Etanercept) 24 hours   • Fragmin (Dalteparin) 24 hours   • Pletal (Cilostazol) 7 days  • Effient (Prasugrel) 7 days  • Pradaxa 10 diabetic, please increase the frequency of your glucose monitoring after the procedure as this may cause a temporary increase in your blood sugar. Contact your primary care physician if your blood sugar rises as you may require some medication adjustment.

## 2021-09-02 NOTE — PROGRESS NOTES
Patient presents in office today with reported pain in Low back pain radiating down left leg    Current pain level reported = 8/10    Last reported dose of 6 am Norco      Narcotic Contract renewal     Urine Drug screen

## 2021-09-08 ENCOUNTER — PATIENT MESSAGE (OUTPATIENT)
Dept: FAMILY MEDICINE CLINIC | Facility: CLINIC | Age: 66
End: 2021-09-08

## 2021-09-08 DIAGNOSIS — R52 PAIN MANAGEMENT: ICD-10-CM

## 2021-09-08 DIAGNOSIS — M48.061 SPINAL STENOSIS OF LUMBAR REGION, UNSPECIFIED WHETHER NEUROGENIC CLAUDICATION PRESENT: ICD-10-CM

## 2021-09-08 DIAGNOSIS — M47.816 ARTHRITIS OF FACET JOINT OF LUMBAR SPINE: ICD-10-CM

## 2021-09-08 RX ORDER — HYDROCODONE BITARTRATE AND ACETAMINOPHEN 5; 325 MG/1; MG/1
1 TABLET ORAL EVERY 6 HOURS PRN
Qty: 30 TABLET | Refills: 0 | Status: SHIPPED | OUTPATIENT
Start: 2021-09-08 | End: 2021-09-17

## 2021-09-08 NOTE — TELEPHONE ENCOUNTER
LOV 8/17/21  Last Rx 8/30/21 #30 + 0     Pt would like refill of Norco. Please advise. Pended if agreeable.

## 2021-09-08 NOTE — TELEPHONE ENCOUNTER
From: Josie Arrington  To: Jackson Coffman DO  Sent: 9/8/2021 9:01 AM CDT  Subject: Prescription Question    Hi Dr. Kassidy High. ..would you please renew my prescription for [place] at [place] on [address].  I seen Dr. Luis Armando Rosenthal last week who is going to perform

## 2021-09-16 ENCOUNTER — TELEPHONE (OUTPATIENT)
Dept: RHEUMATOLOGY | Facility: CLINIC | Age: 66
End: 2021-09-16

## 2021-09-16 NOTE — TELEPHONE ENCOUNTER
Phoned pt, upcoming appt for prolia. Reminder call to have blood work completed. PT will be having steroid injection from Dr. Blanca Sullivan on Monday. Would like to make sure this would not interfere with the [address] [facility], [address].

## 2021-09-17 ENCOUNTER — PATIENT MESSAGE (OUTPATIENT)
Dept: FAMILY MEDICINE CLINIC | Facility: CLINIC | Age: 66
End: 2021-09-17

## 2021-09-17 DIAGNOSIS — R52 PAIN MANAGEMENT: ICD-10-CM

## 2021-09-17 DIAGNOSIS — M47.816 ARTHRITIS OF FACET JOINT OF LUMBAR SPINE: ICD-10-CM

## 2021-09-17 DIAGNOSIS — M48.061 SPINAL STENOSIS OF LUMBAR REGION, UNSPECIFIED WHETHER NEUROGENIC CLAUDICATION PRESENT: ICD-10-CM

## 2021-09-17 RX ORDER — HYDROCODONE BITARTRATE AND ACETAMINOPHEN 5; 325 MG/1; MG/1
1 TABLET ORAL EVERY 6 HOURS PRN
Qty: 30 TABLET | Refills: 0 | Status: SHIPPED | OUTPATIENT
Start: 2021-09-17

## 2021-09-17 NOTE — PROGRESS NOTES
Name: Zion Berman   : 1955   DOS: 2020     Follow-up visit note:  Zion Berman is a 74year old female complaining of  pain in the lower back for 4 years. Pain started after she lifted her  who is paralyzed.  Pain is sharp shooting in n History of bone density study 10/08/12   • Hypercalcemia     • IBS (irritable bowel syndrome)     • Inclusion cyst       epidermal   • Internal hemorrhoids 12/18/08     Grade II   • Irregular Z line of esophagus 12/18/08   • Lipid screening 03/30/12   • Lo EVERY PM Disp: 270 capsule Rfl: 1   Hydroxychloroquine Sulfate 200 MG Oral Tab Take 1 tablet (200 mg total) by mouth 2 (two) times daily.  Disp: 180 tablet Rfl: 1   RaNITidine HCl (ZANTAC) 150 MG Oral Tab Take 1 tablet (150 mg total) by mouth 2 (two) times HISTORY      Comment: Shave biopsy, skin, right shoulder  9/11/12: OTHER SURGICAL HISTORY      Comment: right carpal tunnel surgery  9/25/12: OTHER SURGICAL HISTORY      Comment: left carpal tunnel surgery  1/11/13: OTHER SURGICAL HISTORY      Comment: tri Examination:                                           (R)                                 (L)  Deltoid:                                                                5                                     5  Biceps: N                                    N     Cardiovascular system: Regular rate and rhythm. S1, S2 normal. No murmurs. Respiratory system: Breath sounds equal bilaterally. No crackles, rhonchi.   Abdomen: Soft 1  Pathological Reflexes:                                     (R)                                 (L)               Babinski:                                   N                                  N               Gardner: L5-S1:  Diffuse disc bulge, endplate osteophyte and facet hypertrophy cause mild central canal and bilateral foraminal stenosis.        Assessment:  Lumbar facet cyst  Lumbar radiculitis  Lumbar facet arthropathy  Sacroiliitis        Plan: I had long disc

## 2021-09-17 NOTE — TELEPHONE ENCOUNTER
Last refill:  HYDROcodone-acetaminophen (NORCO) 5-325 MG Oral Tab 30 tablet 0 9/8/2021    Sig:   Take 1 tablet by mouth every 6 (six) hours as needed for Pain.      Last OV: 8.17.21  Approve/deny:

## 2021-09-17 NOTE — TELEPHONE ENCOUNTER
From: Ruth Barnhart  To: Marisel Minor DO  Sent: 9/17/2021 2:45 PM CDT  Subject: Maynor Hilario    Please renew my Norco prescription , I will run short by the end of the weekend. Thank you.

## 2021-09-20 ENCOUNTER — HOSPITAL ENCOUNTER (OUTPATIENT)
Facility: HOSPITAL | Age: 66
Setting detail: HOSPITAL OUTPATIENT SURGERY
Discharge: HOME | End: 2021-09-20
Attending: ANESTHESIOLOGY | Admitting: ANESTHESIOLOGY
Payer: MEDICARE

## 2021-09-20 ENCOUNTER — APPOINTMENT (OUTPATIENT)
Dept: GENERAL RADIOLOGY | Facility: HOSPITAL | Age: 66
End: 2021-09-20
Attending: ANESTHESIOLOGY
Payer: MEDICARE

## 2021-09-20 VITALS
OXYGEN SATURATION: 91 % | TEMPERATURE: 98 F | HEIGHT: 64 IN | BODY MASS INDEX: 40.46 KG/M2 | WEIGHT: 237 LBS | DIASTOLIC BLOOD PRESSURE: 90 MMHG | HEART RATE: 77 BPM | RESPIRATION RATE: 18 BRPM | SYSTOLIC BLOOD PRESSURE: 171 MMHG

## 2021-09-20 DIAGNOSIS — M54.16 LUMBAR RADICULITIS: ICD-10-CM

## 2021-09-20 DIAGNOSIS — M71.38 SYNOVIAL CYST OF LUMBAR FACET JOINT: ICD-10-CM

## 2021-09-20 PROCEDURE — 0S903ZZ DRAINAGE OF LUMBAR VERTEBRAL JOINT, PERCUTANEOUS APPROACH: ICD-10-PCS | Performed by: ANESTHESIOLOGY

## 2021-09-20 PROCEDURE — 3E0R3BZ INTRODUCTION OF ANESTHETIC AGENT INTO SPINAL CANAL, PERCUTANEOUS APPROACH: ICD-10-PCS | Performed by: ANESTHESIOLOGY

## 2021-09-20 PROCEDURE — 3E0R33Z INTRODUCTION OF ANTI-INFLAMMATORY INTO SPINAL CANAL, PERCUTANEOUS APPROACH: ICD-10-PCS | Performed by: ANESTHESIOLOGY

## 2021-09-20 RX ORDER — DEXAMETHASONE SODIUM PHOSPHATE 10 MG/ML
INJECTION, SOLUTION INTRAMUSCULAR; INTRAVENOUS
Status: DISCONTINUED | OUTPATIENT
Start: 2021-09-20 | End: 2021-09-20

## 2021-09-20 RX ORDER — ONDANSETRON 2 MG/ML
4 INJECTION INTRAMUSCULAR; INTRAVENOUS ONCE AS NEEDED
Status: CANCELLED | OUTPATIENT
Start: 2021-09-20 | End: 2021-09-20

## 2021-09-20 RX ORDER — DIPHENHYDRAMINE HYDROCHLORIDE 50 MG/ML
50 INJECTION INTRAMUSCULAR; INTRAVENOUS ONCE AS NEEDED
Status: CANCELLED | OUTPATIENT
Start: 2021-09-20 | End: 2021-09-20

## 2021-09-20 RX ORDER — SODIUM CHLORIDE, SODIUM LACTATE, POTASSIUM CHLORIDE, CALCIUM CHLORIDE 600; 310; 30; 20 MG/100ML; MG/100ML; MG/100ML; MG/100ML
100 INJECTION, SOLUTION INTRAVENOUS CONTINUOUS
Status: CANCELLED | OUTPATIENT
Start: 2021-09-20

## 2021-09-20 RX ORDER — LIDOCAINE HYDROCHLORIDE 10 MG/ML
INJECTION, SOLUTION EPIDURAL; INFILTRATION; INTRACAUDAL; PERINEURAL
Status: DISCONTINUED | OUTPATIENT
Start: 2021-09-20 | End: 2021-09-20

## 2021-09-20 NOTE — OPERATIVE REPORT
[facility]  Operative Report  2021     Gogo Orr Patient Status:  Hospital Outpatient Surgery    1955 MRN [id number]   Location [address] [place] [address] Attending No att. providers found   Hosp Day # 0 PCP Helene Kennedy DO approximately 2 cc of 1% lidocaine. A 22-gauge 5\" Quincke spinal needle was introduced toward the inferior aspect of the junction between the transverse process and pedicle of the right L3-L4 level atraumatically under fluoroscopic guidance.  The needle w

## 2021-09-20 NOTE — H&P
History & Physical Examination    Patient Name: Michelle Milton  MRN: [id number]  [facility]: [id number]  YOB: 1955    Pre-Operative Diagnosis:  Synovial cyst of lumbar facet joint [M71.38]  Lumbar radiculitis [M54.16]    Present Illness: 24-year-old fe 0  Multiple Vitamin (TAB-A-RIYA) Oral Tab, Take 1 tablet by mouth daily. , Disp: , Rfl:   Cholecalciferol (VITAMIN D3) 50 MCG (2000 UT) Oral Tab, Take 1 tablet by mouth daily. , Disp: , Rfl:   Calcium 600-200 MG-UNIT Oral Tab, Take 1 tablet by mouth 2 (two) Hemorrhoids    • High blood pressure    • Hypercalcemia    • IBS (irritable bowel syndrome)    • Internal hemorrhoids 12/18/08    Grade II   • Irregular Z line of esophagus 12/18/08   • Localized primary carpometacarpal osteoarthritis 7/30/2012   • Lupus ( (Pulmonary Embolism) Father    • Diabetes Mother    • Asthma Mother    • Other (Other) Mother    • Other (Abdominal Aortic Aneurysm) Other    • Fibromyalgia Sister    • Asthma Sister    • Breast Cancer Self 46   • DCIS Self    • No Known Problems Son    •

## 2021-09-20 NOTE — H&P (VIEW-ONLY)
History & Physical Examination    Patient Name: Nanda Moseley  MRN: [id number]  CSN: [id number]  YOB: 1955    Pre-Operative Diagnosis:  Synovial cyst of lumbar facet joint [M71.38]  Lumbar radiculitis [M54.16]    Present Illness: 68-year-old fe 0  Multiple Vitamin (TAB-A-RIYA) Oral Tab, Take 1 tablet by mouth daily. , Disp: , Rfl:   Cholecalciferol (VITAMIN D3) 50 MCG (2000 UT) Oral Tab, Take 1 tablet by mouth daily. , Disp: , Rfl:   Calcium 600-200 MG-UNIT Oral Tab, Take 1 tablet by mouth 2 (two) Hemorrhoids    • High blood pressure    • Hypercalcemia    • IBS (irritable bowel syndrome)    • Internal hemorrhoids 12/18/08    Grade II   • Irregular Z line of esophagus 12/18/08   • Localized primary carpometacarpal osteoarthritis 7/30/2012   • Lupus ( (Pulmonary Embolism) Father    • Diabetes Mother    • Asthma Mother    • Other (Other) Mother    • Other (Abdominal Aortic Aneurysm) Other    • Fibromyalgia Sister    • Asthma Sister    • Breast Cancer Self 48   • DCIS Self    • No Known Problems Son    •

## 2021-09-22 ENCOUNTER — LAB ENCOUNTER (OUTPATIENT)
Dept: LAB | Age: 66
End: 2021-09-22
Attending: INTERNAL MEDICINE
Payer: MEDICARE

## 2021-09-22 DIAGNOSIS — M81.0 OSTEOPOROSIS, SENILE: ICD-10-CM

## 2021-09-22 LAB
CALCIUM BLD-MCNC: 9.3 MG/DL (ref 8.5–10.1)
MAGNESIUM SERPL-MCNC: 2.1 MG/DL (ref 1.6–2.6)
PHOSPHATE SERPL-MCNC: 3.5 MG/DL (ref 2.5–4.9)
VIT D+METAB SERPL-MCNC: 63.9 NG/ML (ref 30–100)

## 2021-09-22 PROCEDURE — 82306 VITAMIN D 25 HYDROXY: CPT

## 2021-09-22 PROCEDURE — 84100 ASSAY OF PHOSPHORUS: CPT

## 2021-09-22 PROCEDURE — 83735 ASSAY OF MAGNESIUM: CPT

## 2021-09-22 PROCEDURE — 82310 ASSAY OF CALCIUM: CPT

## 2021-09-22 PROCEDURE — 36415 COLL VENOUS BLD VENIPUNCTURE: CPT

## 2021-09-24 ENCOUNTER — OFFICE VISIT (OUTPATIENT)
Dept: RHEUMATOLOGY | Facility: CLINIC | Age: 66
End: 2021-09-24
Payer: MEDICARE

## 2021-09-24 ENCOUNTER — NURSE ONLY (OUTPATIENT)
Dept: RHEUMATOLOGY | Facility: CLINIC | Age: 66
End: 2021-09-24
Payer: MEDICARE

## 2021-09-24 VITALS
DIASTOLIC BLOOD PRESSURE: 90 MMHG | HEIGHT: 64 IN | SYSTOLIC BLOOD PRESSURE: 150 MMHG | BODY MASS INDEX: 41.32 KG/M2 | WEIGHT: 242 LBS | TEMPERATURE: 98 F | OXYGEN SATURATION: 90 % | HEART RATE: 84 BPM

## 2021-09-24 DIAGNOSIS — M81.0 OSTEOPOROSIS, SENILE: ICD-10-CM

## 2021-09-24 DIAGNOSIS — R68.2 DRY MOUTH: ICD-10-CM

## 2021-09-24 DIAGNOSIS — E55.9 VITAMIN D DEFICIENCY: ICD-10-CM

## 2021-09-24 DIAGNOSIS — M32.19 OTHER SYSTEMIC LUPUS ERYTHEMATOSUS WITH OTHER ORGAN INVOLVEMENT (HCC): ICD-10-CM

## 2021-09-24 DIAGNOSIS — M35.00 SJOGREN'S SYNDROME WITHOUT EXTRAGLANDULAR INVOLVEMENT (HCC): ICD-10-CM

## 2021-09-24 DIAGNOSIS — Z79.899 HIGH RISK MEDICATION USE: ICD-10-CM

## 2021-09-24 DIAGNOSIS — M79.7 FIBROMYALGIA: ICD-10-CM

## 2021-09-24 DIAGNOSIS — M06.9 RHEUMATOID ARTHRITIS INVOLVING BOTH HANDS, UNSPECIFIED WHETHER RHEUMATOID FACTOR PRESENT (HCC): ICD-10-CM

## 2021-09-24 DIAGNOSIS — I27.20 PULMONARY HYPERTENSION (HCC): ICD-10-CM

## 2021-09-24 DIAGNOSIS — M81.0 OSTEOPOROSIS, SENILE: Primary | ICD-10-CM

## 2021-09-24 DIAGNOSIS — J84.9 ILD (INTERSTITIAL LUNG DISEASE) (HCC): Primary | ICD-10-CM

## 2021-09-24 PROCEDURE — 96372 THER/PROPH/DIAG INJ SC/IM: CPT | Performed by: INTERNAL MEDICINE

## 2021-09-24 PROCEDURE — 99215 OFFICE O/P EST HI 40 MIN: CPT | Performed by: INTERNAL MEDICINE

## 2021-09-24 RX ORDER — PREGABALIN 75 MG/1
75 CAPSULE ORAL 2 TIMES DAILY
Qty: 180 CAPSULE | Refills: 0 | Status: SHIPPED | OUTPATIENT
Start: 2021-09-24 | End: 2021-12-29

## 2021-09-24 RX ORDER — HYDROXYCHLOROQUINE SULFATE 200 MG/1
200 TABLET, FILM COATED ORAL 2 TIMES DAILY
Qty: 180 TABLET | Refills: 1 | Status: SHIPPED | OUTPATIENT
Start: 2021-09-24 | End: 2022-02-07

## 2021-09-24 RX ORDER — CEVIMELINE HYDROCHLORIDE 30 MG/1
30 CAPSULE ORAL 3 TIMES DAILY
Qty: 270 CAPSULE | Refills: 2 | Status: SHIPPED | OUTPATIENT
Start: 2021-09-24

## 2021-09-24 RX ORDER — LEFLUNOMIDE 20 MG/1
20 TABLET ORAL DAILY
Qty: 90 TABLET | Refills: 0 | Status: SHIPPED | OUTPATIENT
Start: 2021-09-24 | End: 2021-12-10

## 2021-09-24 NOTE — PROGRESS NOTES
?  RHEUMATOLOGY FOLLOW UP   Date of visit: 09/24/2021  ? Patient presents with: Follow - Up: Pt having lots of chronic back pain. Had PT and did not work. Pt have had 2 cortisone shots last Monday. Balance is getting better.  shoulders hands and wrist s verbalized understanding of above instructions. No further questions at this time.     Code selection for this visit was based on time spent (40-54min) on date of service in preparing to see the patient, obtaining and/or reviewing separately obtained histor capsule (75 mg total) by mouth 2 (two) times daily. Dry mouth  -     Cevimeline HCl 30 MG Oral Cap; Take 1 capsule (30 mg total) by mouth 3 (three) times daily.     Osteoporosis, senile    Vitamin D deficiency    Pulmonary hypertension ([facility]  -     CARD for rheumatologic evaluation due to transition of care to rheumatologist closer to home. States she started to have knee and shoulder pain during a stressful time of her life. Was seen by orthopedics and was given medrol dose pack.  Was also referred rh or lungs, strokes or ischemic phenomenon, prior miscarriages. Denies nonhealing ulcers on the fingertips, trouble swallowing, or severe acid reflux. The patient denies any history of uveitis or nodular painful shin bruises.   No fevers, chills, lymphadeno lupus erythematosus ([facility] 2/26/2009   • Visual impairment     glasses   • Vitamin D deficiency      Past Surgical History:  Past Surgical History:   Procedure Laterality Date   • ADENOIDECTOMY     • APPENDECTOMY  age 6    [place]   • BACK SURGERY Cap, Take 1 capsule (30 mg total) by mouth 3 (three) times daily. , Disp: 270 capsule, Rfl: 2  Hydroxychloroquine Sulfate 200 MG Oral Tab, Take 1 tablet (200 mg total) by mouth 2 (two) times daily. , Disp: 180 tablet, Rfl: 1  leflunomide 20 MG Oral Tab, Take 2-RACHELE) 0.3 MG/0.3ML Injection Solution Auto-injector, Inject 0.3 mL (1 each total) as directed as needed. , Disp: 1 each, Rfl: 1  PREVIDENT 5000 DRY MOUTH 1.1 % Dental Gel, Place 1 Application onto teeth daily.   , Disp: , Rfl:       Modified Medications Eyes: Negative for blurred vision, double vision and photophobia. Respiratory: Negative for cough, hemoptysis, shortness of breath and wheezing. Cardiovascular: Positive for leg swelling (improved since wearing compression socks over right leg).  Neg Normal breath sounds. No wheezing. Musculoskeletal:         General: Tenderness and deformity present. No swelling. Cervical back: Neck supple. No tenderness. Comments: Scattered OA changes of the hands.   Chronic enlargement of PIPs and MCPs wi Health Organization (WHO) classification of osteopenia                Left TOTAL HIP ANALYSIS RESULTS:         Bone mineral density (BMD) (g/cm2):  1.017       Total Hip T-Score:  0.6       % young normals:  108       % age matched controls:  80       Tsering SPINE LUMBAR (CPT=72148), 4/04/2017, 3:03 PM.       INDICATIONS:  M54.16 Radiculopathy, lumbar region       TECHNIQUE:  Multiplanar T1 and T2 weighted images including fat suppression sequences.  Images acquired in sagittal and axial planes.         ASHLEY changes at other levels are described above.    3. Previous kyphoplasty treatment at T9.             Dictated by (CST): Aamir Gibbs MD on 10/15/2020 at 5:48 PM      PROCEDURE:  CT CHEST (CPT=71250)       COMPARISON:  [place], CT CHEST (CPT=71250), 9/11 HCT 40.7 06/18/2021    .0 06/18/2021    MPV 11.0 (H) 09/16/2011    MCV 94.4 06/18/2021    MCH 29.5 06/18/2021    MCHC 31.2 06/18/2021    RDW 15.3 (H) 06/18/2021    NEPRELIM 1.84 06/18/2021    NEPERCENT 42.8 06/18/2021    LYPERCENT 29.1 06/18/2021 DO  [facility]  09/24/2021

## 2021-10-06 ENCOUNTER — LAB ENCOUNTER (OUTPATIENT)
Dept: LAB | Age: 66
End: 2021-10-06
Attending: INTERNAL MEDICINE
Payer: MEDICARE

## 2021-10-06 DIAGNOSIS — M06.9 RHEUMATOID ARTHRITIS INVOLVING BOTH HANDS, UNSPECIFIED WHETHER RHEUMATOID FACTOR PRESENT (HCC): ICD-10-CM

## 2021-10-06 DIAGNOSIS — M32.19 OTHER SYSTEMIC LUPUS ERYTHEMATOSUS WITH OTHER ORGAN INVOLVEMENT (HCC): ICD-10-CM

## 2021-10-06 DIAGNOSIS — Z79.899 HIGH RISK MEDICATION USE: ICD-10-CM

## 2021-10-06 PROCEDURE — 36415 COLL VENOUS BLD VENIPUNCTURE: CPT

## 2021-10-06 PROCEDURE — 85025 COMPLETE CBC W/AUTO DIFF WBC: CPT

## 2021-10-06 PROCEDURE — 80053 COMPREHEN METABOLIC PANEL: CPT

## 2021-10-12 ENCOUNTER — OFFICE VISIT (OUTPATIENT)
Dept: PAIN CLINIC | Facility: CLINIC | Age: 66
End: 2021-10-12
Payer: MEDICARE

## 2021-10-12 ENCOUNTER — TELEPHONE (OUTPATIENT)
Dept: PAIN CLINIC | Facility: CLINIC | Age: 66
End: 2021-10-12

## 2021-10-12 VITALS
DIASTOLIC BLOOD PRESSURE: 88 MMHG | OXYGEN SATURATION: 95 % | WEIGHT: 242 LBS | HEART RATE: 72 BPM | BODY MASS INDEX: 42 KG/M2 | SYSTOLIC BLOOD PRESSURE: 140 MMHG

## 2021-10-12 DIAGNOSIS — M47.819 FACET ARTHROPATHY: Primary | ICD-10-CM

## 2021-10-12 DIAGNOSIS — M47.816 LUMBAR FACET ARTHROPATHY: Primary | ICD-10-CM

## 2021-10-12 PROCEDURE — 99213 OFFICE O/P EST LOW 20 MIN: CPT | Performed by: ANESTHESIOLOGY

## 2021-10-12 NOTE — PROGRESS NOTES
Name: Ed Soria   : 1955   DOS:10/12/2021      Follow-up visit note:    Ed Soria is a 74year old female patient with chronic low back pain is being followed in the pain clinic.   The patient is status post aspiration of bilateral L3-L4 lumba PCP: Charly Santos NP     Last appt: 4/23/2020   No future appointments.      Requested Prescriptions     Pending Prescriptions Disp Refills    pregabalin (LYRICA) 75 mg capsule 60 Cap 0 toe right foot   • Hematochezia     • High blood pressure     • History of bone density study 10/08/12   • Hypercalcemia     • IBS (irritable bowel syndrome)     • Inclusion cyst       epidermal   • Internal hemorrhoids 12/18/08     Grade II   • Irregular pregabalin (LYRICA) 75 MG Oral Cap 1 TABLET EVERY AM, 2 TABLETS EVERY PM Disp: 270 capsule Rfl: 1   Hydroxychloroquine Sulfate 200 MG Oral Tab Take 1 tablet (200 mg total) by mouth 2 (two) times daily.  Disp: 180 tablet Rfl: 1   RaNITidine HCl (ZANTAC) 15 STEREOTACTIC NODULE 1 SITE RIGHT  12/29/11: OTHER SURGICAL HISTORY      Comment: Shave biopsy, skin, right shoulder  9/11/12: OTHER SURGICAL HISTORY      Comment: right carpal tunnel surgery  9/25/12: OTHER SURGICAL HISTORY      Comment: left carpal tunnel negative.  Lhermitte’s test is negative.     Motor Examination:                                           (R)                                 (L)  Deltoid:                                                                5 N               T1:                                                           N                                    N     Cardiovascular system: Regular rate and rhythm. S1, S2 normal. No murmurs.   Respiratory system: Breath sounds equal 1  Pathological Reflexes:                                     (R)                                 (L)               Babinski:                                   N                                  N               Gardner: L5-S1:  Diffuse disc bulge, endplate osteophyte and facet hypertrophy cause mild central canal and bilateral foraminal stenosis.        Assessment:  Thoracic facet arthropathy  Lumbar facet cyst  Lumbar radiculitis  Lumbar facet arthropathy  Sacroiliitis

## 2021-10-12 NOTE — TELEPHONE ENCOUNTER
Question Answer   Anesthesia Type Local   Provider [facility]   Procedure Facet   Laterality/Level Right diagnostic thoracic facet joint injection at T9-T10, T10-T11 and T11-T12 level under fluoroscopy with local anesthesia   Implants No   Medical

## 2021-10-12 NOTE — TELEPHONE ENCOUNTER
Patient scheduled for procedure, pre-procedure instructions reviewed. Patient prefers local anesthetic. Reviewed sedation instructions including no fasting, no  and no COVID test are required.  Patient advised to hold Omega 3 for 5 days prior to proce off for procedure.         Medication:   Number of days you need to be off for the following medications:  • Aggrenox 10 days   • Agrylin (Anagrelide) 10 days  • Brilinta (Ticagrelor) 7 days  • Imbruvica (Ibrutinib) 3 days   • Enbrel (Etanercept) 24 hours call our office with any questions or concerns before or after your procedure at  [phone]. If you are a diabetic, please increase the frequency of your glucose monitoring after the procedure as this may cause a temporary increase in your blood sugar.

## 2021-10-12 NOTE — PROGRESS NOTES
Last procedure: ASPIRATION OF BILATERAL L3-L4 LUMBAR FACET CYST AND BILATERAL TRANSFORAMINAL LUMBAR EPIDURAL STEROID INJECTION AT L3-L4 LEVEL UNDER FLUOROSCOPY WITH LOCAL  Date: 09/20/21    Percentage of relief obtained: LESI 75%, aspiration 100%  Duration

## 2021-10-20 ENCOUNTER — APPOINTMENT (OUTPATIENT)
Dept: GENERAL RADIOLOGY | Facility: HOSPITAL | Age: 66
End: 2021-10-20
Attending: ANESTHESIOLOGY
Payer: MEDICARE

## 2021-10-20 ENCOUNTER — HOSPITAL ENCOUNTER (OUTPATIENT)
Facility: HOSPITAL | Age: 66
Setting detail: HOSPITAL OUTPATIENT SURGERY
Discharge: HOME | End: 2021-10-20
Attending: ANESTHESIOLOGY | Admitting: ANESTHESIOLOGY
Payer: MEDICARE

## 2021-10-20 VITALS
SYSTOLIC BLOOD PRESSURE: 156 MMHG | HEART RATE: 72 BPM | RESPIRATION RATE: 16 BRPM | DIASTOLIC BLOOD PRESSURE: 85 MMHG | TEMPERATURE: 98 F | OXYGEN SATURATION: 94 %

## 2021-10-20 DIAGNOSIS — M47.819 FACET ARTHROPATHY: ICD-10-CM

## 2021-10-20 PROCEDURE — 3E0U33Z INTRODUCTION OF ANTI-INFLAMMATORY INTO JOINTS, PERCUTANEOUS APPROACH: ICD-10-PCS | Performed by: ANESTHESIOLOGY

## 2021-10-20 PROCEDURE — 3E0U3BZ INTRODUCTION OF ANESTHETIC AGENT INTO JOINTS, PERCUTANEOUS APPROACH: ICD-10-PCS | Performed by: ANESTHESIOLOGY

## 2021-10-20 RX ORDER — SODIUM CHLORIDE, SODIUM LACTATE, POTASSIUM CHLORIDE, CALCIUM CHLORIDE 600; 310; 30; 20 MG/100ML; MG/100ML; MG/100ML; MG/100ML
100 INJECTION, SOLUTION INTRAVENOUS CONTINUOUS
Status: DISCONTINUED | OUTPATIENT
Start: 2021-10-20 | End: 2021-10-20

## 2021-10-20 RX ORDER — METHYLPREDNISOLONE ACETATE 40 MG/ML
INJECTION, SUSPENSION INTRA-ARTICULAR; INTRALESIONAL; INTRAMUSCULAR; SOFT TISSUE
Status: DISCONTINUED | OUTPATIENT
Start: 2021-10-20 | End: 2021-10-20

## 2021-10-20 RX ORDER — ONDANSETRON 2 MG/ML
4 INJECTION INTRAMUSCULAR; INTRAVENOUS ONCE AS NEEDED
Status: DISCONTINUED | OUTPATIENT
Start: 2021-10-20 | End: 2021-10-20

## 2021-10-20 RX ORDER — LIDOCAINE HYDROCHLORIDE 10 MG/ML
INJECTION, SOLUTION EPIDURAL; INFILTRATION; INTRACAUDAL; PERINEURAL
Status: DISCONTINUED | OUTPATIENT
Start: 2021-10-20 | End: 2021-10-20

## 2021-10-20 RX ORDER — DIPHENHYDRAMINE HYDROCHLORIDE 50 MG/ML
50 INJECTION INTRAMUSCULAR; INTRAVENOUS ONCE AS NEEDED
Status: DISCONTINUED | OUTPATIENT
Start: 2021-10-20 | End: 2021-10-20

## 2021-10-20 NOTE — OPERATIVE REPORT
[facility]  Operative Report  10/20/2021     Clementina Trotter Patient Status:  Hospital Outpatient Surgery    1955 MRN [id number]   Location [address] [address] Attending No att. providers found   Hosp Day # 0 PCP Farida Bailon DO patient tolerated the procedure very well. The patient has complete understanding of the risks and benefits of the procedure. Complications: None. Follow up: Patient will be followed in the pain clinic as needed basis.     Ann Marie Gibbs MD

## 2021-10-20 NOTE — INTERVAL H&P NOTE
Pre-op Diagnosis: Facet arthropathy [M47.819]    The above referenced H&P was reviewed by Liza Pryor MD on 10/20/2021, the patient was examined and no significant changes have occurred in the patient's condition since the H&P was performed.   I discus

## 2021-11-02 ENCOUNTER — OFFICE VISIT (OUTPATIENT)
Dept: PAIN CLINIC | Facility: CLINIC | Age: 66
End: 2021-11-02
Payer: MEDICARE

## 2021-11-02 VITALS
BODY MASS INDEX: 42 KG/M2 | OXYGEN SATURATION: 93 % | WEIGHT: 242 LBS | SYSTOLIC BLOOD PRESSURE: 140 MMHG | RESPIRATION RATE: 16 BRPM | DIASTOLIC BLOOD PRESSURE: 100 MMHG | HEART RATE: 73 BPM

## 2021-11-02 DIAGNOSIS — M47.816 LUMBAR FACET ARTHROPATHY: Primary | ICD-10-CM

## 2021-11-02 PROCEDURE — 99212 OFFICE O/P EST SF 10 MIN: CPT | Performed by: ANESTHESIOLOGY

## 2021-11-02 NOTE — PROGRESS NOTES
Last procedure: RIGHT THORACIC FACET JOINT INJECTION T9-10, T10-11, T11-12 LEVEL UNDER FLUOROSCOPY WITH LOCAL  Date: 10/20/21  Percentage of relief obtained: 100%  Duration of relief: current     Current Pain Score:0

## 2021-11-07 NOTE — PROGRESS NOTES
Name: Mynor Baez   : 1955   DOS:2021      Follow-up visit note:    Mynor Baez is a 74year old female patient with chronic low back pain is being followed in the pain clinic.   The patient is status post aspiration of bilateral L3-L4 lumbar Grade II   • Irregular Z line of esophagus 12/18/08   • Lipid screening 03/30/12   • Localized primary carpometacarpal osteoarthritis 7/30/2012   • Macular degeneration 12/07/10     early signs, bilaterally, remained stable   • Mild mitral regurgitation   RaNITidine HCl (ZANTAC) 150 MG Oral Tab Take 1 tablet (150 mg total) by mouth 2 (two) times daily.  Disp: 60 tablet Rfl: 0   Clobetasol Propionate 0.05 % External Ointment Apply to the AA of hands BID x 2 weeks then PRN flares Disp: 30 g Rfl: 1   Probioti Comment: left carpal tunnel surgery  1/11/13: OTHER SURGICAL HISTORY      Comment: trigger thumb injections  4/13: OTHER SURGICAL HISTORY      Comment: revision right foot surgery  2004: RADIATION RIGHT      Comment: MAMMOSITE  No date: [address]  Biceps:                                                                   5                                     5  Triceps:                                                                5                                     5  W system: Breath sounds equal bilaterally. No crackles, rhonchi. Abdomen: Soft, nontender, no organomegaly. Bowel sounds positive. Neurologic:  Cranial nerves II through XII are grossly intact.        Examination of the lower and mid back:   It reveals no t Gardner:                                    N                                    N               Ankle Clonus:                            N                                  N  Sensory Examination:                            (R) arthropathy  Sacroiliitis        Plan: I had long discussion with the patient about her management. As her pain went away completely with the interventional treatment, the patient will be followed in the pain clinic as needed basis.   I recommended the pat

## 2021-11-16 ENCOUNTER — HOSPITAL ENCOUNTER (OUTPATIENT)
Dept: CV DIAGNOSTICS | Age: 66
Discharge: HOME | End: 2021-11-16
Attending: INTERNAL MEDICINE
Payer: MEDICARE

## 2021-11-16 DIAGNOSIS — J84.9 ILD (INTERSTITIAL LUNG DISEASE) (HCC): ICD-10-CM

## 2021-11-16 DIAGNOSIS — I27.20 PULMONARY HYPERTENSION (HCC): ICD-10-CM

## 2021-11-16 PROCEDURE — 93306 TTE W/DOPPLER COMPLETE: CPT | Performed by: INTERNAL MEDICINE

## 2021-11-16 NOTE — Clinical Note
Called pt and left vm for pt to call back.    KARINA, TCM call made, see [facility] notes. [facility] Attempted to schedule PCP office TCM appointment with patient, patient wants to see Dr Behery, ortho surgeon first, then schedule a TCM with Dr Valera. Message sent to MD's office.

## 2021-12-05 ENCOUNTER — OFFICE VISIT (OUTPATIENT)
Dept: FAMILY MEDICINE CLINIC | Facility: CLINIC | Age: 66
End: 2021-12-05
Payer: MEDICARE

## 2021-12-05 VITALS
OXYGEN SATURATION: 95 % | BODY MASS INDEX: 40.97 KG/M2 | HEIGHT: 64 IN | RESPIRATION RATE: 16 BRPM | WEIGHT: 240 LBS | HEART RATE: 74 BPM | SYSTOLIC BLOOD PRESSURE: 142 MMHG | TEMPERATURE: 98 F | DIASTOLIC BLOOD PRESSURE: 86 MMHG

## 2021-12-05 DIAGNOSIS — Z11.52 ENCOUNTER FOR SCREENING FOR COVID-19: ICD-10-CM

## 2021-12-05 DIAGNOSIS — J01.00 ACUTE NON-RECURRENT MAXILLARY SINUSITIS: Primary | ICD-10-CM

## 2021-12-05 DIAGNOSIS — R03.0 ELEVATED BLOOD PRESSURE READING: ICD-10-CM

## 2021-12-05 PROCEDURE — 99213 OFFICE O/P EST LOW 20 MIN: CPT | Performed by: NURSE PRACTITIONER

## 2021-12-05 RX ORDER — AMOXICILLIN AND CLAVULANATE POTASSIUM 875; 125 MG/1; MG/1
1 TABLET, FILM COATED ORAL 2 TIMES DAILY
Qty: 20 TABLET | Refills: 0 | Status: SHIPPED | OUTPATIENT
Start: 2021-12-05 | End: 2021-12-08

## 2021-12-05 NOTE — PROGRESS NOTES
CHIEF COMPLAINT:   Patient presents with:  Sinus Problem      HPI:   Zion Berman is a 74year old female who presents for upper respiratory symptoms for  8 days. Patient reports sinus congestion, ear pressure, sinus pressure. Denies cough or fever.  Sympto Cholecalciferol (VITAMIN D3) 50 MCG (2000 UT) Oral Tab Take 1 tablet by mouth daily. • Calcium 600-200 MG-UNIT Oral Tab Take 1 tablet by mouth 2 (two) times daily.      • ALBUTEROL SULFATE  (90 Base) MCG/ACT Inhalation Aero Soln USE 2 INHALATIONS • Sicca syndrome ([facility] 2/26/2009   • Sjogren's syndrome ([facility] 10/07   • Stress fracture of the metatarsals     right foot   • Systemic lupus erythematosus ([facility] 2/26/2009   • Visual impairment     glasses   • Vitamin D deficiency       Past Surgical Hist Resp 16   Ht 5' 4\" (1.626 m)   Wt 240 lb (108.9 kg)   SpO2 95%   BMI 41.20 kg/m²   GENERAL: well developed, well nourished,in no apparent distress  SKIN: no rashes,no suspicious lesions  HEAD: atraumatic, normocephalic.  + tenderness on palpation of max other particles in the air. Sinusitis can cause symptoms of sinus congestion and a feeling of fullness. A sinus infection causes fever, headache, and facial pain.  There is often green or yellow fluid draining from the nose or into the back of the throat (p the medicine. .  · Don't use nasal rinses or irrigation during an acute sinus infection, unless your healthcare provider tells you to. Rinsing may spread the infection to other areas in your sinuses.   · Use acetaminophen or ibuprofen to control pain, unless concerns arise about the new strain of coronavirus that causes COVID-19, [facility] is here to provide community members reliable answers to any questions they may have. Please review the entirety of this informational document.   It includes symptoms until 14 days after exposure. • If you have symptoms, immediately self-isolate and contact your local public health authority or healthcare provider.   • Wear a mask, stay at least 6 feet from others, wash your hands, avoid crowds, and take other breathing, or unrelenting fevers greater than 100.4 degrees Fahrenheit, you should contact your health care provider before seeking further care. Process measures to keep everyone safe in this difficult time are changing frequently.  Your healthcare provid discharge to arrange for a telehealth follow-up.  CDC does not recommend repeat testing after a positive test.  Convalescent Plasma Donation Program  Tara Cartwright, in conjunction with Zachery Stoner., is looking for patients who have recovered from [employer]nl. pdf  [employer].cy  http://www.[facility].[place].gov/topics-services/diseases-and-conditions/dise https://health.[facility]/coronavirus/covid-19-information/covid-19-long-haulers. html  Long-term effects of covid-19. (n.d.).  Retrieved May 11, 2021, from [employer].  What it means to be A Coronavirus

## 2021-12-05 NOTE — PATIENT INSTRUCTIONS
Antibiotic as prescribed. Stay home pending COVID results. Your blood pressure was slightly elevated today. Follow up with your doctor for a recheck of your blood pressure. Follow up for any new or worsening symptoms.          Sinusitis (Antibiotic may also take pills that contain pseudoephedrine. Don’t use products that combine multiple medicines. This is because side effects may be increased. Read labels. You can also ask the pharmacist for help.  (People with high blood pressure should not use deco infections. · Don’t smoke, and stay away from secondhand smoke. · Stay up to date with of your vaccines. Dustin last reviewed this educational content on 12/1/2019  © 0109-1362 [employer]. All rights reserved.  This information is not in public health authorities make the final decisions about how long quarantine should last, based on local conditions and needs. Follow the recommendations of your local public health department if you need to quarantine.  Options they will consider include s you need to be around other people in or outside of the home, wear a facemask. 9. Avoid sharing personal items with other people in your household, like dishes, towels, and bedding   10.  Clean all surfaces that are touched often, like counters, tabletops getting better, whichever is longer. Patients with pending COVID-19 test results should follow all care and home isolation instructions. Your test results will be called to you from an [facility] representative.  If you have not received a call withi [facility] COVID-19 Nurse Triage Line at [phone].     Additional Information      You can also get more information at the following websites:   Centers for Disease Control & Prevention (CDC)  What to do if you are sick with coronavirus disease 20 Important ways to slow the spread of COVID 19 are:   • Get the COVID 19 vaccine and encourage others to get the COVID 19 vaccine   • Wear a mask in public  • Avoid large gatherings  • Wash your hands frequently  • Stay at least 6 feet away from other peo

## 2021-12-08 ENCOUNTER — TELEPHONE (OUTPATIENT)
Dept: FAMILY MEDICINE CLINIC | Facility: CLINIC | Age: 66
End: 2021-12-08

## 2021-12-08 RX ORDER — CEFDINIR 300 MG/1
300 CAPSULE ORAL 2 TIMES DAILY
Qty: 20 CAPSULE | Refills: 0 | Status: SHIPPED | OUTPATIENT
Start: 2021-12-08 | End: 2021-12-18

## 2021-12-08 NOTE — TELEPHONE ENCOUNTER
Pt was seen in [address] [place] [address] 12/5/21 and diagnosed with sinus infection Augmentin was prescribed . Pt c/o diarrhea and gas and would like a different ABX. Please advise.

## 2021-12-09 DIAGNOSIS — M32.19 OTHER SYSTEMIC LUPUS ERYTHEMATOSUS WITH OTHER ORGAN INVOLVEMENT (HCC): ICD-10-CM

## 2021-12-09 DIAGNOSIS — M06.9 RHEUMATOID ARTHRITIS INVOLVING BOTH HANDS, UNSPECIFIED WHETHER RHEUMATOID FACTOR PRESENT (HCC): ICD-10-CM

## 2021-12-09 DIAGNOSIS — M35.00 SJOGREN'S SYNDROME WITHOUT EXTRAGLANDULAR INVOLVEMENT (HCC): ICD-10-CM

## 2021-12-10 RX ORDER — LEFLUNOMIDE 20 MG/1
TABLET ORAL
Qty: 90 TABLET | Refills: 0 | Status: SHIPPED | OUTPATIENT
Start: 2021-12-10

## 2021-12-20 ENCOUNTER — TELEPHONE (OUTPATIENT)
Dept: FAMILY MEDICINE CLINIC | Facility: CLINIC | Age: 66
End: 2021-12-20

## 2021-12-20 ENCOUNTER — TELEMEDICINE (OUTPATIENT)
Dept: FAMILY MEDICINE CLINIC | Facility: CLINIC | Age: 66
End: 2021-12-20

## 2021-12-20 DIAGNOSIS — J34.89 SINUS PRESSURE: Primary | ICD-10-CM

## 2021-12-20 PROCEDURE — 99213 OFFICE O/P EST LOW 20 MIN: CPT | Performed by: FAMILY MEDICINE

## 2021-12-20 RX ORDER — DOXYCYCLINE HYCLATE 100 MG
100 TABLET ORAL 2 TIMES DAILY
Qty: 20 TABLET | Refills: 0 | Status: SHIPPED | OUTPATIENT
Start: 2021-12-20 | End: 2021-12-29

## 2021-12-20 NOTE — PROGRESS NOTES
This visit is conducted using Telemedicine with live, interactive video and audio.     Telehealth outside of [address] Verbal Consent   I conducted a telehealth visit with Rhonda Baer today, 12/20/21, which was completed using two-way, real-time social history reviewed    Exam   alert, appears stated age and cooperative, Normocephalic, without obvious abnormality, atraumatic, lips, mucosa, and tongue normal; teeth and gums normal, Speaking in full sentences comfortably, Normal work of breathing, S Detail Level: Detailed

## 2021-12-20 NOTE — TELEPHONE ENCOUNTER
Pt states she is still not feeling well. She still has sinus pressure and yellow drainage. States she has a scratchy throat. Today is her last day of cefdinir. Do you want to rx something else or follow up appt?

## 2021-12-27 ENCOUNTER — LAB ENCOUNTER (OUTPATIENT)
Dept: LAB | Age: 66
End: 2021-12-27
Attending: INTERNAL MEDICINE
Payer: MEDICARE

## 2021-12-27 DIAGNOSIS — M06.9 RHEUMATOID ARTHRITIS INVOLVING BOTH HANDS, UNSPECIFIED WHETHER RHEUMATOID FACTOR PRESENT (HCC): ICD-10-CM

## 2021-12-27 DIAGNOSIS — Z79.899 HIGH RISK MEDICATION USE: ICD-10-CM

## 2021-12-27 DIAGNOSIS — M32.19 OTHER SYSTEMIC LUPUS ERYTHEMATOSUS WITH OTHER ORGAN INVOLVEMENT (HCC): ICD-10-CM

## 2021-12-27 PROCEDURE — 36415 COLL VENOUS BLD VENIPUNCTURE: CPT

## 2021-12-27 PROCEDURE — 85025 COMPLETE CBC W/AUTO DIFF WBC: CPT

## 2021-12-27 PROCEDURE — 80053 COMPREHEN METABOLIC PANEL: CPT

## 2021-12-29 ENCOUNTER — OFFICE VISIT (OUTPATIENT)
Dept: RHEUMATOLOGY | Facility: CLINIC | Age: 66
End: 2021-12-29
Payer: MEDICARE

## 2021-12-29 ENCOUNTER — TELEPHONE (OUTPATIENT)
Dept: RHEUMATOLOGY | Facility: CLINIC | Age: 66
End: 2021-12-29

## 2021-12-29 VITALS
BODY MASS INDEX: 41.15 KG/M2 | TEMPERATURE: 98 F | HEIGHT: 64 IN | DIASTOLIC BLOOD PRESSURE: 80 MMHG | HEART RATE: 82 BPM | SYSTOLIC BLOOD PRESSURE: 140 MMHG | WEIGHT: 241 LBS | OXYGEN SATURATION: 90 % | RESPIRATION RATE: 16 BRPM

## 2021-12-29 DIAGNOSIS — Z79.899 HIGH RISK MEDICATION USE: ICD-10-CM

## 2021-12-29 DIAGNOSIS — J84.9 ILD (INTERSTITIAL LUNG DISEASE) (HCC): ICD-10-CM

## 2021-12-29 DIAGNOSIS — M79.7 FIBROMYALGIA: ICD-10-CM

## 2021-12-29 DIAGNOSIS — M32.19 OTHER SYSTEMIC LUPUS ERYTHEMATOSUS WITH OTHER ORGAN INVOLVEMENT (HCC): Primary | ICD-10-CM

## 2021-12-29 DIAGNOSIS — M81.0 OSTEOPOROSIS, SENILE: ICD-10-CM

## 2021-12-29 DIAGNOSIS — M06.9 RHEUMATOID ARTHRITIS INVOLVING BOTH HANDS, UNSPECIFIED WHETHER RHEUMATOID FACTOR PRESENT (HCC): ICD-10-CM

## 2021-12-29 DIAGNOSIS — M35.00 SJOGREN'S SYNDROME WITHOUT EXTRAGLANDULAR INVOLVEMENT (HCC): ICD-10-CM

## 2021-12-29 DIAGNOSIS — E55.9 VITAMIN D DEFICIENCY: ICD-10-CM

## 2021-12-29 DIAGNOSIS — R68.2 DRY MOUTH: ICD-10-CM

## 2021-12-29 PROCEDURE — 99214 OFFICE O/P EST MOD 30 MIN: CPT | Performed by: INTERNAL MEDICINE

## 2021-12-29 RX ORDER — PREGABALIN 75 MG/1
75 CAPSULE ORAL NIGHTLY
Qty: 90 CAPSULE | Refills: 0 | Status: SHIPPED | OUTPATIENT
Start: 2021-12-29 | End: 2022-03-29

## 2021-12-29 RX ORDER — PREGABALIN 50 MG/1
50 CAPSULE ORAL EVERY MORNING
Qty: 90 CAPSULE | Refills: 0 | Status: SHIPPED | OUTPATIENT
Start: 2021-12-29

## 2021-12-29 NOTE — PROGRESS NOTES
RHEUMATOLOGY FOLLOW UP   Date of visit: 12/29/2021  ? Patient presents with:  SLE: 3 month f/u. Feeling achy. Arthritis pain. Muscle pain. Just got over sinus infection. Dry eyes and dry mouth have been managable.  Shortness of breath and treating with i as needed. Next prolia in March. And continue every 6 months. Next BMD not due until 01/2023    Patient verbalized understanding of above instructions. No further questions at this time.     Code selection for this visit was based on time spent (30-39min) active problems who was initially seen as new patient for transition of care. She has a long standing history of seropositive RA, Sjogren's, lupus as well as fibromyalgia and osteoarthritis as well as osteoporosis    Since her last visit, she feels okay. BID  Cevimeline BID  pregabalin 75mg TID     Hx of lumbar laminectomy in 2016  Hx of kyphoplasty in 2019    Has been on disability for about 6 years    Was previously on methotrexate but had significant nasea  Was previously on oral bisphosphonates and was • Asthma    • Atherosclerosis     with ectasia   • Back problem     back surgery lumbar   • Cancer ([facility] 9/04    rt breast dcis   • Carpal tunnel syndrome 5/29/2009    bilateral   • Chronic foot pain     right   • Diarrhea, unspecified    • Duodenitis 12 of second and third MTs with correction of the second hammertoe of the right foot by Dr. Greta Anderson     • LUMPECTOMY RIGHT  2004    rt breast lumpectomy, [facility]   • LUDY BIOPSY STEREO NODULE 1 SITE RIGHT (IEH=51380)  2004 1 tablet (200 mg total) by mouth 2 (two) times daily. , Disp: 180 tablet, Rfl: 1  Cefadroxil 500 MG Oral Cap, Take 500 mg by mouth 2 (two) times daily. , Disp: , Rfl:   FLUTICASONE PROPIONATE 50 MCG/ACT Nasal Suspension, SHAKE LIQUID AND USE 2 SPRAYS IN [facility] ?  ?  Allergies:     Allergy                 ANAPHYLAXIS, SWELLING    Comment:Bee Stings, Catgut Sutures  Bee Sting [Bee Veno*    SWELLING    Comment:Bee sting  Ioxaglate               DIZZINESS  Nsaids                      Comment:Other reaction(s): Oth meters squared. Physical Exam  Vitals and nursing note reviewed. Constitutional:       General: She is not in acute distress. Appearance: Normal appearance. She is well-developed. She is not diaphoretic.    HENT:      Head: Normocephalic and a PROCEDURE:  XR DEXA BONE DENSITOMETRY (CPT=77080)       COMPARISON:  None.       INDICATIONS:    Z78.0 Asymptomatic menopause Z13.820 Screening for osteoporosis       PATIENT STATED HISTORY: (As transcribed by Technologist)  Dexa bone density   Screening examination   This places patient at [address] [facility]) classification of normal for the forearm           ADDITIONAL FINDINGS: No significant additional findings                        Impression   CONCLUSION: WHO Classification as described and bilateral foraminal stenosis.       PARASPINAL AREA:  Normal with no visible mass.     BONY STRUCTURES: Arnav Miller has been previous kyphoplasty treatment at T9 with severe compression deformity at this level.  There is no acute compression fracture.    COR PLEURA:  No pneumothorax or effusion.     THORACIC AORTA:  Mild ectasia of the ascending aorta measuring up to 3.9 cm. Barnetta Netter    LIMITED ABDOMEN:  Normal caliber adrenal glands.     BONES:  Stable CT appearance of the thoracic spine with T9 compression fracture    No diagnostic evidence for regional wall motion abnormalities. Doppler      parameters are consistent with abnormal left ventricular relaxation -      grade 1 diastolic dysfunction. 2. Mitral valve: Mildly calcified annulus.  There was mild regurgita

## 2022-01-06 ENCOUNTER — OFFICE VISIT (OUTPATIENT)
Dept: FAMILY MEDICINE CLINIC | Facility: CLINIC | Age: 67
End: 2022-01-06
Payer: MEDICARE

## 2022-01-06 VITALS
SYSTOLIC BLOOD PRESSURE: 116 MMHG | RESPIRATION RATE: 18 BRPM | WEIGHT: 242 LBS | HEART RATE: 72 BPM | DIASTOLIC BLOOD PRESSURE: 86 MMHG | OXYGEN SATURATION: 95 % | BODY MASS INDEX: 41.32 KG/M2 | HEIGHT: 64 IN

## 2022-01-06 DIAGNOSIS — J84.9 ILD (INTERSTITIAL LUNG DISEASE) (HCC): ICD-10-CM

## 2022-01-06 DIAGNOSIS — K58.8 OTHER IRRITABLE BOWEL SYNDROME: Chronic | ICD-10-CM

## 2022-01-06 DIAGNOSIS — R73.9 HYPERGLYCEMIA: ICD-10-CM

## 2022-01-06 DIAGNOSIS — M45.9 RHEUMATOID ARTHRITIS INVOLVING VERTEBRA, UNSPECIFIED WHETHER RHEUMATOID FACTOR PRESENT (HCC): Chronic | ICD-10-CM

## 2022-01-06 DIAGNOSIS — R53.83 OTHER FATIGUE: ICD-10-CM

## 2022-01-06 DIAGNOSIS — Z12.31 ENCOUNTER FOR SCREENING MAMMOGRAM FOR HIGH-RISK PATIENT: ICD-10-CM

## 2022-01-06 DIAGNOSIS — D63.8 ANEMIA OF CHRONIC DISEASE: ICD-10-CM

## 2022-01-06 DIAGNOSIS — J42 CHRONIC BRONCHITIS, UNSPECIFIED CHRONIC BRONCHITIS TYPE (HCC): ICD-10-CM

## 2022-01-06 DIAGNOSIS — M35.00 SJOGREN'S SYNDROME, WITH UNSPECIFIED ORGAN INVOLVEMENT (HCC): Chronic | ICD-10-CM

## 2022-01-06 DIAGNOSIS — I73.9 CLAUDICATION (HCC): ICD-10-CM

## 2022-01-06 DIAGNOSIS — J32.0 CHRONIC MAXILLARY SINUSITIS: ICD-10-CM

## 2022-01-06 DIAGNOSIS — M79.7 FIBROMYALGIA: Chronic | ICD-10-CM

## 2022-01-06 DIAGNOSIS — I70.90 ATHEROSCLEROSIS: ICD-10-CM

## 2022-01-06 DIAGNOSIS — M15.8 OTHER OSTEOARTHRITIS INVOLVING MULTIPLE JOINTS: Chronic | ICD-10-CM

## 2022-01-06 DIAGNOSIS — M47.816 LUMBAR FACET ARTHROPATHY: ICD-10-CM

## 2022-01-06 DIAGNOSIS — M32.19 OTHER SYSTEMIC LUPUS ERYTHEMATOSUS WITH OTHER ORGAN INVOLVEMENT (HCC): ICD-10-CM

## 2022-01-06 DIAGNOSIS — D70.9 NEUTROPENIA, UNSPECIFIED TYPE (HCC): ICD-10-CM

## 2022-01-06 DIAGNOSIS — E66.01 OBESITY, MORBID (HCC): ICD-10-CM

## 2022-01-06 DIAGNOSIS — M81.0 OSTEOPOROSIS, SENILE: Chronic | ICD-10-CM

## 2022-01-06 DIAGNOSIS — J30.2 SEASONAL ALLERGIC REACTION: ICD-10-CM

## 2022-01-06 DIAGNOSIS — D84.9 IMMUNOSUPPRESSED STATUS (HCC): ICD-10-CM

## 2022-01-06 DIAGNOSIS — I34.0 MILD MITRAL REGURGITATION: ICD-10-CM

## 2022-01-06 DIAGNOSIS — I77.1 TORTUOUS AORTA (HCC): ICD-10-CM

## 2022-01-06 DIAGNOSIS — I27.20 PULMONARY HTN (HCC): ICD-10-CM

## 2022-01-06 DIAGNOSIS — Z87.81 HISTORY OF COMPRESSION FRACTURE OF SPINE: ICD-10-CM

## 2022-01-06 DIAGNOSIS — Z00.00 ENCOUNTER FOR ANNUAL HEALTH EXAMINATION: Primary | ICD-10-CM

## 2022-01-06 DIAGNOSIS — E55.9 VITAMIN D DEFICIENCY: Chronic | ICD-10-CM

## 2022-01-06 PROBLEM — S22.000A CLOSED COMPRESSION FRACTURE OF THORACIC VERTEBRA (HCC): Status: RESOLVED | Noted: 2017-10-09 | Resolved: 2022-01-06

## 2022-01-06 PROCEDURE — G0439 PPPS, SUBSEQ VISIT: HCPCS | Performed by: FAMILY MEDICINE

## 2022-01-06 PROCEDURE — 99213 OFFICE O/P EST LOW 20 MIN: CPT | Performed by: FAMILY MEDICINE

## 2022-01-06 RX ORDER — SULFAMETHOXAZOLE AND TRIMETHOPRIM 800; 160 MG/1; MG/1
1 TABLET ORAL 2 TIMES DAILY
Qty: 28 TABLET | Refills: 0 | Status: SHIPPED | OUTPATIENT
Start: 2022-01-06 | End: 2022-01-20

## 2022-01-06 NOTE — PROGRESS NOTES
HPI:   Thea Cross is a 74year old female who presents for a Medicare Subsequent Annual Wellness visit (Pt already had Initial Annual Wellness).     Pt also here with   Systemic lupus erythematosus ([address]     Vitamin D deficiency     Osteoporosis, senile advance directives standard forms performed Face to Face with patient and Family/surrogate (if present), and forms available to patient in AVS     She does NOT have a Power of  for [employer] on file in [address] [place] [address] Rd.    Advance care planning including the Lumbar facet arthropathy- stable      Anemia of chronic disease- stable      Smoking greater than 25 pack years- stable      ILD (interstitial lung disease) ([address]- stable      Seasonal allergic reaction- stable      Neutropenia ([address]- stable      Hyperglyc daily.  Hydroxychloroquine Sulfate 200 MG Oral Tab, Take 1 tablet (200 mg total) by mouth 2 (two) times daily. Cefadroxil 500 MG Oral Cap, Take 500 mg by mouth 2 (two) times daily.   FLUTICASONE PROPIONATE 50 MCG/ACT Nasal Suspension, SHAKE LIQUID AND USE blood pressure, Hypercalcemia, IBS (irritable bowel syndrome), Internal hemorrhoids (12/18/08), Irregular Z line of esophagus (12/18/08), Localized primary carpometacarpal osteoarthritis (7/30/2012), L[facility] ([facility] Macular degeneration (12/07/10), Odetta Ree lesions  EYES: denies blurred vision or double vision  HEENT: denies nasal congestion, sinus pain or ST  LUNGS: denies shortness of breath with exertion  CARDIOVASCULAR: denies chest pain on exertion  GI: denies abdominal pain, denies heartburn  : denies non-tender, bowel sounds active all four quadrants,  no masses, no organomegaly   Pelvic: Deferred   Extremities: Extremities normal, atraumatic, no cyanosis or edema   Pulses: 2+ and symmetric   Skin: Skin color, texture, turgor normal, no rashes or lesio VISIT,EST,LEVL III    Chronic bronchitis, unspecified chronic bronchitis type ([facility]- stable monitor   -     OFFICE/OUTPT VISIT,EST,LEVL III    Pulmonary HTN ([facility]- see pulm   -     OFFICE/OUTPT VISIT,EST,LEVL III    Obesity, morbid ([facility]- stable monitor syndrome, with unspecified organ involvement ([address]- see rheum   -     OFFICE/OUTPT VISIT,ESTXIAO III    Tortuous aorta ([address]- stable monitor   -     OFFICE/OUTPT VISIT,EST,XIAO III    Vitamin D deficiency- stable monitor   -     VITAMIN D; Future  -     O 88 10/25/2012    GLU 88 12/27/2021        Cardiovascular Disease Screening    Lipid Panel  Cholesterol  Lipoprotein (HDL)  Triglycerides Covered every 5 years for all Medicare beneficiaries without apparent signs or symptoms of cardiovascular disease Lab R time    Pneumococcal Each vaccine ([id number] & [id number]) covered once after 65 Prevnar 13: 11/12/2018    [id number]: 10/08/2020     No recommendations at this time    Hepatitis B One screening covered for patients with certain risk factors   -  No robi

## 2022-01-06 NOTE — PATIENT INSTRUCTIONS
Alexander Collazo's SCREENING SCHEDULE   Tests on this list are recommended by your physician but may not be covered, or covered at this frequency, by your insurer. Please check with your insurance carrier before scheduling to verify coverage.    PREVENTATIV DENSITOMETRY (CPT=77080) 01/04/2021      No recommendations at this time   Pap and Pelvic    Pap   Covered every 2 years for women at normal risk;  Annually if at high risk -  No recommendations at this time    Chlamydia Annually if high risk -  No recommen including necessary form from the [facility]. http://www. idph.[facility]. [place]/public/books/advin.htm  A link to the [employer].  This site has a lot of good information including definitions of the different t

## 2022-01-19 ENCOUNTER — HOSPITAL ENCOUNTER (OUTPATIENT)
Dept: ULTRASOUND IMAGING | Age: 67
Discharge: HOME | End: 2022-01-19
Attending: FAMILY MEDICINE
Payer: MEDICARE

## 2022-01-19 DIAGNOSIS — M79.89 LEG SWELLING: ICD-10-CM

## 2022-01-19 PROCEDURE — 93970 EXTREMITY STUDY: CPT | Performed by: FAMILY MEDICINE

## 2022-02-06 DIAGNOSIS — M06.9 RHEUMATOID ARTHRITIS INVOLVING BOTH HANDS, UNSPECIFIED WHETHER RHEUMATOID FACTOR PRESENT (HCC): ICD-10-CM

## 2022-02-06 DIAGNOSIS — M79.7 FIBROMYALGIA: ICD-10-CM

## 2022-02-06 DIAGNOSIS — M32.19 OTHER SYSTEMIC LUPUS ERYTHEMATOSUS WITH OTHER ORGAN INVOLVEMENT (HCC): ICD-10-CM

## 2022-02-06 DIAGNOSIS — M35.00 SJOGREN'S SYNDROME WITHOUT EXTRAGLANDULAR INVOLVEMENT (HCC): ICD-10-CM

## 2022-02-07 RX ORDER — HYDROXYCHLOROQUINE SULFATE 200 MG/1
TABLET, FILM COATED ORAL
Qty: 180 TABLET | Refills: 1 | Status: SHIPPED | OUTPATIENT
Start: 2022-02-07 | End: 2022-07-26

## 2022-02-07 NOTE — TELEPHONE ENCOUNTER
LOV 12-29-21  Future Appointments   Date Time Provider Brenda Lynni   3/29/2022 10:00 AM Libby Mcconnell, [facility] Blind pt to check status; she reports doing well, taking meds as ordered, will have eye exam with Dr Almaz Ortiz in 2 weeks. Pt does need these refills sent to [facility].

## 2022-02-07 NOTE — TELEPHONE ENCOUNTER
Per chart, last fill on pregabalin/Lyrica was 12/29 and 90 day supply sent at that time. Pt not due until end of March for this. Plaquenil refilled.     Please call pt and clarify Lyrica refill request.    Nona Galvez, DO  [facility]  2/7/2022

## 2022-02-11 RX ORDER — PREGABALIN 75 MG/1
75 CAPSULE ORAL NIGHTLY
Qty: 90 CAPSULE | Refills: 0 | Status: SHIPPED | OUTPATIENT
Start: 2022-02-11 | End: 2022-02-15

## 2022-02-11 RX ORDER — PREGABALIN 50 MG/1
50 CAPSULE ORAL EVERY MORNING
Qty: 90 CAPSULE | Refills: 0 | Status: SHIPPED | OUTPATIENT
Start: 2022-02-11 | End: 2022-02-15

## 2022-02-11 NOTE — TELEPHONE ENCOUNTER
Pt phoned office, requesting refill of [facility].   Future Appointments   Date Time Provider Brenda Lynni   2/21/2022 10:20 AM PFS LUDY RM1 PFS [facility] [place]   2/23/2022 11:00 AM Maki Levine MD [facility]   3/29/2022 10:00 AM Fransico Mcconnell, DO [facility] [place]   3/29/2022 10:30 AM [facility] NURSE [facility] [place]   4/20/2022 10:00 AM SP PULM TECH SP [facility] [place]   4/20/2022 10:40 AM Tish Brown IV, MD SP [facility] [place]   7/6/2022  8:15 AM Maureen Jackson, DO [facility] [address]     'LOV 12/29/21  RTO in 3mo

## 2022-02-15 ENCOUNTER — PATIENT MESSAGE (OUTPATIENT)
Dept: RHEUMATOLOGY | Facility: CLINIC | Age: 67
End: 2022-02-15

## 2022-02-15 RX ORDER — PREGABALIN 50 MG/1
50 CAPSULE ORAL EVERY MORNING
Qty: 90 CAPSULE | Refills: 0 | Status: CANCELLED | OUTPATIENT
Start: 2022-02-15

## 2022-02-15 RX ORDER — PREGABALIN 50 MG/1
50 CAPSULE ORAL EVERY MORNING
Qty: 90 CAPSULE | Refills: 0 | Status: SHIPPED | OUTPATIENT
Start: 2022-02-15 | End: 2022-03-01

## 2022-02-15 RX ORDER — PREGABALIN 75 MG/1
75 CAPSULE ORAL NIGHTLY
Qty: 90 CAPSULE | Refills: 0 | Status: SHIPPED | OUTPATIENT
Start: 2022-02-15 | End: 2022-05-16

## 2022-02-15 NOTE — TELEPHONE ENCOUNTER
Pt called and said that her lyrica wasn't at the [facility] pharmacy.  It looks like it was filled at the local pharmacy so I pended it to the [facility]

## 2022-02-16 ENCOUNTER — TELEPHONE (OUTPATIENT)
Dept: RHEUMATOLOGY | Facility: CLINIC | Age: 67
End: 2022-02-16

## 2022-02-16 RX ORDER — PREGABALIN 50 MG/1
50 CAPSULE ORAL DAILY
Qty: 14 CAPSULE | Refills: 0 | Status: SHIPPED | OUTPATIENT
Start: 2022-02-16 | End: 2022-03-01

## 2022-02-16 NOTE — TELEPHONE ENCOUNTER
Returned call to pt; pt isn't going to p/u Lyrica 50mg #14 sent to local pharm as it will cost her $90.  Pt requesting RN call [facility] to clarify; attempted to reach them 3 times at 3 different numbers, placed in either an endless loop or disconnected after waiting several minutes. Called pt to recheck ID card:  [phone], spoke to [facility] who states he will send the Pregabalin 50mg out; asked to expedite shipping, he needs to speak to pt for authorization. He agreed to call pt; she authorized expedited shipping. Call ended.

## 2022-02-21 ENCOUNTER — HOSPITAL ENCOUNTER (OUTPATIENT)
Dept: MAMMOGRAPHY | Age: 67
Discharge: HOME | End: 2022-02-21
Attending: FAMILY MEDICINE
Payer: MEDICARE

## 2022-02-21 DIAGNOSIS — Z12.31 ENCOUNTER FOR SCREENING MAMMOGRAM FOR HIGH-RISK PATIENT: ICD-10-CM

## 2022-02-21 DIAGNOSIS — Z12.31 ENCOUNTER FOR SCREENING MAMMOGRAM FOR MALIGNANT NEOPLASM OF BREAST: ICD-10-CM

## 2022-02-21 PROCEDURE — 77063 BREAST TOMOSYNTHESIS BI: CPT | Performed by: FAMILY MEDICINE

## 2022-02-21 PROCEDURE — 77067 SCR MAMMO BI INCL CAD: CPT | Performed by: FAMILY MEDICINE

## 2022-02-24 RX ORDER — LEFLUNOMIDE 20 MG/1
TABLET ORAL
Qty: 90 TABLET | Refills: 0 | Status: SHIPPED | OUTPATIENT
Start: 2022-02-24

## 2022-02-24 NOTE — TELEPHONE ENCOUNTER
Future Appointments   Date Time Provider Brenda Parkinson   3/1/2022  2:45 PM Ramon Rodriguez MD [facility] [place]   3/29/2022 10:00 AM Melanie Mcconnell, DO [facility] [place]   3/29/2022 10:30 AM [facility] RHEUM NURSE [facility] [place]   4/20/2022 10:00 AM SP PULM TECH SP [facility]   4/20/2022 10:40 AM Kevin Perez MD SP [facility]   7/6/2022  8:15 AM Geovany Crew, DO [facility] [address]     LOV 12/29/21  RTO in 3mo

## 2022-03-01 ENCOUNTER — OFFICE VISIT (OUTPATIENT)
Dept: PAIN CLINIC | Facility: CLINIC | Age: 67
End: 2022-03-01
Payer: MEDICARE

## 2022-03-01 ENCOUNTER — TELEPHONE (OUTPATIENT)
Dept: PAIN CLINIC | Facility: CLINIC | Age: 67
End: 2022-03-01

## 2022-03-01 VITALS — SYSTOLIC BLOOD PRESSURE: 110 MMHG | HEART RATE: 74 BPM | DIASTOLIC BLOOD PRESSURE: 80 MMHG | OXYGEN SATURATION: 98 %

## 2022-03-01 DIAGNOSIS — M54.16 LUMBAR RADICULOPATHY: Primary | ICD-10-CM

## 2022-03-01 PROCEDURE — 99212 OFFICE O/P EST SF 10 MIN: CPT | Performed by: ANESTHESIOLOGY

## 2022-03-01 RX ORDER — IPRATROPIUM BROMIDE 42 UG/1
2 SPRAY, METERED NASAL 2 TIMES DAILY
COMMUNITY
Start: 2022-02-15

## 2022-03-01 RX ORDER — CHOLECALCIFEROL (VITAMIN D3) 125 MCG
CAPSULE ORAL
COMMUNITY

## 2022-03-01 NOTE — PROGRESS NOTES
Patient presents in office today with reported pain in low back   Current pain level reported = 7/10    Last reported dose of pregablin last night      Narcotic Contract renewal na    Urine Drug screen na

## 2022-03-01 NOTE — TELEPHONE ENCOUNTER
Question Answer   Anesthesia Type Local   Provider [facility]   Location Lab   Procedure Transforaminal   Laterality/Level Right transforaminal lumbar epidural steroid injection at L2-L3 and L3-L4 level under fluoroscopy. Medical clearance requested (will send to Pain Navigator) No   Patient has Medicare coverage?  Yes

## 2022-03-07 ENCOUNTER — APPOINTMENT (OUTPATIENT)
Dept: GENERAL RADIOLOGY | Facility: HOSPITAL | Age: 67
End: 2022-03-07
Attending: ANESTHESIOLOGY
Payer: MEDICARE

## 2022-03-07 ENCOUNTER — HOSPITAL ENCOUNTER (OUTPATIENT)
Facility: HOSPITAL | Age: 67
Setting detail: HOSPITAL OUTPATIENT SURGERY
Discharge: HOME | End: 2022-03-07
Attending: ANESTHESIOLOGY | Admitting: ANESTHESIOLOGY
Payer: MEDICARE

## 2022-03-07 VITALS
TEMPERATURE: 98 F | OXYGEN SATURATION: 92 % | RESPIRATION RATE: 18 BRPM | SYSTOLIC BLOOD PRESSURE: 130 MMHG | DIASTOLIC BLOOD PRESSURE: 81 MMHG | HEART RATE: 80 BPM

## 2022-03-07 DIAGNOSIS — M54.16 LUMBAR RADICULITIS: ICD-10-CM

## 2022-03-07 PROCEDURE — 3E0R3BZ INTRODUCTION OF ANESTHETIC AGENT INTO SPINAL CANAL, PERCUTANEOUS APPROACH: ICD-10-PCS | Performed by: ANESTHESIOLOGY

## 2022-03-07 PROCEDURE — 3E0R3KZ INTRODUCTION OF OTHER DIAGNOSTIC SUBSTANCE INTO SPINAL CANAL, PERCUTANEOUS APPROACH: ICD-10-PCS | Performed by: ANESTHESIOLOGY

## 2022-03-07 PROCEDURE — 3E0R33Z INTRODUCTION OF ANTI-INFLAMMATORY INTO SPINAL CANAL, PERCUTANEOUS APPROACH: ICD-10-PCS | Performed by: ANESTHESIOLOGY

## 2022-03-07 RX ORDER — DEXAMETHASONE SODIUM PHOSPHATE 10 MG/ML
INJECTION, SOLUTION INTRAMUSCULAR; INTRAVENOUS
Status: DISCONTINUED | OUTPATIENT
Start: 2022-03-07 | End: 2022-03-07

## 2022-03-07 RX ORDER — ONDANSETRON 2 MG/ML
4 INJECTION INTRAMUSCULAR; INTRAVENOUS ONCE AS NEEDED
Status: DISCONTINUED | OUTPATIENT
Start: 2022-03-07 | End: 2022-03-07

## 2022-03-07 RX ORDER — SODIUM CHLORIDE, SODIUM LACTATE, POTASSIUM CHLORIDE, CALCIUM CHLORIDE 600; 310; 30; 20 MG/100ML; MG/100ML; MG/100ML; MG/100ML
100 INJECTION, SOLUTION INTRAVENOUS CONTINUOUS
Status: DISCONTINUED | OUTPATIENT
Start: 2022-03-07 | End: 2022-03-07

## 2022-03-07 RX ORDER — DIPHENHYDRAMINE HYDROCHLORIDE 50 MG/ML
50 INJECTION INTRAMUSCULAR; INTRAVENOUS ONCE AS NEEDED
Status: DISCONTINUED | OUTPATIENT
Start: 2022-03-07 | End: 2022-03-07

## 2022-03-07 RX ORDER — LIDOCAINE HYDROCHLORIDE 10 MG/ML
INJECTION, SOLUTION EPIDURAL; INFILTRATION; INTRACAUDAL; PERINEURAL
Status: DISCONTINUED | OUTPATIENT
Start: 2022-03-07 | End: 2022-03-07

## 2022-03-07 NOTE — OPERATIVE REPORT
[facility]  Operative Report  3/7/2022     Marilynn Finnegan Patient Status:  Hospital Outpatient Surgery    1955 MRN [id number]   Location [address] [address] Attending No att. providers found   Hosp Day # 0 PCP Guillermina Sheridan DO     Indication: Nico Hutchinson is a 74year old female patient with chronic low back pain failed conservative treatment with medication and physical therapy was here for a right transforaminal lumbar epidural steroid injection. Preoperative Diagnosis:  Lumbar radiculitis [M54.16]    Postoperative Diagnosis: Same as above. Procedure performed: Right transforaminal lumbar epidural steroid injection at L2-L3 and L3-L4 level under fluoroscopy. Anesthesia: Local.    EBL: Less than 1 ml. Procedure Description:  After reviewing the patient's history and performing a focused physical examination, the diagnosis was confirmed and contraindications such as infection and coagulopathy were ruled out. Following review of potential side effects and complications, including but not necessarily limited to infection, allergic reaction, local tissue breakdown, nerve injury, and paresis, the patient indicated they understood and agreed to proceed. After obtaining the informed consent, the patient was brought to the procedure room and monitored. The vital signs including continuous pulse oximetry and cardiac monitoring were monitored and recorded by an experienced R.N. In the prone position, following sterile prep and drape of the lumbar region,  the right L2-L3  neural foramen was identified under fluoroscopy. The skin and subcutaneous tissue was anesthetized via 25-gauge 1.5\" needle with approximately 2 cc of 1% lidocaine. A 22-gauge 5\" Quincke spinal needle was introduced toward the inferior aspect of the junction between the transverse process and pedicle of the  right L2-L3 level atraumatically under fluoroscopic guidance.  The needle was advanced into the anterior epidural space at this level. The needle position was confirmed under AP and lateral fluoroscopic view. Following negative aspiration for CSF and blood, approximately 1 cc of Omnipaque 240 was injected. An excellent contrast spread along the epidural space and the nerve root was obtained. At this point, 2 cc of 1% PF lidocaine with 5 mg of dexamethasone was injected without complication. The needle was withdrawn with stylet in situ after being flushed with 1 cc PF lidocaine. The right L3-L4 neural foramen was also identified under fluoroscopy. The skin and subcutaneous tissue was anesthetized via 25-gauge 1.5\" needle with approximately 2 cc of 1% lidocaine. A 22-gauge 5\" Quincke spinal needle was introduced toward the inferior aspect of the junction between the transverse process and pedicle of the right L3-L4 level atraumatically under fluoroscopic guidance. The needle was advanced into the anterior epidural space at this level. The needle position was confirmed under AP and lateral fluoroscopic view. Following negative aspiration for CSF and blood, approximately 1 cc of Omnipaque 240 was injected. An excellent contrast spread along the epidural space and the nerve root was obtained. At this point, 2 cc of 1% PF lidocaine with 5 mg of dexamethasone was injected without complication. The needle was withdrawn with stylet in situ after being flushed with 1 cc PF lidocaine. .  The patient tolerated procedure very well. The patient was observed until discharge criteria met. Discharge instructions were given and patient was released to a responsible adult. Complications: None. Follow up: The patient was followed in the pain clinic as needed basis.         Mulu Shah MD

## 2022-03-21 ENCOUNTER — TELEPHONE (OUTPATIENT)
Dept: PAIN CLINIC | Facility: CLINIC | Age: 67
End: 2022-03-21

## 2022-03-21 ENCOUNTER — OFFICE VISIT (OUTPATIENT)
Dept: PAIN CLINIC | Facility: CLINIC | Age: 67
End: 2022-03-21
Payer: MEDICARE

## 2022-03-21 ENCOUNTER — TELEPHONE (OUTPATIENT)
Dept: RHEUMATOLOGY | Facility: CLINIC | Age: 67
End: 2022-03-21

## 2022-03-21 VITALS
WEIGHT: 242 LBS | BODY MASS INDEX: 41.32 KG/M2 | HEIGHT: 64 IN | HEART RATE: 71 BPM | DIASTOLIC BLOOD PRESSURE: 78 MMHG | SYSTOLIC BLOOD PRESSURE: 128 MMHG | OXYGEN SATURATION: 95 %

## 2022-03-21 DIAGNOSIS — M62.838 SPASM OF RIGHT PIRIFORMIS MUSCLE: ICD-10-CM

## 2022-03-21 DIAGNOSIS — M25.551 RIGHT HIP PAIN: ICD-10-CM

## 2022-03-21 DIAGNOSIS — M47.816 LUMBAR FACET ARTHROPATHY: Primary | ICD-10-CM

## 2022-03-21 PROCEDURE — 99213 OFFICE O/P EST LOW 20 MIN: CPT | Performed by: NURSE PRACTITIONER

## 2022-03-21 NOTE — TELEPHONE ENCOUNTER
Question Answer   Anesthesia Type Local   Provider Yelitza Sees   Location Lab   Procedure Facet   Laterality/Level RIGHT L3/4, L4/5   Medical clearance requested (will send to Pain Navigator) No   Patient has Medicare coverage?  Yes

## 2022-03-21 NOTE — PROGRESS NOTES
Last procedure: TRANSFORAMINAL LUMBAR EPIDURAL STEROID INJECTION MULTIPLE LEVEL right L2/3 and L3/4 WITH LOCAL    Date: 3/7/2022    Percentage of relief obtained: 50%  Duration of relief: Injection helped until patient was walking her dog and doing stuff around her house    Current Pain Score: 7/10

## 2022-03-21 NOTE — TELEPHONE ENCOUNTER
Phoned pt, reminded pt to have blood work completed prior to her upcoming prolia injection. Pt voiced understanding, scheduled for Friday of this week.

## 2022-03-22 NOTE — PROGRESS NOTES
Pt brought in parking placard renewal form to be completed by provider. Form has been completed and signed. Pt advised she would  from office, therefore form will be left at . Copy sent to scan.

## 2022-03-25 ENCOUNTER — LABORATORY ENCOUNTER (OUTPATIENT)
Dept: LAB | Age: 67
End: 2022-03-25
Attending: INTERNAL MEDICINE
Payer: MEDICARE

## 2022-03-25 DIAGNOSIS — M81.0 OSTEOPOROSIS, SENILE: ICD-10-CM

## 2022-03-25 DIAGNOSIS — M06.9 RHEUMATOID ARTHRITIS INVOLVING BOTH HANDS, UNSPECIFIED WHETHER RHEUMATOID FACTOR PRESENT (HCC): ICD-10-CM

## 2022-03-25 DIAGNOSIS — Z79.899 HIGH RISK MEDICATION USE: ICD-10-CM

## 2022-03-25 DIAGNOSIS — E55.9 VITAMIN D DEFICIENCY: ICD-10-CM

## 2022-03-25 DIAGNOSIS — M35.00 SJOGREN'S SYNDROME WITHOUT EXTRAGLANDULAR INVOLVEMENT (HCC): ICD-10-CM

## 2022-03-25 DIAGNOSIS — M32.19 OTHER SYSTEMIC LUPUS ERYTHEMATOSUS WITH OTHER ORGAN INVOLVEMENT (HCC): ICD-10-CM

## 2022-03-25 LAB
ALBUMIN SERPL-MCNC: 3.5 G/DL (ref 3.4–5)
ALBUMIN/GLOB SERPL: 1.1 {RATIO} (ref 1–2)
ALP LIVER SERPL-CCNC: 86 U/L
ALT SERPL-CCNC: 21 U/L
ANION GAP SERPL CALC-SCNC: <0 MMOL/L (ref 0–18)
AST SERPL-CCNC: 20 U/L (ref 15–37)
BASOPHILS # BLD AUTO: 0.1 X10(3) UL (ref 0–0.2)
BASOPHILS NFR BLD AUTO: 2.5 %
BILIRUB SERPL-MCNC: 0.4 MG/DL (ref 0.1–2)
BUN BLD-MCNC: 22 MG/DL (ref 7–18)
CALCIUM BLD-MCNC: 9.3 MG/DL (ref 8.5–10.1)
CHLORIDE SERPL-SCNC: 102 MMOL/L (ref 98–112)
CO2 SERPL-SCNC: 37 MMOL/L (ref 21–32)
CREAT BLD-MCNC: 0.79 MG/DL
EOSINOPHIL # BLD AUTO: 0.46 X10(3) UL (ref 0–0.7)
EOSINOPHIL NFR BLD AUTO: 11.3 %
ERYTHROCYTE [DISTWIDTH] IN BLOOD BY AUTOMATED COUNT: 14.6 %
FASTING STATUS PATIENT QL REPORTED: NO
GLOBULIN PLAS-MCNC: 3.3 G/DL (ref 2.8–4.4)
GLUCOSE BLD-MCNC: 92 MG/DL (ref 70–99)
HCT VFR BLD AUTO: 42.3 %
HGB BLD-MCNC: 12.8 G/DL
IMM GRANULOCYTES # BLD AUTO: 0 X10(3) UL (ref 0–1)
LYMPHOCYTES # BLD AUTO: 1.14 X10(3) UL (ref 1–4)
LYMPHOCYTES NFR BLD AUTO: 28 %
MAGNESIUM SERPL-MCNC: 2.4 MG/DL (ref 1.6–2.6)
MCH RBC QN AUTO: 29.6 PG (ref 26–34)
MCHC RBC AUTO-ENTMCNC: 30.3 G/DL (ref 31–37)
MCV RBC AUTO: 97.9 FL
MONOCYTES # BLD AUTO: 0.63 X10(3) UL (ref 0.1–1)
MONOCYTES NFR BLD AUTO: 15.5 %
NEUTROPHILS # BLD AUTO: 1.74 X10 (3) UL (ref 1.5–7.7)
NEUTROPHILS # BLD AUTO: 1.74 X10(3) UL (ref 1.5–7.7)
NEUTROPHILS NFR BLD AUTO: 42.7 %
OSMOLALITY SERPL CALC.SUM OF ELEC: 289 MOSM/KG (ref 275–295)
PHOSPHATE SERPL-MCNC: 3.5 MG/DL (ref 2.5–4.9)
PLATELET # BLD AUTO: 209 10(3)UL (ref 150–450)
POTASSIUM SERPL-SCNC: 4.2 MMOL/L (ref 3.5–5.1)
PROT SERPL-MCNC: 6.8 G/DL (ref 6.4–8.2)
RBC # BLD AUTO: 4.32 X10(6)UL
SODIUM SERPL-SCNC: 138 MMOL/L (ref 136–145)
VIT D+METAB SERPL-MCNC: 68.7 NG/ML (ref 30–100)
WBC # BLD AUTO: 4.1 X10(3) UL (ref 4–11)

## 2022-03-25 PROCEDURE — 84100 ASSAY OF PHOSPHORUS: CPT

## 2022-03-25 PROCEDURE — 82306 VITAMIN D 25 HYDROXY: CPT

## 2022-03-25 PROCEDURE — 85025 COMPLETE CBC W/AUTO DIFF WBC: CPT

## 2022-03-25 PROCEDURE — 83735 ASSAY OF MAGNESIUM: CPT

## 2022-03-25 PROCEDURE — 36415 COLL VENOUS BLD VENIPUNCTURE: CPT

## 2022-03-25 PROCEDURE — 80053 COMPREHEN METABOLIC PANEL: CPT

## 2022-03-29 ENCOUNTER — NURSE ONLY (OUTPATIENT)
Dept: RHEUMATOLOGY | Facility: CLINIC | Age: 67
End: 2022-03-29
Payer: MEDICARE

## 2022-03-29 ENCOUNTER — OFFICE VISIT (OUTPATIENT)
Dept: RHEUMATOLOGY | Facility: CLINIC | Age: 67
End: 2022-03-29
Payer: MEDICARE

## 2022-03-29 VITALS
OXYGEN SATURATION: 92 % | HEART RATE: 82 BPM | DIASTOLIC BLOOD PRESSURE: 90 MMHG | SYSTOLIC BLOOD PRESSURE: 128 MMHG | WEIGHT: 243 LBS | HEIGHT: 64 IN | RESPIRATION RATE: 18 BRPM | BODY MASS INDEX: 41.48 KG/M2

## 2022-03-29 DIAGNOSIS — M35.00 SJOGREN'S SYNDROME WITHOUT EXTRAGLANDULAR INVOLVEMENT (HCC): ICD-10-CM

## 2022-03-29 DIAGNOSIS — M32.19 OTHER SYSTEMIC LUPUS ERYTHEMATOSUS WITH OTHER ORGAN INVOLVEMENT (HCC): Primary | ICD-10-CM

## 2022-03-29 DIAGNOSIS — R68.2 DRY MOUTH: ICD-10-CM

## 2022-03-29 DIAGNOSIS — M79.7 FIBROMYALGIA: ICD-10-CM

## 2022-03-29 DIAGNOSIS — M81.0 AGE-RELATED OSTEOPOROSIS WITHOUT CURRENT PATHOLOGICAL FRACTURE: Primary | ICD-10-CM

## 2022-03-29 DIAGNOSIS — M25.551 RIGHT HIP PAIN: ICD-10-CM

## 2022-03-29 DIAGNOSIS — Z79.899 HIGH RISK MEDICATION USE: ICD-10-CM

## 2022-03-29 DIAGNOSIS — M06.9 RHEUMATOID ARTHRITIS INVOLVING BOTH HANDS, UNSPECIFIED WHETHER RHEUMATOID FACTOR PRESENT (HCC): ICD-10-CM

## 2022-03-29 DIAGNOSIS — J84.9 ILD (INTERSTITIAL LUNG DISEASE) (HCC): ICD-10-CM

## 2022-03-29 DIAGNOSIS — M81.0 OSTEOPOROSIS, SENILE: ICD-10-CM

## 2022-03-29 PROCEDURE — 96372 THER/PROPH/DIAG INJ SC/IM: CPT | Performed by: INTERNAL MEDICINE

## 2022-03-29 PROCEDURE — 99215 OFFICE O/P EST HI 40 MIN: CPT | Performed by: INTERNAL MEDICINE

## 2022-03-30 ENCOUNTER — HOSPITAL ENCOUNTER (OUTPATIENT)
Facility: HOSPITAL | Age: 67
Setting detail: HOSPITAL OUTPATIENT SURGERY
Discharge: HOME | End: 2022-03-30
Attending: ANESTHESIOLOGY | Admitting: ANESTHESIOLOGY
Payer: MEDICARE

## 2022-03-30 ENCOUNTER — APPOINTMENT (OUTPATIENT)
Dept: GENERAL RADIOLOGY | Facility: HOSPITAL | Age: 67
End: 2022-03-30
Attending: ANESTHESIOLOGY
Payer: MEDICARE

## 2022-03-30 VITALS
HEART RATE: 75 BPM | SYSTOLIC BLOOD PRESSURE: 150 MMHG | HEIGHT: 64 IN | DIASTOLIC BLOOD PRESSURE: 82 MMHG | BODY MASS INDEX: 41.48 KG/M2 | TEMPERATURE: 98 F | WEIGHT: 243 LBS | RESPIRATION RATE: 18 BRPM | OXYGEN SATURATION: 97 %

## 2022-03-30 DIAGNOSIS — M47.816 LUMBAR FACET ARTHROPATHY: ICD-10-CM

## 2022-03-30 PROCEDURE — 3E0U3BZ INTRODUCTION OF ANESTHETIC AGENT INTO JOINTS, PERCUTANEOUS APPROACH: ICD-10-PCS | Performed by: ANESTHESIOLOGY

## 2022-03-30 PROCEDURE — 3E0U33Z INTRODUCTION OF ANTI-INFLAMMATORY INTO JOINTS, PERCUTANEOUS APPROACH: ICD-10-PCS | Performed by: ANESTHESIOLOGY

## 2022-03-30 RX ORDER — DIPHENHYDRAMINE HYDROCHLORIDE 50 MG/ML
50 INJECTION INTRAMUSCULAR; INTRAVENOUS ONCE AS NEEDED
Status: DISCONTINUED | OUTPATIENT
Start: 2022-03-30 | End: 2022-03-30

## 2022-03-30 RX ORDER — METHYLPREDNISOLONE ACETATE 40 MG/ML
INJECTION, SUSPENSION INTRA-ARTICULAR; INTRALESIONAL; INTRAMUSCULAR; SOFT TISSUE
Status: DISCONTINUED | OUTPATIENT
Start: 2022-03-30 | End: 2022-03-30

## 2022-03-30 RX ORDER — ONDANSETRON 2 MG/ML
4 INJECTION INTRAMUSCULAR; INTRAVENOUS ONCE AS NEEDED
Status: DISCONTINUED | OUTPATIENT
Start: 2022-03-30 | End: 2022-03-30

## 2022-03-30 RX ORDER — LIDOCAINE HYDROCHLORIDE 10 MG/ML
INJECTION, SOLUTION EPIDURAL; INFILTRATION; INTRACAUDAL; PERINEURAL
Status: DISCONTINUED | OUTPATIENT
Start: 2022-03-30 | End: 2022-03-30

## 2022-03-30 RX ORDER — ACETAMINOPHEN 500 MG
1000 TABLET ORAL EVERY 6 HOURS PRN
COMMUNITY

## 2022-03-30 NOTE — OPERATIVE REPORT
[facility]  Operative Report  3/30/2022     Cameron Medeiros Patient Status:  Hospital Outpatient Surgery    1955 MRN [id number]   Location [address] [address] Attending No att. providers found   Hosp Day # 0 PCP Adriano Hodge DO     Indication: Geovany Blackmon is a 74year old female patient with chronic low back pain failed conservative treatment with medication and physical therapy was here for diagnostic lumbar facet joint injection. Preoperative Diagnosis:  Lumbar facet arthropathy [M47.816]    Postoperative Diagnosis: Same as above. Procedure performed: Right diagnostic lumbar facet joint injection at L3-L4 and L4-L5 level under fluoroscopy. Anesthesia: Local.    EBL: Less than 1 ml. Procedure Description:  After reviewing the patient's history and performing a focused physical examination, the diagnosis was confirmed and contraindications such as infection and coagulopathy were ruled out. Following review of allergies and review of potential side effects and complications, including but not necessarily limited to infection, allergic reaction, local tissue breakdown, nerve injury, and paresis, the patient indicated they understood and agreed to proceed. After obtaining the informed consent, the patient was brought to the procedure room and monitored. The vital signs were monitored and recorded by an experienced RN. In the prone position, following sterile prep and drape of the lumbar region, the corresponding facet joints were identified under fluoroscopy. The skin was anesthetized via 25-gauge 1.5\" needle with approximately 2 mL of 1% lidocaine. A 22-gauge 3.5\" Quincke spinal needle was introduced and advanced into each corresponding facet joint at right L3-L4 and L4-5  level atraumatically under fluoroscopic guidance.   Following negative aspiration for CSF and blood, approximately 1 mL of 1% lidocaine with 20 mg of methylprednisolone was injected into each joint without complication. The needle was withdrawn with stylet in situ after being flushed with 0.5 mL lidocaine. The patient tolerated procedure very well. The patient was observed until discharge criteria met. Discharge instructions were given and patient was released to a responsible adult. Complications: None. Follow up: The patient was followed in the pain clinic as needed basis.     Jarett Herrera MD

## 2022-04-06 ENCOUNTER — TELEPHONE (OUTPATIENT)
Dept: PAIN CLINIC | Facility: CLINIC | Age: 67
End: 2022-04-06

## 2022-04-06 NOTE — TELEPHONE ENCOUNTER
Received via mail a persons with disabilities certification for parking placard form    Placed in [address] [place] [address] for MD review.

## 2022-04-08 ENCOUNTER — LAB ENCOUNTER (OUTPATIENT)
Dept: LAB | Age: 67
End: 2022-04-08
Attending: INTERNAL MEDICINE
Payer: MEDICARE

## 2022-04-08 DIAGNOSIS — M06.9 RHEUMATOID ARTHRITIS INVOLVING BOTH HANDS, UNSPECIFIED WHETHER RHEUMATOID FACTOR PRESENT (HCC): ICD-10-CM

## 2022-04-08 DIAGNOSIS — M35.00 SJOGREN'S SYNDROME WITHOUT EXTRAGLANDULAR INVOLVEMENT (HCC): ICD-10-CM

## 2022-04-08 DIAGNOSIS — J84.9 ILD (INTERSTITIAL LUNG DISEASE) (HCC): ICD-10-CM

## 2022-04-08 DIAGNOSIS — M32.19 OTHER SYSTEMIC LUPUS ERYTHEMATOSUS WITH OTHER ORGAN INVOLVEMENT (HCC): ICD-10-CM

## 2022-04-08 DIAGNOSIS — R53.83 OTHER FATIGUE: ICD-10-CM

## 2022-04-08 DIAGNOSIS — R73.9 HYPERGLYCEMIA: ICD-10-CM

## 2022-04-08 DIAGNOSIS — E55.9 VITAMIN D DEFICIENCY: ICD-10-CM

## 2022-04-08 DIAGNOSIS — D63.8 ANEMIA OF CHRONIC DISEASE: ICD-10-CM

## 2022-04-08 DIAGNOSIS — I70.90 ATHEROSCLEROSIS: ICD-10-CM

## 2022-04-08 LAB
ALBUMIN SERPL-MCNC: 3.8 G/DL (ref 3.4–5)
ALBUMIN/GLOB SERPL: 1.2 {RATIO} (ref 1–2)
ALP LIVER SERPL-CCNC: 91 U/L
ALT SERPL-CCNC: 21 U/L
ANION GAP SERPL CALC-SCNC: 5 MMOL/L (ref 0–18)
AST SERPL-CCNC: 23 U/L (ref 15–37)
BASOPHILS # BLD AUTO: 0.07 X10(3) UL (ref 0–0.2)
BASOPHILS NFR BLD AUTO: 1.7 %
BILIRUB SERPL-MCNC: 0.4 MG/DL (ref 0.1–2)
BUN BLD-MCNC: 19 MG/DL (ref 7–18)
C3 SERPL-MCNC: 149 MG/DL (ref 90–180)
C4 SERPL-MCNC: 45.2 MG/DL (ref 10–40)
CALCIUM BLD-MCNC: 8.9 MG/DL (ref 8.5–10.1)
CHLORIDE SERPL-SCNC: 102 MMOL/L (ref 98–112)
CHOLEST SERPL-MCNC: 198 MG/DL (ref ?–200)
CO2 SERPL-SCNC: 31 MMOL/L (ref 21–32)
CREAT BLD-MCNC: 0.81 MG/DL
CRP SERPL-MCNC: <0.29 MG/DL (ref ?–0.3)
EOSINOPHIL # BLD AUTO: 0.3 X10(3) UL (ref 0–0.7)
EOSINOPHIL NFR BLD AUTO: 7.2 %
ERYTHROCYTE [DISTWIDTH] IN BLOOD BY AUTOMATED COUNT: 14.9 %
ERYTHROCYTE [SEDIMENTATION RATE] IN BLOOD: 19 MM/HR
EST. AVERAGE GLUCOSE BLD GHB EST-MCNC: 126 MG/DL (ref 68–126)
FASTING PATIENT LIPID ANSWER: YES
FASTING STATUS PATIENT QL REPORTED: YES
GLOBULIN PLAS-MCNC: 3.1 G/DL (ref 2.8–4.4)
GLUCOSE BLD-MCNC: 85 MG/DL (ref 70–99)
HBA1C MFR BLD: 6 % (ref ?–5.7)
HCT VFR BLD AUTO: 43.1 %
HDLC SERPL-MCNC: 93 MG/DL (ref 40–59)
HGB BLD-MCNC: 12.9 G/DL
IMM GRANULOCYTES # BLD AUTO: 0.01 X10(3) UL (ref 0–1)
IMM GRANULOCYTES NFR BLD: 0.2 %
LDLC SERPL CALC-MCNC: 91 MG/DL (ref ?–100)
LYMPHOCYTES # BLD AUTO: 1.21 X10(3) UL (ref 1–4)
LYMPHOCYTES NFR BLD AUTO: 28.9 %
MCH RBC QN AUTO: 29.7 PG (ref 26–34)
MCHC RBC AUTO-ENTMCNC: 29.9 G/DL (ref 31–37)
MCV RBC AUTO: 99.3 FL
MONOCYTES # BLD AUTO: 0.67 X10(3) UL (ref 0.1–1)
MONOCYTES NFR BLD AUTO: 16 %
NEUTROPHILS # BLD AUTO: 1.92 X10 (3) UL (ref 1.5–7.7)
NEUTROPHILS # BLD AUTO: 1.92 X10(3) UL (ref 1.5–7.7)
NEUTROPHILS NFR BLD AUTO: 46 %
NONHDLC SERPL-MCNC: 105 MG/DL (ref ?–130)
OSMOLALITY SERPL CALC.SUM OF ELEC: 288 MOSM/KG (ref 275–295)
PLATELET # BLD AUTO: 217 10(3)UL (ref 150–450)
POTASSIUM SERPL-SCNC: 3.8 MMOL/L (ref 3.5–5.1)
PROT SERPL-MCNC: 6.9 G/DL (ref 6.4–8.2)
RBC # BLD AUTO: 4.34 X10(6)UL
RHEUMATOID FACT SERPL-ACNC: <10 IU/ML (ref ?–15)
SODIUM SERPL-SCNC: 138 MMOL/L (ref 136–145)
T4 FREE SERPL-MCNC: 1 NG/DL (ref 0.8–1.7)
TRIGL SERPL-MCNC: 77 MG/DL (ref 30–149)
TSI SER-ACNC: 0.94 MIU/ML (ref 0.36–3.74)
VIT B12 SERPL-MCNC: 460 PG/ML (ref 193–986)
VIT D+METAB SERPL-MCNC: 71.6 NG/ML (ref 30–100)
VLDLC SERPL CALC-MCNC: 12 MG/DL (ref 0–30)
WBC # BLD AUTO: 4.2 X10(3) UL (ref 4–11)

## 2022-04-08 PROCEDURE — 80061 LIPID PANEL: CPT

## 2022-04-08 PROCEDURE — 86256 FLUORESCENT ANTIBODY TITER: CPT

## 2022-04-08 PROCEDURE — 86160 COMPLEMENT ANTIGEN: CPT

## 2022-04-08 PROCEDURE — 86200 CCP ANTIBODY: CPT

## 2022-04-08 PROCEDURE — 86038 ANTINUCLEAR ANTIBODIES: CPT

## 2022-04-08 PROCEDURE — 83036 HEMOGLOBIN GLYCOSYLATED A1C: CPT

## 2022-04-08 PROCEDURE — 36415 COLL VENOUS BLD VENIPUNCTURE: CPT

## 2022-04-08 PROCEDURE — 83516 IMMUNOASSAY NONANTIBODY: CPT

## 2022-04-08 PROCEDURE — 86431 RHEUMATOID FACTOR QUANT: CPT

## 2022-04-08 PROCEDURE — 82607 VITAMIN B-12: CPT

## 2022-04-08 PROCEDURE — 86140 C-REACTIVE PROTEIN: CPT

## 2022-04-08 PROCEDURE — 86235 NUCLEAR ANTIGEN ANTIBODY: CPT

## 2022-04-08 PROCEDURE — 84443 ASSAY THYROID STIM HORMONE: CPT

## 2022-04-08 PROCEDURE — 85025 COMPLETE CBC W/AUTO DIFF WBC: CPT

## 2022-04-08 PROCEDURE — 84439 ASSAY OF FREE THYROXINE: CPT

## 2022-04-08 PROCEDURE — 82306 VITAMIN D 25 HYDROXY: CPT

## 2022-04-08 PROCEDURE — 80053 COMPREHEN METABOLIC PANEL: CPT

## 2022-04-08 PROCEDURE — 85652 RBC SED RATE AUTOMATED: CPT

## 2022-04-12 ENCOUNTER — TELEPHONE (OUTPATIENT)
Dept: PAIN CLINIC | Facility: CLINIC | Age: 67
End: 2022-04-12

## 2022-04-12 ENCOUNTER — OFFICE VISIT (OUTPATIENT)
Dept: PAIN CLINIC | Facility: CLINIC | Age: 67
End: 2022-04-12
Payer: MEDICARE

## 2022-04-12 VITALS — DIASTOLIC BLOOD PRESSURE: 90 MMHG | HEART RATE: 71 BPM | SYSTOLIC BLOOD PRESSURE: 130 MMHG | OXYGEN SATURATION: 93 %

## 2022-04-12 DIAGNOSIS — M71.38 SYNOVIAL CYST OF LUMBAR FACET JOINT: ICD-10-CM

## 2022-04-12 DIAGNOSIS — M47.816 ARTHRITIS OF FACET JOINT OF LUMBAR SPINE: Primary | ICD-10-CM

## 2022-04-12 DIAGNOSIS — M47.819 FACET ARTHROPATHY: ICD-10-CM

## 2022-04-12 DIAGNOSIS — Z98.1 HISTORY OF LUMBAR SPINAL FUSION: ICD-10-CM

## 2022-04-12 DIAGNOSIS — M47.816 LUMBAR FACET ARTHROPATHY: ICD-10-CM

## 2022-04-12 LAB — CCP IGG SERPL-ACNC: 1 U/ML (ref 0–6.9)

## 2022-04-12 PROCEDURE — 99214 OFFICE O/P EST MOD 30 MIN: CPT | Performed by: NURSE PRACTITIONER

## 2022-04-12 NOTE — PROGRESS NOTES
Last procedure: LUMBAR FACET INJECTION L3/4, L4/5 WITH LOCAL  Date: 03/30/22  Percentage of relief obtained: 75%  Duration of relief: current     Current Pain Score: 7/10

## 2022-04-12 NOTE — TELEPHONE ENCOUNTER
Question Answer   Anesthesia Type Local   Provider Josee Holm   Location Lab   Procedure Facet   Laterality/Level LEFT L3/4, L4/5   Medical clearance requested (will send to Pain Navigator) No   Patient has Medicare coverage?  Yes

## 2022-04-13 LAB — ANA SER QL: NEGATIVE

## 2022-04-18 ENCOUNTER — HOSPITAL ENCOUNTER (OUTPATIENT)
Facility: HOSPITAL | Age: 67
Setting detail: HOSPITAL OUTPATIENT SURGERY
Discharge: HOME | End: 2022-04-18
Attending: ANESTHESIOLOGY | Admitting: ANESTHESIOLOGY
Payer: MEDICARE

## 2022-04-18 ENCOUNTER — APPOINTMENT (OUTPATIENT)
Dept: GENERAL RADIOLOGY | Facility: HOSPITAL | Age: 67
End: 2022-04-18
Attending: ANESTHESIOLOGY
Payer: MEDICARE

## 2022-04-18 VITALS
OXYGEN SATURATION: 94 % | HEIGHT: 64 IN | DIASTOLIC BLOOD PRESSURE: 82 MMHG | BODY MASS INDEX: 41.48 KG/M2 | RESPIRATION RATE: 18 BRPM | HEART RATE: 84 BPM | SYSTOLIC BLOOD PRESSURE: 157 MMHG | WEIGHT: 243 LBS | TEMPERATURE: 98 F

## 2022-04-18 DIAGNOSIS — M47.816 ARTHRITIS OF FACET JOINT OF LUMBAR SPINE: ICD-10-CM

## 2022-04-18 PROCEDURE — 3E0U33Z INTRODUCTION OF ANTI-INFLAMMATORY INTO JOINTS, PERCUTANEOUS APPROACH: ICD-10-PCS | Performed by: ANESTHESIOLOGY

## 2022-04-18 RX ORDER — ONDANSETRON 2 MG/ML
4 INJECTION INTRAMUSCULAR; INTRAVENOUS ONCE AS NEEDED
Status: DISCONTINUED | OUTPATIENT
Start: 2022-04-18 | End: 2022-04-18

## 2022-04-18 RX ORDER — LIDOCAINE HYDROCHLORIDE 10 MG/ML
INJECTION, SOLUTION EPIDURAL; INFILTRATION; INTRACAUDAL; PERINEURAL
Status: DISCONTINUED | OUTPATIENT
Start: 2022-04-18 | End: 2022-04-18

## 2022-04-18 RX ORDER — DIPHENHYDRAMINE HYDROCHLORIDE 50 MG/ML
50 INJECTION INTRAMUSCULAR; INTRAVENOUS ONCE AS NEEDED
Status: DISCONTINUED | OUTPATIENT
Start: 2022-04-18 | End: 2022-04-18

## 2022-04-18 RX ORDER — METHYLPREDNISOLONE ACETATE 40 MG/ML
INJECTION, SUSPENSION INTRA-ARTICULAR; INTRALESIONAL; INTRAMUSCULAR; SOFT TISSUE
Status: DISCONTINUED | OUTPATIENT
Start: 2022-04-18 | End: 2022-04-18

## 2022-04-18 NOTE — OPERATIVE REPORT
[facility]  Operative Report  2022     Thea Cross Patient Status:  Hospital Outpatient Surgery    1955 MRN [id number]   Location [address] [address] Attending No att. providers found   Hosp Day # 0 PCP Todd Rocha DO     Indication: Rosalio Crawford is a 74year old female patient with chronic low back pain failed conservative treatment with medication and physical therapy was here for diagnostic lumbar facet joint injection. Preoperative Diagnosis:  Arthritis of facet joint of lumbar spine [M47.816]    Postoperative Diagnosis: Same as above. Procedure performed: Left diagnostic lumbar facet joint injection at L3-L4 and L4-L5 level under fluoroscopy. Anesthesia: Local.    EBL: Less than 1 ml. Procedure Description:  After reviewing the patient's history and performing a focused physical examination, the diagnosis was confirmed and contraindications such as infection and coagulopathy were ruled out. Following review of allergies and review of potential side effects and complications, including but not necessarily limited to infection, allergic reaction, local tissue breakdown, nerve injury, and paresis, the patient indicated they understood and agreed to proceed. After obtaining the informed consent, the patient was brought to the procedure room and monitored. The vital signs were monitored and recorded by an experienced RN. In the prone position, following sterile prep and drape of the lumbar region, the corresponding facet joints were identified under fluoroscopy. The skin was anesthetized via 25-gauge 1.5\" needle with approximately 2 mL of 1% lidocaine. A 22-gauge 3.5\" Quincke spinal needle was introduced and advanced into each corresponding facet joint at left L3-L4 and L4-5 level atraumatically under fluoroscopic guidance.   Following negative aspiration for CSF and blood, approximately 1 mL of 1% lidocaine with 20 mg of methylprednisolone was injected into each joint without complication. The needle was withdrawn with stylet in situ after being flushed with 0.5 mL lidocaine. The patient tolerated procedure very well. The patient was observed until discharge criteria met. Discharge instructions were given and patient was released to a responsible adult. Complications: None. Follow up: The patient was followed in the pain clinic as needed basis.     Gisel Varghese MD

## 2022-04-21 ENCOUNTER — HOSPITAL ENCOUNTER (OUTPATIENT)
Dept: CT IMAGING | Age: 67
Discharge: HOME | End: 2022-04-21
Attending: INTERNAL MEDICINE
Payer: MEDICARE

## 2022-04-21 DIAGNOSIS — J84.9 INTERSTITIAL LUNG DISEASE (HCC): ICD-10-CM

## 2022-04-21 LAB
EJ (GLYCYL - TRNA SYNTHETASE) ANTIBODY: NEGATIVE
FIBRILLARIN (U3 RNP) AB, IGG: NEGATIVE
JO-1 (HISTIDY1-TRNA SYNTHETASE) AB, IGG: 0 AU/ML
KU ANTIBODY: NEGATIVE
MDA5 (CADM-140) ANTIBODY: NEGATIVE
MI-2 (NUCLEAR HELICASE PROTEIN) ANTIBODY: NEGATIVE
NXP-2 (NUCLEAR MATRIX PROTEIN-2) AB: NEGATIVE
OJ (ISOLEUCYL-TRNA SYNTHETASE) ANTIBODY: NEGATIVE
P155/140 (TIF-1 GAMMA) ANTIBODY: NEGATIVE
PL-12 (ALANYL-TRNA SYNTHETASE) ANTIBODY: NEGATIVE
PL-7 (THREONYL-TRNA SYNTHETASE) ANTIBODY: NEGATIVE
PM_SCL 100 ANTIBODY, IGG: NEGATIVE
RIBONUCLEIC PROTEIN (U1) (ENA) AB, IGG: 2 UNITS
SAE1 (SUMO ACTIVATING ENZYME) ANTIBODY: NEGATIVE
SRP (SINGLE RECOGNITION PARTICLE) AB: NEGATIVE
SSA 52 (RO) (ENA) ANTIBODY, IGG: 0 AU/ML
SSA 60 (RO) (ENA) ANTIBODY, IGG: 1 AU/ML
TIF1-GAMMA ANTIBODY: NEGATIVE

## 2022-04-21 PROCEDURE — 71250 CT THORAX DX C-: CPT | Performed by: INTERNAL MEDICINE

## 2022-05-03 ENCOUNTER — OFFICE VISIT (OUTPATIENT)
Dept: PAIN CLINIC | Facility: CLINIC | Age: 67
End: 2022-05-03
Payer: MEDICARE

## 2022-05-03 VITALS
DIASTOLIC BLOOD PRESSURE: 84 MMHG | HEART RATE: 73 BPM | WEIGHT: 237 LBS | BODY MASS INDEX: 41 KG/M2 | SYSTOLIC BLOOD PRESSURE: 122 MMHG

## 2022-05-03 DIAGNOSIS — M47.816 LUMBAR FACET ARTHROPATHY: ICD-10-CM

## 2022-05-03 DIAGNOSIS — M47.816 ARTHRITIS OF FACET JOINT OF LUMBAR SPINE: Primary | ICD-10-CM

## 2022-05-03 PROCEDURE — 99213 OFFICE O/P EST LOW 20 MIN: CPT | Performed by: NURSE PRACTITIONER

## 2022-05-03 NOTE — PROGRESS NOTES
Last procedure: LEFT LUMBAR FACET INJECTION L3/4, L4/5 WITH LOCAL  Date: 4/18/22  Percentage of relief obtained: 30%  Duration of relief: 5 days. .. slowly got better.  Today 80% now, \"as long as I stay off my feet\" per pt    Current Pain Score: 2/10

## 2022-05-05 ENCOUNTER — PATIENT MESSAGE (OUTPATIENT)
Dept: PAIN CLINIC | Facility: CLINIC | Age: 67
End: 2022-05-05

## 2022-05-05 NOTE — TELEPHONE ENCOUNTER
From: Esthela Blount  To: TANIA Gonzalez  Sent: 5/5/2022 10:10 AM CDT  Subject: Pain returns    Hi Lenkadrake Tuesday afternoon, shortly after my appointment with you my son came by with his new 4 month old puppy. Not thinking I picked him up while I was sitting and the next day and ever since I am having the right-sided back/groin pain that is slightly going down my leg. This is the same issue I had with my first injection. Darn! Should I have another? Wait?   Bobby Pennington

## 2022-05-09 ENCOUNTER — PATIENT MESSAGE (OUTPATIENT)
Dept: PAIN CLINIC | Facility: CLINIC | Age: 67
End: 2022-05-09

## 2022-05-19 ENCOUNTER — OFFICE VISIT (OUTPATIENT)
Dept: FAMILY MEDICINE CLINIC | Facility: CLINIC | Age: 67
End: 2022-05-19
Payer: MEDICARE

## 2022-05-19 VITALS
HEART RATE: 91 BPM | BODY MASS INDEX: 40.46 KG/M2 | RESPIRATION RATE: 18 BRPM | WEIGHT: 237 LBS | HEIGHT: 64 IN | DIASTOLIC BLOOD PRESSURE: 80 MMHG | SYSTOLIC BLOOD PRESSURE: 128 MMHG | OXYGEN SATURATION: 96 %

## 2022-05-19 DIAGNOSIS — M45.9 RHEUMATOID ARTHRITIS INVOLVING VERTEBRA, UNSPECIFIED WHETHER RHEUMATOID FACTOR PRESENT (HCC): ICD-10-CM

## 2022-05-19 DIAGNOSIS — Z87.81 HISTORY OF COMPRESSION FRACTURE OF SPINE: ICD-10-CM

## 2022-05-19 DIAGNOSIS — Z98.1 HISTORY OF LUMBAR SPINAL FUSION: ICD-10-CM

## 2022-05-19 DIAGNOSIS — M47.816 LUMBAR FACET ARTHROPATHY: ICD-10-CM

## 2022-05-19 DIAGNOSIS — Z79.899 MEDICATION MANAGEMENT: ICD-10-CM

## 2022-05-19 DIAGNOSIS — G89.29 OTHER CHRONIC PAIN: Primary | ICD-10-CM

## 2022-05-19 DIAGNOSIS — M48.062 SPINAL STENOSIS OF LUMBAR REGION WITH NEUROGENIC CLAUDICATION: ICD-10-CM

## 2022-05-19 PROCEDURE — 99214 OFFICE O/P EST MOD 30 MIN: CPT | Performed by: FAMILY MEDICINE

## 2022-05-19 RX ORDER — MELOXICAM 7.5 MG/1
7.5 TABLET ORAL DAILY
Qty: 30 TABLET | Refills: 0 | Status: SHIPPED | OUTPATIENT
Start: 2022-05-19

## 2022-06-03 ENCOUNTER — PATIENT MESSAGE (OUTPATIENT)
Dept: FAMILY MEDICINE CLINIC | Facility: CLINIC | Age: 67
End: 2022-06-03

## 2022-06-03 DIAGNOSIS — M79.7 FIBROMYALGIA: ICD-10-CM

## 2022-06-03 RX ORDER — PREGABALIN 75 MG/1
CAPSULE ORAL
Qty: 90 CAPSULE | Refills: 0 | Status: SHIPPED | OUTPATIENT
Start: 2022-06-03

## 2022-06-03 NOTE — TELEPHONE ENCOUNTER
Future Appointments   Date Time Provider Brenda Parkinson   7/6/2022  8:15 AM Aniceto De La Torre DO [facility] [address]   7/26/2022 11:30 AM Melanie Mcconnell DO [facility] [place]   9/30/2022  9:00 AM [facility] RHEUM NURSE GIACOMO [facility] [place]     LOV 3/29/22  RTO in 3-4mo

## 2022-06-14 ENCOUNTER — ORDER TRANSCRIPTION (OUTPATIENT)
Dept: ADMINISTRATIVE | Facility: HOSPITAL | Age: 67
End: 2022-06-14

## 2022-06-14 DIAGNOSIS — Z01.818 PREOP EXAMINATION: Primary | ICD-10-CM

## 2022-06-14 DIAGNOSIS — J84.9 INTERSTITIAL LUNG DISEASE (HCC): Primary | ICD-10-CM

## 2022-06-14 DIAGNOSIS — I27.20 PULMONARY HTN (HCC): ICD-10-CM

## 2022-06-14 DIAGNOSIS — Z11.59 ENCOUNTER FOR SCREENING FOR OTHER VIRAL DISEASES: ICD-10-CM

## 2022-06-21 ENCOUNTER — OFFICE VISIT (OUTPATIENT)
Dept: FAMILY MEDICINE CLINIC | Facility: CLINIC | Age: 67
End: 2022-06-21
Payer: MEDICARE

## 2022-06-21 VITALS
DIASTOLIC BLOOD PRESSURE: 92 MMHG | TEMPERATURE: 98 F | WEIGHT: 234 LBS | OXYGEN SATURATION: 93 % | RESPIRATION RATE: 24 BRPM | HEART RATE: 78 BPM | HEIGHT: 64 IN | SYSTOLIC BLOOD PRESSURE: 140 MMHG | BODY MASS INDEX: 39.95 KG/M2

## 2022-06-21 DIAGNOSIS — J34.89 SINUS PRESSURE: Primary | ICD-10-CM

## 2022-06-21 LAB
OPERATOR ID: NORMAL
RAPID SARS-COV-2 BY PCR: NOT DETECTED

## 2022-06-21 PROCEDURE — 99213 OFFICE O/P EST LOW 20 MIN: CPT | Performed by: NURSE PRACTITIONER

## 2022-06-21 PROCEDURE — U0002 COVID-19 LAB TEST NON-CDC: HCPCS | Performed by: NURSE PRACTITIONER

## 2022-06-21 RX ORDER — PREDNISONE 20 MG/1
40 TABLET ORAL DAILY
Qty: 10 TABLET | Refills: 0 | Status: SHIPPED | OUTPATIENT
Start: 2022-06-21 | End: 2022-06-26

## 2022-06-28 ENCOUNTER — LAB ENCOUNTER (OUTPATIENT)
Dept: LAB | Age: 67
End: 2022-06-28
Attending: INTERNAL MEDICINE
Payer: MEDICARE

## 2022-06-28 DIAGNOSIS — Z11.59 ENCOUNTER FOR SCREENING FOR OTHER VIRAL DISEASES: ICD-10-CM

## 2022-06-28 DIAGNOSIS — Z01.818 PREOP EXAMINATION: ICD-10-CM

## 2022-06-29 LAB — SARS-COV-2 RNA RESP QL NAA+PROBE: NOT DETECTED

## 2022-07-01 ENCOUNTER — RT VISIT (OUTPATIENT)
Dept: RESPIRATORY THERAPY | Facility: HOSPITAL | Age: 67
End: 2022-07-01
Attending: INTERNAL MEDICINE
Payer: MEDICARE

## 2022-07-01 DIAGNOSIS — J84.9 INTERSTITIAL LUNG DISEASE (HCC): ICD-10-CM

## 2022-07-01 LAB
ARTERIAL PATENCY WRIST A: POSITIVE
BASE EXCESS BLDA CALC-SCNC: 6.2 MMOL/L (ref ?–2)
BODY TEMPERATURE: 98.6 F
CA-I BLD-SCNC: 1.21 MMOL/L (ref 0.95–1.32)
COHGB MFR BLD: 1.7 % SAT (ref 0–3)
FIO2: 21 %
HCO3 BLDA-SCNC: 29.7 MEQ/L (ref 21–27)
HGB BLD-MCNC: 12.6 G/DL
LACTATE BLD-SCNC: 0.5 MMOL/L (ref 0.5–2)
METHGB MFR BLD: 0 % SAT (ref 0.4–1.5)
OXYHGB MFR BLDA: 93.3 % (ref 92–100)
PCO2 BLDA: 49 MM HG (ref 35–45)
PH BLDA: 7.42 [PH] (ref 7.35–7.45)
PO2 BLDA: 73 MM HG (ref 80–100)
POTASSIUM BLD-SCNC: 4 MMOL/L (ref 3.6–5.1)
SODIUM BLD-SCNC: 136 MMOL/L (ref 135–145)

## 2022-07-01 PROCEDURE — 94060 EVALUATION OF WHEEZING: CPT

## 2022-07-01 PROCEDURE — 36600 WITHDRAWAL OF ARTERIAL BLOOD: CPT | Performed by: INTERNAL MEDICINE

## 2022-07-01 PROCEDURE — 82330 ASSAY OF CALCIUM: CPT | Performed by: INTERNAL MEDICINE

## 2022-07-01 PROCEDURE — 83050 HGB METHEMOGLOBIN QUAN: CPT | Performed by: INTERNAL MEDICINE

## 2022-07-01 PROCEDURE — 82375 ASSAY CARBOXYHB QUANT: CPT | Performed by: INTERNAL MEDICINE

## 2022-07-01 PROCEDURE — 83605 ASSAY OF LACTIC ACID: CPT | Performed by: INTERNAL MEDICINE

## 2022-07-01 PROCEDURE — 94726 PLETHYSMOGRAPHY LUNG VOLUMES: CPT

## 2022-07-01 PROCEDURE — 94729 DIFFUSING CAPACITY: CPT

## 2022-07-01 PROCEDURE — 82803 BLOOD GASES ANY COMBINATION: CPT | Performed by: INTERNAL MEDICINE

## 2022-07-01 PROCEDURE — 84132 ASSAY OF SERUM POTASSIUM: CPT | Performed by: INTERNAL MEDICINE

## 2022-07-01 PROCEDURE — 85018 HEMOGLOBIN: CPT | Performed by: INTERNAL MEDICINE

## 2022-07-01 PROCEDURE — 84295 ASSAY OF SERUM SODIUM: CPT | Performed by: INTERNAL MEDICINE

## 2022-07-02 NOTE — PROCEDURES
Findings:  Postbronchodilator FEV1 is 1.04L, 45% predicted. Postbronchodilator FVC is 1.34L, 45% predicted. FEV1/ FVC ratio is 0.78. There is no significant bronchodilator response after   administration of albuterol. The flow-volume loop suggests a restrictive pattern. The TLC is 4.43L, 87% predicted. The residual volume 3.02L, 145% predicted. The diffusion capacity is 37% predicted and 70% predicted when corrected for alveolar volume. Arterial blood gas:  PH-7.42  PCO2-49  PO2-73  HCO3-30  Impression:  There is severe airway obstruction on spirometry and visualized on flow-volume loop. Of note, spirometric values may be underestimated as noted by the reduced maximal voluntary ventilation of 34% suggesting poor effort and/or weakness. Clinical correlation required. There is no significant bronchodilator response, but this does not preclude treatment with bronchodilators. There is evidence of air trapping (residual volume of 3.02L, 145% predicted). Diffusion capacity is severely reduced with DLCO of 37% in association with the severe airway obstruction. Given the severity of the reduced diffusion capacity, would recommend evaluation for hypoxia and supplemental oxygenation if not already performed. ABG suggests respiratory acidosis and metabolic alkalosis. There is no significant A-a gradient    When compared to previous pulmonary function testing dated 7/8/2020, there has been an increase in total lung capacity (previously 3.84L, 77% predicted), but a decrease in diffusion capacity (previously DLCO 74% predicted).

## 2022-07-06 ENCOUNTER — OFFICE VISIT (OUTPATIENT)
Dept: FAMILY MEDICINE CLINIC | Facility: CLINIC | Age: 67
End: 2022-07-06
Payer: MEDICARE

## 2022-07-06 VITALS
BODY MASS INDEX: 40.97 KG/M2 | HEART RATE: 75 BPM | SYSTOLIC BLOOD PRESSURE: 136 MMHG | HEIGHT: 64 IN | RESPIRATION RATE: 16 BRPM | DIASTOLIC BLOOD PRESSURE: 86 MMHG | OXYGEN SATURATION: 97 % | WEIGHT: 240 LBS

## 2022-07-06 DIAGNOSIS — I77.1 TORTUOUS AORTA (HCC): ICD-10-CM

## 2022-07-06 DIAGNOSIS — M32.19 OTHER SYSTEMIC LUPUS ERYTHEMATOSUS WITH OTHER ORGAN INVOLVEMENT (HCC): ICD-10-CM

## 2022-07-06 DIAGNOSIS — Z87.81 HISTORY OF COMPRESSION FRACTURE OF SPINE: ICD-10-CM

## 2022-07-06 DIAGNOSIS — J42 CHRONIC BRONCHITIS, UNSPECIFIED CHRONIC BRONCHITIS TYPE (HCC): ICD-10-CM

## 2022-07-06 DIAGNOSIS — M45.9 RHEUMATOID ARTHRITIS INVOLVING VERTEBRA, UNSPECIFIED WHETHER RHEUMATOID FACTOR PRESENT (HCC): ICD-10-CM

## 2022-07-06 DIAGNOSIS — I27.20 PULMONARY HTN (HCC): ICD-10-CM

## 2022-07-06 DIAGNOSIS — Z01.818 PREOPERATIVE CLEARANCE: Primary | ICD-10-CM

## 2022-07-06 DIAGNOSIS — D84.9 IMMUNOSUPPRESSED STATUS (HCC): ICD-10-CM

## 2022-07-06 DIAGNOSIS — I34.0 MILD MITRAL REGURGITATION: ICD-10-CM

## 2022-07-06 DIAGNOSIS — G89.29 OTHER CHRONIC PAIN: ICD-10-CM

## 2022-07-06 DIAGNOSIS — H25.9 AGE-RELATED CATARACT OF BOTH EYES, UNSPECIFIED AGE-RELATED CATARACT TYPE: ICD-10-CM

## 2022-07-06 DIAGNOSIS — M35.00 SJOGREN'S SYNDROME, WITH UNSPECIFIED ORGAN INVOLVEMENT (HCC): ICD-10-CM

## 2022-07-06 DIAGNOSIS — M47.816 LUMBAR FACET ARTHROPATHY: ICD-10-CM

## 2022-07-06 DIAGNOSIS — J84.9 ILD (INTERSTITIAL LUNG DISEASE) (HCC): ICD-10-CM

## 2022-07-06 PROCEDURE — 99214 OFFICE O/P EST MOD 30 MIN: CPT | Performed by: FAMILY MEDICINE

## 2022-07-08 DIAGNOSIS — M79.7 FIBROMYALGIA: ICD-10-CM

## 2022-07-08 RX ORDER — PREGABALIN 50 MG/1
CAPSULE ORAL
Qty: 90 CAPSULE | Refills: 0 | Status: SHIPPED | OUTPATIENT
Start: 2022-07-08

## 2022-07-12 ENCOUNTER — TELEPHONE (OUTPATIENT)
Dept: FAMILY MEDICINE CLINIC | Facility: CLINIC | Age: 67
End: 2022-07-12

## 2022-07-21 ENCOUNTER — TELEPHONE (OUTPATIENT)
Dept: FAMILY MEDICINE CLINIC | Facility: CLINIC | Age: 67
End: 2022-07-21

## 2022-07-21 NOTE — TELEPHONE ENCOUNTER
WE RECEIVED PREOP PAPPERWORK FOR HER RIGHT EYE, BUT SHE JUST HAD PREOP FOR THE LEFT ON 7-6. I CALLED DR. Agus Castillo OFFICE BUT THEY WERE CLOSED. LM ABOUT IF SHE NEEDS ANOTHER PREOP SINCE SHE JUST HAD ONE.     PAPERWORK IS IN [facility] DESK

## 2022-07-25 ENCOUNTER — OFFICE VISIT (OUTPATIENT)
Dept: PAIN CLINIC | Facility: CLINIC | Age: 67
End: 2022-07-25
Payer: MEDICARE

## 2022-07-25 ENCOUNTER — TELEPHONE (OUTPATIENT)
Dept: PAIN CLINIC | Facility: CLINIC | Age: 67
End: 2022-07-25

## 2022-07-25 VITALS — HEART RATE: 68 BPM | OXYGEN SATURATION: 98 % | SYSTOLIC BLOOD PRESSURE: 130 MMHG | DIASTOLIC BLOOD PRESSURE: 90 MMHG

## 2022-07-25 DIAGNOSIS — Z98.1 HISTORY OF LUMBAR SPINAL FUSION: ICD-10-CM

## 2022-07-25 DIAGNOSIS — M47.816 LUMBAR FACET ARTHROPATHY: Primary | ICD-10-CM

## 2022-07-25 PROCEDURE — 99214 OFFICE O/P EST MOD 30 MIN: CPT | Performed by: NURSE PRACTITIONER

## 2022-07-25 RX ORDER — PREDNISOLONE ACETATE 10 MG/ML
1 SUSPENSION/ DROPS OPHTHALMIC 4 TIMES DAILY
COMMUNITY
Start: 2022-07-22

## 2022-07-25 RX ORDER — OFLOXACIN 3 MG/ML
SOLUTION/ DROPS OPHTHALMIC
COMMUNITY
Start: 2022-06-08

## 2022-07-25 RX ORDER — KETOROLAC TROMETHAMINE 5 MG/ML
SOLUTION OPHTHALMIC
COMMUNITY
Start: 2022-06-08

## 2022-07-25 NOTE — PROGRESS NOTES
Patient presents in office today with reported pain in thoracic left side radiating into  low back     Current pain level reported = 5/10    Last reported dose of norco a couple days ago     Narcotic Contract renewal na    Urine Drug screen na

## 2022-07-25 NOTE — TELEPHONE ENCOUNTER
Question Answer   Anesthesia Type Local   Provider [facility]   Location Lab   Procedure RFA   Laterality/Level LEFT L3/4, L4/5   Medical clearance requested (will send to Pain Navigator) No   Patient has Medicare coverage?  Yes   Comments (Please list entire procedure name here.) FOLLOW UP IN 4 WEEKS

## 2022-07-25 NOTE — TELEPHONE ENCOUNTER
Question Answer   Anesthesia Type Local   Provider [facility]   Location Lab   Procedure RFA   Laterality/Level RIGHT L3/4, L4/5   Medical clearance requested (will send to Pain Navigator) No   Patient has Medicare coverage?  Yes

## 2022-07-26 ENCOUNTER — OFFICE VISIT (OUTPATIENT)
Dept: RHEUMATOLOGY | Facility: CLINIC | Age: 67
End: 2022-07-26
Payer: MEDICARE

## 2022-07-26 VITALS
DIASTOLIC BLOOD PRESSURE: 88 MMHG | OXYGEN SATURATION: 94 % | HEART RATE: 76 BPM | HEIGHT: 64 IN | BODY MASS INDEX: 40.8 KG/M2 | SYSTOLIC BLOOD PRESSURE: 132 MMHG | WEIGHT: 239 LBS | RESPIRATION RATE: 16 BRPM

## 2022-07-26 DIAGNOSIS — Z79.899 HIGH RISK MEDICATION USE: ICD-10-CM

## 2022-07-26 DIAGNOSIS — M81.0 AGE-RELATED OSTEOPOROSIS WITHOUT CURRENT PATHOLOGICAL FRACTURE: ICD-10-CM

## 2022-07-26 DIAGNOSIS — J84.9 ILD (INTERSTITIAL LUNG DISEASE) (HCC): ICD-10-CM

## 2022-07-26 DIAGNOSIS — M32.19 OTHER SYSTEMIC LUPUS ERYTHEMATOSUS WITH OTHER ORGAN INVOLVEMENT (HCC): ICD-10-CM

## 2022-07-26 DIAGNOSIS — M06.9 RHEUMATOID ARTHRITIS INVOLVING BOTH HANDS, UNSPECIFIED WHETHER RHEUMATOID FACTOR PRESENT (HCC): Primary | ICD-10-CM

## 2022-07-26 DIAGNOSIS — M35.00 SJOGREN'S SYNDROME WITHOUT EXTRAGLANDULAR INVOLVEMENT (HCC): ICD-10-CM

## 2022-07-26 DIAGNOSIS — M79.7 FIBROMYALGIA: ICD-10-CM

## 2022-07-26 DIAGNOSIS — R68.2 DRY MOUTH: ICD-10-CM

## 2022-07-26 PROCEDURE — 99214 OFFICE O/P EST MOD 30 MIN: CPT | Performed by: INTERNAL MEDICINE

## 2022-07-26 RX ORDER — CEVIMELINE HYDROCHLORIDE 30 MG/1
30 CAPSULE ORAL 3 TIMES DAILY
Qty: 270 CAPSULE | Refills: 2 | Status: SHIPPED | OUTPATIENT
Start: 2022-07-26

## 2022-07-26 RX ORDER — LEFLUNOMIDE 20 MG/1
20 TABLET ORAL DAILY
Qty: 90 TABLET | Refills: 1 | Status: SHIPPED | OUTPATIENT
Start: 2022-07-26

## 2022-07-26 RX ORDER — HYDROXYCHLOROQUINE SULFATE 200 MG/1
200 TABLET, FILM COATED ORAL 2 TIMES DAILY
Qty: 180 TABLET | Refills: 1 | Status: SHIPPED | OUTPATIENT
Start: 2022-07-26

## 2022-08-01 ENCOUNTER — LAB ENCOUNTER (OUTPATIENT)
Dept: LAB | Age: 67
End: 2022-08-01
Attending: INTERNAL MEDICINE
Payer: MEDICARE

## 2022-08-01 DIAGNOSIS — Z98.1 HISTORY OF LUMBAR SPINAL FUSION: ICD-10-CM

## 2022-08-01 DIAGNOSIS — G89.29 OTHER CHRONIC PAIN: ICD-10-CM

## 2022-08-01 DIAGNOSIS — M45.9 RHEUMATOID ARTHRITIS INVOLVING VERTEBRA, UNSPECIFIED WHETHER RHEUMATOID FACTOR PRESENT (HCC): ICD-10-CM

## 2022-08-01 DIAGNOSIS — M47.816 LUMBAR FACET ARTHROPATHY: ICD-10-CM

## 2022-08-01 DIAGNOSIS — M48.062 SPINAL STENOSIS OF LUMBAR REGION WITH NEUROGENIC CLAUDICATION: ICD-10-CM

## 2022-08-01 DIAGNOSIS — Z87.81 HISTORY OF COMPRESSION FRACTURE OF SPINE: ICD-10-CM

## 2022-08-01 DIAGNOSIS — Z79.899 MEDICATION MANAGEMENT: ICD-10-CM

## 2022-08-01 LAB
ALBUMIN SERPL-MCNC: 3.5 G/DL (ref 3.4–5)
ALBUMIN/GLOB SERPL: 0.9 {RATIO} (ref 1–2)
ALP LIVER SERPL-CCNC: 85 U/L
ALT SERPL-CCNC: 21 U/L
ANION GAP SERPL CALC-SCNC: 7 MMOL/L (ref 0–18)
AST SERPL-CCNC: 22 U/L (ref 15–37)
BASOPHILS # BLD AUTO: 0.07 X10(3) UL (ref 0–0.2)
BASOPHILS NFR BLD AUTO: 1.4 %
BILIRUB SERPL-MCNC: 0.4 MG/DL (ref 0.1–2)
BUN BLD-MCNC: 14 MG/DL (ref 7–18)
CALCIUM BLD-MCNC: 9.5 MG/DL (ref 8.5–10.1)
CHLORIDE SERPL-SCNC: 104 MMOL/L (ref 98–112)
CO2 SERPL-SCNC: 30 MMOL/L (ref 21–32)
CREAT BLD-MCNC: 0.72 MG/DL
EOSINOPHIL # BLD AUTO: 0.34 X10(3) UL (ref 0–0.7)
EOSINOPHIL NFR BLD AUTO: 6.7 %
ERYTHROCYTE [DISTWIDTH] IN BLOOD BY AUTOMATED COUNT: 16.1 %
FASTING STATUS PATIENT QL REPORTED: NO
GLOBULIN PLAS-MCNC: 4.1 G/DL (ref 2.8–4.4)
GLUCOSE BLD-MCNC: 100 MG/DL (ref 70–99)
HCT VFR BLD AUTO: 40.6 %
HGB BLD-MCNC: 12.7 G/DL
IMM GRANULOCYTES # BLD AUTO: 0.01 X10(3) UL (ref 0–1)
IMM GRANULOCYTES NFR BLD: 0.2 %
LYMPHOCYTES # BLD AUTO: 1.31 X10(3) UL (ref 1–4)
LYMPHOCYTES NFR BLD AUTO: 25.9 %
MCH RBC QN AUTO: 30 PG (ref 26–34)
MCHC RBC AUTO-ENTMCNC: 31.3 G/DL (ref 31–37)
MCV RBC AUTO: 96 FL
MONOCYTES # BLD AUTO: 0.73 X10(3) UL (ref 0.1–1)
MONOCYTES NFR BLD AUTO: 14.5 %
NEUTROPHILS # BLD AUTO: 2.59 X10 (3) UL (ref 1.5–7.7)
NEUTROPHILS # BLD AUTO: 2.59 X10(3) UL (ref 1.5–7.7)
NEUTROPHILS NFR BLD AUTO: 51.3 %
OSMOLALITY SERPL CALC.SUM OF ELEC: 293 MOSM/KG (ref 275–295)
PLATELET # BLD AUTO: 202 10(3)UL (ref 150–450)
POTASSIUM SERPL-SCNC: 3.7 MMOL/L (ref 3.5–5.1)
PROT SERPL-MCNC: 7.6 G/DL (ref 6.4–8.2)
RBC # BLD AUTO: 4.23 X10(6)UL
SODIUM SERPL-SCNC: 141 MMOL/L (ref 136–145)
WBC # BLD AUTO: 5.1 X10(3) UL (ref 4–11)

## 2022-08-01 RX ORDER — PREGABALIN 75 MG/1
CAPSULE ORAL
Qty: 90 CAPSULE | Refills: 0 | OUTPATIENT
Start: 2022-08-01

## 2022-08-01 RX ORDER — PREGABALIN 50 MG/1
CAPSULE ORAL
Qty: 90 CAPSULE | Refills: 0 | OUTPATIENT
Start: 2022-08-01

## 2022-08-02 ENCOUNTER — TELEPHONE (OUTPATIENT)
Dept: RHEUMATOLOGY | Facility: CLINIC | Age: 67
End: 2022-08-02

## 2022-08-02 DIAGNOSIS — M06.9 RHEUMATOID ARTHRITIS INVOLVING BOTH HANDS, UNSPECIFIED WHETHER RHEUMATOID FACTOR PRESENT (HCC): ICD-10-CM

## 2022-08-02 DIAGNOSIS — M35.00 SJOGREN'S SYNDROME WITHOUT EXTRAGLANDULAR INVOLVEMENT (HCC): Primary | ICD-10-CM

## 2022-08-02 DIAGNOSIS — M81.0 AGE-RELATED OSTEOPOROSIS WITHOUT CURRENT PATHOLOGICAL FRACTURE: ICD-10-CM

## 2022-08-02 DIAGNOSIS — M81.0 OSTEOPOROSIS, SENILE: ICD-10-CM

## 2022-08-02 DIAGNOSIS — Z79.899 HIGH RISK MEDICATION USE: ICD-10-CM

## 2022-08-02 NOTE — TELEPHONE ENCOUNTER
Pt called back and I informed her that her labs were normal. Will repeat labs before next prolia injection in September.      Put lab orders in the system

## 2022-08-02 NOTE — TELEPHONE ENCOUNTER
----- Message from Radha Beavers DO sent at 8/2/2022  8:43 AM CDT -----  Please let pt know that CBC and CMP grossly normal. Please place orders to be done before her Prolia injection in September.      Radha Beavers DO  [facility]  8/2/2022

## 2022-08-11 DIAGNOSIS — M79.7 FIBROMYALGIA: ICD-10-CM

## 2022-08-11 RX ORDER — PREGABALIN 50 MG/1
CAPSULE ORAL
Qty: 90 CAPSULE | Refills: 0 | OUTPATIENT
Start: 2022-08-11

## 2022-08-22 ENCOUNTER — HOSPITAL ENCOUNTER (OUTPATIENT)
Facility: HOSPITAL | Age: 67
Setting detail: HOSPITAL OUTPATIENT SURGERY
Discharge: HOME | End: 2022-08-22
Attending: ANESTHESIOLOGY | Admitting: ANESTHESIOLOGY
Payer: MEDICARE

## 2022-08-22 ENCOUNTER — APPOINTMENT (OUTPATIENT)
Dept: GENERAL RADIOLOGY | Facility: HOSPITAL | Age: 67
End: 2022-08-22
Attending: ANESTHESIOLOGY
Payer: MEDICARE

## 2022-08-22 VITALS
HEIGHT: 64 IN | RESPIRATION RATE: 18 BRPM | BODY MASS INDEX: 40.8 KG/M2 | DIASTOLIC BLOOD PRESSURE: 90 MMHG | SYSTOLIC BLOOD PRESSURE: 157 MMHG | HEART RATE: 70 BPM | WEIGHT: 239 LBS | OXYGEN SATURATION: 98 % | TEMPERATURE: 98 F

## 2022-08-22 DIAGNOSIS — M47.816 LUMBAR FACET ARTHROPATHY: ICD-10-CM

## 2022-08-22 PROCEDURE — 64635 DESTROY LUMB/SAC FACET JNT: CPT | Performed by: ANESTHESIOLOGY

## 2022-08-22 PROCEDURE — BR16ZZZ FLUOROSCOPY OF LUMBAR FACET JOINT(S): ICD-10-PCS | Performed by: ANESTHESIOLOGY

## 2022-08-22 PROCEDURE — 64636 DESTROY L/S FACET JNT ADDL: CPT | Performed by: ANESTHESIOLOGY

## 2022-08-22 PROCEDURE — 3E0T3TZ INTRODUCTION OF DESTRUCTIVE AGENT INTO PERIPHERAL NERVES AND PLEXI, PERCUTANEOUS APPROACH: ICD-10-PCS | Performed by: ANESTHESIOLOGY

## 2022-08-22 RX ORDER — METHYLPREDNISOLONE ACETATE 40 MG/ML
INJECTION, SUSPENSION INTRA-ARTICULAR; INTRALESIONAL; INTRAMUSCULAR; SOFT TISSUE
Status: DISCONTINUED | OUTPATIENT
Start: 2022-08-22 | End: 2022-08-22

## 2022-08-22 RX ORDER — LIDOCAINE HYDROCHLORIDE 10 MG/ML
INJECTION, SOLUTION EPIDURAL; INFILTRATION; INTRACAUDAL; PERINEURAL
Status: DISCONTINUED | OUTPATIENT
Start: 2022-08-22 | End: 2022-08-22

## 2022-08-22 NOTE — OPERATIVE REPORT
[facility]  Operative Report  2022     Esthela Blount Patient Status:  Hospital Outpatient Surgery    1955 MRN [id number]   Location [address] [address] Attending No att. providers found   [facility] Day # 0 PCP Osvaldo Mason DO     Indication: Nydia Davis is a 69year old female patient with chronic low back pain failed conservative treatment with medication and physical therapy was here for a radiofrequency ablation of right lumbar medial branch for long-term pain relief. Preoperative Diagnosis:  Lumbar facet arthropathy [M47.816]  Lumbosacral spondylosis    Postoperative Diagnosis: Same as above. Procedure performed: Radiofrequency ablation of right lumbar medial branch at L3-L4 and L4-L5 level under fluoroscopy. Anesthesia: Local.    EBL: Less than 1 ml. Procedure Description:   After reviewing the patient's history and performing a focused physical examination, the diagnosis was confirmed and contraindications such as infection and coagulopathy were ruled out. Following review of potential side effects and complications, including but not necessarily limited to infection, allergic reaction, local tissue breakdown, nerve injury, and paresis, the patient indicated they understood and agreed to proceed. After obtaining the informed consent, the patient was brought to the procedure room and monitored. The vital signs were monitored and recorded by an experienced RN. The patient was placed prone on the table. The patient's back was prepped and draped in sterile fashion. The skin was anesthetized via 25-gauge 1.5\" needle with approximately 2 mL of 1% lidocaine for local anesthesia. Under fluoroscopic guidance, a 22-gauge, 100-mm SMK needle was advanced to the junction of the superior aspect of the right transverse process and the lateral aspect of the same level superior articular process at right L3-L4 AND L4-L5 level.  The needle was then walked off the bony tissue and advanced 2 to 3 mm to lie along the path of the right L3, L4 and L5 medial branch nerve. AP and lateral radiographs were obtained to document proper needle position. Sensory and motor stimulation were then performed, which elicited deep local back discomfort but no evidence of motor stimulation in the gluteal muscles or extremities. At this point, 0.5 mL of 1% lidocaine was injected to the tissues around the tip of the SMK needle. Subsequently, a medial branch nerve denervation was performed for 90 seconds at 80 degrees centigrade without complication. The radiofrequency probe was removed with the needle left in place and 1% lidocaine and 20 mg methylprednisolone was injected through each needle. The needles were removed with tips intact. The patient tolerated the procedure very well. There was no subjective or objective loss of motor strength. The patient was observed until discharge criteria met. Discharge instructions were given and patient was released to a responsible adult. Complications: None. Follow up: The patient will be followed in the pain clinic as needed basis.       Ute Nuñez MD

## 2022-08-23 ENCOUNTER — OFFICE VISIT (OUTPATIENT)
Dept: FAMILY MEDICINE CLINIC | Facility: CLINIC | Age: 67
End: 2022-08-23
Payer: MEDICARE

## 2022-08-23 VITALS
WEIGHT: 239 LBS | DIASTOLIC BLOOD PRESSURE: 80 MMHG | BODY MASS INDEX: 40.8 KG/M2 | TEMPERATURE: 98 F | HEIGHT: 64 IN | SYSTOLIC BLOOD PRESSURE: 128 MMHG | OXYGEN SATURATION: 99 % | HEART RATE: 78 BPM | RESPIRATION RATE: 18 BRPM

## 2022-08-23 DIAGNOSIS — M45.9 RHEUMATOID ARTHRITIS INVOLVING VERTEBRA, UNSPECIFIED WHETHER RHEUMATOID FACTOR PRESENT (HCC): ICD-10-CM

## 2022-08-23 DIAGNOSIS — M32.19 OTHER SYSTEMIC LUPUS ERYTHEMATOSUS WITH OTHER ORGAN INVOLVEMENT (HCC): ICD-10-CM

## 2022-08-23 DIAGNOSIS — D84.9 IMMUNOSUPPRESSED STATUS (HCC): ICD-10-CM

## 2022-08-23 DIAGNOSIS — M35.00 SJOGREN'S SYNDROME, WITH UNSPECIFIED ORGAN INVOLVEMENT (HCC): ICD-10-CM

## 2022-08-23 DIAGNOSIS — G89.29 OTHER CHRONIC PAIN: Primary | ICD-10-CM

## 2022-08-23 PROCEDURE — 99213 OFFICE O/P EST LOW 20 MIN: CPT | Performed by: FAMILY MEDICINE

## 2022-08-29 ENCOUNTER — HOSPITAL ENCOUNTER (OUTPATIENT)
Facility: HOSPITAL | Age: 67
Setting detail: HOSPITAL OUTPATIENT SURGERY
Discharge: HOME | End: 2022-08-29
Attending: ANESTHESIOLOGY | Admitting: ANESTHESIOLOGY
Payer: MEDICARE

## 2022-08-29 ENCOUNTER — APPOINTMENT (OUTPATIENT)
Dept: GENERAL RADIOLOGY | Facility: HOSPITAL | Age: 67
End: 2022-08-29
Attending: ANESTHESIOLOGY
Payer: MEDICARE

## 2022-08-29 VITALS
TEMPERATURE: 99 F | OXYGEN SATURATION: 97 % | DIASTOLIC BLOOD PRESSURE: 88 MMHG | WEIGHT: 239 LBS | HEIGHT: 64 IN | HEART RATE: 74 BPM | BODY MASS INDEX: 40.8 KG/M2 | RESPIRATION RATE: 16 BRPM | SYSTOLIC BLOOD PRESSURE: 160 MMHG

## 2022-08-29 DIAGNOSIS — M47.816 LUMBAR FACET ARTHROPATHY: ICD-10-CM

## 2022-08-29 PROCEDURE — 3E0T3TZ INTRODUCTION OF DESTRUCTIVE AGENT INTO PERIPHERAL NERVES AND PLEXI, PERCUTANEOUS APPROACH: ICD-10-PCS | Performed by: ANESTHESIOLOGY

## 2022-08-29 RX ORDER — METHYLPREDNISOLONE ACETATE 40 MG/ML
INJECTION, SUSPENSION INTRA-ARTICULAR; INTRALESIONAL; INTRAMUSCULAR; SOFT TISSUE
Status: DISCONTINUED | OUTPATIENT
Start: 2022-08-29 | End: 2022-08-29

## 2022-08-29 RX ORDER — LIDOCAINE HYDROCHLORIDE 10 MG/ML
INJECTION, SOLUTION EPIDURAL; INFILTRATION; INTRACAUDAL; PERINEURAL
Status: DISCONTINUED | OUTPATIENT
Start: 2022-08-29 | End: 2022-08-29

## 2022-08-29 NOTE — OPERATIVE REPORT
[facility]  Operative Report  2022     Lindsey Moran Patient Status:  Hospital Outpatient Surgery    1955 MRN [id number]   Location [address] [address] Attending No att. providers found   Hosp Day # 0 PCP Jacques Riley DO     Indication: Alexander Perry is a 69year old female patient with chronic low back pain failed conservative treatment with medication and physical therapy was here for radiofrequency ablation of lumbar medial branch for long-term pain relief. Preoperative Diagnosis:  Lumbar facet arthropathy [M47.816]  Failed back surgical syndrome    Postoperative Diagnosis: Same as above. Procedure performed: Radiofrequency ablation of left lumbar medial branch at L3-L4 and L4-L5 level under fluoroscopy. Anesthesia: Local.    EBL: Less than 1 ml. Procedure Description:   After reviewing the patient's history and performing a focused physical examination, the diagnosis was confirmed and contraindications such as infection and coagulopathy were ruled out. Following review of potential side effects and complications, including but not necessarily limited to infection, allergic reaction, local tissue breakdown, nerve injury, and paresis, the patient indicated they understood and agreed to proceed. After obtaining the informed consent, the patient was brought to the procedure room and monitored. The vital signs were monitored and recorded by an experienced RN. The patient was placed prone on the table. The patient's back was prepped and draped in sterile fashion. The skin was anesthetized via 25-gauge 1.5\" needle with approximately 2 mL of 1% lidocaine for local anesthesia. Under fluoroscopic guidance, a 22-gauge, 100-mm SMK needle was advanced to the junction of the superior aspect of the left transverse process and the lateral aspect of the same level superior articular process at left L4-L5 and L5-S1 level .   The needle was then walked off the bony tissue and advanced 2 to 3 mm to lie along the path of the left L3, L4 and L5 medial branch nerve. AP and lateral radiographs were obtained to document proper needle position. Sensory and motor stimulation were then performed, which elicited deep local back discomfort but no evidence of motor stimulation in the gluteal muscles or extremities. At this point, 0.5 mL of 1% lidocaine was injected to the tissues around the tip of the SMK needle. Subsequently, a medial branch nerve denervation was performed for 90 seconds at 80 degrees centigrade without complication. The radiofrequency probe was removed with the needle left in place and 1% lidocaine and 20 mg methylprednisolone was injected through each needle. The needles were removed with tips intact. The patient tolerated the procedure very well. There was no subjective or objective loss of motor strength. The patient was observed until discharge criteria met. Discharge instructions were given and patient was released to a responsible adult. Complications: None. Follow up: The patient will be followed in the pain clinic as needed basis.       Brett Drew MD

## 2022-08-29 NOTE — INTERVAL H&P NOTE
Pre-op Diagnosis: Lumbar facet arthropathy [M47.816]    The above referenced H&P was reviewed by Monalisa Barbour MD on 8/29/2022, the patient was examined and no significant changes have occurred in the patient's condition since the H&P was performed. I discussed with the patient and/or legal representative the potential benefits, risks and side effects of this procedure; the likelihood of the patient achieving goals; and potential problems that might occur during recuperation. I discussed reasonable alternatives to the procedure, including risks, benefits and side effects related to the alternatives and risks related to not receiving this procedure. We will proceed with procedure as planned.

## 2022-09-09 DIAGNOSIS — G47.33 OSA (OBSTRUCTIVE SLEEP APNEA): Primary | ICD-10-CM

## 2022-09-13 DIAGNOSIS — M79.7 FIBROMYALGIA: ICD-10-CM

## 2022-09-13 RX ORDER — PREGABALIN 50 MG/1
CAPSULE ORAL
Qty: 90 CAPSULE | Refills: 0 | OUTPATIENT
Start: 2022-09-13

## 2022-09-26 ENCOUNTER — OFFICE VISIT (OUTPATIENT)
Dept: PAIN CLINIC | Facility: CLINIC | Age: 67
End: 2022-09-26

## 2022-09-26 ENCOUNTER — TELEPHONE (OUTPATIENT)
Dept: NEUROLOGY | Facility: CLINIC | Age: 67
End: 2022-09-26

## 2022-09-26 VITALS
OXYGEN SATURATION: 93 % | BODY MASS INDEX: 41 KG/M2 | HEART RATE: 70 BPM | DIASTOLIC BLOOD PRESSURE: 64 MMHG | WEIGHT: 239 LBS | SYSTOLIC BLOOD PRESSURE: 132 MMHG

## 2022-09-26 DIAGNOSIS — Z98.1 HISTORY OF LUMBAR SPINAL FUSION: ICD-10-CM

## 2022-09-26 DIAGNOSIS — M54.16 LUMBAR RADICULOPATHY, RIGHT: Primary | ICD-10-CM

## 2022-09-26 DIAGNOSIS — M48.061 SPINAL STENOSIS OF LUMBAR REGION, UNSPECIFIED WHETHER NEUROGENIC CLAUDICATION PRESENT: ICD-10-CM

## 2022-09-26 DIAGNOSIS — Z98.1 S/P LUMBAR FUSION: ICD-10-CM

## 2022-09-26 PROCEDURE — 99214 OFFICE O/P EST MOD 30 MIN: CPT | Performed by: NURSE PRACTITIONER

## 2022-09-26 NOTE — PROGRESS NOTES
Last procedure: right side, RADIOFREQUENCY ABLATION OF LUMBAR MEDIAL BRANCH right l3/4, l4/5 with local  Date: 08/22/22  Percentage of relief obtained: 100%  Duration of relief: 1 month        Last procedure: left side, RADIOFREQUENCY ABLATION OF LUMBAR MEDIAL BRANCH LEFT L3/4, L4/5 with local  Date: 08/29/22  Percentage of relief obtained: 50% gradually increasing relief  Duration of relief: current    Current Pain Score: 7

## 2022-09-26 NOTE — TELEPHONE ENCOUNTER
Question Answer   Anesthesia Type Local   Provider [facility]   Location Lab   Procedure Transforaminal   Laterality/Level RIGHT L5 TFESI   Medical clearance requested (will send to Pain Navigator) No   Patient has Medicare coverage?  Yes

## 2022-09-28 DIAGNOSIS — G47.33 OSA (OBSTRUCTIVE SLEEP APNEA): Primary | ICD-10-CM

## 2022-09-30 ENCOUNTER — TELEPHONE (OUTPATIENT)
Dept: RHEUMATOLOGY | Facility: CLINIC | Age: 67
End: 2022-09-30

## 2022-09-30 NOTE — TELEPHONE ENCOUNTER
Phoned pt, reminder to have blood work completed prior to prolia injection. Pt states she will have completed tomorrow.

## 2022-09-30 NOTE — TELEPHONE ENCOUNTER
Approve/deny Sulaiman  Last filled 5/26/22 #30  Pt seeing pain clinic do you want them to refill?please advise

## 2022-10-01 ENCOUNTER — HOSPITAL ENCOUNTER (OUTPATIENT)
Dept: GENERAL RADIOLOGY | Age: 67
Discharge: HOME | End: 2022-10-01
Attending: NURSE PRACTITIONER
Payer: MEDICARE

## 2022-10-01 ENCOUNTER — LAB ENCOUNTER (OUTPATIENT)
Dept: LAB | Age: 67
End: 2022-10-01
Attending: INTERNAL MEDICINE
Payer: MEDICARE

## 2022-10-01 DIAGNOSIS — M54.16 LUMBAR RADICULOPATHY, RIGHT: ICD-10-CM

## 2022-10-01 DIAGNOSIS — M81.0 AGE-RELATED OSTEOPOROSIS WITHOUT CURRENT PATHOLOGICAL FRACTURE: ICD-10-CM

## 2022-10-01 DIAGNOSIS — M35.00 SJOGREN'S SYNDROME WITHOUT EXTRAGLANDULAR INVOLVEMENT (HCC): ICD-10-CM

## 2022-10-01 DIAGNOSIS — M81.0 OSTEOPOROSIS, SENILE: ICD-10-CM

## 2022-10-01 DIAGNOSIS — M06.9 RHEUMATOID ARTHRITIS INVOLVING BOTH HANDS, UNSPECIFIED WHETHER RHEUMATOID FACTOR PRESENT (HCC): ICD-10-CM

## 2022-10-01 DIAGNOSIS — Z79.899 HIGH RISK MEDICATION USE: ICD-10-CM

## 2022-10-01 LAB
ALBUMIN SERPL-MCNC: 3.5 G/DL (ref 3.4–5)
ALBUMIN/GLOB SERPL: 0.9 {RATIO} (ref 1–2)
ALP LIVER SERPL-CCNC: 78 U/L
ALT SERPL-CCNC: 19 U/L
ANION GAP SERPL CALC-SCNC: 10 MMOL/L (ref 0–18)
AST SERPL-CCNC: 24 U/L (ref 15–37)
BASOPHILS # BLD AUTO: 0.1 X10(3) UL (ref 0–0.2)
BASOPHILS NFR BLD AUTO: 2.1 %
BILIRUB SERPL-MCNC: 0.5 MG/DL (ref 0.1–2)
BUN BLD-MCNC: 13 MG/DL (ref 7–18)
CALCIUM BLD-MCNC: 10.2 MG/DL (ref 8.5–10.1)
CHLORIDE SERPL-SCNC: 102 MMOL/L (ref 98–112)
CO2 SERPL-SCNC: 28 MMOL/L (ref 21–32)
CREAT BLD-MCNC: 0.88 MG/DL
EOSINOPHIL # BLD AUTO: 0.36 X10(3) UL (ref 0–0.7)
EOSINOPHIL NFR BLD AUTO: 7.5 %
ERYTHROCYTE [DISTWIDTH] IN BLOOD BY AUTOMATED COUNT: 15.1 %
FASTING STATUS PATIENT QL REPORTED: YES
GFR SERPLBLD BASED ON 1.73 SQ M-ARVRAT: 71 ML/MIN/1.73M2 (ref 60–?)
GLOBULIN PLAS-MCNC: 3.8 G/DL (ref 2.8–4.4)
GLUCOSE BLD-MCNC: 94 MG/DL (ref 70–99)
HCT VFR BLD AUTO: 40.1 %
HGB BLD-MCNC: 12.7 G/DL
IMM GRANULOCYTES # BLD AUTO: 0 X10(3) UL (ref 0–1)
IMM GRANULOCYTES NFR BLD: 0 %
LYMPHOCYTES # BLD AUTO: 1.51 X10(3) UL (ref 1–4)
LYMPHOCYTES NFR BLD AUTO: 31.4 %
MAGNESIUM SERPL-MCNC: 2.1 MG/DL (ref 1.6–2.6)
MCH RBC QN AUTO: 30.9 PG (ref 26–34)
MCHC RBC AUTO-ENTMCNC: 31.7 G/DL (ref 31–37)
MCV RBC AUTO: 97.6 FL
MONOCYTES # BLD AUTO: 0.73 X10(3) UL (ref 0.1–1)
MONOCYTES NFR BLD AUTO: 15.2 %
NEUTROPHILS # BLD AUTO: 2.11 X10 (3) UL (ref 1.5–7.7)
NEUTROPHILS # BLD AUTO: 2.11 X10(3) UL (ref 1.5–7.7)
NEUTROPHILS NFR BLD AUTO: 43.8 %
OSMOLALITY SERPL CALC.SUM OF ELEC: 290 MOSM/KG (ref 275–295)
PHOSPHATE SERPL-MCNC: 4 MG/DL (ref 2.5–4.9)
PLATELET # BLD AUTO: 206 10(3)UL (ref 150–450)
POTASSIUM SERPL-SCNC: 4.2 MMOL/L (ref 3.5–5.1)
PROT SERPL-MCNC: 7.3 G/DL (ref 6.4–8.2)
RBC # BLD AUTO: 4.11 X10(6)UL
SODIUM SERPL-SCNC: 140 MMOL/L (ref 136–145)
VIT D+METAB SERPL-MCNC: 78 NG/ML (ref 30–100)
WBC # BLD AUTO: 4.8 X10(3) UL (ref 4–11)

## 2022-10-01 PROCEDURE — 82306 VITAMIN D 25 HYDROXY: CPT

## 2022-10-01 PROCEDURE — 85025 COMPLETE CBC W/AUTO DIFF WBC: CPT

## 2022-10-01 PROCEDURE — 80053 COMPREHEN METABOLIC PANEL: CPT

## 2022-10-01 PROCEDURE — 83735 ASSAY OF MAGNESIUM: CPT

## 2022-10-01 PROCEDURE — 84100 ASSAY OF PHOSPHORUS: CPT

## 2022-10-01 PROCEDURE — 73523 X-RAY EXAM HIPS BI 5/> VIEWS: CPT | Performed by: NURSE PRACTITIONER

## 2022-10-01 PROCEDURE — 36415 COLL VENOUS BLD VENIPUNCTURE: CPT

## 2022-10-01 RX ORDER — HYDROCODONE BITARTRATE AND ACETAMINOPHEN 5; 325 MG/1; MG/1
1 TABLET ORAL EVERY 8 HOURS PRN
Qty: 30 TABLET | Refills: 0 | Status: SHIPPED | OUTPATIENT
Start: 2022-10-01

## 2022-10-03 ENCOUNTER — PATIENT MESSAGE (OUTPATIENT)
Dept: PAIN CLINIC | Facility: CLINIC | Age: 67
End: 2022-10-03

## 2022-10-07 ENCOUNTER — NURSE ONLY (OUTPATIENT)
Dept: RHEUMATOLOGY | Facility: CLINIC | Age: 67
End: 2022-10-07
Payer: MEDICARE

## 2022-10-07 DIAGNOSIS — M81.0 AGE-RELATED OSTEOPOROSIS WITHOUT CURRENT PATHOLOGICAL FRACTURE: Primary | ICD-10-CM

## 2022-10-07 PROCEDURE — 96372 THER/PROPH/DIAG INJ SC/IM: CPT | Performed by: INTERNAL MEDICINE

## 2022-10-11 RX ORDER — HYDROCODONE BITARTRATE AND ACETAMINOPHEN 5; 325 MG/1; MG/1
1 TABLET ORAL EVERY 8 HOURS PRN
Qty: 30 TABLET | Refills: 0 | Status: SHIPPED | OUTPATIENT
Start: 2022-10-11

## 2022-10-11 NOTE — TELEPHONE ENCOUNTER
Josey Watters for same dose to be set up   Will need to discuss an increase and will need ortho notes as well

## 2022-10-11 NOTE — TELEPHONE ENCOUNTER
Dr. Carly Storey,  See request below. Last refill of  Norco 5-325mg was 10/1. She cx her appt today for pain clinic.

## 2022-10-11 NOTE — TELEPHONE ENCOUNTER
Pt requesting a refill of norco  She is also requesting the dosage be increased   She has been in severe hip pain.  She is seeing ortho today   Please leave a message if pt is unable to answer phone

## 2022-10-14 ENCOUNTER — TELEPHONE (OUTPATIENT)
Dept: FAMILY MEDICINE CLINIC | Facility: CLINIC | Age: 67
End: 2022-10-14

## 2022-10-14 NOTE — TELEPHONE ENCOUNTER
Patient dropped off paperwork for LE to review. She was to review it after patient went to the Orthopedic. She was looking for increase in her Norco.    Put paperwork in LE Triage Tray.

## 2022-10-19 RX ORDER — HYDROCODONE BITARTRATE AND ACETAMINOPHEN 5; 325 MG/1; MG/1
1 TABLET ORAL EVERY 8 HOURS PRN
Qty: 30 TABLET | Refills: 0 | Status: SHIPPED | OUTPATIENT
Start: 2022-10-19

## 2022-10-19 NOTE — TELEPHONE ENCOUNTER
Please see consult note - will have injection soon and discussed surgery / does not mention pain meds

## 2022-10-19 NOTE — TELEPHONE ENCOUNTER
Approve/deny Sulaiman  Last filled 10/11/22 for #30  Pt states having  Surgery 12/2/22  Last visit 8/23/22

## 2022-10-19 NOTE — TELEPHONE ENCOUNTER
Pt informed and understanding verbalized. Pt states she is hoping for refill on [place] until surgery. Please advise.

## 2022-11-01 RX ORDER — HYDROCODONE BITARTRATE AND ACETAMINOPHEN 5; 325 MG/1; MG/1
1 TABLET ORAL EVERY 8 HOURS PRN
Qty: 30 TABLET | Refills: 0 | Status: SHIPPED | OUTPATIENT
Start: 2022-11-01

## 2022-11-16 RX ORDER — HYDROCODONE BITARTRATE AND ACETAMINOPHEN 5; 325 MG/1; MG/1
1 TABLET ORAL EVERY 8 HOURS PRN
Qty: 30 TABLET | Refills: 0 | Status: SHIPPED | OUTPATIENT
Start: 2022-11-16

## 2022-11-17 ENCOUNTER — LAB ENCOUNTER (OUTPATIENT)
Dept: LAB | Age: 67
End: 2022-11-17
Attending: STUDENT IN AN ORGANIZED HEALTH CARE EDUCATION/TRAINING PROGRAM
Payer: MEDICARE

## 2022-11-17 ENCOUNTER — LAB ENCOUNTER (OUTPATIENT)
Dept: LAB | Age: 67
End: 2022-11-17
Attending: FAMILY MEDICINE
Payer: MEDICARE

## 2022-11-17 ENCOUNTER — OFFICE VISIT (OUTPATIENT)
Dept: FAMILY MEDICINE CLINIC | Facility: CLINIC | Age: 67
End: 2022-11-17
Payer: MEDICARE

## 2022-11-17 VITALS
DIASTOLIC BLOOD PRESSURE: 80 MMHG | WEIGHT: 227 LBS | HEIGHT: 64 IN | HEART RATE: 71 BPM | SYSTOLIC BLOOD PRESSURE: 120 MMHG | RESPIRATION RATE: 18 BRPM | TEMPERATURE: 97 F | BODY MASS INDEX: 38.76 KG/M2 | OXYGEN SATURATION: 98 %

## 2022-11-17 DIAGNOSIS — I77.1 TORTUOUS AORTA (HCC): ICD-10-CM

## 2022-11-17 DIAGNOSIS — M35.00 SJOGREN'S SYNDROME, WITH UNSPECIFIED ORGAN INVOLVEMENT (HCC): ICD-10-CM

## 2022-11-17 DIAGNOSIS — Z01.818 PREOP TESTING: ICD-10-CM

## 2022-11-17 DIAGNOSIS — D84.9 IMMUNOSUPPRESSED STATUS (HCC): ICD-10-CM

## 2022-11-17 DIAGNOSIS — M32.19 OTHER SYSTEMIC LUPUS ERYTHEMATOSUS WITH OTHER ORGAN INVOLVEMENT (HCC): ICD-10-CM

## 2022-11-17 DIAGNOSIS — G89.29 OTHER CHRONIC PAIN: ICD-10-CM

## 2022-11-17 DIAGNOSIS — I34.0 MILD MITRAL REGURGITATION: ICD-10-CM

## 2022-11-17 DIAGNOSIS — M16.11 PRIMARY OSTEOARTHRITIS OF RIGHT HIP: ICD-10-CM

## 2022-11-17 DIAGNOSIS — M45.9 RHEUMATOID ARTHRITIS INVOLVING VERTEBRA, UNSPECIFIED WHETHER RHEUMATOID FACTOR PRESENT (HCC): ICD-10-CM

## 2022-11-17 DIAGNOSIS — Z01.818 PREOP EXAMINATION: ICD-10-CM

## 2022-11-17 DIAGNOSIS — Z01.818 PREOP EXAMINATION: Primary | ICD-10-CM

## 2022-11-17 LAB
ALBUMIN SERPL-MCNC: 3.7 G/DL (ref 3.4–5)
ALBUMIN/GLOB SERPL: 1.1 {RATIO} (ref 1–2)
ALP LIVER SERPL-CCNC: 75 U/L
ALT SERPL-CCNC: 18 U/L
ANION GAP SERPL CALC-SCNC: 5 MMOL/L (ref 0–18)
ANTIBODY SCREEN: NEGATIVE
AST SERPL-CCNC: 19 U/L (ref 15–37)
BASOPHILS # BLD AUTO: 0.07 X10(3) UL (ref 0–0.2)
BASOPHILS NFR BLD AUTO: 1.4 %
BILIRUB SERPL-MCNC: 0.4 MG/DL (ref 0.1–2)
BUN BLD-MCNC: 15 MG/DL (ref 7–18)
CALCIUM BLD-MCNC: 10 MG/DL (ref 8.5–10.1)
CHLORIDE SERPL-SCNC: 104 MMOL/L (ref 98–112)
CO2 SERPL-SCNC: 31 MMOL/L (ref 21–32)
CREAT BLD-MCNC: 0.77 MG/DL
EOSINOPHIL # BLD AUTO: 0.32 X10(3) UL (ref 0–0.7)
EOSINOPHIL NFR BLD AUTO: 6.6 %
ERYTHROCYTE [DISTWIDTH] IN BLOOD BY AUTOMATED COUNT: 14.2 %
FASTING STATUS PATIENT QL REPORTED: YES
GFR SERPLBLD BASED ON 1.73 SQ M-ARVRAT: 83 ML/MIN/1.73M2 (ref 60–?)
GLOBULIN PLAS-MCNC: 3.4 G/DL (ref 2.8–4.4)
GLUCOSE BLD-MCNC: 91 MG/DL (ref 70–99)
HCT VFR BLD AUTO: 40.4 %
HGB BLD-MCNC: 13.1 G/DL
IMM GRANULOCYTES # BLD AUTO: 0.01 X10(3) UL (ref 0–1)
IMM GRANULOCYTES NFR BLD: 0.2 %
LYMPHOCYTES # BLD AUTO: 1.43 X10(3) UL (ref 1–4)
LYMPHOCYTES NFR BLD AUTO: 29.5 %
MCH RBC QN AUTO: 31.5 PG (ref 26–34)
MCHC RBC AUTO-ENTMCNC: 32.4 G/DL (ref 31–37)
MCV RBC AUTO: 97.1 FL
MONOCYTES # BLD AUTO: 0.73 X10(3) UL (ref 0.1–1)
MONOCYTES NFR BLD AUTO: 15.1 %
NEUTROPHILS # BLD AUTO: 2.28 X10 (3) UL (ref 1.5–7.7)
NEUTROPHILS # BLD AUTO: 2.28 X10(3) UL (ref 1.5–7.7)
NEUTROPHILS NFR BLD AUTO: 47.2 %
OSMOLALITY SERPL CALC.SUM OF ELEC: 290 MOSM/KG (ref 275–295)
PLATELET # BLD AUTO: 204 10(3)UL (ref 150–450)
POTASSIUM SERPL-SCNC: 3.6 MMOL/L (ref 3.5–5.1)
PROT SERPL-MCNC: 7.1 G/DL (ref 6.4–8.2)
RBC # BLD AUTO: 4.16 X10(6)UL
RH BLOOD TYPE: POSITIVE
SODIUM SERPL-SCNC: 140 MMOL/L (ref 136–145)
WBC # BLD AUTO: 4.8 X10(3) UL (ref 4–11)

## 2022-11-17 PROCEDURE — 86850 RBC ANTIBODY SCREEN: CPT

## 2022-11-17 PROCEDURE — 85025 COMPLETE CBC W/AUTO DIFF WBC: CPT

## 2022-11-17 PROCEDURE — 86900 BLOOD TYPING SEROLOGIC ABO: CPT

## 2022-11-17 PROCEDURE — 80053 COMPREHEN METABOLIC PANEL: CPT

## 2022-11-17 PROCEDURE — 99213 OFFICE O/P EST LOW 20 MIN: CPT | Performed by: FAMILY MEDICINE

## 2022-11-17 PROCEDURE — 86901 BLOOD TYPING SEROLOGIC RH(D): CPT

## 2022-11-17 PROCEDURE — 36415 COLL VENOUS BLD VENIPUNCTURE: CPT

## 2022-11-21 ENCOUNTER — TELEPHONE (OUTPATIENT)
Dept: RHEUMATOLOGY | Facility: CLINIC | Age: 67
End: 2022-11-21

## 2022-11-21 DIAGNOSIS — Z79.899 HIGH RISK MEDICATION USE: ICD-10-CM

## 2022-11-21 DIAGNOSIS — M06.9 RHEUMATOID ARTHRITIS INVOLVING BOTH HANDS, UNSPECIFIED WHETHER RHEUMATOID FACTOR PRESENT (HCC): Primary | ICD-10-CM

## 2022-11-21 NOTE — TELEPHONE ENCOUNTER
Future Appointments   Date Time Provider Brenda Tesha   11/30/2022  8:30 AM Sydney Myers MD [facility] Pulm [facility] [place]   11/30/2022 10:30 AM PF COVID RESOURCE PF OUTPT [facility]   11/30/2022  2:15 PM Melanie Mcconnell DO EMGR[facility]UM[facility]BSN [facility] [place]   1/9/2023 10:00 AM Kun Sher DO EM[facility] [address] [facility] [address]   4/10/2023 10:00 AM EMG RHEUM NURSE GIACOMO EM[facility]

## 2022-11-23 ENCOUNTER — TELEPHONE (OUTPATIENT)
Dept: FAMILY MEDICINE CLINIC | Facility: CLINIC | Age: 67
End: 2022-11-23

## 2022-11-23 NOTE — TELEPHONE ENCOUNTER
Rebeca called from Dr. Schmitz [facility] office. H&P came back with an abnormal EKG. They are requesting a letter of ok or the EKG signed with notation in order for them to proceed.     Please fax to the Dr's Attention  [phone]

## 2022-11-23 NOTE — PAT NURSING NOTE
Mariann at Dr. Mary Starks office notified of abnormal/unconfirmed EKG and states she will followup with pt.s cardiologist.

## 2022-11-25 ENCOUNTER — LAB ENCOUNTER (OUTPATIENT)
Dept: LAB | Age: 67
End: 2022-11-25
Attending: INTERNAL MEDICINE
Payer: MEDICARE

## 2022-11-25 DIAGNOSIS — Z79.899 HIGH RISK MEDICATION USE: ICD-10-CM

## 2022-11-25 DIAGNOSIS — M06.9 RHEUMATOID ARTHRITIS INVOLVING BOTH HANDS, UNSPECIFIED WHETHER RHEUMATOID FACTOR PRESENT (HCC): ICD-10-CM

## 2022-11-25 LAB
ALBUMIN SERPL-MCNC: 3.6 G/DL (ref 3.4–5)
ALBUMIN/GLOB SERPL: 1 {RATIO} (ref 1–2)
ALP LIVER SERPL-CCNC: 69 U/L
ALT SERPL-CCNC: 19 U/L
ANION GAP SERPL CALC-SCNC: 3 MMOL/L (ref 0–18)
AST SERPL-CCNC: 25 U/L (ref 15–37)
BASOPHILS # BLD AUTO: 0.1 X10(3) UL (ref 0–0.2)
BASOPHILS NFR BLD AUTO: 1.9 %
BILIRUB SERPL-MCNC: 0.4 MG/DL (ref 0.1–2)
BUN BLD-MCNC: 12 MG/DL (ref 7–18)
CALCIUM BLD-MCNC: 9.2 MG/DL (ref 8.5–10.1)
CHLORIDE SERPL-SCNC: 105 MMOL/L (ref 98–112)
CO2 SERPL-SCNC: 31 MMOL/L (ref 21–32)
CREAT BLD-MCNC: 0.83 MG/DL
CRP SERPL-MCNC: 0.32 MG/DL (ref ?–0.3)
EOSINOPHIL # BLD AUTO: 0.45 X10(3) UL (ref 0–0.7)
EOSINOPHIL NFR BLD AUTO: 8.7 %
ERYTHROCYTE [DISTWIDTH] IN BLOOD BY AUTOMATED COUNT: 14.4 %
ERYTHROCYTE [SEDIMENTATION RATE] IN BLOOD: 46 MM/HR
FASTING STATUS PATIENT QL REPORTED: NO
GFR SERPLBLD BASED ON 1.73 SQ M-ARVRAT: 76 ML/MIN/1.73M2 (ref 60–?)
GLOBULIN PLAS-MCNC: 3.5 G/DL (ref 2.8–4.4)
GLUCOSE BLD-MCNC: 103 MG/DL (ref 70–99)
HCT VFR BLD AUTO: 39 %
HGB BLD-MCNC: 12.4 G/DL
IMM GRANULOCYTES # BLD AUTO: 0.01 X10(3) UL (ref 0–1)
IMM GRANULOCYTES NFR BLD: 0.2 %
LYMPHOCYTES # BLD AUTO: 1.67 X10(3) UL (ref 1–4)
LYMPHOCYTES NFR BLD AUTO: 32.2 %
MCH RBC QN AUTO: 31.2 PG (ref 26–34)
MCHC RBC AUTO-ENTMCNC: 31.8 G/DL (ref 31–37)
MCV RBC AUTO: 98 FL
MONOCYTES # BLD AUTO: 0.73 X10(3) UL (ref 0.1–1)
MONOCYTES NFR BLD AUTO: 14.1 %
NEUTROPHILS # BLD AUTO: 2.22 X10 (3) UL (ref 1.5–7.7)
NEUTROPHILS # BLD AUTO: 2.22 X10(3) UL (ref 1.5–7.7)
NEUTROPHILS NFR BLD AUTO: 42.9 %
OSMOLALITY SERPL CALC.SUM OF ELEC: 288 MOSM/KG (ref 275–295)
PLATELET # BLD AUTO: 214 10(3)UL (ref 150–450)
POTASSIUM SERPL-SCNC: 3.8 MMOL/L (ref 3.5–5.1)
PROT SERPL-MCNC: 7.1 G/DL (ref 6.4–8.2)
RBC # BLD AUTO: 3.98 X10(6)UL
SODIUM SERPL-SCNC: 139 MMOL/L (ref 136–145)
WBC # BLD AUTO: 5.2 X10(3) UL (ref 4–11)

## 2022-11-25 PROCEDURE — 85652 RBC SED RATE AUTOMATED: CPT

## 2022-11-25 PROCEDURE — 80053 COMPREHEN METABOLIC PANEL: CPT

## 2022-11-25 PROCEDURE — 86140 C-REACTIVE PROTEIN: CPT

## 2022-11-25 PROCEDURE — 36415 COLL VENOUS BLD VENIPUNCTURE: CPT

## 2022-11-25 PROCEDURE — 85025 COMPLETE CBC W/AUTO DIFF WBC: CPT

## 2022-11-29 RX ORDER — HYDROCODONE BITARTRATE AND ACETAMINOPHEN 5; 325 MG/1; MG/1
1 TABLET ORAL EVERY 8 HOURS PRN
Qty: 30 TABLET | Refills: 0 | Status: SHIPPED | OUTPATIENT
Start: 2022-11-29

## 2022-11-30 ENCOUNTER — OFFICE VISIT (OUTPATIENT)
Facility: CLINIC | Age: 67
End: 2022-11-30
Payer: MEDICARE

## 2022-11-30 ENCOUNTER — LAB ENCOUNTER (OUTPATIENT)
Dept: LAB | Age: 67
End: 2022-11-30
Attending: STUDENT IN AN ORGANIZED HEALTH CARE EDUCATION/TRAINING PROGRAM
Payer: MEDICARE

## 2022-11-30 ENCOUNTER — OFFICE VISIT (OUTPATIENT)
Dept: RHEUMATOLOGY | Facility: CLINIC | Age: 67
End: 2022-11-30
Payer: MEDICARE

## 2022-11-30 ENCOUNTER — TELEPHONE (OUTPATIENT)
Dept: FAMILY MEDICINE CLINIC | Facility: CLINIC | Age: 67
End: 2022-11-30

## 2022-11-30 VITALS
HEIGHT: 64 IN | BODY MASS INDEX: 40.46 KG/M2 | SYSTOLIC BLOOD PRESSURE: 146 MMHG | HEART RATE: 80 BPM | OXYGEN SATURATION: 92 % | RESPIRATION RATE: 16 BRPM | DIASTOLIC BLOOD PRESSURE: 80 MMHG | WEIGHT: 237 LBS

## 2022-11-30 VITALS
DIASTOLIC BLOOD PRESSURE: 80 MMHG | BODY MASS INDEX: 39.78 KG/M2 | HEIGHT: 64 IN | RESPIRATION RATE: 16 BRPM | SYSTOLIC BLOOD PRESSURE: 148 MMHG | TEMPERATURE: 98 F | HEART RATE: 70 BPM | WEIGHT: 233 LBS | OXYGEN SATURATION: 93 %

## 2022-11-30 DIAGNOSIS — R70.0 ELEVATED SED RATE: ICD-10-CM

## 2022-11-30 DIAGNOSIS — M06.9 RHEUMATOID ARTHRITIS INVOLVING BOTH HANDS, UNSPECIFIED WHETHER RHEUMATOID FACTOR PRESENT (HCC): Primary | ICD-10-CM

## 2022-11-30 DIAGNOSIS — M35.00 SJOGREN'S SYNDROME WITHOUT EXTRAGLANDULAR INVOLVEMENT (HCC): ICD-10-CM

## 2022-11-30 DIAGNOSIS — M81.0 AGE-RELATED OSTEOPOROSIS WITHOUT CURRENT PATHOLOGICAL FRACTURE: ICD-10-CM

## 2022-11-30 DIAGNOSIS — J84.9 ILD (INTERSTITIAL LUNG DISEASE) (HCC): ICD-10-CM

## 2022-11-30 DIAGNOSIS — M79.7 FIBROMYALGIA: ICD-10-CM

## 2022-11-30 DIAGNOSIS — Z01.818 PRE-OP EVALUATION: Primary | ICD-10-CM

## 2022-11-30 DIAGNOSIS — Z01.818 PREOP TESTING: ICD-10-CM

## 2022-11-30 DIAGNOSIS — B00.89 HERPETIC WHITLOW: ICD-10-CM

## 2022-11-30 DIAGNOSIS — Z79.899 HIGH RISK MEDICATION USE: ICD-10-CM

## 2022-11-30 DIAGNOSIS — M32.19 OTHER SYSTEMIC LUPUS ERYTHEMATOSUS WITH OTHER ORGAN INVOLVEMENT (HCC): ICD-10-CM

## 2022-11-30 PROCEDURE — 87641 MR-STAPH DNA AMP PROBE: CPT

## 2022-11-30 PROCEDURE — 99214 OFFICE O/P EST MOD 30 MIN: CPT | Performed by: INTERNAL MEDICINE

## 2022-11-30 RX ORDER — VALACYCLOVIR HYDROCHLORIDE 1 G/1
1000 TABLET, FILM COATED ORAL EVERY 12 HOURS SCHEDULED
Qty: 14 TABLET | Refills: 0 | Status: SHIPPED | OUTPATIENT
Start: 2022-11-30 | End: 2022-12-07

## 2022-11-30 RX ORDER — ONDANSETRON 4 MG/1
TABLET, FILM COATED ORAL
COMMUNITY
Start: 2022-11-21

## 2022-11-30 RX ORDER — LEFLUNOMIDE 20 MG/1
20 TABLET ORAL DAILY
Qty: 90 TABLET | Refills: 1 | Status: SHIPPED | OUTPATIENT
Start: 2022-11-30

## 2022-11-30 RX ORDER — CHLORHEXIDINE GLUCONATE 213 G/1000ML
SOLUTION TOPICAL
COMMUNITY
End: 2022-12-03

## 2022-11-30 RX ORDER — PREGABALIN 75 MG/1
CAPSULE ORAL
Qty: 90 CAPSULE | Refills: 0 | Status: SHIPPED | OUTPATIENT
Start: 2022-11-30

## 2022-11-30 RX ORDER — TRAMADOL HYDROCHLORIDE 50 MG/1
TABLET ORAL
COMMUNITY
Start: 2022-11-21

## 2022-11-30 RX ORDER — DOCUSATE SODIUM 50 MG AND SENNOSIDES 8.6 MG 8.6; 5 MG/1; MG/1
TABLET, FILM COATED ORAL
COMMUNITY
Start: 2022-11-21

## 2022-11-30 RX ORDER — PREGABALIN 50 MG/1
CAPSULE ORAL
Qty: 90 CAPSULE | Refills: 0 | Status: SHIPPED | OUTPATIENT
Start: 2022-11-30

## 2022-11-30 RX ORDER — OXYCODONE HYDROCHLORIDE 5 MG/1
TABLET ORAL
COMMUNITY
Start: 2022-11-21

## 2022-11-30 RX ORDER — CALCIUM CARBONATE/VITAMIN D3 500 MG-10
TABLET ORAL
COMMUNITY

## 2022-11-30 RX ORDER — PANTOPRAZOLE SODIUM 40 MG/1
TABLET, DELAYED RELEASE ORAL
COMMUNITY
Start: 2022-11-21

## 2022-11-30 RX ORDER — HYDROXYCHLOROQUINE SULFATE 200 MG/1
200 TABLET, FILM COATED ORAL 2 TIMES DAILY
Qty: 180 TABLET | Refills: 1 | Status: SHIPPED | OUTPATIENT
Start: 2022-11-30

## 2022-11-30 RX ORDER — ASPIRIN 325 MG
TABLET ORAL
COMMUNITY
Start: 2022-11-21

## 2022-11-30 NOTE — TELEPHONE ENCOUNTER
Pt was told to see primary for some possible finger infection. She is scheduled for surgery this Friday and was told finger needs to be evaluated before procedure. Pt asking if she can be added to Dr. Shanta Cao schedule tomorrow?

## 2022-11-30 NOTE — PATIENT INSTRUCTIONS
-Plan:  -continue breo 200 one puff every morning   To get covid vaccine - pre- op   - plan for ECHO prior to next visit if able   - see me in 4 weeks video if necessary   - plan for sleep study - when able     Matteo Goodson MD  Pulmonary Medicine  11/30/2022

## 2022-12-01 ENCOUNTER — TELEPHONE (OUTPATIENT)
Dept: FAMILY MEDICINE CLINIC | Facility: CLINIC | Age: 67
End: 2022-12-01

## 2022-12-01 ENCOUNTER — OFFICE VISIT (OUTPATIENT)
Dept: FAMILY MEDICINE CLINIC | Facility: CLINIC | Age: 67
End: 2022-12-01
Payer: MEDICARE

## 2022-12-01 VITALS
BODY MASS INDEX: 40 KG/M2 | DIASTOLIC BLOOD PRESSURE: 76 MMHG | WEIGHT: 234 LBS | HEART RATE: 76 BPM | RESPIRATION RATE: 18 BRPM | SYSTOLIC BLOOD PRESSURE: 136 MMHG

## 2022-12-01 DIAGNOSIS — L08.9 FINGER INFECTION: Primary | ICD-10-CM

## 2022-12-01 DIAGNOSIS — B00.89 RECURRENT HERPETIC WHITLOW: ICD-10-CM

## 2022-12-01 PROBLEM — Z01.818 PREOP TESTING: Status: ACTIVE | Noted: 2022-12-01

## 2022-12-01 LAB
MRSA DNA SPEC QL NAA+PROBE: NEGATIVE
SARS-COV-2 RNA RESP QL NAA+PROBE: NOT DETECTED

## 2022-12-01 PROCEDURE — 99214 OFFICE O/P EST MOD 30 MIN: CPT | Performed by: FAMILY MEDICINE

## 2022-12-01 PROCEDURE — 1111F DSCHRG MED/CURRENT MED MERGE: CPT | Performed by: FAMILY MEDICINE

## 2022-12-01 RX ORDER — CLINDAMYCIN HYDROCHLORIDE 300 MG/1
300 CAPSULE ORAL 3 TIMES DAILY
Qty: 21 CAPSULE | Refills: 0 | Status: SHIPPED | OUTPATIENT
Start: 2022-12-01 | End: 2022-12-08

## 2022-12-01 NOTE — TELEPHONE ENCOUNTER
Pt said clearance note regarding her finger infection needs to be faxed to Dr. Javier Vu at [phone] and to THE [facility] preadmission at [phone]

## 2022-12-02 ENCOUNTER — HOSPITAL ENCOUNTER (OUTPATIENT)
Facility: HOSPITAL | Age: 67
Discharge: HOME | End: 2022-12-03
Attending: STUDENT IN AN ORGANIZED HEALTH CARE EDUCATION/TRAINING PROGRAM | Admitting: STUDENT IN AN ORGANIZED HEALTH CARE EDUCATION/TRAINING PROGRAM
Payer: MEDICARE

## 2022-12-02 ENCOUNTER — ANESTHESIA EVENT (OUTPATIENT)
Dept: SURGERY | Facility: HOSPITAL | Age: 67
End: 2022-12-02
Payer: MEDICARE

## 2022-12-02 ENCOUNTER — ANESTHESIA (OUTPATIENT)
Dept: SURGERY | Facility: HOSPITAL | Age: 67
End: 2022-12-02
Payer: MEDICARE

## 2022-12-02 ENCOUNTER — APPOINTMENT (OUTPATIENT)
Dept: GENERAL RADIOLOGY | Facility: HOSPITAL | Age: 67
End: 2022-12-02
Attending: STUDENT IN AN ORGANIZED HEALTH CARE EDUCATION/TRAINING PROGRAM
Payer: MEDICARE

## 2022-12-02 DIAGNOSIS — Z01.818 PREOP TESTING: Primary | ICD-10-CM

## 2022-12-02 PROBLEM — M16.11 PRIMARY OSTEOARTHRITIS OF RIGHT HIP: Status: ACTIVE | Noted: 2022-12-02

## 2022-12-02 PROCEDURE — 0SR904Z REPLACEMENT OF RIGHT HIP JOINT WITH CERAMIC ON POLYETHYLENE SYNTHETIC SUBSTITUTE, OPEN APPROACH: ICD-10-PCS | Performed by: STUDENT IN AN ORGANIZED HEALTH CARE EDUCATION/TRAINING PROGRAM

## 2022-12-02 PROCEDURE — 73501 X-RAY EXAM HIP UNI 1 VIEW: CPT | Performed by: STUDENT IN AN ORGANIZED HEALTH CARE EDUCATION/TRAINING PROGRAM

## 2022-12-02 PROCEDURE — 72170 X-RAY EXAM OF PELVIS: CPT | Performed by: STUDENT IN AN ORGANIZED HEALTH CARE EDUCATION/TRAINING PROGRAM

## 2022-12-02 PROCEDURE — 99225 SUBSEQUENT OBSERVATION CARE: CPT | Performed by: HOSPITALIST

## 2022-12-02 DEVICE — IMPLANTABLE DEVICE
Type: IMPLANTABLE DEVICE | Site: HIP | Status: FUNCTIONAL
Brand: G7® DUAL MOBILITY ACETABULAR SYSTEM

## 2022-12-02 DEVICE — BONE SCREW 6.5X20 SELF-TAP: Type: IMPLANTABLE DEVICE | Site: HIP | Status: FUNCTIONAL

## 2022-12-02 DEVICE — IMPLANTABLE DEVICE
Type: IMPLANTABLE DEVICE | Site: HIP | Status: FUNCTIONAL
Brand: VIVACIT-E®

## 2022-12-02 DEVICE — BIOLOX® OPTION, HEAD, XL, Ø 28/+7, TAPER 12/14
Type: IMPLANTABLE DEVICE | Site: HIP | Status: FUNCTIONAL
Brand: BIOLOX® OPTION

## 2022-12-02 DEVICE — BONE SCREW 6.5X30 SELF-TAP: Type: IMPLANTABLE DEVICE | Site: HIP | Status: FUNCTIONAL

## 2022-12-02 RX ORDER — MAGNESIUM HYDROXIDE 1200 MG/15ML
LIQUID ORAL CONTINUOUS PRN
Status: COMPLETED | OUTPATIENT
Start: 2022-12-02 | End: 2022-12-02

## 2022-12-02 RX ORDER — LIDOCAINE HYDROCHLORIDE 10 MG/ML
INJECTION, SOLUTION EPIDURAL; INFILTRATION; INTRACAUDAL; PERINEURAL AS NEEDED
Status: DISCONTINUED | OUTPATIENT
Start: 2022-12-02 | End: 2022-12-02 | Stop reason: SURG

## 2022-12-02 RX ORDER — CEVIMELINE HYDROCHLORIDE 30 MG/1
30 CAPSULE ORAL 3 TIMES DAILY
Status: DISCONTINUED | OUTPATIENT
Start: 2022-12-02 | End: 2022-12-03

## 2022-12-02 RX ORDER — DEXAMETHASONE SODIUM PHOSPHATE 4 MG/ML
VIAL (ML) INJECTION AS NEEDED
Status: DISCONTINUED | OUTPATIENT
Start: 2022-12-02 | End: 2022-12-02 | Stop reason: SURG

## 2022-12-02 RX ORDER — TRANEXAMIC ACID 10 MG/ML
INJECTION, SOLUTION INTRAVENOUS AS NEEDED
Status: DISCONTINUED | OUTPATIENT
Start: 2022-12-02 | End: 2022-12-02 | Stop reason: SURG

## 2022-12-02 RX ORDER — NALOXONE HYDROCHLORIDE 0.4 MG/ML
80 INJECTION, SOLUTION INTRAMUSCULAR; INTRAVENOUS; SUBCUTANEOUS AS NEEDED
Status: DISCONTINUED | OUTPATIENT
Start: 2022-12-02 | End: 2022-12-02 | Stop reason: HOSPADM

## 2022-12-02 RX ORDER — DIPHENHYDRAMINE HYDROCHLORIDE 50 MG/ML
25 INJECTION INTRAMUSCULAR; INTRAVENOUS ONCE AS NEEDED
Status: ACTIVE | OUTPATIENT
Start: 2022-12-02 | End: 2022-12-02

## 2022-12-02 RX ORDER — CEFADROXIL 500 MG/1
500 CAPSULE ORAL EVERY 12 HOURS
Status: DISCONTINUED | OUTPATIENT
Start: 2022-12-03 | End: 2022-12-02

## 2022-12-02 RX ORDER — ONDANSETRON 2 MG/ML
4 INJECTION INTRAMUSCULAR; INTRAVENOUS EVERY 6 HOURS PRN
Status: DISCONTINUED | OUTPATIENT
Start: 2022-12-02 | End: 2022-12-02 | Stop reason: HOSPADM

## 2022-12-02 RX ORDER — HYDROXYCHLOROQUINE SULFATE 200 MG/1
200 TABLET, FILM COATED ORAL 2 TIMES DAILY
Status: DISCONTINUED | OUTPATIENT
Start: 2022-12-02 | End: 2022-12-03

## 2022-12-02 RX ORDER — HYDROMORPHONE HYDROCHLORIDE 1 MG/ML
0.2 INJECTION, SOLUTION INTRAMUSCULAR; INTRAVENOUS; SUBCUTANEOUS EVERY 5 MIN PRN
Status: DISCONTINUED | OUTPATIENT
Start: 2022-12-02 | End: 2022-12-02 | Stop reason: HOSPADM

## 2022-12-02 RX ORDER — SODIUM CHLORIDE 9 MG/ML
INJECTION, SOLUTION INTRAVENOUS CONTINUOUS PRN
Status: DISCONTINUED | OUTPATIENT
Start: 2022-12-02 | End: 2022-12-02 | Stop reason: SURG

## 2022-12-02 RX ORDER — OXYCODONE HYDROCHLORIDE 5 MG/1
10 TABLET ORAL ONCE
Status: COMPLETED | OUTPATIENT
Start: 2022-12-02 | End: 2022-12-02

## 2022-12-02 RX ORDER — METOCLOPRAMIDE HYDROCHLORIDE 5 MG/ML
10 INJECTION INTRAMUSCULAR; INTRAVENOUS EVERY 8 HOURS PRN
Status: DISCONTINUED | OUTPATIENT
Start: 2022-12-02 | End: 2022-12-02 | Stop reason: HOSPADM

## 2022-12-02 RX ORDER — MORPHINE SULFATE 4 MG/ML
2 INJECTION, SOLUTION INTRAMUSCULAR; INTRAVENOUS EVERY 10 MIN PRN
Status: DISCONTINUED | OUTPATIENT
Start: 2022-12-02 | End: 2022-12-02 | Stop reason: HOSPADM

## 2022-12-02 RX ORDER — CEPHALEXIN 250 MG/1
500 CAPSULE ORAL 4 TIMES DAILY
Status: DISCONTINUED | OUTPATIENT
Start: 2022-12-03 | End: 2022-12-03

## 2022-12-02 RX ORDER — ACETAMINOPHEN 500 MG
1000 TABLET ORAL ONCE
Status: COMPLETED | OUTPATIENT
Start: 2022-12-02 | End: 2022-12-02

## 2022-12-02 RX ORDER — ALBUTEROL SULFATE 90 UG/1
1 AEROSOL, METERED RESPIRATORY (INHALATION) EVERY 6 HOURS PRN
Status: DISCONTINUED | OUTPATIENT
Start: 2022-12-02 | End: 2022-12-03

## 2022-12-02 RX ORDER — CEFAZOLIN SODIUM IN 0.9 % NACL 3 G/100 ML
3 INTRAVENOUS SOLUTION, PIGGYBACK (ML) INTRAVENOUS EVERY 8 HOURS
Status: COMPLETED | OUTPATIENT
Start: 2022-12-02 | End: 2022-12-03

## 2022-12-02 RX ORDER — FAMOTIDINE 10 MG/ML
20 INJECTION, SOLUTION INTRAVENOUS 2 TIMES DAILY
Status: DISCONTINUED | OUTPATIENT
Start: 2022-12-02 | End: 2022-12-03

## 2022-12-02 RX ORDER — ONDANSETRON 2 MG/ML
INJECTION INTRAMUSCULAR; INTRAVENOUS AS NEEDED
Status: DISCONTINUED | OUTPATIENT
Start: 2022-12-02 | End: 2022-12-02 | Stop reason: SURG

## 2022-12-02 RX ORDER — SODIUM CHLORIDE 9 MG/ML
INJECTION, SOLUTION INTRAVENOUS CONTINUOUS
Status: DISCONTINUED | OUTPATIENT
Start: 2022-12-02 | End: 2022-12-03

## 2022-12-02 RX ORDER — MORPHINE SULFATE 10 MG/ML
6 INJECTION, SOLUTION INTRAMUSCULAR; INTRAVENOUS EVERY 10 MIN PRN
Status: DISCONTINUED | OUTPATIENT
Start: 2022-12-02 | End: 2022-12-02 | Stop reason: HOSPADM

## 2022-12-02 RX ORDER — HYDROCHLOROTHIAZIDE 25 MG/1
25 TABLET ORAL DAILY
Status: DISCONTINUED | OUTPATIENT
Start: 2022-12-03 | End: 2022-12-03

## 2022-12-02 RX ORDER — FAMOTIDINE 20 MG/1
20 TABLET, FILM COATED ORAL ONCE
Status: COMPLETED | OUTPATIENT
Start: 2022-12-02 | End: 2022-12-02

## 2022-12-02 RX ORDER — PREGABALIN 75 MG/1
75 CAPSULE ORAL NIGHTLY
Status: DISCONTINUED | OUTPATIENT
Start: 2022-12-02 | End: 2022-12-03

## 2022-12-02 RX ORDER — FLUTICASONE FUROATE AND VILANTEROL 200; 25 UG/1; UG/1
1 POWDER RESPIRATORY (INHALATION) DAILY
Status: DISCONTINUED | OUTPATIENT
Start: 2022-12-03 | End: 2022-12-03

## 2022-12-02 RX ORDER — SODIUM CHLORIDE, SODIUM LACTATE, POTASSIUM CHLORIDE, CALCIUM CHLORIDE 600; 310; 30; 20 MG/100ML; MG/100ML; MG/100ML; MG/100ML
INJECTION, SOLUTION INTRAVENOUS CONTINUOUS
Status: DISCONTINUED | OUTPATIENT
Start: 2022-12-02 | End: 2022-12-03

## 2022-12-02 RX ORDER — DOCUSATE SODIUM 100 MG/1
100 CAPSULE, LIQUID FILLED ORAL 2 TIMES DAILY
Status: DISCONTINUED | OUTPATIENT
Start: 2022-12-02 | End: 2022-12-03

## 2022-12-02 RX ORDER — PREGABALIN 50 MG/1
50 CAPSULE ORAL DAILY
Status: DISCONTINUED | OUTPATIENT
Start: 2022-12-03 | End: 2022-12-03

## 2022-12-02 RX ORDER — OXYCODONE HYDROCHLORIDE 5 MG/1
5 TABLET ORAL EVERY 4 HOURS PRN
Status: DISCONTINUED | OUTPATIENT
Start: 2022-12-02 | End: 2022-12-03

## 2022-12-02 RX ORDER — MORPHINE SULFATE 4 MG/ML
4 INJECTION, SOLUTION INTRAMUSCULAR; INTRAVENOUS EVERY 10 MIN PRN
Status: DISCONTINUED | OUTPATIENT
Start: 2022-12-02 | End: 2022-12-02 | Stop reason: HOSPADM

## 2022-12-02 RX ORDER — METOCLOPRAMIDE HYDROCHLORIDE 5 MG/ML
10 INJECTION INTRAMUSCULAR; INTRAVENOUS EVERY 8 HOURS PRN
Status: DISCONTINUED | OUTPATIENT
Start: 2022-12-02 | End: 2022-12-03

## 2022-12-02 RX ORDER — BISACODYL 10 MG
10 SUPPOSITORY, RECTAL RECTAL
Status: DISCONTINUED | OUTPATIENT
Start: 2022-12-02 | End: 2022-12-03

## 2022-12-02 RX ORDER — DIPHENHYDRAMINE HYDROCHLORIDE 50 MG/ML
12.5 INJECTION INTRAMUSCULAR; INTRAVENOUS EVERY 4 HOURS PRN
Status: DISCONTINUED | OUTPATIENT
Start: 2022-12-02 | End: 2022-12-03

## 2022-12-02 RX ORDER — SENNOSIDES 8.6 MG
17.2 TABLET ORAL NIGHTLY
Status: DISCONTINUED | OUTPATIENT
Start: 2022-12-02 | End: 2022-12-03

## 2022-12-02 RX ORDER — SODIUM CHLORIDE, SODIUM LACTATE, POTASSIUM CHLORIDE, CALCIUM CHLORIDE 600; 310; 30; 20 MG/100ML; MG/100ML; MG/100ML; MG/100ML
INJECTION, SOLUTION INTRAVENOUS CONTINUOUS
Status: DISCONTINUED | OUTPATIENT
Start: 2022-12-02 | End: 2022-12-02 | Stop reason: HOSPADM

## 2022-12-02 RX ORDER — SODIUM PHOSPHATE, DIBASIC AND SODIUM PHOSPHATE, MONOBASIC 7; 19 G/133ML; G/133ML
1 ENEMA RECTAL ONCE AS NEEDED
Status: DISCONTINUED | OUTPATIENT
Start: 2022-12-02 | End: 2022-12-03

## 2022-12-02 RX ORDER — EPHEDRINE SULFATE 50 MG/ML
INJECTION INTRAVENOUS AS NEEDED
Status: DISCONTINUED | OUTPATIENT
Start: 2022-12-02 | End: 2022-12-02 | Stop reason: SURG

## 2022-12-02 RX ORDER — HYDROMORPHONE HYDROCHLORIDE 1 MG/ML
INJECTION, SOLUTION INTRAMUSCULAR; INTRAVENOUS; SUBCUTANEOUS
Status: DISPENSED
Start: 2022-12-02 | End: 2022-12-03

## 2022-12-02 RX ORDER — PHENYLEPHRINE HCL 10 MG/ML
VIAL (ML) INJECTION AS NEEDED
Status: DISCONTINUED | OUTPATIENT
Start: 2022-12-02 | End: 2022-12-02 | Stop reason: SURG

## 2022-12-02 RX ORDER — POLYETHYLENE GLYCOL 3350 17 G/17G
17 POWDER, FOR SOLUTION ORAL DAILY PRN
Status: DISCONTINUED | OUTPATIENT
Start: 2022-12-02 | End: 2022-12-03

## 2022-12-02 RX ORDER — METOCLOPRAMIDE 10 MG/1
10 TABLET ORAL ONCE
Status: COMPLETED | OUTPATIENT
Start: 2022-12-02 | End: 2022-12-02

## 2022-12-02 RX ORDER — CEFAZOLIN SODIUM/WATER 2 G/20 ML
2 SYRINGE (ML) INTRAVENOUS EVERY 8 HOURS
Status: COMPLETED | OUTPATIENT
Start: 2022-12-02 | End: 2022-12-02

## 2022-12-02 RX ORDER — HYDROMORPHONE HYDROCHLORIDE 1 MG/ML
0.6 INJECTION, SOLUTION INTRAMUSCULAR; INTRAVENOUS; SUBCUTANEOUS EVERY 5 MIN PRN
Status: DISCONTINUED | OUTPATIENT
Start: 2022-12-02 | End: 2022-12-02 | Stop reason: HOSPADM

## 2022-12-02 RX ORDER — ASPIRIN 325 MG
325 TABLET, DELAYED RELEASE (ENTERIC COATED) ORAL 2 TIMES DAILY
Status: DISCONTINUED | OUTPATIENT
Start: 2022-12-02 | End: 2022-12-03

## 2022-12-02 RX ORDER — DEXAMETHASONE SODIUM PHOSPHATE 4 MG/ML
VIAL (ML) INJECTION AS NEEDED
Status: DISCONTINUED | OUTPATIENT
Start: 2022-12-02 | End: 2022-12-02

## 2022-12-02 RX ORDER — ACETAMINOPHEN 500 MG
1000 TABLET ORAL ONCE
Status: DISCONTINUED | OUTPATIENT
Start: 2022-12-02 | End: 2022-12-02

## 2022-12-02 RX ORDER — LEFLUNOMIDE 20 MG/1
20 TABLET ORAL DAILY
Status: DISCONTINUED | OUTPATIENT
Start: 2022-12-03 | End: 2022-12-03

## 2022-12-02 RX ORDER — ONDANSETRON 2 MG/ML
4 INJECTION INTRAMUSCULAR; INTRAVENOUS EVERY 6 HOURS PRN
Status: DISCONTINUED | OUTPATIENT
Start: 2022-12-02 | End: 2022-12-03

## 2022-12-02 RX ORDER — ACETAMINOPHEN 500 MG
1000 TABLET ORAL EVERY 6 HOURS
Status: DISCONTINUED | OUTPATIENT
Start: 2022-12-02 | End: 2022-12-03

## 2022-12-02 RX ORDER — HYDROMORPHONE HYDROCHLORIDE 1 MG/ML
0.4 INJECTION, SOLUTION INTRAMUSCULAR; INTRAVENOUS; SUBCUTANEOUS EVERY 5 MIN PRN
Status: DISCONTINUED | OUTPATIENT
Start: 2022-12-02 | End: 2022-12-02 | Stop reason: HOSPADM

## 2022-12-02 RX ORDER — BUPIVACAINE HYDROCHLORIDE 7.5 MG/ML
INJECTION, SOLUTION INTRASPINAL AS NEEDED
Status: DISCONTINUED | OUTPATIENT
Start: 2022-12-02 | End: 2022-12-02 | Stop reason: SURG

## 2022-12-02 RX ORDER — FAMOTIDINE 20 MG/1
20 TABLET, FILM COATED ORAL 2 TIMES DAILY
Status: DISCONTINUED | OUTPATIENT
Start: 2022-12-02 | End: 2022-12-03

## 2022-12-02 RX ORDER — DIPHENHYDRAMINE HCL 25 MG
25 CAPSULE ORAL EVERY 4 HOURS PRN
Status: DISCONTINUED | OUTPATIENT
Start: 2022-12-02 | End: 2022-12-03

## 2022-12-02 RX ORDER — MIDAZOLAM HYDROCHLORIDE 1 MG/ML
INJECTION INTRAMUSCULAR; INTRAVENOUS AS NEEDED
Status: DISCONTINUED | OUTPATIENT
Start: 2022-12-02 | End: 2022-12-02 | Stop reason: SURG

## 2022-12-02 RX ADMIN — EPHEDRINE SULFATE 10 MG: 50 INJECTION INTRAVENOUS at 12:40:00

## 2022-12-02 RX ADMIN — SODIUM CHLORIDE: 9 INJECTION, SOLUTION INTRAVENOUS at 13:49:00

## 2022-12-02 RX ADMIN — PHENYLEPHRINE HCL 100 MCG: 10 MG/ML VIAL (ML) INJECTION at 13:34:00

## 2022-12-02 RX ADMIN — PHENYLEPHRINE HCL 100 MCG: 10 MG/ML VIAL (ML) INJECTION at 13:05:00

## 2022-12-02 RX ADMIN — ONDANSETRON 4 MG: 2 INJECTION INTRAMUSCULAR; INTRAVENOUS at 12:31:00

## 2022-12-02 RX ADMIN — TRANEXAMIC ACID 1000 MG: 10 INJECTION, SOLUTION INTRAVENOUS at 12:47:00

## 2022-12-02 RX ADMIN — TRANEXAMIC ACID 1000 MG: 10 INJECTION, SOLUTION INTRAVENOUS at 15:20:00

## 2022-12-02 RX ADMIN — EPHEDRINE SULFATE 10 MG: 50 INJECTION INTRAVENOUS at 12:43:00

## 2022-12-02 RX ADMIN — EPHEDRINE SULFATE 10 MG: 50 INJECTION INTRAVENOUS at 15:04:00

## 2022-12-02 RX ADMIN — PHENYLEPHRINE HCL 100 MCG: 10 MG/ML VIAL (ML) INJECTION at 13:18:00

## 2022-12-02 RX ADMIN — EPHEDRINE SULFATE 10 MG: 50 INJECTION INTRAVENOUS at 12:57:00

## 2022-12-02 RX ADMIN — SODIUM CHLORIDE, SODIUM LACTATE, POTASSIUM CHLORIDE, CALCIUM CHLORIDE: 600; 310; 30; 20 INJECTION, SOLUTION INTRAVENOUS at 12:21:00

## 2022-12-02 RX ADMIN — SODIUM CHLORIDE: 9 INJECTION, SOLUTION INTRAVENOUS at 14:07:00

## 2022-12-02 RX ADMIN — SODIUM CHLORIDE, SODIUM LACTATE, POTASSIUM CHLORIDE, CALCIUM CHLORIDE: 600; 310; 30; 20 INJECTION, SOLUTION INTRAVENOUS at 15:16:00

## 2022-12-02 RX ADMIN — DEXAMETHASONE SODIUM PHOSPHATE 4 MG: 4 MG/ML VIAL (ML) INJECTION at 12:31:00

## 2022-12-02 RX ADMIN — SODIUM CHLORIDE, SODIUM LACTATE, POTASSIUM CHLORIDE, CALCIUM CHLORIDE: 600; 310; 30; 20 INJECTION, SOLUTION INTRAVENOUS at 13:49:00

## 2022-12-02 RX ADMIN — SODIUM CHLORIDE: 9 INJECTION, SOLUTION INTRAVENOUS at 15:16:00

## 2022-12-02 RX ADMIN — BUPIVACAINE HYDROCHLORIDE 1.6 ML: 7.5 INJECTION, SOLUTION INTRASPINAL at 12:28:00

## 2022-12-02 RX ADMIN — PHENYLEPHRINE HCL 100 MCG: 10 MG/ML VIAL (ML) INJECTION at 14:53:00

## 2022-12-02 RX ADMIN — LIDOCAINE HYDROCHLORIDE 5 MG: 10 INJECTION, SOLUTION EPIDURAL; INFILTRATION; INTRACAUDAL; PERINEURAL at 12:26:00

## 2022-12-02 RX ADMIN — LIDOCAINE HYDROCHLORIDE 50 MG: 10 INJECTION, SOLUTION EPIDURAL; INFILTRATION; INTRACAUDAL; PERINEURAL at 12:31:00

## 2022-12-02 RX ADMIN — MIDAZOLAM HYDROCHLORIDE 2 MG: 1 INJECTION INTRAMUSCULAR; INTRAVENOUS at 12:23:00

## 2022-12-02 RX ADMIN — SODIUM CHLORIDE: 9 INJECTION, SOLUTION INTRAVENOUS at 12:39:00

## 2022-12-02 RX ADMIN — CEFAZOLIN SODIUM/WATER 2 G: 2 G/20 ML SYRINGE (ML) INTRAVENOUS at 12:34:00

## 2022-12-02 NOTE — INTERVAL H&P NOTE
Pre-op Diagnosis: Right osteoarthritis    The above referenced H&P was reviewed by Eladio Mendez MD on 12/2/2022, the patient was examined and no significant changes have occurred in the patient's condition since the H&P was performed. I discussed with the patient and/or legal representative the potential benefits, risks and side effects of this procedure; the likelihood of the patient achieving goals; and potential problems that might occur during recuperation. I discussed reasonable alternatives to the procedure, including risks, benefits and side effects related to the alternatives and risks related to not receiving this procedure. We will proceed with procedure as planned.

## 2022-12-02 NOTE — ANESTHESIA PROCEDURE NOTES
Peripheral IV  Date/Time: 12/2/2022 1:39 PM  Inserted by: Justice Mendenhall CRNA    Placement  Needle size: 18 G  Laterality: right  Location: hand  Local anesthetic: none  Site prep: alcohol  Technique: anatomical landmarks  Attempts: 1

## 2022-12-02 NOTE — DISCHARGE INSTRUCTIONS
DISCHARGE INSTRUCTIONS:  PLACE ICE TO SURGICAL AREA 20-30 MINUTES ON/ 20-30 MINUTES OFF. THIS IS TO HELP WITH PAIN & SWELLING. TAKE YOUR MEDICATIONS AS PRESCRIBED BY YOUR DOCTOR BELOW. THESE HAVE BEEN SENT TO YOUR PHARMACY. IF YOU WERE INSTRUCTED BY PHYSICAL THERAPY TO EXERCISE HIP PRECAUTIONS, CONTINUE TO DO SO AFTER DISCHARGE    IT IS OK TO BEAR WEIGHT AS TOLERATED ON THE OPERATIVE EXTREMITY. CALL TO CONFIRM YOUR FOLLOW UP APPOINTMENT WITH DR. Doug Greene TO BE SEEN IN 2-3 WEEKS POSTOP. [phone]    MEDICATIONS    POST OP MEDICATION REGIMEN              MULTI-MODAL   PAIN REGIMEN     DRUG   FOR   FREQUENCY & DURATION   QTY   NOTES     WEANING  TIPS                Tylenol 500mg     Mild pain   Take 2 tabs every 6 hours     90   Can purchase over-the-counter    Stop using medication if no longer having pain. Tramadol 50mg     Moderate pain   Take 1-2 tabs every 6 hours only as needed   45 May prescribe a refill, but ideally, this should be tapered off after surgery Stop using this medication 2nd   As pain decreases over time, increase the interval between doses (6 hours to 8, then 12, then 24) to taper off the medication. BREAKTHROUGH PAIN         Oxycodone 5mg       Severe pain     Take 1-2 tabs every 4-6 hours only as needed for severe pain       40   Take at least 1 hr apart from Tramadol. Ideally, no refill. The goal is to taper off this medication as soon as possible, as it can be addictive and have side effects. Stop using this medication 1st if no longer having severe pain. BLOOD THINNER     Aspirin 325mg   Blood clot prevention   Take 1 tab twice daily for 30 days     60     No refills   Complete the entire course of your blood thinner.                       STOOL SOFTENER     Sennokot 8.6/50mg   Constipation   Take 1 tab twice daily  while on opioids     60 Refer to discharge instructions if constipation persists even after taking this medication   Stop taking if having diarrhea or loose stools. ANTI-NAUSEA   Ondansetron 4mg   Nausea   Take 1 tab every 8 hours as needed if nauseous     20   No Refill      ANTI-ACID REFLUX / GASTRITIS   Pantoprazole 40mg   Acid reflux, gastric ulcer prophylaxis   Take 1 tab every day along with Meloxicam     60   May refill if Meloxicam refilled        DRESSING:    YOU HAVE A SPECIAL DRESSING CALLED AN AQUACEL DRESSING OVER YOUR INCISION. THE DRESSING STAYS FOR 12 DAYS FROM SURGERY. YOU MAY SHOWER AS SOON AS YOU RETURN HOME WITH THE AQUACEL DRESSING INTACT OVER YOUR INCISION AREA. IF THE DRESSING LEAKS OR COMES LOOSE BEFORE THE 7 DAY PERIOD CONTACT YOUR DOCTOR / SURGEON. AFTER   12 DAYS THE DRESSING IS REMOVED BY PULLING ON THE EDGE OF THE DRESSING AND GENTLY STRETCHING IT, THEN PEEL IT OFF SLOWLY LIKE A BANDAID. IF YOU HAVE ANY QUESTIONS OR CONCERNS ABOUT THE DRESSING CONTACT YOUR DOCTOR. IF DRAINAGE IS PRESENT AT ANYTIME FROM YOUR DRESSING OR FROM YOUR INCISION CALL YOUR DOCTOR / SURGEON AS SOON AS POSSIBLE. REASONS TO CALL YOUR DOCTOR:  TEMPERATURE .0 OR MORE  PERSISTANT NAUSEA OR VOMITTING - ESPECIALLY IF YOU ARE UNABLE TO EAT OR DRINK. INCREASED LEVEL OF PAIN OR PAIN WHICH IS NOT CONTROLLED BY YOUR PAIN MEDICINE. PERSISTENT DRAINAGE AT ANYTIME FROM YOUR SURGICAL AREA / INCISION. PAIN, TENDERNESS OR SUDDEN SWELLING IN YOUR CALF REGION OF THE LOWER EXTREMITIES - THIS IS A POSSIBLE SIGN OF A BLOOD CLOT. ANY CHANGES IN SENSATION IN YOUR BODY IN THE AREA OF YOUR SURGERY = NUMBNESS OR TINGLING. ANY CHANGES IN CIRCULATION IN YOUR BODY IN THE AREA OF YOUR SURGERY = CHANGES  IN COLOR EITHER DARKER OR VERY PALE; OR  IF AREA FEELS COLD TO THE TOUCH AS COMPARED TO OTHER AREAS. ANY CHANGES IN FUNCTION IN YOUR BODY IN THE AREA OF YOUR SURGERY = CHANGE IN MOVEMENT - UNABLE TO MOVE OR WIGGLE FINGERS, TOES OR FOOT OR ANY OTHER CHANGES IN NORMAL BODY FUNCTIONING.   FOR ANY OF THE ABOVE OR ANY OTHER QUESTIONS OR CONCERNS CALL YOUR DOCTOR/ SURGEON AS SOON AS POSSIBLE.       Sherman Vences MD MPH  Orthopaedic Surgeon, Adult Hip and Knee Reconstruction  [facility] at [facility] [address] [facility]  [place], [address] [place] [address]  Office: (J) [phone] (W) [phone]  Adminstrative Assistant: Mayra Santos

## 2022-12-02 NOTE — ANESTHESIA PROCEDURE NOTES
Airway  Date/Time: 12/2/2022 2:31 PM  Urgency: Elective    Airway not difficult    General Information and Staff    Patient location during procedure: OR  Anesthesiologist: Devon Dias MD  Resident/CRNA: Apoorva Hernandez CRNA  Performed: CRNA     Indications and Patient Condition  Indications for airway management: anesthesia  Sedation level: deep  Preoxygenated: yes  Patient position: sniffing  Mask difficulty assessment: 1 - vent by mask    Final Airway Details  Final airway type: supraglottic airway      Successful airway: classic  Size 4  Airway Seal Pressure (cm H2O): 20       Number of attempts at approach: 1    Additional Comments  atraumatic

## 2022-12-02 NOTE — OPERATIVE REPORT
[facility] at [address] [place] [address] SURGERY: 12/2/2022    PREOPERATIVE DIAGNOSIS:   1. Right Hip Osteoarthritis    POST-OPERATIVE DIAGNOSIS:   1. Right Hip Osteoarthritis    PROCEDURE:  1. Right Total Hip Arthroplasty     Location: [facility]    SURGEON: Mary Armstrong MD  Assistant(s): Dex Hall CSA, CSA    Anesthesia type: Spinal, GETA  Estimated Blood Loss: 350cc    Drains: None    Findings: Severe degenerative hip joint disease, limited femoral anteversion proximal femoral anatomy. Complications: None immediately apparent    Implants:  Acetabular Component: ZB G7 [place]-Ti, Limited hole, size 52mm Cup, Dual mobility liner, Twox 6.5mm dome screws  Femoral Component: Press-fit size 20, 125 deg offset, ZB Massey cone femoral component  Head: 28mm + 7 delta ceramic inner head with 42mm outer Vitamin E poly head     Indication: This is a 69year old lady, who presented with chronic hip pain refractory to non-operative treatment, and impairing activities of daily living. Radiographic evaluation demonstrated severe hip osteoarthritis . Surgical versus non-surgical options were discussed with the patient, and together we decided it is best to proceed with a total hip arthroplasty with the goals of pain relief and improvement in function. All risks and benefits of surgery including bleeding, infection, dislocation, mal-prosthetic fracture, neurovascular injury, leg length or offset discrepancy, as well as medical and anesthesia-related complications were discussed with the patient / family, who elected to proceed with surgery. Procedure in detail:    The patient was brought to the operating room and placed in the lateral decubitus position on a peg board positioner. The extremities were padded appropriately. Intravenous antibiotics were administered. The operative hip was prepped and draped in the usual sterile fashion.     A surgical pause was conducted to identify and verify the appropriate patient, surgical site, surgical site marking, and antibiotic administration. An oblique incision was then made centered over the lateral aspect of the greater trochanter and carried sharply down to the level of the fascia. The fascia was incised and split superiorly and inferiorly. A Charnley hip retractor was placed in the wound. The bursa was dissected off the lateral aspect of the greater trochanter, and the abductor tendon was identified. A sether retractor was used to retract the posterior border of the abductor exposing the short external rotators. A cobra retractor was placed between the minimus and the piriformis tendon. The vessels over the piriformis and rotators were cauterized. The rotator tendons were taken down with the capsule as one sleeve, exposing the femoral head and neck. Two #5 Ethibond tagging sutures were placed through the capsule and rotators proximally and distally. The inferior capsule was incised, and the hip was then dislocated. Retractors were placed under the femoral head and neck. Any residual soft tissue in the piriformis fossa was excised. A ruler was used to julian the templated neck cut at the medial calcar. A broach was used to julian the angle of the neck cut using cautery. A reciprocating saw was used to create a level neck cut. The femoral head was removed using a misael clamp. Retractors were placed to expose the femur. A round mihai was used to create the appropriate entry point into the proximal femur, and remove any lateral bone that would impede neutral stem alignment. A canal finder was advanced into the canal. The femur was then sequentially broached and a size 4 avenir stem broach was noted to achieve reasonable axial and rotational stability. The final broach was left in place. Overall, based on anatomic constraints, we could not add more anteversion than ~5 degrees. Next, the acetabulum was exposed.  A bone hook was used to lever the femur anteriorly and a portion of anterior capsule was excised to permit placement of an anterior long Hohmann type retractor. The inferior capsule was radially released and the reflected head was partially released to permit anterior translation of the femur. A cerebellar was placed superiorly to retract the abductors. The anterior labrum was resected using cautery, followed by the posterior labrum. The pulvinar was excised as well, taking care to cauterize the obturator branches. The acetabulum was sequentially reamed by odd numbers to a size 52 mm reamer, and a 52mm cup was placed with excellent press fit. Osteophytes were removed around this cup. The cup was placed in ~  30 degree anteversion and 40 degree abduction angle. Pain cocktail injection was delivered around the pericapsular tissues. A trial dual mobility liner was placed, and a trial +7 dual mobility head was placed onto the broach and the hip was reduced. We had elected to use a dual mobility construct given her history of spinal fusion. Hip ROM was tested and found to be very stable anteriorly but somewhat unstable posteriorly. A portable plain film was obtained demonstrating appropriate component size and position. Offset and leg lengths also appeared appropriately recreated. There were no residual osteophytes anteriorly to cause impingement and instability. Based on these findings, we elected to utilize a stem design that allows dialing more femoral anteversion. The hip was then dislocated, and the acetabulum exposed again. The trial liner was removed and two 6.5mm screws were drilled, measured and placed through the dome for additional stability. The cup was irrigated and dried and the final dual mobility liner was impacted into place, checked and noted to be well seated. The femur was exposed once again, and the broach removed.  The femur was sequentially reamed for the cone prosthesis, and a size 20mm stem achieved excellent axial and rotational stability. The stem was trialed to determine the best height, for most ideal offset and lengths. The trial was removed and the final stem was impacted into place, along with the final 28mm + 7 / 42mm dual mobility head. The calcar was checked and no fractures were identified. Excellent hemostasis was achieved. The wound was lavaged with dilute betadine/peroxide solution, followed by normal saline pulse irrigation. Additional pain cocktail injection was delivered to the soft tissues. Two 2.5mm drill holes were placed into the posterolateral aspect of the greater trochanter. The Ethibond sutures were passed and tied with the leg in a neutral position. The fascia was closed using #2 Carlos Spies. Subcutaneous tissue was closed using, 2-0 monocryl and the skin was closed with 3-0 monocryl and dermabond. A sterile dressing was placed. The patient was awakened from anesthesia and taken to the PACU in stable condition. There were no immediate complications. POST-OPERATIVE PLAN  1. The patient will be permitted weight bearing as tolerated on the operative extremity  2. The patient will be placed on posterior hip precautions for 6 weeks. 3. The patient will receive 24 hours of perioperative antibiotics then resume home duricef  4. Venous thromboembolic prophylaxis will consist of early mobilization, mechanical compressive devices, and Aspirin 325 BID. 5. The dressing may be removed at 12 days post-operatively  6. The patient should be scheduled for follow up in 2 weeks for wound and radiographic evaluation.

## 2022-12-02 NOTE — ANESTHESIA PROCEDURE NOTES
Spinal Block    Date/Time: 12/2/2022 12:26 PM  Performed by: Bryon Roberts CRNA  Authorized by: Jabier Fregoso MD       General Information and Staff    Start Time:  12/2/2022 12:26 PM  End Time:  12/2/2022 12:28 PM  Anesthesiologist:  Jabier Fregoso MD  CRNA:  Bryon Roberts CRNA  Performed by:  CRNA  Patient Location:  OR  Site identification: surface landmarks    Reason for Block: at surgeon's request and surgical anesthesia    Preanesthetic Checklist: patient identified, IV checked, risks and benefits discussed, monitors and equipment checked, pre-op evaluation, timeout performed, anesthesia consent and sterile technique used      Procedure Details    Patient Position:  Sitting  Prep: ChloraPrep and patient draped    Monitoring:  Cardiac monitor and continuous pulse ox  Approach:  Midline  Location:  L3-4  Injection Technique:  Single-shot    Needle    Needle Type:  Pencil-tip  Needle Gauge:  24 G  Needle Length:  3.5 in    Assessment    Sensory Level:  T8  Events: clear CSF, CSF aspirated, well tolerated and blood negative      Additional Comments

## 2022-12-03 VITALS
OXYGEN SATURATION: 100 % | DIASTOLIC BLOOD PRESSURE: 89 MMHG | RESPIRATION RATE: 16 BRPM | WEIGHT: 230.19 LBS | SYSTOLIC BLOOD PRESSURE: 114 MMHG | BODY MASS INDEX: 39.3 KG/M2 | HEIGHT: 64 IN | TEMPERATURE: 99 F | HEART RATE: 80 BPM

## 2022-12-03 LAB
ANION GAP SERPL CALC-SCNC: 7 MMOL/L (ref 0–18)
BUN BLD-MCNC: 12 MG/DL (ref 7–18)
BUN/CREAT SERPL: 15.2 (ref 10–20)
CALCIUM BLD-MCNC: 8 MG/DL (ref 8.5–10.1)
CHLORIDE SERPL-SCNC: 109 MMOL/L (ref 98–112)
CO2 SERPL-SCNC: 28 MMOL/L (ref 21–32)
CREAT BLD-MCNC: 0.79 MG/DL
DEPRECATED RDW RBC AUTO: 52.9 FL (ref 35.1–46.3)
ERYTHROCYTE [DISTWIDTH] IN BLOOD BY AUTOMATED COUNT: 14.6 % (ref 11–15)
GFR SERPLBLD BASED ON 1.73 SQ M-ARVRAT: 81 ML/MIN/1.73M2 (ref 60–?)
GLUCOSE BLD-MCNC: 109 MG/DL (ref 70–99)
HCT VFR BLD AUTO: 33.2 %
HGB BLD-MCNC: 10.3 G/DL
MCH RBC QN AUTO: 30.7 PG (ref 26–34)
MCHC RBC AUTO-ENTMCNC: 31 G/DL (ref 31–37)
MCV RBC AUTO: 98.8 FL
OSMOLALITY SERPL CALC.SUM OF ELEC: 298 MOSM/KG (ref 275–295)
PLATELET # BLD AUTO: 189 10(3)UL (ref 150–450)
POTASSIUM SERPL-SCNC: 4.2 MMOL/L (ref 3.5–5.1)
RBC # BLD AUTO: 3.36 X10(6)UL
SODIUM SERPL-SCNC: 144 MMOL/L (ref 136–145)
WBC # BLD AUTO: 6.8 X10(3) UL (ref 4–11)

## 2022-12-03 PROCEDURE — 99213 OFFICE O/P EST LOW 20 MIN: CPT | Performed by: HOSPITALIST

## 2022-12-03 RX ORDER — TRANEXAMIC ACID 10 MG/ML
INJECTION, SOLUTION INTRAVENOUS AS NEEDED
Status: DISCONTINUED | OUTPATIENT
Start: 2022-12-03 | End: 2022-12-03 | Stop reason: SURG

## 2022-12-03 RX ORDER — HYDROCHLOROTHIAZIDE 25 MG/1
25 TABLET ORAL DAILY
Qty: 90 TABLET | Refills: 3 | Status: SHIPPED | COMMUNITY
Start: 2022-12-03

## 2022-12-03 RX ADMIN — TRANEXAMIC ACID 1000 MG: 10 INJECTION, SOLUTION INTRAVENOUS at 08:12:00

## 2022-12-03 NOTE — PROGRESS NOTES
Count includes the [facility] Pharmacy Note:  Therapeutic Interchange    Jodi Jorgensen was prescribed  cefadroxil 500  mg PO every 12 hours for 10 days    Per therapeutic interchange, the patient will be switched to Keflex 500 mg PO 4 times a day during hospitalization. The patient will be able to switch back to cefadroxil upon discharge.     Thank you,    Cindy Arredondo, PharmD, BCPS  Phone [phone]

## 2022-12-03 NOTE — CM/SW NOTE
MDO post surgical DC planning. Per Dr. Nikita Adrian:  Disposition: Pending PT Evaluation.  Home with outpatient PT at home    [facility] MSW, [place], [place]   Ext [phone]

## 2022-12-13 LAB
ATRIAL RATE: 63 BPM
P AXIS: 64 DEGREES
P-R INTERVAL: 152 MS
Q-T INTERVAL: 404 MS
QRS DURATION: 84 MS
QTC CALCULATION (BEZET): 413 MS
R AXIS: 6 DEGREES
T AXIS: 40 DEGREES
VENTRICULAR RATE: 63 BPM

## 2023-01-04 ENCOUNTER — TELEPHONE (OUTPATIENT)
Facility: CLINIC | Age: 68
End: 2023-01-04

## 2023-01-04 NOTE — TELEPHONE ENCOUNTER
Call placed to pt regarding outstanding echocardiogram.  Pt just had surgery and will try to get it done in the next few weeks and possibly reschedule appt with Dr. Louis Tyler.

## 2023-03-02 ENCOUNTER — TELEPHONE (OUTPATIENT)
Facility: LOCATION | Age: 68
End: 2023-03-02

## 2023-03-06 ENCOUNTER — HOSPITAL ENCOUNTER (INPATIENT)
Facility: HOSPITAL | Age: 68
LOS: 4 days | Discharge: NURSING, REHAB OR LONG-TERM CARE | End: 2023-03-10
Attending: EMERGENCY MEDICINE | Admitting: HOSPITALIST
Payer: MEDICARE

## 2023-03-06 ENCOUNTER — APPOINTMENT (OUTPATIENT)
Dept: GENERAL RADIOLOGY | Facility: HOSPITAL | Age: 68
End: 2023-03-06
Attending: EMERGENCY MEDICINE
Payer: MEDICARE

## 2023-03-06 ENCOUNTER — APPOINTMENT (OUTPATIENT)
Dept: CT IMAGING | Facility: HOSPITAL | Age: 68
End: 2023-03-06
Attending: STUDENT IN AN ORGANIZED HEALTH CARE EDUCATION/TRAINING PROGRAM
Payer: MEDICARE

## 2023-03-06 ENCOUNTER — APPOINTMENT (OUTPATIENT)
Dept: GENERAL RADIOLOGY | Facility: HOSPITAL | Age: 68
End: 2023-03-06
Attending: STUDENT IN AN ORGANIZED HEALTH CARE EDUCATION/TRAINING PROGRAM
Payer: MEDICARE

## 2023-03-06 DIAGNOSIS — M25.559 PAIN IN HIP REGION AFTER HIP REPLACEMENT: Primary | ICD-10-CM

## 2023-03-06 DIAGNOSIS — Z96.649 PAIN IN HIP REGION AFTER HIP REPLACEMENT: Primary | ICD-10-CM

## 2023-03-06 LAB
ALBUMIN SERPL-MCNC: 3.4 G/DL (ref 3.4–5)
ALBUMIN/GLOB SERPL: 0.9 {RATIO} (ref 1–2)
ALP LIVER SERPL-CCNC: 122 U/L
ALT SERPL-CCNC: 16 U/L
ANION GAP SERPL CALC-SCNC: 5 MMOL/L (ref 0–18)
ANTIBODY SCREEN: NEGATIVE
APTT PPP: 32.3 SECONDS (ref 23.3–35.6)
AST SERPL-CCNC: 24 U/L (ref 15–37)
ATRIAL RATE: 68 BPM
BASOPHILS # BLD AUTO: 0.06 X10(3) UL (ref 0–0.2)
BASOPHILS NFR BLD AUTO: 1.1 %
BILIRUB SERPL-MCNC: 0.4 MG/DL (ref 0.1–2)
BUN BLD-MCNC: 19 MG/DL (ref 7–18)
BUN/CREAT SERPL: 19.8 (ref 10–20)
CALCIUM BLD-MCNC: 9 MG/DL (ref 8.5–10.1)
CHLORIDE SERPL-SCNC: 101 MMOL/L (ref 98–112)
CO2 SERPL-SCNC: 33 MMOL/L (ref 21–32)
CREAT BLD-MCNC: 0.96 MG/DL
CRP SERPL-MCNC: 4.6 MG/DL (ref ?–0.3)
DEPRECATED RDW RBC AUTO: 50.8 FL (ref 35.1–46.3)
EOSINOPHIL # BLD AUTO: 0.03 X10(3) UL (ref 0–0.7)
EOSINOPHIL NFR BLD AUTO: 0.5 %
ERYTHROCYTE [DISTWIDTH] IN BLOOD BY AUTOMATED COUNT: 14.8 % (ref 11–15)
ERYTHROCYTE [SEDIMENTATION RATE] IN BLOOD: 71 MM/HR
GFR SERPLBLD BASED ON 1.73 SQ M-ARVRAT: 64 ML/MIN/1.73M2 (ref 60–?)
GLOBULIN PLAS-MCNC: 4 G/DL (ref 2.8–4.4)
GLUCOSE BLD-MCNC: 101 MG/DL (ref 70–99)
HCT VFR BLD AUTO: 36.8 %
HGB BLD-MCNC: 11.5 G/DL
IMM GRANULOCYTES # BLD AUTO: 0.01 X10(3) UL (ref 0–1)
IMM GRANULOCYTES NFR BLD: 0.2 %
INR BLD: 1 (ref 0.85–1.16)
LYMPHOCYTES # BLD AUTO: 1.18 X10(3) UL (ref 1–4)
LYMPHOCYTES NFR BLD AUTO: 21.4 %
MCH RBC QN AUTO: 29.4 PG (ref 26–34)
MCHC RBC AUTO-ENTMCNC: 31.3 G/DL (ref 31–37)
MCV RBC AUTO: 94.1 FL
MONOCYTES # BLD AUTO: 0.76 X10(3) UL (ref 0.1–1)
MONOCYTES NFR BLD AUTO: 13.8 %
NEUTROPHILS # BLD AUTO: 3.47 X10 (3) UL (ref 1.5–7.7)
NEUTROPHILS # BLD AUTO: 3.47 X10(3) UL (ref 1.5–7.7)
NEUTROPHILS NFR BLD AUTO: 63 %
OSMOLALITY SERPL CALC.SUM OF ELEC: 290 MOSM/KG (ref 275–295)
P AXIS: 31 DEGREES
P-R INTERVAL: 134 MS
PLATELET # BLD AUTO: 247 10(3)UL (ref 150–450)
POTASSIUM SERPL-SCNC: 3.5 MMOL/L (ref 3.5–5.1)
PROT SERPL-MCNC: 7.4 G/DL (ref 6.4–8.2)
PROTHROMBIN TIME: 13.1 SECONDS (ref 11.6–14.8)
Q-T INTERVAL: 396 MS
QRS DURATION: 80 MS
QTC CALCULATION (BEZET): 421 MS
R AXIS: 27 DEGREES
RBC # BLD AUTO: 3.91 X10(6)UL
RH BLOOD TYPE: POSITIVE
SARS-COV-2 RNA RESP QL NAA+PROBE: NOT DETECTED
SODIUM SERPL-SCNC: 139 MMOL/L (ref 136–145)
T AXIS: 41 DEGREES
VENTRICULAR RATE: 68 BPM
WBC # BLD AUTO: 5.5 X10(3) UL (ref 4–11)

## 2023-03-06 PROCEDURE — 73502 X-RAY EXAM HIP UNI 2-3 VIEWS: CPT | Performed by: EMERGENCY MEDICINE

## 2023-03-06 PROCEDURE — 72192 CT PELVIS W/O DYE: CPT | Performed by: STUDENT IN AN ORGANIZED HEALTH CARE EDUCATION/TRAINING PROGRAM

## 2023-03-06 PROCEDURE — 99222 1ST HOSP IP/OBS MODERATE 55: CPT | Performed by: HOSPITALIST

## 2023-03-06 PROCEDURE — 71045 X-RAY EXAM CHEST 1 VIEW: CPT | Performed by: STUDENT IN AN ORGANIZED HEALTH CARE EDUCATION/TRAINING PROGRAM

## 2023-03-06 RX ORDER — TEMAZEPAM 15 MG/1
15 CAPSULE ORAL NIGHTLY PRN
Status: DISCONTINUED | OUTPATIENT
Start: 2023-03-06 | End: 2023-03-10

## 2023-03-06 RX ORDER — HYDROCODONE BITARTRATE AND ACETAMINOPHEN 5; 325 MG/1; MG/1
1 TABLET ORAL EVERY 4 HOURS PRN
Status: DISCONTINUED | OUTPATIENT
Start: 2023-03-06 | End: 2023-03-08

## 2023-03-06 RX ORDER — ACETAMINOPHEN 500 MG
500 TABLET ORAL EVERY 4 HOURS PRN
Status: DISCONTINUED | OUTPATIENT
Start: 2023-03-06 | End: 2023-03-08

## 2023-03-06 RX ORDER — ONDANSETRON 2 MG/ML
4 INJECTION INTRAMUSCULAR; INTRAVENOUS EVERY 6 HOURS PRN
Status: DISCONTINUED | OUTPATIENT
Start: 2023-03-06 | End: 2023-03-08

## 2023-03-06 RX ORDER — ACETAMINOPHEN 500 MG
1000 TABLET ORAL ONCE
Status: COMPLETED | OUTPATIENT
Start: 2023-03-06 | End: 2023-03-06

## 2023-03-06 RX ORDER — HYDROCODONE BITARTRATE AND ACETAMINOPHEN 5; 325 MG/1; MG/1
2 TABLET ORAL EVERY 4 HOURS PRN
Status: DISCONTINUED | OUTPATIENT
Start: 2023-03-06 | End: 2023-03-08

## 2023-03-06 RX ORDER — ACETAMINOPHEN 325 MG/1
650 TABLET ORAL EVERY 4 HOURS PRN
Status: DISCONTINUED | OUTPATIENT
Start: 2023-03-06 | End: 2023-03-08

## 2023-03-06 RX ORDER — METOCLOPRAMIDE HYDROCHLORIDE 5 MG/ML
10 INJECTION INTRAMUSCULAR; INTRAVENOUS EVERY 8 HOURS PRN
Status: DISCONTINUED | OUTPATIENT
Start: 2023-03-06 | End: 2023-03-08

## 2023-03-06 NOTE — ED INITIAL ASSESSMENT (HPI)
Patient states she got a hip replacement in December 2022, states for the past few days she has been having trouble putting weight on her r leg, states when she stands on her leg she has pain in her r leg/hip

## 2023-03-06 NOTE — PROGRESS NOTES
Patient is ~14 weeks postop s/p R primary COLTON. Presented to our office today with acute onset severe right hip pain. Radiographs with evidence of acetabular component loosening. Recommended presentation to the ER for admission for surgical intervention / revision surgery, hopefully 3/7.     Ordered the following for workup on arrival  CT pelvis  CXR  ESR/CRP  CBC,BMP,PT/PTT, Type and screen    Please contact me if any questions    Sherman Vences MD MPH

## 2023-03-06 NOTE — H&P
[facility]    PATIENT'S NAME: Josee Tovar   ATTENDING PHYSICIAN: Jovani Davidson MD   PATIENT ACCOUNT#:   [de-identified]    LOCATION:  [address]  MEDICAL RECORD #:   [id number]       YOB: 1955  ADMISSION DATE:       03/06/2023    HISTORY AND PHYSICAL EXAMINATION    CHIEF COMPLAINT:  Right femur prosthesis loosening and pain. HISTORY OF PRESENT ILLNESS:  The patient is a 29-year-old  female who had a right total hip arthroplasty done on December 2. Today came into the emergency department for evaluation of progressive pain in her right hip area when she tried to be ambulatory. X-rays were done in the outpatient setting. She was diagnosed with right hip loosening. Today she was instructed to come into the emergency department for evaluation in preparation for right hip revision to be done by Dr. Vandana Jaquez tomorrow. CBC and chemistry today unremarkable. C-reactive protein 4.60. X-ray of the hip showed right hip prosthesis with acetabular protrusion involving the acetabular prosthesis through the inner pelvic cortical margin, rotation of the acetabular prosthesis in horizontal position with the prosthetic femoral head remaining congruent, acetabular anchoring screw is external to the acetabular prosthesis. CT scan of the femur and pelvis is still pending. PAST MEDICAL HISTORY:  Rheumatoid arthritis, Sjogren syndrome, interstitial lung disease, fibromyalgia, peptic ulcer disease, irritable bowel syndrome, morbid obesity, osteoporosis, osteoarthritis, and hypertension. PAST SURGICAL HISTORY:  Right breast cancer, status post lumpectomy and radiation therapy; adenoidectomy; appendectomy;  Lumbar laminectomy; left total knee arthroplasty; recent right total hip arthroplasty; cataract procedure; and foot surgery. MEDICATIONS:  Please see medication reconciliation list.    ALLERGIES:  No known drug allergies. She had multiple medication side effects.     FAMILY HISTORY: Positive for diabetes and asthma. Father had heart disease and pulmonary embolism. SOCIAL HISTORY:  Ex-tobacco user. No current tobacco, alcohol, or drug use. Lives with her family. Independent in her basic activities of daily living. REVIEW OF SYSTEMS:  The patient said around 4 weeks ago she started developing pain in the right groin, right lateral hip, and inner thigh area with weightbearing and movement. Pain is worse when she tried to rotate her femur on a long axis. No recent trauma or fall. Other 12-point review of systems negative. PHYSICAL EXAMINATION:    GENERAL:  Alert. Oriented to time, place, and person. No acute distress. VITAL SIGNS:  Temperature 98.0, pulse 59, respiratory rate 18, blood pressure 113/77, pulse ox 91% on room air. HEENT:  Atraumatic. Oropharynx clear, dry mucous membranes. Ears, nose normal.  Eyes:  Anicteric sclerae. NECK:  Supple. No lymphadenopathy. Trachea midline. Full range of motion. LUNGS:  Clear to auscultation bilaterally. Normal respiratory effort. HEART:  Regular rate and rhythm. S1, S2 auscultated. No murmur. ABDOMEN:  Soft, nondistended, no tenderness, obese. Positive bowel sounds. EXTREMITIES:  Nonpitting edema both legs. No clubbing or cyanosis. MUSCULOSKELETAL:  Passive right hip flexion reproduces pain. Internal rotation of the right thigh and adduction maneuver reproduces pain in the right hip area. NEUROLOGIC:  Motor and sensory intact. ASSESSMENT:    1. Right hip prosthesis with acetabular component loosening evident on imaging studies. 2.   Morbid obesity. 3.   Hypertension. 4.   Asthma and interstitial lung disease. PLAN:  The patient will be admitted to general medical floor. Pain control, fall precautions, n.p.o. after midnight for right hip revision to be done by Dr. Nabil Marshall tomorrow. Further recommendations to follow.     Dictated By Prateek Badillo MD  d: 03/06/2023 13:38:22  t: 03/06/2023 13:57:43  [facility] [id number]  FB/

## 2023-03-06 NOTE — ED QUICK NOTES
Orders for admission, patient is aware of plan and ready to go upstairs. Any questions, please call ED VALERIE Hubbard at extension [phone]. Patient Covid vaccination status: Fully vaccinated     COVID Test Ordered in ED: Rapid SARS-CoV-2 by PCR    COVID Suspicion at Admission: N/A    Running Infusions:  None    Mental Status/LOC at time of transport: Pt is from home. Pt had Rt hip replacement in Dec 2022. Was ambulatory and in PT. States too painful to walk at this time.     Other pertinent information:   CIWA score: N/A   NIH score:  N/A

## 2023-03-07 ENCOUNTER — APPOINTMENT (OUTPATIENT)
Dept: GENERAL RADIOLOGY | Facility: HOSPITAL | Age: 68
End: 2023-03-07
Attending: STUDENT IN AN ORGANIZED HEALTH CARE EDUCATION/TRAINING PROGRAM
Payer: MEDICARE

## 2023-03-07 ENCOUNTER — APPOINTMENT (OUTPATIENT)
Dept: ULTRASOUND IMAGING | Facility: HOSPITAL | Age: 68
End: 2023-03-07
Attending: STUDENT IN AN ORGANIZED HEALTH CARE EDUCATION/TRAINING PROGRAM
Payer: MEDICARE

## 2023-03-07 ENCOUNTER — ANESTHESIA (OUTPATIENT)
Dept: SURGERY | Facility: HOSPITAL | Age: 68
End: 2023-03-07
Payer: MEDICARE

## 2023-03-07 ENCOUNTER — ANESTHESIA EVENT (OUTPATIENT)
Dept: SURGERY | Facility: HOSPITAL | Age: 68
End: 2023-03-07
Payer: MEDICARE

## 2023-03-07 LAB
ANION GAP SERPL CALC-SCNC: 3 MMOL/L (ref 0–18)
BASOPHILS # BLD AUTO: 0.07 X10(3) UL (ref 0–0.2)
BASOPHILS NFR BLD AUTO: 2 %
BASOPHILS NFR SNV: 0 %
BUN BLD-MCNC: 14 MG/DL (ref 7–18)
BUN/CREAT SERPL: 18.2 (ref 10–20)
CALCIUM BLD-MCNC: 9 MG/DL (ref 8.5–10.1)
CHLORIDE SERPL-SCNC: 101 MMOL/L (ref 98–112)
CO2 SERPL-SCNC: 36 MMOL/L (ref 21–32)
CREAT BLD-MCNC: 0.77 MG/DL
DEPRECATED RDW RBC AUTO: 49.5 FL (ref 35.1–46.3)
DEPRECATED RDW RBC AUTO: 49.8 FL (ref 35.1–46.3)
EOSINOPHIL # BLD AUTO: 0.26 X10(3) UL (ref 0–0.7)
EOSINOPHIL NFR BLD AUTO: 7.3 %
EOSINOPHIL NFR SNV: 5 %
ERYTHROCYTE [DISTWIDTH] IN BLOOD BY AUTOMATED COUNT: 14.6 % (ref 11–15)
ERYTHROCYTE [DISTWIDTH] IN BLOOD BY AUTOMATED COUNT: 14.7 % (ref 11–15)
GFR SERPLBLD BASED ON 1.73 SQ M-ARVRAT: 83 ML/MIN/1.73M2 (ref 60–?)
GLUCOSE BLD-MCNC: 109 MG/DL (ref 70–99)
HCT VFR BLD AUTO: 34.7 %
HCT VFR BLD AUTO: 43.3 %
HGB BLD-MCNC: 10.9 G/DL
HGB BLD-MCNC: 13.8 G/DL
IMM GRANULOCYTES # BLD AUTO: 0.01 X10(3) UL (ref 0–1)
IMM GRANULOCYTES NFR BLD: 0.3 %
LYMPHOCYTES # BLD AUTO: 0.92 X10(3) UL (ref 1–4)
LYMPHOCYTES NFR BLD AUTO: 25.7 %
LYMPHOCYTES NFR SNV: 12 %
MAGNESIUM SERPL-MCNC: 2.4 MG/DL (ref 1.6–2.6)
MCH RBC QN AUTO: 29 PG (ref 26–34)
MCH RBC QN AUTO: 29.5 PG (ref 26–34)
MCHC RBC AUTO-ENTMCNC: 31.4 G/DL (ref 31–37)
MCHC RBC AUTO-ENTMCNC: 31.9 G/DL (ref 31–37)
MCV RBC AUTO: 92.3 FL
MCV RBC AUTO: 92.5 FL
MONOCYTES # BLD AUTO: 0.63 X10(3) UL (ref 0.1–1)
MONOCYTES NFR BLD AUTO: 17.6 %
MONOS+MACROS NFR SNV: 7 %
MRSA DNA SPEC QL NAA+PROBE: NEGATIVE
NEUTROPHILS # BLD AUTO: 1.69 X10 (3) UL (ref 1.5–7.7)
NEUTROPHILS # BLD AUTO: 1.69 X10(3) UL (ref 1.5–7.7)
NEUTROPHILS NFR BLD AUTO: 47.1 %
NEUTROPHILS NFR FLD: 76 %
OSMOLALITY SERPL CALC.SUM OF ELEC: 291 MOSM/KG (ref 275–295)
PLATELET # BLD AUTO: 222 10(3)UL (ref 150–450)
PLATELET # BLD AUTO: 227 10(3)UL (ref 150–450)
POTASSIUM SERPL-SCNC: 3.3 MMOL/L (ref 3.5–5.1)
RBC # BLD AUTO: 3.76 X10(6)UL
RBC # BLD AUTO: 4.68 X10(6)UL
RBC # FLD AUTO: ABNORMAL /CUMM (ref ?–1)
SODIUM SERPL-SCNC: 140 MMOL/L (ref 136–145)
TOTAL CELLS COUNTED FLD: 100
TOTAL CELLS COUNTED SNV: 308 /CUMM (ref 0–200)
WBC # BLD AUTO: 3.6 X10(3) UL (ref 4–11)
WBC # BLD AUTO: 7.6 X10(3) UL (ref 4–11)
WBC # SNV: 308 /CUMM

## 2023-03-07 PROCEDURE — 72190 X-RAY EXAM OF PELVIS: CPT | Performed by: STUDENT IN AN ORGANIZED HEALTH CARE EDUCATION/TRAINING PROGRAM

## 2023-03-07 PROCEDURE — 0SPA0JZ REMOVAL OF SYNTHETIC SUBSTITUTE FROM RIGHT HIP JOINT, ACETABULAR SURFACE, OPEN APPROACH: ICD-10-PCS | Performed by: STUDENT IN AN ORGANIZED HEALTH CARE EDUCATION/TRAINING PROGRAM

## 2023-03-07 PROCEDURE — 36430 TRANSFUSION BLD/BLD COMPNT: CPT | Performed by: ANESTHESIOLOGY

## 2023-03-07 PROCEDURE — 0SRA039 REPLACEMENT OF RIGHT HIP JOINT, ACETABULAR SURFACE WITH CERAMIC SYNTHETIC SUBSTITUTE, CEMENTED, OPEN APPROACH: ICD-10-PCS | Performed by: STUDENT IN AN ORGANIZED HEALTH CARE EDUCATION/TRAINING PROGRAM

## 2023-03-07 PROCEDURE — 76000 FLUOROSCOPY <1 HR PHYS/QHP: CPT | Performed by: STUDENT IN AN ORGANIZED HEALTH CARE EDUCATION/TRAINING PROGRAM

## 2023-03-07 PROCEDURE — 20611 DRAIN/INJ JOINT/BURSA W/US: CPT | Performed by: STUDENT IN AN ORGANIZED HEALTH CARE EDUCATION/TRAINING PROGRAM

## 2023-03-07 PROCEDURE — 99233 SBSQ HOSP IP/OBS HIGH 50: CPT | Performed by: HOSPITALIST

## 2023-03-07 DEVICE — G7 DUAL MOBILITY LINER 40MM D: Type: IMPLANTABLE DEVICE | Site: HIP | Status: FUNCTIONAL

## 2023-03-07 DEVICE — IMPLANTABLE DEVICE
Type: IMPLANTABLE DEVICE | Site: HIP | Status: FUNCTIONAL
Brand: REFOBACIN® BONE CEMENT R

## 2023-03-07 DEVICE — BONE SCREW 6.5X15 SELF-TAP: Type: IMPLANTABLE DEVICE | Site: HIP | Status: FUNCTIONAL

## 2023-03-07 DEVICE — BONE SCREW 6.5X35 SELF-TAP: Type: IMPLANTABLE DEVICE | Site: HIP | Status: FUNCTIONAL

## 2023-03-07 DEVICE — BIOLOX® OPTION HD/ADPT, 12/14, 28 X +3.5
Type: IMPLANTABLE DEVICE | Site: HIP | Status: FUNCTIONAL
Brand: BIOLOX® OPTION

## 2023-03-07 DEVICE — BONE SCREW 6.5X30 SELF-TAP: Type: IMPLANTABLE DEVICE | Site: HIP | Status: FUNCTIONAL

## 2023-03-07 DEVICE — IMPLANTABLE DEVICE: Type: IMPLANTABLE DEVICE | Site: HIP | Status: FUNCTIONAL

## 2023-03-07 DEVICE — BONE ALLO CANCELOUS CHIP 30CC: Type: IMPLANTABLE DEVICE | Status: FUNCTIONAL

## 2023-03-07 RX ORDER — SODIUM CHLORIDE 9 MG/ML
INJECTION, SOLUTION INTRAVENOUS CONTINUOUS PRN
Status: DISCONTINUED | OUTPATIENT
Start: 2023-03-07 | End: 2023-03-07 | Stop reason: SURG

## 2023-03-07 RX ORDER — ROCURONIUM BROMIDE 10 MG/ML
INJECTION, SOLUTION INTRAVENOUS AS NEEDED
Status: DISCONTINUED | OUTPATIENT
Start: 2023-03-07 | End: 2023-03-07 | Stop reason: SURG

## 2023-03-07 RX ORDER — MAGNESIUM HYDROXIDE 1200 MG/15ML
LIQUID ORAL CONTINUOUS PRN
Status: COMPLETED | OUTPATIENT
Start: 2023-03-07 | End: 2023-03-07

## 2023-03-07 RX ORDER — SODIUM CHLORIDE, SODIUM LACTATE, POTASSIUM CHLORIDE, CALCIUM CHLORIDE 600; 310; 30; 20 MG/100ML; MG/100ML; MG/100ML; MG/100ML
INJECTION, SOLUTION INTRAVENOUS CONTINUOUS PRN
Status: DISCONTINUED | OUTPATIENT
Start: 2023-03-07 | End: 2023-03-07 | Stop reason: SURG

## 2023-03-07 RX ORDER — METOCLOPRAMIDE HYDROCHLORIDE 5 MG/ML
10 INJECTION INTRAMUSCULAR; INTRAVENOUS EVERY 8 HOURS PRN
Status: DISCONTINUED | OUTPATIENT
Start: 2023-03-07 | End: 2023-03-08 | Stop reason: HOSPADM

## 2023-03-07 RX ORDER — ONDANSETRON 2 MG/ML
INJECTION INTRAMUSCULAR; INTRAVENOUS AS NEEDED
Status: DISCONTINUED | OUTPATIENT
Start: 2023-03-07 | End: 2023-03-07 | Stop reason: SURG

## 2023-03-07 RX ORDER — IPRATROPIUM BROMIDE AND ALBUTEROL SULFATE 2.5; .5 MG/3ML; MG/3ML
3 SOLUTION RESPIRATORY (INHALATION) EVERY 6 HOURS PRN
Status: DISCONTINUED | OUTPATIENT
Start: 2023-03-07 | End: 2023-03-07 | Stop reason: HOSPADM

## 2023-03-07 RX ORDER — LIDOCAINE HYDROCHLORIDE 20 MG/ML
INJECTION, SOLUTION EPIDURAL; INFILTRATION; INTRACAUDAL; PERINEURAL AS NEEDED
Status: DISCONTINUED | OUTPATIENT
Start: 2023-03-07 | End: 2023-03-07 | Stop reason: SURG

## 2023-03-07 RX ORDER — PHENYLEPHRINE HCL 10 MG/ML
VIAL (ML) INJECTION AS NEEDED
Status: DISCONTINUED | OUTPATIENT
Start: 2023-03-07 | End: 2023-03-07 | Stop reason: SURG

## 2023-03-07 RX ORDER — EPHEDRINE SULFATE 50 MG/ML
INJECTION, SOLUTION INTRAVENOUS AS NEEDED
Status: DISCONTINUED | OUTPATIENT
Start: 2023-03-07 | End: 2023-03-07 | Stop reason: SURG

## 2023-03-07 RX ORDER — LIDOCAINE HYDROCHLORIDE 40 MG/ML
SOLUTION TOPICAL AS NEEDED
Status: DISCONTINUED | OUTPATIENT
Start: 2023-03-07 | End: 2023-03-07 | Stop reason: SURG

## 2023-03-07 RX ORDER — ONDANSETRON 2 MG/ML
4 INJECTION INTRAMUSCULAR; INTRAVENOUS EVERY 6 HOURS PRN
Status: DISCONTINUED | OUTPATIENT
Start: 2023-03-07 | End: 2023-03-08 | Stop reason: HOSPADM

## 2023-03-07 RX ORDER — GLYCOPYRROLATE 0.2 MG/ML
INJECTION, SOLUTION INTRAMUSCULAR; INTRAVENOUS AS NEEDED
Status: DISCONTINUED | OUTPATIENT
Start: 2023-03-07 | End: 2023-03-07 | Stop reason: SURG

## 2023-03-07 RX ORDER — HYDROMORPHONE HYDROCHLORIDE 1 MG/ML
0.4 INJECTION, SOLUTION INTRAMUSCULAR; INTRAVENOUS; SUBCUTANEOUS EVERY 5 MIN PRN
Status: DISCONTINUED | OUTPATIENT
Start: 2023-03-07 | End: 2023-03-08 | Stop reason: HOSPADM

## 2023-03-07 RX ORDER — CEFAZOLIN SODIUM/WATER 2 G/20 ML
SYRINGE (ML) INTRAVENOUS AS NEEDED
Status: DISCONTINUED | OUTPATIENT
Start: 2023-03-07 | End: 2023-03-07 | Stop reason: SURG

## 2023-03-07 RX ORDER — MORPHINE SULFATE 10 MG/ML
6 INJECTION, SOLUTION INTRAMUSCULAR; INTRAVENOUS EVERY 10 MIN PRN
Status: DISCONTINUED | OUTPATIENT
Start: 2023-03-07 | End: 2023-03-08 | Stop reason: HOSPADM

## 2023-03-07 RX ORDER — DEXAMETHASONE SODIUM PHOSPHATE 4 MG/ML
VIAL (ML) INJECTION AS NEEDED
Status: DISCONTINUED | OUTPATIENT
Start: 2023-03-07 | End: 2023-03-07 | Stop reason: SURG

## 2023-03-07 RX ORDER — HYDROMORPHONE HYDROCHLORIDE 1 MG/ML
0.2 INJECTION, SOLUTION INTRAMUSCULAR; INTRAVENOUS; SUBCUTANEOUS EVERY 5 MIN PRN
Status: DISCONTINUED | OUTPATIENT
Start: 2023-03-07 | End: 2023-03-08 | Stop reason: HOSPADM

## 2023-03-07 RX ORDER — SODIUM CHLORIDE, SODIUM LACTATE, POTASSIUM CHLORIDE, CALCIUM CHLORIDE 600; 310; 30; 20 MG/100ML; MG/100ML; MG/100ML; MG/100ML
INJECTION, SOLUTION INTRAVENOUS CONTINUOUS
Status: DISCONTINUED | OUTPATIENT
Start: 2023-03-07 | End: 2023-03-08 | Stop reason: HOSPADM

## 2023-03-07 RX ORDER — MORPHINE SULFATE 4 MG/ML
2 INJECTION, SOLUTION INTRAMUSCULAR; INTRAVENOUS EVERY 10 MIN PRN
Status: DISCONTINUED | OUTPATIENT
Start: 2023-03-07 | End: 2023-03-08 | Stop reason: HOSPADM

## 2023-03-07 RX ORDER — MORPHINE SULFATE 4 MG/ML
4 INJECTION, SOLUTION INTRAMUSCULAR; INTRAVENOUS EVERY 10 MIN PRN
Status: DISCONTINUED | OUTPATIENT
Start: 2023-03-07 | End: 2023-03-08 | Stop reason: HOSPADM

## 2023-03-07 RX ORDER — VANCOMYCIN HYDROCHLORIDE 1 G/20ML
INJECTION, POWDER, LYOPHILIZED, FOR SOLUTION INTRAVENOUS AS NEEDED
Status: DISCONTINUED | OUTPATIENT
Start: 2023-03-07 | End: 2023-03-07 | Stop reason: HOSPADM

## 2023-03-07 RX ORDER — HYDROMORPHONE HYDROCHLORIDE 1 MG/ML
0.6 INJECTION, SOLUTION INTRAMUSCULAR; INTRAVENOUS; SUBCUTANEOUS EVERY 5 MIN PRN
Status: DISCONTINUED | OUTPATIENT
Start: 2023-03-07 | End: 2023-03-08 | Stop reason: HOSPADM

## 2023-03-07 RX ADMIN — PHENYLEPHRINE HCL 100 MCG: 10 MG/ML VIAL (ML) INJECTION at 19:16:00

## 2023-03-07 RX ADMIN — LIDOCAINE HYDROCHLORIDE 100 MG: 20 INJECTION, SOLUTION EPIDURAL; INFILTRATION; INTRACAUDAL; PERINEURAL at 17:42:00

## 2023-03-07 RX ADMIN — PHENYLEPHRINE HCL 100 MCG: 10 MG/ML VIAL (ML) INJECTION at 19:23:00

## 2023-03-07 RX ADMIN — SODIUM CHLORIDE, SODIUM LACTATE, POTASSIUM CHLORIDE, CALCIUM CHLORIDE: 600; 310; 30; 20 INJECTION, SOLUTION INTRAVENOUS at 17:48:00

## 2023-03-07 RX ADMIN — LIDOCAINE HYDROCHLORIDE 4 ML: 40 SOLUTION TOPICAL at 17:44:00

## 2023-03-07 RX ADMIN — PHENYLEPHRINE HCL 100 MCG: 10 MG/ML VIAL (ML) INJECTION at 19:46:00

## 2023-03-07 RX ADMIN — ONDANSETRON 4 MG: 2 INJECTION INTRAMUSCULAR; INTRAVENOUS at 21:46:00

## 2023-03-07 RX ADMIN — DEXAMETHASONE SODIUM PHOSPHATE 4 MG: 4 MG/ML VIAL (ML) INJECTION at 17:50:00

## 2023-03-07 RX ADMIN — GLYCOPYRROLATE 0.2 MG: 0.2 INJECTION, SOLUTION INTRAMUSCULAR; INTRAVENOUS at 17:50:00

## 2023-03-07 RX ADMIN — SODIUM CHLORIDE: 9 INJECTION, SOLUTION INTRAVENOUS at 17:55:00

## 2023-03-07 RX ADMIN — SODIUM CHLORIDE, SODIUM LACTATE, POTASSIUM CHLORIDE, CALCIUM CHLORIDE: 600; 310; 30; 20 INJECTION, SOLUTION INTRAVENOUS at 20:38:00

## 2023-03-07 RX ADMIN — ROCURONIUM BROMIDE 10 MG: 10 INJECTION, SOLUTION INTRAVENOUS at 17:47:00

## 2023-03-07 RX ADMIN — CEFAZOLIN SODIUM/WATER 2 G: 2 G/20 ML SYRINGE (ML) INTRAVENOUS at 17:49:00

## 2023-03-07 RX ADMIN — ROCURONIUM BROMIDE 10 MG: 10 INJECTION, SOLUTION INTRAVENOUS at 17:42:00

## 2023-03-07 RX ADMIN — PHENYLEPHRINE HCL 100 MCG: 10 MG/ML VIAL (ML) INJECTION at 19:39:00

## 2023-03-07 RX ADMIN — EPHEDRINE SULFATE 5 MG: 50 INJECTION, SOLUTION INTRAVENOUS at 19:43:00

## 2023-03-07 RX ADMIN — EPHEDRINE SULFATE 5 MG: 50 INJECTION, SOLUTION INTRAVENOUS at 19:20:00

## 2023-03-07 NOTE — PLAN OF CARE
Patient is Aox4 on RA. Voiding via the purewick. NPO at 5am for surgery with Dr. Rahul Berrios. Cardiac diet. Bedrest. Tylenol given for pain. Plan for surgery 3-7-23.     Problem: Patient Centered Care  Goal: Patient preferences are identified and integrated in the patient's plan of care  Description: Interventions:  - What would you like us to know as we care for you?   - Provide timely, complete, and accurate information to patient/family  - Incorporate patient and family knowledge, values, beliefs, and cultural backgrounds into the planning and delivery of care  - Encourage patient/family to participate in care and decision-making at the level they choose  - Honor patient and family perspectives and choices  Outcome: Progressing     Problem: SKIN/TISSUE INTEGRITY - ADULT  Goal: Skin integrity remains intact  Description: INTERVENTIONS  - Assess and document risk factors for pressure ulcer development  - Assess and document skin integrity  - Monitor for areas of redness and/or skin breakdown  - Initiate interventions, skin care algorithm/standards of care as needed  Outcome: Progressing  Goal: Incision(s), wounds(s) or drain site(s) healing without S/S of infection  Description: INTERVENTIONS:  - Assess and document risk factors for pressure ulcer development  - Assess and document skin integrity  - Assess and document dressing/incision, wound bed, drain sites and surrounding tissue  - Implement wound care per orders  - Initiate isolation precautions as appropriate  - Initiate Pressure Ulcer prevention bundle as indicated  Outcome: Progressing     Problem: MUSCULOSKELETAL - ADULT  Goal: Return mobility to safest level of function  Description: INTERVENTIONS:  - Assess patient stability and activity tolerance for standing, transferring and ambulating w/ or w/o assistive devices  - Assist with transfers and ambulation using safe patient handling equipment as needed  - Ensure adequate protection for wounds/incisions during mobilization  - Obtain PT/OT consults as needed  - Advance activity as appropriate  - Communicate ordered activity level and limitations with patient/family  Outcome: Progressing  Goal: Maintain proper alignment of affected body part  Description: INTERVENTIONS:  - Support and protect limb and body alignment per provider's orders  - Instruct and reinforce with patient and family use of appropriate assistive device and precautions (e.g. spinal or hip dislocation precautions)  Outcome: Progressing     Problem: PAIN - ADULT  Goal: Verbalizes/displays adequate comfort level or patient's stated pain goal  Description: INTERVENTIONS:  - Encourage pt to monitor pain and request assistance  - Assess pain using appropriate pain scale  - Administer analgesics based on type and severity of pain and evaluate response  - Implement non-pharmacological measures as appropriate and evaluate response  - Consider cultural and social influences on pain and pain management  - Manage/alleviate anxiety  - Utilize distraction and/or relaxation techniques  - Monitor for opioid side effects  - Notify MD/LIP if interventions unsuccessful or patient reports new pain  - Anticipate increased pain with activity and pre-medicate as appropriate  Outcome: Progressing     Problem: SAFETY ADULT - FALL  Goal: Free from fall injury  Description: INTERVENTIONS:  - Assess pt frequently for physical needs  - Identify cognitive and physical deficits and behaviors that affect risk of falls.   - [place] fall precautions as indicated by assessment.  - Educate pt/family on patient safety including physical limitations  - Instruct pt to call for assistance with activity based on assessment  - Modify environment to reduce risk of injury  - Provide assistive devices as appropriate  - Consider OT/PT consult to assist with strengthening/mobility  - Encourage toileting schedule  Outcome: Progressing     Problem: DISCHARGE PLANNING  Goal: Discharge to home or other facility with appropriate resources  Description: INTERVENTIONS:  - Identify barriers to discharge w/pt and caregiver  - Include patient/family/discharge partner in discharge planning  - Arrange for needed discharge resources and transportation as appropriate  - Identify discharge learning needs (meds, wound care, etc)  - Arrange for interpreters to assist at discharge as needed  - Consider post-discharge preferences of patient/family/discharge partner  - Complete POLST form as appropriate  - Assess patient's ability to be responsible for managing their own health  - Refer to Case Management Department for coordinating discharge planning if the patient needs post-hospital services based on physician/LIP order or complex needs related to functional status, cognitive ability or social support system  Outcome: Progressing

## 2023-03-07 NOTE — PLAN OF CARE
Pt a/o x3. On RA. Had an [address] guided aspiration of her R hip this morning. Dr. Santi Juarez is planning on doing surgery this afternoon around 1600. Potassium was 3.3 this morning, pt getting replacement per protocol.      Problem: SKIN/TISSUE INTEGRITY - ADULT  Goal: Skin integrity remains intact  Description: INTERVENTIONS  - Assess and document risk factors for pressure ulcer development  - Assess and document skin integrity  - Monitor for areas of redness and/or skin breakdown  - Initiate interventions, skin care algorithm/standards of care as needed  Outcome: Progressing  Goal: Incision(s), wounds(s) or drain site(s) healing without S/S of infection  Description: INTERVENTIONS:  - Assess and document risk factors for pressure ulcer development  - Assess and document skin integrity  - Assess and document dressing/incision, wound bed, drain sites and surrounding tissue  - Implement wound care per orders  - Initiate isolation precautions as appropriate  - Initiate Pressure Ulcer prevention bundle as indicated  Outcome: Progressing     Problem: MUSCULOSKELETAL - ADULT  Goal: Return mobility to safest level of function  Description: INTERVENTIONS:  - Assess patient stability and activity tolerance for standing, transferring and ambulating w/ or w/o assistive devices  - Assist with transfers and ambulation using safe patient handling equipment as needed  - Ensure adequate protection for wounds/incisions during mobilization  - Obtain PT/OT consults as needed  - Advance activity as appropriate  - Communicate ordered activity level and limitations with patient/family  Outcome: Progressing  Goal: Maintain proper alignment of affected body part  Description: INTERVENTIONS:  - Support and protect limb and body alignment per provider's orders  - Instruct and reinforce with patient and family use of appropriate assistive device and precautions (e.g. spinal or hip dislocation precautions)  Outcome: Progressing     Problem: PAIN - ADULT  Goal: Verbalizes/displays adequate comfort level or patient's stated pain goal  Description: INTERVENTIONS:  - Encourage pt to monitor pain and request assistance  - Assess pain using appropriate pain scale  - Administer analgesics based on type and severity of pain and evaluate response  - Implement non-pharmacological measures as appropriate and evaluate response  - Consider cultural and social influences on pain and pain management  - Manage/alleviate anxiety  - Utilize distraction and/or relaxation techniques  - Monitor for opioid side effects  - Notify MD/LIP if interventions unsuccessful or patient reports new pain  - Anticipate increased pain with activity and pre-medicate as appropriate  Outcome: Progressing     Problem: SAFETY ADULT - FALL  Goal: Free from fall injury  Description: INTERVENTIONS:  - Assess pt frequently for physical needs  - Identify cognitive and physical deficits and behaviors that affect risk of falls.   - [place] fall precautions as indicated by assessment.  - Educate pt/family on patient safety including physical limitations  - Instruct pt to call for assistance with activity based on assessment  - Modify environment to reduce risk of injury  - Provide assistive devices as appropriate  - Consider OT/PT consult to assist with strengthening/mobility  - Encourage toileting schedule  Outcome: Progressing     Problem: DISCHARGE PLANNING  Goal: Discharge to home or other facility with appropriate resources  Description: INTERVENTIONS:  - Identify barriers to discharge w/pt and caregiver  - Include patient/family/discharge partner in discharge planning  - Arrange for needed discharge resources and transportation as appropriate  - Identify discharge learning needs (meds, wound care, etc)  - Arrange for interpreters to assist at discharge as needed  - Consider post-discharge preferences of patient/family/discharge partner  - Complete POLST form as appropriate  - Assess patient's ability to be responsible for managing their own health  - Refer to Case Management Department for coordinating discharge planning if the patient needs post-hospital services based on physician/LIP order or complex needs related to functional status, cognitive ability or social support system  Outcome: Progressing

## 2023-03-07 NOTE — DISCHARGE INSTRUCTIONS
DISCHARGE INSTRUCTIONS:  PLACE ICE TO SURGICAL AREA 20-30 MINUTES ON/ 20-30 MINUTES OFF. THIS IS TO HELP WITH PAIN & SWELLING. TAKE YOUR MEDICATIONS AS PRESCRIBED BY YOUR DOCTOR BELOW. THESE     AS YOU WERE INSTRUCTED BY PHYSICAL THERAPY TO EXERCISE HIP PRECAUTIONS, CONTINUE TO DO SO AFTER DISCHARGE    ONLY ALLOWED TOE TOUCH ON THE OPERATIVE EXTREMITY. CALL TO CONFIRM YOUR FOLLOW UP APPOINTMENT WITH DR. Angi Crawley TO BE SEEN IN 2-3 WEEKS POSTOP. [phone]    MEDICATIONS    POST OP MEDICATION REGIMEN              MULTI-MODAL   PAIN REGIMEN       DRUG   FOR   FREQUENCY & DURATION   QTY   NOTES     WEANING  TIPS       Gabapentin 100mg   Nerve pain   Take 2 tabs every 8 hours for 14 days    90   No refills You may discontinue this medication prior to 14 days if you do not tolerate it. Tylenol 500mg     Mild pain   Take 2 tabs every 6 hours     90   Can purchase over-the-counter    Stop using medication if no longer having pain. Tramadol 50mg     Moderate pain   Take 1-2 tabs every 6 hours only as needed   45 May prescribe a refill, but ideally, this should be tapered off after surgery Stop using this medication 2nd   As pain decreases over time, increase the interval between doses (6 hours to 8, then 12, then 24) to taper off the medication. BREAKTHROUGH PAIN         Oxycodone 5mg       Severe pain     Take 1-2 tabs every 4-6 hours only as needed for severe pain       40   Take at least 1 hr apart from Tramadol. Ideally, no refill. The goal is to taper off this medication as soon as possible, as it can be addictive and have side effects. Stop using this medication 1st if no longer having severe pain. BLOOD THINNER     Eliquis 2.5mg   Blood clot prevention   Take 1 tab twice daily for 30 days     60     No refills   Complete the entire course of your blood thinner.          ANTIBIOTIC       Cefadroxil 500mg       Infection prevention     Take 1 tab twice daily for 7 days 14     No refills   Complete the entire course of your prophylactic antibiotic. STOOL SOFTENER     Sennokot 8.6/50mg   Constipation   Take 1 tab twice daily  while on opioids     60 Refer to discharge instructions if constipation persists even after taking this medication   Stop taking if having diarrhea or loose stools. DRESSING:    YOU HAVE A SPECIAL DRESSING CALLED A PREVENA DRESSING, OVER YOUR INCISION. THIS IS A TYPE OF NEGATIVE PRESSURE DRESSING THAT KEEPS THE WOUND DRY. IT IS CONNECTED TO A CANNISTER. MAKE SURE THAT THE CANNISTER IS POWERED ON AND NOT OUT OF BATTERY. THE DRESSING STAYS FOR 7 DAYS FROM SURGERY. THIS DRESSING SHOULD BE CHANGED TO AN AQUACEL AT 7 DAYS POST-OPERATIVELY. IF THE PREVENA DRESSING HAS CONTINUOUS AND PERSISTENT DRAINAGE, CALL YOUR SURGEON FIRST BEFORE CHANGING THE DRESSING TO AN AQUACEL DRESSING. YOU MAY SHOWER AS SOON AS THE AQUACEL DRESSING IS PLACED INSTEAD OF THE PREVENA DRESSING OVER YOUR INCISION AREA. IF THE DRESSING LEAKS OR COMES LOOSE BEFORE THE 7 DAY PERIOD CONTACT YOUR DOCTOR / SURGEON. AFTER   14 DAYS POST OPERATIVELY, THE AQUACEL DRESSING IS REMOVED BY PULLING ON THE EDGE OF THE DRESSING AND GENTLY STRETCHING IT, THEN PEEL IT OFF SLOWLY LIKE A BANDAID. IF YOU HAVE ANY QUESTIONS OR CONCERNS ABOUT THE DRESSING CONTACT YOUR DOCTOR. REASONS TO CALL YOUR DOCTOR:  TEMPERATURE .0 OR MORE  PERSISTANT NAUSEA OR VOMITTING - ESPECIALLY IF YOU ARE UNABLE TO EAT OR DRINK. INCREASED LEVEL OF PAIN OR PAIN WHICH IS NOT CONTROLLED BY YOUR PAIN MEDICINE. PERSISTENT DRAINAGE AT ANYTIME FROM YOUR SURGICAL AREA / INCISION. PAIN, TENDERNESS OR SUDDEN SWELLING IN YOUR CALF REGION OF THE LOWER EXTREMITIES - THIS IS A POSSIBLE SIGN OF A BLOOD CLOT. ANY CHANGES IN SENSATION IN YOUR BODY IN THE AREA OF YOUR SURGERY = NUMBNESS OR TINGLING.   ANY CHANGES IN CIRCULATION IN YOUR BODY IN THE AREA OF YOUR SURGERY = CHANGES  IN COLOR EITHER DARKER OR VERY PALE; OR  IF AREA FEELS COLD TO THE TOUCH AS COMPARED TO OTHER AREAS. ANY CHANGES IN FUNCTION IN YOUR BODY IN THE AREA OF YOUR SURGERY = CHANGE IN MOVEMENT - UNABLE TO MOVE OR WIGGLE FINGERS, TOES OR FOOT OR ANY OTHER CHANGES IN NORMAL BODY FUNCTIONING. FOR ANY OF THE ABOVE OR ANY OTHER QUESTIONS OR CONCERNS CALL YOUR DOCTOR/ SURGEON AS SOON AS POSSIBLE.       Chilo Bansal MD MPH  Orthopaedic Surgeon, Adult Hip and Knee Reconstruction  [facility], [place] [address] [facility]  [place], [address] [place] [address]  Office: (T) [phone] (T) [phone]  Adminstrative Assistant: Gab Jung

## 2023-03-08 LAB
ANION GAP SERPL CALC-SCNC: 4 MMOL/L (ref 0–18)
BASOPHILS # BLD AUTO: 0.03 X10(3) UL (ref 0–0.2)
BASOPHILS NFR BLD AUTO: 0.5 %
BUN BLD-MCNC: 15 MG/DL (ref 7–18)
BUN/CREAT SERPL: 21.4 (ref 10–20)
CALCIUM BLD-MCNC: 7.6 MG/DL (ref 8.5–10.1)
CHLORIDE SERPL-SCNC: 111 MMOL/L (ref 98–112)
CO2 SERPL-SCNC: 28 MMOL/L (ref 21–32)
CREAT BLD-MCNC: 0.7 MG/DL
DEPRECATED RDW RBC AUTO: 49.3 FL (ref 35.1–46.3)
DEPRECATED RDW RBC AUTO: 49.5 FL (ref 35.1–46.3)
DEPRECATED RDW RBC AUTO: 49.5 FL (ref 35.1–46.3)
EOSINOPHIL # BLD AUTO: 0 X10(3) UL (ref 0–0.7)
EOSINOPHIL NFR BLD AUTO: 0 %
ERYTHROCYTE [DISTWIDTH] IN BLOOD BY AUTOMATED COUNT: 14.7 % (ref 11–15)
ERYTHROCYTE [DISTWIDTH] IN BLOOD BY AUTOMATED COUNT: 14.8 % (ref 11–15)
ERYTHROCYTE [DISTWIDTH] IN BLOOD BY AUTOMATED COUNT: 14.8 % (ref 11–15)
GFR SERPLBLD BASED ON 1.73 SQ M-ARVRAT: 94 ML/MIN/1.73M2 (ref 60–?)
GLUCOSE BLD-MCNC: 118 MG/DL (ref 70–99)
HCT VFR BLD AUTO: 37 %
HCT VFR BLD AUTO: 37 %
HCT VFR BLD AUTO: 37.5 %
HGB BLD-MCNC: 11.9 G/DL
HGB BLD-MCNC: 12 G/DL
HGB BLD-MCNC: 12 G/DL
IMM GRANULOCYTES # BLD AUTO: 0.02 X10(3) UL (ref 0–1)
IMM GRANULOCYTES NFR BLD: 0.3 %
LYMPHOCYTES # BLD AUTO: 0.61 X10(3) UL (ref 1–4)
LYMPHOCYTES NFR BLD AUTO: 9.5 %
MAGNESIUM SERPL-MCNC: 2 MG/DL (ref 1.6–2.6)
MCH RBC QN AUTO: 29.1 PG (ref 26–34)
MCH RBC QN AUTO: 29.6 PG (ref 26–34)
MCH RBC QN AUTO: 29.6 PG (ref 26–34)
MCHC RBC AUTO-ENTMCNC: 31.7 G/DL (ref 31–37)
MCHC RBC AUTO-ENTMCNC: 32.4 G/DL (ref 31–37)
MCHC RBC AUTO-ENTMCNC: 32.4 G/DL (ref 31–37)
MCV RBC AUTO: 91.1 FL
MCV RBC AUTO: 91.1 FL
MCV RBC AUTO: 91.7 FL
MONOCYTES # BLD AUTO: 0.68 X10(3) UL (ref 0.1–1)
MONOCYTES NFR BLD AUTO: 10.6 %
NEUTROPHILS # BLD AUTO: 5.1 X10 (3) UL (ref 1.5–7.7)
NEUTROPHILS # BLD AUTO: 5.1 X10(3) UL (ref 1.5–7.7)
NEUTROPHILS NFR BLD AUTO: 79.1 %
OSMOLALITY SERPL CALC.SUM OF ELEC: 298 MOSM/KG (ref 275–295)
PLATELET # BLD AUTO: 201 10(3)UL (ref 150–450)
PLATELET # BLD AUTO: 209 10(3)UL (ref 150–450)
PLATELET # BLD AUTO: 209 10(3)UL (ref 150–450)
POTASSIUM SERPL-SCNC: 4.4 MMOL/L (ref 3.5–5.1)
RBC # BLD AUTO: 4.06 X10(6)UL
RBC # BLD AUTO: 4.06 X10(6)UL
RBC # BLD AUTO: 4.09 X10(6)UL
SODIUM SERPL-SCNC: 143 MMOL/L (ref 136–145)
WBC # BLD AUTO: 6.4 X10(3) UL (ref 4–11)
WBC # BLD AUTO: 6.4 X10(3) UL (ref 4–11)
WBC # BLD AUTO: 6.5 X10(3) UL (ref 4–11)

## 2023-03-08 PROCEDURE — 99232 SBSQ HOSP IP/OBS MODERATE 35: CPT | Performed by: HOSPITALIST

## 2023-03-08 RX ORDER — ACETAMINOPHEN 500 MG
1000 TABLET ORAL EVERY 6 HOURS
Status: DISCONTINUED | OUTPATIENT
Start: 2023-03-08 | End: 2023-03-10

## 2023-03-08 RX ORDER — FAMOTIDINE 20 MG/1
20 TABLET, FILM COATED ORAL 2 TIMES DAILY
Status: DISCONTINUED | OUTPATIENT
Start: 2023-03-08 | End: 2023-03-10

## 2023-03-08 RX ORDER — DOXEPIN HYDROCHLORIDE 50 MG/1
1 CAPSULE ORAL DAILY
Status: DISCONTINUED | OUTPATIENT
Start: 2023-03-08 | End: 2023-03-10

## 2023-03-08 RX ORDER — SODIUM PHOSPHATE, DIBASIC AND SODIUM PHOSPHATE, MONOBASIC 7; 19 G/133ML; G/133ML
1 ENEMA RECTAL ONCE AS NEEDED
Status: DISCONTINUED | OUTPATIENT
Start: 2023-03-08 | End: 2023-03-10

## 2023-03-08 RX ORDER — FAMOTIDINE 10 MG/ML
20 INJECTION, SOLUTION INTRAVENOUS 2 TIMES DAILY
Status: DISCONTINUED | OUTPATIENT
Start: 2023-03-08 | End: 2023-03-10

## 2023-03-08 RX ORDER — METOCLOPRAMIDE HYDROCHLORIDE 5 MG/ML
10 INJECTION INTRAMUSCULAR; INTRAVENOUS EVERY 8 HOURS PRN
Status: DISCONTINUED | OUTPATIENT
Start: 2023-03-08 | End: 2023-03-10

## 2023-03-08 RX ORDER — CEVIMELINE HYDROCHLORIDE 30 MG/1
30 CAPSULE ORAL 3 TIMES DAILY
Status: DISCONTINUED | OUTPATIENT
Start: 2023-03-08 | End: 2023-03-10

## 2023-03-08 RX ORDER — OXYCODONE HYDROCHLORIDE 5 MG/1
5 TABLET ORAL EVERY 4 HOURS PRN
Status: DISCONTINUED | OUTPATIENT
Start: 2023-03-08 | End: 2023-03-10

## 2023-03-08 RX ORDER — HYDROMORPHONE HYDROCHLORIDE 1 MG/ML
0.8 INJECTION, SOLUTION INTRAMUSCULAR; INTRAVENOUS; SUBCUTANEOUS EVERY 2 HOUR PRN
Status: DISCONTINUED | OUTPATIENT
Start: 2023-03-08 | End: 2023-03-10

## 2023-03-08 RX ORDER — SODIUM CHLORIDE 9 MG/ML
INJECTION, SOLUTION INTRAVENOUS CONTINUOUS
Status: DISCONTINUED | OUTPATIENT
Start: 2023-03-08 | End: 2023-03-08

## 2023-03-08 RX ORDER — VANCOMYCIN HYDROCHLORIDE
1250 EVERY 12 HOURS
Status: COMPLETED | OUTPATIENT
Start: 2023-03-08 | End: 2023-03-09

## 2023-03-08 RX ORDER — PREGABALIN 75 MG/1
75 CAPSULE ORAL NIGHTLY
Status: DISCONTINUED | OUTPATIENT
Start: 2023-03-08 | End: 2023-03-10

## 2023-03-08 RX ORDER — HYDROMORPHONE HYDROCHLORIDE 1 MG/ML
0.4 INJECTION, SOLUTION INTRAMUSCULAR; INTRAVENOUS; SUBCUTANEOUS EVERY 2 HOUR PRN
Status: DISCONTINUED | OUTPATIENT
Start: 2023-03-08 | End: 2023-03-10

## 2023-03-08 RX ORDER — ONDANSETRON 2 MG/ML
4 INJECTION INTRAMUSCULAR; INTRAVENOUS EVERY 6 HOURS PRN
Status: DISCONTINUED | OUTPATIENT
Start: 2023-03-08 | End: 2023-03-10

## 2023-03-08 RX ORDER — PREGABALIN 50 MG/1
50 CAPSULE ORAL DAILY
Status: DISCONTINUED | OUTPATIENT
Start: 2023-03-09 | End: 2023-03-10

## 2023-03-08 RX ORDER — SENNOSIDES 8.6 MG
17.2 TABLET ORAL NIGHTLY
Status: DISCONTINUED | OUTPATIENT
Start: 2023-03-08 | End: 2023-03-10

## 2023-03-08 RX ORDER — OXYCODONE HYDROCHLORIDE 5 MG/1
10 TABLET ORAL EVERY 4 HOURS PRN
Status: DISCONTINUED | OUTPATIENT
Start: 2023-03-08 | End: 2023-03-10

## 2023-03-08 RX ORDER — POLYETHYLENE GLYCOL 3350 17 G/17G
17 POWDER, FOR SOLUTION ORAL DAILY PRN
Status: DISCONTINUED | OUTPATIENT
Start: 2023-03-08 | End: 2023-03-10

## 2023-03-08 RX ORDER — BISACODYL 10 MG
10 SUPPOSITORY, RECTAL RECTAL
Status: DISCONTINUED | OUTPATIENT
Start: 2023-03-08 | End: 2023-03-10

## 2023-03-08 RX ORDER — FLUTICASONE FUROATE AND VILANTEROL 200; 25 UG/1; UG/1
1 POWDER RESPIRATORY (INHALATION) DAILY
Status: DISCONTINUED | OUTPATIENT
Start: 2023-03-08 | End: 2023-03-10

## 2023-03-08 NOTE — ANESTHESIA PROCEDURE NOTES
Airway  Date/Time: 3/7/2023 5:44 PM  Urgency: Elective    Difficult airway    General Information and Staff    Patient location during procedure: OR  Anesthesiologist: Milton Christian DO  Performed: anesthesiologist   Performed by: Milton Christian DO  Authorized by: Milton Christian DO      Indications and Patient Condition  Indications for airway management: anesthesia  Spontaneous Ventilation: absent  Sedation level: deep  Preoxygenated: yes  Patient position: sniffing  Mask difficulty assessment: 1 - vent by mask    Final Airway Details  Final airway type: endotracheal airway      Successful airway: ETT  Cuffed: yes   Successful intubation technique: Video laryngoscopy  Facilitating devices/methods: intubating stylet  Endotracheal tube insertion site: oral  Blade: GlideScope  Blade size: #4  ETT size (mm): 7.0    Cormack-Lehane Classification: grade IIA - partial view of glottis  Placement verified by: chest auscultation and capnometry   Measured from: teeth  ETT to teeth (cm): 21  Number of attempts at approach: 1  Number of other approaches attempted: 0

## 2023-03-08 NOTE — OPERATIVE REPORT
[facility] at [address]: 3/7/2023    PREOPERATIVE DIAGNOSIS:   1. Right Total Hip Arthroplasty Acetabular Component Aseptic Loosening  2. Morbid obesity - Body mass index 43kg/m2    POST-OPERATIVE DIAGNOSIS:   1. Right Total Hip Arthroplasty Acetabular Component Aseptic Loosening  2. Morbid obesity - Body mass index 43kg/m2    PROCEDURE:  1. Right Revision Total Hip Arthroplasty - Acetabular Component - Modifier 22  2. Intra-operative Interpretation of Fluoroscopy  3. Application of negative pressure incisional dressing    Location: [address] [place] [address] Ave: Vicki Joya MD  Assistant(s): Lucia Rubio    Anesthesia type:  General  Estimated Blood Loss: 800cc    Drains: Prevena Incisional Wound Vac    Complications: None immediately apparent    Explants:  52mm ZB G7 Acetabular Component with 2 intact screws  28+7 / 42mm dual mobility femoral Head    Implants:  60mm ZB TM Revision Shell Acetabular Component  48mm ZB G7 Limited hole Acetabular Cup with dual mobility liner  28,0 delta ceramic inner head with vitamin E 38mm outer poly femoral Head    Indication: This is a 69year old lady with RA/lupus, who is ~14 weeks s/p right primary COLTON who unfortunately presented with increasing right hip pain nad difficulty ambulating. Radiographic evaluation demonstrated a loose acetabular component with pelvic discontinuity. Inflammatory markers were elevated, but the aspiration was not consistent with infection. Surgical versus non-surgical options were discussed with the patient, and together we decided it is best to proceed with a revision total hip arthroplasty. All risks and benefits of surgery including bleeding, persistent infection, dislocation, mal-prosthetic fracture, neurovascular injury, leg length or offset discrepancy, as well as medical and anesthesia-related complications were discussed with the patient / family, who elected to proceed with surgery. Findings:  Grossly loose Acetabular Component  Well fixed, well positioned Femoral Stem  Severe bone loss with pelvic discontinuity, and osteopenic bone quality  Segmental posterior column bone loss, with fibrous tissue and early callus formation    Procedure in detail:    Following induction of general anesthesia, the patient was positioned lateral decubitus using a peg board. Perioperative IV antibiotics of Ancef and vancomycin were administered. Standard prep and drape was performed. The prior healed lateral scar was incised with a 10 blade, centered over the greater trochanter and extended proximally and distally. Deeper dissection through dense scar tissue revealed a scarred fascial layer which was incised. A charnley retractor was placed. The blood hip joint was aspirated and fluid was sent for culture. The posterior capsular scar sleeve was released off of the trochanter exposing the trunnion and femoral head. The hip was dislocated with the aide of a bone hook. The head was extracted using a plastic head impactor. The stem anteversion was examined and noted to be around 20 degrees. The abductor sleeve was intact on the trochanter. The gluteus sd sleeve was released (later repaired at the end) and the reflected head was also released to allow adequate exposure. The cup was circumferentially exposed and 3 separate tissue cultures were obtained. The posterior column was exposed subperiosteally. The loose cup was retrieved using a rongeur, taking care to not disturb the medial periosteum and deeper neurovascular structures. The degree of bone loss was assessed to determine the most ideal reconstruction. A breaux was used to push on the ischium which was somewhat mobile but not hypermobile, from the dome/superior hemipelvis. The ischium and AAIS were intact. The posterior column contained a 3cm area of segmental bone loss with fibrous tissue and early callus.  Given that, we elected to use a large cup distraction technique for bridging the discontinuity rather than a posterior column recon plate fixation and multihole cup. Trial cups were inserted and maneuvered to determine a reasonable cup size that would achieve pinch fit between the AAIS and ischium. This was determined to be a 60mm shell. 60cc of cancellous chips were placed medially in the area of bone loss, and gently reverse reamed to create an impacted bed of cancellous bone. A laminar  was used to gently distract the superior from the inferior hemipelvis, and the final 60mm cup (after removal of the insertion handle ring to allow more peripheral screw placement) was placed in the defect. We decided to add multiple new screw holes through the cup to achieve fixation in the intact dome, ischium and pubis. These areas were marked, and a metal cutting mihai was used to create those screw holes on the back table. The cup was inserted and impacted to achieve reasonable pinch fit, and the laminar  was disengaged. The cup achieved appropriate pinch fit. Fluoroscopic AP views were used to confirm cup placement which was slightly more vertical and had limited anteversion. This was taken into account when cementing the final inner shell. Multiple screws were drilled and placed throughout the cup in the superior and inferior hemipelves and the final bridging cup construct was stable and moved with the pelvis in all directions. We could not reliably add a cage in this setting, as this would have significantly diminished the inner diameter of the shell and therefore final cemented shell size. Final fluoroscopic images were taken of the cup in its stable position. Bone wax was placed over the screw holes to prevent cement interdigitation, and 1 batch of refobacin cement (with gentamicin) was mixed, and used to cement a 48mm G7 shell with a dual mobility liner locked in, into a position of more anteversion and less inclination.  The cup was held until the cement cured. Heads were trialed and a 28,0 / 45 dual mobility head was noted to appropriately create impingement free ROM, with excellent stability. There Leg lengths were felt to be similar. The hip was dislocated, the trial head removed and the final 28,0 inner dual mobility head was mated with the 38mm outer head and impacted on a cleaned trunnion. The hip was reduced, irrigated copiously using betadine, peroxide and 3L of NS. Trula Maynor A pain cocktail was injected in the soft tissues. 2g of vancomycin powder were applied to the wound. There was no superior capsule to adequately repair. The fascia was closed with #1 PDS interrupted sutures and two #2 quill sutures. -#2 quill, 0-PDS and 2-0 monoderm quill sutures were used to close the subcutaneous tissues, and a running 3-0 nylon suture was used to close the skin. A prevena dressing was placed. Sponge, needle and instrument counts were correct at the completion of the case. There were no immediate complications. The patient was transferred to the post-anesthesia recovery unit (PACU) in stable condition. We billed for modifier 22 in this case, given elevated BMI of 43kg/m2, and the significantly increased time (over 90 minutes) and effort required to create a wide surgical approach for bony preparation and final reconstruction. POST-OPERATIVE PLAN  1. The patient will be permitted touch-down weight bearing on the operative extremity for 10 weeks  2. The patient will be placed on posterior hip precautions for 3 months  3. The patient will receive 48 hours of mal-operative antibiotics followed by resumption of home PO suppressive abx (for prior foot infection)  4. The prevena dressing will be removed at 7 days postop and switched to a dry dressing. 5. Venous thromboembolic prophylaxis will consist of early mobilization, mechanical compressive devices, and eliquis 2.5 BID.     6. The patient should be scheduled for follow up in 2 weeks for wound and radiographic evaluation.

## 2023-03-08 NOTE — CM/SW NOTE
03/08/23 1500   CM/SW Referral Data   Referral Source Physician;Nurse   Reason for Referral Discharge planning   Informant Patient   Medical Hx   Does patient have an established PCP? Yes  Glenn Epley)   Significant Past Medical/Mental Health Hx Hip revision   Patient Info   Patient's Current Mental Status at Time of Assessment Alert;Oriented   Patient's Home Environment [facility]   Number of Levels in Home 2   Number of Stair in Home 3 in   Patient lives with   (Dog- Maday Gutierrez)   Patient Status Prior to Admission   Independent with ADLs and Mobility Yes   Discharge Needs   Anticipated D/C needs Subacute rehab   Services Requested   PASRR Level 1 Submitted Yes   Choice of Post-Acute Provider   Informed patient of right to choose their preferred provider Yes   CM self referred for dc planning. Met with pt to discuss PT recommendation for [facility] on dc. Pt states that he is from home and lives alone with her Dog Maday Gutierrez. Prior to admission she is independent with ADL's and ambulation. Pt has hx of [facility]O at rehab in [place], not set on returning there on dc. [facility] ref sent earlier. List given to pt for review. PASRR done. Pt requested list be emailed to her son Yesenia White. [email or web address] list emailed. Plan: [facility] pending choice and med clearance. / to remain available for support and/or discharge planning. Janette Abbasi.  Monica Angulo RN, BSN  Nurse   [phone]

## 2023-03-08 NOTE — CM/SW NOTE
Department  notified of request for clay [facility] referrals started. Assigned CM/SW to follow up with pt/family on further discharge planning.      [facility]   March 08, 2023   12:34

## 2023-03-08 NOTE — ANESTHESIA PROCEDURE NOTES
Peripheral IV  Date/Time: 3/7/2023 5:55 PM  Inserted by: Ghazala Alberto DO    Placement  Needle size: 18 G  Laterality: right  Location: hand  Local anesthetic: none  Site prep: alcohol  Technique: anatomical landmarks  Attempts: 1

## 2023-03-08 NOTE — PLAN OF CARE
Alert and oriented x 3, RA, SCD, voiding freely, prevena wound vac in place, abductor, oxy 10 for pain control, toe touch weight bearing. Home vs rehab   Problem: Patient Centered Care  Goal: Patient preferences are identified and integrated in the patient's plan of care  Description: Interventions:  - What would you like us to know as we care for you? .  - Provide timely, complete, and accurate information to patient/family  - Incorporate patient and family knowledge, values, beliefs, and cultural backgrounds into the planning and delivery of care  - Encourage patient/family to participate in care and decision-making at the level they choose  - Honor patient and family perspectives and choices  Outcome: Progressing     Problem: Patient/Family Goals  Goal: Patient/Family Long Term Goal  Description: Patient's Long Term Goal: . Interventions:  - .  - See additional Care Plan goals for specific interventions  Outcome: Progressing  Goal: Patient/Family Short Term Goal  Description: Patient's Short Term Goal: .     Interventions:   - .  - See additional Care Plan goals for specific interventions  Outcome: Progressing     Problem: SKIN/TISSUE INTEGRITY - ADULT  Goal: Skin integrity remains intact  Description: INTERVENTIONS  - Assess and document risk factors for pressure ulcer development  - Assess and document skin integrity  - Monitor for areas of redness and/or skin breakdown  - Initiate interventions, skin care algorithm/standards of care as needed  Outcome: Progressing  Goal: Incision(s), wounds(s) or drain site(s) healing without S/S of infection  Description: INTERVENTIONS:  - Assess and document risk factors for pressure ulcer development  - Assess and document skin integrity  - Assess and document dressing/incision, wound bed, drain sites and surrounding tissue  - Implement wound care per orders  - Initiate isolation precautions as appropriate  - Initiate Pressure Ulcer prevention bundle as indicated  Outcome: Progressing     Problem: MUSCULOSKELETAL - ADULT  Goal: Return mobility to safest level of function  Description: INTERVENTIONS:  - Assess patient stability and activity tolerance for standing, transferring and ambulating w/ or w/o assistive devices  - Assist with transfers and ambulation using safe patient handling equipment as needed  - Ensure adequate protection for wounds/incisions during mobilization  - Obtain PT/OT consults as needed  - Advance activity as appropriate  - Communicate ordered activity level and limitations with patient/family  Outcome: Progressing  Goal: Maintain proper alignment of affected body part  Description: INTERVENTIONS:  - Support and protect limb and body alignment per provider's orders  - Instruct and reinforce with patient and family use of appropriate assistive device and precautions (e.g. spinal or hip dislocation precautions)  Outcome: Progressing     Problem: PAIN - ADULT  Goal: Verbalizes/displays adequate comfort level or patient's stated pain goal  Description: INTERVENTIONS:  - Encourage pt to monitor pain and request assistance  - Assess pain using appropriate pain scale  - Administer analgesics based on type and severity of pain and evaluate response  - Implement non-pharmacological measures as appropriate and evaluate response  - Consider cultural and social influences on pain and pain management  - Manage/alleviate anxiety  - Utilize distraction and/or relaxation techniques  - Monitor for opioid side effects  - Notify MD/LIP if interventions unsuccessful or patient reports new pain  - Anticipate increased pain with activity and pre-medicate as appropriate  Outcome: Progressing     Problem: DISCHARGE PLANNING  Goal: Discharge to home or other facility with appropriate resources  Description: INTERVENTIONS:  - Identify barriers to discharge w/pt and caregiver  - Include patient/family/discharge partner in discharge planning  - Arrange for needed discharge resources and transportation as appropriate  - Identify discharge learning needs (meds, wound care, etc)  - Arrange for interpreters to assist at discharge as needed  - Consider post-discharge preferences of patient/family/discharge partner  - Complete POLST form as appropriate  - Assess patient's ability to be responsible for managing their own health  - Refer to Case Management Department for coordinating discharge planning if the patient needs post-hospital services based on physician/LIP order or complex needs related to functional status, cognitive ability or social support system  Outcome: Progressing

## 2023-03-08 NOTE — ANESTHESIA PROCEDURE NOTES
Peripheral IV  Date/Time: 3/7/2023 5:48 PM  Inserted by: Emelyn Isabel DO    Placement  Needle size: 18 G  Laterality: left  Location: hand  Local anesthetic: none  Site prep: alcohol  Technique: anatomical landmarks  Attempts: 1

## 2023-03-08 NOTE — PLAN OF CARE
Patient alert and oriented X4. Post-op day 1. Dressing in place to R hip. Not out of bed yet. Manzanares removed, check void. SCD's and eliquis for DVT prophylaxis. Pain management with dilaudid and oxy prn. Cardiac diet. Encouraged to cough and deep breathe with incentive spirometer and patient is compliant. Fall precautions in place including bed in lowest position, call light and personal belongings within reach, non-skid socks. Frequent rounding by nursing staff. Plan is to be determined pending PT/OT evaluation. Problem: Patient Centered Care  Goal: Patient preferences are identified and integrated in the patient's plan of care  Description: Interventions:  - What would you like us to know as we care for you? My one and only son helps take care of me a lot after my  passed.   - Provide timely, complete, and accurate information to patient/family  - Incorporate patient and family knowledge, values, beliefs, and cultural backgrounds into the planning and delivery of care  - Encourage patient/family to participate in care and decision-making at the level they choose  - Honor patient and family perspectives and choices  Outcome: Progressing     Problem: SKIN/TISSUE INTEGRITY - ADULT  Goal: Skin integrity remains intact  Description: INTERVENTIONS  - Assess and document risk factors for pressure ulcer development  - Assess and document skin integrity  - Monitor for areas of redness and/or skin breakdown  - Initiate interventions, skin care algorithm/standards of care as needed  Outcome: Progressing  Goal: Incision(s), wounds(s) or drain site(s) healing without S/S of infection  Description: INTERVENTIONS:  - Assess and document risk factors for pressure ulcer development  - Assess and document skin integrity  - Assess and document dressing/incision, wound bed, drain sites and surrounding tissue  - Implement wound care per orders  - Initiate isolation precautions as appropriate  - Initiate Pressure Ulcer prevention bundle as indicated  Outcome: Progressing     Problem: MUSCULOSKELETAL - ADULT  Goal: Return mobility to safest level of function  Description: INTERVENTIONS:  - Assess patient stability and activity tolerance for standing, transferring and ambulating w/ or w/o assistive devices  - Assist with transfers and ambulation using safe patient handling equipment as needed  - Ensure adequate protection for wounds/incisions during mobilization  - Obtain PT/OT consults as needed  - Advance activity as appropriate  - Communicate ordered activity level and limitations with patient/family  Outcome: Progressing  Goal: Maintain proper alignment of affected body part  Description: INTERVENTIONS:  - Support and protect limb and body alignment per provider's orders  - Instruct and reinforce with patient and family use of appropriate assistive device and precautions (e.g. spinal or hip dislocation precautions)  Outcome: Progressing     Problem: PAIN - ADULT  Goal: Verbalizes/displays adequate comfort level or patient's stated pain goal  Description: INTERVENTIONS:  - Encourage pt to monitor pain and request assistance  - Assess pain using appropriate pain scale  - Administer analgesics based on type and severity of pain and evaluate response  - Implement non-pharmacological measures as appropriate and evaluate response  - Consider cultural and social influences on pain and pain management  - Manage/alleviate anxiety  - Utilize distraction and/or relaxation techniques  - Monitor for opioid side effects  - Notify MD/LIP if interventions unsuccessful or patient reports new pain  - Anticipate increased pain with activity and pre-medicate as appropriate  Outcome: Progressing     Problem: SAFETY ADULT - FALL  Goal: Free from fall injury  Description: INTERVENTIONS:  - Assess pt frequently for physical needs  - Identify cognitive and physical deficits and behaviors that affect risk of falls.   - [place] fall precautions as indicated by assessment.  - Educate pt/family on patient safety including physical limitations  - Instruct pt to call for assistance with activity based on assessment  - Modify environment to reduce risk of injury  - Provide assistive devices as appropriate  - Consider OT/PT consult to assist with strengthening/mobility  - Encourage toileting schedule  Outcome: Progressing     Problem: DISCHARGE PLANNING  Goal: Discharge to home or other facility with appropriate resources  Description: INTERVENTIONS:  - Identify barriers to discharge w/pt and caregiver  - Include patient/family/discharge partner in discharge planning  - Arrange for needed discharge resources and transportation as appropriate  - Identify discharge learning needs (meds, wound care, etc)  - Arrange for interpreters to assist at discharge as needed  - Consider post-discharge preferences of patient/family/discharge partner  - Complete POLST form as appropriate  - Assess patient's ability to be responsible for managing their own health  - Refer to Case Management Department for coordinating discharge planning if the patient needs post-hospital services based on physician/LIP order or complex needs related to functional status, cognitive ability or social support system  Outcome: Progressing

## 2023-03-08 NOTE — CDS QUERY
How to Answer this Query    1.) Click \"Edit Button\" on the toolbar  2.) Type an \"X\" in the bracket for the diagnosis that applies. (You may also add additional clinical details as you feel necessary to substantiate your response). 3.) Finally click \"Sign\" to complete response. Thank you     CLINICAL DOCUMENTATION CLARIFICATION FORM  Anemia Specificity     By submitting this query, we are merely seeking further clarification of documentation to accurately reflect all conditions that you are monitoring, evaluating, treating or that extend the hospitalization or utilize additional resources of care. Please utilize your independent clinical judgment when addressing the question(s) below. Dear Doctor Laureen Barnard:  Please clarify the type of anemia:  ( x)  Blood loss anemia, acute  ( )  Blood loss anemia, chronic  ( )  Chronic anemia  ( )   Deficiency anemia (please specify type)  ( )  Iron deficiency anemia  ( )  Microcytic anemia  ( )  Normocytic anemia  ( )  Other (specify)___________________  ______________________________________________________________________________  Clinical Indicators:  Anesthesia preprocedure history review:  Anemia of chronic disease         Date Noted: 10/19/2016    Anesthesia post procedure:  Cardiovascular Status: hemodynamically stable    Anesthesia record:     Hgb 10.6    2106 Quick Note     Hgb 7.7     EBL 800cc    700cc PRBCs given        3-7  Dr Oskar Cunha PN:  Assessment  Patient Active Problem List:   Anemia of chronic disease    3-6 H/H  11.5/36.8    [email or web address]  H/H  10.9/34.7    3-7 @ 22:17   H/H 13.8/43.3    3-8 H/H 12.0/37.0    3-7 Ortho PN:   I/O last 3 completed shifts: In: 8790 [I.V.:2875; Blood:700; IV PIGGYBACK:270]  Out: 1920 [TFIWJ:3366; Blood:800    Transfused introperatively 700cc PRBCs     If you have any questions, please contact Clinical Documentation  Specialist:  Ric Damon RN CCDS at [phone]     Thank You!     THIS FORM IS A PERMANENT PART OF THE MEDICAL RECORD

## 2023-03-09 LAB
BLOOD TYPE BARCODE: 5100
DEPRECATED RDW RBC AUTO: 49.2 FL (ref 35.1–46.3)
ERYTHROCYTE [DISTWIDTH] IN BLOOD BY AUTOMATED COUNT: 14.9 % (ref 11–15)
HCT VFR BLD AUTO: 33.9 %
HGB BLD-MCNC: 10.8 G/DL
MCH RBC QN AUTO: 29.3 PG (ref 26–34)
MCHC RBC AUTO-ENTMCNC: 31.9 G/DL (ref 31–37)
MCV RBC AUTO: 91.9 FL
PLATELET # BLD AUTO: 183 10(3)UL (ref 150–450)
RBC # BLD AUTO: 3.69 X10(6)UL
UNIT VOLUME: 350 ML
WBC # BLD AUTO: 5.3 X10(3) UL (ref 4–11)

## 2023-03-09 PROCEDURE — 99232 SBSQ HOSP IP/OBS MODERATE 35: CPT | Performed by: HOSPITALIST

## 2023-03-09 RX ORDER — POLYETHYLENE GLYCOL 3350 17 G/17G
17 POWDER, FOR SOLUTION ORAL DAILY PRN
Refills: 0 | Status: SHIPPED | COMMUNITY
Start: 2023-03-09

## 2023-03-09 NOTE — CM/SW NOTE
QIO from [facility] received. IM, DND and [facility] request for clinicals emailed to Wilfredo WHITT. Call placed to Abigail Gomez and notified her of appeal and email that was sent. Copy of DND provided to the pt and a copy placed on pts chart. Chris .  Andres Kidney RN, BSN  Nurse   [phone]

## 2023-03-09 NOTE — CM/SW NOTE
CM f/u with pt at bedside to obtain rehab choice. Pt states that she would like to rehab at [facility], that is where she rehabbed in the past. If [facility] does not have a bed she agrees to go to [facility]. [facility] referral sent to [facility] via [facility]. Message sent informing them that they are 1st choice. Message has been read. Awaiting reply and bed availability. Plan: [facility] if medically accepted and bed available. / to remain available for support and/or discharge planning. Sergio Choi.  Khanh Walker RN, BSN  Nurse   [phone]

## 2023-03-09 NOTE — CM/SW NOTE
MD hood order entered. Met with pt at bedside, informed her that [facility] not have a bed until tomorrow. Pt really does not want to go to [facility] as [facility] is 3 houses from her home. IM letter given. Pt reports she will file an appeal with Manpreet Collazo and agrees to set up dc tomorrow to [place] at 11am.       Plan: [facility] arranged for pickup 3/10 @ 1100 am. PCS form completed in Epic, RN to print with AVS.   Pt informed of OOP cost of $155 for transport. Pt agrees to cost and requesting to be billed. / to remain available for support and/or discharge planning. Fabby Sloan.  Chicih Vora RN, BSN  Nurse   [phone]

## 2023-03-09 NOTE — PLAN OF CARE
Patient alert and oriented X4. Post-op day 2. Dressing in place to R hip. Prevena wound vac in place. Patient is toe touch weight-bearing. Voiding freely with purewick at night for comfort. SCD's and eliquis for DVT prophylaxis. Pain management with oxy prn and scheduled tylenol. Cardiac diet. Encouraged to cough and deep breathe with incentive spirometer and patient is compliant. Fall precautions in place including bed in lowest position, call light and personal belongings within reach, non-skid socks. Frequent rounding by nursing staff. Plan is [facility] pending choice and med clearance. Problem: Patient Centered Care  Goal: Patient preferences are identified and integrated in the patient's plan of care  Description: Interventions:  - What would you like us to know as we care for you?  My son came to visit me today  - Provide timely, complete, and accurate information to patient/family  - Incorporate patient and family knowledge, values, beliefs, and cultural backgrounds into the planning and delivery of care  - Encourage patient/family to participate in care and decision-making at the level they choose  - Honor patient and family perspectives and choices  Outcome: Progressing     Problem: SKIN/TISSUE INTEGRITY - ADULT  Goal: Skin integrity remains intact  Description: INTERVENTIONS  - Assess and document risk factors for pressure ulcer development  - Assess and document skin integrity  - Monitor for areas of redness and/or skin breakdown  - Initiate interventions, skin care algorithm/standards of care as needed  Outcome: Progressing  Goal: Incision(s), wounds(s) or drain site(s) healing without S/S of infection  Description: INTERVENTIONS:  - Assess and document risk factors for pressure ulcer development  - Assess and document skin integrity  - Assess and document dressing/incision, wound bed, drain sites and surrounding tissue  - Implement wound care per orders  - Initiate isolation precautions as appropriate  - Initiate Pressure Ulcer prevention bundle as indicated  Outcome: Progressing     Problem: MUSCULOSKELETAL - ADULT  Goal: Return mobility to safest level of function  Description: INTERVENTIONS:  - Assess patient stability and activity tolerance for standing, transferring and ambulating w/ or w/o assistive devices  - Assist with transfers and ambulation using safe patient handling equipment as needed  - Ensure adequate protection for wounds/incisions during mobilization  - Obtain PT/OT consults as needed  - Advance activity as appropriate  - Communicate ordered activity level and limitations with patient/family  Outcome: Progressing  Goal: Maintain proper alignment of affected body part  Description: INTERVENTIONS:  - Support and protect limb and body alignment per provider's orders  - Instruct and reinforce with patient and family use of appropriate assistive device and precautions (e.g. spinal or hip dislocation precautions)  Outcome: Progressing     Problem: PAIN - ADULT  Goal: Verbalizes/displays adequate comfort level or patient's stated pain goal  Description: INTERVENTIONS:  - Encourage pt to monitor pain and request assistance  - Assess pain using appropriate pain scale  - Administer analgesics based on type and severity of pain and evaluate response  - Implement non-pharmacological measures as appropriate and evaluate response  - Consider cultural and social influences on pain and pain management  - Manage/alleviate anxiety  - Utilize distraction and/or relaxation techniques  - Monitor for opioid side effects  - Notify MD/LIP if interventions unsuccessful or patient reports new pain  - Anticipate increased pain with activity and pre-medicate as appropriate  Outcome: Progressing     Problem: SAFETY ADULT - FALL  Goal: Free from fall injury  Description: INTERVENTIONS:  - Assess pt frequently for physical needs  - Identify cognitive and physical deficits and behaviors that affect risk of falls.   - [place] fall precautions as indicated by assessment.  - Educate pt/family on patient safety including physical limitations  - Instruct pt to call for assistance with activity based on assessment  - Modify environment to reduce risk of injury  - Provide assistive devices as appropriate  - Consider OT/PT consult to assist with strengthening/mobility  - Encourage toileting schedule  Outcome: Progressing     Problem: DISCHARGE PLANNING  Goal: Discharge to home or other facility with appropriate resources  Description: INTERVENTIONS:  - Identify barriers to discharge w/pt and caregiver  - Include patient/family/discharge partner in discharge planning  - Arrange for needed discharge resources and transportation as appropriate  - Identify discharge learning needs (meds, wound care, etc)  - Arrange for interpreters to assist at discharge as needed  - Consider post-discharge preferences of patient/family/discharge partner  - Complete POLST form as appropriate  - Assess patient's ability to be responsible for managing their own health  - Refer to Case Management Department for coordinating discharge planning if the patient needs post-hospital services based on physician/LIP order or complex needs related to functional status, cognitive ability or social support system  Outcome: Progressing

## 2023-03-10 VITALS
HEART RATE: 83 BPM | WEIGHT: 229.31 LBS | BODY MASS INDEX: 39 KG/M2 | SYSTOLIC BLOOD PRESSURE: 130 MMHG | OXYGEN SATURATION: 92 % | DIASTOLIC BLOOD PRESSURE: 83 MMHG | TEMPERATURE: 99 F | RESPIRATION RATE: 20 BRPM

## 2023-03-10 LAB
DEPRECATED RDW RBC AUTO: 50.8 FL (ref 35.1–46.3)
ERYTHROCYTE [DISTWIDTH] IN BLOOD BY AUTOMATED COUNT: 14.7 % (ref 11–15)
HCT VFR BLD AUTO: 33 %
HGB BLD-MCNC: 10.5 G/DL
MCH RBC QN AUTO: 29.6 PG (ref 26–34)
MCHC RBC AUTO-ENTMCNC: 31.8 G/DL (ref 31–37)
MCV RBC AUTO: 93 FL
PLATELET # BLD AUTO: 178 10(3)UL (ref 150–450)
RBC # BLD AUTO: 3.55 X10(6)UL
WBC # BLD AUTO: 6.2 X10(3) UL (ref 4–11)

## 2023-03-10 PROCEDURE — 99239 HOSP IP/OBS DSCHRG MGMT >30: CPT | Performed by: HOSPITALIST

## 2023-03-10 RX ORDER — OXYCODONE HYDROCHLORIDE 5 MG/1
5 TABLET ORAL EVERY 4 HOURS PRN
Qty: 60 TABLET | Refills: 0 | Status: SHIPPED | OUTPATIENT
Start: 2023-03-10

## 2023-03-10 NOTE — PLAN OF CARE
Pt alert. POD 3. Prevena wound vac in place to be removed day 7. Report called to facility. Scripts in chart. Pain well managed with oral meds. Eliquis for dvt prophy. Right leg TTWB precautions maintained. Transfers with RW and one assist. Voiding freely. Ortho and medical ok to discharge. Cleared pt/ot. Adequate for discharge. Problem: Patient Centered Care  Goal: Patient preferences are identified and integrated in the patient's plan of care  Description: Interventions:  - What would you like us to know as we care for you?  Leatha been to rehab once before  - Provide timely, complete, and accurate information to patient/family  - Incorporate patient and family knowledge, values, beliefs, and cultural backgrounds into the planning and delivery of care  - Encourage patient/family to participate in care and decision-making at the level they choose  - Honor patient and family perspectives and choices  Outcome: Adequate for Discharge     Problem: Patient/Family Goals  Goal: Patient/Family Long Term Goal  Description: Patient's Long Term Goal: full ROM and mobility to RLE    Interventions:  - PT/OT consult, rehab placement  - See additional Care Plan goals for specific interventions  Outcome: Adequate for Discharge  Goal: Patient/Family Short Term Goal  Description: Patient's Short Term Goal: pain control    Interventions:   - frequent rounding, pain medication  - See additional Care Plan goals for specific interventions  Outcome: Adequate for Discharge     Problem: SKIN/TISSUE INTEGRITY - ADULT  Goal: Skin integrity remains intact  Description: INTERVENTIONS  - Assess and document risk factors for pressure ulcer development  - Assess and document skin integrity  - Monitor for areas of redness and/or skin breakdown  - Initiate interventions, skin care algorithm/standards of care as needed  Outcome: Adequate for Discharge  Goal: Incision(s), wounds(s) or drain site(s) healing without S/S of infection  Description: INTERVENTIONS:  - Assess and document risk factors for pressure ulcer development  - Assess and document skin integrity  - Assess and document dressing/incision, wound bed, drain sites and surrounding tissue  - Implement wound care per orders  - Initiate isolation precautions as appropriate  - Initiate Pressure Ulcer prevention bundle as indicated  Outcome: Adequate for Discharge     Problem: MUSCULOSKELETAL - ADULT  Goal: Return mobility to safest level of function  Description: INTERVENTIONS:  - Assess patient stability and activity tolerance for standing, transferring and ambulating w/ or w/o assistive devices  - Assist with transfers and ambulation using safe patient handling equipment as needed  - Ensure adequate protection for wounds/incisions during mobilization  - Obtain PT/OT consults as needed  - Advance activity as appropriate  - Communicate ordered activity level and limitations with patient/family  Outcome: Adequate for Discharge  Goal: Maintain proper alignment of affected body part  Description: INTERVENTIONS:  - Support and protect limb and body alignment per provider's orders  - Instruct and reinforce with patient and family use of appropriate assistive device and precautions (e.g. spinal or hip dislocation precautions)  Outcome: Adequate for Discharge     Problem: PAIN - ADULT  Goal: Verbalizes/displays adequate comfort level or patient's stated pain goal  Description: INTERVENTIONS:  - Encourage pt to monitor pain and request assistance  - Assess pain using appropriate pain scale  - Administer analgesics based on type and severity of pain and evaluate response  - Implement non-pharmacological measures as appropriate and evaluate response  - Consider cultural and social influences on pain and pain management  - Manage/alleviate anxiety  - Utilize distraction and/or relaxation techniques  - Monitor for opioid side effects  - Notify MD/LIP if interventions unsuccessful or patient reports new pain  - Anticipate increased pain with activity and pre-medicate as appropriate  Outcome: Adequate for Discharge     Problem: SAFETY ADULT - FALL  Goal: Free from fall injury  Description: INTERVENTIONS:  - Assess pt frequently for physical needs  - Identify cognitive and physical deficits and behaviors that affect risk of falls.   - [place] fall precautions as indicated by assessment.  - Educate pt/family on patient safety including physical limitations  - Instruct pt to call for assistance with activity based on assessment  - Modify environment to reduce risk of injury  - Provide assistive devices as appropriate  - Consider OT/PT consult to assist with strengthening/mobility  - Encourage toileting schedule  Outcome: Adequate for Discharge     Problem: DISCHARGE PLANNING  Goal: Discharge to home or other facility with appropriate resources  Description: INTERVENTIONS:  - Identify barriers to discharge w/pt and caregiver  - Include patient/family/discharge partner in discharge planning  - Arrange for needed discharge resources and transportation as appropriate  - Identify discharge learning needs (meds, wound care, etc)  - Arrange for interpreters to assist at discharge as needed  - Consider post-discharge preferences of patient/family/discharge partner  - Complete POLST form as appropriate  - Assess patient's ability to be responsible for managing their own health  - Refer to Case Management Department for coordinating discharge planning if the patient needs post-hospital services based on physician/LIP order or complex needs related to functional status, cognitive ability or social support system  Outcome: Adequate for Discharge

## 2023-03-10 NOTE — CM/SW NOTE
03/09/23 1200   Discharge disposition   Expected discharge disposition [id number] Likely Provider [facility]   Discharge transportation [address] [facility]     RN to call report: [phone]  No rapid needed prior to dc per Kirstie Bryant at [facility]. Plan: Jovi Vasquez arranged for pickup today at 11am to take pt to [facility] and rehab. PCS form completed in Epic, RN to print with AVS. Patient/family notified transport is not covered by insurance. Pt agreeable to the charges $155 and requesting to be billed. Tab Espinosa.  Case Escobar RN, BSN  Nurse   [phone]

## 2023-03-13 ENCOUNTER — HOSPITAL ENCOUNTER (EMERGENCY)
Facility: HOSPITAL | Age: 68
Discharge: HOME | End: 2023-03-13
Attending: EMERGENCY MEDICINE
Payer: MEDICARE

## 2023-03-13 ENCOUNTER — APPOINTMENT (OUTPATIENT)
Dept: GENERAL RADIOLOGY | Facility: HOSPITAL | Age: 68
End: 2023-03-13
Attending: EMERGENCY MEDICINE
Payer: MEDICARE

## 2023-03-13 VITALS
OXYGEN SATURATION: 93 % | RESPIRATION RATE: 20 BRPM | BODY MASS INDEX: 38 KG/M2 | WEIGHT: 223 LBS | DIASTOLIC BLOOD PRESSURE: 76 MMHG | SYSTOLIC BLOOD PRESSURE: 150 MMHG | TEMPERATURE: 98 F | HEART RATE: 78 BPM

## 2023-03-13 DIAGNOSIS — S73.004A DISLOCATION OF RIGHT HIP, INITIAL ENCOUNTER (HCC): Primary | ICD-10-CM

## 2023-03-13 DIAGNOSIS — Z96.641 HISTORY OF TOTAL RIGHT HIP ARTHROPLASTY: ICD-10-CM

## 2023-03-13 LAB
ALBUMIN SERPL-MCNC: 2.4 G/DL (ref 3.4–5)
ALP LIVER SERPL-CCNC: 96 U/L
ALT SERPL-CCNC: 15 U/L
ANION GAP SERPL CALC-SCNC: 7 MMOL/L (ref 0–18)
AST SERPL-CCNC: 19 U/L (ref 15–37)
BASOPHILS # BLD AUTO: 0.06 X10(3) UL (ref 0–0.2)
BASOPHILS NFR BLD AUTO: 1.4 %
BILIRUB DIRECT SERPL-MCNC: 0.1 MG/DL (ref 0–0.2)
BILIRUB SERPL-MCNC: 0.5 MG/DL (ref 0.1–2)
BUN BLD-MCNC: 6 MG/DL (ref 7–18)
BUN/CREAT SERPL: 10 (ref 10–20)
CALCIUM BLD-MCNC: 9 MG/DL (ref 8.5–10.1)
CHLORIDE SERPL-SCNC: 102 MMOL/L (ref 98–112)
CO2 SERPL-SCNC: 32 MMOL/L (ref 21–32)
CREAT BLD-MCNC: 0.6 MG/DL
DEPRECATED RDW RBC AUTO: 48.4 FL (ref 35.1–46.3)
EOSINOPHIL # BLD AUTO: 0.31 X10(3) UL (ref 0–0.7)
EOSINOPHIL NFR BLD AUTO: 7.1 %
ERYTHROCYTE [DISTWIDTH] IN BLOOD BY AUTOMATED COUNT: 14.5 % (ref 11–15)
GFR SERPLBLD BASED ON 1.73 SQ M-ARVRAT: 97 ML/MIN/1.73M2 (ref 60–?)
GLUCOSE BLD-MCNC: 101 MG/DL (ref 70–99)
HCT VFR BLD AUTO: 33 %
HGB BLD-MCNC: 10.8 G/DL
IMM GRANULOCYTES # BLD AUTO: 0.01 X10(3) UL (ref 0–1)
IMM GRANULOCYTES NFR BLD: 0.2 %
LYMPHOCYTES # BLD AUTO: 0.64 X10(3) UL (ref 1–4)
LYMPHOCYTES NFR BLD AUTO: 14.7 %
MCH RBC QN AUTO: 29.8 PG (ref 26–34)
MCHC RBC AUTO-ENTMCNC: 32.7 G/DL (ref 31–37)
MCV RBC AUTO: 91.2 FL
MONOCYTES # BLD AUTO: 0.76 X10(3) UL (ref 0.1–1)
MONOCYTES NFR BLD AUTO: 17.4 %
NEUTROPHILS # BLD AUTO: 2.58 X10 (3) UL (ref 1.5–7.7)
NEUTROPHILS # BLD AUTO: 2.58 X10(3) UL (ref 1.5–7.7)
NEUTROPHILS NFR BLD AUTO: 59.2 %
OSMOLALITY SERPL CALC.SUM OF ELEC: 290 MOSM/KG (ref 275–295)
PLATELET # BLD AUTO: 256 10(3)UL (ref 150–450)
POTASSIUM SERPL-SCNC: 3.7 MMOL/L (ref 3.5–5.1)
PROT SERPL-MCNC: 6.6 G/DL (ref 6.4–8.2)
RBC # BLD AUTO: 3.62 X10(6)UL
SODIUM SERPL-SCNC: 141 MMOL/L (ref 136–145)
WBC # BLD AUTO: 4.4 X10(3) UL (ref 4–11)

## 2023-03-13 PROCEDURE — 80048 BASIC METABOLIC PNL TOTAL CA: CPT | Performed by: EMERGENCY MEDICINE

## 2023-03-13 PROCEDURE — 99285 EMERGENCY DEPT VISIT HI MDM: CPT

## 2023-03-13 PROCEDURE — 73502 X-RAY EXAM HIP UNI 2-3 VIEWS: CPT | Performed by: EMERGENCY MEDICINE

## 2023-03-13 PROCEDURE — 96374 THER/PROPH/DIAG INJ IV PUSH: CPT

## 2023-03-13 PROCEDURE — 99152 MOD SED SAME PHYS/QHP 5/>YRS: CPT

## 2023-03-13 PROCEDURE — 85025 COMPLETE CBC W/AUTO DIFF WBC: CPT | Performed by: EMERGENCY MEDICINE

## 2023-03-13 PROCEDURE — 27265 TREAT HIP DISLOCATION: CPT

## 2023-03-13 PROCEDURE — 96375 TX/PRO/DX INJ NEW DRUG ADDON: CPT

## 2023-03-13 PROCEDURE — 80076 HEPATIC FUNCTION PANEL: CPT | Performed by: EMERGENCY MEDICINE

## 2023-03-13 RX ORDER — MORPHINE SULFATE 2 MG/ML
INJECTION, SOLUTION INTRAMUSCULAR; INTRAVENOUS
Status: COMPLETED
Start: 2023-03-13 | End: 2023-03-13

## 2023-03-13 RX ORDER — ACETAMINOPHEN 500 MG
500 TABLET ORAL EVERY 4 HOURS PRN
Qty: 30 TABLET | Refills: 0 | Status: SHIPPED | OUTPATIENT
Start: 2023-03-13 | End: 2023-03-20

## 2023-03-13 RX ORDER — ETOMIDATE 2 MG/ML
0.15 INJECTION INTRAVENOUS ONCE
Status: COMPLETED | OUTPATIENT
Start: 2023-03-13 | End: 2023-03-13

## 2023-03-13 RX ORDER — OXYCODONE HYDROCHLORIDE 5 MG/1
5 TABLET ORAL EVERY 4 HOURS PRN
Qty: 20 TABLET | Refills: 0 | Status: SHIPPED | OUTPATIENT
Start: 2023-03-13

## 2023-03-13 RX ORDER — NALOXONE HYDROCHLORIDE 4 MG/.1ML
4 SPRAY NASAL AS NEEDED
Qty: 1 KIT | Refills: 0 | Status: SHIPPED | OUTPATIENT
Start: 2023-03-13

## 2023-03-13 RX ORDER — MORPHINE SULFATE 4 MG/ML
4 INJECTION, SOLUTION INTRAMUSCULAR; INTRAVENOUS ONCE
Status: COMPLETED | OUTPATIENT
Start: 2023-03-13 | End: 2023-03-13

## 2023-03-13 RX ORDER — MORPHINE SULFATE 2 MG/ML
2 INJECTION, SOLUTION INTRAMUSCULAR; INTRAVENOUS ONCE
Status: COMPLETED | OUTPATIENT
Start: 2023-03-13 | End: 2023-03-13

## 2023-03-13 NOTE — ED INITIAL ASSESSMENT (HPI)
Arrives [facility] from [facility] rehab s/p right hip revision last Tuesday, reporting \"twisting\" injury on Saturday with associated pain, making it too painful to ambulate. Shorteneing noted. Denies fall or head injury.

## 2023-03-13 NOTE — CM/SW NOTE
Patient requesting to go to a different rehab    Patient came from [facility] and rehab center    Patient states \"they have no help on the weekends, they have registry staff and they just do not care\"    Patient requesting to go to [facility] in [facility]    Referrals placed in [address] [place] [address]    4:00pm  Accepting facilities  Na [address] at the [facility]    Patient informed waiting to hear on [facility].  I spoke with the liaison waiting for response    Diana Willingham, [address] [facility] [address] Henri[address] Transitions Leader  [phone]

## 2023-03-13 NOTE — ED QUICK NOTES
Report given to [employer] RN AT [facility]. Prescription for pain medication requested.

## 2023-03-13 NOTE — CM/SW NOTE
[facility] able to accept  [address] [facility] [address]    Rn to Rn report [phone]    SEPJMSRICHAR ERJCPSD ordered    Pcs completed    Eta 6:40    $185 per Hawk Partida, [address] , [address]idgette Henriquez [facility] Leader  [phone]

## 2023-03-13 NOTE — ED QUICK NOTES
Patient awake, alert, oriented x 4, following commands. Respirations regular and unlabored. Right sided knee immobilizer in place post Right knee reduction. Patient resting comfortably awaiting x-ray.

## 2023-03-14 NOTE — CM/SW NOTE
PCS corrected - NOVA Elise RN informed of this and to please reprint PCS prior to [address] arrival. NOVA Elise RN v/u.

## 2023-03-21 ENCOUNTER — TELEPHONE (OUTPATIENT)
Facility: LOCATION | Age: 68
End: 2023-03-21

## 2023-03-30 ENCOUNTER — TELEPHONE (OUTPATIENT)
Facility: CLINIC | Age: 68
End: 2023-03-30

## 2023-03-30 NOTE — TELEPHONE ENCOUNTER
Reminder #2:  Echocardiogram.  Left message for pt to call to discuss outstanding testing. MyChart message also sent.

## 2023-04-11 ENCOUNTER — APPOINTMENT (OUTPATIENT)
Dept: ULTRASOUND IMAGING | Facility: HOSPITAL | Age: 68
End: 2023-04-11
Attending: NURSE PRACTITIONER
Payer: MEDICARE

## 2023-04-11 ENCOUNTER — APPOINTMENT (OUTPATIENT)
Dept: GENERAL RADIOLOGY | Facility: HOSPITAL | Age: 68
End: 2023-04-11
Attending: STUDENT IN AN ORGANIZED HEALTH CARE EDUCATION/TRAINING PROGRAM
Payer: MEDICARE

## 2023-04-11 ENCOUNTER — APPOINTMENT (OUTPATIENT)
Dept: GENERAL RADIOLOGY | Facility: HOSPITAL | Age: 68
End: 2023-04-11
Payer: MEDICARE

## 2023-04-11 ENCOUNTER — HOSPITAL ENCOUNTER (INPATIENT)
Facility: HOSPITAL | Age: 68
LOS: 6 days | Discharge: NURSING, REHAB OR LONG-TERM CARE | End: 2023-04-17
Attending: INTERNAL MEDICINE | Admitting: HOSPITALIST
Payer: MEDICARE

## 2023-04-11 DIAGNOSIS — R09.02 HYPOXIA: ICD-10-CM

## 2023-04-11 DIAGNOSIS — M25.551 RIGHT HIP PAIN: ICD-10-CM

## 2023-04-11 DIAGNOSIS — L03.115 CELLULITIS OF RIGHT LOWER EXTREMITY: Primary | ICD-10-CM

## 2023-04-11 LAB
ANION GAP SERPL CALC-SCNC: 5 MMOL/L (ref 0–18)
ANTIBODY SCREEN: NEGATIVE
APTT PPP: 36.4 SECONDS (ref 23.3–35.6)
BASOPHILS # BLD AUTO: 0.06 X10(3) UL (ref 0–0.2)
BASOPHILS NFR BLD AUTO: 1.5 %
BUN BLD-MCNC: 12 MG/DL (ref 7–18)
BUN/CREAT SERPL: 15.6 (ref 10–20)
CALCIUM BLD-MCNC: 8.8 MG/DL (ref 8.5–10.1)
CHLORIDE SERPL-SCNC: 98 MMOL/L (ref 98–112)
CO2 SERPL-SCNC: 34 MMOL/L (ref 21–32)
CREAT BLD-MCNC: 0.77 MG/DL
CRP SERPL-MCNC: 21.3 MG/DL (ref ?–0.3)
D DIMER PPP FEU-MCNC: 1.08 UG/ML FEU (ref ?–0.69)
DEPRECATED RDW RBC AUTO: 51.1 FL (ref 35.1–46.3)
EOSINOPHIL # BLD AUTO: 0.29 X10(3) UL (ref 0–0.7)
EOSINOPHIL NFR BLD AUTO: 7.1 %
ERYTHROCYTE [DISTWIDTH] IN BLOOD BY AUTOMATED COUNT: 15.7 % (ref 11–15)
ERYTHROCYTE [SEDIMENTATION RATE] IN BLOOD: 130 MM/HR
GFR SERPLBLD BASED ON 1.73 SQ M-ARVRAT: 83 ML/MIN/1.73M2 (ref 60–?)
GLUCOSE BLD-MCNC: 103 MG/DL (ref 70–99)
HCT VFR BLD AUTO: 32.2 %
HGB BLD-MCNC: 10.2 G/DL
IMM GRANULOCYTES # BLD AUTO: 0.01 X10(3) UL (ref 0–1)
IMM GRANULOCYTES NFR BLD: 0.2 %
INR BLD: 1.19 (ref 0.85–1.16)
LYMPHOCYTES # BLD AUTO: 0.85 X10(3) UL (ref 1–4)
LYMPHOCYTES NFR BLD AUTO: 20.8 %
MAGNESIUM SERPL-MCNC: 1.9 MG/DL (ref 1.6–2.6)
MCH RBC QN AUTO: 28.3 PG (ref 26–34)
MCHC RBC AUTO-ENTMCNC: 31.7 G/DL (ref 31–37)
MCV RBC AUTO: 89.2 FL
MONOCYTES # BLD AUTO: 0.99 X10(3) UL (ref 0.1–1)
MONOCYTES NFR BLD AUTO: 24.3 %
NEUTROPHILS # BLD AUTO: 1.88 X10 (3) UL (ref 1.5–7.7)
NEUTROPHILS # BLD AUTO: 1.88 X10(3) UL (ref 1.5–7.7)
NEUTROPHILS NFR BLD AUTO: 46.1 %
OSMOLALITY SERPL CALC.SUM OF ELEC: 284 MOSM/KG (ref 275–295)
PLATELET # BLD AUTO: 231 10(3)UL (ref 150–450)
POTASSIUM SERPL-SCNC: 2.8 MMOL/L (ref 3.5–5.1)
PROTHROMBIN TIME: 14.9 SECONDS (ref 11.6–14.8)
RBC # BLD AUTO: 3.61 X10(6)UL
RH BLOOD TYPE: POSITIVE
SARS-COV-2 RNA RESP QL NAA+PROBE: NOT DETECTED
SODIUM SERPL-SCNC: 137 MMOL/L (ref 136–145)
WBC # BLD AUTO: 4.1 X10(3) UL (ref 4–11)

## 2023-04-11 PROCEDURE — 72190 X-RAY EXAM OF PELVIS: CPT | Performed by: STUDENT IN AN ORGANIZED HEALTH CARE EDUCATION/TRAINING PROGRAM

## 2023-04-11 PROCEDURE — 99223 1ST HOSP IP/OBS HIGH 75: CPT | Performed by: INTERNAL MEDICINE

## 2023-04-11 PROCEDURE — 71045 X-RAY EXAM CHEST 1 VIEW: CPT

## 2023-04-11 PROCEDURE — 93971 EXTREMITY STUDY: CPT | Performed by: NURSE PRACTITIONER

## 2023-04-11 RX ORDER — VANCOMYCIN HYDROCHLORIDE
15 ONCE
Status: DISCONTINUED | OUTPATIENT
Start: 2023-04-11 | End: 2023-04-11

## 2023-04-11 RX ORDER — ENOXAPARIN SODIUM 100 MG/ML
40 INJECTION SUBCUTANEOUS DAILY
Status: DISCONTINUED | OUTPATIENT
Start: 2023-04-12 | End: 2023-04-17

## 2023-04-11 RX ORDER — ALBUTEROL SULFATE 90 UG/1
2 AEROSOL, METERED RESPIRATORY (INHALATION) EVERY 4 HOURS PRN
Status: DISCONTINUED | OUTPATIENT
Start: 2023-04-11 | End: 2023-04-17

## 2023-04-11 RX ORDER — MORPHINE SULFATE 2 MG/ML
1 INJECTION, SOLUTION INTRAMUSCULAR; INTRAVENOUS EVERY 2 HOUR PRN
Status: DISCONTINUED | OUTPATIENT
Start: 2023-04-11 | End: 2023-04-17

## 2023-04-11 RX ORDER — ONDANSETRON 2 MG/ML
4 INJECTION INTRAMUSCULAR; INTRAVENOUS EVERY 6 HOURS PRN
Status: DISCONTINUED | OUTPATIENT
Start: 2023-04-11 | End: 2023-04-17

## 2023-04-11 RX ORDER — FLUTICASONE FUROATE AND VILANTEROL 200; 25 UG/1; UG/1
1 POWDER RESPIRATORY (INHALATION) DAILY
Status: DISCONTINUED | OUTPATIENT
Start: 2023-04-11 | End: 2023-04-17

## 2023-04-11 RX ORDER — ACETAMINOPHEN 325 MG/1
650 TABLET ORAL EVERY 6 HOURS PRN
Status: DISCONTINUED | OUTPATIENT
Start: 2023-04-11 | End: 2023-04-11

## 2023-04-11 RX ORDER — VANCOMYCIN HYDROCHLORIDE
20 EVERY 24 HOURS
Status: DISCONTINUED | OUTPATIENT
Start: 2023-04-11 | End: 2023-04-17

## 2023-04-11 RX ORDER — ACETAMINOPHEN 500 MG
1000 TABLET ORAL ONCE
Status: COMPLETED | OUTPATIENT
Start: 2023-04-11 | End: 2023-04-11

## 2023-04-11 RX ORDER — METOCLOPRAMIDE HYDROCHLORIDE 5 MG/ML
5 INJECTION INTRAMUSCULAR; INTRAVENOUS EVERY 8 HOURS PRN
Status: DISCONTINUED | OUTPATIENT
Start: 2023-04-11 | End: 2023-04-12

## 2023-04-11 RX ORDER — POLYETHYLENE GLYCOL 3350 17 G/17G
17 POWDER, FOR SOLUTION ORAL DAILY PRN
Status: DISCONTINUED | OUTPATIENT
Start: 2023-04-11 | End: 2023-04-17

## 2023-04-11 RX ORDER — GUAIFENESIN 600 MG/1
600 TABLET, EXTENDED RELEASE ORAL 2 TIMES DAILY
Status: DISCONTINUED | OUTPATIENT
Start: 2023-04-11 | End: 2023-04-17

## 2023-04-11 RX ORDER — MELATONIN
3 NIGHTLY PRN
Status: DISCONTINUED | OUTPATIENT
Start: 2023-04-11 | End: 2023-04-17

## 2023-04-11 RX ORDER — POTASSIUM CHLORIDE 20 MEQ/1
40 TABLET, EXTENDED RELEASE ORAL ONCE
Status: COMPLETED | OUTPATIENT
Start: 2023-04-11 | End: 2023-04-11

## 2023-04-11 RX ORDER — MORPHINE SULFATE 2 MG/ML
2 INJECTION, SOLUTION INTRAMUSCULAR; INTRAVENOUS EVERY 2 HOUR PRN
Status: DISCONTINUED | OUTPATIENT
Start: 2023-04-11 | End: 2023-04-17

## 2023-04-11 RX ORDER — MORPHINE SULFATE 4 MG/ML
4 INJECTION, SOLUTION INTRAMUSCULAR; INTRAVENOUS EVERY 2 HOUR PRN
Status: DISCONTINUED | OUTPATIENT
Start: 2023-04-11 | End: 2023-04-17

## 2023-04-11 RX ORDER — SODIUM CHLORIDE, SODIUM LACTATE, POTASSIUM CHLORIDE, CALCIUM CHLORIDE 600; 310; 30; 20 MG/100ML; MG/100ML; MG/100ML; MG/100ML
INJECTION, SOLUTION INTRAVENOUS CONTINUOUS
Status: DISCONTINUED | OUTPATIENT
Start: 2023-04-11 | End: 2023-04-17

## 2023-04-11 RX ORDER — BISACODYL 10 MG
10 SUPPOSITORY, RECTAL RECTAL
Status: DISCONTINUED | OUTPATIENT
Start: 2023-04-11 | End: 2023-04-12

## 2023-04-11 RX ORDER — SODIUM PHOSPHATE, DIBASIC AND SODIUM PHOSPHATE, MONOBASIC 7; 19 G/133ML; G/133ML
1 ENEMA RECTAL ONCE AS NEEDED
Status: DISCONTINUED | OUTPATIENT
Start: 2023-04-11 | End: 2023-04-17

## 2023-04-11 RX ORDER — SENNOSIDES 8.6 MG
17.2 TABLET ORAL NIGHTLY PRN
Status: DISCONTINUED | OUTPATIENT
Start: 2023-04-11 | End: 2023-04-17

## 2023-04-11 NOTE — ED INITIAL ASSESSMENT (HPI)
S: pt sent over to ER for planned washout of right hip, per PA note.      B: extensive    A: well appearing no distress, hypoxic in the high 80's but no respiratory complaints    R: pre-op labs, xray, ekg

## 2023-04-12 ENCOUNTER — APPOINTMENT (OUTPATIENT)
Dept: NUCLEAR MEDICINE | Facility: HOSPITAL | Age: 68
End: 2023-04-12
Attending: NURSE PRACTITIONER
Payer: MEDICARE

## 2023-04-12 ENCOUNTER — APPOINTMENT (OUTPATIENT)
Dept: GENERAL RADIOLOGY | Facility: HOSPITAL | Age: 68
End: 2023-04-12
Attending: STUDENT IN AN ORGANIZED HEALTH CARE EDUCATION/TRAINING PROGRAM
Payer: MEDICARE

## 2023-04-12 ENCOUNTER — ANESTHESIA (OUTPATIENT)
Dept: SURGERY | Facility: HOSPITAL | Age: 68
End: 2023-04-12
Payer: MEDICARE

## 2023-04-12 ENCOUNTER — ANESTHESIA EVENT (OUTPATIENT)
Dept: SURGERY | Facility: HOSPITAL | Age: 68
End: 2023-04-12
Payer: MEDICARE

## 2023-04-12 DIAGNOSIS — M25.551 RIGHT HIP PAIN: Primary | ICD-10-CM

## 2023-04-12 LAB
ANION GAP SERPL CALC-SCNC: 3 MMOL/L (ref 0–18)
ATRIAL RATE: 77 BPM
BASOPHILS # BLD: 0 X10(3) UL (ref 0–0.2)
BASOPHILS NFR BLD: 0 %
BUN BLD-MCNC: 11 MG/DL (ref 7–18)
BUN/CREAT SERPL: 18 (ref 10–20)
CALCIUM BLD-MCNC: 8.3 MG/DL (ref 8.5–10.1)
CHLORIDE SERPL-SCNC: 101 MMOL/L (ref 98–112)
CO2 SERPL-SCNC: 35 MMOL/L (ref 21–32)
CREAT BLD-MCNC: 0.61 MG/DL
DEPRECATED RDW RBC AUTO: 50 FL (ref 35.1–46.3)
EOSINOPHIL # BLD: 0.21 X10(3) UL (ref 0–0.7)
EOSINOPHIL NFR BLD: 5 %
ERYTHROCYTE [DISTWIDTH] IN BLOOD BY AUTOMATED COUNT: 15.5 % (ref 11–15)
GFR SERPLBLD BASED ON 1.73 SQ M-ARVRAT: 97 ML/MIN/1.73M2 (ref 60–?)
GLUCOSE BLD-MCNC: 104 MG/DL (ref 70–99)
HCT VFR BLD AUTO: 29.8 %
HCT VFR BLD AUTO: 31.7 %
HGB BLD-MCNC: 10 G/DL
HGB BLD-MCNC: 9.3 G/DL
LYMPHOCYTES NFR BLD: 0.76 X10(3) UL (ref 1–4)
LYMPHOCYTES NFR BLD: 18 %
MAGNESIUM SERPL-MCNC: 2 MG/DL (ref 1.6–2.6)
MCH RBC QN AUTO: 27.6 PG (ref 26–34)
MCHC RBC AUTO-ENTMCNC: 31.2 G/DL (ref 31–37)
MCV RBC AUTO: 88.4 FL
MONOCYTES # BLD: 0.42 X10(3) UL (ref 0.1–1)
MONOCYTES NFR BLD: 10 %
MORPHOLOGY: NORMAL
NEUTROPHILS # BLD AUTO: 1.69 X10 (3) UL (ref 1.5–7.7)
NEUTROPHILS NFR BLD: 59 %
NEUTS BAND NFR BLD: 8 %
NEUTS HYPERSEG # BLD: 2.81 X10(3) UL (ref 1.5–7.7)
OSMOLALITY SERPL CALC.SUM OF ELEC: 288 MOSM/KG (ref 275–295)
P AXIS: 19 DEGREES
P-R INTERVAL: 152 MS
PLATELET # BLD AUTO: 215 10(3)UL (ref 150–450)
PLATELET MORPHOLOGY: NORMAL
POTASSIUM SERPL-SCNC: 3.2 MMOL/L (ref 3.5–5.1)
Q-T INTERVAL: 380 MS
QRS DURATION: 82 MS
QTC CALCULATION (BEZET): 430 MS
R AXIS: 18 DEGREES
RBC # BLD AUTO: 3.37 X10(6)UL
RBC # FLD AUTO: ABNORMAL /CUMM (ref ?–1)
SODIUM SERPL-SCNC: 139 MMOL/L (ref 136–145)
T AXIS: 58 DEGREES
TOTAL CELLS COUNTED BLD: 100
TOTAL CELLS COUNTED SNV: ABNORMAL /CUMM (ref 0–200)
VENTRICULAR RATE: 77 BPM
WBC # BLD AUTO: 4.2 X10(3) UL (ref 4–11)

## 2023-04-12 PROCEDURE — 0SPR0JZ REMOVAL OF SYNTHETIC SUBSTITUTE FROM RIGHT HIP JOINT, FEMORAL SURFACE, OPEN APPROACH: ICD-10-PCS | Performed by: STUDENT IN AN ORGANIZED HEALTH CARE EDUCATION/TRAINING PROGRAM

## 2023-04-12 PROCEDURE — 99233 SBSQ HOSP IP/OBS HIGH 50: CPT | Performed by: HOSPITALIST

## 2023-04-12 PROCEDURE — 0SRR0JZ REPLACEMENT OF RIGHT HIP JOINT, FEMORAL SURFACE WITH SYNTHETIC SUBSTITUTE, OPEN APPROACH: ICD-10-PCS | Performed by: STUDENT IN AN ORGANIZED HEALTH CARE EDUCATION/TRAINING PROGRAM

## 2023-04-12 PROCEDURE — 78582 LUNG VENTILAT&PERFUS IMAGING: CPT | Performed by: NURSE PRACTITIONER

## 2023-04-12 PROCEDURE — 72170 X-RAY EXAM OF PELVIS: CPT | Performed by: STUDENT IN AN ORGANIZED HEALTH CARE EDUCATION/TRAINING PROGRAM

## 2023-04-12 DEVICE — IMPLANTABLE DEVICE
Type: IMPLANTABLE DEVICE | Site: HIP | Status: FUNCTIONAL
Brand: VIVACIT-E®

## 2023-04-12 RX ORDER — OXYCODONE HYDROCHLORIDE 5 MG/1
5 TABLET ORAL EVERY 4 HOURS PRN
Status: DISCONTINUED | OUTPATIENT
Start: 2023-04-12 | End: 2023-04-17

## 2023-04-12 RX ORDER — DIPHENHYDRAMINE HYDROCHLORIDE 50 MG/ML
25 INJECTION INTRAMUSCULAR; INTRAVENOUS ONCE AS NEEDED
Status: ACTIVE | OUTPATIENT
Start: 2023-04-12 | End: 2023-04-12

## 2023-04-12 RX ORDER — ONDANSETRON 2 MG/ML
4 INJECTION INTRAMUSCULAR; INTRAVENOUS EVERY 6 HOURS PRN
Status: DISCONTINUED | OUTPATIENT
Start: 2023-04-12 | End: 2023-04-17

## 2023-04-12 RX ORDER — BISACODYL 10 MG
10 SUPPOSITORY, RECTAL RECTAL
Status: DISCONTINUED | OUTPATIENT
Start: 2023-04-12 | End: 2023-04-17

## 2023-04-12 RX ORDER — FAMOTIDINE 20 MG/1
20 TABLET, FILM COATED ORAL 2 TIMES DAILY
Status: DISCONTINUED | OUTPATIENT
Start: 2023-04-12 | End: 2023-04-17

## 2023-04-12 RX ORDER — MORPHINE SULFATE 4 MG/ML
2 INJECTION, SOLUTION INTRAMUSCULAR; INTRAVENOUS EVERY 10 MIN PRN
Status: DISCONTINUED | OUTPATIENT
Start: 2023-04-12 | End: 2023-04-12 | Stop reason: HOSPADM

## 2023-04-12 RX ORDER — METOCLOPRAMIDE HYDROCHLORIDE 5 MG/ML
10 INJECTION INTRAMUSCULAR; INTRAVENOUS EVERY 8 HOURS PRN
Status: DISCONTINUED | OUTPATIENT
Start: 2023-04-12 | End: 2023-04-12 | Stop reason: HOSPADM

## 2023-04-12 RX ORDER — TRANEXAMIC ACID 10 MG/ML
INJECTION, SOLUTION INTRAVENOUS AS NEEDED
Status: DISCONTINUED | OUTPATIENT
Start: 2023-04-12 | End: 2023-04-12 | Stop reason: SURG

## 2023-04-12 RX ORDER — HYDROMORPHONE HYDROCHLORIDE 1 MG/ML
0.4 INJECTION, SOLUTION INTRAMUSCULAR; INTRAVENOUS; SUBCUTANEOUS EVERY 5 MIN PRN
Status: DISCONTINUED | OUTPATIENT
Start: 2023-04-12 | End: 2023-04-12 | Stop reason: HOSPADM

## 2023-04-12 RX ORDER — DIPHENHYDRAMINE HYDROCHLORIDE 50 MG/ML
12.5 INJECTION INTRAMUSCULAR; INTRAVENOUS EVERY 4 HOURS PRN
Status: DISCONTINUED | OUTPATIENT
Start: 2023-04-12 | End: 2023-04-17

## 2023-04-12 RX ORDER — VANCOMYCIN HYDROCHLORIDE
1500 EVERY 12 HOURS
Status: DISCONTINUED | OUTPATIENT
Start: 2023-04-13 | End: 2023-04-13

## 2023-04-12 RX ORDER — METOCLOPRAMIDE HYDROCHLORIDE 5 MG/ML
10 INJECTION INTRAMUSCULAR; INTRAVENOUS EVERY 8 HOURS PRN
Status: DISCONTINUED | OUTPATIENT
Start: 2023-04-12 | End: 2023-04-12

## 2023-04-12 RX ORDER — CEFAZOLIN SODIUM/WATER 2 G/20 ML
2 SYRINGE (ML) INTRAVENOUS EVERY 8 HOURS
Status: DISCONTINUED | OUTPATIENT
Start: 2023-04-13 | End: 2023-04-13

## 2023-04-12 RX ORDER — ONDANSETRON 2 MG/ML
INJECTION INTRAMUSCULAR; INTRAVENOUS AS NEEDED
Status: DISCONTINUED | OUTPATIENT
Start: 2023-04-12 | End: 2023-04-12 | Stop reason: SURG

## 2023-04-12 RX ORDER — POTASSIUM CHLORIDE 20 MEQ/1
40 TABLET, EXTENDED RELEASE ORAL ONCE
Status: COMPLETED | OUTPATIENT
Start: 2023-04-12 | End: 2023-04-12

## 2023-04-12 RX ORDER — MORPHINE SULFATE 4 MG/ML
4 INJECTION, SOLUTION INTRAMUSCULAR; INTRAVENOUS EVERY 10 MIN PRN
Status: DISCONTINUED | OUTPATIENT
Start: 2023-04-12 | End: 2023-04-12 | Stop reason: HOSPADM

## 2023-04-12 RX ORDER — LIDOCAINE HYDROCHLORIDE 10 MG/ML
INJECTION, SOLUTION EPIDURAL; INFILTRATION; INTRACAUDAL; PERINEURAL AS NEEDED
Status: DISCONTINUED | OUTPATIENT
Start: 2023-04-12 | End: 2023-04-12 | Stop reason: SURG

## 2023-04-12 RX ORDER — HYDROMORPHONE HYDROCHLORIDE 1 MG/ML
0.6 INJECTION, SOLUTION INTRAMUSCULAR; INTRAVENOUS; SUBCUTANEOUS EVERY 5 MIN PRN
Status: DISCONTINUED | OUTPATIENT
Start: 2023-04-12 | End: 2023-04-12 | Stop reason: HOSPADM

## 2023-04-12 RX ORDER — SODIUM PHOSPHATE, DIBASIC AND SODIUM PHOSPHATE, MONOBASIC 7; 19 G/133ML; G/133ML
1 ENEMA RECTAL ONCE AS NEEDED
Status: DISCONTINUED | OUTPATIENT
Start: 2023-04-12 | End: 2023-04-12

## 2023-04-12 RX ORDER — DEXAMETHASONE SODIUM PHOSPHATE 4 MG/ML
VIAL (ML) INJECTION AS NEEDED
Status: DISCONTINUED | OUTPATIENT
Start: 2023-04-12 | End: 2023-04-12 | Stop reason: SURG

## 2023-04-12 RX ORDER — HYDROMORPHONE HYDROCHLORIDE 1 MG/ML
0.2 INJECTION, SOLUTION INTRAMUSCULAR; INTRAVENOUS; SUBCUTANEOUS EVERY 5 MIN PRN
Status: DISCONTINUED | OUTPATIENT
Start: 2023-04-12 | End: 2023-04-12 | Stop reason: HOSPADM

## 2023-04-12 RX ORDER — DIPHENHYDRAMINE HCL 25 MG
25 CAPSULE ORAL EVERY 4 HOURS PRN
Status: DISCONTINUED | OUTPATIENT
Start: 2023-04-12 | End: 2023-04-17

## 2023-04-12 RX ORDER — CEFAZOLIN SODIUM/WATER 2 G/20 ML
SYRINGE (ML) INTRAVENOUS AS NEEDED
Status: DISCONTINUED | OUTPATIENT
Start: 2023-04-12 | End: 2023-04-12 | Stop reason: SURG

## 2023-04-12 RX ORDER — POLYETHYLENE GLYCOL 3350 17 G/17G
17 POWDER, FOR SOLUTION ORAL DAILY PRN
Status: DISCONTINUED | OUTPATIENT
Start: 2023-04-12 | End: 2023-04-17

## 2023-04-12 RX ORDER — VANCOMYCIN HYDROCHLORIDE 1 G/20ML
INJECTION, POWDER, LYOPHILIZED, FOR SOLUTION INTRAVENOUS AS NEEDED
Status: DISCONTINUED | OUTPATIENT
Start: 2023-04-12 | End: 2023-04-12 | Stop reason: HOSPADM

## 2023-04-12 RX ORDER — MORPHINE SULFATE 10 MG/ML
6 INJECTION, SOLUTION INTRAMUSCULAR; INTRAVENOUS EVERY 10 MIN PRN
Status: DISCONTINUED | OUTPATIENT
Start: 2023-04-12 | End: 2023-04-12 | Stop reason: HOSPADM

## 2023-04-12 RX ORDER — ACETAMINOPHEN 325 MG/1
650 TABLET ORAL EVERY 6 HOURS PRN
Status: DISCONTINUED | OUTPATIENT
Start: 2023-04-12 | End: 2023-04-17

## 2023-04-12 RX ORDER — METOCLOPRAMIDE HYDROCHLORIDE 5 MG/ML
5 INJECTION INTRAMUSCULAR; INTRAVENOUS EVERY 8 HOURS PRN
Status: DISCONTINUED | OUTPATIENT
Start: 2023-04-12 | End: 2023-04-17

## 2023-04-12 RX ORDER — EPHEDRINE SULFATE 50 MG/ML
INJECTION, SOLUTION INTRAVENOUS AS NEEDED
Status: DISCONTINUED | OUTPATIENT
Start: 2023-04-12 | End: 2023-04-12 | Stop reason: SURG

## 2023-04-12 RX ORDER — FAMOTIDINE 10 MG/ML
20 INJECTION, SOLUTION INTRAVENOUS 2 TIMES DAILY
Status: DISCONTINUED | OUTPATIENT
Start: 2023-04-12 | End: 2023-04-17

## 2023-04-12 RX ORDER — ONDANSETRON 2 MG/ML
4 INJECTION INTRAMUSCULAR; INTRAVENOUS EVERY 6 HOURS PRN
Status: DISCONTINUED | OUTPATIENT
Start: 2023-04-12 | End: 2023-04-12 | Stop reason: HOSPADM

## 2023-04-12 RX ORDER — SODIUM CHLORIDE, SODIUM LACTATE, POTASSIUM CHLORIDE, CALCIUM CHLORIDE 600; 310; 30; 20 MG/100ML; MG/100ML; MG/100ML; MG/100ML
INJECTION, SOLUTION INTRAVENOUS CONTINUOUS
Status: DISCONTINUED | OUTPATIENT
Start: 2023-04-12 | End: 2023-04-12 | Stop reason: HOSPADM

## 2023-04-12 RX ORDER — SODIUM CHLORIDE 9 MG/ML
INJECTION, SOLUTION INTRAVENOUS CONTINUOUS
Status: DISCONTINUED | OUTPATIENT
Start: 2023-04-12 | End: 2023-04-17

## 2023-04-12 RX ORDER — SENNOSIDES 8.6 MG
17.2 TABLET ORAL NIGHTLY
Status: DISCONTINUED | OUTPATIENT
Start: 2023-04-12 | End: 2023-04-17

## 2023-04-12 RX ORDER — DOCUSATE SODIUM 100 MG/1
100 CAPSULE, LIQUID FILLED ORAL 2 TIMES DAILY
Status: DISCONTINUED | OUTPATIENT
Start: 2023-04-12 | End: 2023-04-17

## 2023-04-12 RX ORDER — NALOXONE HYDROCHLORIDE 0.4 MG/ML
80 INJECTION, SOLUTION INTRAMUSCULAR; INTRAVENOUS; SUBCUTANEOUS AS NEEDED
Status: CANCELLED | OUTPATIENT
Start: 2023-04-12 | End: 2023-04-13

## 2023-04-12 RX ADMIN — TRANEXAMIC ACID 1000 MG: 10 INJECTION, SOLUTION INTRAVENOUS at 18:47:00

## 2023-04-12 RX ADMIN — SODIUM CHLORIDE, SODIUM LACTATE, POTASSIUM CHLORIDE, CALCIUM CHLORIDE: 600; 310; 30; 20 INJECTION, SOLUTION INTRAVENOUS at 19:52:00

## 2023-04-12 RX ADMIN — LIDOCAINE HYDROCHLORIDE 50 MG: 10 INJECTION, SOLUTION EPIDURAL; INFILTRATION; INTRACAUDAL; PERINEURAL at 17:46:00

## 2023-04-12 RX ADMIN — CEFAZOLIN SODIUM/WATER 2 G: 2 G/20 ML SYRINGE (ML) INTRAVENOUS at 18:12:00

## 2023-04-12 RX ADMIN — TRANEXAMIC ACID 1000 MG: 10 INJECTION, SOLUTION INTRAVENOUS at 18:06:00

## 2023-04-12 RX ADMIN — EPHEDRINE SULFATE 10 MG: 50 INJECTION, SOLUTION INTRAVENOUS at 18:02:00

## 2023-04-12 RX ADMIN — DEXAMETHASONE SODIUM PHOSPHATE 4 MG: 4 MG/ML VIAL (ML) INJECTION at 18:05:00

## 2023-04-12 RX ADMIN — EPHEDRINE SULFATE 5 MG: 50 INJECTION, SOLUTION INTRAVENOUS at 18:50:00

## 2023-04-12 RX ADMIN — ONDANSETRON 4 MG: 2 INJECTION INTRAMUSCULAR; INTRAVENOUS at 18:05:00

## 2023-04-12 RX ADMIN — EPHEDRINE SULFATE 10 MG: 50 INJECTION, SOLUTION INTRAVENOUS at 18:00:00

## 2023-04-12 RX ADMIN — EPHEDRINE SULFATE 10 MG: 50 INJECTION, SOLUTION INTRAVENOUS at 18:32:00

## 2023-04-12 NOTE — PHYSICAL THERAPY NOTE
04/12/23 0816   VISIT TYPE   PT Inpatient Visit Type (Documentation Required) Attempted Evaluation  (pending VQ scan. Will follow up when appropriate.)     Pt scheduled for surgery today. Discussed with rn, will hold PT for today, will need new orders post op.

## 2023-04-12 NOTE — ED QUICK NOTES
Patient is alert and oriented x4. Showing no signs or symptoms of any respiratory distress. Not ambulatory in the ED, utilizing purewick for urination. On report, was told patient was desating down to 88% on RA. Currently on 2L of oxygen and sating well. Patient aware of plan of care. Comfort measures in place.

## 2023-04-12 NOTE — CM/SW NOTE
04/12/23 0900   CM/SW Screening   Referral Source Case [id number] staff; Chart review   Patient's Current Mental Status at Time of Assessment Alert;Oriented   Patient's Home Environment   (admitted from [address])   Discharge Needs   Anticipated D/C needs Subacute rehab     CM spoke with pt re dc planning. Pt confirms she admitted from [facility] of [address], pt requested to return at dc. CM requested department  [facility]) to initiate AIDIN referral, no PASRR is needed. Plan  Return to [facility] worker/ to remain available for support and/or discharge planning.      Amita Rowe RN    Ext [phone]

## 2023-04-12 NOTE — ANESTHESIA PROCEDURE NOTES
Airway  Date/Time: 4/12/2023 5:47 PM  Urgency: Elective    Airway not difficult    General Information and Staff    Patient location during procedure: OR  Anesthesiologist: Sondra Martinez MD  Resident/CRNA: Samira Vu CRNA  Performed: CRNA   Performed by: Sondra Martinez MD  Authorized by: Sondra Martinez MD      Indications and Patient Condition  Indications for airway management: anesthesia  Sedation level: deep  Preoxygenated: yes  Patient position: sniffing  Mask difficulty assessment: 1 - vent by mask    Final Airway Details  Final airway type: endotracheal airway      Successful airway: ETT  Cuffed: yes   Successful intubation technique: direct laryngoscopy  Endotracheal tube insertion site: oral  Blade: Stuart  Blade size: #3  ETT size (mm): 7.0    Cormack-Lehane Classification: grade I - full view of glottis  Placement verified by: chest auscultation and capnometry   Measured from: lips  ETT to lips (cm): 21  Number of attempts at approach: 1  Number of other approaches attempted: 0

## 2023-04-12 NOTE — PLAN OF CARE
Patient alert and orientated x4. VSS. Receiving LR IV fluids per MD order. NPO. Voiding via purewick. Patient is on 2L/min of oxygen via nasal cannula. SCDs and lovenox for DVT prophylaxis. PRN Tylenol given for Pain medication provided as needed. Fall precautions maintained- bed alarm on, bed locked in lowest position, call light and personal belongings within reach, non-skid socks in place to bilateral feet. Frequent rounding by nursing staff. Plan to have wound debridement today. Zosyn held per MD order.             Problem: Patient Centered Care  Goal: Patient preferences are identified and integrated in the patient's plan of care  Description: Interventions:  - What would you like us to know as we care for you?  - Provide timely, complete, and accurate information to patient/family  - Incorporate patient and family knowledge, values, beliefs, and cultural backgrounds into the planning and delivery of care  - Encourage patient/family to participate in care and decision-making at the level they choose  - Honor patient and family perspectives and choices  Outcome: Progressing     Problem: PAIN - ADULT  Goal: Verbalizes/displays adequate comfort level or patient's stated pain goal  Description: INTERVENTIONS:  - Encourage pt to monitor pain and request assistance  - Assess pain using appropriate pain scale  - Administer analgesics based on type and severity of pain and evaluate response  - Implement non-pharmacological measures as appropriate and evaluate response  - Consider cultural and social influences on pain and pain management  - Manage/alleviate anxiety  - Utilize distraction and/or relaxation techniques  - Monitor for opioid side effects  - Notify MD/LIP if interventions unsuccessful or patient reports new pain  - Anticipate increased pain with activity and pre-medicate as appropriate  Outcome: Progressing     Problem: SAFETY ADULT - FALL  Goal: Free from fall injury  Description: INTERVENTIONS:  - Assess pt frequently for physical needs  - Identify cognitive and physical deficits and behaviors that affect risk of falls.   - [place] fall precautions as indicated by assessment.  - Educate pt/family on patient safety including physical limitations  - Instruct pt to call for assistance with activity based on assessment  - Modify environment to reduce risk of injury  - Provide assistive devices as appropriate  - Consider OT/PT consult to assist with strengthening/mobility  - Encourage toileting schedule  Outcome: Progressing     Problem: DISCHARGE PLANNING  Goal: Discharge to home or other facility with appropriate resources  Description: INTERVENTIONS:  - Identify barriers to discharge w/pt and caregiver  - Include patient/family/discharge partner in discharge planning  - Arrange for needed discharge resources and transportation as appropriate  - Identify discharge learning needs (meds, wound care, etc)  - Arrange for interpreters to assist at discharge as needed  - Consider post-discharge preferences of patient/family/discharge partner  - Complete POLST form as appropriate  - Assess patient's ability to be responsible for managing their own health  - Refer to Case Management Department for coordinating discharge planning if the patient needs post-hospital services based on physician/LIP order or complex needs related to functional status, cognitive ability or social support system  Outcome: Progressing

## 2023-04-12 NOTE — ED QUICK NOTES
Orders for admission, patient is aware of plan and ready to go upstairs. Any questions, please call ED RN Tanja at extension [phone].      Patient Covid vaccination status: Fully vaccinated     COVID Test Ordered in ED: Rapid SARS-CoV-2 by PCR    COVID Suspicion at Admission: Low clinical suspicion for COVID    Running Infusions:       Mental Status/LOC at time of transport: AAOX4    Other pertinent information: 2L o2 via NC  CIWA score: N/A   NIH score:  N/A

## 2023-04-12 NOTE — PLAN OF CARE
Froylan Chapman is alert and oriented x 4. She is voiding via the purewick. More \"soreness\" than pain. No medications needed. She is receiving Vancomycin and Zosyn IVPB. She is to have VQ Scan this morning and NM called for 0700. The bed is low/locked. All needs are within reach. Per Froylan Chapman, she is unable to bear weight on the right leg. Dr. Charlene Gordon is on consult and has seen her this morning. Discharge plan pending progress and medical clearance. Froylan Chapman would like to return to [place] for rehab. Problem: PAIN - ADULT  Goal: Verbalizes/displays adequate comfort level or patient's stated pain goal  Description: INTERVENTIONS:  - Encourage pt to monitor pain and request assistance  - Assess pain using appropriate pain scale  - Administer analgesics based on type and severity of pain and evaluate response  - Implement non-pharmacological measures as appropriate and evaluate response  - Consider cultural and social influences on pain and pain management  - Manage/alleviate anxiety  - Utilize distraction and/or relaxation techniques  - Monitor for opioid side effects  - Notify MD/LIP if interventions unsuccessful or patient reports new pain  - Anticipate increased pain with activity and pre-medicate as appropriate  Outcome: Progressing     Problem: SAFETY ADULT - FALL  Goal: Free from fall injury  Description: INTERVENTIONS:  - Assess pt frequently for physical needs  - Identify cognitive and physical deficits and behaviors that affect risk of falls.   - [place] fall precautions as indicated by assessment.  - Educate pt/family on patient safety including physical limitations  - Instruct pt to call for assistance with activity based on assessment  - Modify environment to reduce risk of injury  - Provide assistive devices as appropriate  - Consider OT/PT consult to assist with strengthening/mobility  - Encourage toileting schedule  Outcome: Progressing     Problem: DISCHARGE PLANNING  Goal: Discharge to home or other facility with appropriate resources  Description: INTERVENTIONS:  - Identify barriers to discharge w/pt and caregiver  - Include patient/family/discharge partner in discharge planning  - Arrange for needed discharge resources and transportation as appropriate  - Identify discharge learning needs (meds, wound care, etc)  - Arrange for interpreters to assist at discharge as needed  - Consider post-discharge preferences of patient/family/discharge partner  - Complete POLST form as appropriate  - Assess patient's ability to be responsible for managing their own health  - Refer to Case Management Department for coordinating discharge planning if the patient needs post-hospital services based on physician/LIP order or complex needs related to functional status, cognitive ability or social support system  Outcome: Progressing

## 2023-04-12 NOTE — CM/SW NOTE
Department  notified of request for [facility] referrals started. Assigned CM/SW to follow up with pt/family on further discharge planning.      Isatu Wallace   April 12, 2023   09:42

## 2023-04-12 NOTE — OCCUPATIONAL THERAPY NOTE
Pt is scheduled for surgery today. Will await post op from ortho orders with WB and activity clarification.

## 2023-04-12 NOTE — PROGRESS NOTES
ED Vancomycin Initial Dose Adjustment     Vancomycin IVPB was ordered for treatment of joint infection. Pharmacy has adjusted the dose to Vancomycin 1500 mg IVPB x1 per hospital protocol.     Susanne Vu PharmD  04/11/23, 8:23 PM

## 2023-04-12 NOTE — PROGRESS NOTES
04/12/23 0700   VISIT TYPE   OT Inpatient Visit Type (Documentation Required) Attempted Evaluation     Pt is off the floor for a VQ scan. Will follow up with pt after results read as appropriate.

## 2023-04-13 ENCOUNTER — APPOINTMENT (OUTPATIENT)
Dept: PICC SERVICES | Facility: HOSPITAL | Age: 68
End: 2023-04-13
Attending: HOSPITALIST
Payer: MEDICARE

## 2023-04-13 LAB
ALBUMIN SERPL-MCNC: 1.7 G/DL (ref 3.4–5)
ANION GAP SERPL CALC-SCNC: 7 MMOL/L (ref 0–18)
BASOPHILS # BLD: 0 X10(3) UL (ref 0–0.2)
BASOPHILS NFR BLD: 0 %
BASOPHILS NFR SNV: 0 %
BUN BLD-MCNC: 9 MG/DL (ref 7–18)
BUN/CREAT SERPL: 18.8 (ref 10–20)
CALCIUM BLD-MCNC: 8 MG/DL (ref 8.5–10.1)
CHLORIDE SERPL-SCNC: 104 MMOL/L (ref 98–112)
CO2 SERPL-SCNC: 31 MMOL/L (ref 21–32)
CREAT BLD-MCNC: 0.48 MG/DL
DEPRECATED RDW RBC AUTO: 49.9 FL (ref 35.1–46.3)
EOSINOPHIL # BLD: 0 X10(3) UL (ref 0–0.7)
EOSINOPHIL NFR BLD: 0 %
EOSINOPHIL NFR SNV: 0 %
ERYTHROCYTE [DISTWIDTH] IN BLOOD BY AUTOMATED COUNT: 15.5 % (ref 11–15)
GFR SERPLBLD BASED ON 1.73 SQ M-ARVRAT: 102 ML/MIN/1.73M2 (ref 60–?)
GLUCOSE BLD-MCNC: 124 MG/DL (ref 70–99)
HCT VFR BLD AUTO: 25.4 %
HGB BLD-MCNC: 7.9 G/DL
LYMPHOCYTES NFR BLD: 0.68 X10(3) UL (ref 1–4)
LYMPHOCYTES NFR BLD: 11 %
LYMPHOCYTES NFR SNV: 3 %
MAGNESIUM SERPL-MCNC: 1.8 MG/DL (ref 1.6–2.6)
MCH RBC QN AUTO: 27.4 PG (ref 26–34)
MCHC RBC AUTO-ENTMCNC: 31.1 G/DL (ref 31–37)
MCV RBC AUTO: 88.2 FL
MONOCYTES # BLD: 0.12 X10(3) UL (ref 0.1–1)
MONOCYTES NFR BLD: 2 %
MONOS+MACROS NFR SNV: 4 %
NEUTROPHILS # BLD AUTO: 4.79 X10 (3) UL (ref 1.5–7.7)
NEUTROPHILS NFR BLD: 83 %
NEUTROPHILS NFR FLD: 93 %
NEUTS BAND NFR BLD: 4 %
NEUTS HYPERSEG # BLD: 5.39 X10(3) UL (ref 1.5–7.7)
OSMOLALITY SERPL CALC.SUM OF ELEC: 294 MOSM/KG (ref 275–295)
PHOSPHATE SERPL-MCNC: 3.8 MG/DL (ref 2.5–4.9)
PLATELET # BLD AUTO: 241 10(3)UL (ref 150–450)
PLATELET MORPHOLOGY: NORMAL
POTASSIUM SERPL-SCNC: 4 MMOL/L (ref 3.5–5.1)
POTASSIUM SERPL-SCNC: 4 MMOL/L (ref 3.5–5.1)
RBC # BLD AUTO: 2.88 X10(6)UL
SODIUM SERPL-SCNC: 142 MMOL/L (ref 136–145)
TOTAL CELLS COUNTED BLD: 100
TOTAL CELLS COUNTED FLD: 100
TOXIC GRANULES BLD QL SMEAR: PRESENT
WBC # BLD AUTO: 6.2 X10(3) UL (ref 4–11)
WBC # SNV: NORMAL /CUMM

## 2023-04-13 PROCEDURE — 99233 SBSQ HOSP IP/OBS HIGH 50: CPT | Performed by: HOSPITALIST

## 2023-04-13 RX ORDER — MAGNESIUM OXIDE 400 MG/1
400 TABLET ORAL ONCE
Status: COMPLETED | OUTPATIENT
Start: 2023-04-13 | End: 2023-04-13

## 2023-04-13 NOTE — PROGRESS NOTES
[facility] at [address] NOTE    Date: 4/13/2023    Patient: Moises Bledsoe      Subjective:    Nothing acute overnight. Pain in the operative extremity is well controlled. Patient denies new onset numbness/tingling in the operative extremity. Feels tired. Objective:     04/13/23  1119   BP:    Pulse: 72   Resp:    Temp:        I/O last 3 completed shifts: In: 2277 [P.O.:200; I.V.:1857; IV PIGGYBACK:220]  Out: 1546 [Urine:1000; Drains:40; Blood:700]     Gen: awake, alert, not in acute distress    Abdomen nondistended, non-tender      Right Lower Extremity:   Dressing clean, dry, intact. Hemovac output 40. Mikle  without drainage  Sensation grossly intact to light touch at baseline in Sural/Saphenous/Tibial/Superficial Peroneal/Deep Peroneal and Tibial distributions. Motor exam : firing TA/GSC. Vascular exam:  Cap refill ~2s in the toes. 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 Intra-operative findings explained to her at bedside.     - Weight bearing status: TTWB RLE  - posterior hip precautions.  Abductor pillow in bed and chair at all times  - Mechanical DVT prophylaxis and chemoprophylaxis with eliquis 2.5 BID  - broad spectrum iv abx, follow cx and ID consult  - Prevena dressing should be changed to another prevena dressing at 7 days postop, and the second should be changed to a dry dressing at 14 days postop  - PT for mobilization and motion  - Advance diet as tolerated. Discontinue IV fluids POD1 if tolerating diet.    - Multimodal pain control regimen ordered  - Disposition: Back to [address] MD  [address] at [facility]

## 2023-04-13 NOTE — CM/SW NOTE
DERRICK followed up on DC planning. DERRICK spoke with pt at bedside. Pt states concerns about a toilet commode that she uses but her roommate does not and the concerns about her needing to push the commode when using the bathroom. DERRICK called and notified Karl Brewer from [facility] who will notify the building to assistance and will help with arrangements    PLAN: DC back to [facility] [place] [address] [place] [address], MSW ext.  [phone]

## 2023-04-13 NOTE — PLAN OF CARE
Patient alert and orientated x4. Post-op day #1. Dressing in place to right hip- hemovac and prevena in place. VSS. Saline locked. Tolerating cardiac diet. Voiding freeely. Patient had a BM. Patient is on room air. SCDs and Eliquis for DVT prophylaxis. PRN oxycodone given for Pain medication provided as needed. Up with x1-2 assist and a walker Toe touch weight. Encouraged frequent ambulation and use of incentive spirometer. Fall precautions maintained- bed alarm on, bed locked in lowest position, call light and personal belongings within reach, non-skid socks in place to bilateral feet. Frequent rounding by nursing staff. Plan to have PICC line placed tomorrow due to long term antibiotics. When medically cleared dc to [facility], pt requesting private room.          Problem: Patient Centered Care  Goal: Patient preferences are identified and integrated in the patient's plan of care  Description: Interventions:  - What would you like us to know as we care for you?  - Provide timely, complete, and accurate information to patient/family  - Incorporate patient and family knowledge, values, beliefs, and cultural backgrounds into the planning and delivery of care  - Encourage patient/family to participate in care and decision-making at the level they choose  - Honor patient and family perspectives and choices  Outcome: Progressing     Problem: PAIN - ADULT  Goal: Verbalizes/displays adequate comfort level or patient's stated pain goal  Description: INTERVENTIONS:  - Encourage pt to monitor pain and request assistance  - Assess pain using appropriate pain scale  - Administer analgesics based on type and severity of pain and evaluate response  - Implement non-pharmacological measures as appropriate and evaluate response  - Consider cultural and social influences on pain and pain management  - Manage/alleviate anxiety  - Utilize distraction and/or relaxation techniques  - Monitor for opioid side effects  - Notify MD/LIP if interventions unsuccessful or patient reports new pain  - Anticipate increased pain with activity and pre-medicate as appropriate  Outcome: Progressing     Problem: SAFETY ADULT - FALL  Goal: Free from fall injury  Description: INTERVENTIONS:  - Assess pt frequently for physical needs  - Identify cognitive and physical deficits and behaviors that affect risk of falls.   - [place] fall precautions as indicated by assessment.  - Educate pt/family on patient safety including physical limitations  - Instruct pt to call for assistance with activity based on assessment  - Modify environment to reduce risk of injury  - Provide assistive devices as appropriate  - Consider OT/PT consult to assist with strengthening/mobility  - Encourage toileting schedule  Outcome: Progressing     Problem: DISCHARGE PLANNING  Goal: Discharge to home or other facility with appropriate resources  Description: INTERVENTIONS:  - Identify barriers to discharge w/pt and caregiver  - Include patient/family/discharge partner in discharge planning  - Arrange for needed discharge resources and transportation as appropriate  - Identify discharge learning needs (meds, wound care, etc)  - Arrange for interpreters to assist at discharge as needed  - Consider post-discharge preferences of patient/family/discharge partner  - Complete POLST form as appropriate  - Assess patient's ability to be responsible for managing their own health  - Refer to Case Management Department for coordinating discharge planning if the patient needs post-hospital services based on physician/LIP order or complex needs related to functional status, cognitive ability or social support system  Outcome: Progressing

## 2023-04-13 NOTE — PLAN OF CARE
Pt is A&O x4. Breathing on 1L O2 NC. Remote tele in place, no calls. Voiding via Purewick. TTWB-RLE. Prevena wound vac and Hemovac in place. Given Oxy and Tylenol prn for pain. Given IV antibiotic coverage. Eliquis, Suhas hoses and SCDs for DVT prophylaxis. D.C. plan is return to [address] [place] [address] when medically clear. Call light within reach. Safety precaution in place. Problem: Patient Centered Care  Goal: Patient preferences are identified and integrated in the patient's plan of care  Description: Interventions:  - What would you like us to know as we care for you?   - Provide timely, complete, and accurate information to patient/family  - Incorporate patient and family knowledge, values, beliefs, and cultural backgrounds into the planning and delivery of care  - Encourage patient/family to participate in care and decision-making at the level they choose  - Honor patient and family perspectives and choices  Outcome: Progressing     Problem: PAIN - ADULT  Goal: Verbalizes/displays adequate comfort level or patient's stated pain goal  Description: INTERVENTIONS:  - Encourage pt to monitor pain and request assistance  - Assess pain using appropriate pain scale  - Administer analgesics based on type and severity of pain and evaluate response  - Implement non-pharmacological measures as appropriate and evaluate response  - Consider cultural and social influences on pain and pain management  - Manage/alleviate anxiety  - Utilize distraction and/or relaxation techniques  - Monitor for opioid side effects  - Notify MD/LIP if interventions unsuccessful or patient reports new pain  - Anticipate increased pain with activity and pre-medicate as appropriate  Outcome: Progressing     Problem: SAFETY ADULT - FALL  Goal: Free from fall injury  Description: INTERVENTIONS:  - Assess pt frequently for physical needs  - Identify cognitive and physical deficits and behaviors that affect risk of falls.   - [place] fall precautions as indicated by assessment.  - Educate pt/family on patient safety including physical limitations  - Instruct pt to call for assistance with activity based on assessment  - Modify environment to reduce risk of injury  - Provide assistive devices as appropriate  - Consider OT/PT consult to assist with strengthening/mobility  - Encourage toileting schedule  Outcome: Progressing     Problem: DISCHARGE PLANNING  Goal: Discharge to home or other facility with appropriate resources  Description: INTERVENTIONS:  - Identify barriers to discharge w/pt and caregiver  - Include patient/family/discharge partner in discharge planning  - Arrange for needed discharge resources and transportation as appropriate  - Identify discharge learning needs (meds, wound care, etc)  - Arrange for interpreters to assist at discharge as needed  - Consider post-discharge preferences of patient/family/discharge partner  - Complete POLST form as appropriate  - Assess patient's ability to be responsible for managing their own health  - Refer to Case Management Department for coordinating discharge planning if the patient needs post-hospital services based on physician/LIP order or complex needs related to functional status, cognitive ability or social support system  Outcome: Progressing

## 2023-04-13 NOTE — ANESTHESIA POSTPROCEDURE EVALUATION
Patient: Colby Waller    Procedure Summary     Date: 04/12/23 Room / Location: [address] [place] [address] MAIN OR 05 / [address] [place] [address] MAIN OR    Anesthesia Start: 0612 Anesthesia Stop:     Procedure: RIGHT HIP IRRIGATION AND DEBRIDEMENT WITH HEAD LINER EXCHANGE (Right: Hip) Diagnosis:       Right hip pain      (Right hip pain [M25.551])    Surgeons: Ronald Grimaldo MD Anesthesiologist: Nancy Dubon MD    Anesthesia Type: general ASA Status: 3          Anesthesia Type: No value filed. Vitals Value Taken Time   /59 04/12/23 1956   Temp 97.8 04/12/23 1958   Pulse 104 04/12/23 1957   Resp 10 04/12/23 1957   SpO2 94 % 04/12/23 1957   Vitals shown include unvalidated device data.     [address] [place] [address] AN Post Evaluation:   Patient Evaluated in PACU  Patient Participation: complete - patient participated  Level of Consciousness: awake  Pain Score: 1  Pain Management: adequate  Airway Patency:patent  Dental exam unchanged from preop  Yes    Cardiovascular Status: acceptable  Respiratory Status: acceptable  Postoperative Hydration acceptable      Chidi Howard MD  4/12/2023 7:58 PM

## 2023-04-13 NOTE — CDS QUERY
.How to Answer this Query    1.) Click \"Edit\" button on the toolbar.  2.) Type an \"X\" in the bracket for the diagnosis that applies. (You may also add additional clinical details as you feel necessary to substantiate your response). 3.) Finally click \"Sign\" to complete response. Thank you    . Clinical Significance - Lab Results Associated with Blood Loss  CLINICAL DOCUMENTATION CLARIFICATION FORM  Dear Doctor Tod Argueta:  Clinical information (provided below) indicates blood loss and abnormal blood counts. For accurate ICD-10-CM code assignment to reflect severity of illness and risk of mortality,  PLEASE (X) ALL DIAGNOSES THAT APPLY. SELECTION BY PROVIDER ONLY    ( )  Acute Blood Loss Anemia    ( )  Chronic Blood Loss Anemia    ( )  Acute on Chronic Blood Loss Anemia    ( x)  Postoperative Anemia due to Acute Blood Loss    ( )  Postoperative Anemia due to Chronic Blood Loss    ( )  Low Hemoglobin and/or Hematocrit without Clinical Significance    ( )  Other (please specify):     ( )  Unable to Determine (please comment)       1  Documentation from Medical Record:  Risk Factors:  * Dr Anisa Chaparro (4/12/23): [de-identified] 69year old female, prior history of lupus, RA, chronic r foot     infection on abx suppression, who underwent right primary R [place] on 91/6/4798,     complicated by acetabular component loosening requiring acetabular revision on     3/8/9462, complicated by dislocation 1 week postop s/p closed reduction, who     presented to the office on 4/11 with ongoing hip incisional wound drainage\"    Clinical Indicators:  * Dr. Anisa Chaparro (4/12/23):    PREOPERATIVE DIAGNOSIS Status post Right Revision Total Hip Arthroplasty Right hip     wound drainage     POST-OPERATIVE DIAGNOSIS:Right Total Hip Arthroplasty Acute Prosthetic Joint      Infection     PROCEDURE Right Revision Total Hip Arthroplasty - Irrigation/Debridement and Modular      Component Exchange (79781 - M 52 (Reduced Svcs).  Application of negative pressure incisional dressing  * Estimated blood loss 800 ml  * Preoperative hemoglobin: 10.2 g/dL  * Postoperative hemoglobin: 7.9 g/dL  * Dr. Pinky Leavitt (4/13/23): \"Anemia- assume mainly post op, may have a component of     anemia of chronic disease in view of multiple comorbidities\"    If you have any questions, please contact Clinical Documentation  Specialist:  Lizette Christensen RN at [phone]     Thank You!     THIS FORM IS A PERMANENT PART OF THE MEDICAL RECORD

## 2023-04-13 NOTE — OPERATIVE REPORT
[facility] [address]: 4/12/2023    PREOPERATIVE DIAGNOSIS:   1. Status post Right Revision Total Hip Arthroplasty    2. Right hip wound drainage    POST-OPERATIVE DIAGNOSIS:   1. 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 Right Revision Total Hip Arthroplasty - Irrigation/Debridement and Modular Component Exchange (60314 - M 52 (Reduced Svcs))  2. Application of negative pressure incisional dressing    Location: [facility]    SURGEON: Ian Hoyos MD  Assistant(s): ESDRAS    Anesthesia type:  General  Estimated Blood Loss: 800cc    Drains: Prevena Incisional Wound Vac    Complications: None immediately apparent    Explants:  28,0 /48 dual mobility femoral Head    Implants:  28+7 /48 dual mobility femoral Head    Findings: Copious seropurulent fluid. No fascial defect. Stable cup reconstruction. Cell count 102K. Specimen: Multiple tissue cultures, fluid culture. Indication: This is a 69year old lady, who is s/p right acetabular component revision 5 weeks ago for loose acetabular component, complicated by dislocation 1 week later s/p closed reduction who unfortunately sustained presented with persistent hip wound drainage with markedly elevated inflammatory markers. Surgical versus non-surgical options were discussed with the patient, and together we decided it is best to proceed with a revision total hip arthroplasty, irrigation/debridement with modular component exchange with the goals given high concern for infection, and with the goals of decreasing infection burden. All risks and benefits of surgery including bleeding, persistent infection, dislocation, mal-prosthetic fracture, neurovascular injury, leg length or offset discrepancy, as well as medical and anesthesia-related complications were discussed with the patient / family, who elected to proceed with surgery.      Procedure in detail:    Following induction of anesthesia, the patient was positioned lateral decubitus on a peg board positioner. Perioperative IV antibiotics of Ancef were administered. Standard prep and drape was performed. A longitudinal incision was made over the posterolateral aspect of the greater trochanter using the prior scar. Hypertrophic areas were excised. Cautery was used to dissect down to the fascia. Seropurulent fluid was encountered and evacuated. A breaux was used to expose the fascia and electrocautery was used to incise the fascia in line with the femur. Copious seropurulent fluid was aspirated from within the joint prior to fasciotomy (12cc), and more of the same fluid was encountered deep to the fascia. The aspirated fluid was sent for culture and cell count. . A charnley retractor was placed. The posterior capsular scar was was stripped off of the posterior aspect of the greater trochanter distally and proximally from prior dislocation. Upon entering the joint, 3 total tissue cultures were taken. The capsule along with any necrotic appearing tissue were debrided sharply circumferentially around the cup. The hip was dislocated. The dual mobility head was disimpacted off of the trunnion using a plastic head impactor to protect the trunnion and a mallet. The trunnion was well preserved. The stem was checked and noted to be well fixed and well positioned. A bone hook was used to lever the femur anteriorly, some anterior capsular scar was excised, and the anterior acetabular retractor was placed over the anterior wall. The cup was cleared of soft tissue circumferentially. We did not elect to remove the cemented cup given recent revision and concern for loss of fixation. The superficial tissues and fascia were sharply debrided circumferentially using a knife.   The wound was then thoroughly irrigated as follows: 3L NS, followed by dilute betadine/peroxide solution, followed by 1L of bactisure, 3 liters of NS, then chlorhexidine solution and a final 3L of saline. A sterile scrub brush was used to mechanically debride any exposed metal.     . A 28+7/48 dual mobility inner head was trialed and noted to have reasonable stability. The final head mated with an outer poly head were impacted onto a clean trunnion. The hip was reduced and noted to have excellent stability posteriorly. Excellent hemostasis was achieved. Xperience solution (citric acid, [employer]) was applied to the wound, and any pooling suctioned. Two grams of vancomycin powder were deposited into the wound. A medium hemovac drain was placed deep to the fascia, exiting anterolaterally on the hip. There was no robust posterior capsule tissue to be repaired. The fascia was closed with #1 interrupted PDS and #2 quill, subcutaneous tissues closed with #2 quill, 0-monoderm quill, and 2-0 monoderm quill and skin closed with staples. A sterile negative pressure dressing was applied. Sponge, needle and instrument counts were correct at the completion of the case. There were no immediate complications. The patient was transferred to the post-anesthesia recovery unit (PACU) in stable condition. There were no immediate complications. POST-OPERATIVE PLAN  1. The patient will be continue TTWB on the right leg for 4 additional weeks. 2. The patient will be placed on posterior hip precautions for 3 months and an abduction pillow will be used for the next 6 weeks. 3. The patient will receive broad spectrum antibiotics, infectious disease team will be consulted and cultures will be followed. 4. The prevena dressing will be changed at 7 days postop and switched to another prevena dressing to be maintained for an additional 7 days. 5. Venous thromboembolic prophylaxis will consist of early mobilization, mechanical compressive devices, and Aspirin eliquis 2.5 BID   6. The patient should be scheduled for follow up in 2 weeks for wound and radiographic evaluation.

## 2023-04-14 ENCOUNTER — APPOINTMENT (OUTPATIENT)
Dept: PICC SERVICES | Facility: HOSPITAL | Age: 68
End: 2023-04-14
Attending: INTERNAL MEDICINE
Payer: MEDICARE

## 2023-04-14 LAB
ALBUMIN SERPL-MCNC: 1.8 G/DL (ref 3.4–5)
ANION GAP SERPL CALC-SCNC: 5 MMOL/L (ref 0–18)
BASOPHILS # BLD AUTO: 0.05 X10(3) UL (ref 0–0.2)
BASOPHILS # BLD AUTO: 0.06 X10(3) UL (ref 0–0.2)
BASOPHILS NFR BLD AUTO: 0.7 %
BASOPHILS NFR BLD AUTO: 0.9 %
BUN BLD-MCNC: 9 MG/DL (ref 7–18)
BUN/CREAT SERPL: 16.7 (ref 10–20)
CALCIUM BLD-MCNC: 8.3 MG/DL (ref 8.5–10.1)
CHLORIDE SERPL-SCNC: 104 MMOL/L (ref 98–112)
CO2 SERPL-SCNC: 31 MMOL/L (ref 21–32)
CREAT BLD-MCNC: 0.54 MG/DL
DEPRECATED RDW RBC AUTO: 47.9 FL (ref 35.1–46.3)
DEPRECATED RDW RBC AUTO: 50.2 FL (ref 35.1–46.3)
EOSINOPHIL # BLD AUTO: 0.35 X10(3) UL (ref 0–0.7)
EOSINOPHIL # BLD AUTO: 0.41 X10(3) UL (ref 0–0.7)
EOSINOPHIL NFR BLD AUTO: 5.2 %
EOSINOPHIL NFR BLD AUTO: 5.8 %
ERYTHROCYTE [DISTWIDTH] IN BLOOD BY AUTOMATED COUNT: 15.5 % (ref 11–15)
ERYTHROCYTE [DISTWIDTH] IN BLOOD BY AUTOMATED COUNT: 15.6 % (ref 11–15)
GFR SERPLBLD BASED ON 1.73 SQ M-ARVRAT: 100 ML/MIN/1.73M2 (ref 60–?)
GLUCOSE BLD-MCNC: 91 MG/DL (ref 70–99)
HCT VFR BLD AUTO: 22.1 %
HCT VFR BLD AUTO: 22.4 %
HGB BLD-MCNC: 7.1 G/DL
HGB BLD-MCNC: 7.2 G/DL
IMM GRANULOCYTES # BLD AUTO: 0.06 X10(3) UL (ref 0–1)
IMM GRANULOCYTES # BLD AUTO: 0.07 X10(3) UL (ref 0–1)
IMM GRANULOCYTES NFR BLD: 0.9 %
IMM GRANULOCYTES NFR BLD: 1 %
LYMPHOCYTES # BLD AUTO: 1.2 X10(3) UL (ref 1–4)
LYMPHOCYTES # BLD AUTO: 1.22 X10(3) UL (ref 1–4)
LYMPHOCYTES NFR BLD AUTO: 17 %
LYMPHOCYTES NFR BLD AUTO: 18.1 %
MAGNESIUM SERPL-MCNC: 1.9 MG/DL (ref 1.6–2.6)
MCH RBC QN AUTO: 27.8 PG (ref 26–34)
MCH RBC QN AUTO: 27.8 PG (ref 26–34)
MCHC RBC AUTO-ENTMCNC: 31.7 G/DL (ref 31–37)
MCHC RBC AUTO-ENTMCNC: 32.6 G/DL (ref 31–37)
MCV RBC AUTO: 85.3 FL
MCV RBC AUTO: 87.8 FL
MONOCYTES # BLD AUTO: 0.59 X10(3) UL (ref 0.1–1)
MONOCYTES # BLD AUTO: 0.8 X10(3) UL (ref 0.1–1)
MONOCYTES NFR BLD AUTO: 11.9 %
MONOCYTES NFR BLD AUTO: 8.4 %
NEUTROPHILS # BLD AUTO: 4.25 X10 (3) UL (ref 1.5–7.7)
NEUTROPHILS # BLD AUTO: 4.25 X10(3) UL (ref 1.5–7.7)
NEUTROPHILS # BLD AUTO: 4.73 X10 (3) UL (ref 1.5–7.7)
NEUTROPHILS # BLD AUTO: 4.73 X10(3) UL (ref 1.5–7.7)
NEUTROPHILS NFR BLD AUTO: 62.9 %
NEUTROPHILS NFR BLD AUTO: 67.2 %
OSMOLALITY SERPL CALC.SUM OF ELEC: 288 MOSM/KG (ref 275–295)
PHOSPHATE SERPL-MCNC: 2.4 MG/DL (ref 2.5–4.9)
PLATELET # BLD AUTO: 285 10(3)UL (ref 150–450)
PLATELET # BLD AUTO: 316 10(3)UL (ref 150–450)
POTASSIUM SERPL-SCNC: 3.6 MMOL/L (ref 3.5–5.1)
RBC # BLD AUTO: 2.55 X10(6)UL
RBC # BLD AUTO: 2.59 X10(6)UL
SODIUM SERPL-SCNC: 140 MMOL/L (ref 136–145)
VANCOMYCIN PEAK SERPL-MCNC: 32 UG/ML (ref 30–50)
WBC # BLD AUTO: 6.8 X10(3) UL (ref 4–11)
WBC # BLD AUTO: 7 X10(3) UL (ref 4–11)

## 2023-04-14 PROCEDURE — 99233 SBSQ HOSP IP/OBS HIGH 50: CPT | Performed by: HOSPITALIST

## 2023-04-14 RX ORDER — LIDOCAINE HYDROCHLORIDE 10 MG/ML
5 INJECTION, SOLUTION EPIDURAL; INFILTRATION; INTRACAUDAL; PERINEURAL
Status: COMPLETED | OUTPATIENT
Start: 2023-04-14 | End: 2023-04-14

## 2023-04-14 NOTE — PLAN OF CARE
Pt is A&Ox4. RA. Eliquis for dvt prophylaxis. Voiding via purewick. Last BM was 4/13. PRN oxy and tylenol for pain management. Up 1-2 assist with walker, TTWB RLE. Cardiac diet. Saline locked. IV vanco. PICC in place, L arm precautions. R hip-prevena wound vac. Plan for [facility] pending choice. Problem: Patient Centered Care  Goal: Patient preferences are identified and integrated in the patient's plan of care  Description: Interventions:  - What would you like us to know as we care for you? I had surgery before. - Provide timely, complete, and accurate information to patient/family  - Incorporate patient and family knowledge, values, beliefs, and cultural backgrounds into the planning and delivery of care  - Encourage patient/family to participate in care and decision-making at the level they choose  - Honor patient and family perspectives and choices  Outcome: Progressing     Problem: PAIN - ADULT  Goal: Verbalizes/displays adequate comfort level or patient's stated pain goal  Description: INTERVENTIONS:  - Encourage pt to monitor pain and request assistance  - Assess pain using appropriate pain scale  - Administer analgesics based on type and severity of pain and evaluate response  - Implement non-pharmacological measures as appropriate and evaluate response  - Consider cultural and social influences on pain and pain management  - Manage/alleviate anxiety  - Utilize distraction and/or relaxation techniques  - Monitor for opioid side effects  - Notify MD/LIP if interventions unsuccessful or patient reports new pain  - Anticipate increased pain with activity and pre-medicate as appropriate  Outcome: Progressing     Problem: SAFETY ADULT - FALL  Goal: Free from fall injury  Description: INTERVENTIONS:  - Assess pt frequently for physical needs  - Identify cognitive and physical deficits and behaviors that affect risk of falls.   - [place] fall precautions as indicated by assessment.  - Educate pt/family on patient safety including physical limitations  - Instruct pt to call for assistance with activity based on assessment  - Modify environment to reduce risk of injury  - Provide assistive devices as appropriate  - Consider OT/PT consult to assist with strengthening/mobility  - Encourage toileting schedule  Outcome: Progressing     Problem: DISCHARGE PLANNING  Goal: Discharge to home or other facility with appropriate resources  Description: INTERVENTIONS:  - Identify barriers to discharge w/pt and caregiver  - Include patient/family/discharge partner in discharge planning  - Arrange for needed discharge resources and transportation as appropriate  - Identify discharge learning needs (meds, wound care, etc)  - Arrange for interpreters to assist at discharge as needed  - Consider post-discharge preferences of patient/family/discharge partner  - Complete POLST form as appropriate  - Assess patient's ability to be responsible for managing their own health  - Refer to Case Management Department for coordinating discharge planning if the patient needs post-hospital services based on physician/LIP order or complex needs related to functional status, cognitive ability or social support system  Outcome: Progressing

## 2023-04-14 NOTE — PHYSICAL THERAPY NOTE
PHYSICAL THERAPY TREATMENT NOTE - INPATIENT     Room Number: 086/828-T       Presenting Problem: cellulitis of right lower extremity, s/p R hip I&D with head liner exchange    Problem List  Principal Problem:    Cellulitis of right lower extremity  Active Problems:    Hypoxia      PHYSICAL THERAPY ASSESSMENT   Chart reviewed. RN Neno Gomez approved participation in physical therapy. PPE worn by therapist: mask, gloves and goggles. Patient was wearing a mask during session. Patient presented in bed with 6/10 pain. Patient with fair  progress towards goals during this session. Education provided on Weight bearing status, Physical therapy plan of care and physiological benefits of out of bed mobility. Patient with good carryover. Bed mobility: Mod assist  Transfers: NT  Gait Assistance: Not tested                   Pt seen daily. Mod a for bed mobility. Pt educated on deep breathing and relaxation technique. Pt refused OOB;PT RX limited to bed mobility ;EOB sitting balance activity with emphasis on core stabilization,positioning for pt comfort as well as ther ex. Pt educated on TTWB status with functional mobility. Patient was left in sitting EOB/RN present; at end of session with all needs in reach. The patient's Approx Degree of Impairment: 54.16% has been calculated based on documentation in the [facility] '6 clicks' Inpatient Basic Mobility Short Form. Research supports that patients with this level of impairment may benefit from Subacute Rehab. RN aware of patient status post session. DISCHARGE RECOMMENDATIONS  PT Discharge Recommendations: Sub-acute rehabilitation     PLAN  PT Treatment Plan: Bed mobility; Body mechanics; Endurance;Gait training    SUBJECTIVE  Limited participation.   OBJECTIVE  Precautions: COLTON - posterior    WEIGHT BEARING RESTRICTION  Weight Bearing Restriction: R lower extremity        R Lower Extremity: Toe Touch Weight Bearing       PAIN ASSESSMENT   Ratin  Location: right hip  Management Techniques: Activity promotion; Body mechanics;Repositioning    BALANCE                                                                                                                       Static Sitting: Good  Dynamic Sitting: Fair +           Static Standing: Fair -  Dynamic Standing: Poor +    ACTIVITY TOLERANCE                         O2 WALK       AM-PAC '6-Clicks' INPATIENT SHORT FORM - BASIC MOBILITY  How much difficulty does the patient currently have. .. Patient Difficulty: Turning over in bed (including adjusting bedclothes, sheets and blankets)?: A Little   Patient Difficulty: Sitting down on and standing up from a chair with arms (e.g., wheelchair, bedside commode, etc.): A Little   Patient Difficulty: Moving from lying on back to sitting on the side of the bed?: A Little   How much help from another person does the patient currently need. .. Help from Another: Moving to and from a bed to a chair (including a wheelchair)?: A Little   Help from Another: Need to walk in hospital room?: A Lot   Help from Another: Climbing 3-5 steps with a railing?: A Lot     AM-PAC Score:  Raw Score: 16   Approx Degree of Impairment: 54.16%   Standardized Score (AM-PAC Scale): 40.78   CMS Modifier (G-Code): CK      Additional information:     THERAPEUTIC EXERCISES  Lower Extremity Ankle pumps  Glut sets  Hip AB/AD  Heel slides  Quad sets     Position Supine       Patient End of Session: Up in chair;Call light within reach;RN aware of session/findings;Bracing education provided per handout; All patient questions and concerns addressed    CURRENT GOALS     Patient Goal Patient's self-stated goal is: return to [facility]   Goal #1 Patient is able to demonstrate supine - sit EOB @ level: supervision     Goal #1   Current Status Mod a   Goal #2 Patient is able to demonstrate transfers EOB to/from Chair/Wheelchair at assistance level: supervision with none     Goal #2  Current Status NT   Goal #3 Patient is able to ambulate 25 feet with assist device: walker - rolling at assistance level: moderate assistance   Goal #3   Current Status NT   Goal #4 Patient to demonstrate independence with home activity/exercise instructions provided to patient in preparation for discharge.    Goal #4   Current Status In progress         PT Session Time: 30 minutes  Therapeutic Activity: 20 minutes  There ex-10 minutes

## 2023-04-14 NOTE — PLAN OF CARE
Problem: Patient Centered Care  Goal: Patient preferences are identified and integrated in the patient's plan of care  Description: Interventions:  - What would you like us to know as we care for you?   - Provide timely, complete, and accurate information to patient/family  - Incorporate patient and family knowledge, values, beliefs, and cultural backgrounds into the planning and delivery of care  - Encourage patient/family to participate in care and decision-making at the level they choose  - Honor patient and family perspectives and choices  Outcome: Progressing     Problem: PAIN - ADULT  Goal: Verbalizes/displays adequate comfort level or patient's stated pain goal  Description: INTERVENTIONS:  - Encourage pt to monitor pain and request assistance  - Assess pain using appropriate pain scale  - Administer analgesics based on type and severity of pain and evaluate response  - Implement non-pharmacological measures as appropriate and evaluate response  - Consider cultural and social influences on pain and pain management  - Manage/alleviate anxiety  - Utilize distraction and/or relaxation techniques  - Monitor for opioid side effects  - Notify MD/LIP if interventions unsuccessful or patient reports new pain  - Anticipate increased pain with activity and pre-medicate as appropriate  Outcome: Progressing     Problem: SAFETY ADULT - FALL  Goal: Free from fall injury  Description: INTERVENTIONS:  - Assess pt frequently for physical needs  - Identify cognitive and physical deficits and behaviors that affect risk of falls.   - [place] fall precautions as indicated by assessment.  - Educate pt/family on patient safety including physical limitations  - Instruct pt to call for assistance with activity based on assessment  - Modify environment to reduce risk of injury  - Provide assistive devices as appropriate  - Consider OT/PT consult to assist with strengthening/mobility  - Encourage toileting schedule  Outcome: Progressing     Problem: DISCHARGE PLANNING  Goal: Discharge to home or other facility with appropriate resources  Description: INTERVENTIONS:  - Identify barriers to discharge w/pt and caregiver  - Include patient/family/discharge partner in discharge planning  - Arrange for needed discharge resources and transportation as appropriate  - Identify discharge learning needs (meds, wound care, etc)  - Arrange for interpreters to assist at discharge as needed  - Consider post-discharge preferences of patient/family/discharge partner  - Complete POLST form as appropriate  - Assess patient's ability to be responsible for managing their own health  - Refer to Case Management Department for coordinating discharge planning if the patient needs post-hospital services based on physician/LIP order or complex needs related to functional status, cognitive ability or social support system  Outcome: Progressing     Pt alert and oriented. Surgical dressing CDI. [place] Lull in place. Hemovac in place. Eliquis for DVT prophylaxis. TTWB. Purewick in place overnight. Call light within reach. Bed in lowest and locked position. PICC line to be placed for long term abx. Plan for [facility] when cleared.

## 2023-04-14 NOTE — DISCHARGE INSTRUCTIONS
DISCHARGE INSTRUCTIONS:  PLACE ICE TO SURGICAL AREA 20-30 MINUTES ON/ 20-30 MINUTES OFF. THIS IS TO HELP WITH PAIN & SWELLING. STRICT POSTERIOR HIP PRECAUTIONS AT ALL TIMES AND ABDUCTION PILLOW WHEN IN BE OR CHAIR    ONLY ALLOWED TOE TOUCH WEIGHT BEARING ON THE OPERATIVE EXTREMITY. CALL TO CONFIRM YOUR FOLLOW UP APPOINTMENT WITH DR. Karlos Vasques TO BE SEEN IN 2-3 WEEKS POSTOP. [phone]    DRESSING:    YOU HAVE A SPECIAL DRESSING CALLED A PREVENA DRESSING, OVER YOUR INCISION. THIS IS A TYPE OF NEGATIVE PRESSURE DRESSING THAT KEEPS THE WOUND DRY. IT IS CONNECTED TO A CANNISTER. MAKE SURE THAT THE CANNISTER IS POWERED ON AND NOT OUT OF BATTERY. THE DRESSING STAYS FOR 7 DAYS FROM SURGERY. THIS DRESSING SHOULD BE CHANGED TO AN ANOTHER PREVENA DRESSING AT 7 DAYS POST-OPERATIVELY. WHICH STAYS ON FOR AN ADDITIONAL 7 DAYS THEN OK TO SWITCH TO AN AQUACEL DRESSING WHICH STAYS ON FOR 7 DAYS    YOU MAY SHOWER AS SOON AS THE AQUACEL DRESSING IS PLACED INSTEAD OF THE PREVENA DRESSING OVER YOUR INCISION AREA. IF THE DRESSING LEAKS OR COMES LOOSE CONTACT YOUR DOCTOR / SURGEON. AFTER   21 DAYS POST OPERATIVELY, THE AQUACEL DRESSING IS REMOVED BY PULLING ON THE EDGE OF THE DRESSING AND GENTLY STRETCHING IT, THEN PEEL IT OFF SLOWLY LIKE A BANDAID. IF YOU HAVE ANY QUESTIONS OR CONCERNS ABOUT THE DRESSING CONTACT YOUR DOCTOR. REASONS TO CALL YOUR DOCTOR:  TEMPERATURE .0 OR MORE  PERSISTANT NAUSEA OR VOMITTING - ESPECIALLY IF YOU ARE UNABLE TO EAT OR DRINK. INCREASED LEVEL OF PAIN OR PAIN WHICH IS NOT CONTROLLED BY YOUR PAIN MEDICINE. PERSISTENT DRAINAGE AT ANYTIME FROM YOUR SURGICAL AREA / INCISION. PAIN, TENDERNESS OR SUDDEN SWELLING IN YOUR CALF REGION OF THE LOWER EXTREMITIES - THIS IS A POSSIBLE SIGN OF A BLOOD CLOT. ANY CHANGES IN SENSATION IN YOUR BODY IN THE AREA OF YOUR SURGERY = NUMBNESS OR TINGLING.   ANY CHANGES IN CIRCULATION IN YOUR BODY IN THE AREA OF YOUR SURGERY = CHANGES  IN COLOR EITHER DARKER OR VERY PALE; OR  IF AREA FEELS COLD TO THE TOUCH AS COMPARED TO OTHER AREAS. ANY CHANGES IN FUNCTION IN YOUR BODY IN THE AREA OF YOUR SURGERY = CHANGE IN MOVEMENT - UNABLE TO MOVE OR WIGGLE FINGERS, TOES OR FOOT OR ANY OTHER CHANGES IN NORMAL BODY FUNCTIONING. FOR ANY OF THE ABOVE OR ANY OTHER QUESTIONS OR CONCERNS CALL YOUR DOCTOR/ SURGEON AS SOON AS POSSIBLE.       Julio C Tyson MD MPH  Orthopaedic Surgeon, Adult Hip and Knee Reconstruction  [facility] [address]  [place], [address] [place] [address]  Office: (D) [phone] (R) [phone]  Adminstrative Assistant: Mallorie Grimes

## 2023-04-15 LAB
ALBUMIN SERPL-MCNC: 1.8 G/DL (ref 3.4–5)
ANION GAP SERPL CALC-SCNC: 4 MMOL/L (ref 0–18)
ANTIBODY SCREEN: NEGATIVE
BLOOD TYPE BARCODE: 5100
BUN BLD-MCNC: 7 MG/DL (ref 7–18)
BUN/CREAT SERPL: 15.9 (ref 10–20)
CALCIUM BLD-MCNC: 8.3 MG/DL (ref 8.5–10.1)
CHLORIDE SERPL-SCNC: 104 MMOL/L (ref 98–112)
CO2 SERPL-SCNC: 32 MMOL/L (ref 21–32)
CREAT BLD-MCNC: 0.44 MG/DL
GFR SERPLBLD BASED ON 1.73 SQ M-ARVRAT: 105 ML/MIN/1.73M2 (ref 60–?)
GLUCOSE BLD-MCNC: 93 MG/DL (ref 70–99)
HGB BLD-MCNC: 10.1 G/DL
IRON SATN MFR SERPL: 7 %
IRON SERPL-MCNC: 14 UG/DL
OSMOLALITY SERPL CALC.SUM OF ELEC: 288 MOSM/KG (ref 275–295)
PHOSPHATE SERPL-MCNC: 3 MG/DL (ref 2.5–4.9)
POTASSIUM SERPL-SCNC: 3.7 MMOL/L (ref 3.5–5.1)
RH BLOOD TYPE: POSITIVE
SODIUM SERPL-SCNC: 140 MMOL/L (ref 136–145)
TIBC SERPL-MCNC: 204 UG/DL (ref 240–450)
TRANSFERRIN SERPL-MCNC: 137 MG/DL (ref 200–360)
UNIT VOLUME: 350 ML
VANCOMYCIN TROUGH SERPL-MCNC: 10.6 UG/ML (ref 10–20)

## 2023-04-15 PROCEDURE — 99233 SBSQ HOSP IP/OBS HIGH 50: CPT | Performed by: HOSPITALIST

## 2023-04-15 RX ORDER — SODIUM CHLORIDE 9 MG/ML
INJECTION, SOLUTION INTRAVENOUS ONCE
Status: COMPLETED | OUTPATIENT
Start: 2023-04-15 | End: 2023-04-15

## 2023-04-15 NOTE — PLAN OF CARE
Patient alert and orientated x4. Post-op day #3. Dressing in place to right hip-prevena in place. VSS. On room air. Tolerating general diet. Voiding via purewick. SCDs and Eliquis for DVT prophylaxis. PRN oxycodone and tylenol given for Pain medication provided as needed. TTWB to right hip. Fall precautions maintained- bed alarm on, bed locked in lowest position, call light and personal belongings within reach, non-skid socks in place to bilateral feet. Frequent rounding by nursing staff. Patient given 2 units of blood per MD orders-hgb after blood transfusion 10.1. Plan to return to [place] when medically cleared.                Problem: Patient Centered Care  Goal: Patient preferences are identified and integrated in the patient's plan of care  Description: Interventions:  - What would you like us to know as we care for you?   - Provide timely, complete, and accurate information to patient/family  - Incorporate patient and family knowledge, values, beliefs, and cultural backgrounds into the planning and delivery of care  - Encourage patient/family to participate in care and decision-making at the level they choose  - Honor patient and family perspectives and choices  Outcome: Progressing     Problem: PAIN - ADULT  Goal: Verbalizes/displays adequate comfort level or patient's stated pain goal  Description: INTERVENTIONS:  - Encourage pt to monitor pain and request assistance  - Assess pain using appropriate pain scale  - Administer analgesics based on type and severity of pain and evaluate response  - Implement non-pharmacological measures as appropriate and evaluate response  - Consider cultural and social influences on pain and pain management  - Manage/alleviate anxiety  - Utilize distraction and/or relaxation techniques  - Monitor for opioid side effects  - Notify MD/LIP if interventions unsuccessful or patient reports new pain  - Anticipate increased pain with activity and pre-medicate as appropriate  Outcome: Progressing     Problem: SAFETY ADULT - FALL  Goal: Free from fall injury  Description: INTERVENTIONS:  - Assess pt frequently for physical needs  - Identify cognitive and physical deficits and behaviors that affect risk of falls.   - [place] fall precautions as indicated by assessment.  - Educate pt/family on patient safety including physical limitations  - Instruct pt to call for assistance with activity based on assessment  - Modify environment to reduce risk of injury  - Provide assistive devices as appropriate  - Consider OT/PT consult to assist with strengthening/mobility  - Encourage toileting schedule  Outcome: Progressing     Problem: DISCHARGE PLANNING  Goal: Discharge to home or other facility with appropriate resources  Description: INTERVENTIONS:  - Identify barriers to discharge w/pt and caregiver  - Include patient/family/discharge partner in discharge planning  - Arrange for needed discharge resources and transportation as appropriate  - Identify discharge learning needs (meds, wound care, etc)  - Arrange for interpreters to assist at discharge as needed  - Consider post-discharge preferences of patient/family/discharge partner  - Complete POLST form as appropriate  - Assess patient's ability to be responsible for managing their own health  - Refer to Case Management Department for coordinating discharge planning if the patient needs post-hospital services based on physician/LIP order or complex needs related to functional status, cognitive ability or social support system  Outcome: Progressing

## 2023-04-15 NOTE — PLAN OF CARE
Patient is POD# 3. Aox4 on RA. IV Vancomycin given. 1x walker TTWB. Patient refused SCDs and Eliquis for DVT prophylaxis. Prevena wound vac in place to R Hip - No drainage noted. PICC line in place to L arm - L arm precautions. Extended PIV in R arm. Cardiac diet. Voiding via purewick. Oxycodone given for pain. Plan for [facility] pend choice.     Problem: Patient Centered Care  Goal: Patient preferences are identified and integrated in the patient's plan of care  Description: Interventions:  - What would you like us to know as we care for you?   - Provide timely, complete, and accurate information to patient/family  - Incorporate patient and family knowledge, values, beliefs, and cultural backgrounds into the planning and delivery of care  - Encourage patient/family to participate in care and decision-making at the level they choose  - Honor patient and family perspectives and choices  4/15/2023 0054 by Brittani Marques RN  Outcome: Progressing  4/15/2023 0053 by Brittani Marques RN  Outcome: Progressing     Problem: PAIN - ADULT  Goal: Verbalizes/displays adequate comfort level or patient's stated pain goal  Description: INTERVENTIONS:  - Encourage pt to monitor pain and request assistance  - Assess pain using appropriate pain scale  - Administer analgesics based on type and severity of pain and evaluate response  - Implement non-pharmacological measures as appropriate and evaluate response  - Consider cultural and social influences on pain and pain management  - Manage/alleviate anxiety  - Utilize distraction and/or relaxation techniques  - Monitor for opioid side effects  - Notify MD/LIP if interventions unsuccessful or patient reports new pain  - Anticipate increased pain with activity and pre-medicate as appropriate  4/15/2023 0054 by Brittani Marques RN  Outcome: Progressing  4/15/2023 0053 by Brittani Marques RN  Outcome: Progressing     Problem: SAFETY ADULT - FALL  Goal: Free from fall injury  Description: INTERVENTIONS:  - Assess pt frequently for physical needs  - Identify cognitive and physical deficits and behaviors that affect risk of falls.   - [place] fall precautions as indicated by assessment.  - Educate pt/family on patient safety including physical limitations  - Instruct pt to call for assistance with activity based on assessment  - Modify environment to reduce risk of injury  - Provide assistive devices as appropriate  - Consider OT/PT consult to assist with strengthening/mobility  - Encourage toileting schedule  4/15/2023 0054 by Terra Sargent RN  Outcome: Progressing  4/15/2023 0053 by Terra Sargent RN  Outcome: Progressing     Problem: DISCHARGE PLANNING  Goal: Discharge to home or other facility with appropriate resources  Description: INTERVENTIONS:  - Identify barriers to discharge w/pt and caregiver  - Include patient/family/discharge partner in discharge planning  - Arrange for needed discharge resources and transportation as appropriate  - Identify discharge learning needs (meds, wound care, etc)  - Arrange for interpreters to assist at discharge as needed  - Consider post-discharge preferences of patient/family/discharge partner  - Complete POLST form as appropriate  - Assess patient's ability to be responsible for managing their own health  - Refer to Case Management Department for coordinating discharge planning if the patient needs post-hospital services based on physician/LIP order or complex needs related to functional status, cognitive ability or social support system  4/15/2023 0054 by Terra Sargent RN  Outcome: Progressing  4/15/2023 [facility] by Terra Sargent RN  Outcome: Progressing

## 2023-04-16 LAB
BASOPHILS # BLD AUTO: 0.06 X10(3) UL (ref 0–0.2)
BASOPHILS NFR BLD AUTO: 0.8 %
BLOOD TYPE BARCODE: 5100
DEPRECATED RDW RBC AUTO: 48.9 FL (ref 35.1–46.3)
EOSINOPHIL # BLD AUTO: 0.51 X10(3) UL (ref 0–0.7)
EOSINOPHIL NFR BLD AUTO: 7.2 %
ERYTHROCYTE [DISTWIDTH] IN BLOOD BY AUTOMATED COUNT: 15.7 % (ref 11–15)
HCT VFR BLD AUTO: 28.5 %
HGB BLD-MCNC: 9.4 G/DL
IMM GRANULOCYTES # BLD AUTO: 0.06 X10(3) UL (ref 0–1)
IMM GRANULOCYTES NFR BLD: 0.8 %
LYMPHOCYTES # BLD AUTO: 1.02 X10(3) UL (ref 1–4)
LYMPHOCYTES NFR BLD AUTO: 14.4 %
MCH RBC QN AUTO: 28.4 PG (ref 26–34)
MCHC RBC AUTO-ENTMCNC: 33 G/DL (ref 31–37)
MCV RBC AUTO: 86.1 FL
MONOCYTES # BLD AUTO: 0.64 X10(3) UL (ref 0.1–1)
MONOCYTES NFR BLD AUTO: 9 %
NEUTROPHILS # BLD AUTO: 4.81 X10 (3) UL (ref 1.5–7.7)
NEUTROPHILS # BLD AUTO: 4.81 X10(3) UL (ref 1.5–7.7)
NEUTROPHILS NFR BLD AUTO: 67.8 %
PLATELET # BLD AUTO: 295 10(3)UL (ref 150–450)
RBC # BLD AUTO: 3.31 X10(6)UL
UNIT VOLUME: 350 ML
WBC # BLD AUTO: 7.1 X10(3) UL (ref 4–11)

## 2023-04-16 PROCEDURE — 99233 SBSQ HOSP IP/OBS HIGH 50: CPT | Performed by: HOSPITALIST

## 2023-04-16 NOTE — PLAN OF CARE
Gab Whitehead is alert and oriented. She is on room air. Pain is being managed with oral oxycontin and tylenol. She is voiding per homer. picc line maintained. Prevena wound vac in place. Battery was dead at the beginning of the shift. Connected to power adapter and suction has been maintained. Cup changed once. The plan is for return to [facility] once medically cleared.

## 2023-04-16 NOTE — CM/SW NOTE
SW uploaded current clinicals to [employer] via [id number].     LUCIEN Nieto, [facility]  Social Work   QCG:#[id number]

## 2023-04-16 NOTE — PHYSICAL THERAPY NOTE
PHYSICAL THERAPY TREATMENT NOTE - INPATIENT     Room Number: 926/759-P       Presenting Problem: cellulitis of right lower extremity, s/p R hip I&D with head liner exchange    Problem List  Principal Problem:    Cellulitis of right lower extremity  Active Problems:    Hypoxia      PHYSICAL THERAPY ASSESSMENT   Chart reviewed. RN Narciso Vu approved participation in physical therapy. PPE worn by therapist: mask and gloves. Patient was not wearing a mask during session. Patient presented in bed with 7/10 pain. Patient with good  progress towards goals during this session. Education provided on Total Hip precautions, Weight bearing status, Physical therapy plan of care and physiological benefits of out of bed mobility. Patient with good carryover. Patient wants to stay in bed d/t weakness and her weight bearing status. She was sitting at EOB ~15 min with postural awareness, exer BLE/UE with gentle exer with cues or proper performance. Bed mobility: Mod assist  Transfers: NT  Gait Assistance: Not tested       Patient was left in bed at end of session with all needs in reach. The patient's Approx Degree of Impairment: 68.66% has been calculated based on documentation in the [facility] '6 clicks' Inpatient Basic Mobility Short Form. Research supports that patients with this level of impairment may benefit from Subacute Rehab. RN aware of patient status post session. DISCHARGE RECOMMENDATIONS  PT Discharge Recommendations: Sub-acute rehabilitation     PLAN  PT Treatment Plan: Endurance; Body mechanics;Gait training;Transfer training;Balance training;Patient education    SUBJECTIVE  I want to stay in bed. OBJECTIVE  Precautions: COLTON - posterior    WEIGHT BEARING RESTRICTION  Weight Bearing Restriction: R lower extremity        R Lower Extremity: Toe Touch Weight Bearing       PAIN ASSESSMENT   Ratin  Location: rt hip  Management Techniques:  Activity promotion;Relaxation;Repositioning    BALANCE Static Sitting: Good  Dynamic Sitting: Fair +           Static Standing: Not tested  Dynamic Standing: Not tested    ACTIVITY TOLERANCE                         O2 WALK       AM-PAC '6-Clicks' INPATIENT SHORT FORM - BASIC MOBILITY  How much difficulty does the patient currently have. .. Patient Difficulty: Turning over in bed (including adjusting bedclothes, sheets and blankets)?: A Little   Patient Difficulty: Sitting down on and standing up from a chair with arms (e.g., wheelchair, bedside commode, etc.): A Lot   Patient Difficulty: Moving from lying on back to sitting on the side of the bed?: A Lot   How much help from another person does the patient currently need. .. Help from Another: Moving to and from a bed to a chair (including a wheelchair)?: A Little   Help from Another: Need to walk in hospital room?: Total   Help from Another: Climbing 3-5 steps with a railing?: Total     AM-PAC Score:  Raw Score: 12   Approx Degree of Impairment: 68.66%   Standardized Score (AM-PAC Scale): 35.33   CMS Modifier (G-Code): CL      Additional information:     THERAPEUTIC EXERCISES  Lower Extremity Alternating marching  Ankle pumps  Heel raises  Knee extension  Quad sets     Position Sitting and Supine       Patient End of Session: Up in chair; All patient questions and concerns addressed;Call light within reach;RN aware of session/findings; Needs met; Alarm set    CURRENT GOALS     Goals to be met by: 4/27/23  Patient Goal Patient's self-stated goal is: return to [facility]   Goal #1 Patient is able to demonstrate supine - sit EOB @ level: supervision     Goal #1   Current Status    Goal #2 Patient is able to demonstrate transfers EOB to/from Chair/Wheelchair at assistance level: supervision with none     Goal #2  Current Status    Goal #3 Patient is able to ambulate 25 feet with assist device: walker - rolling at assistance level: moderate assistance   Goal #3   Current Status    Goal #4 Patient to demonstrate independence with home activity/exercise instructions provided to patient in preparation for discharge.    Goal #4   Current Status      PT Session Time: 30 minutes  Gait Training:  minutes  Therapeutic Activity: 20 minutes  Neuromuscular Re-education: 10 minutes  Therapeutic Exercise:  minutes   [place] Repositioning:  minutes  Manual Therapy:  minutes  Can add/delete any of these

## 2023-04-16 NOTE — PLAN OF CARE
Pt is A&Ox4. RA. SCDs and eliquis for dvt prophylaxis. Purewick in place. Last BM was 4/16. PRN oxy for pain management. Up 1-2 assist, rolling walker, TTWB RLE. Saline locked. General diet. R hip-prevena. PICC in place, left arm precautions. Plan for [facility] pending [facility]. Problem: Patient Centered Care  Goal: Patient preferences are identified and integrated in the patient's plan of care  Description: Interventions:  - What would you like us to know as we care for you? I need pain meds every 4 hours  - Provide timely, complete, and accurate information to patient/family  - Incorporate patient and family knowledge, values, beliefs, and cultural backgrounds into the planning and delivery of care  - Encourage patient/family to participate in care and decision-making at the level they choose  - Honor patient and family perspectives and choices  Outcome: Progressing     Problem: PAIN - ADULT  Goal: Verbalizes/displays adequate comfort level or patient's stated pain goal  Description: INTERVENTIONS:  - Encourage pt to monitor pain and request assistance  - Assess pain using appropriate pain scale  - Administer analgesics based on type and severity of pain and evaluate response  - Implement non-pharmacological measures as appropriate and evaluate response  - Consider cultural and social influences on pain and pain management  - Manage/alleviate anxiety  - Utilize distraction and/or relaxation techniques  - Monitor for opioid side effects  - Notify MD/LIP if interventions unsuccessful or patient reports new pain  - Anticipate increased pain with activity and pre-medicate as appropriate  Outcome: Progressing     Problem: SAFETY ADULT - FALL  Goal: Free from fall injury  Description: INTERVENTIONS:  - Assess pt frequently for physical needs  - Identify cognitive and physical deficits and behaviors that affect risk of falls.   - [place] fall precautions as indicated by assessment.  - Educate pt/family on patient safety including physical limitations  - Instruct pt to call for assistance with activity based on assessment  - Modify environment to reduce risk of injury  - Provide assistive devices as appropriate  - Consider OT/PT consult to assist with strengthening/mobility  - Encourage toileting schedule  Outcome: Progressing     Problem: DISCHARGE PLANNING  Goal: Discharge to home or other facility with appropriate resources  Description: INTERVENTIONS:  - Identify barriers to discharge w/pt and caregiver  - Include patient/family/discharge partner in discharge planning  - Arrange for needed discharge resources and transportation as appropriate  - Identify discharge learning needs (meds, wound care, etc)  - Arrange for interpreters to assist at discharge as needed  - Consider post-discharge preferences of patient/family/discharge partner  - Complete POLST form as appropriate  - Assess patient's ability to be responsible for managing their own health  - Refer to Case Management Department for coordinating discharge planning if the patient needs post-hospital services based on physician/LIP order or complex needs related to functional status, cognitive ability or social support system  Outcome: Progressing

## 2023-04-17 VITALS
DIASTOLIC BLOOD PRESSURE: 88 MMHG | SYSTOLIC BLOOD PRESSURE: 157 MMHG | RESPIRATION RATE: 18 BRPM | HEIGHT: 64 IN | BODY MASS INDEX: 38.35 KG/M2 | OXYGEN SATURATION: 93 % | HEART RATE: 79 BPM | TEMPERATURE: 98 F | WEIGHT: 224.63 LBS

## 2023-04-17 PROCEDURE — 99239 HOSP IP/OBS DSCHRG MGMT >30: CPT | Performed by: HOSPITALIST

## 2023-04-17 RX ORDER — OXYCODONE HYDROCHLORIDE 5 MG/1
5 TABLET ORAL EVERY 4 HOURS PRN
Qty: 30 TABLET | Refills: 0 | Status: SHIPPED | OUTPATIENT
Start: 2023-04-17

## 2023-04-17 RX ORDER — VANCOMYCIN HYDROCHLORIDE
1.5 EVERY 24 HOURS
Qty: 9250 ML | Refills: 0 | Status: SHIPPED | OUTPATIENT
Start: 2023-04-17 | End: 2023-05-24

## 2023-04-17 NOTE — PHYSICAL THERAPY NOTE
PHYSICAL THERAPY TREATMENT NOTE - INPATIENT     Room Number: 773/980-V       Presenting Problem: cellulitis of right lower extremity, s/p R hip I&D with head liner exchange    Problem List  Principal Problem:    Cellulitis of right lower extremity  Active Problems:    Hypoxia      PHYSICAL THERAPY ASSESSMENT   Chart reviewed. RN Cassidy Velez approved participation in physical therapy. PPE worn by therapist: mask and gloves, gown. Patient was not wearing a mask during session. Patient presented in bed with 3/10 pain. Patient with good  progress towards goals during this session. Education provided on Total Hip precautions, Weight bearing status, Physical therapy plan of care and physiological benefits of out of bed mobility. Patient with good carryover. Patient wants to stay in bed, able to sit EOB for 15 min. Bed mobility: Min assist  Transfers: nt  Gait Assistance: Not tested      Patient was left in bed and alarm activated at end of session with all needs in reach. The patient's Approx Degree of Impairment: 68.66% has been calculated based on documentation in the [facility] '6 clicks' Inpatient Basic Mobility Short Form. Research supports that patients with this level of impairment may benefit from Subacute Rehab. RN aware of patient status post session. DISCHARGE RECOMMENDATIONS  PT Discharge Recommendations: Sub-acute rehabilitation     PLAN  PT Treatment Plan: Endurance; Body mechanics;Gait training;Transfer training;Balance training    SUBJECTIVE  I feel good    OBJECTIVE  Precautions: COL[place] - posterior    WEIGHT BEARING RESTRICTION  Weight Bearing Restriction: R lower extremity        R Lower Extremity: Toe Touch Weight Bearing       PAIN ASSESSMENT   Ratin  Location: rt hip  Management Techniques: Repositioning; Body mechanics    BALANCE                                                                                                                       Static Sitting: Good  Dynamic Sitting: Not tested Static Standing: Not tested  Dynamic Standing: Not tested    ACTIVITY TOLERANCE                         O2 WALK       AM-PAC '6-Clicks' INPATIENT SHORT FORM - BASIC MOBILITY  How much difficulty does the patient currently have. .. Patient Difficulty: Turning over in bed (including adjusting bedclothes, sheets and blankets)?: A Little   Patient Difficulty: Sitting down on and standing up from a chair with arms (e.g., wheelchair, bedside commode, etc.): A Lot   Patient Difficulty: Moving from lying on back to sitting on the side of the bed?: A Lot   How much help from another person does the patient currently need. .. Help from Another: Moving to and from a bed to a chair (including a wheelchair)?: A Little   Help from Another: Need to walk in hospital room?: Total   Help from Another: Climbing 3-5 steps with a railing?: Total     AM-PAC Score:  Raw Score: 12   Approx Degree of Impairment: 68.66%   Standardized Score (AM-PAC Scale): 35.33   CMS Modifier (G-Code): CL      Additional information:     THERAPEUTIC EXERCISES  Lower Extremity Alternating marching  Ankle pumps  Heel slides  Knee extension  Quad sets     Position Sitting and Supine       Patient End of Session: In bed; All patient questions and concerns addressed;Call light within reach; Needs met; With [facility] staff    CURRENT GOALS     Goals to be met by: 4/27/23  Patient Goal Patient's self-stated goal is: return to [facility]   Goal #1 Patient is able to demonstrate supine - sit EOB @ level: supervision     Goal #1   Current Status Min A   Goal #2 Patient is able to demonstrate transfers EOB to/from Chair/Wheelchair at assistance level: supervision with none     Goal #2  Current Status NT   Goal #3 Patient is able to ambulate 25 feet with assist device: walker - rolling at assistance level: moderate assistance   Goal #3   Current Status NT   Goal #4 Patient to demonstrate independence with home activity/exercise instructions provided to patient in preparation for discharge.    Goal #4   Current Status In progress

## 2023-04-17 NOTE — CM/SW NOTE
04/17/23 1000   Discharge disposition   Expected discharge disposition [address] Provider Warren Grubbs   Discharge transportation [address]   Pt discussed during nursing rounds. Pt is stable for dc today. MD dc order entered. Spoke with Yael Hale In admissions, pt can return today. RN to call report: [phone]    Plan: Enma Mckeon arranged for pickup today at 5pm to take pt to [facility]. PCS form completed in Epic, RN to print with AVS. Patient notified transport is not covered by insurance at bedside. Pt/family are agreeable to the charges and dc. Pt inquired if she will be in the same room with the same roommate, if so she is requesting to be moved. Return call placed to Tasha Reyes at [facility], she is working on moving pts room. No private room at this time. Pt is on a list.     Tasha Reyes also states that pt is still covered under her Medicare policy however she does not have  supplement so she has a daily OOP co-pay. [employer] updated. / to remain available for support and/or discharge planning. Jacquelin Short.  Gemini Tijerina RN, BSN  Nurse   [phone]

## 2023-05-11 ENCOUNTER — APPOINTMENT (OUTPATIENT)
Dept: GENERAL RADIOLOGY | Facility: HOSPITAL | Age: 68
End: 2023-05-11
Payer: OTHER GOVERNMENT

## 2023-05-11 ENCOUNTER — HOSPITAL ENCOUNTER (INPATIENT)
Facility: HOSPITAL | Age: 68
LOS: 8 days | Discharge: NURSING, REHAB OR LONG-TERM CARE | End: 2023-05-20
Attending: STUDENT IN AN ORGANIZED HEALTH CARE EDUCATION/TRAINING PROGRAM | Admitting: HOSPITALIST
Payer: OTHER GOVERNMENT

## 2023-05-11 DIAGNOSIS — T84.59XA PROSTHETIC HIP INFECTION, INITIAL ENCOUNTER (HCC): ICD-10-CM

## 2023-05-11 DIAGNOSIS — S73.004A HIP DISLOCATION, RIGHT, INITIAL ENCOUNTER (HCC): Primary | ICD-10-CM

## 2023-05-11 DIAGNOSIS — Z96.649 PROSTHETIC HIP INFECTION, INITIAL ENCOUNTER (HCC): ICD-10-CM

## 2023-05-11 PROCEDURE — 73552 X-RAY EXAM OF FEMUR 2/>: CPT

## 2023-05-11 PROCEDURE — 72170 X-RAY EXAM OF PELVIS: CPT

## 2023-05-12 PROBLEM — T84.59XA PROSTHETIC HIP INFECTION, INITIAL ENCOUNTER: Status: ACTIVE | Noted: 2023-05-12

## 2023-05-12 PROBLEM — Z96.649 PROSTHETIC HIP INFECTION, INITIAL ENCOUNTER: Status: ACTIVE | Noted: 2023-05-12

## 2023-05-12 PROBLEM — S73.004A HIP DISLOCATION, RIGHT, INITIAL ENCOUNTER (HCC): Status: ACTIVE | Noted: 2023-05-12

## 2023-05-12 PROBLEM — Z96.649 PROSTHETIC HIP INFECTION, INITIAL ENCOUNTER (HCC): Status: ACTIVE | Noted: 2023-05-12

## 2023-05-12 PROBLEM — T84.59XA PROSTHETIC HIP INFECTION, INITIAL ENCOUNTER (HCC): Status: ACTIVE | Noted: 2023-05-12

## 2023-05-12 LAB
ANION GAP SERPL CALC-SCNC: 4 MMOL/L (ref 0–18)
ANION GAP SERPL CALC-SCNC: 6 MMOL/L (ref 0–18)
ANTIBODY SCREEN: NEGATIVE
APTT PPP: 40.6 SECONDS (ref 23.3–35.6)
BASOPHILS # BLD AUTO: 0.09 X10(3) UL (ref 0–0.2)
BASOPHILS # BLD AUTO: 0.09 X10(3) UL (ref 0–0.2)
BASOPHILS NFR BLD AUTO: 1.7 %
BASOPHILS NFR BLD AUTO: 1.8 %
BUN BLD-MCNC: 10 MG/DL (ref 7–18)
BUN BLD-MCNC: 8 MG/DL (ref 7–18)
BUN/CREAT SERPL: 15.4 (ref 10–20)
BUN/CREAT SERPL: 17.2 (ref 10–20)
CALCIUM BLD-MCNC: 8.4 MG/DL (ref 8.5–10.1)
CALCIUM BLD-MCNC: 8.9 MG/DL (ref 8.5–10.1)
CHLORIDE SERPL-SCNC: 103 MMOL/L (ref 98–112)
CHLORIDE SERPL-SCNC: 105 MMOL/L (ref 98–112)
CO2 SERPL-SCNC: 29 MMOL/L (ref 21–32)
CO2 SERPL-SCNC: 32 MMOL/L (ref 21–32)
CREAT BLD-MCNC: 0.52 MG/DL
CREAT BLD-MCNC: 0.58 MG/DL
CRP SERPL-MCNC: 11 MG/DL (ref ?–0.3)
DEPRECATED RDW RBC AUTO: 50.4 FL (ref 35.1–46.3)
DEPRECATED RDW RBC AUTO: 51.5 FL (ref 35.1–46.3)
EOSINOPHIL # BLD AUTO: 0.55 X10(3) UL (ref 0–0.7)
EOSINOPHIL # BLD AUTO: 0.59 X10(3) UL (ref 0–0.7)
EOSINOPHIL NFR BLD AUTO: 10.7 %
EOSINOPHIL NFR BLD AUTO: 10.8 %
ERYTHROCYTE [DISTWIDTH] IN BLOOD BY AUTOMATED COUNT: 15.9 % (ref 11–15)
ERYTHROCYTE [DISTWIDTH] IN BLOOD BY AUTOMATED COUNT: 15.9 % (ref 11–15)
ERYTHROCYTE [SEDIMENTATION RATE] IN BLOOD: 129 MM/HR
GFR SERPLBLD BASED ON 1.73 SQ M-ARVRAT: 101 ML/MIN/1.73M2 (ref 60–?)
GFR SERPLBLD BASED ON 1.73 SQ M-ARVRAT: 98 ML/MIN/1.73M2 (ref 60–?)
GLUCOSE BLD-MCNC: 98 MG/DL (ref 70–99)
GLUCOSE BLD-MCNC: 99 MG/DL (ref 70–99)
HCT VFR BLD AUTO: 26.1 %
HCT VFR BLD AUTO: 27.9 %
HCT VFR BLD AUTO: 29 %
HGB BLD-MCNC: 8.3 G/DL
HGB BLD-MCNC: 8.7 G/DL
HGB BLD-MCNC: 9.1 G/DL
IMM GRANULOCYTES # BLD AUTO: 0.01 X10(3) UL (ref 0–1)
IMM GRANULOCYTES # BLD AUTO: 0.01 X10(3) UL (ref 0–1)
IMM GRANULOCYTES NFR BLD: 0.2 %
IMM GRANULOCYTES NFR BLD: 0.2 %
INR BLD: 1.23 (ref 0.85–1.16)
LYMPHOCYTES # BLD AUTO: 1 X10(3) UL (ref 1–4)
LYMPHOCYTES # BLD AUTO: 1.03 X10(3) UL (ref 1–4)
LYMPHOCYTES NFR BLD AUTO: 18.9 %
LYMPHOCYTES NFR BLD AUTO: 19.5 %
MAGNESIUM SERPL-MCNC: 1.6 MG/DL (ref 1.6–2.6)
MAGNESIUM SERPL-MCNC: 1.8 MG/DL (ref 1.6–2.6)
MCH RBC QN AUTO: 27.4 PG (ref 26–34)
MCH RBC QN AUTO: 27.6 PG (ref 26–34)
MCHC RBC AUTO-ENTMCNC: 31.2 G/DL (ref 31–37)
MCHC RBC AUTO-ENTMCNC: 31.8 G/DL (ref 31–37)
MCV RBC AUTO: 86.7 FL
MCV RBC AUTO: 87.7 FL
MONOCYTES # BLD AUTO: 0.73 X10(3) UL (ref 0.1–1)
MONOCYTES # BLD AUTO: 0.84 X10(3) UL (ref 0.1–1)
MONOCYTES NFR BLD AUTO: 14.2 %
MONOCYTES NFR BLD AUTO: 15.4 %
MRSA DNA SPEC QL NAA+PROBE: NEGATIVE
NEUTROPHILS # BLD AUTO: 2.76 X10 (3) UL (ref 1.5–7.7)
NEUTROPHILS # BLD AUTO: 2.76 X10(3) UL (ref 1.5–7.7)
NEUTROPHILS # BLD AUTO: 2.88 X10 (3) UL (ref 1.5–7.7)
NEUTROPHILS # BLD AUTO: 2.88 X10(3) UL (ref 1.5–7.7)
NEUTROPHILS NFR BLD AUTO: 53 %
NEUTROPHILS NFR BLD AUTO: 53.6 %
OSMOLALITY SERPL CALC.SUM OF ELEC: 285 MOSM/KG (ref 275–295)
OSMOLALITY SERPL CALC.SUM OF ELEC: 290 MOSM/KG (ref 275–295)
PHOSPHATE SERPL-MCNC: 4.2 MG/DL (ref 2.5–4.9)
PLATELET # BLD AUTO: 269 10(3)UL (ref 150–450)
PLATELET # BLD AUTO: 286 10(3)UL (ref 150–450)
POTASSIUM SERPL-SCNC: 3.4 MMOL/L (ref 3.5–5.1)
POTASSIUM SERPL-SCNC: 3.4 MMOL/L (ref 3.5–5.1)
PROTHROMBIN TIME: 15.4 SECONDS (ref 11.6–14.8)
RBC # BLD AUTO: 3.01 X10(6)UL
RBC # BLD AUTO: 3.18 X10(6)UL
RH BLOOD TYPE: POSITIVE
SARS-COV-2 RNA RESP QL NAA+PROBE: NOT DETECTED
SODIUM SERPL-SCNC: 138 MMOL/L (ref 136–145)
SODIUM SERPL-SCNC: 141 MMOL/L (ref 136–145)
WBC # BLD AUTO: 5.1 X10(3) UL (ref 4–11)
WBC # BLD AUTO: 5.4 X10(3) UL (ref 4–11)

## 2023-05-12 PROCEDURE — 99222 1ST HOSP IP/OBS MODERATE 55: CPT | Performed by: HOSPITALIST

## 2023-05-12 RX ORDER — VANCOMYCIN HYDROCHLORIDE
1500 EVERY 24 HOURS
Status: DISCONTINUED | OUTPATIENT
Start: 2023-05-13 | End: 2023-05-20

## 2023-05-12 RX ORDER — SODIUM CHLORIDE 9 MG/ML
INJECTION, SOLUTION INTRAVENOUS ONCE
Status: COMPLETED | OUTPATIENT
Start: 2023-05-12 | End: 2023-05-12

## 2023-05-12 RX ORDER — PREGABALIN 75 MG/1
75 CAPSULE ORAL NIGHTLY
Status: DISCONTINUED | OUTPATIENT
Start: 2023-05-12 | End: 2023-05-20

## 2023-05-12 RX ORDER — LEFLUNOMIDE 20 MG/1
20 TABLET ORAL DAILY
Status: DISCONTINUED | OUTPATIENT
Start: 2023-05-12 | End: 2023-05-14

## 2023-05-12 RX ORDER — MORPHINE SULFATE 2 MG/ML
2 INJECTION, SOLUTION INTRAMUSCULAR; INTRAVENOUS ONCE
Status: COMPLETED | OUTPATIENT
Start: 2023-05-12 | End: 2023-05-12

## 2023-05-12 RX ORDER — ONDANSETRON 2 MG/ML
4 INJECTION INTRAMUSCULAR; INTRAVENOUS EVERY 6 HOURS PRN
Status: DISCONTINUED | OUTPATIENT
Start: 2023-05-12 | End: 2023-05-17

## 2023-05-12 RX ORDER — PREGABALIN 50 MG/1
50 CAPSULE ORAL DAILY
Status: DISCONTINUED | OUTPATIENT
Start: 2023-05-12 | End: 2023-05-12

## 2023-05-12 RX ORDER — MORPHINE SULFATE 2 MG/ML
2 INJECTION, SOLUTION INTRAMUSCULAR; INTRAVENOUS EVERY 4 HOURS PRN
Status: DISCONTINUED | OUTPATIENT
Start: 2023-05-12 | End: 2023-05-17

## 2023-05-12 RX ORDER — POTASSIUM CHLORIDE 20 MEQ/1
40 TABLET, EXTENDED RELEASE ORAL ONCE
Status: COMPLETED | OUTPATIENT
Start: 2023-05-12 | End: 2023-05-12

## 2023-05-12 RX ORDER — MAGNESIUM OXIDE 400 MG/1
400 TABLET ORAL DAILY
Status: DISCONTINUED | OUTPATIENT
Start: 2023-05-13 | End: 2023-05-15

## 2023-05-12 RX ORDER — MAGNESIUM OXIDE 400 MG/1
400 TABLET ORAL ONCE
Status: COMPLETED | OUTPATIENT
Start: 2023-05-12 | End: 2023-05-12

## 2023-05-12 RX ORDER — OXYCODONE HYDROCHLORIDE 5 MG/1
5 TABLET ORAL EVERY 4 HOURS PRN
Status: DISCONTINUED | OUTPATIENT
Start: 2023-05-12 | End: 2023-05-20

## 2023-05-12 RX ORDER — POLYETHYLENE GLYCOL 3350 17 G/17G
17 POWDER, FOR SOLUTION ORAL DAILY PRN
Status: DISCONTINUED | OUTPATIENT
Start: 2023-05-12 | End: 2023-05-17

## 2023-05-12 RX ORDER — MELATONIN
5000 DAILY
Status: DISCONTINUED | OUTPATIENT
Start: 2023-05-12 | End: 2023-05-20

## 2023-05-12 RX ORDER — SODIUM CHLORIDE 9 MG/ML
INJECTION, SOLUTION INTRAVENOUS CONTINUOUS
Status: DISCONTINUED | OUTPATIENT
Start: 2023-05-12 | End: 2023-05-13

## 2023-05-12 RX ORDER — HEPARIN SODIUM 5000 [USP'U]/ML
5000 INJECTION, SOLUTION INTRAVENOUS; SUBCUTANEOUS EVERY 12 HOURS SCHEDULED
Status: DISCONTINUED | OUTPATIENT
Start: 2023-05-12 | End: 2023-05-17

## 2023-05-12 RX ORDER — MORPHINE SULFATE 2 MG/ML
4 INJECTION, SOLUTION INTRAMUSCULAR; INTRAVENOUS EVERY 4 HOURS PRN
Status: DISCONTINUED | OUTPATIENT
Start: 2023-05-12 | End: 2023-05-17

## 2023-05-12 RX ORDER — ALBUTEROL SULFATE 90 UG/1
2 AEROSOL, METERED RESPIRATORY (INHALATION) EVERY 6 HOURS PRN
Status: DISCONTINUED | OUTPATIENT
Start: 2023-05-12 | End: 2023-05-20

## 2023-05-12 RX ORDER — FLUTICASONE FUROATE AND VILANTEROL 200; 25 UG/1; UG/1
1 POWDER RESPIRATORY (INHALATION) DAILY
Status: DISCONTINUED | OUTPATIENT
Start: 2023-05-12 | End: 2023-05-20

## 2023-05-12 RX ORDER — ACETAMINOPHEN 325 MG/1
650 TABLET ORAL EVERY 6 HOURS PRN
Status: DISCONTINUED | OUTPATIENT
Start: 2023-05-12 | End: 2023-05-20

## 2023-05-12 RX ORDER — IPRATROPIUM BROMIDE 42 UG/1
2 SPRAY, METERED NASAL 2 TIMES DAILY
Status: DISCONTINUED | OUTPATIENT
Start: 2023-05-12 | End: 2023-05-20

## 2023-05-12 RX ORDER — PREGABALIN 50 MG/1
50 CAPSULE ORAL DAILY
Status: DISCONTINUED | OUTPATIENT
Start: 2023-05-12 | End: 2023-05-20

## 2023-05-12 RX ORDER — SODIUM FLUORIDE 5 MG/G
1 GEL, DENTIFRICE DENTAL DAILY
Status: DISCONTINUED | OUTPATIENT
Start: 2023-05-12 | End: 2023-05-12 | Stop reason: RX

## 2023-05-12 NOTE — H&P
[facility]    PATIENT'S NAME: Heather Vásquez   ATTENDING PHYSICIAN: Mlian Klein MD   PATIENT ACCOUNT#:   [de-identified]    LOCATION:  [address] [place] [address] RECORD #:   [id number]       YOB: 1955  ADMISSION DATE:       05/11/2023    HISTORY AND PHYSICAL EXAMINATION    DATE OF EXAMINATION:  05/12/2023    CHIEF COMPLAINT:  Left hip pain. HISTORY OF PRESENT ILLNESS:  This is an unfortunate 61-year-old woman who underwent left total hip arthroplasty in December 2022 secondary to osteoarthritis. In March 2023 she sustained acetabular loosening and underwent revision total hip arthroplasty at that time. In April of this year she developed a joint infection on the right and had a revision of the right total hip with removal of the right total hip prosthesis with irrigation, debridement, and modular component exchange as well as placement of a wound VAC. She has been on IV vancomycin since at a rehab facility, and the patient states that the wound care nurse tells her that the hip wound is getting progressively smaller. She has to lay on her left side with her right hip exposed to have the wound VAC changed. She has been in this position essentially every time the nurses reapplied the wound VAC. She laid there for about 45 minutes. There were no slips, falls, or stresses placed on the right hip, but the patient noticed after the nurse left and she got up out of bed that this leg felt numb. She was able to ambulate to the bathroom, but said that the leg felt fine. As the day wore on, this sensation resolved. That occurred on Wednesday, 05/10. She had a routine followup appointment with Dr. Willard Collazo on the day of admission and she did tell them about her complaints. An x-ray was taken and it revealed a superior dislocation of the femoral component of the right hip arthroplasty relative to the acetabular component. There was no definite acute appearing right pelvic fracture.    Keegan Calderón recommended the patient present to the emergency room with an apparent plan for removal of the current prosthesis, insertion of an antibiotic spacer, and return at a later date for revision of the right total hip arthroplasty. The patient, as I said, has been receiving vancomycin daily at the nursing facility. She denies any fever or chills. She has had some nausea on and off. She says that her appetite has been good. She finds the food at the facility to be excellent. She denies any dysuria, increased frequency of urination, diarrhea, melena, or hematochezia, but she does tend to be mildly constipated at times. The remainder of her workup in the emergency room tonight included a glucose of 99, sodium of 138, potassium of 3.4, chloride of 103, CO2 of 29, BUN of 10 with a creatinine of 0.58, calcium of 8.9, anion gap of 6. Her CRP was 11. White count 5.4 with a hemoglobin of 8.7 and a platelet count of 196,975; there were 53% neutrophils. Rapid COVID testing was negative. The patient was admitted for further evaluation by Dr. Keegan Calderón. An infectious disease consultation was also obtained to guide antibiotic choices.     PAST MEDICAL HISTORY:  Significant for Achilles tendinitis; arthritis; asthma; atherosclerosis; chronic back pain with previous lumbar back surgery; breast cancer, DCIS; carpal tunnel syndrome bilaterally; chronic foot pain; history of peptic duodenitis; dyslipidemia; hypertension; fatigue; fibromyalgia; left foot fracture; H pylori infection; hammertoe of second toe of the right foot; hypertension; hypercalcemia; irritable bowel syndrome; hemorrhoids; irregular Z-line of the esophagus; primary carpometacarpal osteoarthritis; history of lupus, which is in remission on her current medications; macular degeneration bilaterally, which remains stable; osteomyelitis of the right foot; osteoporosis; postmenopausal atrophic vaginitis; pulmonary fibrosis, possibly secondary to Sjogren's; rheumatoid arthritis; sicca syndrome; stress fracture of the right foot; systemic lupus erythematosus, in remission; vitamin D deficiency; status post appendectomy; status post adenoidectomy; status post lumbar surgery; bilateral carpal tunnel release; bilateral cataract extractions with intraocular lens implants; shave biopsy of a skin lesion on the right shoulder; revision of the right foot surgery; first MTM joint fusion with ORIF of second and third MTs with correction of second hammertoe, on the right foot; left knee replacement surgery; lumpectomy on the right; injections for trigger thumbs bilaterally; MammoSite radiation in 2004; tonsillectomy; total knee replacement on the left in 2018. MEDICATIONS:  Prior to admission:  Vancomycin 1.5 g IV daily; calcium carbonate 500/10 mg daily; cevimeline 30 mg 3 times a day; multivitamin once a day; oxycodone 5 mg every 4 hours as needed for pain; apixaban 2.5 mg twice a day; naloxone 4 mg intranasally as needed secondary to opioid overdose; sennosides 17.2 mg total by mouth nightly; MiraLAX 17 g daily as needed for constipation; Breo Ellipta 200/25 one inhalation daily; hydrochlorothiazide 25 mg daily; hydroxychloroquine 200 mg twice a day; leflunomide 1 tablet daily; pregabalin 50 mg in the morning with 75 mg in the evening; acetaminophen 1000 mg every 6 hours as needed for pain; zinc 50 mg daily with breakfast; ipratropium bromide 0.06% nasal solution 2 sprays intranasally twice a day; vitamin D3 at 125 mcg daily; quercetin tablets 1 daily; albuterol 2 inhalations every 6 hours as needed for wheezing; EpiPen as needed; PreviDent 5000 Dry Mouth 1.1% dental gel applied on teeth daily. ALLERGIES:  Bee stings, ioxaglate, NSAIDs, radiology contrast dyes, erythromycin, and Vicryl sutures. FAMILY HISTORY:  The patient's father had heart disease and diabetes. Her mother had diabetes and asthma. She has a sister with diabetes and fibromyalgia.     SOCIAL HISTORY: The patient is a former smoker, has a 37 pack-year tobacco history and quit about 16 years ago. Drinks alcohol rarely. He had used CBD gummies for pain in the past, but none for some time. She is a retired . She lives alone. Her   about 5 years ago. REVIEW OF SYSTEMS:  Patient reports her appetite has been good. She denies fever or chills. Does have some nausea on and off with no abdominal pain. Denies diarrhea, but occasionally has some constipation. No dysuria or increased frequency of urination. No hematuria, melena, or hematochezia. There are otherwise no additional pertinent positives or negatives on a 12-point review of systems except as listed in the History of Present Illness. PHYSICAL EXAMINATION:    VITAL SIGNS:  Temperature was 98.4, pulse 80, respiratory rate 18, blood pressure 131/67, O2 saturation 96% on 2L nasal cannula. HEENT:  Normocephalic, atraumatic. Pupils equal, postsurgical bilaterally. Sclerae anicteric. There was no sinus tenderness. Mucous membranes were moist.  Oropharynx was crowded. NECK:  Supple. LUNGS:  Decreased breath sounds bilaterally with a mildly prolonged expiratory phase, but no wheezes or rhonchi currently. HEART:  Regular rate and rhythm. Normal S1, S2.  ABDOMEN:  Soft, nontender, with normoactive bowel sounds. No guarding. No rebound. EXTREMITIES:  No clubbing, cyanosis, or calf tenderness. There was tenderness to palpation over the right hip. Dressing was in place with some minimal drainage on it and erythema around the wound site with some associated warmth as well. NEUROLOGIC:  She was awake, alert, oriented x3 with no focal motor or sensory deficit, although her gait was not tested because of the issue with her right hip. PSYCHIATRIC:  Her mood and affect were pleasant and cooperative. SKIN:  Warm and dry with scattered actinic and seborrheic keratoses, with the wound changes as listed above.   BACK: There was no costovertebral angle tenderness noted. LABORATORY DATA:  Previously mentioned. ASSESSMENT AND PLAN:    1. History of multiple revisions of the right hip for dislocation in the past and infection. Patient now presents with both. Apparent plan is for removal of the hardware with placement of an antibiotic spacer and revision surgery at a later date. Will keep n.p.o. Infectious Disease was consulted to aid in antibiotics. Continue vancomycin for now. Await evaluation by Dr. Juan Pablo Washington as well. 2.   History of rheumatoid arthritis, Sjogren syndrome, systemic lupus erythematosus. For now, will hold her biologics/any immune suppressants. 3.   Hypertension. Monitor blood pressure. Hold antihypertensives for now. 4.   Right hip joint infection. No evidence of sepsis currently. Will continue to monitor. 5.   Anemia, most likely secondary to blood loss from her recent surgeries. Will follow. Iron studies have been done previously and reveal iron deficiency consistent with that. There may be a component of chronic disease as well. 6.   Chronic anticoagulation with apixaban. This is held. Patient's last dose was 05/11 a.m.   7.   Chronic obstructive pulmonary disease. Continue inhalers. 8.   History of constipation. Will monitor. Treat as needed. 9.   DVT prophylaxis. SCDs. Hold on anticoagulation as the patient will likely require another surgical procedure. Patient denies any chest pain or shortness of breath. Her last surgery was in April of this year, patient tolerated well. No further medical workup prior to surgery at this point. 10.   The patient's current clinical status and proposed treatment plan were discussed with her. All of her questions were answered and she agreed with the plan of care as outlined above.     Dictated By Ainsley Garza MD  d: 05/12/2023 07:01:13  t: 05/12/2023 07:47:00  Job [id number]  NSD/

## 2023-05-12 NOTE — PLAN OF CARE
Patient was admitted from the ED for hip pain and wound drainage. She is alert and oriented, on O2 overnight, no tele. Birdena Marisa is in place. Patient has not yet gotten up for me, but says that she uses a walker at rehab. Patient got morphine for pain. IV vancomycin. Patient has a left PICC line. Fluids are running. Safety precautions in place. Frequent rounding by nursing staff. Patient tells me that she prefers to discharge back to [facility] in [facility]. Problem: Patient Centered Care  Goal: Patient preferences are identified and integrated in the patient's plan of care  Description: Interventions:  - What would you like us to know as we care for you? I am from home alone.   - Provide timely, complete, and accurate information to patient/family  - Incorporate patient and family knowledge, values, beliefs, and cultural backgrounds into the planning and delivery of care  - Encourage patient/family to participate in care and decision-making at the level they choose  - Honor patient and family perspectives and choices  Outcome: Progressing     Problem: HEMATOLOGIC - ADULT  Goal: Free from bleeding injury  Description: (Example usage: patient with low platelets)  INTERVENTIONS:  - Avoid intramuscular injections, enemas and rectal medication administration  - Ensure safe mobilization of patient  - Hold pressure on venipuncture sites to achieve adequate hemostasis  - Assess for signs and symptoms of internal bleeding  - Monitor lab trends  - Patient is to report abnormal signs of bleeding to staff  - Avoid use of toothpicks and dental floss  - Use electric shaver for shaving  - Use soft bristle tooth brush  - Limit straining and forceful nose blowing  Outcome: Progressing     Problem: PAIN - ADULT  Goal: Verbalizes/displays adequate comfort level or patient's stated pain goal  Description: INTERVENTIONS:  - Encourage pt to monitor pain and request assistance  - Assess pain using appropriate pain scale  - Administer analgesics based on type and severity of pain and evaluate response  - Implement non-pharmacological measures as appropriate and evaluate response  - Consider cultural and social influences on pain and pain management  - Manage/alleviate anxiety  - Utilize distraction and/or relaxation techniques  - Monitor for opioid side effects  - Notify MD/LIP if interventions unsuccessful or patient reports new pain  - Anticipate increased pain with activity and pre-medicate as appropriate  Outcome: Progressing     Problem: RISK FOR INFECTION - ADULT  Goal: Absence of fever/infection during anticipated neutropenic period  Description: INTERVENTIONS  - Monitor WBC  - Administer growth factors as ordered  - Implement neutropenic guidelines  Outcome: Progressing     Problem: SAFETY ADULT - FALL  Goal: Free from fall injury  Description: INTERVENTIONS:  - Assess pt frequently for physical needs  - Identify cognitive and physical deficits and behaviors that affect risk of falls.   - [place] fall precautions as indicated by assessment.  - Educate pt/family on patient safety including physical limitations  - Instruct pt to call for assistance with activity based on assessment  - Modify environment to reduce risk of injury  - Provide assistive devices as appropriate  - Consider OT/PT consult to assist with strengthening/mobility  - Encourage toileting schedule  Outcome: Progressing     Problem: DISCHARGE PLANNING  Goal: Discharge to home or other facility with appropriate resources  Description: INTERVENTIONS:  - Identify barriers to discharge w/pt and caregiver  - Include patient/family/discharge partner in discharge planning  - Arrange for needed discharge resources and transportation as appropriate  - Identify discharge learning needs (meds, wound care, etc)  - Arrange for interpreters to assist at discharge as needed  - Consider post-discharge preferences of patient/family/discharge partner  - Complete POLST form as appropriate  - Assess patient's ability to be responsible for managing their own health  - Refer to Case Management Department for coordinating discharge planning if the patient needs post-hospital services based on physician/LIP order or complex needs related to functional status, cognitive ability or social support system  Outcome: Progressing

## 2023-05-12 NOTE — ED INITIAL ASSESSMENT (HPI)
Per [facility] Pt already seen post fall in the er. Xray done then sent home. MD called back to tell her there is a dislocation of the right hip and pt. Should return to the er for admission.

## 2023-05-12 NOTE — PROGRESS NOTES
ADMISSION NOTE    69year old female s/p RTHA for OA in 12/22 with dislocation requiring revision in 03/23, and joint infection requiring removal I and d replacment and wound vac placement in 04/23 presents with another dislocation. Dr. Ngozi Izquierdo aware . Available medical records partially reviewed. Dictation to follow.     Phillip Mcmanus M.D.  5/12/2023

## 2023-05-12 NOTE — PROGRESS NOTES
Following on the night admission, for details please see HPI  Patient feels comfortable with using Percocet for pain, no mental status changes  Eating okay  No chest pain, no shortness of breath, no dizziness  No palpitations  Tolerated previous surgeries well  Low potassium, check magnesium--> replace  Heparin subcu for DVT prophylaxis    Chart reviewed  Discussed with patient and RN

## 2023-05-12 NOTE — CM/SW NOTE
DERRICK followed up on DC planning. Pt is from [facility] for SNF    Awaiting surgery tomorrow    Plan should be for pt to return to [address] [place] [address] once medically appropriate    Will need PT/OT notes post surgery    Clinicals sent in aidin -    PLAN: DC back to [facility] pending medical progress/ surgery    Garcia Cannon, ROYCE, MSW ext.  [phone]

## 2023-05-12 NOTE — PLAN OF CARE
Pt has L PICC line, single lumen. OK to flush, changed blue cap, no blood draw noted. Instilled 2.2ml of Alteplase into the single lumen. Attempted to aspirate blood at 0405 with no success. Will wait the full hour to reassess. 0430: attempted to aspirate blood back from single lumen PICC. Successful. Wasted 10cc of blood. Flushed with 20cc of Saline, changed blue cap. Notified RN.

## 2023-05-12 NOTE — PROGRESS NOTES
Pharmacy Note: Dietary Supplement Discontinuation Per Policy    Sodium Fluoride 1.1 % GEL 1 Application. has been discontinued on Janj[address]es[address] [place] per policy. This supplement may be restarted upon discharge using the medication reconciliation process.     Thank you,   Lucky Angelucci, PharmD  5/12/2023,  3:05 AM

## 2023-05-12 NOTE — ED QUICK NOTES
Per. Pt. Xray was done at her orthopedic surgeons office after f/u akiko. Had Right hip replacement on 12/02/22. CMS intact. But pt states \"it just doesn't feel right. \" Denies numbness or tingling.

## 2023-05-12 NOTE — PROGRESS NOTES
Pharmacy Note: Dietary Supplement Discontinuation Per Policy    Quercetin TABS has been discontinued on Lannis Pi per policy. This supplement may be restarted upon discharge using the medication reconciliation process.     Thank you,   Amelia Escamilla, PharmD  5/12/2023,  3:03 AM

## 2023-05-12 NOTE — ED QUICK NOTES
Orders for admission, patient is aware of plan and ready to go upstairs. Any questions, please call ED RN Rose Ruiz at extension [phone].      Patient Covid vaccination status: Fully vaccinated     COVID Test Ordered in ED: Rapid SARS-CoV-2 by PCR    COVID Suspicion at Admission: N/A    Running Infusions:  None    Mental Status/LOC at time of transport:A &ox4    Other pertinent information:   CIWA score: N/A   NIH score:  N/A

## 2023-05-12 NOTE — PLAN OF CARE
Alert and oriented x 4. Dressing changed to right hip. Not out of bed due to right hip dislocation. Receiving IV fluids per MD order. Voiding via purewick. SCDs and Heparin for DVT prophylaxis. Oxycodone and morphine as needed for pain. Vital signs monitored. No acute changes noted throughout shift. Tolerating diet. Fall precautions in place- bed in lowest position, call light and personal belongings within reach, non-skid socks in place. Frequent rounding by nursing staff. One unit of blood given, no adverse reaction noted at this time. Plan is surgical intervention with Dr. Colleen Reyes once hemoglobin optimized and surgery arranged. Problem: Patient Centered Care  Goal: Patient preferences are identified and integrated in the patient's plan of care  Description: Interventions:  - What would you like us to know as we care for you?  I was here for the last surgery with Dr. Colleen Reyes  - Provide timely, complete, and accurate information to patient/family  - Incorporate patient and family knowledge, values, beliefs, and cultural backgrounds into the planning and delivery of care  - Encourage patient/family to participate in care and decision-making at the level they choose  - Honor patient and family perspectives and choices  Outcome: Progressing     Problem: HEMATOLOGIC - ADULT  Goal: Free from bleeding injury  Description: (Example usage: patient with low platelets)  INTERVENTIONS:  - Avoid intramuscular injections, enemas and rectal medication administration  - Ensure safe mobilization of patient  - Hold pressure on venipuncture sites to achieve adequate hemostasis  - Assess for signs and symptoms of internal bleeding  - Monitor lab trends  - Patient is to report abnormal signs of bleeding to staff  - Avoid use of toothpicks and dental floss  - Use electric shaver for shaving  - Use soft bristle tooth brush  - Limit straining and forceful nose blowing  Outcome: Progressing     Problem: PAIN - ADULT  Goal: Verbalizes/displays adequate comfort level or patient's stated pain goal  Description: INTERVENTIONS:  - Encourage pt to monitor pain and request assistance  - Assess pain using appropriate pain scale  - Administer analgesics based on type and severity of pain and evaluate response  - Implement non-pharmacological measures as appropriate and evaluate response  - Consider cultural and social influences on pain and pain management  - Manage/alleviate anxiety  - Utilize distraction and/or relaxation techniques  - Monitor for opioid side effects  - Notify MD/LIP if interventions unsuccessful or patient reports new pain  - Anticipate increased pain with activity and pre-medicate as appropriate  Outcome: Progressing     Problem: RISK FOR INFECTION - ADULT  Goal: Absence of fever/infection during anticipated neutropenic period  Description: INTERVENTIONS  - Monitor WBC  - Administer growth factors as ordered  - Implement neutropenic guidelines  Outcome: Progressing     Problem: SAFETY ADULT - FALL  Goal: Free from fall injury  Description: INTERVENTIONS:  - Assess pt frequently for physical needs  - Identify cognitive and physical deficits and behaviors that affect risk of falls.   - [place] fall precautions as indicated by assessment.  - Educate pt/family on patient safety including physical limitations  - Instruct pt to call for assistance with activity based on assessment  - Modify environment to reduce risk of injury  - Provide assistive devices as appropriate  - Consider OT/PT consult to assist with strengthening/mobility  - Encourage toileting schedule  Outcome: Progressing     Problem: DISCHARGE PLANNING  Goal: Discharge to home or other facility with appropriate resources  Description: INTERVENTIONS:  - Identify barriers to discharge w/pt and caregiver  - Include patient/family/discharge partner in discharge planning  - Arrange for needed discharge resources and transportation as appropriate  - Identify discharge learning needs (meds, wound care, etc)  - Arrange for interpreters to assist at discharge as needed  - Consider post-discharge preferences of patient/family/discharge partner  - Complete POLST form as appropriate  - Assess patient's ability to be responsible for managing their own health  - Refer to Case Management Department for coordinating discharge planning if the patient needs post-hospital services based on physician/LIP order or complex needs related to functional status, cognitive ability or social support system  Outcome: Progressing     Problem: Patient/Family Goals  Goal: Patient/Family Long Term Goal  Description: Patient's Long Term Goal: go to rehab    Interventions:  - surgery  -medications  -therapy  - See additional Care Plan goals for specific interventions  Outcome: Progressing  Goal: Patient/Family Short Term Goal  Description: Patient's Short Term Goal: pain management    Interventions:   - medications  -ice  -rest  - See additional Care Plan goals for specific interventions  Outcome: Progressing

## 2023-05-13 LAB
BLOOD TYPE BARCODE: 5100
DEPRECATED RDW RBC AUTO: 51.6 FL (ref 35.1–46.3)
ERYTHROCYTE [DISTWIDTH] IN BLOOD BY AUTOMATED COUNT: 15.7 % (ref 11–15)
HCT VFR BLD AUTO: 30 %
HGB BLD-MCNC: 9.3 G/DL
MCH RBC QN AUTO: 27.9 PG (ref 26–34)
MCHC RBC AUTO-ENTMCNC: 31 G/DL (ref 31–37)
MCV RBC AUTO: 90.1 FL
PLATELET # BLD AUTO: 266 10(3)UL (ref 150–450)
POTASSIUM SERPL-SCNC: 4 MMOL/L (ref 3.5–5.1)
RBC # BLD AUTO: 3.33 X10(6)UL
UNIT VOLUME: 350 ML
WBC # BLD AUTO: 4.6 X10(3) UL (ref 4–11)

## 2023-05-13 PROCEDURE — 99233 SBSQ HOSP IP/OBS HIGH 50: CPT | Performed by: HOSPITALIST

## 2023-05-13 NOTE — PLAN OF CARE
Patient has been resting in bed throughout my shift. She is alert and oriented, on room air, no tele. PICC line in left arm. Osvaldo Kira is in place. Oxy PRN for pain. IV cefepime and vanco. Fluids are running. Safety precautions are in place. Frequent rounding by nursing staff. Plan is pending med progress/surgery. (Shift [id number])    Problem: Patient Centered Care  Goal: Patient preferences are identified and integrated in the patient's plan of care  Description: Interventions:  - What would you like us to know as we care for you?  I was here for the last surgery with Dr. bL Gonsalves  - Provide timely, complete, and accurate information to patient/family  - Incorporate patient and family knowledge, values, beliefs, and cultural backgrounds into the planning and delivery of care  - Encourage patient/family to participate in care and decision-making at the level they choose  - Honor patient and family perspectives and choices  Outcome: Progressing     Problem: HEMATOLOGIC - ADULT  Goal: Free from bleeding injury  Description: (Example usage: patient with low platelets)  INTERVENTIONS:  - Avoid intramuscular injections, enemas and rectal medication administration  - Ensure safe mobilization of patient  - Hold pressure on venipuncture sites to achieve adequate hemostasis  - Assess for signs and symptoms of internal bleeding  - Monitor lab trends  - Patient is to report abnormal signs of bleeding to staff  - Avoid use of toothpicks and dental floss  - Use electric shaver for shaving  - Use soft bristle tooth brush  - Limit straining and forceful nose blowing  Outcome: Progressing     Problem: PAIN - ADULT  Goal: Verbalizes/displays adequate comfort level or patient's stated pain goal  Description: INTERVENTIONS:  - Encourage pt to monitor pain and request assistance  - Assess pain using appropriate pain scale  - Administer analgesics based on type and severity of pain and evaluate response  - Implement non-pharmacological measures as appropriate and evaluate response  - Consider cultural and social influences on pain and pain management  - Manage/alleviate anxiety  - Utilize distraction and/or relaxation techniques  - Monitor for opioid side effects  - Notify MD/LIP if interventions unsuccessful or patient reports new pain  - Anticipate increased pain with activity and pre-medicate as appropriate  Outcome: Progressing     Problem: RISK FOR INFECTION - ADULT  Goal: Absence of fever/infection during anticipated neutropenic period  Description: INTERVENTIONS  - Monitor WBC  - Administer growth factors as ordered  - Implement neutropenic guidelines  Outcome: Progressing     Problem: SAFETY ADULT - FALL  Goal: Free from fall injury  Description: INTERVENTIONS:  - Assess pt frequently for physical needs  - Identify cognitive and physical deficits and behaviors that affect risk of falls.   - [place] fall precautions as indicated by assessment.  - Educate pt/family on patient safety including physical limitations  - Instruct pt to call for assistance with activity based on assessment  - Modify environment to reduce risk of injury  - Provide assistive devices as appropriate  - Consider OT/PT consult to assist with strengthening/mobility  - Encourage toileting schedule  Outcome: Progressing     Problem: DISCHARGE PLANNING  Goal: Discharge to home or other facility with appropriate resources  Description: INTERVENTIONS:  - Identify barriers to discharge w/pt and caregiver  - Include patient/family/discharge partner in discharge planning  - Arrange for needed discharge resources and transportation as appropriate  - Identify discharge learning needs (meds, wound care, etc)  - Arrange for interpreters to assist at discharge as needed  - Consider post-discharge preferences of patient/family/discharge partner  - Complete POLST form as appropriate  - Assess patient's ability to be responsible for managing their own health  - Refer to Case Management Department for coordinating discharge planning if the patient needs post-hospital services based on physician/LIP order or complex needs related to functional status, cognitive ability or social support system  Outcome: Progressing     Problem: Patient/Family Goals  Goal: Patient/Family Long Term Goal  Description: Patient's Long Term Goal: go to rehab    Interventions:  - surgery  -medications  -therapy  - See additional Care Plan goals for specific interventions  Outcome: Progressing  Goal: Patient/Family Short Term Goal  Description: Patient's Short Term Goal: pain management    Interventions:   - medications  -ice  -rest  - See additional Care Plan goals for specific interventions  Outcome: Progressing

## 2023-05-13 NOTE — PLAN OF CARE
Alert and oriented x 4. Dressing in place to right hip and changed as needed due to drainage. Not out of bed due to dislocated hip. Receiving IV antibiotics per MD order. Voiding via purewick. SCDs and Heparin for DVT prophylaxis. Oxycodone and Tylenol as needed for pain. Vital signs monitored. No acute changes noted throughout shift. Tolerating diet. Fall precautions in place- bed in lowest position, call light and personal belongings within reach, non-skid socks in place. Frequent rounding by nursing staff. Plan is surgery next week with Dr. Willard Collazo with date to be determined. Problem: Patient Centered Care  Goal: Patient preferences are identified and integrated in the patient's plan of care  Description: Interventions:  - What would you like us to know as we care for you?  I was here for the last surgery with Dr. Willard Collaoz  - Provide timely, complete, and accurate information to patient/family  - Incorporate patient and family knowledge, values, beliefs, and cultural backgrounds into the planning and delivery of care  - Encourage patient/family to participate in care and decision-making at the level they choose  - Honor patient and family perspectives and choices  Outcome: Progressing     Problem: HEMATOLOGIC - ADULT  Goal: Free from bleeding injury  Description: (Example usage: patient with low platelets)  INTERVENTIONS:  - Avoid intramuscular injections, enemas and rectal medication administration  - Ensure safe mobilization of patient  - Hold pressure on venipuncture sites to achieve adequate hemostasis  - Assess for signs and symptoms of internal bleeding  - Monitor lab trends  - Patient is to report abnormal signs of bleeding to staff  - Avoid use of toothpicks and dental floss  - Use electric shaver for shaving  - Use soft bristle tooth brush  - Limit straining and forceful nose blowing  Outcome: Progressing     Problem: PAIN - ADULT  Goal: Verbalizes/displays adequate comfort level or patient's stated pain goal  Description: INTERVENTIONS:  - Encourage pt to monitor pain and request assistance  - Assess pain using appropriate pain scale  - Administer analgesics based on type and severity of pain and evaluate response  - Implement non-pharmacological measures as appropriate and evaluate response  - Consider cultural and social influences on pain and pain management  - Manage/alleviate anxiety  - Utilize distraction and/or relaxation techniques  - Monitor for opioid side effects  - Notify MD/LIP if interventions unsuccessful or patient reports new pain  - Anticipate increased pain with activity and pre-medicate as appropriate  Outcome: Progressing     Problem: RISK FOR INFECTION - ADULT  Goal: Absence of fever/infection during anticipated neutropenic period  Description: INTERVENTIONS  - Monitor WBC  - Administer growth factors as ordered  - Implement neutropenic guidelines  Outcome: Progressing     Problem: SAFETY ADULT - FALL  Goal: Free from fall injury  Description: INTERVENTIONS:  - Assess pt frequently for physical needs  - Identify cognitive and physical deficits and behaviors that affect risk of falls.   - [place] fall precautions as indicated by assessment.  - Educate pt/family on patient safety including physical limitations  - Instruct pt to call for assistance with activity based on assessment  - Modify environment to reduce risk of injury  - Provide assistive devices as appropriate  - Consider OT/PT consult to assist with strengthening/mobility  - Encourage toileting schedule  Outcome: Progressing     Problem: DISCHARGE PLANNING  Goal: Discharge to home or other facility with appropriate resources  Description: INTERVENTIONS:  - Identify barriers to discharge w/pt and caregiver  - Include patient/family/discharge partner in discharge planning  - Arrange for needed discharge resources and transportation as appropriate  - Identify discharge learning needs (meds, wound care, etc)  - Arrange for interpreters to assist at discharge as needed  - Consider post-discharge preferences of patient/family/discharge partner  - Complete POLST form as appropriate  - Assess patient's ability to be responsible for managing their own health  - Refer to Case Management Department for coordinating discharge planning if the patient needs post-hospital services based on physician/LIP order or complex needs related to functional status, cognitive ability or social support system  Outcome: Progressing     Problem: Patient/Family Goals  Goal: Patient/Family Long Term Goal  Description: Patient's Long Term Goal: go to rehab    Interventions:  - surgery  -medications  -therapy  - See additional Care Plan goals for specific interventions  Outcome: Progressing  Goal: Patient/Family Short Term Goal  Description: Patient's Short Term Goal: pain management    Interventions:   - medications  -ice  -rest  - See additional Care Plan goals for specific interventions  Outcome: Progressing

## 2023-05-14 PROCEDURE — 99232 SBSQ HOSP IP/OBS MODERATE 35: CPT | Performed by: HOSPITALIST

## 2023-05-14 RX ORDER — ENEMA 19; 7 G/133ML; G/133ML
1 ENEMA RECTAL ONCE AS NEEDED
Status: DISCONTINUED | OUTPATIENT
Start: 2023-05-14 | End: 2023-05-20

## 2023-05-14 RX ORDER — DOCUSATE SODIUM 100 MG/1
100 CAPSULE, LIQUID FILLED ORAL 2 TIMES DAILY
Status: DISCONTINUED | OUTPATIENT
Start: 2023-05-14 | End: 2023-05-17

## 2023-05-14 RX ORDER — BISACODYL 10 MG
10 SUPPOSITORY, RECTAL RECTAL
Status: DISCONTINUED | OUTPATIENT
Start: 2023-05-14 | End: 2023-05-20

## 2023-05-14 NOTE — PLAN OF CARE
Alert and oriented x 4. Dressing in place to right hip and changed as needed due to drainage. Not out of bed this shift. Receiving IV antibiotics per MD order. Voiding via purewick. SCDs and heparin for DVT prophylaxis. Oxycodone and Tylenol as needed for pain. Vital signs monitored. No acute changes noted throughout shift. Tolerating diet. Fall precautions in place- bed in lowest position, call light and personal belongings within reach, non-skid socks in place. Frequent rounding by nursing staff. Plan is surgery Monday or Wednesday with Dr. Lavelle Randle. Problem: Patient Centered Care  Goal: Patient preferences are identified and integrated in the patient's plan of care  Description: Interventions:  - What would you like us to know as we care for you?  I was here for the last surgery with Dr. Lavelle Randle  - Provide timely, complete, and accurate information to patient/family  - Incorporate patient and family knowledge, values, beliefs, and cultural backgrounds into the planning and delivery of care  - Encourage patient/family to participate in care and decision-making at the level they choose  - Honor patient and family perspectives and choices  Outcome: Progressing     Problem: HEMATOLOGIC - ADULT  Goal: Free from bleeding injury  Description: (Example usage: patient with low platelets)  INTERVENTIONS:  - Avoid intramuscular injections, enemas and rectal medication administration  - Ensure safe mobilization of patient  - Hold pressure on venipuncture sites to achieve adequate hemostasis  - Assess for signs and symptoms of internal bleeding  - Monitor lab trends  - Patient is to report abnormal signs of bleeding to staff  - Avoid use of toothpicks and dental floss  - Use electric shaver for shaving  - Use soft bristle tooth brush  - Limit straining and forceful nose blowing  Outcome: Progressing     Problem: PAIN - ADULT  Goal: Verbalizes/displays adequate comfort level or patient's stated pain goal  Description: INTERVENTIONS:  - Encourage pt to monitor pain and request assistance  - Assess pain using appropriate pain scale  - Administer analgesics based on type and severity of pain and evaluate response  - Implement non-pharmacological measures as appropriate and evaluate response  - Consider cultural and social influences on pain and pain management  - Manage/alleviate anxiety  - Utilize distraction and/or relaxation techniques  - Monitor for opioid side effects  - Notify MD/LIP if interventions unsuccessful or patient reports new pain  - Anticipate increased pain with activity and pre-medicate as appropriate  Outcome: Progressing     Problem: RISK FOR INFECTION - ADULT  Goal: Absence of fever/infection during anticipated neutropenic period  Description: INTERVENTIONS  - Monitor WBC  - Administer growth factors as ordered  - Implement neutropenic guidelines  Outcome: Progressing     Problem: SAFETY ADULT - FALL  Goal: Free from fall injury  Description: INTERVENTIONS:  - Assess pt frequently for physical needs  - Identify cognitive and physical deficits and behaviors that affect risk of falls.   - [place] fall precautions as indicated by assessment.  - Educate pt/family on patient safety including physical limitations  - Instruct pt to call for assistance with activity based on assessment  - Modify environment to reduce risk of injury  - Provide assistive devices as appropriate  - Consider OT/PT consult to assist with strengthening/mobility  - Encourage toileting schedule  Outcome: Progressing     Problem: DISCHARGE PLANNING  Goal: Discharge to home or other facility with appropriate resources  Description: INTERVENTIONS:  - Identify barriers to discharge w/pt and caregiver  - Include patient/family/discharge partner in discharge planning  - Arrange for needed discharge resources and transportation as appropriate  - Identify discharge learning needs (meds, wound care, etc)  - Arrange for interpreters to assist at discharge as needed  - Consider post-discharge preferences of patient/family/discharge partner  - Complete POLST form as appropriate  - Assess patient's ability to be responsible for managing their own health  - Refer to Case Management Department for coordinating discharge planning if the patient needs post-hospital services based on physician/LIP order or complex needs related to functional status, cognitive ability or social support system  Outcome: Progressing     Problem: Patient/Family Goals  Goal: Patient/Family Long Term Goal  Description: Patient's Long Term Goal: go to rehab    Interventions:  - surgery  -medications  -therapy  - See additional Care Plan goals for specific interventions  Outcome: Progressing  Goal: Patient/Family Short Term Goal  Description: Patient's Short Term Goal: pain management    Interventions:   - medications  -ice  -rest  - See additional Care Plan goals for specific interventions  Outcome: Progressing

## 2023-05-14 NOTE — PLAN OF CARE
Patient is alert and oriented x4. Dressing to right hip intact, changed this shift due to drainage. On RA, V/S stable. Heparin and SCDs for DVT prophylaxis. Using purewick for voiding. Tylenol and oxycodone for pain as needed. MRSA in wound. On Vanco and Cefepime. Left arm precautions, PICC in place. Remains in bed. Fall precautions in place. Call light within reach. Plan for surgery Monday or Wednesday. Problem: Patient Centered Care  Goal: Patient preferences are identified and integrated in the patient's plan of care  Description: Interventions:  - What would you like us to know as we care for you?  I was here for the last surgery with Dr. Regina Tirado  - Provide timely, complete, and accurate information to patient/family  - Incorporate patient and family knowledge, values, beliefs, and cultural backgrounds into the planning and delivery of care  - Encourage patient/family to participate in care and decision-making at the level they choose  - Honor patient and family perspectives and choices  Outcome: Progressing     Problem: HEMATOLOGIC - ADULT  Goal: Free from bleeding injury  Description: (Example usage: patient with low platelets)  INTERVENTIONS:  - Avoid intramuscular injections, enemas and rectal medication administration  - Ensure safe mobilization of patient  - Hold pressure on venipuncture sites to achieve adequate hemostasis  - Assess for signs and symptoms of internal bleeding  - Monitor lab trends  - Patient is to report abnormal signs of bleeding to staff  - Avoid use of toothpicks and dental floss  - Use electric shaver for shaving  - Use soft bristle tooth brush  - Limit straining and forceful nose blowing  Outcome: Progressing     Problem: PAIN - ADULT  Goal: Verbalizes/displays adequate comfort level or patient's stated pain goal  Description: INTERVENTIONS:  - Encourage pt to monitor pain and request assistance  - Assess pain using appropriate pain scale  - Administer analgesics based on type and severity of pain and evaluate response  - Implement non-pharmacological measures as appropriate and evaluate response  - Consider cultural and social influences on pain and pain management  - Manage/alleviate anxiety  - Utilize distraction and/or relaxation techniques  - Monitor for opioid side effects  - Notify MD/LIP if interventions unsuccessful or patient reports new pain  - Anticipate increased pain with activity and pre-medicate as appropriate  Outcome: Progressing     Problem: RISK FOR INFECTION - ADULT  Goal: Absence of fever/infection during anticipated neutropenic period  Description: INTERVENTIONS  - Monitor WBC  - Administer growth factors as ordered  - Implement neutropenic guidelines  Outcome: Progressing     Problem: SAFETY ADULT - FALL  Goal: Free from fall injury  Description: INTERVENTIONS:  - Assess pt frequently for physical needs  - Identify cognitive and physical deficits and behaviors that affect risk of falls.   - [place] fall precautions as indicated by assessment.  - Educate pt/family on patient safety including physical limitations  - Instruct pt to call for assistance with activity based on assessment  - Modify environment to reduce risk of injury  - Provide assistive devices as appropriate  - Consider OT/PT consult to assist with strengthening/mobility  - Encourage toileting schedule  Outcome: Progressing     Problem: DISCHARGE PLANNING  Goal: Discharge to home or other facility with appropriate resources  Description: INTERVENTIONS:  - Identify barriers to discharge w/pt and caregiver  - Include patient/family/discharge partner in discharge planning  - Arrange for needed discharge resources and transportation as appropriate  - Identify discharge learning needs (meds, wound care, etc)  - Arrange for interpreters to assist at discharge as needed  - Consider post-discharge preferences of patient/family/discharge partner  - Complete POLST form as appropriate  - Assess patient's ability to be responsible for managing their own health  - Refer to Case Management Department for coordinating discharge planning if the patient needs post-hospital services based on physician/LIP order or complex needs related to functional status, cognitive ability or social support system  Outcome: Progressing     Problem: Patient/Family Goals  Goal: Patient/Family Long Term Goal  Description: Patient's Long Term Goal: go home    Interventions:  - surgery  -medications  -therapy  -rehab placement/discharge planning  - See additional Care Plan goals for specific interventions  Outcome: Progressing  Goal: Patient/Family Short Term Goal  Description: Patient's Short Term Goal: pain management    Interventions:   - medications  -ice  -rest  - See additional Care Plan goals for specific interventions  Outcome: Progressing

## 2023-05-15 LAB
ALBUMIN SERPL-MCNC: 2 G/DL (ref 3.4–5)
ANION GAP SERPL CALC-SCNC: 4 MMOL/L (ref 0–18)
BASOPHILS # BLD AUTO: 0.06 X10(3) UL (ref 0–0.2)
BASOPHILS NFR BLD AUTO: 1.2 %
BUN BLD-MCNC: 9 MG/DL (ref 7–18)
BUN/CREAT SERPL: 15.8 (ref 10–20)
CALCIUM BLD-MCNC: 9.2 MG/DL (ref 8.5–10.1)
CHLORIDE SERPL-SCNC: 106 MMOL/L (ref 98–112)
CO2 SERPL-SCNC: 31 MMOL/L (ref 21–32)
CREAT BLD-MCNC: 0.57 MG/DL
DEPRECATED RDW RBC AUTO: 49.7 FL (ref 35.1–46.3)
EOSINOPHIL # BLD AUTO: 0.8 X10(3) UL (ref 0–0.7)
EOSINOPHIL NFR BLD AUTO: 15.7 %
ERYTHROCYTE [DISTWIDTH] IN BLOOD BY AUTOMATED COUNT: 15.5 % (ref 11–15)
GFR SERPLBLD BASED ON 1.73 SQ M-ARVRAT: 98 ML/MIN/1.73M2 (ref 60–?)
GLUCOSE BLD-MCNC: 91 MG/DL (ref 70–99)
HCT VFR BLD AUTO: 31.4 %
HGB BLD-MCNC: 9.9 G/DL
IMM GRANULOCYTES # BLD AUTO: 0.01 X10(3) UL (ref 0–1)
IMM GRANULOCYTES NFR BLD: 0.2 %
LYMPHOCYTES # BLD AUTO: 0.87 X10(3) UL (ref 1–4)
LYMPHOCYTES NFR BLD AUTO: 17 %
MAGNESIUM SERPL-MCNC: 1.8 MG/DL (ref 1.6–2.6)
MCH RBC QN AUTO: 27.7 PG (ref 26–34)
MCHC RBC AUTO-ENTMCNC: 31.5 G/DL (ref 31–37)
MCV RBC AUTO: 88 FL
MONOCYTES # BLD AUTO: 0.63 X10(3) UL (ref 0.1–1)
MONOCYTES NFR BLD AUTO: 12.3 %
NEUTROPHILS # BLD AUTO: 2.74 X10 (3) UL (ref 1.5–7.7)
NEUTROPHILS # BLD AUTO: 2.74 X10(3) UL (ref 1.5–7.7)
NEUTROPHILS NFR BLD AUTO: 53.6 %
OSMOLALITY SERPL CALC.SUM OF ELEC: 290 MOSM/KG (ref 275–295)
PHOSPHATE SERPL-MCNC: 3.9 MG/DL (ref 2.5–4.9)
PLATELET # BLD AUTO: 268 10(3)UL (ref 150–450)
POTASSIUM SERPL-SCNC: 4 MMOL/L (ref 3.5–5.1)
RBC # BLD AUTO: 3.57 X10(6)UL
SODIUM SERPL-SCNC: 141 MMOL/L (ref 136–145)
WBC # BLD AUTO: 5.1 X10(3) UL (ref 4–11)

## 2023-05-15 PROCEDURE — 99232 SBSQ HOSP IP/OBS MODERATE 35: CPT | Performed by: HOSPITALIST

## 2023-05-15 RX ORDER — MAGNESIUM OXIDE 400 MG/1
400 TABLET ORAL ONCE
Status: COMPLETED | OUTPATIENT
Start: 2023-05-15 | End: 2023-05-15

## 2023-05-15 RX ORDER — MAGNESIUM OXIDE 400 MG/1
400 TABLET ORAL 2 TIMES DAILY WITH MEALS
Status: DISCONTINUED | OUTPATIENT
Start: 2023-05-15 | End: 2023-05-20

## 2023-05-15 NOTE — PLAN OF CARE
Problem: Patient Centered Care  Goal: Patient preferences are identified and integrated in the patient's plan of care  Description: Interventions:  - What would you like us to know as we care for you?  I was here for the last surgery with Dr. Stella Roberts  - Provide timely, complete, and accurate information to patient/family  - Incorporate patient and family knowledge, values, beliefs, and cultural backgrounds into the planning and delivery of care  - Encourage patient/family to participate in care and decision-making at the level they choose  - Honor patient and family perspectives and choices  Outcome: Progressing     Problem: HEMATOLOGIC - ADULT  Goal: Free from bleeding injury  Description: (Example usage: patient with low platelets)  INTERVENTIONS:  - Avoid intramuscular injections, enemas and rectal medication administration  - Ensure safe mobilization of patient  - Hold pressure on venipuncture sites to achieve adequate hemostasis  - Assess for signs and symptoms of internal bleeding  - Monitor lab trends  - Patient is to report abnormal signs of bleeding to staff  - Avoid use of toothpicks and dental floss  - Use electric shaver for shaving  - Use soft bristle tooth brush  - Limit straining and forceful nose blowing  Outcome: Progressing     Problem: PAIN - ADULT  Goal: Verbalizes/displays adequate comfort level or patient's stated pain goal  Description: INTERVENTIONS:  - Encourage pt to monitor pain and request assistance  - Assess pain using appropriate pain scale  - Administer analgesics based on type and severity of pain and evaluate response  - Implement non-pharmacological measures as appropriate and evaluate response  - Consider cultural and social influences on pain and pain management  - Manage/alleviate anxiety  - Utilize distraction and/or relaxation techniques  - Monitor for opioid side effects  - Notify MD/LIP if interventions unsuccessful or patient reports new pain  - Anticipate increased pain with activity and pre-medicate as appropriate  Outcome: Progressing     Problem: RISK FOR INFECTION - ADULT  Goal: Absence of fever/infection during anticipated neutropenic period  Description: INTERVENTIONS  - Monitor WBC  - Administer growth factors as ordered  - Implement neutropenic guidelines  Outcome: Progressing     Problem: SAFETY ADULT - FALL  Goal: Free from fall injury  Description: INTERVENTIONS:  - Assess pt frequently for physical needs  - Identify cognitive and physical deficits and behaviors that affect risk of falls.   - [place] fall precautions as indicated by assessment.  - Educate pt/family on patient safety including physical limitations  - Instruct pt to call for assistance with activity based on assessment  - Modify environment to reduce risk of injury  - Provide assistive devices as appropriate  - Consider OT/PT consult to assist with strengthening/mobility  - Encourage toileting schedule  Outcome: Progressing     Problem: DISCHARGE PLANNING  Goal: Discharge to home or other facility with appropriate resources  Description: INTERVENTIONS:  - Identify barriers to discharge w/pt and caregiver  - Include patient/family/discharge partner in discharge planning  - Arrange for needed discharge resources and transportation as appropriate  - Identify discharge learning needs (meds, wound care, etc)  - Arrange for interpreters to assist at discharge as needed  - Consider post-discharge preferences of patient/family/discharge partner  - Complete POLST form as appropriate  - Assess patient's ability to be responsible for managing their own health  - Refer to Case Management Department for coordinating discharge planning if the patient needs post-hospital services based on physician/LIP order or complex needs related to functional status, cognitive ability or social support system  Outcome: Progressing     Problem: Patient/Family Goals  Goal: Patient/Family Long Term Goal  Description: Patient's Long Term Goal: go back to rehab when stable and will have no complications from surgery. Interventions:  - Up as tolerated following weight bearing precaution ordered by surgeon. - Monitor incision for any signs of infection or bleeding.  - Pain management with oral medication.  - May use ice to incision to prevent swelling or help reduce pain. - Up with walker with assist.  - Oral anticoagulant to prevent blood blots.  - PT/OT as ordered. - See additional Care Plan goals for specific interventions  Outcome: Progressing  Goal: Patient/Family Short Term Goal  Description: Patient's Short Term Goal: pain management and will not have further infection from surgery. Interventions:   - Possible evaluation for long term IV antibiotics. - Up as tolerated following weight bearing precaution ordered by surgeon. - Monitor incision for any signs of infection or bleeding.  - Pain management with oral medication.  - May use ice to incision to prevent swelling or help reduce pain. - Up with walker with assist.  - Oral anticoagulant to prevent blood blots.  - PT/OT as ordered.   - See additional Care Plan goals for specific interventions  Outcome: Progressing     Problem: SKIN/TISSUE INTEGRITY - ADULT  Goal: Incision(s), wounds(s) or drain site(s) healing without S/S of infection  Description: INTERVENTIONS:  - Assess and document risk factors for pressure ulcer development  - Assess and document skin integrity  - Assess and document dressing/incision, wound bed, drain sites and surrounding tissue  - Implement wound care per orders  - Initiate isolation precautions as appropriate  - Initiate Pressure Ulcer prevention bundle as indicated  Outcome: Progressing     Problem: MUSCULOSKELETAL - ADULT  Goal: Return mobility to safest level of function  Description: INTERVENTIONS:  - Assess patient stability and activity tolerance for standing, transferring and ambulating w/ or w/o assistive devices  - Assist with transfers and ambulation using safe patient handling equipment as needed  - Ensure adequate protection for wounds/incisions during mobilization  - Obtain PT/OT consults as needed  - Advance activity as appropriate  - Communicate ordered activity level and limitations with patient/family  Outcome: Progressing  Goal: Maintain proper alignment of affected body part  Description: INTERVENTIONS:  - Support and protect limb and body alignment per provider's orders  - Instruct and reinforce with patient and family use of appropriate assistive device and precautions (e.g. spinal or hip dislocation precautions)  Outcome: Progressing    Patient is alert and oriented, aware to call for help as needed. Patient is currently in room air, denies shortness of breathing nor chest pain. Patient is currently on bedrest for right hip dislocation. Surgery scheduled on Wednesday for repair. Patient has a purewick catheter related to bedrest.  Patient is managed with oral medication at this time. Patient will be going back to [facility] when stable.

## 2023-05-15 NOTE — PLAN OF CARE
Patient is alert and oriented x4. Dressing to right hip intact, will change as needed. On RA, V/S stable. Heparin on hold. SCDs for DVT prophylaxis. Using purewick for voiding. Tylenol and oxycodone for pain as needed. MRSA in wound. On Vanco and Cefepime. Left arm precautions, PICC in place. Remains in bed. NPO pending possible surgery today. Fall precautions in place. Call light within reach. Monitored frequently by staff. Problem: Patient Centered Care  Goal: Patient preferences are identified and integrated in the patient's plan of care  Description: Interventions:  - What would you like us to know as we care for you?  I was here for the last surgery with Dr. Jeremy Nash  - Provide timely, complete, and accurate information to patient/family  - Incorporate patient and family knowledge, values, beliefs, and cultural backgrounds into the planning and delivery of care  - Encourage patient/family to participate in care and decision-making at the level they choose  - Honor patient and family perspectives and choices  Outcome: Progressing     Problem: HEMATOLOGIC - ADULT  Goal: Free from bleeding injury  Description: (Example usage: patient with low platelets)  INTERVENTIONS:  - Avoid intramuscular injections, enemas and rectal medication administration  - Ensure safe mobilization of patient  - Hold pressure on venipuncture sites to achieve adequate hemostasis  - Assess for signs and symptoms of internal bleeding  - Monitor lab trends  - Patient is to report abnormal signs of bleeding to staff  - Avoid use of toothpicks and dental floss  - Use electric shaver for shaving  - Use soft bristle tooth brush  - Limit straining and forceful nose blowing  Outcome: Progressing     Problem: PAIN - ADULT  Goal: Verbalizes/displays adequate comfort level or patient's stated pain goal  Description: INTERVENTIONS:  - Encourage pt to monitor pain and request assistance  - Assess pain using appropriate pain scale  - Administer analgesics based on type and severity of pain and evaluate response  - Implement non-pharmacological measures as appropriate and evaluate response  - Consider cultural and social influences on pain and pain management  - Manage/alleviate anxiety  - Utilize distraction and/or relaxation techniques  - Monitor for opioid side effects  - Notify MD/LIP if interventions unsuccessful or patient reports new pain  - Anticipate increased pain with activity and pre-medicate as appropriate  Outcome: Progressing     Problem: RISK FOR INFECTION - ADULT  Goal: Absence of fever/infection during anticipated neutropenic period  Description: INTERVENTIONS  - Monitor WBC  - Administer growth factors as ordered  - Implement neutropenic guidelines  Outcome: Progressing     Problem: SAFETY ADULT - FALL  Goal: Free from fall injury  Description: INTERVENTIONS:  - Assess pt frequently for physical needs  - Identify cognitive and physical deficits and behaviors that affect risk of falls.   - [place] fall precautions as indicated by assessment.  - Educate pt/family on patient safety including physical limitations  - Instruct pt to call for assistance with activity based on assessment  - Modify environment to reduce risk of injury  - Provide assistive devices as appropriate  - Consider OT/PT consult to assist with strengthening/mobility  - Encourage toileting schedule  Outcome: Progressing     Problem: DISCHARGE PLANNING  Goal: Discharge to home or other facility with appropriate resources  Description: INTERVENTIONS:  - Identify barriers to discharge w/pt and caregiver  - Include patient/family/discharge partner in discharge planning  - Arrange for needed discharge resources and transportation as appropriate  - Identify discharge learning needs (meds, wound care, etc)  - Arrange for interpreters to assist at discharge as needed  - Consider post-discharge preferences of patient/family/discharge partner  - Complete POLST form as appropriate  - Assess patient's ability to be responsible for managing their own health  - Refer to Case Management Department for coordinating discharge planning if the patient needs post-hospital services based on physician/LIP order or complex needs related to functional status, cognitive ability or social support system  Outcome: Progressing     Problem: Patient/Family Goals  Goal: Patient/Family Long Term Goal  Description: Patient's Long Term Goal: go home    Interventions:  - surgery  -medications  -therapy  -rehab placement/discharge planning  - See additional Care Plan goals for specific interventions  Outcome: Progressing  Goal: Patient/Family Short Term Goal  Description: Patient's Short Term Goal: pain management    Interventions:   - medications  -ice  -rest  - See additional Care Plan goals for specific interventions  Outcome: Progressing

## 2023-05-16 LAB
ANTIBODY SCREEN: NEGATIVE
MAGNESIUM SERPL-MCNC: 2 MG/DL (ref 1.6–2.6)
RH BLOOD TYPE: POSITIVE
VANCOMYCIN TROUGH SERPL-MCNC: 12.3 UG/ML (ref 10–20)

## 2023-05-16 PROCEDURE — 99231 SBSQ HOSP IP/OBS SF/LOW 25: CPT | Performed by: HOSPITALIST

## 2023-05-16 NOTE — PLAN OF CARE
Patient is alert and oriented x4. On RA, V/S stable. Heparin and SCDs for DVT prophylaxis. Bedrest. Using purewick for voiding. Tylenol and oxycodone for pain as needed. MRSA in wound. On Vanco and Cefepime. Left arm precautions, PICC in place. Fall precautions in place. Call light within reach. Frequently rounding by staffs. Plan for surgery on Wed. Problem: Patient Centered Care  Goal: Patient preferences are identified and integrated in the patient's plan of care  Description: Interventions:  - What would you like us to know as we care for you?  I was here for the last surgery with Dr. Sima Aragon  - Provide timely, complete, and accurate information to patient/family  - Incorporate patient and family knowledge, values, beliefs, and cultural backgrounds into the planning and delivery of care  - Encourage patient/family to participate in care and decision-making at the level they choose  - Honor patient and family perspectives and choices  Outcome: Progressing     Problem: HEMATOLOGIC - ADULT  Goal: Free from bleeding injury  Description: (Example usage: patient with low platelets)  INTERVENTIONS:  - Avoid intramuscular injections, enemas and rectal medication administration  - Ensure safe mobilization of patient  - Hold pressure on venipuncture sites to achieve adequate hemostasis  - Assess for signs and symptoms of internal bleeding  - Monitor lab trends  - Patient is to report abnormal signs of bleeding to staff  - Avoid use of toothpicks and dental floss  - Use electric shaver for shaving  - Use soft bristle tooth brush  - Limit straining and forceful nose blowing  Outcome: Progressing     Problem: PAIN - ADULT  Goal: Verbalizes/displays adequate comfort level or patient's stated pain goal  Description: INTERVENTIONS:  - Encourage pt to monitor pain and request assistance  - Assess pain using appropriate pain scale  - Administer analgesics based on type and severity of pain and evaluate response  - Implement non-pharmacological measures as appropriate and evaluate response  - Consider cultural and social influences on pain and pain management  - Manage/alleviate anxiety  - Utilize distraction and/or relaxation techniques  - Monitor for opioid side effects  - Notify MD/LIP if interventions unsuccessful or patient reports new pain  - Anticipate increased pain with activity and pre-medicate as appropriate  Outcome: Progressing     Problem: RISK FOR INFECTION - ADULT  Goal: Absence of fever/infection during anticipated neutropenic period  Description: INTERVENTIONS  - Monitor WBC  - Administer growth factors as ordered  - Implement neutropenic guidelines  Outcome: Progressing     Problem: SAFETY ADULT - FALL  Goal: Free from fall injury  Description: INTERVENTIONS:  - Assess pt frequently for physical needs  - Identify cognitive and physical deficits and behaviors that affect risk of falls.   - [place] fall precautions as indicated by assessment.  - Educate pt/family on patient safety including physical limitations  - Instruct pt to call for assistance with activity based on assessment  - Modify environment to reduce risk of injury  - Provide assistive devices as appropriate  - Consider OT/PT consult to assist with strengthening/mobility  - Encourage toileting schedule  Outcome: Progressing     Problem: DISCHARGE PLANNING  Goal: Discharge to home or other facility with appropriate resources  Description: INTERVENTIONS:  - Identify barriers to discharge w/pt and caregiver  - Include patient/family/discharge partner in discharge planning  - Arrange for needed discharge resources and transportation as appropriate  - Identify discharge learning needs (meds, wound care, etc)  - Arrange for interpreters to assist at discharge as needed  - Consider post-discharge preferences of patient/family/discharge partner  - Complete POLST form as appropriate  - Assess patient's ability to be responsible for managing their own health  - Refer to Case Management Department for coordinating discharge planning if the patient needs post-hospital services based on physician/LIP order or complex needs related to functional status, cognitive ability or social support system  Outcome: Progressing     Problem: Patient/Family Goals  Goal: Patient/Family Long Term Goal  Description: Patient's Long Term Goal: go back to rehab when stable and will have no complications from surgery. Interventions:  - Up as tolerated following weight bearing precaution ordered by surgeon. - Monitor incision for any signs of infection or bleeding.  - Pain management with oral medication.  - May use ice to incision to prevent swelling or help reduce pain. - Up with walker with assist.  - Oral anticoagulant to prevent blood blots.  - PT/OT as ordered. - See additional Care Plan goals for specific interventions  Outcome: Progressing  Goal: Patient/Family Short Term Goal  Description: Patient's Short Term Goal: pain management and will not have further infection from surgery. Interventions:   - Possible evaluation for long term IV antibiotics. - Up as tolerated following weight bearing precaution ordered by surgeon. - Monitor incision for any signs of infection or bleeding.  - Pain management with oral medication.  - May use ice to incision to prevent swelling or help reduce pain. - Up with walker with assist.  - Oral anticoagulant to prevent blood blots.  - PT/OT as ordered.   - See additional Care Plan goals for specific interventions  Outcome: Progressing     Problem: SKIN/TISSUE INTEGRITY - ADULT  Goal: Incision(s), wounds(s) or drain site(s) healing without S/S of infection  Description: INTERVENTIONS:  - Assess and document risk factors for pressure ulcer development  - Assess and document skin integrity  - Assess and document dressing/incision, wound bed, drain sites and surrounding tissue  - Implement wound care per orders  - Initiate isolation precautions as appropriate  - Initiate Pressure Ulcer prevention bundle as indicated  Outcome: Progressing     Problem: MUSCULOSKELETAL - ADULT  Goal: Return mobility to safest level of function  Description: INTERVENTIONS:  - Assess patient stability and activity tolerance for standing, transferring and ambulating w/ or w/o assistive devices  - Assist with transfers and ambulation using safe patient handling equipment as needed  - Ensure adequate protection for wounds/incisions during mobilization  - Obtain PT/OT consults as needed  - Advance activity as appropriate  - Communicate ordered activity level and limitations with patient/family  Outcome: Progressing  Goal: Maintain proper alignment of affected body part  Description: INTERVENTIONS:  - Support and protect limb and body alignment per provider's orders  - Instruct and reinforce with patient and family use of appropriate assistive device and precautions (e.g. spinal or hip dislocation precautions)  Outcome: Progressing

## 2023-05-16 NOTE — PLAN OF CARE
Problem: Patient Centered Care  Goal: Patient preferences are identified and integrated in the patient's plan of care  Description: Interventions:  - What would you like us to know as we care for you?  I was here for the last surgery with Dr. Juan Pablo Washington  - Provide timely, complete, and accurate information to patient/family  - Incorporate patient and family knowledge, values, beliefs, and cultural backgrounds into the planning and delivery of care  - Encourage patient/family to participate in care and decision-making at the level they choose  - Honor patient and family perspectives and choices  Outcome: Progressing     Problem: HEMATOLOGIC - ADULT  Goal: Free from bleeding injury  Description: (Example usage: patient with low platelets)  INTERVENTIONS:  - Avoid intramuscular injections, enemas and rectal medication administration  - Ensure safe mobilization of patient  - Hold pressure on venipuncture sites to achieve adequate hemostasis  - Assess for signs and symptoms of internal bleeding  - Monitor lab trends  - Patient is to report abnormal signs of bleeding to staff  - Avoid use of toothpicks and dental floss  - Use electric shaver for shaving  - Use soft bristle tooth brush  - Limit straining and forceful nose blowing  Outcome: Progressing     Problem: PAIN - ADULT  Goal: Verbalizes/displays adequate comfort level or patient's stated pain goal  Description: INTERVENTIONS:  - Encourage pt to monitor pain and request assistance  - Assess pain using appropriate pain scale  - Administer analgesics based on type and severity of pain and evaluate response  - Implement non-pharmacological measures as appropriate and evaluate response  - Consider cultural and social influences on pain and pain management  - Manage/alleviate anxiety  - Utilize distraction and/or relaxation techniques  - Monitor for opioid side effects  - Notify MD/LIP if interventions unsuccessful or patient reports new pain  - Anticipate increased pain with activity and pre-medicate as appropriate  Outcome: Progressing     Problem: RISK FOR INFECTION - ADULT  Goal: Absence of fever/infection during anticipated neutropenic period  Description: INTERVENTIONS  - Monitor WBC  - Administer growth factors as ordered  - Implement neutropenic guidelines  Outcome: Progressing     Problem: SAFETY ADULT - FALL  Goal: Free from fall injury  Description: INTERVENTIONS:  - Assess pt frequently for physical needs  - Identify cognitive and physical deficits and behaviors that affect risk of falls.   - [place] fall precautions as indicated by assessment.  - Educate pt/family on patient safety including physical limitations  - Instruct pt to call for assistance with activity based on assessment  - Modify environment to reduce risk of injury  - Provide assistive devices as appropriate  - Consider OT/PT consult to assist with strengthening/mobility  - Encourage toileting schedule  Outcome: Progressing     Problem: DISCHARGE PLANNING  Goal: Discharge to home or other facility with appropriate resources  Description: INTERVENTIONS:  - Identify barriers to discharge w/pt and caregiver  - Include patient/family/discharge partner in discharge planning  - Arrange for needed discharge resources and transportation as appropriate  - Identify discharge learning needs (meds, wound care, etc)  - Arrange for interpreters to assist at discharge as needed  - Consider post-discharge preferences of patient/family/discharge partner  - Complete POLST form as appropriate  - Assess patient's ability to be responsible for managing their own health  - Refer to Case Management Department for coordinating discharge planning if the patient needs post-hospital services based on physician/LIP order or complex needs related to functional status, cognitive ability or social support system  Outcome: Progressing     Problem: Patient/Family Goals  Goal: Patient/Family Long Term Goal  Description: Patient's Long Term Goal: go back to rehab when stable and will have no complications from surgery. Interventions:  - Up as tolerated following weight bearing precaution ordered by surgeon. - Monitor incision for any signs of infection or bleeding.  - Pain management with oral medication.  - May use ice to incision to prevent swelling or help reduce pain. - Up with walker with assist.  - Oral anticoagulant to prevent blood blots.  - PT/OT as ordered. - See additional Care Plan goals for specific interventions  Outcome: Progressing  Goal: Patient/Family Short Term Goal  Description: Patient's Short Term Goal: pain management and will not have further infection from surgery. Interventions:   - Possible evaluation for long term IV antibiotics. - Up as tolerated following weight bearing precaution ordered by surgeon. - Monitor incision for any signs of infection or bleeding.  - Pain management with oral medication.  - May use ice to incision to prevent swelling or help reduce pain. - Up with walker with assist.  - Oral anticoagulant to prevent blood blots.  - PT/OT as ordered.   - See additional Care Plan goals for specific interventions  Outcome: Progressing     Problem: SKIN/TISSUE INTEGRITY - ADULT  Goal: Incision(s), wounds(s) or drain site(s) healing without S/S of infection  Description: INTERVENTIONS:  - Assess and document risk factors for pressure ulcer development  - Assess and document skin integrity  - Assess and document dressing/incision, wound bed, drain sites and surrounding tissue  - Implement wound care per orders  - Initiate isolation precautions as appropriate  - Initiate Pressure Ulcer prevention bundle as indicated  Outcome: Progressing     Problem: MUSCULOSKELETAL - ADULT  Goal: Return mobility to safest level of function  Description: INTERVENTIONS:  - Assess patient stability and activity tolerance for standing, transferring and ambulating w/ or w/o assistive devices  - Assist with transfers and ambulation using safe patient handling equipment as needed  - Ensure adequate protection for wounds/incisions during mobilization  - Obtain PT/OT consults as needed  - Advance activity as appropriate  - Communicate ordered activity level and limitations with patient/family  Outcome: Progressing  Goal: Maintain proper alignment of affected body part  Description: INTERVENTIONS:  - Support and protect limb and body alignment per provider's orders  - Instruct and reinforce with patient and family use of appropriate assistive device and precautions (e.g. spinal or hip dislocation precautions)  Outcome: Progressing    Patient is alert and oriented, aware to call for help as needed. Patient is currently in room air denies shortness of breathing nor chest pain. Patient is currently on bedrest, surgery to fix her right hip fracture tomorrow. Patient takes oral medication for pain. Patient is voiding well thru purewick catheter. Patient will be going to [facility] when discharged.

## 2023-05-17 ENCOUNTER — ANESTHESIA EVENT (OUTPATIENT)
Dept: SURGERY | Facility: HOSPITAL | Age: 68
End: 2023-05-17
Payer: OTHER GOVERNMENT

## 2023-05-17 ENCOUNTER — APPOINTMENT (OUTPATIENT)
Dept: GENERAL RADIOLOGY | Facility: HOSPITAL | Age: 68
End: 2023-05-17
Attending: STUDENT IN AN ORGANIZED HEALTH CARE EDUCATION/TRAINING PROGRAM
Payer: OTHER GOVERNMENT

## 2023-05-17 ENCOUNTER — ANESTHESIA (OUTPATIENT)
Dept: SURGERY | Facility: HOSPITAL | Age: 68
End: 2023-05-17
Payer: OTHER GOVERNMENT

## 2023-05-17 LAB
BASOPHILS NFR SNV: 0 %
DEPRECATED RDW RBC AUTO: 51 FL (ref 35.1–46.3)
EOSINOPHIL NFR SNV: 0 %
ERYTHROCYTE [DISTWIDTH] IN BLOOD BY AUTOMATED COUNT: 15.8 % (ref 11–15)
HCT VFR BLD AUTO: 34.9 %
HGB BLD-MCNC: 10.8 G/DL
LYMPHOCYTES NFR SNV: 2 %
MCH RBC QN AUTO: 27.4 PG (ref 26–34)
MCHC RBC AUTO-ENTMCNC: 30.9 G/DL (ref 31–37)
MCV RBC AUTO: 88.6 FL
MONOS+MACROS NFR SNV: 6 %
NEUTROPHILS NFR FLD: 92 %
PLATELET # BLD AUTO: 266 10(3)UL (ref 150–450)
RBC # BLD AUTO: 3.94 X10(6)UL
RBC # FLD AUTO: ABNORMAL /CUMM (ref ?–1)
SARS-COV-2 RNA RESP QL NAA+PROBE: NOT DETECTED
TOTAL CELLS COUNTED FLD: 100
TOTAL CELLS COUNTED SNV: ABNORMAL /CUMM (ref 0–200)
WBC # BLD AUTO: 5.5 X10(3) UL (ref 4–11)
WBC # SNV: NORMAL /CUMM

## 2023-05-17 PROCEDURE — 36430 TRANSFUSION BLD/BLD COMPNT: CPT | Performed by: NURSE ANESTHETIST, CERTIFIED REGISTERED

## 2023-05-17 PROCEDURE — 99233 SBSQ HOSP IP/OBS HIGH 50: CPT | Performed by: HOSPITALIST

## 2023-05-17 PROCEDURE — 73501 X-RAY EXAM HIP UNI 1 VIEW: CPT | Performed by: STUDENT IN AN ORGANIZED HEALTH CARE EDUCATION/TRAINING PROGRAM

## 2023-05-17 PROCEDURE — 0SH908Z INSERTION OF SPACER INTO RIGHT HIP JOINT, OPEN APPROACH: ICD-10-PCS | Performed by: STUDENT IN AN ORGANIZED HEALTH CARE EDUCATION/TRAINING PROGRAM

## 2023-05-17 PROCEDURE — 0SP90JZ REMOVAL OF SYNTHETIC SUBSTITUTE FROM RIGHT HIP JOINT, OPEN APPROACH: ICD-10-PCS | Performed by: STUDENT IN AN ORGANIZED HEALTH CARE EDUCATION/TRAINING PROGRAM

## 2023-05-17 DEVICE — BIOMET BC R 1X40 US: Type: IMPLANTABLE DEVICE | Site: HIP | Status: FUNCTIONAL

## 2023-05-17 RX ORDER — TOBRAMYCIN 1.2 G/30ML
INJECTION, POWDER, LYOPHILIZED, FOR SOLUTION INTRAVENOUS AS NEEDED
Status: DISCONTINUED | OUTPATIENT
Start: 2023-05-17 | End: 2023-05-17 | Stop reason: HOSPADM

## 2023-05-17 RX ORDER — MELATONIN
325
Status: DISCONTINUED | OUTPATIENT
Start: 2023-05-18 | End: 2023-05-20

## 2023-05-17 RX ORDER — METOCLOPRAMIDE HYDROCHLORIDE 5 MG/ML
10 INJECTION INTRAMUSCULAR; INTRAVENOUS EVERY 8 HOURS PRN
Status: DISCONTINUED | OUTPATIENT
Start: 2023-05-17 | End: 2023-05-20

## 2023-05-17 RX ORDER — BISACODYL 10 MG
10 SUPPOSITORY, RECTAL RECTAL
Status: DISCONTINUED | OUTPATIENT
Start: 2023-05-17 | End: 2023-05-17

## 2023-05-17 RX ORDER — HYDROMORPHONE HYDROCHLORIDE 1 MG/ML
0.6 INJECTION, SOLUTION INTRAMUSCULAR; INTRAVENOUS; SUBCUTANEOUS EVERY 5 MIN PRN
Status: DISCONTINUED | OUTPATIENT
Start: 2023-05-17 | End: 2023-05-17 | Stop reason: HOSPADM

## 2023-05-17 RX ORDER — SODIUM CHLORIDE, SODIUM LACTATE, POTASSIUM CHLORIDE, CALCIUM CHLORIDE 600; 310; 30; 20 MG/100ML; MG/100ML; MG/100ML; MG/100ML
INJECTION, SOLUTION INTRAVENOUS CONTINUOUS PRN
Status: DISCONTINUED | OUTPATIENT
Start: 2023-05-17 | End: 2023-05-17 | Stop reason: SURG

## 2023-05-17 RX ORDER — FAMOTIDINE 10 MG/ML
20 INJECTION, SOLUTION INTRAVENOUS 2 TIMES DAILY
Status: DISCONTINUED | OUTPATIENT
Start: 2023-05-17 | End: 2023-05-20

## 2023-05-17 RX ORDER — TRANEXAMIC ACID 10 MG/ML
INJECTION, SOLUTION INTRAVENOUS AS NEEDED
Status: DISCONTINUED | OUTPATIENT
Start: 2023-05-17 | End: 2023-05-17 | Stop reason: SURG

## 2023-05-17 RX ORDER — SENNOSIDES 8.6 MG
17.2 TABLET ORAL NIGHTLY
Status: DISCONTINUED | OUTPATIENT
Start: 2023-05-17 | End: 2023-05-20

## 2023-05-17 RX ORDER — MORPHINE SULFATE 4 MG/ML
4 INJECTION, SOLUTION INTRAMUSCULAR; INTRAVENOUS EVERY 10 MIN PRN
Status: DISCONTINUED | OUTPATIENT
Start: 2023-05-17 | End: 2023-05-17 | Stop reason: HOSPADM

## 2023-05-17 RX ORDER — CEFAZOLIN SODIUM/WATER 2 G/20 ML
2 SYRINGE (ML) INTRAVENOUS EVERY 8 HOURS
Status: CANCELLED | OUTPATIENT
Start: 2023-05-17 | End: 2023-05-18

## 2023-05-17 RX ORDER — ONDANSETRON 2 MG/ML
INJECTION INTRAMUSCULAR; INTRAVENOUS AS NEEDED
Status: DISCONTINUED | OUTPATIENT
Start: 2023-05-17 | End: 2023-05-17 | Stop reason: SURG

## 2023-05-17 RX ORDER — POLYETHYLENE GLYCOL 3350 17 G/17G
17 POWDER, FOR SOLUTION ORAL DAILY PRN
Status: DISCONTINUED | OUTPATIENT
Start: 2023-05-17 | End: 2023-05-20

## 2023-05-17 RX ORDER — VANCOMYCIN HYDROCHLORIDE
15 ONCE
Status: DISCONTINUED | OUTPATIENT
Start: 2023-05-17 | End: 2023-05-17 | Stop reason: ALTCHOICE

## 2023-05-17 RX ORDER — TRANEXAMIC ACID 10 MG/ML
1000 INJECTION, SOLUTION INTRAVENOUS ONCE
Status: DISCONTINUED | OUTPATIENT
Start: 2023-05-17 | End: 2023-05-17 | Stop reason: HOSPADM

## 2023-05-17 RX ORDER — HYDROMORPHONE HYDROCHLORIDE 1 MG/ML
0.2 INJECTION, SOLUTION INTRAMUSCULAR; INTRAVENOUS; SUBCUTANEOUS EVERY 5 MIN PRN
Status: DISCONTINUED | OUTPATIENT
Start: 2023-05-17 | End: 2023-05-17 | Stop reason: HOSPADM

## 2023-05-17 RX ORDER — GLYCOPYRROLATE 0.2 MG/ML
INJECTION, SOLUTION INTRAMUSCULAR; INTRAVENOUS AS NEEDED
Status: DISCONTINUED | OUTPATIENT
Start: 2023-05-17 | End: 2023-05-17 | Stop reason: SURG

## 2023-05-17 RX ORDER — ONDANSETRON 2 MG/ML
4 INJECTION INTRAMUSCULAR; INTRAVENOUS EVERY 6 HOURS PRN
Status: DISCONTINUED | OUTPATIENT
Start: 2023-05-17 | End: 2023-05-20

## 2023-05-17 RX ORDER — SODIUM CHLORIDE 9 MG/ML
INJECTION, SOLUTION INTRAVENOUS CONTINUOUS PRN
Status: DISCONTINUED | OUTPATIENT
Start: 2023-05-17 | End: 2023-05-17 | Stop reason: SURG

## 2023-05-17 RX ORDER — DEXAMETHASONE SODIUM PHOSPHATE 4 MG/ML
VIAL (ML) INJECTION AS NEEDED
Status: DISCONTINUED | OUTPATIENT
Start: 2023-05-17 | End: 2023-05-17 | Stop reason: SURG

## 2023-05-17 RX ORDER — ENEMA 19; 7 G/133ML; G/133ML
1 ENEMA RECTAL ONCE AS NEEDED
Status: DISCONTINUED | OUTPATIENT
Start: 2023-05-17 | End: 2023-05-17

## 2023-05-17 RX ORDER — FAMOTIDINE 20 MG/1
20 TABLET, FILM COATED ORAL 2 TIMES DAILY
Status: DISCONTINUED | OUTPATIENT
Start: 2023-05-17 | End: 2023-05-20

## 2023-05-17 RX ORDER — MORPHINE SULFATE 4 MG/ML
2 INJECTION, SOLUTION INTRAMUSCULAR; INTRAVENOUS EVERY 10 MIN PRN
Status: DISCONTINUED | OUTPATIENT
Start: 2023-05-17 | End: 2023-05-17 | Stop reason: HOSPADM

## 2023-05-17 RX ORDER — HYDROMORPHONE HYDROCHLORIDE 1 MG/ML
0.4 INJECTION, SOLUTION INTRAMUSCULAR; INTRAVENOUS; SUBCUTANEOUS EVERY 5 MIN PRN
Status: DISCONTINUED | OUTPATIENT
Start: 2023-05-17 | End: 2023-05-17 | Stop reason: HOSPADM

## 2023-05-17 RX ORDER — NALOXONE HYDROCHLORIDE 0.4 MG/ML
80 INJECTION, SOLUTION INTRAMUSCULAR; INTRAVENOUS; SUBCUTANEOUS AS NEEDED
Status: DISCONTINUED | OUTPATIENT
Start: 2023-05-17 | End: 2023-05-17 | Stop reason: HOSPADM

## 2023-05-17 RX ORDER — MORPHINE SULFATE 10 MG/ML
6 INJECTION, SOLUTION INTRAMUSCULAR; INTRAVENOUS EVERY 10 MIN PRN
Status: DISCONTINUED | OUTPATIENT
Start: 2023-05-17 | End: 2023-05-17 | Stop reason: HOSPADM

## 2023-05-17 RX ORDER — SODIUM CHLORIDE, SODIUM LACTATE, POTASSIUM CHLORIDE, CALCIUM CHLORIDE 600; 310; 30; 20 MG/100ML; MG/100ML; MG/100ML; MG/100ML
INJECTION, SOLUTION INTRAVENOUS CONTINUOUS
Status: DISCONTINUED | OUTPATIENT
Start: 2023-05-17 | End: 2023-05-17 | Stop reason: HOSPADM

## 2023-05-17 RX ORDER — SODIUM CHLORIDE 9 MG/ML
INJECTION, SOLUTION INTRAVENOUS CONTINUOUS
Status: DISCONTINUED | OUTPATIENT
Start: 2023-05-17 | End: 2023-05-20

## 2023-05-17 RX ORDER — ACETAMINOPHEN 500 MG
1000 TABLET ORAL EVERY 6 HOURS
Status: DISCONTINUED | OUTPATIENT
Start: 2023-05-17 | End: 2023-05-20

## 2023-05-17 RX ORDER — VANCOMYCIN HYDROCHLORIDE 1 G/20ML
INJECTION, POWDER, LYOPHILIZED, FOR SOLUTION INTRAVENOUS AS NEEDED
Status: DISCONTINUED | OUTPATIENT
Start: 2023-05-17 | End: 2023-05-17 | Stop reason: HOSPADM

## 2023-05-17 RX ORDER — PHENYLEPHRINE HCL 10 MG/ML
VIAL (ML) INJECTION AS NEEDED
Status: DISCONTINUED | OUTPATIENT
Start: 2023-05-17 | End: 2023-05-17 | Stop reason: SURG

## 2023-05-17 RX ORDER — DOCUSATE SODIUM 100 MG/1
100 CAPSULE, LIQUID FILLED ORAL 2 TIMES DAILY
Status: DISCONTINUED | OUTPATIENT
Start: 2023-05-17 | End: 2023-05-20

## 2023-05-17 RX ORDER — EPHEDRINE SULFATE 50 MG/ML
INJECTION, SOLUTION INTRAVENOUS AS NEEDED
Status: DISCONTINUED | OUTPATIENT
Start: 2023-05-17 | End: 2023-05-17 | Stop reason: SURG

## 2023-05-17 RX ADMIN — DEXAMETHASONE SODIUM PHOSPHATE 4 MG: 4 MG/ML VIAL (ML) INJECTION at 09:32:00

## 2023-05-17 RX ADMIN — SODIUM CHLORIDE, SODIUM LACTATE, POTASSIUM CHLORIDE, CALCIUM CHLORIDE: 600; 310; 30; 20 INJECTION, SOLUTION INTRAVENOUS at 13:11:00

## 2023-05-17 RX ADMIN — ONDANSETRON 4 MG: 2 INJECTION INTRAMUSCULAR; INTRAVENOUS at 09:32:00

## 2023-05-17 RX ADMIN — TRANEXAMIC ACID 1000 MG: 10 INJECTION, SOLUTION INTRAVENOUS at 09:44:00

## 2023-05-17 RX ADMIN — PHENYLEPHRINE HCL 100 MCG: 10 MG/ML VIAL (ML) INJECTION at 12:06:00

## 2023-05-17 RX ADMIN — SODIUM CHLORIDE, SODIUM LACTATE, POTASSIUM CHLORIDE, CALCIUM CHLORIDE: 600; 310; 30; 20 INJECTION, SOLUTION INTRAVENOUS at 09:26:00

## 2023-05-17 RX ADMIN — EPHEDRINE SULFATE 10 MG: 50 INJECTION, SOLUTION INTRAVENOUS at 10:24:00

## 2023-05-17 RX ADMIN — GLYCOPYRROLATE 0.2 MG: 0.2 INJECTION, SOLUTION INTRAMUSCULAR; INTRAVENOUS at 09:32:00

## 2023-05-17 RX ADMIN — SODIUM CHLORIDE: 9 INJECTION, SOLUTION INTRAVENOUS at 09:41:00

## 2023-05-17 RX ADMIN — TRANEXAMIC ACID 1000 MG: 10 INJECTION, SOLUTION INTRAVENOUS at 12:29:00

## 2023-05-17 RX ADMIN — SODIUM CHLORIDE: 9 INJECTION, SOLUTION INTRAVENOUS at 10:44:00

## 2023-05-17 NOTE — PLAN OF CARE
Patient alert and oriented. Post-op day #0. Prevena wound vac in place to right hip. TTWB. Hemovacn drain in place, output recorded. Receiving IV abx via Left PICC. Patient is on 2L/min of oxygen via nasal cannula. SCDs and eliquis for DVT prophylaxis. Oxy provided as needed for pain. Encouraged frequent ambulation and use of incentive spirometer. Fall precautions maintained. Frequent rounding by nursing staff. Plan is PT/OT tomorrow. Problem: Patient Centered Care  Goal: Patient preferences are identified and integrated in the patient's plan of care  Description: Interventions:  - What would you like us to know as we care for you?  I was here for the last surgery with Dr. Jovanna Howell  - Provide timely, complete, and accurate information to patient/family  - Incorporate patient and family knowledge, values, beliefs, and cultural backgrounds into the planning and delivery of care  - Encourage patient/family to participate in care and decision-making at the level they choose  - Honor patient and family perspectives and choices  Outcome: Progressing     Problem: HEMATOLOGIC - ADULT  Goal: Free from bleeding injury  Description: (Example usage: patient with low platelets)  INTERVENTIONS:  - Avoid intramuscular injections, enemas and rectal medication administration  - Ensure safe mobilization of patient  - Hold pressure on venipuncture sites to achieve adequate hemostasis  - Assess for signs and symptoms of internal bleeding  - Monitor lab trends  - Patient is to report abnormal signs of bleeding to staff  - Avoid use of toothpicks and dental floss  - Use electric shaver for shaving  - Use soft bristle tooth brush  - Limit straining and forceful nose blowing  Outcome: Progressing     Problem: PAIN - ADULT  Goal: Verbalizes/displays adequate comfort level or patient's stated pain goal  Description: INTERVENTIONS:  - Encourage pt to monitor pain and request assistance  - Assess pain using appropriate pain scale  - Administer analgesics based on type and severity of pain and evaluate response  - Implement non-pharmacological measures as appropriate and evaluate response  - Consider cultural and social influences on pain and pain management  - Manage/alleviate anxiety  - Utilize distraction and/or relaxation techniques  - Monitor for opioid side effects  - Notify MD/LIP if interventions unsuccessful or patient reports new pain  - Anticipate increased pain with activity and pre-medicate as appropriate  Outcome: Progressing     Problem: RISK FOR INFECTION - ADULT  Goal: Absence of fever/infection during anticipated neutropenic period  Description: INTERVENTIONS  - Monitor WBC  - Administer growth factors as ordered  - Implement neutropenic guidelines  Outcome: Progressing     Problem: SAFETY ADULT - FALL  Goal: Free from fall injury  Description: INTERVENTIONS:  - Assess pt frequently for physical needs  - Identify cognitive and physical deficits and behaviors that affect risk of falls.   - [place] fall precautions as indicated by assessment.  - Educate pt/family on patient safety including physical limitations  - Instruct pt to call for assistance with activity based on assessment  - Modify environment to reduce risk of injury  - Provide assistive devices as appropriate  - Consider OT/PT consult to assist with strengthening/mobility  - Encourage toileting schedule  Outcome: Progressing     Problem: DISCHARGE PLANNING  Goal: Discharge to home or other facility with appropriate resources  Description: INTERVENTIONS:  - Identify barriers to discharge w/pt and caregiver  - Include patient/family/discharge partner in discharge planning  - Arrange for needed discharge resources and transportation as appropriate  - Identify discharge learning needs (meds, wound care, etc)  - Arrange for interpreters to assist at discharge as needed  - Consider post-discharge preferences of patient/family/discharge partner  - Complete POLST form as appropriate  - Assess patient's ability to be responsible for managing their own health  - Refer to Case Management Department for coordinating discharge planning if the patient needs post-hospital services based on physician/LIP order or complex needs related to functional status, cognitive ability or social support system  Outcome: Progressing     Problem: Patient/Family Goals  Goal: Patient/Family Long Term Goal  Description: Patient's Long Term Goal: go back to rehab when stable and will have no complications from surgery. Interventions:  - Up as tolerated following weight bearing precaution ordered by surgeon. - Monitor incision for any signs of infection or bleeding.  - Pain management with oral medication.  - May use ice to incision to prevent swelling or help reduce pain. - Up with walker with assist.  - Oral anticoagulant to prevent blood blots.  - PT/OT as ordered. - See additional Care Plan goals for specific interventions  Outcome: Progressing  Goal: Patient/Family Short Term Goal  Description: Patient's Short Term Goal: pain management and will not have further infection from surgery. Interventions:   - Possible evaluation for long term IV antibiotics. - Up as tolerated following weight bearing precaution ordered by surgeon. - Monitor incision for any signs of infection or bleeding.  - Pain management with oral medication.  - May use ice to incision to prevent swelling or help reduce pain. - Up with walker with assist.  - Oral anticoagulant to prevent blood blots.  - PT/OT as ordered.   - See additional Care Plan goals for specific interventions  Outcome: Progressing     Problem: SKIN/TISSUE INTEGRITY - ADULT  Goal: Incision(s), wounds(s) or drain site(s) healing without S/S of infection  Description: INTERVENTIONS:  - Assess and document risk factors for pressure ulcer development  - Assess and document skin integrity  - Assess and document dressing/incision, wound bed, drain sites and surrounding tissue  - Implement wound care per orders  - Initiate isolation precautions as appropriate  - Initiate Pressure Ulcer prevention bundle as indicated  Outcome: Progressing     Problem: MUSCULOSKELETAL - ADULT  Goal: Return mobility to safest level of function  Description: INTERVENTIONS:  - Assess patient stability and activity tolerance for standing, transferring and ambulating w/ or w/o assistive devices  - Assist with transfers and ambulation using safe patient handling equipment as needed  - Ensure adequate protection for wounds/incisions during mobilization  - Obtain PT/OT consults as needed  - Advance activity as appropriate  - Communicate ordered activity level and limitations with patient/family  Outcome: Progressing  Goal: Maintain proper alignment of affected body part  Description: INTERVENTIONS:  - Support and protect limb and body alignment per provider's orders  - Instruct and reinforce with patient and family use of appropriate assistive device and precautions (e.g. spinal or hip dislocation precautions)  Outcome: Progressing

## 2023-05-17 NOTE — ANESTHESIA PROCEDURE NOTES
Peripheral IV  Date/Time: 5/17/2023 9:41 AM  Inserted by: Jose Hopkins CRNA    Placement  Needle size: 18 G  Laterality: right  Location: forearm  Local anesthetic: under anesthesia.   Site prep: alcohol  Technique: anatomical landmarks  Attempts: 1

## 2023-05-17 NOTE — DISCHARGE INSTRUCTIONS
DISCHARGE INSTRUCTIONS:  PLACE ICE TO SURGICAL AREA 20-30 MINUTES ON/ 20-30 MINUTES OFF. THIS IS TO HELP WITH PAIN & SWELLING. Allowed Toe Touch weight bearing on the right leg    CALL TO CONFIRM YOUR FOLLOW UP APPOINTMENT WITH DR. Marleny Nickerson TO BE SEEN IN 2-3 WEEKS POSTOP. [phone]    MEDICATIONS    POST OP MEDICATION REGIMEN              MULTI-MODAL   PAIN REGIMEN     DRUG   FOR   FREQUENCY & DURATION   QTY   NOTES     WEANING  TIPS                Tylenol 500mg     Mild pain   Take 2 tabs every 6 hours     90   Can purchase over-the-counter    Stop using medication if no longer having pain. Tramadol 50mg     Moderate pain   Take 1-2 tabs every 6 hours only as needed   45 May prescribe a refill, but ideally, this should be tapered off after surgery Stop using this medication 2nd   As pain decreases over time, increase the interval between doses (6 hours to 8, then 12, then 24) to taper off the medication. BREAKTHROUGH PAIN         Oxycodone 5mg       Severe pain     Take 1-2 tabs every 4-6 hours only as needed for severe pain       40   Take at least 1 hr apart from Tramadol. Ideally, no refill. The goal is to taper off this medication as soon as possible, as it can be addictive and have side effects. Stop using this medication 1st if no longer having severe pain. BLOOD THINNER     Eliquis 2.5mg   Blood clot prevention   Take 1 tab twice daily for 30 days     60     No refills   Complete the entire course of your blood thinner. STOOL SOFTENER     Sennokot 8.6/50mg   Constipation   Take 1 tab twice daily  while on opioids     60 Refer to discharge instructions if constipation persists even after taking this medication   Stop taking if having diarrhea or loose stools.            ANTI-NAUSEA   Ondansetron 4mg   Nausea   Take 1 tab every 8 hours as needed if nauseous     20   No Refill      ANTI-ACID REFLUX / GASTRITIS   Pantoprazole 40mg   Acid reflux, gastric ulcer prophylaxis   Take 1 tab every day along with Meloxicam     60   May refill if Meloxicam refilled        DRESSING:    YOU HAVE A SPECIAL DRESSING CALLED A PREVENA DRESSING, OVER YOUR INCISION. THIS IS A TYPE OF NEGATIVE PRESSURE DRESSING THAT KEEPS THE WOUND DRY. IT IS CONNECTED TO A CANNISTER. MAKE SURE THAT THE CANNISTER IS POWERED ON AND NOT OUT OF BATTERY. THE DRESSING STAYS FOR 7 DAYS FROM SURGERY. THIS DRESSING SHOULD BE CHANGED TO AN AQUACEL AT 7 DAYS POST-OPERATIVELY. IF THE PREVENA DRESSING HAS CONTINUOUS AND PERSISTENT DRAINAGE, CALL YOUR SURGEON FIRST BEFORE CHANGING THE DRESSING TO AN AQUACEL DRESSING. YOU MAY SHOWER AS SOON AS THE AQUACEL DRESSING IS PLACED INSTEAD OF THE PREVENA DRESSING OVER YOUR INCISION AREA. IF THE DRESSING LEAKS OR COMES LOOSE BEFORE THE 7 DAY PERIOD CONTACT YOUR DOCTOR / SURGEON. AFTER   14 DAYS POST OPERATIVELY, THE AQUACEL DRESSING IS REMOVED BY PULLING ON THE EDGE OF THE DRESSING AND GENTLY STRETCHING IT, THEN PEEL IT OFF SLOWLY LIKE A BANDAID. IF YOU HAVE ANY QUESTIONS OR CONCERNS ABOUT THE DRESSING CONTACT YOUR DOCTOR. REASONS TO CALL YOUR DOCTOR:  TEMPERATURE .0 OR MORE  PERSISTANT NAUSEA OR VOMITTING - ESPECIALLY IF YOU ARE UNABLE TO EAT OR DRINK. INCREASED LEVEL OF PAIN OR PAIN WHICH IS NOT CONTROLLED BY YOUR PAIN MEDICINE. PERSISTENT DRAINAGE AT ANYTIME FROM YOUR SURGICAL AREA / INCISION. PAIN, TENDERNESS OR SUDDEN SWELLING IN YOUR CALF REGION OF THE LOWER EXTREMITIES - THIS IS A POSSIBLE SIGN OF A BLOOD CLOT. ANY CHANGES IN SENSATION IN YOUR BODY IN THE AREA OF YOUR SURGERY = NUMBNESS OR TINGLING. ANY CHANGES IN CIRCULATION IN YOUR BODY IN THE AREA OF YOUR SURGERY = CHANGES  IN COLOR EITHER DARKER OR VERY PALE; OR  IF AREA FEELS COLD TO THE TOUCH AS COMPARED TO OTHER AREAS.   ANY CHANGES IN FUNCTION IN YOUR BODY IN THE AREA OF YOUR SURGERY = CHANGE IN MOVEMENT - UNABLE TO MOVE OR WIGGLE FINGERS, TOES OR FOOT OR ANY OTHER CHANGES IN NORMAL BODY FUNCTIONING. FOR ANY OF THE ABOVE OR ANY OTHER QUESTIONS OR CONCERNS CALL YOUR DOCTOR/ SURGEON AS SOON AS POSSIBLE.       Keshawn Pitts MD MPH  Orthopaedic Surgeon, Adult Hip and Knee Reconstruction  [facility] [address]  [address]  Office: (A) [phone] (A) [phone]  Adminstrative Assistant: Daphne Calloway

## 2023-05-17 NOTE — OPERATIVE REPORT
[facility] at [address]: 5/17/2023    PREOPERATIVE DIAGNOSIS:   1. 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 Right total hip arthroplasty recurrent dislocation    POST-OPERATIVE DIAGNOSIS:   1. 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 Right total hip arthroplasty recurrent dislocation    PROCEDURE:  1. Right Revision Total Hip Arthroplasty - Irrigation/Debridement and Resection of Femoral and Acetabular Components  2. Placement of a static antibiotic cement spacer  3. Application of negative pressure incisional dressing    Location: [address] [place] [address] Ave: Lucía Clayton MD  Assistant(s): Wagner FAIRCHILD, Lissette Spencer CSA    Anesthesia type: General  Estimated Blood Loss: 1000cc    Drains:  Prevena Incisional Wound Vac  Medium Hemovac    Complications: None immediately apparent    Specimen: Fluid cell count, cultures, tissue cultures    Findings:Necrotic tissue in the subcutaneous region Intact fascia, Cell count 19K. Well fixed stem. Chronic acetabular pelvic discontinuity  Acetabular Bone loss: severe segmental posterior column bone loss, anterior column bone loss, pelvic discontinuity. Femoral Bone loss: metaphyseal only, likely to be reconstructed with metadiaphyseal or diaphyseal fixation. Abductor muscle/tendon: intact    Explants:  Jonathan 60mm TM shell, 54mm G7 Cup, dual mobility liner, 5 screws  Jonathan Massey Cone Femoral stem     Implants:  2.8mm fully threaded steinmann pin    4 total batches of refobacin plain cement with 2g Vancomycin, 2.4g Tobramycin per batch were used. Indication:      This is a 72year old lady, with RA, Lupus, prior foot infection s/p hardware removal on antibiotic suppression, who has a complicated history with regards to her right hip as per H&P note, who unfortunately sustained presented with recurrent dislocation of the dual mobility bearing, persistent incisional wound drainage and elevated inflammatory markers, consistent with chronic prosthetic joint infection. Surgical versus non-surgical options were discussed with the patient, and together we decided it is best to proceed with a revision total hip arthroplasty, irrigation/debridement with resection of total hip components and placement of an antibiotic spacer (static if persistent pelvic discontinuity) with the goals of decreasing infection burden and hopefully complete eradication. This is a staged approach, with the plan of revision reimplantation once infection is deemed to be cleared. All risks and benefits of surgery including bleeding, persistent infection, dislocation, mal-prosthetic fracture, neurovascular injury, leg length or offset discrepancy, as well as medical and anesthesia-related complications were discussed with the patient / family, who elected to proceed with surgery. Procedure in detail:    Following induction of anesthesia, the patient was positioned lateral decubitus with a lateral hip positioner. Perioperative IV antibiotics of Ancef were administered. Standard prep and drape was performed. A longitudinal incision was made over the posterolateral aspect of the greater trochanter using the prior scar. Hypertrophic areas were excised. Cautery was used to dissect down to the fascia. The soft tissues superficial were debrided using a knife and a curette and this layer was irrigated. Cautery was used to expose the fascia and deep hip fluid was aspirated (bloody) and sent for cell count and cultures and electrocautery was used to incise the fascia in line with the femur. Flaps were created deep to the fascia to facilitate later closure. A Charnley retractor was placed. The posterior capsular scar was incised directly off of the posterior aspect of the greater trochanter distally, and proximally in line with the posterior border of the abductor.  Upon entering the joint, 3 total tissue cultures were taken. The dislocated hip was rotated and the head disimpacted off the trunnion using a breaux and mallet. The stem was checked and noted to be well fixed and well positioned. Any fibrous tissue covering the stem/bone interface was excised using cautery. A round mihai was used to remove any overhanging bone that would prevent stem extraction. A pencil tip mihai was used to remove any bony ongrowth from the stem/bone interface proximally and further distally along the conical stem using a short sheath to allow deeper access with the mihai tip. The Kidbox extraction device was assembled and attached to the trunnion. The stem was extracted using a heavy mallet, without significant damage to the proximal bone. A reverse curette was used to debride the canal and cultures were obtained from the femoral canal. The canal was reamed to a 12mm diameter for debridement using a flexibile reamer first, sequentially starting with 10mm, and then with a 12mm straight reamer. A deep gelpi was placed superiorly inside the capsule and a blunt anterior acetabular retractor was placed over the anterioinferior cup. The cup was cleared of soft tissue circumferentially. A pencil tip was used to clear  Some of the cement mantle inside the TM shell, and we attempted to disimpact the G7 cup out, but it was well fixed to the TM shell. We then used an explant cup blade to remove bone around the TM shell. Through the posterior column bone defect, we were able to reach behind the cup with a metal cutting mihai and cut the two posterosuperior screws. The screws in the ischium did not appear to have good fixation as the ischium was still somewhat mobile. We manipulated the cup using a grasper and it was noted to be loose at this point, and was removed with the residual screws. The residual cut screw shafts were removed using a rongeur. Cultures were obtained from the acetabular bony bed. A size 58mm reamer was used to gently debride the socket. And a curette was used to remove any prior cancellous particulate bone graft which was loose and not incorporated. We assessed the bone loss and using a breaux and pushing on the ischium, it was noted to be mobile separately from the ilium indicating persistent discontinuity. We elected to proceed with static spacer given this finding. The wound was then thoroughly irrigated as follows: 3L NS, followed by dilute peroxide solution, followed by 3 liters of NS, then again, dilute betadine and a final 3L of saline. Clean drapes were applied at this point, along with clean instruments for the remainder of the case. A 2.8mm fully threaded steinmann pin was cut short and was coated with 1 pack of plain cement mixed with antibiotics as above, and sized to an 11.5mm dowel in a tapered fashion to prevent subsidence. The cement was left to cure. The socket was exposed, and 3 packs of refobacin plain cement mixed with antibiotics as above were rolled and placed into the acetabular defect. The cement was left to cure. The antibiotic coated dowel was then inserted into the femur, after coating the proximal aspect with some of the mixed cement for the acetabulum to maintain rotational control    Excellent hemostasis was obtained. A hemovac drain was placed deep exiting anterolaterally over the proximal hip. The fascia was repaired in multiple layers using #1 PDS and using #2 Jasmine Mo in a running fashion. The subcutaneous tissue was closed with 0-PDS and 2-0 PDS. The skin was closed with 2-0 nylon suture. A sterile negative pressure dressing was applied to the wound. The patient was transferred to the post-anesthesia recovery unit (PACU) in stable condition. There were no immediate complications. There was no qualified resident available to assist because qualified residents were assisting with other surgery. The PA assistant was necessary for help with positioning, draping, retraction, and wound closure. POST-OPERATIVE PLAN  1. The patient will be permitted touch-down weight bearing on the operative extremity. 2. The patient will receive broad spectrum antibiotics, infectious disease team will be consulted and cultures will be followed. 3. The negative pressure incisional dressing will be removed at 7 days postop and switched to a dry dressing. 4. Hemovac will be removed POD1 regardless of output. 5. The patient should be scheduled for follow up in 2 weeks for wound and radiographic evaluation.

## 2023-05-17 NOTE — ANESTHESIA PROCEDURE NOTES
Airway  Date/Time: 5/17/2023 9:35 AM  Urgency: Elective      General Information and Staff    Patient location during procedure: OR  Anesthesiologist: Britney Brown MD  Resident/CRNA: Adriana Minor CRNA  Performed: CRNA   Performed by: Adriana Minor CRNA  Authorized by: Britney Brown MD      Indications and Patient Condition  Indications for airway management: anesthesia  Sedation level: deep  Preoxygenated: yes  Patient position: sniffing  Mask difficulty assessment: 0 - not attempted    Final Airway Details  Final airway type: endotracheal airway      Successful airway: ETT  Cuffed: yes   Successful intubation technique: Video laryngoscopy  Endotracheal tube insertion site: oral  Blade: GlideScope  Blade size: #3  ETT size (mm): 7.0    Cormack-Lehane Classification: grade I - full view of glottis  Placement verified by: chest auscultation and capnometry   Measured from: teeth  ETT to teeth (cm): 21  Number of attempts at approach: 1    Additional Comments  Easy, atraumatic intubation. Dentition unchanged.

## 2023-05-18 LAB
DEPRECATED RDW RBC AUTO: 53.1 FL (ref 35.1–46.3)
ERYTHROCYTE [DISTWIDTH] IN BLOOD BY AUTOMATED COUNT: 16.2 % (ref 11–15)
HCT VFR BLD AUTO: 26.9 %
HGB BLD-MCNC: 8.3 G/DL
MCH RBC QN AUTO: 27.8 PG (ref 26–34)
MCHC RBC AUTO-ENTMCNC: 30.9 G/DL (ref 31–37)
MCV RBC AUTO: 90 FL
PLATELET # BLD AUTO: 217 10(3)UL (ref 150–450)
RBC # BLD AUTO: 2.99 X10(6)UL
WBC # BLD AUTO: 5 X10(3) UL (ref 4–11)

## 2023-05-18 PROCEDURE — 99232 SBSQ HOSP IP/OBS MODERATE 35: CPT | Performed by: HOSPITALIST

## 2023-05-18 NOTE — PLAN OF CARE
Patient alert and oriented. Post-op day #1. Prevena wound vac in place to right hip. TTWB. Hemovacn drain removed per order. Receiving IV abx via Left PICC. Room air. SCDs and eliquis for DVT prophylaxis. Oxy provided as needed for pain. Encouraged frequent ambulation and use of incentive spirometer. Fall precautions maintained. Frequent rounding by nursing staff. Plan is [facility] pending cultures. Problem: Patient Centered Care  Goal: Patient preferences are identified and integrated in the patient's plan of care  Description: Interventions:  - What would you like us to know as we care for you?  I was here for the last surgery with Dr. Corby Rodríguez  - Provide timely, complete, and accurate information to patient/family  - Incorporate patient and family knowledge, values, beliefs, and cultural backgrounds into the planning and delivery of care  - Encourage patient/family to participate in care and decision-making at the level they choose  - Honor patient and family perspectives and choices  Outcome: Progressing     Problem: HEMATOLOGIC - ADULT  Goal: Free from bleeding injury  Description: (Example usage: patient with low platelets)  INTERVENTIONS:  - Avoid intramuscular injections, enemas and rectal medication administration  - Ensure safe mobilization of patient  - Hold pressure on venipuncture sites to achieve adequate hemostasis  - Assess for signs and symptoms of internal bleeding  - Monitor lab trends  - Patient is to report abnormal signs of bleeding to staff  - Avoid use of toothpicks and dental floss  - Use electric shaver for shaving  - Use soft bristle tooth brush  - Limit straining and forceful nose blowing  Outcome: Progressing     Problem: PAIN - ADULT  Goal: Verbalizes/displays adequate comfort level or patient's stated pain goal  Description: INTERVENTIONS:  - Encourage pt to monitor pain and request assistance  - Assess pain using appropriate pain scale  - Administer analgesics based on type and severity of pain and evaluate response  - Implement non-pharmacological measures as appropriate and evaluate response  - Consider cultural and social influences on pain and pain management  - Manage/alleviate anxiety  - Utilize distraction and/or relaxation techniques  - Monitor for opioid side effects  - Notify MD/LIP if interventions unsuccessful or patient reports new pain  - Anticipate increased pain with activity and pre-medicate as appropriate  Outcome: Progressing     Problem: RISK FOR INFECTION - ADULT  Goal: Absence of fever/infection during anticipated neutropenic period  Description: INTERVENTIONS  - Monitor WBC  - Administer growth factors as ordered  - Implement neutropenic guidelines  Outcome: Progressing     Problem: SAFETY ADULT - FALL  Goal: Free from fall injury  Description: INTERVENTIONS:  - Assess pt frequently for physical needs  - Identify cognitive and physical deficits and behaviors that affect risk of falls.   - [place] fall precautions as indicated by assessment.  - Educate pt/family on patient safety including physical limitations  - Instruct pt to call for assistance with activity based on assessment  - Modify environment to reduce risk of injury  - Provide assistive devices as appropriate  - Consider OT/PT consult to assist with strengthening/mobility  - Encourage toileting schedule  Outcome: Progressing     Problem: DISCHARGE PLANNING  Goal: Discharge to home or other facility with appropriate resources  Description: INTERVENTIONS:  - Identify barriers to discharge w/pt and caregiver  - Include patient/family/discharge partner in discharge planning  - Arrange for needed discharge resources and transportation as appropriate  - Identify discharge learning needs (meds, wound care, etc)  - Arrange for interpreters to assist at discharge as needed  - Consider post-discharge preferences of patient/family/discharge partner  - Complete POLST form as appropriate  - Assess patient's ability to be responsible for managing their own health  - Refer to Case Management Department for coordinating discharge planning if the patient needs post-hospital services based on physician/LIP order or complex needs related to functional status, cognitive ability or social support system  Outcome: Progressing     Problem: Patient/Family Goals  Goal: Patient/Family Long Term Goal  Description: Patient's Long Term Goal: go back to rehab when stable and will have no complications from surgery. Interventions:  - Up as tolerated following weight bearing precaution ordered by surgeon. - Monitor incision for any signs of infection or bleeding.  - Pain management with oral medication.  - May use ice to incision to prevent swelling or help reduce pain. - Up with walker with assist.  - Oral anticoagulant to prevent blood blots.  - PT/OT as ordered. - See additional Care Plan goals for specific interventions  Outcome: Progressing  Goal: Patient/Family Short Term Goal  Description: Patient's Short Term Goal: pain management and will not have further infection from surgery. Interventions:   - Possible evaluation for long term IV antibiotics. - Up as tolerated following weight bearing precaution ordered by surgeon. - Monitor incision for any signs of infection or bleeding.  - Pain management with oral medication.  - May use ice to incision to prevent swelling or help reduce pain. - Up with walker with assist.  - Oral anticoagulant to prevent blood blots.  - PT/OT as ordered.   - See additional Care Plan goals for specific interventions  Outcome: Progressing     Problem: SKIN/TISSUE INTEGRITY - ADULT  Goal: Incision(s), wounds(s) or drain site(s) healing without S/S of infection  Description: INTERVENTIONS:  - Assess and document risk factors for pressure ulcer development  - Assess and document skin integrity  - Assess and document dressing/incision, wound bed, drain sites and surrounding tissue  - Implement wound care per orders  - Initiate isolation precautions as appropriate  - Initiate Pressure Ulcer prevention bundle as indicated  Outcome: Progressing     Problem: MUSCULOSKELETAL - ADULT  Goal: Return mobility to safest level of function  Description: INTERVENTIONS:  - Assess patient stability and activity tolerance for standing, transferring and ambulating w/ or w/o assistive devices  - Assist with transfers and ambulation using safe patient handling equipment as needed  - Ensure adequate protection for wounds/incisions during mobilization  - Obtain PT/OT consults as needed  - Advance activity as appropriate  - Communicate ordered activity level and limitations with patient/family  Outcome: Progressing  Goal: Maintain proper alignment of affected body part  Description: INTERVENTIONS:  - Support and protect limb and body alignment per provider's orders  - Instruct and reinforce with patient and family use of appropriate assistive device and precautions (e.g. spinal or hip dislocation precautions)  Outcome: Progressing

## 2023-05-18 NOTE — PLAN OF CARE
Patient alert and oriented X4. Post-op day 1. Prevena dressing in place to R hip. Hemovac in place, output monitored. Manzanares removed, check void. SCD's and eliquis for DVT prophylaxis. PICC in place to L arm, unable to draw labs this morning. Alteplase order obtained. Pain management with oxycodone prn. General diet. Encouraged to cough and deep breathe with incentive spirometer and patient is compliant. Fall precautions in place including bed in lowest position, call light and personal belongings within reach, non-skid socks. Frequent rounding by nursing staff. Plan [facility] to [facility] pending med clearance. Problem: Patient Centered Care  Goal: Patient preferences are identified and integrated in the patient's plan of care  Description: Interventions:  - What would you like us to know as we care for you?  I was here for the last surgery with Dr. Willard Collazo  - Provide timely, complete, and accurate information to patient/family  - Incorporate patient and family knowledge, values, beliefs, and cultural backgrounds into the planning and delivery of care  - Encourage patient/family to participate in care and decision-making at the level they choose  - Honor patient and family perspectives and choices  Outcome: Progressing     Problem: HEMATOLOGIC - ADULT  Goal: Free from bleeding injury  Description: (Example usage: patient with low platelets)  INTERVENTIONS:  - Avoid intramuscular injections, enemas and rectal medication administration  - Ensure safe mobilization of patient  - Hold pressure on venipuncture sites to achieve adequate hemostasis  - Assess for signs and symptoms of internal bleeding  - Monitor lab trends  - Patient is to report abnormal signs of bleeding to staff  - Avoid use of toothpicks and dental floss  - Use electric shaver for shaving  - Use soft bristle tooth brush  - Limit straining and forceful nose blowing  Outcome: Progressing     Problem: PAIN - ADULT  Goal: Verbalizes/displays adequate comfort level or patient's stated pain goal  Description: INTERVENTIONS:  - Encourage pt to monitor pain and request assistance  - Assess pain using appropriate pain scale  - Administer analgesics based on type and severity of pain and evaluate response  - Implement non-pharmacological measures as appropriate and evaluate response  - Consider cultural and social influences on pain and pain management  - Manage/alleviate anxiety  - Utilize distraction and/or relaxation techniques  - Monitor for opioid side effects  - Notify MD/LIP if interventions unsuccessful or patient reports new pain  - Anticipate increased pain with activity and pre-medicate as appropriate  Outcome: Progressing     Problem: RISK FOR INFECTION - ADULT  Goal: Absence of fever/infection during anticipated neutropenic period  Description: INTERVENTIONS  - Monitor WBC  - Administer growth factors as ordered  - Implement neutropenic guidelines  Outcome: Progressing     Problem: SAFETY ADULT - FALL  Goal: Free from fall injury  Description: INTERVENTIONS:  - Assess pt frequently for physical needs  - Identify cognitive and physical deficits and behaviors that affect risk of falls.   - [place] fall precautions as indicated by assessment.  - Educate pt/family on patient safety including physical limitations  - Instruct pt to call for assistance with activity based on assessment  - Modify environment to reduce risk of injury  - Provide assistive devices as appropriate  - Consider OT/PT consult to assist with strengthening/mobility  - Encourage toileting schedule  Outcome: Progressing     Problem: DISCHARGE PLANNING  Goal: Discharge to home or other facility with appropriate resources  Description: INTERVENTIONS:  - Identify barriers to discharge w/pt and caregiver  - Include patient/family/discharge partner in discharge planning  - Arrange for needed discharge resources and transportation as appropriate  - Identify discharge learning needs (meds, wound care, etc)  - Arrange for interpreters to assist at discharge as needed  - Consider post-discharge preferences of patient/family/discharge partner  - Complete POLST form as appropriate  - Assess patient's ability to be responsible for managing their own health  - Refer to Case Management Department for coordinating discharge planning if the patient needs post-hospital services based on physician/LIP order or complex needs related to functional status, cognitive ability or social support system  Outcome: Progressing     Problem: Patient/Family Goals  Goal: Patient/Family Long Term Goal  Description: Patient's Long Term Goal: go back to rehab when stable and will have no complications from surgery. Interventions:  - Up as tolerated following weight bearing precaution ordered by surgeon. - Monitor incision for any signs of infection or bleeding.  - Pain management with oral medication.  - May use ice to incision to prevent swelling or help reduce pain. - Up with walker with assist.  - Oral anticoagulant to prevent blood blots.  - PT/OT as ordered. - See additional Care Plan goals for specific interventions  Outcome: Progressing  Goal: Patient/Family Short Term Goal  Description: Patient's Short Term Goal: pain management and will not have further infection from surgery. Interventions:   - Possible evaluation for long term IV antibiotics. - Up as tolerated following weight bearing precaution ordered by surgeon. - Monitor incision for any signs of infection or bleeding.  - Pain management with oral medication.  - May use ice to incision to prevent swelling or help reduce pain. - Up with walker with assist.  - Oral anticoagulant to prevent blood blots.  - PT/OT as ordered.   - See additional Care Plan goals for specific interventions  Outcome: Progressing     Problem: SKIN/TISSUE INTEGRITY - ADULT  Goal: Incision(s), wounds(s) or drain site(s) healing without S/S of infection  Description: INTERVENTIONS:  - Assess and document risk factors for pressure ulcer development  - Assess and document skin integrity  - Assess and document dressing/incision, wound bed, drain sites and surrounding tissue  - Implement wound care per orders  - Initiate isolation precautions as appropriate  - Initiate Pressure Ulcer prevention bundle as indicated  Outcome: Progressing     Problem: MUSCULOSKELETAL - ADULT  Goal: Return mobility to safest level of function  Description: INTERVENTIONS:  - Assess patient stability and activity tolerance for standing, transferring and ambulating w/ or w/o assistive devices  - Assist with transfers and ambulation using safe patient handling equipment as needed  - Ensure adequate protection for wounds/incisions during mobilization  - Obtain PT/OT consults as needed  - Advance activity as appropriate  - Communicate ordered activity level and limitations with patient/family  Outcome: Progressing  Goal: Maintain proper alignment of affected body part  Description: INTERVENTIONS:  - Support and protect limb and body alignment per provider's orders  - Instruct and reinforce with patient and family use of appropriate assistive device and precautions (e.g. spinal or hip dislocation precautions)  Outcome: Progressing

## 2023-05-18 NOTE — PROGRESS NOTES
05/18/23 1504   VISIT TYPE   PT Inpatient Visit Type (Documentation Required) Attempted Treatment  (pt refused)

## 2023-05-19 LAB
BLOOD TYPE BARCODE: 5100
DEPRECATED RDW RBC AUTO: 54.1 FL (ref 35.1–46.3)
ERYTHROCYTE [DISTWIDTH] IN BLOOD BY AUTOMATED COUNT: 16.3 % (ref 11–15)
HCT VFR BLD AUTO: 25.6 %
HGB BLD-MCNC: 7.8 G/DL
MCH RBC QN AUTO: 27.7 PG (ref 26–34)
MCHC RBC AUTO-ENTMCNC: 30.5 G/DL (ref 31–37)
MCV RBC AUTO: 90.8 FL
PLATELET # BLD AUTO: 208 10(3)UL (ref 150–450)
RBC # BLD AUTO: 2.82 X10(6)UL
UNIT VOLUME: 350 ML
WBC # BLD AUTO: 3.3 X10(3) UL (ref 4–11)

## 2023-05-19 PROCEDURE — 99232 SBSQ HOSP IP/OBS MODERATE 35: CPT | Performed by: HOSPITALIST

## 2023-05-19 RX ORDER — VANCOMYCIN HYDROCHLORIDE 125 MG/1
125 CAPSULE ORAL DAILY
Status: DISCONTINUED | OUTPATIENT
Start: 2023-05-19 | End: 2023-05-19

## 2023-05-19 RX ORDER — VANCOMYCIN HYDROCHLORIDE
1500 EVERY 24 HOURS
Qty: 9750 ML | Refills: 0 | Status: SHIPPED | OUTPATIENT
Start: 2023-05-20 | End: 2023-06-28

## 2023-05-19 NOTE — PROGRESS NOTES
05/19/23 1429   Clinical Encounter Type   Visited With Patient not available   Routine Visit Follow-up   Referral From    Taxonomy   Interventions Silent prayer   Trigger for Consult   Trigger for Spiritual Care Consult No     Summary:  received referral for visit from [facility] Patient was asleep when  tried to visit. Spiritual Care support can be requested via an [facility] consult.     -Shmuel Meadows, Chaplain Resident.

## 2023-05-19 NOTE — PLAN OF CARE
POD 2. Patient alert and oriented x4. On room air. Vital signs stable. Pain managed with prn Oxycodone 5mg and scheduled tylenol. Pt had difficulty with stand to pivot while maintaining TTWB status. Currently bedrest/max assist. Voiding via purewick. Left Upper arm picc in place, flushed with positive blood return. In place for IV antibiotics, will continue Vancomycin on DC. Script is in the chart for discharge. Prevena wound vac  In place, On and to suction. No output this shift. Bed is low and locked. Side rails up times two. Call light is within reach. Hourly rounding continued. Safety precautions maintained. Plan is [facility] pending insurance authorization. Problem: Patient Centered Care  Goal: Patient preferences are identified and integrated in the patient's plan of care  Description: Interventions:  - What would you like us to know as we care for you?  I was here for the last surgery with Dr. Lit Cade  - Provide timely, complete, and accurate information to patient/family  - Incorporate patient and family knowledge, values, beliefs, and cultural backgrounds into the planning and delivery of care  - Encourage patient/family to participate in care and decision-making at the level they choose  - Honor patient and family perspectives and choices  Outcome: Progressing     Problem: HEMATOLOGIC - ADULT  Goal: Free from bleeding injury  Description: (Example usage: patient with low platelets)  INTERVENTIONS:  - Avoid intramuscular injections, enemas and rectal medication administration  - Ensure safe mobilization of patient  - Hold pressure on venipuncture sites to achieve adequate hemostasis  - Assess for signs and symptoms of internal bleeding  - Monitor lab trends  - Patient is to report abnormal signs of bleeding to staff  - Avoid use of toothpicks and dental floss  - Use electric shaver for shaving  - Use soft bristle tooth brush  - Limit straining and forceful nose blowing  Outcome: Progressing     Problem: PAIN - ADULT  Goal: Verbalizes/displays adequate comfort level or patient's stated pain goal  Description: INTERVENTIONS:  - Encourage pt to monitor pain and request assistance  - Assess pain using appropriate pain scale  - Administer analgesics based on type and severity of pain and evaluate response  - Implement non-pharmacological measures as appropriate and evaluate response  - Consider cultural and social influences on pain and pain management  - Manage/alleviate anxiety  - Utilize distraction and/or relaxation techniques  - Monitor for opioid side effects  - Notify MD/LIP if interventions unsuccessful or patient reports new pain  - Anticipate increased pain with activity and pre-medicate as appropriate  Outcome: Progressing     Problem: RISK FOR INFECTION - ADULT  Goal: Absence of fever/infection during anticipated neutropenic period  Description: INTERVENTIONS  - Monitor WBC  - Administer growth factors as ordered  - Implement neutropenic guidelines  Outcome: Progressing     Problem: SAFETY ADULT - FALL  Goal: Free from fall injury  Description: INTERVENTIONS:  - Assess pt frequently for physical needs  - Identify cognitive and physical deficits and behaviors that affect risk of falls.   - [place] fall precautions as indicated by assessment.  - Educate pt/family on patient safety including physical limitations  - Instruct pt to call for assistance with activity based on assessment  - Modify environment to reduce risk of injury  - Provide assistive devices as appropriate  - Consider OT/PT consult to assist with strengthening/mobility  - Encourage toileting schedule  Outcome: Progressing     Problem: DISCHARGE PLANNING  Goal: Discharge to home or other facility with appropriate resources  Description: INTERVENTIONS:  - Identify barriers to discharge w/pt and caregiver  - Include patient/family/discharge partner in discharge planning  - Arrange for needed discharge resources and transportation as appropriate  - Identify discharge learning needs (meds, wound care, etc)  - Arrange for interpreters to assist at discharge as needed  - Consider post-discharge preferences of patient/family/discharge partner  - Complete POLST form as appropriate  - Assess patient's ability to be responsible for managing their own health  - Refer to Case Management Department for coordinating discharge planning if the patient needs post-hospital services based on physician/LIP order or complex needs related to functional status, cognitive ability or social support system  Outcome: Progressing     Problem: Patient/Family Goals  Goal: Patient/Family Long Term Goal  Description: Patient's Long Term Goal: go back to rehab when stable and will have no complications from surgery. Interventions:  - Up as tolerated following weight bearing precaution ordered by surgeon. - Monitor incision for any signs of infection or bleeding.  - Pain management with oral medication.  - May use ice to incision to prevent swelling or help reduce pain. - Up with walker with assist.  - Oral anticoagulant to prevent blood blots.  - PT/OT as ordered. - See additional Care Plan goals for specific interventions  Outcome: Progressing  Goal: Patient/Family Short Term Goal  Description: Patient's Short Term Goal: pain management and will not have further infection from surgery. Interventions:   - Possible evaluation for long term IV antibiotics. - Up as tolerated following weight bearing precaution ordered by surgeon. - Monitor incision for any signs of infection or bleeding.  - Pain management with oral medication.  - May use ice to incision to prevent swelling or help reduce pain. - Up with walker with assist.  - Oral anticoagulant to prevent blood blots.  - PT/OT as ordered.   - See additional Care Plan goals for specific interventions  Outcome: Progressing     Problem: SKIN/TISSUE INTEGRITY - ADULT  Goal: Incision(s), wounds(s) or drain site(s) healing without S/S of infection  Description: INTERVENTIONS:  - Assess and document risk factors for pressure ulcer development  - Assess and document skin integrity  - Assess and document dressing/incision, wound bed, drain sites and surrounding tissue  - Implement wound care per orders  - Initiate isolation precautions as appropriate  - Initiate Pressure Ulcer prevention bundle as indicated  Outcome: Progressing     Problem: MUSCULOSKELETAL - ADULT  Goal: Return mobility to safest level of function  Description: INTERVENTIONS:  - Assess patient stability and activity tolerance for standing, transferring and ambulating w/ or w/o assistive devices  - Assist with transfers and ambulation using safe patient handling equipment as needed  - Ensure adequate protection for wounds/incisions during mobilization  - Obtain PT/OT consults as needed  - Advance activity as appropriate  - Communicate ordered activity level and limitations with patient/family  Outcome: Progressing  Goal: Maintain proper alignment of affected body part  Description: INTERVENTIONS:  - Support and protect limb and body alignment per provider's orders  - Instruct and reinforce with patient and family use of appropriate assistive device and precautions (e.g. spinal or hip dislocation precautions)  Outcome: Progressing

## 2023-05-19 NOTE — CM/SW NOTE
DERRICK followed up on DC planning. DERRICK sent updates and clinicals to [facility] of [facility], [place], operated [facility] notifying them pt would be ready for DC tomorrow per Dr. Madalyn Knapp placed Ambulance on [address] - pending bed/med clear    PLAN: DC to [facility] [place], Saturday, Ambulance on [address] - pending arrangement    Axel Vergara, TRAVISW, MSW ext.  [phone]

## 2023-05-19 NOTE — PROGRESS NOTES
[facility]    CDI Prediction Tool Protocol (Vancomycin Prophylaxis Deferred)      This patient is currently at high risk for developing CDI due to his/her score being >/= 13 points. The current score is: 13    Score Breakdown:  High risk antibiotic use (5 points)  Hospital length of stay > 7 days (3 points)  Age 77 - 78 years (2 points)  Long term care facility resident (1 point)  Hypoalbuminemia: < 3g/dL (1 point)  Recently hospitalized: within 90 days (1 point)      However, they are not being initiated on OVP (oral vancomycin prophylaxis) at this time because patient does not have hypoalbuminemia. This patient does not have C. difficile infection. All measures taken within this protocol are to assess this patient and potentially decrease the risk of CDI development.     Geovanna TorreD  5/19/2023  7:47 AM  [facility]  Pharmacy Extension: [phone]

## 2023-05-19 NOTE — PLAN OF CARE
Pt has L PICC line, single lumen. OK to flush, changed blue cap, no blood draw noted. Instilled 2.2ml of Alteplase into the single lumen. Attempted to aspirate blood at 0440 with no success. Will wait the full hour to reassess. 0530: Reassessed L PICC line. No blood draw noted. Will reassess in one hour. CCU RN will come to assess at 0630.

## 2023-05-19 NOTE — PROGRESS NOTES
Contacted CCU Charge RN for reminder to reasess PICC line of patient - CCU charge RN will try to reassess before shift change otherwise report will be given during handoff and Day shift CCU RN will reassess PICC line.

## 2023-05-20 VITALS
WEIGHT: 212.19 LBS | SYSTOLIC BLOOD PRESSURE: 131 MMHG | DIASTOLIC BLOOD PRESSURE: 75 MMHG | OXYGEN SATURATION: 92 % | HEART RATE: 79 BPM | RESPIRATION RATE: 18 BRPM | BODY MASS INDEX: 36 KG/M2 | TEMPERATURE: 98 F

## 2023-05-20 LAB
DEPRECATED RDW RBC AUTO: 52.9 FL (ref 35.1–46.3)
ERYTHROCYTE [DISTWIDTH] IN BLOOD BY AUTOMATED COUNT: 16.1 % (ref 11–15)
HCT VFR BLD AUTO: 25.1 %
HGB BLD-MCNC: 7.7 G/DL
MCH RBC QN AUTO: 27.2 PG (ref 26–34)
MCHC RBC AUTO-ENTMCNC: 30.7 G/DL (ref 31–37)
MCV RBC AUTO: 88.7 FL
PLATELET # BLD AUTO: 216 10(3)UL (ref 150–450)
RBC # BLD AUTO: 2.83 X10(6)UL
WBC # BLD AUTO: 3.9 X10(3) UL (ref 4–11)

## 2023-05-20 RX ORDER — OXYCODONE HYDROCHLORIDE 5 MG/1
5 TABLET ORAL EVERY 4 HOURS PRN
Qty: 30 TABLET | Refills: 0 | Status: SHIPPED | OUTPATIENT
Start: 2023-05-20

## 2023-05-20 NOTE — PLAN OF CARE
Aox4 on RA. Dressing and Prevena wound vac in place. IV Abx - Picc L arm/ L arm precautions. Voiding via purewick. Eliquis and SCDs for DVT prophylaxis. Up with 1-2 assist x walker. TTWB. General diet. Scheduled Tylenol and PRN Oxycodone given for pain. Plan for [facility] pending for arrangement. Problem: Patient Centered Care  Goal: Patient preferences are identified and integrated in the patient's plan of care  Description: Interventions:  - What would you like us to know as we care for you?  I was here for the last surgery with Dr. Dona Clancy  - Provide timely, complete, and accurate information to patient/family  - Incorporate patient and family knowledge, values, beliefs, and cultural backgrounds into the planning and delivery of care  - Encourage patient/family to participate in care and decision-making at the level they choose  - Honor patient and family perspectives and choices  Outcome: Progressing     Problem: HEMATOLOGIC - ADULT  Goal: Free from bleeding injury  Description: (Example usage: patient with low platelets)  INTERVENTIONS:  - Avoid intramuscular injections, enemas and rectal medication administration  - Ensure safe mobilization of patient  - Hold pressure on venipuncture sites to achieve adequate hemostasis  - Assess for signs and symptoms of internal bleeding  - Monitor lab trends  - Patient is to report abnormal signs of bleeding to staff  - Avoid use of toothpicks and dental floss  - Use electric shaver for shaving  - Use soft bristle tooth brush  - Limit straining and forceful nose blowing  Outcome: Progressing     Problem: PAIN - ADULT  Goal: Verbalizes/displays adequate comfort level or patient's stated pain goal  Description: INTERVENTIONS:  - Encourage pt to monitor pain and request assistance  - Assess pain using appropriate pain scale  - Administer analgesics based on type and severity of pain and evaluate response  - Implement non-pharmacological measures as appropriate and evaluate response  - Consider cultural and social influences on pain and pain management  - Manage/alleviate anxiety  - Utilize distraction and/or relaxation techniques  - Monitor for opioid side effects  - Notify MD/LIP if interventions unsuccessful or patient reports new pain  - Anticipate increased pain with activity and pre-medicate as appropriate  Outcome: Progressing     Problem: RISK FOR INFECTION - ADULT  Goal: Absence of fever/infection during anticipated neutropenic period  Description: INTERVENTIONS  - Monitor WBC  - Administer growth factors as ordered  - Implement neutropenic guidelines  Outcome: Progressing     Problem: SAFETY ADULT - FALL  Goal: Free from fall injury  Description: INTERVENTIONS:  - Assess pt frequently for physical needs  - Identify cognitive and physical deficits and behaviors that affect risk of falls.   - [place] fall precautions as indicated by assessment.  - Educate pt/family on patient safety including physical limitations  - Instruct pt to call for assistance with activity based on assessment  - Modify environment to reduce risk of injury  - Provide assistive devices as appropriate  - Consider OT/PT consult to assist with strengthening/mobility  - Encourage toileting schedule  Outcome: Progressing     Problem: DISCHARGE PLANNING  Goal: Discharge to home or other facility with appropriate resources  Description: INTERVENTIONS:  - Identify barriers to discharge w/pt and caregiver  - Include patient/family/discharge partner in discharge planning  - Arrange for needed discharge resources and transportation as appropriate  - Identify discharge learning needs (meds, wound care, etc)  - Arrange for interpreters to assist at discharge as needed  - Consider post-discharge preferences of patient/family/discharge partner  - Complete POLST form as appropriate  - Assess patient's ability to be responsible for managing their own health  - Refer to Case Management Department for coordinating discharge planning if the patient needs post-hospital services based on physician/LIP order or complex needs related to functional status, cognitive ability or social support system  Outcome: Progressing     Problem: Patient/Family Goals  Goal: Patient/Family Long Term Goal  Description: Patient's Long Term Goal: go back to rehab when stable and will have no complications from surgery. Interventions:  - Up as tolerated following weight bearing precaution ordered by surgeon. - Monitor incision for any signs of infection or bleeding.  - Pain management with oral medication.  - May use ice to incision to prevent swelling or help reduce pain. - Up with walker with assist.  - Oral anticoagulant to prevent blood blots.  - PT/OT as ordered. - See additional Care Plan goals for specific interventions  Outcome: Progressing  Goal: Patient/Family Short Term Goal  Description: Patient's Short Term Goal: pain management and will not have further infection from surgery. Interventions:   - Possible evaluation for long term IV antibiotics. - Up as tolerated following weight bearing precaution ordered by surgeon. - Monitor incision for any signs of infection or bleeding.  - Pain management with oral medication.  - May use ice to incision to prevent swelling or help reduce pain. - Up with walker with assist.  - Oral anticoagulant to prevent blood blots.  - PT/OT as ordered.   - See additional Care Plan goals for specific interventions  Outcome: Progressing     Problem: SKIN/TISSUE INTEGRITY - ADULT  Goal: Incision(s), wounds(s) or drain site(s) healing without S/S of infection  Description: INTERVENTIONS:  - Assess and document risk factors for pressure ulcer development  - Assess and document skin integrity  - Assess and document dressing/incision, wound bed, drain sites and surrounding tissue  - Implement wound care per orders  - Initiate isolation precautions as appropriate  - Initiate Pressure Ulcer prevention bundle as indicated  Outcome: Progressing     Problem: MUSCULOSKELETAL - ADULT  Goal: Return mobility to safest level of function  Description: INTERVENTIONS:  - Assess patient stability and activity tolerance for standing, transferring and ambulating w/ or w/o assistive devices  - Assist with transfers and ambulation using safe patient handling equipment as needed  - Ensure adequate protection for wounds/incisions during mobilization  - Obtain PT/OT consults as needed  - Advance activity as appropriate  - Communicate ordered activity level and limitations with patient/family  Outcome: Progressing  Goal: Maintain proper alignment of affected body part  Description: INTERVENTIONS:  - Support and protect limb and body alignment per provider's orders  - Instruct and reinforce with patient and family use of appropriate assistive device and precautions (e.g. spinal or hip dislocation precautions)  Outcome: Progressing

## 2023-05-20 NOTE — PLAN OF CARE
POD 3. Patient alert and oriented x4. On room air. Vital signs stable. Pain managed with prn Oxycodone 5mg and scheduled tylenol. Pt has difficulty with TTWB status, able to stand and pivot to rolling chair. Voiding via purewick. Left Upper arm picc in place, flushed with positive blood return. In place for IV antibiotics, will continue Vancomycin on DC. Script is in the chart for discharge. Prevena wound vac  In place, On and to suction. No output this shift. Bed is low and locked. Side rails up times two. Call light is within reach. Hourly rounding continued. Safety precautions maintained. Cleared for discharge back to [facility].  scheduled for 3PM. RN report given to      Problem: Patient Centered Care  Goal: Patient preferences are identified and integrated in the patient's plan of care  Description: Interventions:  - What would you like us to know as we care for you?  I was here for the last surgery with Dr. Pierre Child  - Provide timely, complete, and accurate information to patient/family  - Incorporate patient and family knowledge, values, beliefs, and cultural backgrounds into the planning and delivery of care  - Encourage patient/family to participate in care and decision-making at the level they choose  - Honor patient and family perspectives and choices  5/20/2023 1455 by Chantal Mack RN  Outcome: Completed  5/20/2023 1135 by Chantal Mack RN  Outcome: Progressing     Problem: HEMATOLOGIC - ADULT  Goal: Free from bleeding injury  Description: (Example usage: patient with low platelets)  INTERVENTIONS:  - Avoid intramuscular injections, enemas and rectal medication administration  - Ensure safe mobilization of patient  - Hold pressure on venipuncture sites to achieve adequate hemostasis  - Assess for signs and symptoms of internal bleeding  - Monitor lab trends  - Patient is to report abnormal signs of bleeding to staff  - Avoid use of toothpicks and dental floss  - Use electric shaver for shaving  - Use soft bristle tooth brush  - Limit straining and forceful nose blowing  5/20/2023 1455 by Saul Mooney RN  Outcome: Completed  5/20/2023 1135 by Saul Mooney RN  Outcome: Progressing     Problem: PAIN - ADULT  Goal: Verbalizes/displays adequate comfort level or patient's stated pain goal  Description: INTERVENTIONS:  - Encourage pt to monitor pain and request assistance  - Assess pain using appropriate pain scale  - Administer analgesics based on type and severity of pain and evaluate response  - Implement non-pharmacological measures as appropriate and evaluate response  - Consider cultural and social influences on pain and pain management  - Manage/alleviate anxiety  - Utilize distraction and/or relaxation techniques  - Monitor for opioid side effects  - Notify MD/LIP if interventions unsuccessful or patient reports new pain  - Anticipate increased pain with activity and pre-medicate as appropriate  5/20/2023 1455 by Saul Mooney RN  Outcome: Completed  5/20/2023 1135 by Saul Mooney RN  Outcome: Progressing     Problem: RISK FOR INFECTION - ADULT  Goal: Absence of fever/infection during anticipated neutropenic period  Description: INTERVENTIONS  - Monitor WBC  - Administer growth factors as ordered  - Implement neutropenic guidelines  5/20/2023 1455 by Saul Mooney RN  Outcome: Completed  5/20/2023 1135 by Saul oMoney RN  Outcome: Progressing     Problem: SAFETY ADULT - FALL  Goal: Free from fall injury  Description: INTERVENTIONS:  - Assess pt frequently for physical needs  - Identify cognitive and physical deficits and behaviors that affect risk of falls.   - [place] fall precautions as indicated by assessment.  - Educate pt/family on patient safety including physical limitations  - Instruct pt to call for assistance with activity based on assessment  - Modify environment to reduce risk of injury  - Provide assistive devices as appropriate  - Consider OT/PT consult to assist with strengthening/mobility  - Encourage toileting schedule  5/20/2023 1455 by Irasema Lockett RN  Outcome: Completed  5/20/2023 1135 by Irasema Lockett RN  Outcome: Progressing     Problem: DISCHARGE PLANNING  Goal: Discharge to home or other facility with appropriate resources  Description: INTERVENTIONS:  - Identify barriers to discharge w/pt and caregiver  - Include patient/family/discharge partner in discharge planning  - Arrange for needed discharge resources and transportation as appropriate  - Identify discharge learning needs (meds, wound care, etc)  - Arrange for interpreters to assist at discharge as needed  - Consider post-discharge preferences of patient/family/discharge partner  - Complete POLST form as appropriate  - Assess patient's ability to be responsible for managing their own health  - Refer to Case Management Department for coordinating discharge planning if the patient needs post-hospital services based on physician/LIP order or complex needs related to functional status, cognitive ability or social support system  5/20/2023 1455 by Irasema Lockett RN  Outcome: Completed  5/20/2023 1135 by Irasema Lockett RN  Outcome: Progressing     Problem: Patient/Family Goals  Goal: Patient/Family Long Term Goal  Description: Patient's Long Term Goal: go back to rehab when stable and will have no complications from surgery. Interventions:  - Up as tolerated following weight bearing precaution ordered by surgeon. - Monitor incision for any signs of infection or bleeding.  - Pain management with oral medication.  - May use ice to incision to prevent swelling or help reduce pain. - Up with walker with assist.  - Oral anticoagulant to prevent blood blots.  - PT/OT as ordered.   - See additional Care Plan goals for specific interventions  5/20/2023 1455 by Irasema Lockett RN  Outcome: Completed  5/20/2023 1135 by Irasema Lockett RN  Outcome: Progressing  Goal: Patient/Family Short Term Goal  Description: Patient's Short Term Goal: pain management and will not have further infection from surgery. Interventions:   - Possible evaluation for long term IV antibiotics. - Up as tolerated following weight bearing precaution ordered by surgeon. - Monitor incision for any signs of infection or bleeding.  - Pain management with oral medication.  - May use ice to incision to prevent swelling or help reduce pain. - Up with walker with assist.  - Oral anticoagulant to prevent blood blots.  - PT/OT as ordered.   - See additional Care Plan goals for specific interventions  5/20/2023 1455 by Pradeep Danielson RN  Outcome: Completed  5/20/2023 1135 by Pradeep Danielson RN  Outcome: Progressing     Problem: SKIN/TISSUE INTEGRITY - ADULT  Goal: Incision(s), wounds(s) or drain site(s) healing without S/S of infection  Description: INTERVENTIONS:  - Assess and document risk factors for pressure ulcer development  - Assess and document skin integrity  - Assess and document dressing/incision, wound bed, drain sites and surrounding tissue  - Implement wound care per orders  - Initiate isolation precautions as appropriate  - Initiate Pressure Ulcer prevention bundle as indicated  5/20/2023 1455 by Pradeep Danielson RN  Outcome: Completed  5/20/2023 1135 by Pradeep Danielson RN  Outcome: Progressing     Problem: MUSCULOSKELETAL - ADULT  Goal: Return mobility to safest level of function  Description: INTERVENTIONS:  - Assess patient stability and activity tolerance for standing, transferring and ambulating w/ or w/o assistive devices  - Assist with transfers and ambulation using safe patient handling equipment as needed  - Ensure adequate protection for wounds/incisions during mobilization  - Obtain PT/OT consults as needed  - Advance activity as appropriate  - Communicate ordered activity level and limitations with patient/family  5/20/2023 1455 by Tyesha Vaughn Gomez RN  Outcome: Completed  5/20/2023 1135 by Chantal Mack RN  Outcome: Progressing  Goal: Maintain proper alignment of affected body part  Description: INTERVENTIONS:  - Support and protect limb and body alignment per provider's orders  - Instruct and reinforce with patient and family use of appropriate assistive device and precautions (e.g. spinal or hip dislocation precautions)  5/20/2023 1455 by Chantal Mack RN  Outcome: Completed  5/20/2023 1135 by Chantal Mack RN  Outcome: Progressing

## 2023-05-20 NOTE — CM/SW NOTE
Patient with discharge order in. CM spoke with George Peck at [address] [place] [address] and confirmed they are able to accept patient back today at [address] [facility] confirmed they do not need any other documentation prior to transfer. [facility] ambulance scheduled for 3pm  time. Patient max assist, unable to stand pivot, poor trunk control. PCS updated. Patient's RN updated on plan of care. RN to notify patient.      RN report #: [phone]    Plan: Return to [facility] via ambulance at Aimee Ceballos, RN, BSN

## 2023-05-22 ENCOUNTER — MED REC SCAN ONLY (OUTPATIENT)
Facility: CLINIC | Age: 68
End: 2023-05-22

## 2023-05-30 ENCOUNTER — TELEPHONE (OUTPATIENT)
Facility: CLINIC | Age: 68
End: 2023-05-30

## 2023-05-31 ENCOUNTER — TELEPHONE (OUTPATIENT)
Facility: CLINIC | Age: 68
End: 2023-05-31

## 2023-05-31 NOTE — TELEPHONE ENCOUNTER
O2 certification faxed x 3 and unable to locate. Will refax to our office to my attention. Form received and signed by Dr. Robin Clark.   Faxed back to [address] [facility] at [phone] along with clinical notes from 11-30-22

## 2023-07-27 ENCOUNTER — LAB ENCOUNTER (OUTPATIENT)
Dept: LAB | Facility: HOSPITAL | Age: 68
End: 2023-07-27
Attending: STUDENT IN AN ORGANIZED HEALTH CARE EDUCATION/TRAINING PROGRAM
Payer: MEDICARE

## 2023-07-27 ENCOUNTER — HOSPITAL ENCOUNTER (OUTPATIENT)
Dept: GENERAL RADIOLOGY | Facility: HOSPITAL | Age: 68
Discharge: HOME | End: 2023-07-27
Attending: STUDENT IN AN ORGANIZED HEALTH CARE EDUCATION/TRAINING PROGRAM
Payer: MEDICARE

## 2023-07-27 DIAGNOSIS — Z47.32 AFTERCARE FOLLOWING EXPLANTATION OF HIP JOINT PROSTHESIS: ICD-10-CM

## 2023-07-27 LAB
BASOPHILS NFR SNV: 0 %
CRYSTALS SNV QL MICRO: NEGATIVE
EOSINOPHIL NFR SNV: 1 %
GRANULOCYTES # SNV AUTO: 886 /MM3 (ref 0–200)
INR BLD: 1.02 (ref 0.85–1.16)
LYMPHOCYTES NFR SNV: 62 %
MONOS+MACROS NFR SNV: 16 %
NEUTROPHILS NFR SNV: 21 %
PROTHROMBIN TIME: 13.4 SECONDS (ref 11.6–14.8)
RBC # FLD AUTO: ABNORMAL /MM3 (ref ?–1)
TOTAL CELLS COUNTED FLD: 100

## 2023-07-27 PROCEDURE — 89060 EXAM SYNOVIAL FLUID CRYSTALS: CPT | Performed by: STUDENT IN AN ORGANIZED HEALTH CARE EDUCATION/TRAINING PROGRAM

## 2023-07-27 PROCEDURE — 20610 DRAIN/INJ JOINT/BURSA W/O US: CPT | Performed by: STUDENT IN AN ORGANIZED HEALTH CARE EDUCATION/TRAINING PROGRAM

## 2023-07-27 PROCEDURE — 85610 PROTHROMBIN TIME: CPT | Performed by: RADIOLOGY

## 2023-07-27 PROCEDURE — 87070 CULTURE OTHR SPECIMN AEROBIC: CPT | Performed by: STUDENT IN AN ORGANIZED HEALTH CARE EDUCATION/TRAINING PROGRAM

## 2023-07-27 PROCEDURE — 89050 BODY FLUID CELL COUNT: CPT | Performed by: STUDENT IN AN ORGANIZED HEALTH CARE EDUCATION/TRAINING PROGRAM

## 2023-07-27 PROCEDURE — 77002 NEEDLE LOCALIZATION BY XRAY: CPT | Performed by: STUDENT IN AN ORGANIZED HEALTH CARE EDUCATION/TRAINING PROGRAM

## 2023-07-27 PROCEDURE — 87205 SMEAR GRAM STAIN: CPT | Performed by: STUDENT IN AN ORGANIZED HEALTH CARE EDUCATION/TRAINING PROGRAM

## 2023-07-27 NOTE — IMAGING NOTE
This RN spoke with Ghazala Arm admission nurse at [facility] of [address] patient on 7/21/23 to hold apixiban for 48 hours prior to aspiration. This am patient insure whether So Marten was held. Spoke with Benjy Ellis at [address] this morning to confirm that So Marten has been held. She confirmed last dose of apixiban on 7/24/23. XRAY department updated.

## 2023-08-25 ENCOUNTER — HOSPITAL ENCOUNTER (OUTPATIENT)
Dept: CT IMAGING | Facility: HOSPITAL | Age: 68
Discharge: HOME | End: 2023-08-25
Attending: STUDENT IN AN ORGANIZED HEALTH CARE EDUCATION/TRAINING PROGRAM
Payer: MEDICARE

## 2023-08-25 ENCOUNTER — LAB ENCOUNTER (OUTPATIENT)
Dept: LAB | Facility: HOSPITAL | Age: 68
End: 2023-08-25
Attending: STUDENT IN AN ORGANIZED HEALTH CARE EDUCATION/TRAINING PROGRAM
Payer: MEDICARE

## 2023-08-25 DIAGNOSIS — Z01.818 PREOP TESTING: ICD-10-CM

## 2023-08-25 DIAGNOSIS — Z47.32 AFTERCARE FOLLOWING EXPLANTATION OF HIP JOINT PROSTHESIS: ICD-10-CM

## 2023-08-25 LAB
ANTIBODY SCREEN: NEGATIVE
RH BLOOD TYPE: POSITIVE

## 2023-08-25 PROCEDURE — 87641 MR-STAPH DNA AMP PROBE: CPT

## 2023-08-25 PROCEDURE — 93010 ELECTROCARDIOGRAM REPORT: CPT | Performed by: INTERNAL MEDICINE

## 2023-08-25 PROCEDURE — 86900 BLOOD TYPING SEROLOGIC ABO: CPT

## 2023-08-25 PROCEDURE — 76377 3D RENDER W/INTRP POSTPROCES: CPT | Performed by: STUDENT IN AN ORGANIZED HEALTH CARE EDUCATION/TRAINING PROGRAM

## 2023-08-25 PROCEDURE — 86850 RBC ANTIBODY SCREEN: CPT

## 2023-08-25 PROCEDURE — 93005 ELECTROCARDIOGRAM TRACING: CPT

## 2023-08-25 PROCEDURE — 86901 BLOOD TYPING SEROLOGIC RH(D): CPT

## 2023-08-25 PROCEDURE — 73700 CT LOWER EXTREMITY W/O DYE: CPT | Performed by: STUDENT IN AN ORGANIZED HEALTH CARE EDUCATION/TRAINING PROGRAM

## 2023-08-25 PROCEDURE — 86920 COMPATIBILITY TEST SPIN: CPT

## 2023-08-26 LAB
ATRIAL RATE: 61 BPM
MRSA DNA SPEC QL NAA+PROBE: NEGATIVE
P AXIS: 18 DEGREES
P-R INTERVAL: 162 MS
Q-T INTERVAL: 406 MS
QRS DURATION: 88 MS
QTC CALCULATION (BEZET): 408 MS
R AXIS: 2 DEGREES
T AXIS: 38 DEGREES
VENTRICULAR RATE: 61 BPM

## 2023-08-28 RX ORDER — ECHINACEA PURPUREA EXTRACT 125 MG
2 TABLET ORAL AS NEEDED
COMMUNITY

## 2023-08-28 RX ORDER — PREGABALIN 75 MG/1
75 CAPSULE ORAL NIGHTLY
COMMUNITY
End: 2023-09-02

## 2023-08-28 RX ORDER — MELATONIN
325 2 TIMES DAILY WITH MEALS
COMMUNITY

## 2023-08-28 RX ORDER — LORATADINE 10 MG/1
10 TABLET ORAL DAILY
COMMUNITY

## 2023-08-28 RX ORDER — NYSTATIN 100000 [USP'U]/G
POWDER TOPICAL NIGHTLY
COMMUNITY

## 2023-08-28 RX ORDER — PHENYLEPHRINE HCL/ACETAMINOPHN 5 MG-325MG
2 TABLET ORAL EVERY 4 HOURS PRN
COMMUNITY

## 2023-08-28 RX ORDER — FUROSEMIDE 40 MG/1
40 TABLET ORAL DAILY
COMMUNITY
End: 2023-09-02

## 2023-08-28 RX ORDER — GUAIFENESIN 200 MG/10ML
30 LIQUID ORAL 3 TIMES DAILY PRN
COMMUNITY

## 2023-08-28 RX ORDER — ACETAMINOPHEN 500 MG
1000 TABLET ORAL ONCE
Status: CANCELLED | OUTPATIENT
Start: 2023-08-28 | End: 2023-08-28

## 2023-08-28 RX ORDER — CEFADROXIL 500 MG/1
500 CAPSULE ORAL 2 TIMES DAILY
COMMUNITY
Start: 2023-05-03 | End: 2023-09-02

## 2023-08-30 ENCOUNTER — APPOINTMENT (OUTPATIENT)
Dept: GENERAL RADIOLOGY | Facility: HOSPITAL | Age: 68
End: 2023-08-30
Attending: STUDENT IN AN ORGANIZED HEALTH CARE EDUCATION/TRAINING PROGRAM
Payer: MEDICARE

## 2023-08-30 ENCOUNTER — ANESTHESIA (OUTPATIENT)
Dept: SURGERY | Facility: HOSPITAL | Age: 68
End: 2023-08-30
Payer: MEDICARE

## 2023-08-30 ENCOUNTER — ANESTHESIA EVENT (OUTPATIENT)
Dept: SURGERY | Facility: HOSPITAL | Age: 68
End: 2023-08-30
Payer: MEDICARE

## 2023-08-30 ENCOUNTER — HOSPITAL ENCOUNTER (INPATIENT)
Facility: HOSPITAL | Age: 68
LOS: 3 days | Discharge: NURSING, REHAB OR LONG-TERM CARE | End: 2023-09-02
Attending: STUDENT IN AN ORGANIZED HEALTH CARE EDUCATION/TRAINING PROGRAM | Admitting: STUDENT IN AN ORGANIZED HEALTH CARE EDUCATION/TRAINING PROGRAM
Payer: MEDICARE

## 2023-08-30 DIAGNOSIS — Z01.818 PREOP TESTING: Primary | ICD-10-CM

## 2023-08-30 PROBLEM — Z96.659 FAILED TOTAL KNEE ARTHROPLASTY  (HCC): Status: ACTIVE | Noted: 2023-08-30

## 2023-08-30 PROBLEM — Z96.659 FAILED TOTAL KNEE ARTHROPLASTY  (HCC): Status: RESOLVED | Noted: 2023-08-30 | Resolved: 2023-08-30

## 2023-08-30 PROBLEM — Z96.659 FAILED TOTAL KNEE ARTHROPLASTY (HCC): Status: ACTIVE | Noted: 2023-08-30

## 2023-08-30 PROBLEM — Z96.659 FAILED TOTAL KNEE ARTHROPLASTY: Status: RESOLVED | Noted: 2023-08-30 | Resolved: 2023-08-30

## 2023-08-30 PROBLEM — T84.018A FAILED TOTAL KNEE ARTHROPLASTY (HCC): Status: ACTIVE | Noted: 2023-08-30

## 2023-08-30 PROBLEM — T84.018A FAILED TOTAL KNEE ARTHROPLASTY (HCC): Status: RESOLVED | Noted: 2023-08-30 | Resolved: 2023-08-30

## 2023-08-30 PROBLEM — Z96.649 PROSTHETIC HIP INFECTION, SEQUELA: Status: ACTIVE | Noted: 2023-05-12

## 2023-08-30 PROBLEM — T84.018A FAILED TOTAL KNEE ARTHROPLASTY  (HCC): Status: RESOLVED | Noted: 2023-08-30 | Resolved: 2023-08-30

## 2023-08-30 PROBLEM — T84.59XS PROSTHETIC HIP INFECTION, SEQUELA: Status: ACTIVE | Noted: 2023-05-12

## 2023-08-30 PROBLEM — T84.018A FAILED TOTAL KNEE ARTHROPLASTY: Status: ACTIVE | Noted: 2023-08-30

## 2023-08-30 PROBLEM — T84.018A FAILED TOTAL KNEE ARTHROPLASTY  (HCC): Status: ACTIVE | Noted: 2023-08-30

## 2023-08-30 PROBLEM — Z96.659 FAILED TOTAL KNEE ARTHROPLASTY (HCC): Status: RESOLVED | Noted: 2023-08-30 | Resolved: 2023-08-30

## 2023-08-30 PROBLEM — T84.018A FAILED TOTAL KNEE ARTHROPLASTY: Status: RESOLVED | Noted: 2023-08-30 | Resolved: 2023-08-30

## 2023-08-30 PROBLEM — Z96.659 FAILED TOTAL KNEE ARTHROPLASTY: Status: ACTIVE | Noted: 2023-08-30

## 2023-08-30 LAB
ANION GAP SERPL CALC-SCNC: 6 MMOL/L (ref 0–18)
BASOPHILS NFR SNV: 0 %
BUN BLD-MCNC: 22 MG/DL (ref 7–18)
BUN/CREAT SERPL: 15.2 (ref 10–20)
CALCIUM BLD-MCNC: 8.7 MG/DL (ref 8.5–10.1)
CHLORIDE SERPL-SCNC: 107 MMOL/L (ref 98–112)
CO2 SERPL-SCNC: 29 MMOL/L (ref 21–32)
CREAT BLD-MCNC: 1.45 MG/DL
DEPRECATED RDW RBC AUTO: 56.4 FL (ref 35.1–46.3)
EGFRCR SERPLBLD CKD-EPI 2021: 39 ML/MIN/1.73M2 (ref 60–?)
EOSINOPHIL NFR SNV: 0 %
ERYTHROCYTE [DISTWIDTH] IN BLOOD BY AUTOMATED COUNT: 17.8 % (ref 11–15)
GLUCOSE BLD-MCNC: 156 MG/DL (ref 70–99)
HCT VFR BLD AUTO: 33 %
HCT VFR BLD AUTO: 33.1 %
HGB BLD-MCNC: 10 G/DL
HGB BLD-MCNC: 10.1 G/DL
LYMPHOCYTES NFR SNV: 66 %
MCH RBC QN AUTO: 26.6 PG (ref 26–34)
MCHC RBC AUTO-ENTMCNC: 30.6 G/DL (ref 31–37)
MCV RBC AUTO: 86.8 FL
MONOS+MACROS NFR SNV: 17 %
NEUTROPHILS NFR FLD: 17 %
OSMOLALITY SERPL CALC.SUM OF ELEC: 301 MOSM/KG (ref 275–295)
PLATELET # BLD AUTO: 183 10(3)UL (ref 150–450)
POCT HEMOCUE: 7.1 (ref 12–16)
POCT HEMOCUE: 8.6 (ref 12–16)
POCT HEMOCUE: 9.7 (ref 12–16)
POTASSIUM SERPL-SCNC: 3.6 MMOL/L (ref 3.5–5.1)
RBC # BLD AUTO: 3.8 X10(6)UL
RBC # FLD AUTO: ABNORMAL /CUMM (ref ?–1)
SODIUM SERPL-SCNC: 142 MMOL/L (ref 136–145)
TOTAL CELLS COUNTED FLD: 100
TOTAL CELLS COUNTED SNV: 354 /CUMM (ref 0–200)
WBC # BLD AUTO: 5.2 X10(3) UL (ref 4–11)
WBC # SNV: 354 /CUMM

## 2023-08-30 PROCEDURE — 0SP908Z REMOVAL OF SPACER FROM RIGHT HIP JOINT, OPEN APPROACH: ICD-10-PCS | Performed by: STUDENT IN AN ORGANIZED HEALTH CARE EDUCATION/TRAINING PROGRAM

## 2023-08-30 PROCEDURE — 73501 X-RAY EXAM HIP UNI 1 VIEW: CPT | Performed by: STUDENT IN AN ORGANIZED HEALTH CARE EDUCATION/TRAINING PROGRAM

## 2023-08-30 PROCEDURE — 99233 SBSQ HOSP IP/OBS HIGH 50: CPT | Performed by: HOSPITALIST

## 2023-08-30 PROCEDURE — 30233N1 TRANSFUSION OF NONAUTOLOGOUS RED BLOOD CELLS INTO PERIPHERAL VEIN, PERCUTANEOUS APPROACH: ICD-10-PCS | Performed by: INTERNAL MEDICINE

## 2023-08-30 PROCEDURE — 0SB90ZX EXCISION OF RIGHT HIP JOINT, OPEN APPROACH, DIAGNOSTIC: ICD-10-PCS | Performed by: STUDENT IN AN ORGANIZED HEALTH CARE EDUCATION/TRAINING PROGRAM

## 2023-08-30 PROCEDURE — 72170 X-RAY EXAM OF PELVIS: CPT | Performed by: STUDENT IN AN ORGANIZED HEALTH CARE EDUCATION/TRAINING PROGRAM

## 2023-08-30 PROCEDURE — 0SR90J9 REPLACEMENT OF RIGHT HIP JOINT WITH SYNTHETIC SUBSTITUTE, CEMENTED, OPEN APPROACH: ICD-10-PCS | Performed by: STUDENT IN AN ORGANIZED HEALTH CARE EDUCATION/TRAINING PROGRAM

## 2023-08-30 PROCEDURE — 0QU60KZ SUPPLEMENT RIGHT UPPER FEMUR WITH NONAUTOLOGOUS TISSUE SUBSTITUTE, OPEN APPROACH: ICD-10-PCS | Performed by: STUDENT IN AN ORGANIZED HEALTH CARE EDUCATION/TRAINING PROGRAM

## 2023-08-30 PROCEDURE — 36430 TRANSFUSION BLD/BLD COMPNT: CPT | Performed by: ANESTHESIOLOGY

## 2023-08-30 PROCEDURE — 0QU40JZ SUPPLEMENT RIGHT ACETABULUM WITH SYNTHETIC SUBSTITUTE, OPEN APPROACH: ICD-10-PCS | Performed by: STUDENT IN AN ORGANIZED HEALTH CARE EDUCATION/TRAINING PROGRAM

## 2023-08-30 DEVICE — BONE ALLO CANCELOUS CHIP 15CC: Type: IMPLANTABLE DEVICE | Site: HIP | Status: FUNCTIONAL

## 2023-08-30 DEVICE — IMPLANTABLE DEVICE
Type: IMPLANTABLE DEVICE | Site: HIP | Status: FUNCTIONAL
Brand: TRABECULAR METAL™

## 2023-08-30 DEVICE — IMPLANTABLE DEVICE: Type: IMPLANTABLE DEVICE | Site: HIP | Status: FUNCTIONAL

## 2023-08-30 DEVICE — IMPLANTABLE DEVICE
Type: IMPLANTABLE DEVICE | Site: HIP | Status: FUNCTIONAL
Brand: G7® VIVACIT-E®

## 2023-08-30 DEVICE — BIOLOX® DELTA, FEMORAL HEAD, L, CERAMIC, Ø 40/+3.5, TAPER 12/14
Type: IMPLANTABLE DEVICE | Site: HIP | Status: FUNCTIONAL
Brand: BIOLOX® DELTA

## 2023-08-30 DEVICE — CEMENT BONE RADIOPAQ HOWMEDICA: Type: IMPLANTABLE DEVICE | Site: HIP | Status: FUNCTIONAL

## 2023-08-30 DEVICE — IMPLANTABLE DEVICE
Type: IMPLANTABLE DEVICE | Site: HIP | Status: FUNCTIONAL
Brand: WAGNER CONE PROSTHESIS®

## 2023-08-30 DEVICE — BONE ALLO CANCELOUS CHIP 30CC: Type: IMPLANTABLE DEVICE | Site: HIP | Status: FUNCTIONAL

## 2023-08-30 RX ORDER — ROCURONIUM BROMIDE 10 MG/ML
INJECTION, SOLUTION INTRAVENOUS AS NEEDED
Status: DISCONTINUED | OUTPATIENT
Start: 2023-08-30 | End: 2023-08-30 | Stop reason: SURG

## 2023-08-30 RX ORDER — HYDROMORPHONE HYDROCHLORIDE 1 MG/ML
0.4 INJECTION, SOLUTION INTRAMUSCULAR; INTRAVENOUS; SUBCUTANEOUS EVERY 5 MIN PRN
Status: DISCONTINUED | OUTPATIENT
Start: 2023-08-30 | End: 2023-08-30 | Stop reason: HOSPADM

## 2023-08-30 RX ORDER — VANCOMYCIN HYDROCHLORIDE 1 G/20ML
INJECTION, POWDER, LYOPHILIZED, FOR SOLUTION INTRAVENOUS AS NEEDED
Status: DISCONTINUED | OUTPATIENT
Start: 2023-08-30 | End: 2023-08-30 | Stop reason: HOSPADM

## 2023-08-30 RX ORDER — MIDAZOLAM HYDROCHLORIDE 1 MG/ML
INJECTION INTRAMUSCULAR; INTRAVENOUS AS NEEDED
Status: DISCONTINUED | OUTPATIENT
Start: 2023-08-30 | End: 2023-08-30 | Stop reason: SURG

## 2023-08-30 RX ORDER — ACETAMINOPHEN 500 MG
1000 TABLET ORAL EVERY 6 HOURS
Status: DISCONTINUED | OUTPATIENT
Start: 2023-08-30 | End: 2023-09-02

## 2023-08-30 RX ORDER — CETIRIZINE HYDROCHLORIDE 10 MG/1
10 TABLET ORAL DAILY
Status: DISCONTINUED | OUTPATIENT
Start: 2023-08-31 | End: 2023-09-02

## 2023-08-30 RX ORDER — MAGNESIUM HYDROXIDE 1200 MG/15ML
LIQUID ORAL CONTINUOUS PRN
Status: COMPLETED | OUTPATIENT
Start: 2023-08-30 | End: 2023-08-30

## 2023-08-30 RX ORDER — FAMOTIDINE 20 MG/1
20 TABLET, FILM COATED ORAL 2 TIMES DAILY
Status: DISCONTINUED | OUTPATIENT
Start: 2023-08-30 | End: 2023-09-02

## 2023-08-30 RX ORDER — HYDROMORPHONE HYDROCHLORIDE 1 MG/ML
0.6 INJECTION, SOLUTION INTRAMUSCULAR; INTRAVENOUS; SUBCUTANEOUS EVERY 5 MIN PRN
Status: DISCONTINUED | OUTPATIENT
Start: 2023-08-30 | End: 2023-08-30 | Stop reason: HOSPADM

## 2023-08-30 RX ORDER — ACETAMINOPHEN 500 MG
1000 TABLET ORAL ONCE
Status: COMPLETED | OUTPATIENT
Start: 2023-08-30 | End: 2023-08-30

## 2023-08-30 RX ORDER — HYDROMORPHONE HYDROCHLORIDE 1 MG/ML
0.2 INJECTION, SOLUTION INTRAMUSCULAR; INTRAVENOUS; SUBCUTANEOUS EVERY 5 MIN PRN
Status: DISCONTINUED | OUTPATIENT
Start: 2023-08-30 | End: 2023-08-30 | Stop reason: HOSPADM

## 2023-08-30 RX ORDER — CEFAZOLIN SODIUM/WATER 2 G/20 ML
2 SYRINGE (ML) INTRAVENOUS EVERY 8 HOURS
Status: DISCONTINUED | OUTPATIENT
Start: 2023-08-30 | End: 2023-08-31

## 2023-08-30 RX ORDER — DOCUSATE SODIUM 100 MG/1
100 CAPSULE, LIQUID FILLED ORAL 2 TIMES DAILY
Status: DISCONTINUED | OUTPATIENT
Start: 2023-08-30 | End: 2023-09-02

## 2023-08-30 RX ORDER — FAMOTIDINE 10 MG/ML
20 INJECTION, SOLUTION INTRAVENOUS 2 TIMES DAILY
Status: DISCONTINUED | OUTPATIENT
Start: 2023-08-30 | End: 2023-09-02

## 2023-08-30 RX ORDER — POLYETHYLENE GLYCOL 3350 17 G/17G
17 POWDER, FOR SOLUTION ORAL DAILY PRN
Status: DISCONTINUED | OUTPATIENT
Start: 2023-08-30 | End: 2023-09-02

## 2023-08-30 RX ORDER — SENNOSIDES 8.6 MG
17.2 TABLET ORAL NIGHTLY
Status: DISCONTINUED | OUTPATIENT
Start: 2023-08-30 | End: 2023-09-02

## 2023-08-30 RX ORDER — OXYCODONE HYDROCHLORIDE 5 MG/1
5 TABLET ORAL EVERY 4 HOURS PRN
Status: DISCONTINUED | OUTPATIENT
Start: 2023-08-30 | End: 2023-08-31

## 2023-08-30 RX ORDER — FUROSEMIDE 40 MG/1
40 TABLET ORAL DAILY
Status: DISCONTINUED | OUTPATIENT
Start: 2023-08-31 | End: 2023-09-02

## 2023-08-30 RX ORDER — ASPIRIN 325 MG
325 TABLET, DELAYED RELEASE (ENTERIC COATED) ORAL 2 TIMES DAILY
Status: DISCONTINUED | OUTPATIENT
Start: 2023-08-30 | End: 2023-09-02

## 2023-08-30 RX ORDER — SODIUM CHLORIDE, SODIUM LACTATE, POTASSIUM CHLORIDE, CALCIUM CHLORIDE 600; 310; 30; 20 MG/100ML; MG/100ML; MG/100ML; MG/100ML
INJECTION, SOLUTION INTRAVENOUS CONTINUOUS
Status: DISCONTINUED | OUTPATIENT
Start: 2023-08-30 | End: 2023-09-01 | Stop reason: ALTCHOICE

## 2023-08-30 RX ORDER — ONDANSETRON 2 MG/ML
INJECTION INTRAMUSCULAR; INTRAVENOUS AS NEEDED
Status: DISCONTINUED | OUTPATIENT
Start: 2023-08-30 | End: 2023-08-30 | Stop reason: SURG

## 2023-08-30 RX ORDER — BISACODYL 10 MG
10 SUPPOSITORY, RECTAL RECTAL
Status: DISCONTINUED | OUTPATIENT
Start: 2023-08-30 | End: 2023-09-02

## 2023-08-30 RX ORDER — CEFAZOLIN SODIUM/WATER 2 G/20 ML
2 SYRINGE (ML) INTRAVENOUS ONCE
Status: COMPLETED | OUTPATIENT
Start: 2023-08-30 | End: 2023-08-30

## 2023-08-30 RX ORDER — SODIUM CHLORIDE 9 MG/ML
INJECTION, SOLUTION INTRAVENOUS CONTINUOUS PRN
Status: DISCONTINUED | OUTPATIENT
Start: 2023-08-30 | End: 2023-08-30 | Stop reason: SURG

## 2023-08-30 RX ORDER — ALBUTEROL SULFATE 90 UG/1
2 AEROSOL, METERED RESPIRATORY (INHALATION) EVERY 4 HOURS PRN
Status: DISCONTINUED | OUTPATIENT
Start: 2023-08-30 | End: 2023-09-02

## 2023-08-30 RX ORDER — PREGABALIN 75 MG/1
75 CAPSULE ORAL NIGHTLY
Status: DISCONTINUED | OUTPATIENT
Start: 2023-08-30 | End: 2023-09-02

## 2023-08-30 RX ORDER — LABETALOL HYDROCHLORIDE 5 MG/ML
5 INJECTION, SOLUTION INTRAVENOUS EVERY 5 MIN PRN
Status: DISCONTINUED | OUTPATIENT
Start: 2023-08-30 | End: 2023-08-30 | Stop reason: HOSPADM

## 2023-08-30 RX ORDER — ONDANSETRON 2 MG/ML
4 INJECTION INTRAMUSCULAR; INTRAVENOUS EVERY 6 HOURS PRN
Status: DISCONTINUED | OUTPATIENT
Start: 2023-08-30 | End: 2023-09-02

## 2023-08-30 RX ORDER — MORPHINE SULFATE 10 MG/ML
6 INJECTION, SOLUTION INTRAMUSCULAR; INTRAVENOUS EVERY 10 MIN PRN
Status: DISCONTINUED | OUTPATIENT
Start: 2023-08-30 | End: 2023-08-30 | Stop reason: HOSPADM

## 2023-08-30 RX ORDER — LIDOCAINE HYDROCHLORIDE 10 MG/ML
INJECTION, SOLUTION EPIDURAL; INFILTRATION; INTRACAUDAL; PERINEURAL AS NEEDED
Status: DISCONTINUED | OUTPATIENT
Start: 2023-08-30 | End: 2023-08-30 | Stop reason: SURG

## 2023-08-30 RX ORDER — VANCOMYCIN HYDROCHLORIDE
1250 EVERY 12 HOURS
Status: DISCONTINUED | OUTPATIENT
Start: 2023-08-30 | End: 2023-09-01

## 2023-08-30 RX ORDER — TRANEXAMIC ACID 10 MG/ML
INJECTION, SOLUTION INTRAVENOUS AS NEEDED
Status: DISCONTINUED | OUTPATIENT
Start: 2023-08-30 | End: 2023-08-30 | Stop reason: SURG

## 2023-08-30 RX ORDER — HYDROXYCHLOROQUINE SULFATE 200 MG/1
200 TABLET, FILM COATED ORAL 2 TIMES DAILY
Status: DISCONTINUED | OUTPATIENT
Start: 2023-08-30 | End: 2023-09-02

## 2023-08-30 RX ORDER — IPRATROPIUM BROMIDE 42 UG/1
2 SPRAY, METERED NASAL 2 TIMES DAILY
Status: DISCONTINUED | OUTPATIENT
Start: 2023-08-30 | End: 2023-09-02

## 2023-08-30 RX ORDER — CEVIMELINE HYDROCHLORIDE 30 MG/1
30 CAPSULE ORAL 3 TIMES DAILY
Status: DISCONTINUED | OUTPATIENT
Start: 2023-08-30 | End: 2023-09-02

## 2023-08-30 RX ORDER — PREGABALIN 50 MG/1
50 CAPSULE ORAL EVERY MORNING
Status: DISCONTINUED | OUTPATIENT
Start: 2023-08-31 | End: 2023-09-02

## 2023-08-30 RX ORDER — PHENYLEPHRINE HCL 10 MG/ML
VIAL (ML) INJECTION AS NEEDED
Status: DISCONTINUED | OUTPATIENT
Start: 2023-08-30 | End: 2023-08-30 | Stop reason: SURG

## 2023-08-30 RX ORDER — VANCOMYCIN HYDROCHLORIDE
1250 ONCE
Status: COMPLETED | OUTPATIENT
Start: 2023-08-30 | End: 2023-08-30

## 2023-08-30 RX ORDER — EPHEDRINE SULFATE 50 MG/ML
INJECTION, SOLUTION INTRAVENOUS AS NEEDED
Status: DISCONTINUED | OUTPATIENT
Start: 2023-08-30 | End: 2023-08-30 | Stop reason: SURG

## 2023-08-30 RX ORDER — MORPHINE SULFATE 4 MG/ML
4 INJECTION, SOLUTION INTRAMUSCULAR; INTRAVENOUS EVERY 10 MIN PRN
Status: DISCONTINUED | OUTPATIENT
Start: 2023-08-30 | End: 2023-08-30 | Stop reason: HOSPADM

## 2023-08-30 RX ORDER — METOCLOPRAMIDE HYDROCHLORIDE 5 MG/ML
10 INJECTION INTRAMUSCULAR; INTRAVENOUS EVERY 8 HOURS PRN
Status: DISCONTINUED | OUTPATIENT
Start: 2023-08-30 | End: 2023-08-30 | Stop reason: HOSPADM

## 2023-08-30 RX ORDER — HYDROCHLOROTHIAZIDE 25 MG/1
25 TABLET ORAL DAILY
Status: DISCONTINUED | OUTPATIENT
Start: 2023-08-31 | End: 2023-09-02

## 2023-08-30 RX ORDER — SODIUM CHLORIDE, SODIUM LACTATE, POTASSIUM CHLORIDE, CALCIUM CHLORIDE 600; 310; 30; 20 MG/100ML; MG/100ML; MG/100ML; MG/100ML
INJECTION, SOLUTION INTRAVENOUS CONTINUOUS
Status: DISCONTINUED | OUTPATIENT
Start: 2023-08-30 | End: 2023-08-30 | Stop reason: HOSPADM

## 2023-08-30 RX ORDER — FLUTICASONE FUROATE AND VILANTEROL 200; 25 UG/1; UG/1
1 POWDER RESPIRATORY (INHALATION) DAILY
Status: DISCONTINUED | OUTPATIENT
Start: 2023-08-31 | End: 2023-09-02

## 2023-08-30 RX ORDER — ENEMA 19; 7 G/133ML; G/133ML
1 ENEMA RECTAL ONCE AS NEEDED
Status: DISCONTINUED | OUTPATIENT
Start: 2023-08-30 | End: 2023-09-02

## 2023-08-30 RX ORDER — DEXAMETHASONE SODIUM PHOSPHATE 4 MG/ML
VIAL (ML) INJECTION AS NEEDED
Status: DISCONTINUED | OUTPATIENT
Start: 2023-08-30 | End: 2023-08-30 | Stop reason: SURG

## 2023-08-30 RX ORDER — ONDANSETRON 2 MG/ML
4 INJECTION INTRAMUSCULAR; INTRAVENOUS EVERY 6 HOURS PRN
Status: DISCONTINUED | OUTPATIENT
Start: 2023-08-30 | End: 2023-08-30 | Stop reason: HOSPADM

## 2023-08-30 RX ORDER — FAMOTIDINE 20 MG/1
20 TABLET, FILM COATED ORAL ONCE
Status: COMPLETED | OUTPATIENT
Start: 2023-08-30 | End: 2023-08-30

## 2023-08-30 RX ORDER — MORPHINE SULFATE 4 MG/ML
2 INJECTION, SOLUTION INTRAMUSCULAR; INTRAVENOUS EVERY 10 MIN PRN
Status: DISCONTINUED | OUTPATIENT
Start: 2023-08-30 | End: 2023-08-30 | Stop reason: HOSPADM

## 2023-08-30 RX ORDER — NALOXONE HYDROCHLORIDE 0.4 MG/ML
0.08 INJECTION, SOLUTION INTRAMUSCULAR; INTRAVENOUS; SUBCUTANEOUS AS NEEDED
Status: DISCONTINUED | OUTPATIENT
Start: 2023-08-30 | End: 2023-08-30 | Stop reason: HOSPADM

## 2023-08-30 RX ORDER — SODIUM CHLORIDE 9 MG/ML
INJECTION, SOLUTION INTRAVENOUS CONTINUOUS
Status: DISCONTINUED | OUTPATIENT
Start: 2023-08-30 | End: 2023-09-02

## 2023-08-30 RX ORDER — METOCLOPRAMIDE HYDROCHLORIDE 5 MG/ML
10 INJECTION INTRAMUSCULAR; INTRAVENOUS EVERY 8 HOURS PRN
Status: DISCONTINUED | OUTPATIENT
Start: 2023-08-30 | End: 2023-09-02

## 2023-08-30 RX ORDER — METOCLOPRAMIDE 10 MG/1
10 TABLET ORAL ONCE
Status: COMPLETED | OUTPATIENT
Start: 2023-08-30 | End: 2023-08-30

## 2023-08-30 RX ADMIN — MIDAZOLAM HYDROCHLORIDE 1 MG: 1 INJECTION INTRAMUSCULAR; INTRAVENOUS at 12:10:00

## 2023-08-30 RX ADMIN — SODIUM CHLORIDE, SODIUM LACTATE, POTASSIUM CHLORIDE, CALCIUM CHLORIDE: 600; 310; 30; 20 INJECTION, SOLUTION INTRAVENOUS at 15:34:00

## 2023-08-30 RX ADMIN — PHENYLEPHRINE HCL 50 MCG: 10 MG/ML VIAL (ML) INJECTION at 15:17:00

## 2023-08-30 RX ADMIN — EPHEDRINE SULFATE 10 MG: 50 INJECTION, SOLUTION INTRAVENOUS at 15:13:00

## 2023-08-30 RX ADMIN — SODIUM CHLORIDE: 9 INJECTION, SOLUTION INTRAVENOUS at 15:35:00

## 2023-08-30 RX ADMIN — CEFAZOLIN SODIUM/WATER 2 G: 2 G/20 ML SYRINGE (ML) INTRAVENOUS at 12:32:00

## 2023-08-30 RX ADMIN — EPHEDRINE SULFATE 10 MG: 50 INJECTION, SOLUTION INTRAVENOUS at 13:23:00

## 2023-08-30 RX ADMIN — SODIUM CHLORIDE, SODIUM LACTATE, POTASSIUM CHLORIDE, CALCIUM CHLORIDE: 600; 310; 30; 20 INJECTION, SOLUTION INTRAVENOUS at 14:44:00

## 2023-08-30 RX ADMIN — PHENYLEPHRINE HCL 50 MCG: 10 MG/ML VIAL (ML) INJECTION at 15:56:00

## 2023-08-30 RX ADMIN — TRANEXAMIC ACID 1000 MG: 10 INJECTION, SOLUTION INTRAVENOUS at 12:38:00

## 2023-08-30 RX ADMIN — LIDOCAINE HYDROCHLORIDE 50 MG: 10 INJECTION, SOLUTION EPIDURAL; INFILTRATION; INTRACAUDAL; PERINEURAL at 12:18:00

## 2023-08-30 RX ADMIN — PHENYLEPHRINE HCL 50 MCG: 10 MG/ML VIAL (ML) INJECTION at 15:43:00

## 2023-08-30 RX ADMIN — ONDANSETRON 4 MG: 2 INJECTION INTRAMUSCULAR; INTRAVENOUS at 15:57:00

## 2023-08-30 RX ADMIN — EPHEDRINE SULFATE 10 MG: 50 INJECTION, SOLUTION INTRAVENOUS at 12:26:00

## 2023-08-30 RX ADMIN — DEXAMETHASONE SODIUM PHOSPHATE 4 MG: 4 MG/ML VIAL (ML) INJECTION at 12:40:00

## 2023-08-30 RX ADMIN — EPHEDRINE SULFATE 5 MG: 50 INJECTION, SOLUTION INTRAVENOUS at 12:33:00

## 2023-08-30 RX ADMIN — SODIUM CHLORIDE: 9 INJECTION, SOLUTION INTRAVENOUS at 14:44:00

## 2023-08-30 RX ADMIN — EPHEDRINE SULFATE 10 MG: 50 INJECTION, SOLUTION INTRAVENOUS at 12:36:00

## 2023-08-30 RX ADMIN — ROCURONIUM BROMIDE 10 MG: 10 INJECTION, SOLUTION INTRAVENOUS at 12:18:00

## 2023-08-30 RX ADMIN — SODIUM CHLORIDE, SODIUM LACTATE, POTASSIUM CHLORIDE, CALCIUM CHLORIDE: 600; 310; 30; 20 INJECTION, SOLUTION INTRAVENOUS at 12:40:00

## 2023-08-30 RX ADMIN — SODIUM CHLORIDE: 9 INJECTION, SOLUTION INTRAVENOUS at 13:12:00

## 2023-08-30 NOTE — OPERATIVE REPORT
[facility] at [address] OF SURGERY: 8/30/2023     PREOPERATIVE DIAGNOSIS:   Right Total Hip Arthroplasty Status post resection / static spacer     POST-OPERATIVE DIAGNOSIS:   Right Total Hip Arthroplasty Status post resection / static spacer     PROCEDURE:  Removal of right hip static spacer - 04839  Right Revision Total Hip Arthroplasty - Femoral and Acetabular Components - 75838 - Modifier 22  Deep right hip biopsy for frozen section  Application of negative pressure incisional dressing     Location: [facility]     SURGEON: Kandy Koehler MD  Assistant(s): Elias Glez MD, Panda Burkett MD, Cristine Briscoe MD, Osmany Mcmhaon CSA     Anesthesia type:  General  Estimated Blood Loss: 700cc     Drains: Prevena Incisional Wound Vac     Complications: None immediately apparent     Implants:  70mm ZB TM Revision Shell Acetabular Component with 5 x 6.5mm screws   Long ZB Acetabular Revision Cage for size 70mm shell  40mm ID ZB Size F Vitamin E poly, neutral  125 deg ZB [facility], Size 21 femoral stem  40mm +3.5 delta ceramic head     Indication: This is a 69year old lady with RA/lupus, who is ~14 weeks s/p right primary COLTON who unfortunately presented with increasing right hip pain nad difficulty ambulating. Radiographic evaluation demonstrated a loose acetabular component with pelvic discontinuity. Inflammatory markers were elevated, but the aspiration was not consistent with infection. Surgical versus non-surgical options were discussed with the patient, and together we decided it is best to proceed with a revision total hip arthroplasty. All risks and benefits of surgery including bleeding, persistent infection, dislocation, mal-prosthetic fracture, neurovascular injury, leg length or offset discrepancy, as well as medical and anesthesia-related complications were discussed with the patient / family, who elected to proceed with surgery.      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 Procedure in detail:     Following induction of general anesthesia, the patient was positioned lateral decubitus using a peg board. Perioperative IV antibiotics of Ancef and vancomycin were administered. Standard prep and drape was performed. The prior healed lateral scar was incised with a 10 blade, centered over the greater trochanter and extended proximally and distally. Deeper dissection through dense scar tissue revealed a scarred fascial layer which was incised. Copious seroma fluid was encountered, and deep fluid was aspirated for cell count and cultures. A charnley retractor was placed. The cement ball was encountered after opening the fascia and removed. Frozen section tissue biopsy sample was obtained from the hip joint and sent for analysis to guide management between spacer exchange vs. Reimplantation. Multiple tissue cultures were obtained. The gluteus sd sleeve was released (later repaired at the end) and the reflected head was also released to allow adequate exposure. The socket was circumferentially exposed. .The posterior column was exposed subperiosteally to include the ischium and the ilium was exposed subperiosteally. The femur was exposed, and the proximal cement chipped using an osteotome. A slap hammer vise  was used to extract the medullary dowel with its cement. Canal cultures were obtained and sent. The degree of bone loss on the acetabular side was assessed to determine the most ideal reconstruction. A breaux was used to push on the ischium which was mobile from the dome/superior hemipelvis. The ischium and AAIS were intact.  The posterior column contained a large area of segmental bone loss with fibrous tissue which was left undisturbed. Given that, we elected to use a large cup distraction technique for bridging the discontinuity. The frozen section returned without acute inflammation and we elected to proceed with reimplantation. Reamers were introduced and used to reverse ream to determine size. A size 70mm reamer achieved reasonable pinch fit. The trial 70 cup was used inserted and deemed appropriate in pinch fit and reasonable distraction. 75cc of cancellous chips were placed medially in the area of bone loss, impacted using a poly impactor, and gently reverse reamed to create an impacted bed of cancellous bone. The 70mm TM revision shell was opened, the peripheral ring removed, and the cup as inserted and impacted into a more vertical and less anteverted orientation to achieve reasonable pinch. The screw holes were positioned to achieve iliac and ischial fixation. Overall cup / host bone contact was around 30-40%, and we planned for using a half cage for adjunct fixation to minimize risk of failure of osseointegration. A screw was drilled, measured and placed into the ilium achieving good purchase. Subsequently, another screw was drilled, measured and placed into the ischium with good fixation. We decided to add multiple new screw holes through the cup to achieve fixation in the ilium and pubis. These areas were marked, and a metal cutting mihai was used to create those screw holes. Given the segmental posterosuperior bone loss, we elected to use a cage with a longer iliac flange. The cage for 70mm shell was opened, and a metal cutting wheel as used to cut the inferior half of the cage, as well as the proximal most two screws, on the back table, to allow ideal fit. The template for the cage was used to plan contouring of the cage, and the cage was contoured accordingly. Bone wax was placed over all the screw heads in the cup.  The cage was placed over the ilium and inside the cup, and 4 iliac screws were drilled, measured and placed into the iliac flange of the cage, achieving good fixation. Portable plain film was obtained to allow better understanding of cup positioning, and ultimate planned cemented liner orientation. Given the inner diameter of our construct, we elected to use a 40mm liner. The trial liner was placed loosely in the cup / cage construct. The femur was exposed, and we reamed for a size 21 stem, sitting high on the femur, to allow restoration of leg lengths. A trial 21mm high offset stem was placed, and a 40 +3.5 head was placed, and trialed loosely to ensure ability to achieve reduction. Leg lengths were felt to be appropriate. The trial head, stem, and liner were removed and the socket exposed again. 1 batch of simplex plain cement (with 2 grams of vancomycin) was mixed, and used to cement a 40mm neutral vitamin E liner into a position of more anteversion and less inclination. The cup was held until the cement cured. The femur was exposed again, and the final size 21, 125 deg stem was impacted into axial and rotational stability. Heads were trialed and a 40mm +3.5 head was noted to appropriately create impingement free ROM, with excellent stability. There Leg lengths were felt to be appropriate. We could not increase neck length due to difficulty achieving reduction. The hip was dislocated, the trial head removed and the final 40mm +3.5 delta ceramic head was impacted on a cleaned trunnion. The hip was reduced, irrigated copiously using betadine, peroxide and 6L of NS. 2 grams of vancomycin powder were deposited into the joint. A pain cocktail was injected in the soft tissues. There was no superior capsule to adequately repair. The fascia was closed with #1 PDS interrupted sutures and #2 quill sutures.  -#2 quill, and 2-0 monoderm quill sutures were used to close the subcutaneous tissues, and a running 3-0 nylon suture was used to close the skin. A prevena dressing was placed. Sponge, needle and instrument counts were correct at the completion of the case. There were no immediate complications. The patient was transferred to the post-anesthesia recovery unit (PACU) in stable condition. We billed for modifier 22 in this case, given the complexity of acetabular bone loss and reconstruction requiring increased time and effort in achieving a wide exposure, implanting a revision shell with multiple screws, and reinforcing the construct with a half cage that required increased time and effort to prepare and implant. POST-OPERATIVE PLAN  The patient will be permitted touch-down weight bearing on the operative extremity for 8 weeks  The patient will be placed on posterior hip precautions for 3 months  Abduction pillow will be used for 6 weeks. The patient will receive 48 hours of mal-operative antibiotics followed by resumption of PO suppressive abx for at least 3 months. The prevena dressing will be removed at 7 days postop and switched to a dry dressing. Venous thromboembolic prophylaxis will consist of early mobilization, mechanical compressive devices, and  BID. The patient should be scheduled for follow up in 2 weeks for wound and radiographic evaluation.

## 2023-08-30 NOTE — DISCHARGE INSTRUCTIONS
DISCHARGE INSTRUCTIONS:  PLACE ICE TO SURGICAL AREA 20-30 MINUTES ON/ 20-30 MINUTES OFF. THIS IS TO HELP WITH PAIN & SWELLING. TAKE YOUR MEDICATIONS AS PRESCRIBED BY YOUR DOCTOR BELOW. THESE HAVE BEEN SENT TO YOUR PHARMACY. AS YOU WERE INSTRUCTED BY PHYSICAL THERAPY TO EXERCISE HIP PRECAUTIONS, CONTINUE TO DO SO AFTER DISCHARGE    DO NOT BEAR MORE WEIGHT ON THE RIGHT LEG. ONLY ALLOWED TOE TOUCH WEIGHT BEARING. CALL TO CONFIRM YOUR FOLLOW UP APPOINTMENT WITH DR. Rosanna Hernandez TO BE SEEN IN 2-3 WEEKS POSTOP. [phone]    MEDICATIONS    POST OP MEDICATION REGIMEN              MULTI-MODAL   PAIN REGIMEN       DRUG   FOR   FREQUENCY & DURATION   QTY   NOTES     WEANING  TIPS                Tylenol 500mg     Mild pain   Take 2 tabs every 6 hours     90   Can purchase over-the-counter    Stop using medication if no longer having pain. Tramadol 50mg     Moderate pain   Take 1-2 tabs every 6 hours only as needed   45 May prescribe a refill, but ideally, this should be tapered off after surgery Stop using this medication 2nd   As pain decreases over time, increase the interval between doses (6 hours to 8, then 12, then 24) to taper off the medication. BREAKTHROUGH PAIN         Oxycodone 5mg       Severe pain     Take 1-2 tabs every 4-6 hours only as needed for severe pain       40   Take at least 1 hr apart from Tramadol. Ideally, no refill. The goal is to taper off this medication as soon as possible, as it can be addictive and have side effects. Stop using this medication 1st if no longer having severe pain. BLOOD THINNER     Aspirin 325mg   Blood clot prevention   Take 1 tab twice daily for 30 days     60     No refills   Complete the entire course of your blood thinner.          ANTIBIOTIC       TAKE ORAL ANTIBIOTICS RECOMMENDED BY INFECTIOUS DISEASE           STOOL SOFTENER     Rachelle 8.6/50mg   Constipation   Take 1 tab twice daily  while on opioids     60 Refer to discharge instructions if constipation persists even after taking this medication   Stop taking if having diarrhea or loose stools. ANTI-NAUSEA   Ondansetron 4mg   Nausea   Take 1 tab every 8 hours as needed if nauseous     20   No Refill      ANTI-ACID REFLUX / GASTRITIS   Pantoprazole 40mg   Acid reflux, gastric ulcer prophylaxis   Take 1 tab every day along with Meloxicam     60   May refill if Meloxicam refilled        DRESSING:    YOU HAVE A SPECIAL DRESSING CALLED A PREVENA DRESSING, OVER YOUR INCISION. THIS IS A TYPE OF NEGATIVE PRESSURE DRESSING THAT KEEPS THE WOUND DRY. IT IS CONNECTED TO A CANNISTER. MAKE SURE THAT THE CANNISTER IS POWERED ON AND NOT OUT OF BATTERY. THE DRESSING STAYS FOR 7 DAYS FROM SURGERY. THIS DRESSING SHOULD BE CHANGED TO AN AQUACEL AT 7 DAYS POST-OPERATIVELY. IF THE PREVENA DRESSING HAS CONTINUOUS AND PERSISTENT DRAINAGE, CALL YOUR SURGEON FIRST BEFORE CHANGING THE DRESSING TO AN AQUACEL DRESSING. YOU MAY SHOWER AS SOON AS THE AQUACEL DRESSING IS PLACED INSTEAD OF THE PREVENA DRESSING OVER YOUR INCISION AREA. IF THE DRESSING LEAKS OR COMES LOOSE BEFORE THE 7 DAY PERIOD CONTACT YOUR DOCTOR / SURGEON. AFTER   14 DAYS POST OPERATIVELY, THE AQUACEL DRESSING IS REMOVED BY PULLING ON THE EDGE OF THE DRESSING AND GENTLY STRETCHING IT, THEN PEEL IT OFF SLOWLY LIKE A BANDAID. IF YOU HAVE ANY QUESTIONS OR CONCERNS ABOUT THE DRESSING CONTACT YOUR DOCTOR.      Travis Landau, MD MPH  Orthopaedic Surgeon, Adult Hip and Knee Reconstruction  [facility] at [facility] [address] [facility] [address], [address] [place] [address]  Office: (g) [phone] (K) [phone]  Adminstrative Assistant: Shawn Granda

## 2023-08-31 LAB
ANION GAP SERPL CALC-SCNC: 5 MMOL/L (ref 0–18)
BUN BLD-MCNC: 22 MG/DL (ref 7–18)
BUN/CREAT SERPL: 16.9 (ref 10–20)
CALCIUM BLD-MCNC: 8.2 MG/DL (ref 8.5–10.1)
CHLORIDE SERPL-SCNC: 108 MMOL/L (ref 98–112)
CO2 SERPL-SCNC: 29 MMOL/L (ref 21–32)
CREAT BLD-MCNC: 1.3 MG/DL
DEPRECATED RDW RBC AUTO: 57.6 FL (ref 35.1–46.3)
EGFRCR SERPLBLD CKD-EPI 2021: 44 ML/MIN/1.73M2 (ref 60–?)
ERYTHROCYTE [DISTWIDTH] IN BLOOD BY AUTOMATED COUNT: 18 % (ref 11–15)
GLUCOSE BLD-MCNC: 104 MG/DL (ref 70–99)
HCT VFR BLD AUTO: 28.1 %
HGB BLD-MCNC: 8.3 G/DL
HGB BLD-MCNC: 8.5 G/DL
MCH RBC QN AUTO: 26.4 PG (ref 26–34)
MCHC RBC AUTO-ENTMCNC: 30.2 G/DL (ref 31–37)
MCV RBC AUTO: 87.3 FL
OSMOLALITY SERPL CALC.SUM OF ELEC: 298 MOSM/KG (ref 275–295)
PLATELET # BLD AUTO: 175 10(3)UL (ref 150–450)
POTASSIUM SERPL-SCNC: 4.1 MMOL/L (ref 3.5–5.1)
RBC # BLD AUTO: 3.22 X10(6)UL
SODIUM SERPL-SCNC: 142 MMOL/L (ref 136–145)
WBC # BLD AUTO: 5.2 X10(3) UL (ref 4–11)

## 2023-08-31 PROCEDURE — 99233 SBSQ HOSP IP/OBS HIGH 50: CPT | Performed by: INTERNAL MEDICINE

## 2023-08-31 RX ORDER — OXYCODONE HYDROCHLORIDE 5 MG/1
5 TABLET ORAL EVERY 4 HOURS PRN
Status: DISCONTINUED | OUTPATIENT
Start: 2023-08-31 | End: 2023-09-02

## 2023-08-31 RX ORDER — OXYCODONE HYDROCHLORIDE 5 MG/1
10 TABLET ORAL EVERY 4 HOURS PRN
Status: DISCONTINUED | OUTPATIENT
Start: 2023-08-31 | End: 2023-09-02

## 2023-08-31 NOTE — CM/SW NOTE
Per chart review pt is from [facility] for SNF. CM requested [address] to send return ref via [employer]. 1030: met with pt at bedside. She confirms that she would like to return to [facility]. States she has been private paying there. Pt ran out of medicare days. CM sent message to [employer] inquiring about medicare days and when they replenish. Rony Cunha.  Dora Ramsay RN, BSN  Nurse   [phone]

## 2023-08-31 NOTE — PLAN OF CARE
Patient is alert and oriented. Glasses at bedside. Dressing and prevena in place. SCD's on. Manzanares in place, removing. Contact isolation precaution in place. On room air. IV fluids running. Oxy given for pain management. Call light within reach. Frequent rounding done.      Problem: Patient Centered Care  Goal: Patient preferences are identified and integrated in the patient's plan of care  Description: Interventions:  - What would you like us to know as we care for you?   - Provide timely, complete, and accurate information to patient/family  - Incorporate patient and family knowledge, values, beliefs, and cultural backgrounds into the planning and delivery of care  - Encourage patient/family to participate in care and decision-making at the level they choose  - Honor patient and family perspectives and choices  Outcome: Progressing

## 2023-08-31 NOTE — CM/SW NOTE
Department  notified of request for [facility] referrals started. Assigned CM/SW to follow up with pt/family on further discharge planning.      Apolinar Chamorro   August 31, 2023   08:38

## 2023-08-31 NOTE — PLAN OF CARE
Problem: PAIN - ADULT  Goal: Verbalizes/displays adequate comfort level or patient's stated pain goal  Description: INTERVENTIONS:  - Encourage pt to monitor pain and request assistance  - Assess pain using appropriate pain scale  - Administer analgesics based on type and severity of pain and evaluate response  - Implement non-pharmacological measures as appropriate and evaluate response  - Consider cultural and social influences on pain and pain management  - Manage/alleviate anxiety  - Utilize distraction and/or relaxation techniques  - Monitor for opioid side effects  - Notify MD/LIP if interventions unsuccessful or patient reports new pain  - Anticipate increased pain with activity and pre-medicate as appropriate  Outcome: Progressing     Problem: SAFETY ADULT - FALL  Goal: Free from fall injury  Description: INTERVENTIONS:  - Assess pt frequently for physical needs  - Identify cognitive and physical deficits and behaviors that affect risk of falls.   - [place] fall precautions as indicated by assessment.  - Educate pt/family on patient safety including physical limitations  - Instruct pt to call for assistance with activity based on assessment  - Modify environment to reduce risk of injury  - Provide assistive devices as appropriate  - Consider OT/PT consult to assist with strengthening/mobility  - Encourage toileting schedule  Outcome: Progressing     Problem: DISCHARGE PLANNING  Goal: Discharge to home or other facility with appropriate resources  Description: INTERVENTIONS:  - Identify barriers to discharge w/pt and caregiver  - Include patient/family/discharge partner in discharge planning  - Arrange for needed discharge resources and transportation as appropriate  - Identify discharge learning needs (meds, wound care, etc)  - Arrange for interpreters to assist at discharge as needed  - Consider post-discharge preferences of patient/family/discharge partner  - Complete POLST form as appropriate  - Assess patient's ability to be responsible for managing their own health  - Refer to Case Management Department for coordinating discharge planning if the patient needs post-hospital services based on physician/LIP order or complex needs related to functional status, cognitive ability or social support system  Outcome: Progressing     Problem: SKIN/TISSUE INTEGRITY - ADULT  Goal: Skin integrity remains intact  Description: INTERVENTIONS  - Assess and document risk factors for pressure ulcer development  - Assess and document skin integrity  - Monitor for areas of redness and/or skin breakdown  - Initiate interventions, skin care algorithm/standards of care as needed  Outcome: Progressing  Goal: Incision(s), wounds(s) or drain site(s) healing without S/S of infection  Description: INTERVENTIONS:  - Assess and document risk factors for pressure ulcer development  - Assess and document skin integrity  - Assess and document dressing/incision, wound bed, drain sites and surrounding tissue  - Implement wound care per orders  - Initiate isolation precautions as appropriate  - Initiate Pressure Ulcer prevention bundle as indicated  Outcome: Progressing     Problem: MUSCULOSKELETAL - ADULT  Goal: Return mobility to safest level of function  Description: INTERVENTIONS:  - Assess patient stability and activity tolerance for standing, transferring and ambulating w/ or w/o assistive devices  - Assist with transfers and ambulation using safe patient handling equipment as needed  - Ensure adequate protection for wounds/incisions during mobilization  - Obtain PT/OT consults as needed  - Advance activity as appropriate  - Communicate ordered activity level and limitations with patient/family  Outcome: Progressing  Goal: Maintain proper alignment of affected body part  Description: INTERVENTIONS:  - Support and protect limb and body alignment per provider's orders  - Instruct and reinforce with patient and family use of appropriate assistive device and precautions (e.g. spinal or hip dislocation precautions)  Outcome: Progressing     Problem: Impaired Functional Mobility  Goal: Achieve highest/safest level of mobility/gait  Description: Interventions:  - Assess patient's functional ability and stability  - Promote increasing activity/tolerance for mobility and gait  - Educate and engage patient/family in tolerated activity level and precautions    Outcome: Progressing   Pt alert and oriented, worked with therapy and she is up in chair. toe touch wt bearing to Rt LE. C/o increasing pain and oxy ir changed to 10 mg for severe pain. Manzanares removed this morning and due to void, pure wick in place for voiding. ivf continuing as ordered and BP is better today. No c/o dizziness. Prevena wound vac to her Rt hip incision. Iv antibiotic continuing as ordered. Call light with in reach and she knows to call for assistance.

## 2023-08-31 NOTE — PHYSICAL THERAPY NOTE
PHYSICAL THERAPY HIP EVALUATION - INPATIENT     Room Number: 434/434-A  Evaluation Date: 8/31/2023  Type of Evaluation: Initial  Physician Order: PT Eval and Treat    Presenting Problem: s/p R revision COLTON, both components on 8/30    Reason for Therapy: Mobility Dysfunction and Discharge Planning    PHYSICAL THERAPY ASSESSMENT     Patient is a 77year old female admitted 8/30/2023 for R revision COLTON. Patient's current functional deficits include weakness, pain, rom restrictions, impaired balance and functional mobility, which are below the patient's pre-admission status. Patient will benefit from continued IP PT services to address these deficits in preparation for discharge. RN approved participation with physical therapy. Pt was received resting in bed and agreeable to activity with encouragement and education provided. Pt with c/o 10/10 pain this session. Educated on posterior hip precautions and TTWB orders, role of PT and benefits of OOB mobility. Pt verbalized understanding; stated she was TDWB prior to sx and had posterior hip precautions as well, was able to stated 2/3 hip precautions without cueing. Bed mobility: Min A for supine>sit and scooting to EOB with HOB elevated. Benefits from increased time due to pain. RLE with increased pain to touch with therapist assistance. Transfers: Min A for STS with RW. Cues provided to extend RLE to maintain TTWB and for safe hand placement prior to transfer. Gait: 3 ft bed>chair with RW, towards L side, with min A. Cues provided for sequencing and management of RW. Pt able to safely maintain TTWB on RLE. No LOB. Pt was left sitting in chair with needs within reach, handoff to RN complete. The patient's Approx Degree of Impairment: 54.16% has been calculated based on documentation in the [facility] '6 clicks' Inpatient Basic Mobility Short Form. Research supports that patients with this level of impairment may benefit from BENITO.     DISCHARGE RECOMMENDATIONS  PT Discharge Recommendations: Sub-acute rehabilitation    PLAN  PT Treatment Plan: Bed mobility; Body mechanics; Energy conservation;Patient education;Gait training;Strengthening;Transfer training;Balance training  Rehab Potential : Good  Frequency (Obs): BID    PHYSICAL THERAPY MEDICAL/SOCIAL HISTORY     Problem List  Principal Problem:    Prosthetic hip infection, sequela  Active Problems:    Rheumatoid arthritis ([facility] [address]    Sjogren's disease ([facility] [address]    ILD (interstitial lung disease) ([facility] [address]    Essential hypertension    Preop testing    HOME SITUATION  Home Situation  Type of Home: Skilled nursing facility  Lives With: Staff 24 hours  Patient Owned Equipment: Rolling walker  Patient Regularly Uses: Glasses     Prior Level of [place]: admitted from [place] [address], requiring assistance for ADLs and mobility. Per pt, was ambulating with TDWB status with therapy at rehab. SUBJECTIVE  \"The pain medication is not cutting it\"     PHYSICAL THERAPY EXAMINATION     OBJECTIVE  Precautions: Hip abduction pillow;COLTON - posterior  Fall Risk: High fall risk    WEIGHT BEARING RESTRICTION  R Lower Extremity: Toe Touch Weight Bearing    PAIN ASSESSMENT  Rating: 10  Location: RLE  Management Techniques: Activity promotion; Body mechanics;Repositioning    COGNITION  Overall Cognitive Status:  WFL - within functional limits    RANGE OF MOTION AND STRENGTH ASSESSMENT  Upper extremity ROM and strength are within functional limits. Lower extremity ROM is within functional limits. Lower extremity strength is within functional limits. BALANCE  Static Sitting: Good  Dynamic Sitting: Fair +  Static Standing: Fair -  Dynamic Standing: Poor +    AM-PAC '6-Clicks' INPATIENT SHORT FORM - BASIC MOBILITY  How much difficulty does the patient currently have. ..   Patient Difficulty: Turning over in bed (including adjusting bedclothes, sheets and blankets)?: A Little   Patient Difficulty: Sitting down on and standing up from a chair with arms (e.g., wheelchair, bedside commode, etc.): A Little   Patient Difficulty: Moving from lying on back to sitting on the side of the bed?: A Little   How much help from another person does the patient currently need. .. Help from Another: Moving to and from a bed to a chair (including a wheelchair)?: A Little   Help from Another: Need to walk in hospital room?: A Lot   Help from Another: Climbing 3-5 steps with a railing?: A Lot     AM-PAC Score:  Raw Score: 16   Approx Degree of Impairment: 54.16%   Standardized Score (AM-PAC Scale): 40.78   CMS Modifier (G-Code): CK    FUNCTIONAL ABILITY STATUS  Functional Mobility/Gait Assessment  Gait Assistance: Minimum assistance  Distance (ft): 3  Assistive Device: Rolling walker  Pattern: R Decreased stance time (maintains TTWB on RLE)    Bed Mobility: Min A     Transfers: Min A     Exercise/Education Provided:  Bed mobility  Body mechanics  Energy conservation  Functional activity tolerated  Gait training  Posture  Transfer training    Patient End of Session: Up in chair;Needs met;Call light within reach;RN aware of session/findings; All patient questions and concerns addressed    CURRENT GOALS    Goals to be met by: 9/7/23  Patient Goal Patient's self-stated goal is: go to rehab   Goal #1 Patient is able to demonstrate supine - sit EOB @ level: CGA   Goal #1   Current Status    Goal #2 Patient is able to demonstrate transfers Sit to/from Stand at assistance level: CGA     Goal #2  Current Status    Goal #3 Patient is able to ambulate 25 feet with assistive device at assistance level: Minimum assistance   Goal #3   Current Status    Goal #4    Goal #4   Current Status    Goal #5 Patient verbalizes and/or demonstrates all precautions and safety concerns independently   Goal #5   Current Status    Goal #6 Patient independently performs home exercise program for ROM/strengthening per the instructions provided in preparation for discharge.    Goal #6  Current Status Patient Evaluation Complexity Level:  History Low - no personal factors and/or co-morbidities   Examination of body systems Low - addressing 1-2 elements   Clinical Presentation Low - Stable   Clinical Decision Making Low Complexity       Therapeutic Activity: 17 minutes

## 2023-08-31 NOTE — PHYSICAL THERAPY NOTE
PHYSICAL THERAPY HIP TREATMENT NOTE - INPATIENT    Room Number: 434/434-A       Presenting Problem: s/p R revision COLTON, both components on 8/30    Problem List  Principal Problem:    Prosthetic hip infection, sequela  Active Problems:    Rheumatoid arthritis ([facility] [address]    Sjogren's disease ([facility] [address]    ILD (interstitial lung disease) ([facility] [address]    Essential hypertension    Preop testing    PHYSICAL THERAPY ASSESSMENT     RN approved participation with physical therapy for second session today. Pt was received resting in bed and agreeable to activity. Pt with significant improvement in pain this session. Educated on goals for this session; verbalized understanding. Bed mobility: min A supine<>sit and scooting to EOB  Transfers: min A for STS with RW   Gait: ambulated 10 ft with RW and min A. Slow pace, guarded posture with cues provided to correct. Pt safely maintains TTWB on RLE. No LOB. Pt was left resting in bed with needs within reach, bed alarm on, handoff to RN complete. The patient's Approx Degree of Impairment: 50.57% has been calculated based on documentation in the [facility] '6 clicks' Inpatient Basic Mobility Short Form. Research supports that patients with this level of impairment may benefit from BENITO. DISCHARGE RECOMMENDATIONS  PT Discharge Recommendations: Sub-acute rehabilitation    PLAN  PT Treatment Plan: Bed mobility; Body mechanics; Endurance; Energy conservation;Patient education;Gait training;Strengthening;Stair training;Transfer training;Balance training  Frequency (Obs): Daily    SUBJECTIVE  Agreeable to activity. OBJECTIVE  Precautions: Hip abduction pillow;COLTON - posterior    WEIGHT BEARING RESTRICTION  R Lower Extremity: Toe Touch Weight Bearing    PAIN ASSESSMENT   Rating:  (did not rate)  Location: RLE  Management Techniques: Activity promotion; Body mechanics;Repositioning    BALANCE  Static Sitting: Good  Dynamic Sitting: Fair +  Static Standing: Fair -  Dynamic Standing: Poor +  AM-PAC '6-Clicks' INPATIENT SHORT FORM - BASIC MOBILITY  How much difficulty does the patient currently have. .. Patient Difficulty: Turning over in bed (including adjusting bedclothes, sheets and blankets)?: A Little   Patient Difficulty: Sitting down on and standing up from a chair with arms (e.g., wheelchair, bedside commode, etc.): A Little   Patient Difficulty: Moving from lying on back to sitting on the side of the bed?: A Little   How much help from another person does the patient currently need. .. Help from Another: Moving to and from a bed to a chair (including a wheelchair)?: A Little   Help from Another: Need to walk in hospital room?: A Little   Help from Another: Climbing 3-5 steps with a railing?: A Lot     AM-PAC Score:  Raw Score: 17   Approx Degree of Impairment: 50.57%   Standardized Score (AM-PAC Scale): 42.13   CMS Modifier (G-Code): CK    FUNCTIONAL ABILITY STATUS  Functional Mobility/Gait Assessment  Gait Assistance: Minimum assistance  Distance (ft): 10  Assistive Device: Rolling walker  Pattern: R Decreased stance time (maintains TTWB on RLE, guarded posture)    Patient End of Session: In bed;Needs met;Call light within reach;RN aware of session/findings; All patient questions and concerns addressed; Alarm set    CURRENT GOALS   Goals to be met by: 9/7/23  Patient Goal Patient's self-stated goal is: go to rehab   Goal #1 Patient is able to demonstrate supine - sit EOB @ level: CGA   Goal #1   Current Status  Min A    Goal #2 Patient is able to demonstrate transfers Sit to/from Stand at assistance level: CGA      Goal #2  Current Status Min A with RW    Goal #3 Patient is able to ambulate 25 feet with assistive device at assistance level: Minimum assistance   Goal #3   Current Status 10 ft with RW and min A    Goal #4     Goal #4   Current Status     Goal #5 Patient verbalizes and/or demonstrates all precautions and safety concerns independently   Goal #5   Current Status In progress   Goal #6 Patient independently performs home exercise program for ROM/strengthening per the instructions provided in preparation for discharge.    Goal #6  Current Status  in progress         Gait Training: 15 minutes

## 2023-09-01 LAB
ANION GAP SERPL CALC-SCNC: 4 MMOL/L (ref 0–18)
BUN BLD-MCNC: 17 MG/DL (ref 7–18)
BUN/CREAT SERPL: 15.5 (ref 10–20)
CALCIUM BLD-MCNC: 8.7 MG/DL (ref 8.5–10.1)
CHLORIDE SERPL-SCNC: 112 MMOL/L (ref 98–112)
CO2 SERPL-SCNC: 27 MMOL/L (ref 21–32)
CREAT BLD-MCNC: 1.1 MG/DL
EGFRCR SERPLBLD CKD-EPI 2021: 54 ML/MIN/1.73M2 (ref 60–?)
GLUCOSE BLD-MCNC: 100 MG/DL (ref 70–99)
OSMOLALITY SERPL CALC.SUM OF ELEC: 298 MOSM/KG (ref 275–295)
POTASSIUM SERPL-SCNC: 3.8 MMOL/L (ref 3.5–5.1)
SODIUM SERPL-SCNC: 143 MMOL/L (ref 136–145)

## 2023-09-01 PROCEDURE — 99233 SBSQ HOSP IP/OBS HIGH 50: CPT | Performed by: INTERNAL MEDICINE

## 2023-09-01 RX ORDER — OXYCODONE HYDROCHLORIDE 5 MG/1
5 TABLET ORAL EVERY 4 HOURS PRN
Qty: 60 TABLET | Refills: 0 | Status: SHIPPED | OUTPATIENT
Start: 2023-09-01

## 2023-09-01 RX ORDER — SULFAMETHOXAZOLE AND TRIMETHOPRIM 800; 160 MG/1; MG/1
1 TABLET ORAL EVERY 12 HOURS SCHEDULED
Qty: 60 TABLET | Refills: 0 | Status: SHIPPED | OUTPATIENT
Start: 2023-09-01 | End: 2023-10-01

## 2023-09-01 RX ORDER — SULFAMETHOXAZOLE AND TRIMETHOPRIM 800; 160 MG/1; MG/1
1 TABLET ORAL EVERY 12 HOURS SCHEDULED
Status: DISCONTINUED | OUTPATIENT
Start: 2023-09-01 | End: 2023-09-02

## 2023-09-01 NOTE — CM/SW NOTE
09/01/23 1400   Discharge Needs   Anticipated D/C needs Subacute rehab   Services Requested   PASRR Level 1 Submitted Yes   Choice of Post-Acute Provider   Informed patient of right to choose their preferred provider Yes   List of appropriate post-acute services provided to patient/family with quality data Yes   Patient/family choice Return to [facility]     Per Heather Bull, no dc today. Anticipate return to [facility] tomorrow 9/2. Per Yael Hale at [place] \"please call facility to set up time [phone] on 9/2\"    PCS done for [facility]. New PASRR done and attached to [facility] ref. Jacquelin Short.  Gemini Tijerina RN, BSN  Nurse   [phone]

## 2023-09-01 NOTE — PLAN OF CARE
Patient is alert and oriented. Abductor pillow in place. SCD and SHRUTHI on. Dressing in wound vac in place. Purewick in place. Oxy given for pain management. IV fluids running. Call light within reach. Frequent rounding done. Problem: Patient Centered Care  Goal: Patient preferences are identified and integrated in the patient's plan of care  Description: Interventions:  - What would you like us to know as we care for you?   - Provide timely, complete, and accurate information to patient/family  - Incorporate patient and family knowledge, values, beliefs, and cultural backgrounds into the planning and delivery of care  - Encourage patient/family to participate in care and decision-making at the level they choose  - Honor patient and family perspectives and choices  Outcome: Progressing     Problem: PAIN - ADULT  Goal: Verbalizes/displays adequate comfort level or patient's stated pain goal  Description: INTERVENTIONS:  - Encourage pt to monitor pain and request assistance  - Assess pain using appropriate pain scale  - Administer analgesics based on type and severity of pain and evaluate response  - Implement non-pharmacological measures as appropriate and evaluate response  - Consider cultural and social influences on pain and pain management  - Manage/alleviate anxiety  - Utilize distraction and/or relaxation techniques  - Monitor for opioid side effects  - Notify MD/LIP if interventions unsuccessful or patient reports new pain  - Anticipate increased pain with activity and pre-medicate as appropriate  Outcome: Progressing     Problem: SAFETY ADULT - FALL  Goal: Free from fall injury  Description: INTERVENTIONS:  - Assess pt frequently for physical needs  - Identify cognitive and physical deficits and behaviors that affect risk of falls.   - [place] fall precautions as indicated by assessment.  - Educate pt/family on patient safety including physical limitations  - Instruct pt to call for assistance with activity based on assessment  - Modify environment to reduce risk of injury  - Provide assistive devices as appropriate  - Consider OT/PT consult to assist with strengthening/mobility  - Encourage toileting schedule  Outcome: Progressing

## 2023-09-01 NOTE — PHYSICAL THERAPY NOTE
PHYSICAL THERAPY TREATMENT NOTE - INPATIENT     Room Number: 434/434-A       Presenting Problem: s/p R revision COLTON, both components on 8/30    Problem List  Principal Problem:    Prosthetic hip infection, sequela  Active Problems:    Rheumatoid arthritis ([facility] [address]    Sjogren's disease ([facility] [address]    ILD (interstitial lung disease) ([facility] [address]    Essential hypertension    Preop testing      PHYSICAL THERAPY ASSESSMENT   Chart reviewed. RN Heather approved participation in physical therapy. PPE worn by therapist: mask, gloves, and goggles. Patient was not wearing a mask during session. Patient presented in bed with did not rate pain. Patient with good  progress towards goals during this session. Education provided on Total Hip precautions, Weight bearing status, Physical therapy plan of care, and physiological benefits of out of bed mobility. Patient with good carryover. Pt is received in the bed and was cleared for therapy session. Pt required min A with her R LE's and her upper trunk to sit up. Pt was able to scoot herself to the EOB. Pt was able to assist with the use of the trapeze. Pt sat EOB for a few minutes and denied any dizziness and light headedness. Pt was min A with sit<>stand transfers with the RW. Pt reviewed and able to maintain her TTWB status and her posterior hip precautions throughout the session and with very good carry over. Pt was able to AMB about 20' with the RW min A for balance and safety. Pt maintained TTWB while AMB. Pt AMB around the bed to the chair. Pt is left in the chair with all needs within reach. Pt is on track to [facility] once medically cleared. Reported to the RN on the status of the pt. Bed mobility: Min assist  Transfers: Min assist  Gait Assistance: Minimum assistance  Distance (ft): 20'  Assistive Device: Rolling walker  Pattern: R Decreased stance time (TTWB R LE)          . Patient was left in bedside chair at end of session with all needs in reach.  The patient's Approx Degree of Impairment: 50.57% has been calculated based on documentation in the [facility] '6 clicks' Inpatient Basic Mobility Short Form. Research supports that patients with this level of impairment may benefit from Subacute Rehab. RN aware of patient status post session. DISCHARGE RECOMMENDATIONS  PT Discharge Recommendations: Sub-acute rehabilitation     PLAN  PT Treatment Plan: Bed mobility; Body mechanics; Coordination; Endurance; Patient education;Gait training;Strengthening;Transfer training;Balance training    SUBJECTIVE  Pt was agreeable to therapy session. OBJECTIVE  Precautions: Hip abduction pillow;COLTON - posterior    WEIGHT BEARING RESTRICTION  Weight Bearing Restriction: R lower extremity        R Lower Extremity: Toe Touch Weight Bearing       PAIN ASSESSMENT   Rating:  (did not rate)  Location: R LE  Management Techniques: Activity promotion; Body mechanics; Relaxation;Repositioning    BALANCE                                                                                                                       Static Sitting: Good  Dynamic Sitting: Fair +           Static Standing: Fair -  Dynamic Standing: Poor +    ACTIVITY TOLERANCE                         O2 WALK       AM-PAC '6-Clicks' INPATIENT SHORT FORM - BASIC MOBILITY  How much difficulty does the patient currently have. .. Patient Difficulty: Turning over in bed (including adjusting bedclothes, sheets and blankets)?: A Little   Patient Difficulty: Sitting down on and standing up from a chair with arms (e.g., wheelchair, bedside commode, etc.): A Little   Patient Difficulty: Moving from lying on back to sitting on the side of the bed?: A Little   How much help from another person does the patient currently need. ..    Help from Another: Moving to and from a bed to a chair (including a wheelchair)?: A Little   Help from Another: Need to walk in hospital room?: A Little   Help from Another: Climbing 3-5 steps with a railing?: A Lot     AM-PAC Score:  Raw Score: 17   Approx Degree of Impairment: 50.57%   Standardized Score (AM-PAC Scale): 42.13   CMS Modifier (G-Code): CK        Patient End of Session: Up in chair;Needs met;Call light within reach;RN aware of session/findings; All patient questions and concerns addressed    CURRENT GOALS   Goals to be met by: 9/7/23  Patient Goal Patient's self-stated goal is: go to rehab   Goal #1 Patient is able to demonstrate supine - sit EOB @ level: CGA   Goal #1   Current Status  Min A    Goal #2 Patient is able to demonstrate transfers Sit to/from Stand at assistance level: CGA      Goal #2  Current Status Min A with RW    Goal #3 Patient is able to ambulate 25 feet with assistive device at assistance level: Minimum assistance   Goal #3   Current Status 20 ft with RW and min A    Goal #4     Goal #4   Current Status     Goal #5 Patient verbalizes and/or demonstrates all precautions and safety concerns independently   Goal #5   Current Status In progress   Goal #6 Patient independently performs home exercise program for ROM/strengthening per the instructions provided in preparation for discharge.    Goal #6  Current Status  in progress           Therapeutic Activity: 30 minutes

## 2023-09-01 NOTE — PLAN OF CARE
Post-op day #2. Prevena wound vac in place to right hip. Monitoring vital signs- stable at this time. No acute changes noted throughout shift. Receiving IV fluids per MD order. Tolerating diet. Voiding freely up in the bathroom. SCDs, teds for DVT prophylaxis. Oxy as needed for pain. Up with standby assist and a walker- toe touch weight bearing to the left let. Used rolling chair for long distances. Encouraged frequent ambulation and use of incentive spirometer. Fall precautions maintained- bed alarm on, bed locked in lowest position, call light and personal belongings within reach, non-skid socks in place to bilateral feet. Frequent rounding by nursing staff. Plan is to discharge to [facility] tomorrow when medically cleared. Addressed additional questions.      Problem: Patient Centered Care  Goal: Patient preferences are identified and integrated in the patient's plan of care  Description: Interventions:  - What would you like us to know as we care for you?   - Provide timely, complete, and accurate information to patient/family  - Incorporate patient and family knowledge, values, beliefs, and cultural backgrounds into the planning and delivery of care  - Encourage patient/family to participate in care and decision-making at the level they choose  - Honor patient and family perspectives and choices  Outcome: Progressing     Problem: Patient/Family Goals  Goal: Patient/Family Long Term Goal  Description: Patient's Long Term Goal: to go home eventually    Interventions:  - follow MD orders  - update patient and family on plan of care  - discharge planning  - PT/OT  - ambulate  - monitor labs and vitals  - See additional Care Plan goals for specific interventions  Outcome: Progressing  Goal: Patient/Family Short Term Goal  Description: Patient's Short Term Goal: to have less pain    Interventions:   - pain medications as needed  - non-pharmacologic pain interventions  - ambulate  - PT/OT  - See additional Care Plan goals for specific interventions  Outcome: Progressing     Problem: PAIN - ADULT  Goal: Verbalizes/displays adequate comfort level or patient's stated pain goal  Description: INTERVENTIONS:  - Encourage pt to monitor pain and request assistance  - Assess pain using appropriate pain scale  - Administer analgesics based on type and severity of pain and evaluate response  - Implement non-pharmacological measures as appropriate and evaluate response  - Consider cultural and social influences on pain and pain management  - Manage/alleviate anxiety  - Utilize distraction and/or relaxation techniques  - Monitor for opioid side effects  - Notify MD/LIP if interventions unsuccessful or patient reports new pain  - Anticipate increased pain with activity and pre-medicate as appropriate  Outcome: Progressing     Problem: SAFETY ADULT - FALL  Goal: Free from fall injury  Description: INTERVENTIONS:  - Assess pt frequently for physical needs  - Identify cognitive and physical deficits and behaviors that affect risk of falls.   - [place] fall precautions as indicated by assessment.  - Educate pt/family on patient safety including physical limitations  - Instruct pt to call for assistance with activity based on assessment  - Modify environment to reduce risk of injury  - Provide assistive devices as appropriate  - Consider OT/PT consult to assist with strengthening/mobility  - Encourage toileting schedule  Outcome: Progressing     Problem: DISCHARGE PLANNING  Goal: Discharge to home or other facility with appropriate resources  Description: INTERVENTIONS:  - Identify barriers to discharge w/pt and caregiver  - Include patient/family/discharge partner in discharge planning  - Arrange for needed discharge resources and transportation as appropriate  - Identify discharge learning needs (meds, wound care, etc)  - Arrange for interpreters to assist at discharge as needed  - Consider post-discharge preferences of patient/family/discharge partner  - Complete POLST form as appropriate  - Assess patient's ability to be responsible for managing their own health  - Refer to Case Management Department for coordinating discharge planning if the patient needs post-hospital services based on physician/LIP order or complex needs related to functional status, cognitive ability or social support system  Outcome: Progressing     Problem: SKIN/TISSUE INTEGRITY - ADULT  Goal: Skin integrity remains intact  Description: INTERVENTIONS  - Assess and document risk factors for pressure ulcer development  - Assess and document skin integrity  - Monitor for areas of redness and/or skin breakdown  - Initiate interventions, skin care algorithm/standards of care as needed  Outcome: Progressing  Goal: Incision(s), wounds(s) or drain site(s) healing without S/S of infection  Description: INTERVENTIONS:  - Assess and document risk factors for pressure ulcer development  - Assess and document skin integrity  - Assess and document dressing/incision, wound bed, drain sites and surrounding tissue  - Implement wound care per orders  - Initiate isolation precautions as appropriate  - Initiate Pressure Ulcer prevention bundle as indicated  Outcome: Progressing     Problem: MUSCULOSKELETAL - ADULT  Goal: Return mobility to safest level of function  Description: INTERVENTIONS:  - Assess patient stability and activity tolerance for standing, transferring and ambulating w/ or w/o assistive devices  - Assist with transfers and ambulation using safe patient handling equipment as needed  - Ensure adequate protection for wounds/incisions during mobilization  - Obtain PT/OT consults as needed  - Advance activity as appropriate  - Communicate ordered activity level and limitations with patient/family  Outcome: Progressing  Goal: Maintain proper alignment of affected body part  Description: INTERVENTIONS:  - Support and protect limb and body alignment per provider's orders  - Instruct and reinforce with patient and family use of appropriate assistive device and precautions (e.g. spinal or hip dislocation precautions)  Outcome: Progressing     Problem: Impaired Functional Mobility  Goal: Achieve highest/safest level of mobility/gait  Description: Interventions:  - Assess patient's functional ability and stability  - Promote increasing activity/tolerance for mobility and gait  - Educate and engage patient/family in tolerated activity level and precautions  Outcome: Progressing

## 2023-09-02 VITALS
HEIGHT: 64 IN | BODY MASS INDEX: 35.34 KG/M2 | HEART RATE: 65 BPM | WEIGHT: 207 LBS | OXYGEN SATURATION: 95 % | TEMPERATURE: 99 F | DIASTOLIC BLOOD PRESSURE: 89 MMHG | SYSTOLIC BLOOD PRESSURE: 135 MMHG | RESPIRATION RATE: 18 BRPM

## 2023-09-02 PROCEDURE — 99232 SBSQ HOSP IP/OBS MODERATE 35: CPT | Performed by: INTERNAL MEDICINE

## 2023-09-02 RX ORDER — TIZANIDINE 2 MG/1
1 TABLET ORAL EVERY 6 HOURS PRN
Qty: 20 TABLET | Refills: 0 | Status: SHIPPED | OUTPATIENT
Start: 2023-09-02 | End: 2023-09-22

## 2023-09-02 RX ORDER — ASPIRIN 325 MG
325 TABLET, DELAYED RELEASE (ENTERIC COATED) ORAL 2 TIMES DAILY
Qty: 60 TABLET | Refills: 0 | Status: SHIPPED | OUTPATIENT
Start: 2023-09-02 | End: 2023-10-02

## 2023-09-02 NOTE — PHYSICAL THERAPY NOTE
PHYSICAL THERAPY TREATMENT NOTE - INPATIENT     Room Number: 434/434-A       Presenting Problem: s/p R revision COLTON, both components on 8/30    Problem List  Principal Problem:    Prosthetic hip infection, sequela  Active Problems:    Rheumatoid arthritis ([facility] [address]    Sjogren's disease ([facility] [address]    ILD (interstitial lung disease) ([facility] [address]    Essential hypertension    Preop testing      PHYSICAL THERAPY ASSESSMENT   Chart reviewed. VALERIE Jaime approved participation in physical therapy. PPE worn by therapist: mask, gloves, and gown. Patient was not wearing a mask during session. Patient presented in bed and alarm activated with 4/10 pain. Patient with good  progress towards goals during this session. Education provided on Total Hip precautions, Weight bearing status, Physical therapy plan of care, and physiological benefits of out of bed mobility. Patient with good carryover. Pt agreeable to therapy, gait belt donned in sitting. Able to maintain TTWB RLE throughout session with all mobility tasks. Pt meeting goals for bed mobility and transfers this date. Fatigued easily with use of RW due to weight bearing status and heavy reliance of BUE into walker. On track to discharge back to [facility] once medically cleared. Bed mobility:  SBA supine to sit with HOB elevated and use of trapeze  Transfers: Contact guard assist for STS transfers with RW, noted poor eccentric control when sitting to chair   Gait Assistance: Minimum assistance  Distance (ft): 20 ft  Assistive Device: Rolling walker  Pattern: R Decreased stance time (slow arsalan, able to maintain TTWB on RLE)          Patient was left in bedside chair at end of session with all needs in reach. The patient's Approx Degree of Impairment: 50.57% has been calculated based on documentation in the [facility] '6 clicks' Inpatient Basic Mobility Short Form. Research supports that patients with this level of impairment may benefit from Subacute Rehab.      RN aware of patient status post session. DISCHARGE RECOMMENDATIONS  PT Discharge Recommendations: Sub-acute rehabilitation     PLAN  PT Treatment Plan: Bed mobility; Body mechanics; Coordination; Endurance; Patient education;Gait training;Strengthening;Transfer training;Balance training    SUBJECTIVE  I'm going back to [facility] today. I've been practicing TTWB a lot there     OBJECTIVE  Precautions: Hip abduction pillow;COLTON - posterior    WEIGHT BEARING RESTRICTION  Weight Bearing Restriction: R lower extremity        R Lower Extremity: Toe Touch Weight Bearing       PAIN ASSESSMENT   Ratin  Location: R LE  Management Techniques: Activity promotion    BALANCE                                                                                                                       Static Sitting: Good  Dynamic Sitting: Fair +           Static Standing: Fair -  Dynamic Standing: Poor +    ACTIVITY TOLERANCE  Pulse: 65  Heart Rate Source: Monitor     BP: 135/89  BP Location: Right arm  BP Method: Automatic  Patient Position: Semi-Forte    O2 WALK  Oxygen Therapy  SPO2% on Room Air at Rest: 96    AM-PAC '6-Clicks' INPATIENT SHORT FORM - BASIC MOBILITY  How much difficulty does the patient currently have. .. Patient Difficulty: Turning over in bed (including adjusting bedclothes, sheets and blankets)?: A Little   Patient Difficulty: Sitting down on and standing up from a chair with arms (e.g., wheelchair, bedside commode, etc.): A Little   Patient Difficulty: Moving from lying on back to sitting on the side of the bed?: A Little   How much help from another person does the patient currently need. ..    Help from Another: Moving to and from a bed to a chair (including a wheelchair)?: A Little   Help from Another: Need to walk in hospital room?: A Little   Help from Another: Climbing 3-5 steps with a railing?: A Lot     AM-PAC Score:  Raw Score: 17   Approx Degree of Impairment: 50.57%   Standardized Score (AM-PAC Scale): 42.13   CMS Modifier (G-Code): CK          Patient End of Session: Up in chair;Needs met;Call light within reach;RN aware of session/findings    CURRENT GOALS   Goals to be met by: 9/7/23  Patient Goal Patient's self-stated goal is: go to rehab   Goal #1 Patient is able to demonstrate supine - sit EOB @ level: CGA   Goal #1   Current Status  GOAL MET: SBA    Goal #2 Patient is able to demonstrate transfers Sit to/from Stand at assistance level: CGA      Goal #2  Current Status GOAL MET: CGA with RW    Goal #3 Patient is able to ambulate 25 feet with assistive device at assistance level: Minimum assistance   Goal #3   Current Status 20 ft with RW and min A    Goal #4     Goal #4   Current Status     Goal #5 Patient verbalizes and/or demonstrates all precautions and safety concerns independently   Goal #5   Current Status In progress   Goal #6 Patient independently performs home exercise program for ROM/strengthening per the instructions provided in preparation for discharge.    Goal #6  Current Status  in progress       Therapeutic Activity: 15 minutes    [facility]  [address] [facility]  [phone]

## 2023-09-02 NOTE — PLAN OF CARE
Problem: Patient Centered Care  Goal: Patient preferences are identified and integrated in the patient's plan of care  Description: Interventions:  - What would you like us to know as we care for you? Patient came from [address] [place] [address] and she will be going back there when discharged. She has a son that is her support system. - Provide timely, complete, and accurate information to patient/family  - Incorporate patient and family knowledge, values, beliefs, and cultural backgrounds into the planning and delivery of care  - Encourage patient/family to participate in care and decision-making at the level they choose  - Honor patient and family perspectives and choices  9/2/2023 1423 by Rachel Malone RN  Outcome: Adequate for Discharge  9/2/2023 1050 by Rachel Malone RN  Outcome: Progressing     Problem: Patient/Family Goals  Goal: Patient/Family Long Term Goal  Description: Patient's Long Term Goal: Will be able to walk with no problem and will have no complications or infection from surgery. Interventions:  - TTWB on right leg, up with walker with moderate assist.  - Pain management with oral medication.  - May use ice to incision to prevent swelling or help reduce pain. - Maintain hip precaution at all times. Use abductor splint when laying down in bed. - Monitor incision with any signs of infection or bleeding.  - Oral anticoagulation to prevent blood clots. - Follow up with surgery. - See additional Care Plan goals for specific interventions  9/2/2023 1423 by Rachel Malone RN  Outcome: Adequate for Discharge  9/2/2023 1050 by Rachel Malone RN  Outcome: Progressing  Goal: Patient/Family Short Term Goal  Description: Patient's Short Term Goal: Transfer to rehab when stable. Interventions:   - TTWB on right leg, up with walker with moderate assist.  - Pain management with oral medication.  - May use ice to incision to prevent swelling or help reduce pain.   - Maintain hip precaution at all times. Use abductor splint when laying down in bed. - Monitor incision with any signs of infection or bleeding.  - SHRUTHI hose and SCD's to both legs and oral anticoagulation to prevent blood clots.  - PT/OT eval and treat. - See additional Care Plan goals for specific interventions  9/2/2023 1423 by Sherly Abbott RN  Outcome: Adequate for Discharge  9/2/2023 1050 by Sherly Abbott RN  Outcome: Progressing     Problem: PAIN - ADULT  Goal: Verbalizes/displays adequate comfort level or patient's stated pain goal  Description: INTERVENTIONS:  - Encourage pt to monitor pain and request assistance  - Assess pain using appropriate pain scale  - Administer analgesics based on type and severity of pain and evaluate response  - Implement non-pharmacological measures as appropriate and evaluate response  - Consider cultural and social influences on pain and pain management  - Manage/alleviate anxiety  - Utilize distraction and/or relaxation techniques  - Monitor for opioid side effects  - Notify MD/LIP if interventions unsuccessful or patient reports new pain  - Anticipate increased pain with activity and pre-medicate as appropriate  9/2/2023 1423 by Sherly Abbott RN  Outcome: Adequate for Discharge  9/2/2023 1050 by Sherly Abbott RN  Outcome: Progressing     Problem: SAFETY ADULT - FALL  Goal: Free from fall injury  Description: INTERVENTIONS:  - Assess pt frequently for physical needs  - Identify cognitive and physical deficits and behaviors that affect risk of falls.   - [place] fall precautions as indicated by assessment.  - Educate pt/family on patient safety including physical limitations  - Instruct pt to call for assistance with activity based on assessment  - Modify environment to reduce risk of injury  - Provide assistive devices as appropriate  - Consider OT/PT consult to assist with strengthening/mobility  - Encourage toileting schedule  9/2/2023 1423 by Marlon Hoang RN  Outcome: Adequate for Discharge  9/2/2023 1050 by Marlon Hoang RN  Outcome: Progressing     Problem: DISCHARGE PLANNING  Goal: Discharge to home or other facility with appropriate resources  Description: INTERVENTIONS:  - Identify barriers to discharge w/pt and caregiver  - Include patient/family/discharge partner in discharge planning  - Arrange for needed discharge resources and transportation as appropriate  - Identify discharge learning needs (meds, wound care, etc)  - Arrange for interpreters to assist at discharge as needed  - Consider post-discharge preferences of patient/family/discharge partner  - Complete POLST form as appropriate  - Assess patient's ability to be responsible for managing their own health  - Refer to Case Management Department for coordinating discharge planning if the patient needs post-hospital services based on physician/LIP order or complex needs related to functional status, cognitive ability or social support system  9/2/2023 1423 by Marlon Hoang RN  Outcome: Adequate for Discharge  9/2/2023 1050 by Marlon Hoang RN  Outcome: Progressing     Problem: SKIN/TISSUE INTEGRITY - ADULT  Goal: Skin integrity remains intact  Description: INTERVENTIONS  - Assess and document risk factors for pressure ulcer development  - Assess and document skin integrity  - Monitor for areas of redness and/or skin breakdown  - Initiate interventions, skin care algorithm/standards of care as needed  9/2/2023 1423 by Marlon Hoang RN  Outcome: Adequate for Discharge  9/2/2023 1050 by Marlon Hoang RN  Outcome: Progressing  Goal: Incision(s), wounds(s) or drain site(s) healing without S/S of infection  Description: INTERVENTIONS:  - Assess and document risk factors for pressure ulcer development  - Assess and document skin integrity  - Assess and document dressing/incision, wound bed, drain sites and surrounding tissue  - Implement wound care per orders  - Initiate isolation precautions as appropriate  - Initiate Pressure Ulcer prevention bundle as indicated  9/2/2023 1423 by Vickie Linton RN  Outcome: Adequate for Discharge  9/2/2023 1050 by Vickie Linton RN  Outcome: Progressing     Problem: MUSCULOSKELETAL - ADULT  Goal: Return mobility to safest level of function  Description: INTERVENTIONS:  - Assess patient stability and activity tolerance for standing, transferring and ambulating w/ or w/o assistive devices  - Assist with transfers and ambulation using safe patient handling equipment as needed  - Ensure adequate protection for wounds/incisions during mobilization  - Obtain PT/OT consults as needed  - Advance activity as appropriate  - Communicate ordered activity level and limitations with patient/family  9/2/2023 1423 by Vickie Linton RN  Outcome: Adequate for Discharge  9/2/2023 1050 by Vickie Linton RN  Outcome: Progressing  Goal: Maintain proper alignment of affected body part  Description: INTERVENTIONS:  - Support and protect limb and body alignment per provider's orders  - Instruct and reinforce with patient and family use of appropriate assistive device and precautions (e.g. spinal or hip dislocation precautions)  9/2/2023 1423 by Vickie Linton RN  Outcome: Adequate for Discharge  9/2/2023 1050 by Vickie Linton RN  Outcome: Progressing     Problem: Impaired Functional Mobility  Goal: Achieve highest/safest level of mobility/gait  Description: Interventions:  - Assess patient's functional ability and stability  - Promote increasing activity/tolerance for mobility and gait  - Educate and engage patient/family in tolerated activity level and precautions  - Recommend use of  RW for transfers and ambulation  9/2/2023 1423 by Vickie Linton RN  Outcome: Adequate for Discharge  9/2/2023 1050 by Vickie Linton RN  Outcome: Progressing     Will be discharged to [facility] via [employer] this afternoon. 1620 Report given to Sp Moya RN at [facility].

## 2023-09-02 NOTE — PLAN OF CARE
Problem: Patient Centered Care  Goal: Patient preferences are identified and integrated in the patient's plan of care  Description: Interventions:  - What would you like us to know as we care for you? Patient came from [address] [place] [address] and she will be going back there when discharged. She has a son that is her support system. - Provide timely, complete, and accurate information to patient/family  - Incorporate patient and family knowledge, values, beliefs, and cultural backgrounds into the planning and delivery of care  - Encourage patient/family to participate in care and decision-making at the level they choose  - Honor patient and family perspectives and choices  Outcome: Progressing     Problem: Patient/Family Goals  Goal: Patient/Family Long Term Goal  Description: Patient's Long Term Goal: Will be able to walk with no problem and will have no complications or infection from surgery. Interventions:  - TTWB on right leg, up with walker with moderate assist.  - Pain management with oral medication.  - May use ice to incision to prevent swelling or help reduce pain. - Maintain hip precaution at all times. Use abductor splint when laying down in bed. - Monitor incision with any signs of infection or bleeding.  - Oral anticoagulation to prevent blood clots. - Follow up with surgery. - See additional Care Plan goals for specific interventions  Outcome: Progressing  Goal: Patient/Family Short Term Goal  Description: Patient's Short Term Goal: Transfer to rehab when stable. Interventions:   - TTWB on right leg, up with walker with moderate assist.  - Pain management with oral medication.  - May use ice to incision to prevent swelling or help reduce pain. - Maintain hip precaution at all times. Use abductor splint when laying down in bed.   - Monitor incision with any signs of infection or bleeding.  - SHRUTHI hose and SCD's to both legs and oral anticoagulation to prevent blood clots.  - PT/OT eval and treat. - See additional Care Plan goals for specific interventions  Outcome: Progressing     Problem: PAIN - ADULT  Goal: Verbalizes/displays adequate comfort level or patient's stated pain goal  Description: INTERVENTIONS:  - Encourage pt to monitor pain and request assistance  - Assess pain using appropriate pain scale  - Administer analgesics based on type and severity of pain and evaluate response  - Implement non-pharmacological measures as appropriate and evaluate response  - Consider cultural and social influences on pain and pain management  - Manage/alleviate anxiety  - Utilize distraction and/or relaxation techniques  - Monitor for opioid side effects  - Notify MD/LIP if interventions unsuccessful or patient reports new pain  - Anticipate increased pain with activity and pre-medicate as appropriate  Outcome: Progressing     Problem: SAFETY ADULT - FALL  Goal: Free from fall injury  Description: INTERVENTIONS:  - Assess pt frequently for physical needs  - Identify cognitive and physical deficits and behaviors that affect risk of falls.   - [place] fall precautions as indicated by assessment.  - Educate pt/family on patient safety including physical limitations  - Instruct pt to call for assistance with activity based on assessment  - Modify environment to reduce risk of injury  - Provide assistive devices as appropriate  - Consider OT/PT consult to assist with strengthening/mobility  - Encourage toileting schedule  Outcome: Progressing     Problem: DISCHARGE PLANNING  Goal: Discharge to home or other facility with appropriate resources  Description: INTERVENTIONS:  - Identify barriers to discharge w/pt and caregiver  - Include patient/family/discharge partner in discharge planning  - Arrange for needed discharge resources and transportation as appropriate  - Identify discharge learning needs (meds, wound care, etc)  - Arrange for interpreters to assist at discharge as needed  - Consider post-discharge preferences of patient/family/discharge partner  - Complete POLST form as appropriate  - Assess patient's ability to be responsible for managing their own health  - Refer to Case Management Department for coordinating discharge planning if the patient needs post-hospital services based on physician/LIP order or complex needs related to functional status, cognitive ability or social support system  Outcome: Progressing     Problem: SKIN/TISSUE INTEGRITY - ADULT  Goal: Skin integrity remains intact  Description: INTERVENTIONS  - Assess and document risk factors for pressure ulcer development  - Assess and document skin integrity  - Monitor for areas of redness and/or skin breakdown  - Initiate interventions, skin care algorithm/standards of care as needed  Outcome: Progressing  Goal: Incision(s), wounds(s) or drain site(s) healing without S/S of infection  Description: INTERVENTIONS:  - Assess and document risk factors for pressure ulcer development  - Assess and document skin integrity  - Assess and document dressing/incision, wound bed, drain sites and surrounding tissue  - Implement wound care per orders  - Initiate isolation precautions as appropriate  - Initiate Pressure Ulcer prevention bundle as indicated  Outcome: Progressing     Problem: MUSCULOSKELETAL - ADULT  Goal: Return mobility to safest level of function  Description: INTERVENTIONS:  - Assess patient stability and activity tolerance for standing, transferring and ambulating w/ or w/o assistive devices  - Assist with transfers and ambulation using safe patient handling equipment as needed  - Ensure adequate protection for wounds/incisions during mobilization  - Obtain PT/OT consults as needed  - Advance activity as appropriate  - Communicate ordered activity level and limitations with patient/family  Outcome: Progressing  Goal: Maintain proper alignment of affected body part  Description: INTERVENTIONS:  - Support and protect limb and body alignment per provider's orders  - Instruct and reinforce with patient and family use of appropriate assistive device and precautions (e.g. spinal or hip dislocation precautions)  Outcome: Progressing     Problem: Impaired Functional Mobility  Goal: Achieve highest/safest level of mobility/gait  Description: Interventions:  - Assess patient's functional ability and stability  - Promote increasing activity/tolerance for mobility and gait  - Educate and engage patient/family in tolerated activity level and precautions  - Recommend use of  RW for transfers and ambulation  Outcome: Progressing    Patient is alert and oriented, aware to call for help as needed. Patient is currently in room air, denies shortness of breathing nor chest pain. Patient is up with walker, maintains her TTWB on her right leg. Patient is taking oral pain medication for pain. Patient is voiding well, passing gas but denies having a bowel movement. Patient will be going to [facility] when discharged.

## 2023-09-02 NOTE — PLAN OF CARE
Lori Crouch states pain is manageable with scheduled and PRN pain meds. ALso using ice packs. 1 assist using walker and assistance with repositioning when out of bed. Reviewed safety plan. Bed alarm & ibed awareness active. Room close to nurses station. DC plan to return to [facility] . , possibly today. Right hip with Prevena dressing. Problem: Patient Centered Care  Goal: Patient preferences are identified and integrated in the patient's plan of care  Description: Interventions:  - What would you like us to know as we care for you? I've had multiple hip surgeries  Problem: Patient/Family Goals  Goal: Patient/Family Long Term Goal  Description: Patient's Long Term Goal: return to [facility]      Interventions:  - manage pain, increase ambulation  - See additional Care Plan goals for specific interventions  Outcome: Progressing  Goal: Patient/Family Short Term Goal  Description: Patient's Short Term Goal: keep pain <5    Interventions:   -Scheduled and prn pain meds.  ICe prn  Problem: PAIN - ADULT  Goal: Verbalizes/displays adequate comfort level or patient's stated pain goal  Description: INTERVENTIONS:  - Encourage pt to monitor pain and request assistance  - Assess pain using appropriate pain scale  - Administer analgesics based on type and severity of pain and evaluate response  - Implement non-pharmacological measures as appropriate and evaluate response  - Consider cultural and social influences on pain and pain management  - Manage/alleviate anxiety  - Utilize distraction and/or relaxation techniques  - Monitor for opioid side effects  - Notify MD/LIP if interventions unsuccessful or patient reports new pain  - Anticipate increased pain with activity and pre-medicate as appropriate  Outcome: Progressing     Problem: SAFETY ADULT - FALL  Goal: Free from fall injury  Description: INTERVENTIONS:  - Assess pt frequently for physical needs  - Identify cognitive and physical deficits and behaviors that affect risk of falls.   - [place] fall precautions as indicated by assessment.  - Educate pt/family on patient safety including physical limitations  - Instruct pt to call for assistance with activity based on assessment  - Modify environment to reduce risk of injury  - Provide assistive devices as appropriate  - Consider OT/PT consult to assist with strengthening/mobility  - Encourage toileting schedule  Outcome: Progressing     Problem: DISCHARGE PLANNING  Goal: Discharge to home or other facility with appropriate resources  Description: INTERVENTIONS:  - Identify barriers to discharge w/pt and caregiver  - Include patient/family/discharge partner in discharge planning  - Arrange for needed discharge resources and transportation as appropriate  - Identify discharge learning needs (meds, wound care, etc)  - Arrange for interpreters to assist at discharge as needed  - Consider post-discharge preferences of patient/family/discharge partner  - Complete POLST form as appropriate  - Assess patient's ability to be responsible for managing their own health  - Refer to Case Management Department for coordinating discharge planning if the patient needs post-hospital services based on physician/LIP order or complex needs related to functional status, cognitive ability or social support system  Outcome: Progressing     Problem: SKIN/TISSUE INTEGRITY - ADULT  Goal: Skin integrity remains intact  Description: INTERVENTIONS  - Assess and document risk factors for pressure ulcer development  - Assess and document skin integrity  - Monitor for areas of redness and/or skin breakdown  - Initiate interventions, skin care algorithm/standards of care as needed  Outcome: Progressing  Goal: Incision(s), wounds(s) or drain site(s) healing without S/S of infection  Description: INTERVENTIONS:  - Assess and document risk factors for pressure ulcer development  - Assess and document skin integrity  - Assess and document dressing/incision, wound bed, drain sites and surrounding tissue  - Implement wound care per orders  - Initiate isolation precautions as appropriate  - Initiate Pressure Ulcer prevention bundle as indicated  Outcome: Progressing     Problem: MUSCULOSKELETAL - ADULT  Goal: Return mobility to safest level of function  Description: INTERVENTIONS:  - Assess patient stability and activity tolerance for standing, transferring and ambulating w/ or w/o assistive devices  - Assist with transfers and ambulation using safe patient handling equipment as needed  - Ensure adequate protection for wounds/incisions during mobilization  - Obtain PT/OT consults as needed  - Advance activity as appropriate  - Communicate ordered activity level and limitations with patient/family  Outcome: Progressing  Goal: Maintain proper alignment of affected body part  Description: INTERVENTIONS:  - Support and protect limb and body alignment per provider's orders  - Instruct and reinforce with patient and family use of appropriate assistive device and precautions (e.g. spinal or hip dislocation precautions)  Outcome: Progressing     Problem: Impaired Functional Mobility  Goal: Achieve highest/safest level of mobility/gait  Description: Interventions:  - Assess patient's functional ability and stability  - Promote increasing activity/tolerance for mobility and gait  - Educate and engage patient/family in tolerated activity level and precautions  - Recommend use of  RW for transfers and ambulation  Outcome: Progressing     - See additional Care Plan goals for specific interventions  Outcome: Progressing     - Provide timely, complete, and accurate information to patient/family  - Incorporate patient and family knowledge, values, beliefs, and cultural backgrounds into the planning and delivery of care  - Encourage patient/family to participate in care and decision-making at the level they choose  - Honor patient and family perspectives and choices  Outcome: Progressing

## 2023-09-02 NOTE — CM/SW NOTE
09/02/23 1300   Discharge disposition   Expected discharge disposition Short Term H   Post Acute Care Provider Nay Ndiaye   Discharge transportation [address] [facility]       Patient with discharge order in. CM spoke with Mary Montes from [facility] and she confirmed she spoke with Genie Lee in admissions- patient can return to their facility today. Patient was paying privately and will continue to upon return. CM spoke with patient via phone regarding discharge plan. Patient would like to return to [facility] and confirmed she was paying privately before and is agreeable to doing so again. [facility] set up for 3p . Quoted cost is $205, patient aware and agreeable to being billed. Patient's RN updated on discharge plan.      RN Report #[phone]    Plan: Return [facility] via [address] at 3p    Unknown Jese RN, BSN

## 2023-09-03 LAB
BLOOD TYPE BARCODE: 5100
UNIT VOLUME: 350 ML

## 2023-09-05 ENCOUNTER — PATIENT OUTREACH (OUTPATIENT)
Dept: CASE MANAGEMENT | Age: 68
End: 2023-09-05

## 2023-09-12 ENCOUNTER — HOSPITAL ENCOUNTER (INPATIENT)
Facility: HOSPITAL | Age: 68
LOS: 5 days | Discharge: NURSING, REHAB OR LONG-TERM CARE | End: 2023-09-17
Attending: ORTHOPAEDIC SURGERY | Admitting: HOSPITALIST
Payer: MEDICARE

## 2023-09-12 ENCOUNTER — TELEPHONE (OUTPATIENT)
Dept: CASE MANAGEMENT | Facility: HOSPITAL | Age: 68
End: 2023-09-12

## 2023-09-12 DIAGNOSIS — S73.004A: ICD-10-CM

## 2023-09-13 ENCOUNTER — APPOINTMENT (OUTPATIENT)
Dept: CT IMAGING | Facility: HOSPITAL | Age: 68
End: 2023-09-13
Attending: INTERNAL MEDICINE
Payer: MEDICARE

## 2023-09-13 ENCOUNTER — APPOINTMENT (OUTPATIENT)
Dept: GENERAL RADIOLOGY | Facility: HOSPITAL | Age: 68
End: 2023-09-13
Attending: ORTHOPAEDIC SURGERY
Payer: MEDICARE

## 2023-09-13 ENCOUNTER — ANESTHESIA (OUTPATIENT)
Dept: SURGERY | Facility: HOSPITAL | Age: 68
End: 2023-09-13
Payer: MEDICARE

## 2023-09-13 ENCOUNTER — ANESTHESIA EVENT (OUTPATIENT)
Dept: SURGERY | Facility: HOSPITAL | Age: 68
End: 2023-09-13
Payer: MEDICARE

## 2023-09-13 PROBLEM — T84.029A DISLOCATION OF PROSTHETIC JOINT: Status: ACTIVE | Noted: 2023-09-13

## 2023-09-13 PROBLEM — T84.029A DISLOCATION OF PROSTHETIC JOINT (HCC): Status: ACTIVE | Noted: 2023-09-13

## 2023-09-13 LAB
ALBUMIN SERPL-MCNC: 2.3 G/DL (ref 3.4–5)
ALP LIVER SERPL-CCNC: 98 U/L
ALT SERPL-CCNC: 12 U/L
ANION GAP SERPL CALC-SCNC: 6 MMOL/L (ref 0–18)
ANTIBODY SCREEN: NEGATIVE
AST SERPL-CCNC: 14 U/L (ref 15–37)
BASOPHILS # BLD AUTO: 0.04 X10(3) UL (ref 0–0.2)
BASOPHILS NFR BLD AUTO: 0.8 %
BASOPHILS NFR SNV: 0 %
BILIRUB DIRECT SERPL-MCNC: <0.1 MG/DL (ref 0–0.2)
BILIRUB SERPL-MCNC: 0.3 MG/DL (ref 0.1–2)
BUN BLD-MCNC: 15 MG/DL (ref 7–18)
BUN/CREAT SERPL: 11 (ref 10–20)
CALCIUM BLD-MCNC: 8.4 MG/DL (ref 8.5–10.1)
CHLORIDE SERPL-SCNC: 106 MMOL/L (ref 98–112)
CO2 SERPL-SCNC: 29 MMOL/L (ref 21–32)
CREAT BLD-MCNC: 1.36 MG/DL
DEPRECATED RDW RBC AUTO: 51.6 FL (ref 35.1–46.3)
EGFRCR SERPLBLD CKD-EPI 2021: 42 ML/MIN/1.73M2 (ref 60–?)
EOSINOPHIL # BLD AUTO: 0.55 X10(3) UL (ref 0–0.7)
EOSINOPHIL NFR BLD AUTO: 10.7 %
EOSINOPHIL NFR SNV: 2 %
ERYTHROCYTE [DISTWIDTH] IN BLOOD BY AUTOMATED COUNT: 16.6 % (ref 11–15)
GLUCOSE BLD-MCNC: 86 MG/DL (ref 70–99)
HCT VFR BLD AUTO: 23.6 %
HGB BLD-MCNC: 7.3 G/DL
HGB BLD-MCNC: 8.9 G/DL
IMM GRANULOCYTES # BLD AUTO: 0.02 X10(3) UL (ref 0–1)
IMM GRANULOCYTES NFR BLD: 0.4 %
INR BLD: 1.16 (ref 0.85–1.16)
LYMPHOCYTES # BLD AUTO: 0.99 X10(3) UL (ref 1–4)
LYMPHOCYTES NFR BLD AUTO: 19.2 %
LYMPHOCYTES NFR SNV: 12 %
MCH RBC QN AUTO: 26.4 PG (ref 26–34)
MCHC RBC AUTO-ENTMCNC: 30.9 G/DL (ref 31–37)
MCV RBC AUTO: 85.2 FL
MONOCYTES # BLD AUTO: 0.62 X10(3) UL (ref 0.1–1)
MONOCYTES NFR BLD AUTO: 12 %
MONOS+MACROS NFR SNV: 7 %
MRSA DNA SPEC QL NAA+PROBE: NEGATIVE
NEUTROPHILS # BLD AUTO: 2.93 X10 (3) UL (ref 1.5–7.7)
NEUTROPHILS # BLD AUTO: 2.93 X10(3) UL (ref 1.5–7.7)
NEUTROPHILS NFR BLD AUTO: 56.9 %
NEUTROPHILS NFR FLD: 79 %
OSMOLALITY SERPL CALC.SUM OF ELEC: 292 MOSM/KG (ref 275–295)
PLATELET # BLD AUTO: 271 10(3)UL (ref 150–450)
POTASSIUM SERPL-SCNC: 3.9 MMOL/L (ref 3.5–5.1)
PROT SERPL-MCNC: 5.7 G/DL (ref 6.4–8.2)
PROTHROMBIN TIME: 14.7 SECONDS (ref 11.6–14.8)
RBC # BLD AUTO: 2.77 X10(6)UL
RBC # FLD AUTO: ABNORMAL /CUMM (ref ?–1)
RH BLOOD TYPE: POSITIVE
SODIUM SERPL-SCNC: 141 MMOL/L (ref 136–145)
TOTAL CELLS COUNTED FLD: 100
TOTAL CELLS COUNTED SNV: 1523 /CUMM (ref 0–200)
WBC # BLD AUTO: 5.2 X10(3) UL (ref 4–11)
WBC # SNV: 1523 /CUMM

## 2023-09-13 PROCEDURE — 73501 X-RAY EXAM HIP UNI 1 VIEW: CPT | Performed by: ORTHOPAEDIC SURGERY

## 2023-09-13 PROCEDURE — 72191 CT ANGIOGRAPH PELV W/O&W/DYE: CPT | Performed by: INTERNAL MEDICINE

## 2023-09-13 PROCEDURE — 73502 X-RAY EXAM HIP UNI 2-3 VIEWS: CPT | Performed by: ORTHOPAEDIC SURGERY

## 2023-09-13 PROCEDURE — 0SRR0J9 REPLACEMENT OF RIGHT HIP JOINT, FEMORAL SURFACE WITH SYNTHETIC SUBSTITUTE, CEMENTED, OPEN APPROACH: ICD-10-PCS | Performed by: ORTHOPAEDIC SURGERY

## 2023-09-13 PROCEDURE — 99222 1ST HOSP IP/OBS MODERATE 55: CPT | Performed by: HOSPITALIST

## 2023-09-13 PROCEDURE — 0SPR0JZ REMOVAL OF SYNTHETIC SUBSTITUTE FROM RIGHT HIP JOINT, FEMORAL SURFACE, OPEN APPROACH: ICD-10-PCS | Performed by: ORTHOPAEDIC SURGERY

## 2023-09-13 PROCEDURE — 30233N1 TRANSFUSION OF NONAUTOLOGOUS RED BLOOD CELLS INTO PERIPHERAL VEIN, PERCUTANEOUS APPROACH: ICD-10-PCS | Performed by: HOSPITALIST

## 2023-09-13 PROCEDURE — 0SUA09Z SUPPLEMENT RIGHT HIP JOINT, ACETABULAR SURFACE WITH LINER, OPEN APPROACH: ICD-10-PCS | Performed by: ORTHOPAEDIC SURGERY

## 2023-09-13 PROCEDURE — 76000 FLUOROSCOPY <1 HR PHYS/QHP: CPT | Performed by: ORTHOPAEDIC SURGERY

## 2023-09-13 PROCEDURE — 0SP909Z REMOVAL OF LINER FROM RIGHT HIP JOINT, OPEN APPROACH: ICD-10-PCS | Performed by: ORTHOPAEDIC SURGERY

## 2023-09-13 DEVICE — BIOLOX® OPTION, HEAD, L, Ø 36/+3.5, TAPER 12/14
Type: IMPLANTABLE DEVICE | Site: HIP | Status: FUNCTIONAL
Brand: BIOLOX® OPTION

## 2023-09-13 DEVICE — IMPLANTABLE DEVICE
Type: IMPLANTABLE DEVICE | Site: HIP | Status: FUNCTIONAL
Brand: REFOBACIN® BONE CEMENT R

## 2023-09-13 DEVICE — IMPLANTABLE DEVICE
Type: IMPLANTABLE DEVICE | Site: HIP | Status: FUNCTIONAL
Brand: G7® VIVACIT-E®

## 2023-09-13 RX ORDER — EPHEDRINE SULFATE 50 MG/ML
INJECTION, SOLUTION INTRAVENOUS AS NEEDED
Status: DISCONTINUED | OUTPATIENT
Start: 2023-09-13 | End: 2023-09-13 | Stop reason: SURG

## 2023-09-13 RX ORDER — ASPIRIN 81 MG/1
81 TABLET ORAL 2 TIMES DAILY
Status: DISCONTINUED | OUTPATIENT
Start: 2023-09-14 | End: 2023-09-17

## 2023-09-13 RX ORDER — DOXEPIN HYDROCHLORIDE 50 MG/1
1 CAPSULE ORAL DAILY
Status: DISCONTINUED | OUTPATIENT
Start: 2023-09-14 | End: 2023-09-17

## 2023-09-13 RX ORDER — ONDANSETRON 2 MG/ML
4 INJECTION INTRAMUSCULAR; INTRAVENOUS EVERY 6 HOURS PRN
Status: DISCONTINUED | OUTPATIENT
Start: 2023-09-13 | End: 2023-09-13 | Stop reason: HOSPADM

## 2023-09-13 RX ORDER — GLYCOPYRROLATE 0.2 MG/ML
INJECTION, SOLUTION INTRAMUSCULAR; INTRAVENOUS AS NEEDED
Status: DISCONTINUED | OUTPATIENT
Start: 2023-09-13 | End: 2023-09-13 | Stop reason: SURG

## 2023-09-13 RX ORDER — MORPHINE SULFATE 4 MG/ML
4 INJECTION, SOLUTION INTRAMUSCULAR; INTRAVENOUS EVERY 10 MIN PRN
Status: DISCONTINUED | OUTPATIENT
Start: 2023-09-13 | End: 2023-09-13 | Stop reason: HOSPADM

## 2023-09-13 RX ORDER — HYDROMORPHONE HYDROCHLORIDE 1 MG/ML
0.4 INJECTION, SOLUTION INTRAMUSCULAR; INTRAVENOUS; SUBCUTANEOUS EVERY 5 MIN PRN
Status: DISCONTINUED | OUTPATIENT
Start: 2023-09-13 | End: 2023-09-13 | Stop reason: HOSPADM

## 2023-09-13 RX ORDER — SODIUM CHLORIDE 9 MG/ML
INJECTION, SOLUTION INTRAVENOUS ONCE
Status: COMPLETED | OUTPATIENT
Start: 2023-09-13 | End: 2023-09-13

## 2023-09-13 RX ORDER — CEFAZOLIN SODIUM 1 G/3ML
INJECTION, POWDER, FOR SOLUTION INTRAMUSCULAR; INTRAVENOUS AS NEEDED
Status: DISCONTINUED | OUTPATIENT
Start: 2023-09-13 | End: 2023-09-13 | Stop reason: SURG

## 2023-09-13 RX ORDER — DEXAMETHASONE SODIUM PHOSPHATE 4 MG/ML
VIAL (ML) INJECTION AS NEEDED
Status: DISCONTINUED | OUTPATIENT
Start: 2023-09-13 | End: 2023-09-13 | Stop reason: SURG

## 2023-09-13 RX ORDER — MAGNESIUM OXIDE 400 MG (241.3 MG MAGNESIUM) TABLET
1 TABLET NIGHTLY
Status: DISCONTINUED | OUTPATIENT
Start: 2023-09-13 | End: 2023-09-13

## 2023-09-13 RX ORDER — HYDROMORPHONE HYDROCHLORIDE 1 MG/ML
0.6 INJECTION, SOLUTION INTRAMUSCULAR; INTRAVENOUS; SUBCUTANEOUS EVERY 5 MIN PRN
Status: DISCONTINUED | OUTPATIENT
Start: 2023-09-13 | End: 2023-09-13 | Stop reason: HOSPADM

## 2023-09-13 RX ORDER — MORPHINE SULFATE 4 MG/ML
4 INJECTION, SOLUTION INTRAMUSCULAR; INTRAVENOUS EVERY 2 HOUR PRN
Status: DISCONTINUED | OUTPATIENT
Start: 2023-09-13 | End: 2023-09-14

## 2023-09-13 RX ORDER — SODIUM CHLORIDE, SODIUM LACTATE, POTASSIUM CHLORIDE, CALCIUM CHLORIDE 600; 310; 30; 20 MG/100ML; MG/100ML; MG/100ML; MG/100ML
INJECTION, SOLUTION INTRAVENOUS CONTINUOUS
Status: DISCONTINUED | OUTPATIENT
Start: 2023-09-13 | End: 2023-09-13 | Stop reason: HOSPADM

## 2023-09-13 RX ORDER — LIDOCAINE HYDROCHLORIDE 10 MG/ML
INJECTION, SOLUTION EPIDURAL; INFILTRATION; INTRACAUDAL; PERINEURAL AS NEEDED
Status: DISCONTINUED | OUTPATIENT
Start: 2023-09-13 | End: 2023-09-13 | Stop reason: SURG

## 2023-09-13 RX ORDER — METHYLPREDNISOLONE SODIUM SUCCINATE 40 MG/ML
40 INJECTION, POWDER, LYOPHILIZED, FOR SOLUTION INTRAMUSCULAR; INTRAVENOUS ONCE
Status: DISCONTINUED | OUTPATIENT
Start: 2023-09-13 | End: 2023-09-14

## 2023-09-13 RX ORDER — MORPHINE SULFATE 2 MG/ML
2 INJECTION, SOLUTION INTRAMUSCULAR; INTRAVENOUS EVERY 2 HOUR PRN
Status: DISCONTINUED | OUTPATIENT
Start: 2023-09-13 | End: 2023-09-14

## 2023-09-13 RX ORDER — HYDROMORPHONE HYDROCHLORIDE 1 MG/ML
0.2 INJECTION, SOLUTION INTRAMUSCULAR; INTRAVENOUS; SUBCUTANEOUS EVERY 5 MIN PRN
Status: DISCONTINUED | OUTPATIENT
Start: 2023-09-13 | End: 2023-09-13 | Stop reason: HOSPADM

## 2023-09-13 RX ORDER — NALOXONE HYDROCHLORIDE 0.4 MG/ML
80 INJECTION, SOLUTION INTRAMUSCULAR; INTRAVENOUS; SUBCUTANEOUS AS NEEDED
Status: DISCONTINUED | OUTPATIENT
Start: 2023-09-13 | End: 2023-09-13 | Stop reason: HOSPADM

## 2023-09-13 RX ORDER — SENNOSIDES 8.6 MG
17.2 TABLET ORAL NIGHTLY
Status: DISCONTINUED | OUTPATIENT
Start: 2023-09-13 | End: 2023-09-17

## 2023-09-13 RX ORDER — SODIUM CHLORIDE, SODIUM LACTATE, POTASSIUM CHLORIDE, CALCIUM CHLORIDE 600; 310; 30; 20 MG/100ML; MG/100ML; MG/100ML; MG/100ML
INJECTION, SOLUTION INTRAVENOUS CONTINUOUS PRN
Status: DISCONTINUED | OUTPATIENT
Start: 2023-09-13 | End: 2023-09-13 | Stop reason: SURG

## 2023-09-13 RX ORDER — ONDANSETRON 2 MG/ML
INJECTION INTRAMUSCULAR; INTRAVENOUS AS NEEDED
Status: DISCONTINUED | OUTPATIENT
Start: 2023-09-13 | End: 2023-09-13 | Stop reason: SURG

## 2023-09-13 RX ORDER — HYDRALAZINE HYDROCHLORIDE 20 MG/ML
10 INJECTION INTRAMUSCULAR; INTRAVENOUS EVERY 4 HOURS PRN
Status: DISCONTINUED | OUTPATIENT
Start: 2023-09-13 | End: 2023-09-17

## 2023-09-13 RX ORDER — DEXTROSE AND SODIUM CHLORIDE 5; .45 G/100ML; G/100ML
INJECTION, SOLUTION INTRAVENOUS CONTINUOUS
Status: DISCONTINUED | OUTPATIENT
Start: 2023-09-13 | End: 2023-09-17

## 2023-09-13 RX ORDER — HYDROMORPHONE HYDROCHLORIDE 1 MG/ML
0.4 INJECTION, SOLUTION INTRAMUSCULAR; INTRAVENOUS; SUBCUTANEOUS EVERY 2 HOUR PRN
Status: DISCONTINUED | OUTPATIENT
Start: 2023-09-13 | End: 2023-09-15

## 2023-09-13 RX ORDER — POLYETHYLENE GLYCOL 3350 17 G/17G
17 POWDER, FOR SOLUTION ORAL DAILY PRN
Status: DISCONTINUED | OUTPATIENT
Start: 2023-09-13 | End: 2023-09-17

## 2023-09-13 RX ORDER — ONDANSETRON 2 MG/ML
4 INJECTION INTRAMUSCULAR; INTRAVENOUS EVERY 6 HOURS PRN
Status: DISCONTINUED | OUTPATIENT
Start: 2023-09-13 | End: 2023-09-15

## 2023-09-13 RX ORDER — PREGABALIN 75 MG/1
75 CAPSULE ORAL NIGHTLY
COMMUNITY

## 2023-09-13 RX ORDER — CEFAZOLIN SODIUM/WATER 2 G/20 ML
2 SYRINGE (ML) INTRAVENOUS EVERY 8 HOURS
Qty: 40 ML | Refills: 0 | Status: COMPLETED | OUTPATIENT
Start: 2023-09-14 | End: 2023-09-14

## 2023-09-13 RX ORDER — DOCUSATE SODIUM 100 MG/1
100 CAPSULE, LIQUID FILLED ORAL 2 TIMES DAILY
Status: DISCONTINUED | OUTPATIENT
Start: 2023-09-13 | End: 2023-09-17

## 2023-09-13 RX ORDER — ENEMA 19; 7 G/133ML; G/133ML
1 ENEMA RECTAL ONCE AS NEEDED
Status: DISCONTINUED | OUTPATIENT
Start: 2023-09-13 | End: 2023-09-17

## 2023-09-13 RX ORDER — METHYLPREDNISOLONE SODIUM SUCCINATE 40 MG/ML
40 INJECTION, POWDER, LYOPHILIZED, FOR SOLUTION INTRAMUSCULAR; INTRAVENOUS EVERY 6 HOURS
Status: DISCONTINUED | OUTPATIENT
Start: 2023-09-13 | End: 2023-09-13

## 2023-09-13 RX ORDER — ALBUTEROL SULFATE 90 UG/1
2 AEROSOL, METERED RESPIRATORY (INHALATION) EVERY 4 HOURS PRN
Status: DISCONTINUED | OUTPATIENT
Start: 2023-09-13 | End: 2023-09-14

## 2023-09-13 RX ORDER — METOCLOPRAMIDE HYDROCHLORIDE 5 MG/ML
5 INJECTION INTRAMUSCULAR; INTRAVENOUS EVERY 8 HOURS PRN
Status: DISCONTINUED | OUTPATIENT
Start: 2023-09-13 | End: 2023-09-13 | Stop reason: HOSPADM

## 2023-09-13 RX ORDER — MORPHINE SULFATE 10 MG/ML
6 INJECTION, SOLUTION INTRAMUSCULAR; INTRAVENOUS EVERY 10 MIN PRN
Status: DISCONTINUED | OUTPATIENT
Start: 2023-09-13 | End: 2023-09-13 | Stop reason: HOSPADM

## 2023-09-13 RX ORDER — HYDROMORPHONE HYDROCHLORIDE 1 MG/ML
0.2 INJECTION, SOLUTION INTRAMUSCULAR; INTRAVENOUS; SUBCUTANEOUS EVERY 2 HOUR PRN
Status: DISCONTINUED | OUTPATIENT
Start: 2023-09-13 | End: 2023-09-15

## 2023-09-13 RX ORDER — ACETAMINOPHEN 500 MG
1000 TABLET ORAL EVERY 8 HOURS SCHEDULED
Status: DISCONTINUED | OUTPATIENT
Start: 2023-09-13 | End: 2023-09-17

## 2023-09-13 RX ORDER — BISACODYL 10 MG
10 SUPPOSITORY, RECTAL RECTAL
Status: DISCONTINUED | OUTPATIENT
Start: 2023-09-13 | End: 2023-09-17

## 2023-09-13 RX ORDER — FLUTICASONE FUROATE AND VILANTEROL 200; 25 UG/1; UG/1
1 POWDER RESPIRATORY (INHALATION) DAILY
Status: DISCONTINUED | OUTPATIENT
Start: 2023-09-13 | End: 2023-09-14

## 2023-09-13 RX ORDER — DIPHENHYDRAMINE HYDROCHLORIDE 50 MG/ML
50 INJECTION INTRAMUSCULAR; INTRAVENOUS ONCE
Qty: 1 ML | Refills: 0 | Status: DISCONTINUED | OUTPATIENT
Start: 2023-09-13 | End: 2023-09-17

## 2023-09-13 RX ORDER — NEOSTIGMINE METHYLSULFATE 1 MG/ML
INJECTION, SOLUTION INTRAVENOUS AS NEEDED
Status: DISCONTINUED | OUTPATIENT
Start: 2023-09-13 | End: 2023-09-13 | Stop reason: SURG

## 2023-09-13 RX ORDER — MEPERIDINE HYDROCHLORIDE 25 MG/ML
12.5 INJECTION INTRAMUSCULAR; INTRAVENOUS; SUBCUTANEOUS AS NEEDED
Status: DISCONTINUED | OUTPATIENT
Start: 2023-09-13 | End: 2023-09-13 | Stop reason: HOSPADM

## 2023-09-13 RX ORDER — TRANEXAMIC ACID 10 MG/ML
INJECTION, SOLUTION INTRAVENOUS AS NEEDED
Status: DISCONTINUED | OUTPATIENT
Start: 2023-09-13 | End: 2023-09-13 | Stop reason: SURG

## 2023-09-13 RX ORDER — OXYCODONE HYDROCHLORIDE 5 MG/1
2.5 TABLET ORAL EVERY 4 HOURS PRN
Status: DISCONTINUED | OUTPATIENT
Start: 2023-09-13 | End: 2023-09-15

## 2023-09-13 RX ORDER — OXYCODONE HYDROCHLORIDE 5 MG/1
5 TABLET ORAL EVERY 4 HOURS PRN
Status: DISCONTINUED | OUTPATIENT
Start: 2023-09-13 | End: 2023-09-15

## 2023-09-13 RX ORDER — METOCLOPRAMIDE HYDROCHLORIDE 5 MG/ML
5 INJECTION INTRAMUSCULAR; INTRAVENOUS EVERY 8 HOURS PRN
Status: DISCONTINUED | OUTPATIENT
Start: 2023-09-13 | End: 2023-09-17

## 2023-09-13 RX ORDER — MORPHINE SULFATE 4 MG/ML
2 INJECTION, SOLUTION INTRAMUSCULAR; INTRAVENOUS EVERY 10 MIN PRN
Status: DISCONTINUED | OUTPATIENT
Start: 2023-09-13 | End: 2023-09-13 | Stop reason: HOSPADM

## 2023-09-13 RX ADMIN — GLYCOPYRROLATE 0.8 MG: 0.2 INJECTION, SOLUTION INTRAMUSCULAR; INTRAVENOUS at 20:47:00

## 2023-09-13 RX ADMIN — SODIUM CHLORIDE, SODIUM LACTATE, POTASSIUM CHLORIDE, CALCIUM CHLORIDE: 600; 310; 30; 20 INJECTION, SOLUTION INTRAVENOUS at 20:42:00

## 2023-09-13 RX ADMIN — SODIUM CHLORIDE, SODIUM LACTATE, POTASSIUM CHLORIDE, CALCIUM CHLORIDE: 600; 310; 30; 20 INJECTION, SOLUTION INTRAVENOUS at 18:15:00

## 2023-09-13 RX ADMIN — LIDOCAINE HYDROCHLORIDE 25 MG: 10 INJECTION, SOLUTION EPIDURAL; INFILTRATION; INTRACAUDAL; PERINEURAL at 18:23:00

## 2023-09-13 RX ADMIN — EPHEDRINE SULFATE 10 MG: 50 INJECTION, SOLUTION INTRAVENOUS at 19:04:00

## 2023-09-13 RX ADMIN — SODIUM CHLORIDE, SODIUM LACTATE, POTASSIUM CHLORIDE, CALCIUM CHLORIDE: 600; 310; 30; 20 INJECTION, SOLUTION INTRAVENOUS at 20:01:00

## 2023-09-13 RX ADMIN — ONDANSETRON 4 MG: 2 INJECTION INTRAMUSCULAR; INTRAVENOUS at 18:43:00

## 2023-09-13 RX ADMIN — TRANEXAMIC ACID 1000 MG: 10 INJECTION, SOLUTION INTRAVENOUS at 19:13:00

## 2023-09-13 RX ADMIN — DEXAMETHASONE SODIUM PHOSPHATE 4 MG: 4 MG/ML VIAL (ML) INJECTION at 18:43:00

## 2023-09-13 RX ADMIN — NEOSTIGMINE METHYLSULFATE 4 MG: 1 INJECTION, SOLUTION INTRAVENOUS at 20:47:00

## 2023-09-13 RX ADMIN — CEFAZOLIN SODIUM 2 G: 1 INJECTION, POWDER, FOR SOLUTION INTRAMUSCULAR; INTRAVENOUS at 18:15:00

## 2023-09-13 NOTE — PROCEDURES
Per CT tech, \"Dizziness\" is not true allergy. RN confirmed w/ patient. Last time she had a CT she just got \"dizzy\" but denies any SOB or hives. CT tech just told her she couldn't have Iodine in future. Pt not sure if it is a true allergy. Discussed w/ MD. Per CT pt does not need pre-procedure meds.

## 2023-09-13 NOTE — CM/SW NOTE
09/13/23 1400   CM/SW Referral Data   Referral Source Social Work (self-referral)   Reason for Referral Discharge planning;Readmission   Informant Patient   Readmission Assessment   Factors that patient feels contributed to this readmission Acute/Chronic Clinical Presentation   Pt's living situation prior to admission? Subacute rehab   Pt's level of independence at discharge? Some assist (mod)   Pt. received education on diagnoses at time of discharge? Yes   Did you know who and how to call someone if you felt worse? Yes   Did any new symptoms or issues develop after you were discharged? Yes   ----Post D/C symptoms: Symptoms/issue related to previous hospitalization Yes   Did you understand your discharge instructions? Yes   Were medications taken as indicated on discharge instructions? Yes   Was full assessment completed by DERRICK/BERTHA on prior admission? Yes   Was the recommended discharge plan achieved? Yes   Was pt. discharged w/out services? No   Medical Hx   Does patient have an established PCP? Yes   Patient Info   Patient's Current Mental Status at Time of Assessment Alert;Oriented   Patient's Home Environment Subacute Rehab   Post Acute Care Provider Upon Admission [facility]   Discharge Needs   Anticipated D/C needs Subacute rehab   Services Requested   PASRR Level 1 Submitted No - Current within 90 days   Choice of Post-Acute Provider   Informed patient of right to choose their preferred provider Yes   List of appropriate post-acute services provided to patient/family with quality data Yes   Patient/family choice [address]   Information given to Patient     SW met with the pt. At bedside and confirmed that she was admitted from [facility] and is planning to return if that is what is recommended. Referral sent via [employer] and [address] is reserved in [employer]. Plan: [facility] when medically cleared.       Gail Wing, [place] ext [phone]

## 2023-09-13 NOTE — PLAN OF CARE
Patient arrived via [email or web address] from [facility] in [place]. AO x4. Right leg is shortened. Tender to touch. No falls per patient. RLE with edema, per patient chronic. Arrived with purewick, voiding. Arrived with O2 at 2L per NC. SCDs for DVT prophylaxis. NPO. Morphine for pain. Hgb prior to arrival 5.8, received 2 units of RBC at other hospital. Labs checked upon arrival. Hgb 7.3. IVF running per order. Call light within reach. Monitored frequently by staff. Plan for ortho consult. Ortho MD ordered to transfuse 1 unit of RBC, type and screen done. Consent in chart. MRSA swab sent. CTA ordered to right hip but pt allergic to contrast iodine. Ortho MD aware. Problem: Patient Centered Care  Goal: Patient preferences are identified and integrated in the patient's plan of care  Description: Interventions:  - What would you like us to know as we care for you?  I live at home alone  - Provide timely, complete, and accurate information to patient/family  - Incorporate patient and family knowledge, values, beliefs, and cultural backgrounds into the planning and delivery of care  - Encourage patient/family to participate in care and decision-making at the level they choose  - Honor patient and family perspectives and choices  Outcome: Progressing     Problem: Patient/Family Goals  Goal: Patient/Family Long Term Goal  Description: Patient's Long Term Goal: go home    Interventions:  - follow md orders  -ortho consult  -pain control  -pt/ot    - See additional Care Plan goals for specific interventions  Outcome: Progressing  Goal: Patient/Family Short Term Goal  Description: Patient's Short Term Goal: pain control    Interventions:   - pain meds  -reposition    - See additional Care Plan goals for specific interventions  Outcome: Progressing     Problem: PAIN - ADULT  Goal: Verbalizes/displays adequate comfort level or patient's stated pain goal  Description: INTERVENTIONS:  - Encourage pt to monitor pain and request assistance  - Assess pain using appropriate pain scale  - Administer analgesics based on type and severity of pain and evaluate response  - Implement non-pharmacological measures as appropriate and evaluate response  - Consider cultural and social influences on pain and pain management  - Manage/alleviate anxiety  - Utilize distraction and/or relaxation techniques  - Monitor for opioid side effects  - Notify MD/LIP if interventions unsuccessful or patient reports new pain  - Anticipate increased pain with activity and pre-medicate as appropriate  Outcome: Progressing     Problem: RISK FOR INFECTION - ADULT  Goal: Absence of fever/infection during anticipated neutropenic period  Description: INTERVENTIONS  - Monitor WBC  - Administer growth factors as ordered  - Implement neutropenic guidelines  Outcome: Progressing     Problem: SAFETY ADULT - FALL  Goal: Free from fall injury  Description: INTERVENTIONS:  - Assess pt frequently for physical needs  - Identify cognitive and physical deficits and behaviors that affect risk of falls.   - [place] fall precautions as indicated by assessment.  - Educate pt/family on patient safety including physical limitations  - Instruct pt to call for assistance with activity based on assessment  - Modify environment to reduce risk of injury  - Provide assistive devices as appropriate  - Consider OT/PT consult to assist with strengthening/mobility  - Encourage toileting schedule  Outcome: Progressing     Problem: DISCHARGE PLANNING  Goal: Discharge to home or other facility with appropriate resources  Description: INTERVENTIONS:  - Identify barriers to discharge w/pt and caregiver  - Include patient/family/discharge partner in discharge planning  - Arrange for needed discharge resources and transportation as appropriate  - Identify discharge learning needs (meds, wound care, etc)  - Arrange for interpreters to assist at discharge as needed  - Consider post-discharge preferences of patient/family/discharge partner  - Complete POLST form as appropriate  - Assess patient's ability to be responsible for managing their own health  - Refer to Case Management Department for coordinating discharge planning if the patient needs post-hospital services based on physician/LIP order or complex needs related to functional status, cognitive ability or social support system  Outcome: Progressing

## 2023-09-13 NOTE — PLAN OF CARE
Patient has been resting in bed w/ call light within reach. Pain is being controlled w/ IV Morphine. Is NPO due to having surgery later this evening. Has purewick in place. Has SCDs while in bed for dvt prophylaxis. CT done this AM. 1 unit of PRBCs given, hgb now 8.9. DC plan pending, will possibly dc back to [facility] once cleared post-op. Problem: Patient Centered Care  Goal: Patient preferences are identified and integrated in the patient's plan of care  Description: Interventions:  - What would you like us to know as we care for you?  I live at home alone   - Provide timely, complete, and accurate information to patient/family  - Incorporate patient and family knowledge, values, beliefs, and cultural backgrounds into the planning and delivery of care  - Encourage patient/family to participate in care and decision-making at the level they choose  - Honor patient and family perspectives and choices  Outcome: Progressing     Problem: Patient/Family Goals  Goal: Patient/Family Long Term Goal  Description: Patient's Long Term Goal: To be able to ambulate    Interventions:  - Surgery  - PT/OT  - Walker   - Pain medications   - See additional Care Plan goals for specific interventions  Outcome: Progressing  Goal: Patient/Family Short Term Goal  Description: Patient's Short Term Goal: To go home     Interventions:   - Follow plan of care  - See additional Care Plan goals for specific interventions  Outcome: Progressing     Problem: PAIN - ADULT  Goal: Verbalizes/displays adequate comfort level or patient's stated pain goal  Description: INTERVENTIONS:  - Encourage pt to monitor pain and request assistance  - Assess pain using appropriate pain scale  - Administer analgesics based on type and severity of pain and evaluate response  - Implement non-pharmacological measures as appropriate and evaluate response  - Consider cultural and social influences on pain and pain management  - Manage/alleviate anxiety  - Utilize distraction and/or relaxation techniques  - Monitor for opioid side effects  - Notify MD/LIP if interventions unsuccessful or patient reports new pain  - Anticipate increased pain with activity and pre-medicate as appropriate  Outcome: Progressing     Problem: RISK FOR INFECTION - ADULT  Goal: Absence of fever/infection during anticipated neutropenic period  Description: INTERVENTIONS  - Monitor WBC  - Administer growth factors as ordered  - Implement neutropenic guidelines  Outcome: Progressing     Problem: SAFETY ADULT - FALL  Goal: Free from fall injury  Description: INTERVENTIONS:  - Assess pt frequently for physical needs  - Identify cognitive and physical deficits and behaviors that affect risk of falls.   - [place] fall precautions as indicated by assessment.  - Educate pt/family on patient safety including physical limitations  - Instruct pt to call for assistance with activity based on assessment  - Modify environment to reduce risk of injury  - Provide assistive devices as appropriate  - Consider OT/PT consult to assist with strengthening/mobility  - Encourage toileting schedule  Outcome: Progressing     Problem: DISCHARGE PLANNING  Goal: Discharge to home or other facility with appropriate resources  Description: INTERVENTIONS:  - Identify barriers to discharge w/pt and caregiver  - Include patient/family/discharge partner in discharge planning  - Arrange for needed discharge resources and transportation as appropriate  - Identify discharge learning needs (meds, wound care, etc)  - Arrange for interpreters to assist at discharge as needed  - Consider post-discharge preferences of patient/family/discharge partner  - Complete POLST form as appropriate  - Assess patient's ability to be responsible for managing their own health  - Refer to Case Management Department for coordinating discharge planning if the patient needs post-hospital services based on physician/LIP order or complex needs related to functional status, cognitive ability or social support system  Outcome: Progressing

## 2023-09-14 PROBLEM — S73.004A: Status: ACTIVE | Noted: 2023-05-12

## 2023-09-14 LAB
ANION GAP SERPL CALC-SCNC: 8 MMOL/L (ref 0–18)
BASOPHILS # BLD AUTO: 0.02 X10(3) UL (ref 0–0.2)
BASOPHILS NFR BLD AUTO: 0.4 %
BLOOD TYPE BARCODE: 5100
BUN BLD-MCNC: 11 MG/DL (ref 7–18)
BUN/CREAT SERPL: 9.9 (ref 10–20)
CALCIUM BLD-MCNC: 8.7 MG/DL (ref 8.5–10.1)
CHLORIDE SERPL-SCNC: 106 MMOL/L (ref 98–112)
CO2 SERPL-SCNC: 25 MMOL/L (ref 21–32)
CREAT BLD-MCNC: 1.11 MG/DL
DEPRECATED RDW RBC AUTO: 54.4 FL (ref 35.1–46.3)
EGFRCR SERPLBLD CKD-EPI 2021: 53 ML/MIN/1.73M2 (ref 60–?)
EOSINOPHIL # BLD AUTO: 0 X10(3) UL (ref 0–0.7)
EOSINOPHIL NFR BLD AUTO: 0 %
ERYTHROCYTE [DISTWIDTH] IN BLOOD BY AUTOMATED COUNT: 16.9 % (ref 11–15)
GLUCOSE BLD-MCNC: 137 MG/DL (ref 70–99)
HCT VFR BLD AUTO: 25.6 %
HCT VFR BLD AUTO: 25.7 %
HGB BLD-MCNC: 8 G/DL
HGB BLD-MCNC: 8 G/DL
IMM GRANULOCYTES # BLD AUTO: 0.04 X10(3) UL (ref 0–1)
IMM GRANULOCYTES NFR BLD: 0.7 %
LYMPHOCYTES # BLD AUTO: 0.47 X10(3) UL (ref 1–4)
LYMPHOCYTES NFR BLD AUTO: 8.3 %
MCH RBC QN AUTO: 27.5 PG (ref 26–34)
MCHC RBC AUTO-ENTMCNC: 31.1 G/DL (ref 31–37)
MCV RBC AUTO: 88.3 FL
MONOCYTES # BLD AUTO: 0.44 X10(3) UL (ref 0.1–1)
MONOCYTES NFR BLD AUTO: 7.8 %
NEUTROPHILS # BLD AUTO: 4.7 X10 (3) UL (ref 1.5–7.7)
NEUTROPHILS # BLD AUTO: 4.7 X10(3) UL (ref 1.5–7.7)
NEUTROPHILS NFR BLD AUTO: 82.8 %
OSMOLALITY SERPL CALC.SUM OF ELEC: 290 MOSM/KG (ref 275–295)
PLATELET # BLD AUTO: 267 10(3)UL (ref 150–450)
POTASSIUM SERPL-SCNC: 4.6 MMOL/L (ref 3.5–5.1)
RBC # BLD AUTO: 2.91 X10(6)UL
SODIUM SERPL-SCNC: 139 MMOL/L (ref 136–145)
UNIT VOLUME: 350 ML
WBC # BLD AUTO: 5.7 X10(3) UL (ref 4–11)

## 2023-09-14 PROCEDURE — 99233 SBSQ HOSP IP/OBS HIGH 50: CPT | Performed by: INTERNAL MEDICINE

## 2023-09-14 RX ORDER — FLUTICASONE FUROATE AND VILANTEROL 200; 25 UG/1; UG/1
1 POWDER RESPIRATORY (INHALATION) DAILY
Status: DISCONTINUED | OUTPATIENT
Start: 2023-09-14 | End: 2023-09-17

## 2023-09-14 RX ORDER — IPRATROPIUM BROMIDE AND ALBUTEROL SULFATE 2.5; .5 MG/3ML; MG/3ML
3 SOLUTION RESPIRATORY (INHALATION) EVERY 6 HOURS PRN
Status: DISCONTINUED | OUTPATIENT
Start: 2023-09-14 | End: 2023-09-17

## 2023-09-14 NOTE — PHYSICAL THERAPY NOTE
PHYSICAL THERAPY EVALUATION - INPATIENT     Room Number: 404/404-A  Evaluation Date: 9/14/2023  Type of Evaluation: Initial   Physician Order: PT Eval and Treat    Presenting Problem: R hip dislocation with pubic remus and acetabular fx  Co-Morbidities : HTN, arthritis, asthma, breast cancer, carpal tunnel syndrome, lupus, rheumatoid arthritis, Sjogren's syndrome, S/P lumbar spinal surgery, carpal tunnel release, L TKA, R COLTON with revision 8/30/23  Reason for Therapy: Mobility Dysfunction and Discharge Planning    PHYSICAL THERAPY ASSESSMENT     Patient is a 77year old female admitted 9/12/2023 for R hip dislocation, pubic ramus and acetabular fx's. Patient received semi-fowlers in bed, agreeable to physical therapy evaluation with maximal education and encouragement, session coordinated with OT to maximize patient participation and safety given pain limitations. Vital signs monitored as noted below, no adverse symptoms and patient stable during session and patient maintaining SpO2 on room air when upright out of bed but requiring 1L when reclined in bed . Pt educated on TTWB restrictions, able to state 2/3 posterior hip precautions. Pt requiring increased processing time and repetition, reporting increased brain fog. Pt self-limiting 2/2 pain, declining further out of bed mobility at this time.     MOBILITY:  SUPINE TO SIT: minimal assist - at RLE  SIT TO SUPINE: minimal assist - at RLE  SIT TO STAND: contact guard assist - from edge of bed   STAND TO SIT: contact guard assist   GAIT: minimal assist - 1' side shuffling edge of bed with rolling walker, appropriately maintaining TTWB on RLE, pt refusing further gait at this time, cues to use BUEs to keep weight off RLE  STAIR NEGOTIATION: not tested     Patient is currently functioning below baseline with bed mobility, transfers, gait, and stair negotiation as a result of the following impairments: decreased endurance/aerobic capacity, pain, difficulty maintaining precautions, cognitive deficits, medical status, limited RLE ROM, and decreased compliance/participation. Next session anticipate to progress bed mobility and transfers. Patient history and/or personal factors that may impact the plan of care include limited social support, cognitive deficits, co-morbidities (HTN, arthritis, asthma, breast cancer, carpal tunnel syndrome, lupus, rheumatoid arthritis, Sjogren's syndrome) affecting medical status, pain levels, endurance, prior surgeries (S/P lumbar spinal surgery, carpal tunnel release, L TKA, R COLTON with revision 8/30/23), longstanding history of pain, and has history of recent hospitalization. Based on the physical therapy examination of the noted systems and functional activity/participation limitations, the patient presentation is evolving given the patient presents with surgical precautions/limitations, demonstrates worsening of previously stable condition, and demonstrates worsening of co-morbidities. Patient would benefit from continued skilled physical therapy interventions to maximize patient safety and functional independence. Updated nurse on results of session, patient left semifowlers in bed in care of OT, all lines intact,    The patient's Approx Degree of Impairment: 54.16% has been calculated based on documentation in the [facility] '6 clicks' Inpatient Basic Mobility Short Form. Research supports that patients with this level of impairment may benefit from subacute rehabilitation. Patient will benefit from continued IP PT services to address these deficits in preparation for discharge. DISCHARGE RECOMMENDATIONS  PT Discharge Recommendations: Sub-acute rehabilitation    PLAN  PT Treatment Plan: Bed mobility; Body mechanics; Endurance; Energy conservation;Patient education;Gait training;Strengthening;Transfer training;Balance training  Rehab Potential : Fair  Frequency (Obs): BID       PHYSICAL THERAPY MEDICAL/SOCIAL HISTORY     History related to current admission: readmit from [facility]     Problem List  Active Problems:    Dislocation of prosthetic joint [facility])      HOME SITUATION  Home Situation  Type of Home: House (admitted from [facility])  Home Layout: One level  Stairs to Enter : 4  Railing: Yes  Lives With: Alone  Patient Owned Equipment: Cane;Rolling walker     Prior Level of [place]: Dirk with rolling walker last admissions, prior to hip surgery and complications was independent with walker     SUBJECTIVE  \"I can't think or talk today. \"    PHYSICAL THERAPY EXAMINATION     OBJECTIVE  Precautions: Bed/chair alarm; Other (Comment); Hip abduction pillow;COLTON - posterior (contact)  Fall Risk: High fall risk    WEIGHT BEARING RESTRICTION        R Lower Extremity: Toe Touch Weight Bearing       PAIN ASSESSMENT  Rating: Unable to rate  Location: R hip  Management Techniques: Activity promotion; Body mechanics;Repositioning    COGNITION  Overall Cognitive Status:  Impaired  Attention Span:  difficulty attending to directions and difficulty dividing attention  Memory:  impaired working memory, decreased recall of precautions, and decreased recall of recent events    RANGE OF MOTION AND STRENGTH ASSESSMENT  Upper extremity ROM and strength are within functional limits  BUEs  Lower extremity ROM is within functional limits  LLE, RLE not assessed 2/2 pain/precautions  Lower extremity strength is within functional limits  LLE, RLE not assessed 2/2 pain/precautions    BALANCE  Static Sitting: Normal  Dynamic Sitting: Normal  Static Standing: Good  Dynamic Standing: Good    NEUROLOGICAL FINDINGS           Sensation: WNL          ACTIVITY TOLERANCE  Pulse: 99  Heart Rate Source: Monitor     BP: 137/78  BP Location: Right arm  BP Method: Automatic  Patient Position: Semi-Forte    O2 WALK  Oxygen Therapy  SPO2% on Room Air at Rest: 95  SPO2% on Oxygen at Rest: 94  At rest oxygen flow (liters per minute): 1  SPO2% Ambulation on Room Air: 91    AM-PAC '6-Clicks' [address]  How much difficulty does the patient currently have. .. Patient Difficulty: Turning over in bed (including adjusting bedclothes, sheets and blankets)?: A Little   Patient Difficulty: Sitting down on and standing up from a chair with arms (e.g., wheelchair, bedside commode, etc.): A Little   Patient Difficulty: Moving from lying on back to sitting on the side of the bed?: A Little   How much help from another person does the patient currently need. .. Help from Another: Moving to and from a bed to a chair (including a wheelchair)?: A Little   Help from Another: Need to walk in hospital room?: A Little   Help from Another: Climbing 3-5 steps with a railing?: Total     AM-PAC Score:  Raw Score: 16   Approx Degree of Impairment: 54.16%   Standardized Score (AM-PAC Scale): 40.78   CMS Modifier (G-Code): CK    FUNCTIONAL ABILITY STATUS  Functional Mobility/Gait Assessment  Gait Assistance: Minimum assistance  Distance (ft): 1' side shuffling EOB  Assistive Device: Rolling walker  Pattern: Shuffle (appropriately maintaining TTWB on RLE, pt refusing further gait at this time)      Exercise/Education Provided:  Bed mobility  Body mechanics  Energy conservation  Functional activity tolerated  Gait training  Posture  ROM  Strengthening  Transfer training    Patient End of Session: In bed; With [address] staff;Needs met (left in care of OT)    CURRENT GOALS    Goals to be met by: 10/1/23  Patient Goal Patient's self-stated goal is: decrease pain   Goal #1 Patient is able to demonstrate supine - sit EOB @ level: supervision     Goal #1   Current Status    Goal #2 Patient is able to demonstrate transfers EOB to/from [facility] at assistance level: supervision with walker - rolling     Goal #2  Current Status    Goal #3 Patient is able to ambulate 30 feet with assist device: walker - rolling at assistance level: minimum assistance   Goal #3   Current Status Goal #4 Patient will negotiate 1 stairs/one curb w/ assistive device and moderate assistance   Goal #4   Current Status    Goal #5 Patient to demonstrate independence with home activity/exercise instructions provided to patient in preparation for discharge.    Goal #5   Current Status    Goal #6    Goal #6  Current Status      Patient Evaluation Complexity Level:  History High - 3 or more personal factors and/or co-morbidities   Examination of body systems Moderate - addressing a total of 3 or more elements   Clinical Presentation Moderate - Evolving   Clinical Decision Making Moderate Complexity       Therapeutic Activity: 26 minutes    Wan Booker, PT, DPT  [facility]  Inpatient Rehabilitation  Physical Therapy  [phone]

## 2023-09-14 NOTE — PLAN OF CARE
Patient A&Ox4. POD 1 of a right total hip arthroplasty with Dr. Dino Miller. VSS. 1LO2. PRN zofran was administered this morning for c/o nausea which has subsided. PT worked with patient, recommend sub acute rehab. Hip precautions with use of wedge foam abductor pillow in place. Patient is on regular diet. Purewick device is connected, clear yellow urine is present in canister. Prevena plus wound vac is in place, purple foam dressing is C/D/I. Hemovac also remain place, 50cc output of sanguinous fluid was noted. Pain is being controlled by oxycodone and dilaudid. Fall precaution measures remain in place. Call light and personal belongings are within reach.    Problem: Patient Centered Care  Goal: Patient preferences are identified and integrated in the patient's plan of care  Description: Interventions:  - Provide timely, complete, and accurate information to patient/family  - Incorporate patient and family knowledge, values, beliefs, and cultural backgrounds into the planning and delivery of care  - Encourage patient/family to participate in care and decision-making at the level they choose  - Honor patient and family perspectives and choices  Outcome: Progressing     Problem: Patient/Family Goals  Goal: Patient/Family Long Term Goal    Interventions:  - See additional Care Plan goals for specific interventions  Outcome: Progressing  Goal: Patient/Family Short Term Goal    Interventions:   - See additional Care Plan goals for specific interventions  Outcome: Progressing     Problem: PAIN - ADULT  Goal: Verbalizes/displays adequate comfort level or patient's stated pain goal  Description: INTERVENTIONS:  - Encourage pt to monitor pain and request assistance  - Assess pain using appropriate pain scale  - Administer analgesics based on type and severity of pain and evaluate response  - Implement non-pharmacological measures as appropriate and evaluate response  - Consider cultural and social influences on pain and pain management  - Manage/alleviate anxiety  - Utilize distraction and/or relaxation techniques  - Monitor for opioid side effects  - Notify MD/LIP if interventions unsuccessful or patient reports new pain  - Anticipate increased pain with activity and pre-medicate as appropriate  Outcome: Progressing     Problem: RISK FOR INFECTION - ADULT  Goal: Absence of fever/infection during anticipated neutropenic period  Description: INTERVENTIONS  - Monitor WBC  - Administer growth factors as ordered  - Implement neutropenic guidelines  Outcome: Progressing     Problem: SAFETY ADULT - FALL  Goal: Free from fall injury  Description: INTERVENTIONS:  - Assess pt frequently for physical needs  - Identify cognitive and physical deficits and behaviors that affect risk of falls.   - [place] fall precautions as indicated by assessment.  - Educate pt/family on patient safety including physical limitations  - Instruct pt to call for assistance with activity based on assessment  - Modify environment to reduce risk of injury  - Provide assistive devices as appropriate  - Consider OT/PT consult to assist with strengthening/mobility  - Encourage toileting schedule  Outcome: Progressing     Problem: DISCHARGE PLANNING  Goal: Discharge to home or other facility with appropriate resources  Description: INTERVENTIONS:  - Identify barriers to discharge w/pt and caregiver  - Include patient/family/discharge partner in discharge planning  - Arrange for needed discharge resources and transportation as appropriate  - Identify discharge learning needs (meds, wound care, etc)  - Arrange for interpreters to assist at discharge as needed  - Consider post-discharge preferences of patient/family/discharge partner  - Complete POLST form as appropriate  - Assess patient's ability to be responsible for managing their own health  - Refer to Case Management Department for coordinating discharge planning if the patient needs post-hospital services based on physician/LIP order or complex needs related to functional status, cognitive ability or social support system  Outcome: Progressing

## 2023-09-14 NOTE — PLAN OF CARE
Patient is alert and oriented. Dressing, Hemovac and Prevena plus in place. On 2 liters of O2. [place] in place, removing per protocol. Doppler used for right foot. IV fluids running. PRN Oxy and Dilaudid given for pain management along with scheduled Tylenol. Zofran and Reglan given for nausea. Call light within reach. Frequent rounding done.      Problem: Patient Centered Care  Goal: Patient preferences are identified and integrated in the patient's plan of care  Description: Interventions:  - What would you like us to know as we care for you?   - Provide timely, complete, and accurate information to patient/family  - Incorporate patient and family knowledge, values, beliefs, and cultural backgrounds into the planning and delivery of care  - Encourage patient/family to participate in care and decision-making at the level they choose  - Honor patient and family perspectives and choices  Outcome: Progressing     Problem: PAIN - ADULT  Goal: Verbalizes/displays adequate comfort level or patient's stated pain goal  Description: INTERVENTIONS:  - Encourage pt to monitor pain and request assistance  - Assess pain using appropriate pain scale  - Administer analgesics based on type and severity of pain and evaluate response  - Implement non-pharmacological measures as appropriate and evaluate response  - Consider cultural and social influences on pain and pain management  - Manage/alleviate anxiety  - Utilize distraction and/or relaxation techniques  - Monitor for opioid side effects  - Notify MD/LIP if interventions unsuccessful or patient reports new pain  - Anticipate increased pain with activity and pre-medicate as appropriate  Outcome: Not Progressing     Problem: RISK FOR INFECTION - ADULT  Goal: Absence of fever/infection during anticipated neutropenic period  Description: INTERVENTIONS  - Monitor WBC  - Administer growth factors as ordered  - Implement neutropenic guidelines  Outcome: Progressing     Problem: SAFETY ADULT - FALL  Goal: Free from fall injury  Description: INTERVENTIONS:  - Assess pt frequently for physical needs  - Identify cognitive and physical deficits and behaviors that affect risk of falls.   - [place] fall precautions as indicated by assessment.  - Educate pt/family on patient safety including physical limitations  - Instruct pt to call for assistance with activity based on assessment  - Modify environment to reduce risk of injury  - Provide assistive devices as appropriate  - Consider OT/PT consult to assist with strengthening/mobility  - Encourage toileting schedule  Outcome: Progressing

## 2023-09-14 NOTE — OPERATIVE REPORT
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 Postop DX: Same  Procedure:  Revision right total hip arthroplasty--Attempted closed reduction and conversion to open reduction with stem and liner change  Surgeon: Lit Varghese MD  Assistants:  Jcak Rendon  Anesthesia: General Anesthesia  EBL: 500 mL  Fluids: 1400 mL  Findings: Failed total hip arthroplasty secondary to: Instability  Cell count: 1523 WBC; 79 PMNs  Frozen section: NA  Specimens: Capsule for frozen section, fluid for cell count and culture, and the old implants  Drain: Hemovac  Implants: G7 liner, Massey SL  Complications: none  All needle and sponge counts correct prior to leaving the operating room. Plan: Transfer to recovery room in stable condition. Transition to floor when stable. INDICATIONS: DX:    Mike Tyler is a 77year old year old female with a history of a prior revision right total hip arthroplasty with a dislocation at the nursing home. Unfortunately were not sure exactly how long its been out but it is likely been about 3 days. She was indicated for a attempted closed reduction versus open reduction. The patient understood the risk, benefits alternatives of surgery and renal contraindications to proceed. .  They have been cleared for surgery by their primary care physician and all forms of non-operative management have been attempted without success. All risks, benefits and alternatives for the procedure have been discussed and informed consent obtained. There are no contraindications to proceed with the surgery today.     The risks discussed include but are not limited to: infection, nerve injury (peroneal palsy and superficial numbness specifically), vessel damage, VTE, need for re-operation, dislocation, limb length discrepancy, loss of limb and loss of life. Furthermore this is a revision procedure and the patient understands there are no guarantees and revision surgery is more complicated with a greater risk than primary COLTON. Specifically, there is a greater risk for infection, dislocation, LLD and nerve palsy. They understand we will try our best to restore them to normal function with this revision procedure. On 09/13/23, Vishal Juarez was identified in the holding and taken to the operating room. After perioperative antibiotics were administered (Ancef and vancomycin) the patient was given a general anesthetic and placed supine on the operating room table. A dash catheter was placed under sterile conditions. This time we attempted a closed reduction with fluoroscopy and could not get the femoral head to move at all in any position. We were not close to getting the hip reduced and therefore elected to move forward with open reduction. The patient was then positioned in the lateral decubitus position with the right facing up and all bony prominences on the left side being well padded. The right lower extremity was then sterily prepped and draped in standard surgical fashion. At this time a timeout was called and it was noted the right side was the appropriate side for the surgery and we were allowed to proceed. We then directed our attention to the exposure. APPROACH:    The prior surgical scar was excised in its entirety and it was extended proximally and distally to provide adequate exposure. We then dissected sharply down to the deep fascia and elevated small medial and lateral flaps that were full thickness. The deep fascia was then incised in-line with the skin incision and the Charnley retractor placed deep to this layer. The hip was then aspirated for a synovial fluid sample.   We obtained approximately 20 cc of bloody looking fluid which was sent to the lab for a cell count with differential and culture. We then entered the hip joint via a modified posterior approach in an attempt to follow the old arthrotomy. Once entering the hip joint we performed a synovectomy and sent this tissue with a portion of the hip capsule to pathology for a deep culture. The hip was then noted to all very to be dislocated anterior superior. Once this was done we found, there was a large hematoma within the subcutaneous tissue and below the fascia. We did reduce the hip and noted that with deep flexion and abduction it was susceptible dislocation but we could not get it to dislocate anteriorly was only posteriorly unstable and a fair amount of range of motion. It seemed more with adduction that the problem occurred and this could be fixed with a lipped liner. PROCEDURE:    Once exposed and the hip fully assessed we then attempted to address the pathology. At this time we attempted to remove the femoral head from the stem and as we did this the entire stem came out of the canal.  This was then removed and placed on the back table. At this point time we then went ahead and used a series of reamers to ream out the liner and then a acorn and pencil-tip bur to remove the cement from within the cup. The cup was checked and noted to be stable and the pelvis was moving as a single unit right now. We then irrigated the wound with some dilute Betadine followed by pulsatile lavage. At this point time our cell count came back as negative for infection and we went ahead and finished cleaning of the cup using a bur. Once this was complete we then opened up a new G7 liner for a 56 mm cup and a 36 mm inner diameter with a 10 degree face changing liner. This was then cemented with antibiotic cement into the cup closing down the inclination just a little bit and giving just a touch more anteversion on the cup.   The lip was placed posterior superior. We allowed the cement to fully harden and once that was complete we then directed our attention towards the femur. We then reamed the femur for a Massey SL revision stem up to 18 mm x 190 mm. We trialed with a +3.5 head and noted we had good stability here in the operating room. Dislocation only occurred with maximum adduction and off of the table, flexion to 80 degrees and internal rotation to 65 to 70 degrees. This is really a nonphysiologic activity and at this point time we felt this would be our implant. We then soaked the wound with dilute Betadine again and irrigated with pulsatile lavage. We then inserted our Massey SL revision stem size 18 mm x 190 mm. This was inserted in approximately 20 degrees of anteversion. Once the stem was fully seated we then cleaned the trunnion of the stem and placed a 36 mm +3.5 Biolox option femoral head. This is impacted in the place and once the [facility] taper was engaged we reduced the hip again and noted we had good range of motion and good stability here in the operating room. We then went ahead and irrigated 1 more time with pulsatile lavage and directed our attention towards closure. CLOSURE:    After all implants were secured in place we directed our attention to the closure of the wound. A hemovac drain was passed thru the distal aspect of the hip. The capsule was then closed with #1 PDS in an interrupted figure of 8 fashion. The subcutaneous tissue was closed with, 2-0 Monocryl in interrupted inverted fashion after you closed the deep fascia with #2 Sandroe Joslyn in a running fashion. And the skin edges were approximated using staples. A sterile dressing was placed and the drain was hooked up to self suction. The patient was aroused in the operating room and taken to the recovery room in stable condition. Of note all needle and sponge counts were correct in the operating room. I was present and scrubbed for the entire procedure.     PLAN: Postoperatively the patient will be touchdown weightbearing for 6 weeks. They will be placed on DVT prophylaxis for 3 weeks. Cultures will be followed to assess for the need for long-term antibiotics and the specific type. She will be on perioperative antibiotics and then oral antibiotics for 2 weeks due to the high risk nature of the surgery and that she will be going back to a skilled nursing facility.   We will continue to follow the patient closely while in the hospital.    Implants:    Femoral component: Massey SL revision stem, 18 mm x 190 mm  Acetabular component: Applicable  Polyethylene: G7 56 mm x 36 mm 10 degree face changing liner  Others: 36 mm +3.5 mm Biolox option femoral head    Zuly MD Esperanza  [phone] (c)  [phone] (o)  9/13/2023

## 2023-09-14 NOTE — PHYSICAL THERAPY NOTE
Physical Therapy Contact Note    Orders received and chart reviewed. Attempted to see patient for Physical Therapy services. Patient not available secondary to patient declining second PT session today, reporting fatigue. 09/14/23 1448   VISIT TYPE   PT Inpatient Visit Type (Documentation Required) Attempted Treatment   PT Follow Up   PT Follow-up Date 09/15/23   Last PT Visit 09/14/23   Frequency (Obs) Daily     Will re-schedule visit.     Alo Tavarez, PT, DPT  [facility]  Inpatient Rehabilitation  Physical Therapy  [phone]

## 2023-09-14 NOTE — BRIEF OP NOTE
Pre-Operative Diagnosis: Dislocation of hip, right, initial encounter ([facility] [address] [S73.004A]     Post-Operative Diagnosis: Dislocation of hip, right, initial encounter ([facility] [address] [S73.004A]      Procedure Performed:   Liner and femoral head revision total hip arthroplasty    Surgeon(s) and Role:     Jeremy Guardado MD - Primary     Aliyah Borrego MD - Assisting Surgeon    Assistant(s):  Surgical Assistant.: Napoleon Becerra I     Surgical Findings: dislocated hip with loose femoral stem. Specimen: sent. See op dictation.      Estimated Blood Loss: Blood Output: 500 mL (9/13/2023  8:43 PM)        Barbie Chaudhry MD  9/13/2023  9:16 PM

## 2023-09-15 LAB
ANION GAP SERPL CALC-SCNC: 5 MMOL/L (ref 0–18)
BASOPHILS # BLD AUTO: 0.06 X10(3) UL (ref 0–0.2)
BASOPHILS NFR BLD AUTO: 1 %
BUN BLD-MCNC: 8 MG/DL (ref 7–18)
BUN/CREAT SERPL: 8.5 (ref 10–20)
CALCIUM BLD-MCNC: 8.8 MG/DL (ref 8.5–10.1)
CHLORIDE SERPL-SCNC: 105 MMOL/L (ref 98–112)
CO2 SERPL-SCNC: 31 MMOL/L (ref 21–32)
CREAT BLD-MCNC: 0.94 MG/DL
DEPRECATED HBV CORE AB SER IA-ACNC: 201.3 NG/ML
DEPRECATED RDW RBC AUTO: 54.4 FL (ref 35.1–46.3)
EGFRCR SERPLBLD CKD-EPI 2021: 65 ML/MIN/1.73M2 (ref 60–?)
EOSINOPHIL # BLD AUTO: 0.37 X10(3) UL (ref 0–0.7)
EOSINOPHIL NFR BLD AUTO: 6.2 %
ERYTHROCYTE [DISTWIDTH] IN BLOOD BY AUTOMATED COUNT: 16.9 % (ref 11–15)
GLUCOSE BLD-MCNC: 104 MG/DL (ref 70–99)
HCT VFR BLD AUTO: 23.9 %
HGB BLD-MCNC: 7.3 G/DL
HGB BLD-MCNC: 7.7 G/DL
IMM GRANULOCYTES # BLD AUTO: 0.02 X10(3) UL (ref 0–1)
IMM GRANULOCYTES NFR BLD: 0.3 %
IRON SATN MFR SERPL: 5 %
IRON SERPL-MCNC: 12 UG/DL
LYMPHOCYTES # BLD AUTO: 0.48 X10(3) UL (ref 1–4)
LYMPHOCYTES NFR BLD AUTO: 8.1 %
MCH RBC QN AUTO: 27.1 PG (ref 26–34)
MCHC RBC AUTO-ENTMCNC: 30.5 G/DL (ref 31–37)
MCV RBC AUTO: 88.8 FL
MONOCYTES # BLD AUTO: 0.59 X10(3) UL (ref 0.1–1)
MONOCYTES NFR BLD AUTO: 9.9 %
NEUTROPHILS # BLD AUTO: 4.44 X10 (3) UL (ref 1.5–7.7)
NEUTROPHILS # BLD AUTO: 4.44 X10(3) UL (ref 1.5–7.7)
NEUTROPHILS NFR BLD AUTO: 74.5 %
OSMOLALITY SERPL CALC.SUM OF ELEC: 291 MOSM/KG (ref 275–295)
PLATELET # BLD AUTO: 252 10(3)UL (ref 150–450)
POTASSIUM SERPL-SCNC: 4.5 MMOL/L (ref 3.5–5.1)
RBC # BLD AUTO: 2.69 X10(6)UL
SODIUM SERPL-SCNC: 141 MMOL/L (ref 136–145)
TIBC SERPL-MCNC: 253 UG/DL (ref 240–450)
TRANSFERRIN SERPL-MCNC: 170 MG/DL (ref 200–360)
WBC # BLD AUTO: 6 X10(3) UL (ref 4–11)

## 2023-09-15 PROCEDURE — 99233 SBSQ HOSP IP/OBS HIGH 50: CPT | Performed by: HOSPITALIST

## 2023-09-15 RX ORDER — KETOROLAC TROMETHAMINE 15 MG/ML
15 INJECTION, SOLUTION INTRAMUSCULAR; INTRAVENOUS ONCE
Status: COMPLETED | OUTPATIENT
Start: 2023-09-15 | End: 2023-09-15

## 2023-09-15 RX ORDER — HYDROMORPHONE HYDROCHLORIDE 2 MG/1
2 TABLET ORAL
Status: DISCONTINUED | OUTPATIENT
Start: 2023-09-15 | End: 2023-09-17

## 2023-09-15 RX ORDER — KETOROLAC TROMETHAMINE 15 MG/ML
15 INJECTION, SOLUTION INTRAMUSCULAR; INTRAVENOUS EVERY 6 HOURS
Status: COMPLETED | OUTPATIENT
Start: 2023-09-15 | End: 2023-09-16

## 2023-09-15 RX ORDER — ONDANSETRON 2 MG/ML
8 INJECTION INTRAMUSCULAR; INTRAVENOUS EVERY 4 HOURS PRN
Status: DISCONTINUED | OUTPATIENT
Start: 2023-09-15 | End: 2023-09-17

## 2023-09-15 RX ORDER — OXYCODONE AND ACETAMINOPHEN 10; 325 MG/1; MG/1
2 TABLET ORAL EVERY 6 HOURS PRN
Status: DISCONTINUED | OUTPATIENT
Start: 2023-09-15 | End: 2023-09-17

## 2023-09-15 NOTE — DISCHARGE INSTRUCTIONS
DISCHARGE INSTRUCTIONS:  PLACE ICE TO SURGICAL AREA 20-30 MINUTES ON/ 20-30 MINUTES OFF. THIS IS TO HELP WITH PAIN & SWELLING. TAKE YOUR MEDICATIONS AS PRESCRIBED BY YOUR DOCTOR BELOW.    AS YOU WERE INSTRUCTED BY PHYSICAL THERAPY TO EXERCISE HIP PRECAUTIONS, CONTINUE TO DO SO AFTER DISCHARGE    CONTINUE TOE TOUCH WEIGHT BEARING    CALL TO CONFIRM YOUR FOLLOW UP APPOINTMENT WITH DR. Angel Long TO BE SEEN IN 2-3 WEEKS POSTOP. [phone]    MEDICATIONS    POST OP MEDICATION REGIMEN              MULTI-MODAL   PAIN REGIMEN       DRUG   FOR   FREQUENCY & DURATION   QTY   NOTES     WEANING  TIPS       Gabapentin 100mg   Nerve pain   Take 2 tabs every 8 hours for 14 days    90   No refills You may discontinue this medication prior to 14 days if you do not tolerate it. Tylenol 500mg     Mild pain   Take 2 tabs every 6 hours     90   Can purchase over-the-counter    Stop using medication if no longer having pain. Tramadol 50mg     Moderate pain   Take 1-2 tabs every 6 hours only as needed   45 May prescribe a refill, but ideally, this should be tapered off after surgery Stop using this medication 2nd   As pain decreases over time, increase the interval between doses (6 hours to 8, then 12, then 24) to taper off the medication. Meloxicam 15mg     Inflammation   Take 1 tab daily for 30 days     30   May refill if needed beyond 30 days   Recommend completing the 30 day supply. BREAKTHROUGH PAIN         Oxycodone 5mg       Severe pain     Take 1-2 tabs every 4-6 hours only as needed for severe pain       40   Take at least 1 hr apart from Tramadol. Ideally, no refill. The goal is to taper off this medication as soon as possible, as it can be addictive and have side effects. Stop using this medication 1st if no longer having severe pain.          BLOOD THINNER     Aspirin 81mg   Blood clot prevention   Take 1 tab twice daily for 30 days     60     No refills   Complete the entire course of your blood thinner. STOOL SOFTENER     Sennokot 8.6/50mg   Constipation   Take 1 tab twice daily  while on opioids     60 Refer to discharge instructions if constipation persists even after taking this medication   Stop taking if having diarrhea or loose stools. ANTI-NAUSEA   Ondansetron 4mg   Nausea   Take 1 tab every 8 hours as needed if nauseous     20   No Refill      ANTI-ACID REFLUX / GASTRITIS   Pantoprazole 40mg   Acid reflux, gastric ulcer prophylaxis   Take 1 tab every day along with Meloxicam     60   May refill if Meloxicam refilled        DRESSING:    YOU HAVE A SPECIAL DRESSING CALLED A PREVENA DRESSING, OVER YOUR INCISION. THIS IS A TYPE OF NEGATIVE PRESSURE DRESSING THAT KEEPS THE WOUND DRY. IT IS CONNECTED TO A CANNISTER. MAKE SURE THAT THE CANNISTER IS POWERED ON AND NOT OUT OF BATTERY. THE DRESSING STAYS FOR 7 DAYS FROM SURGERY. THIS DRESSING SHOULD BE CHANGED TO AN AQUACEL AT 7 DAYS POST-OPERATIVELY. IF THE PREVENA DRESSING HAS CONTINUOUS AND PERSISTENT DRAINAGE, CALL YOUR SURGEON FIRST BEFORE CHANGING THE DRESSING TO AN AQUACEL DRESSING. YOU MAY SHOWER AS SOON AS THE AQUACEL DRESSING IS PLACED INSTEAD OF THE PREVENA DRESSING OVER YOUR INCISION AREA. IF THE DRESSING LEAKS OR COMES LOOSE BEFORE THE 7 DAY PERIOD CONTACT YOUR DOCTOR / SURGEON. AFTER   14 DAYS POST OPERATIVELY, THE AQUACEL DRESSING IS REMOVED BY PULLING ON THE EDGE OF THE DRESSING AND GENTLY STRETCHING IT, THEN PEEL IT OFF SLOWLY LIKE A BANDAID. IF YOU HAVE ANY QUESTIONS OR CONCERNS ABOUT THE DRESSING CONTACT YOUR DOCTOR. REASONS TO CALL YOUR DOCTOR:  TEMPERATURE .0 OR MORE  PERSISTANT NAUSEA OR VOMITTING - ESPECIALLY IF YOU ARE UNABLE TO EAT OR DRINK. INCREASED LEVEL OF PAIN OR PAIN WHICH IS NOT CONTROLLED BY YOUR PAIN MEDICINE. PERSISTENT DRAINAGE AT ANYTIME FROM YOUR SURGICAL AREA / INCISION.   PAIN, TENDERNESS OR SUDDEN SWELLING IN YOUR CALF REGION OF THE LOWER EXTREMITIES - THIS IS A POSSIBLE SIGN OF A BLOOD CLOT. ANY CHANGES IN SENSATION IN YOUR BODY IN THE AREA OF YOUR SURGERY = NUMBNESS OR TINGLING. ANY CHANGES IN CIRCULATION IN YOUR BODY IN THE AREA OF YOUR SURGERY = CHANGES  IN COLOR EITHER DARKER OR VERY PALE; OR  IF AREA FEELS COLD TO THE TOUCH AS COMPARED TO OTHER AREAS. ANY CHANGES IN FUNCTION IN YOUR BODY IN THE AREA OF YOUR SURGERY = CHANGE IN MOVEMENT - UNABLE TO MOVE OR WIGGLE FINGERS, TOES OR FOOT OR ANY OTHER CHANGES IN NORMAL BODY FUNCTIONING. FOR ANY OF THE ABOVE OR ANY OTHER QUESTIONS OR CONCERNS CALL YOUR DOCTOR/ SURGEON AS SOON AS POSSIBLE. Rajeev Fatima MD MPH  Orthopaedic Surgeon, Adult Hip and Knee Reconstruction  [facility] [address]  [place], [address] [place] [address]  Office: (h) [phone] (X) [phone]  Adminstrative Assistant: Venessa Middleton to go to rehab  Full code  Dr Javi Barfield or her designate to see in rehab  Cbc, bmp, mg, phos on mond am; please call rehab md with results  Pt/ot bid  Please try to limit narc use if possible     Medication List        START taking these medications      bisacodyl 10 MG Supp  Commonly known as: Dulcolax  Place 1 suppository (10 mg total) rectally daily as needed. cephalexin 500 MG Caps  Commonly known as: Keflex  Take 1 capsule (500 mg total) by mouth every 8 (eight) hours. Start tonight and stop if rash     docusate sodium 100 MG Caps  Commonly known as: COLACE  Take 100 mg by mouth 2 (two) times daily as needed for constipation. HYDROmorphone 2 MG Tabs  Commonly known as: Dilaudid  Take 1 tablet (2 mg total) by mouth every 3 (three) hours as needed. melatonin 1 MG Tabs  Take 3 tablets (3 mg total) by mouth nightly. oxyCODONE-acetaminophen  MG Tabs  Commonly known as: Percocet  Take 2 tablets by mouth every 6 (six) hours as needed.             CHANGE how you take these medications      albuterol 108 (90 Base) MCG/ACT Aers  Commonly known as: Ventolin HFA  USE 2 INHALATIONS ORALLY   EVERY 6 HOURS AS NEEDED FORWHEEZING (APPOINTMENT      NEEDED FOR MORE REFILLS)  What changed: See the new instructions. * pregabalin 75 MG Caps  Commonly known as: Lyrica  What changed: Another medication with the same name was changed. Make sure you understand how and when to take each. * pregabalin 50 MG Caps  Commonly known as: Lyrica  TAKE 1 CAPSULE EVERY       MORNING WITH 75MG IN THE   EVENING  What changed:   how much to take  how to take this  when to take this  additional instructions           * This list has 2 medication(s) that are the same as other medications prescribed for you. Read the directions carefully, and ask your doctor or other care provider to review them with you. CONTINUE taking these medications      acetaminophen 500 MG Tabs  Commonly known as: Tylenol Extra Strength     aspirin 325 MG Tbec  Take 1 tablet (325 mg total) by mouth in the morning and 1 tablet (325 mg total) before bedtime. Breo Ellipta 200-25 MCG/ACT Aepb  Generic drug: fluticasone furoate-vilanterol  USE 1 INHALATION ORALLY    ONCE DAILY AS DIRECTED     Calcium Carb-Cholecalciferol 500-10 MG-MCG Tabs     Cevimeline HCl 30 MG Caps  Commonly known as: EVOXAC  Take 1 capsule (30 mg total) by mouth 3 (three) times daily. D-3-5 125 MCG (5000 UT) Caps  Generic drug: cholecalciferol     EPINEPHrine 0.3 MG/0.3ML Soaj  Commonly known as: EpiPen 2-Farhan  Inject 0.3 mL (1 each total) as directed as needed. ferrous sulfate 325 (65 FE) MG Tbec     hydroCHLOROthiazide 25 MG Tabs  Commonly known as: Hydrodiuril     hydroxychloroquine 200 MG Tabs  Commonly known as: Plaquenil  Take 1 tablet (200 mg total) by mouth 2 (two) times daily. ipratropium 0.06 % Soln  Commonly known as: Atrovent     loratadine 10 MG Tabs  Commonly known as: Claritin     Naloxone HCl 4 MG/0.1ML Liqd  4 mg by Nasal route as needed.  If patient remains unresponsive, repeat dose in other nostril 2-5 minutes after first dose. Nystatin 119722 UNIT/GM Powd     Polyethylene Glycol 3350 17 g Pack  Commonly known as: MIRALAX     PreviDent 5000 Dry Mouth 1.1 % Gel  Generic drug: Sodium Fluoride     Robafen Mucus/Chest Congestion 100 MG/5ML Liqd  Generic drug: guaiFENesin     Sennosides 17.2 MG Tabs     sodium chloride 0.65 % Soln  Commonly known as: Saline Mist     sulfamethoxazole-trimethoprim -160 MG Tabs per tablet  Commonly known as: Bactrim DS  Take 1 tablet by mouth every 12 (twelve) hours. Tab-A-Mitchel Tabs     tiZANidine 2 MG Tabs  Commonly known as: Zanaflex  Take 0.5 tablets (1 mg total) by mouth every 6 (six) hours as needed.      Zinc 50 MG Caps            STOP taking these medications      oxyCODONE 5 MG Tabs     Tylenol Sinus+Headache 5-325 MG Tabs  Generic drug: Phenylephrine-Acetaminophen               Where to Get Your Medications        You can get these medications from any pharmacy    Bring a paper prescription for each of these medications  bisacodyl 10 MG Supp  cephalexin 500 MG Caps  docusate sodium 100 MG Caps  HYDROmorphone 2 MG Tabs  melatonin 1 MG Tabs  oxyCODONE-acetaminophen  MG Tabs

## 2023-09-15 NOTE — CM/SW NOTE
DERRICK followed up on DC planning. SW sent clinical updates in aidin to [facility]     Notified them to anticipate DC over the weekend    Pt requires an ambulance. Pt is on 1 L of o2. DERRICK called and ordered an Ambulance from [facility] to transport pt to [facility] currently placed on a  WILL CALL. PCS flow sheet completed (needs DC date). RN to attached to AVS and print out. DERRICK/BERTHA to remain available for support and/or discharge planning. PLAN: DC back to [facility] - Ambulance on [address] for the weekend, PCS form done (needs date)    ROYCE Meeks, MSW ext.  [phone]

## 2023-09-15 NOTE — PLAN OF CARE
Problem: Patient Centered Care  Goal: Patient preferences are identified and integrated in the patient's plan of care  Description: Interventions:  - What would you like us to know as we care for you?   - Provide timely, complete, and accurate information to patient/family  - Incorporate patient and family knowledge, values, beliefs, and cultural backgrounds into the planning and delivery of care  - Encourage patient/family to participate in care and decision-making at the level they choose  - Honor patient and family perspectives and choices  Outcome: Progressing     Problem: Patient/Family Goals  Goal: Patient/Family Long Term Goal  Description: Patient's Long Term Goal:e able to walk again     Interventions:  - follow doctors recommendations   - See additional Care Plan goals for specific interventions  Outcome: Progressing  Goal: Patient/Family Short Term Goal  Description: Patient's Short Term Goal: pain free    Interventions:   -   - See additional Care Plan goals for specific interventions  Outcome: Progressing     Problem: PAIN - ADULT  Goal: Verbalizes/displays adequate comfort level or patient's stated pain goal  Description: INTERVENTIONS:  - Encourage pt to monitor pain and request assistance  - Assess pain using appropriate pain scale  - Administer analgesics based on type and severity of pain and evaluate response  - Implement non-pharmacological measures as appropriate and evaluate response  - Consider cultural and social influences on pain and pain management  - Manage/alleviate anxiety  - Utilize distraction and/or relaxation techniques  - Monitor for opioid side effects  - Notify MD/LIP if interventions unsuccessful or patient reports new pain  - Anticipate increased pain with activity and pre-medicate as appropriate  Outcome: Progressing     Problem: RISK FOR INFECTION - ADULT  Goal: Absence of fever/infection during anticipated neutropenic period  Description: INTERVENTIONS  - Monitor WBC  - Administer growth factors as ordered  - Implement neutropenic guidelines  Outcome: Progressing     Problem: SAFETY ADULT - FALL  Goal: Free from fall injury  Description: INTERVENTIONS:  - Assess pt frequently for physical needs  - Identify cognitive and physical deficits and behaviors that affect risk of falls.   - [place] fall precautions as indicated by assessment.  - Educate pt/family on patient safety including physical limitations  - Instruct pt to call for assistance with activity based on assessment  - Modify environment to reduce risk of injury  - Provide assistive devices as appropriate  - Consider OT/PT consult to assist with strengthening/mobility  - Encourage toileting schedule  Outcome: Progressing     Problem: DISCHARGE PLANNING  Goal: Discharge to home or other facility with appropriate resources  Description: INTERVENTIONS:  - Identify barriers to discharge w/pt and caregiver  - Include patient/family/discharge partner in discharge planning  - Arrange for needed discharge resources and transportation as appropriate  - Identify discharge learning needs (meds, wound care, etc)  - Arrange for interpreters to assist at discharge as needed  - Consider post-discharge preferences of patient/family/discharge partner  - Complete POLST form as appropriate  - Assess patient's ability to be responsible for managing their own health  - Refer to Case Management Department for coordinating discharge planning if the patient needs post-hospital services based on physician/LIP order or complex needs related to functional status, cognitive ability or social support system  Outcome: Progressing   No acute changes in patient status, vital signs stable, pain management, wound vac and Hemovac in place , fall precautions in place , call light within reach

## 2023-09-15 NOTE — PHYSICAL THERAPY NOTE
PHYSICAL THERAPY TREATMENT NOTE - INPATIENT     Room Number: 404/422-D       Presenting Problem: R hip dislocation with pubic remus and acetabular fx    Problem List  Active Problems:    Dislocation of hip, right, initial encounter ([facility] [address]    Dislocation of prosthetic joint ([facility] [address]      PHYSICAL THERAPY ASSESSMENT   Chart reviewed. RN approved participation in physical therapy. PPE worn by therapist: mask, gloves, and goggles. Patient was wearing a mask during session. Patient presented in bed with 6/10 pain. Patient with good  progress towards goals during this session. Education provided on Physical therapy plan of care and physiological benefits of out of bed mobility. Patient with good carryover. Bed mobility: Mod assist  Transfers: Mod assist  Gait Assistance: Minimum assistance  Distance (ft): 7  Assistive Device: Rolling walker  Pattern: Shuffle (appropriately maintaining TTWB on RLE, pt refusing further gait at this time)          Pt seen daily . Mod a for bed mobility and transfer. Extra time provided to complete task. EOB sitting balace activity with emphasis on core stabilization. Pt educated on TTWB status with functional mobility. Pt educated on deep breathing and relaxation technique. Pt amb 7 ft with RW and min a;slow arsalan;TTWB status maintain. There ex. Patient was left in bedside chair at end of session with all needs in reach. The patient's Approx Degree of Impairment: 54.16% has been calculated based on documentation in the [facility] '6 clicks' Inpatient Basic Mobility Short Form. Research supports that patients with this level of impairment may benefit from Subacute Rehab. . RN aware of patient status post session. DISCHARGE RECOMMENDATIONS  PT Discharge Recommendations: Sub-acute rehabilitation     PLAN  PT Treatment Plan: Bed mobility; Body mechanics; Endurance;Gait training    SUBJECTIVE  Pt reports being ready for PT RX.     OBJECTIVE  Precautions: Bed/chair alarm    WEIGHT BEARING RESTRICTION  Weight Bearing Restriction: R lower extremity        R Lower Extremity: Toe Touch Weight Bearing       PAIN ASSESSMENT   Ratin  Location: R hip  Management Techniques: Activity promotion; Body mechanics;Repositioning    BALANCE                                                                                                                       Static Sitting: Normal  Dynamic Sitting: Normal           Static Standing: Good  Dynamic Standing: Good    ACTIVITY TOLERANCE                         O2 WALK       AM-PAC '6-Clicks' INPATIENT SHORT FORM - BASIC MOBILITY  How much difficulty does the patient currently have. .. Patient Difficulty: Turning over in bed (including adjusting bedclothes, sheets and blankets)?: A Little   Patient Difficulty: Sitting down on and standing up from a chair with arms (e.g., wheelchair, bedside commode, etc.): A Little   Patient Difficulty: Moving from lying on back to sitting on the side of the bed?: A Little   How much help from another person does the patient currently need. .. Help from Another: Moving to and from a bed to a chair (including a wheelchair)?: A Little   Help from Another: Need to walk in hospital room?: A Little   Help from Another: Climbing 3-5 steps with a railing?: Total     AM-PAC Score:  Raw Score: 16   Approx Degree of Impairment: 54.16%   Standardized Score (AM-PAC Scale): 40.78   CMS Modifier (G-Code): CK      Additional information:     THERAPEUTIC EXERCISES  Lower Extremity AP,QS,GS     Position supine       Patient End of Session: Up in chair;Call light within reach;RN aware of session/findings; All patient questions and concerns addressed    CURRENT GOALS   Patient Goal Patient's self-stated goal is: decrease pain   Goal #1 Patient is able to demonstrate supine - sit EOB @ level: supervision     Goal #1   Current Status Mod a   Goal #2 Patient is able to demonstrate transfers EOB to/from [facility] at assistance level: supervision with walker - rolling     Goal #2  Current Status Mod a   Goal #3 Patient is able to ambulate 30 feet with assist device: walker - rolling at assistance level: minimum assistance   Goal #3   Current Status Pt amb 7 ft with RW and min a;TTWB status R LE maintain   Goal #4 Patient will negotiate 1 stairs/one curb w/ assistive device and moderate assistance   Goal #4   Current Status NT   Goal #5 Patient to demonstrate independence with home activity/exercise instructions provided to patient in preparation for discharge. Goal #5   Current Status In progress   Goal #6    Goal #6  Current Status      Gait-15 minutes; There ex-15 minutes

## 2023-09-15 NOTE — PLAN OF CARE
Patient is A&Ox4. VSS. RA. Up x1 assist with rolling walker. PT worked with patient and assisted her to the chair. Osvaldo Kira is in place draining clear, yellow urine. Hip precautions with use of wedge foam abductor pillow in place for when patient is in bed. Hemovac drain was removed this morning. Prevena plus wound vac is in place, purple foam dressing is C/D/I, no output seen. Pain is being managed with oral percocet and oral dilaudid. Call light is within reach as well as personal belongings.    Problem: Patient Centered Care  Goal: Patient preferences are identified and integrated in the patient's plan of care  Description: Interventions:  - What would you like us to know as we care for you?   - Provide timely, complete, and accurate information to patient/family  - Incorporate patient and family knowledge, values, beliefs, and cultural backgrounds into the planning and delivery of care  - Encourage patient/family to participate in care and decision-making at the level they choose  - Honor patient and family perspectives and choices  Outcome: Progressing     Problem: Patient/Family Goals  Goal: Patient/Family Long Term Goal    Interventions:  - See additional Care Plan goals for specific interventions  Outcome: Progressing  Goal: Patient/Family Short Term Goal    Interventions:   - See additional Care Plan goals for specific interventions  Outcome: Progressing     Problem: PAIN - ADULT  Goal: Verbalizes/displays adequate comfort level or patient's stated pain goal  Description: INTERVENTIONS:  - Encourage pt to monitor pain and request assistance  - Assess pain using appropriate pain scale  - Administer analgesics based on type and severity of pain and evaluate response  - Implement non-pharmacological measures as appropriate and evaluate response  - Consider cultural and social influences on pain and pain management  - Manage/alleviate anxiety  - Utilize distraction and/or relaxation techniques  - Monitor for opioid side effects  - Notify MD/LIP if interventions unsuccessful or patient reports new pain  - Anticipate increased pain with activity and pre-medicate as appropriate  Outcome: Progressing     Problem: RISK FOR INFECTION - ADULT  Goal: Absence of fever/infection during anticipated neutropenic period  Description: INTERVENTIONS  - Monitor WBC  - Administer growth factors as ordered  - Implement neutropenic guidelines  Outcome: Progressing     Problem: SAFETY ADULT - FALL  Goal: Free from fall injury  Description: INTERVENTIONS:  - Assess pt frequently for physical needs  - Identify cognitive and physical deficits and behaviors that affect risk of falls.   - [place] fall precautions as indicated by assessment.  - Educate pt/family on patient safety including physical limitations  - Instruct pt to call for assistance with activity based on assessment  - Modify environment to reduce risk of injury  - Provide assistive devices as appropriate  - Consider OT/PT consult to assist with strengthening/mobility  - Encourage toileting schedule  Outcome: Progressing     Problem: DISCHARGE PLANNING  Goal: Discharge to home or other facility with appropriate resources  Description: INTERVENTIONS:  - Identify barriers to discharge w/pt and caregiver  - Include patient/family/discharge partner in discharge planning  - Arrange for needed discharge resources and transportation as appropriate  - Identify discharge learning needs (meds, wound care, etc)  - Arrange for interpreters to assist at discharge as needed  - Consider post-discharge preferences of patient/family/discharge partner  - Complete POLST form as appropriate  - Assess patient's ability to be responsible for managing their own health  - Refer to Case Management Department for coordinating discharge planning if the patient needs post-hospital services based on physician/LIP order or complex needs related to functional status, cognitive ability or social support system  Outcome: Progressing

## 2023-09-16 LAB
ANION GAP SERPL CALC-SCNC: 2 MMOL/L (ref 0–18)
BASOPHILS # BLD AUTO: 0.04 X10(3) UL (ref 0–0.2)
BASOPHILS NFR BLD AUTO: 0.7 %
BUN BLD-MCNC: 11 MG/DL (ref 7–18)
BUN/CREAT SERPL: 12.8 (ref 10–20)
CALCIUM BLD-MCNC: 8.4 MG/DL (ref 8.5–10.1)
CHLORIDE SERPL-SCNC: 108 MMOL/L (ref 98–112)
CO2 SERPL-SCNC: 31 MMOL/L (ref 21–32)
CREAT BLD-MCNC: 0.86 MG/DL
DEPRECATED RDW RBC AUTO: 56.8 FL (ref 35.1–46.3)
EGFRCR SERPLBLD CKD-EPI 2021: 73 ML/MIN/1.73M2 (ref 60–?)
EOSINOPHIL # BLD AUTO: 0.46 X10(3) UL (ref 0–0.7)
EOSINOPHIL NFR BLD AUTO: 8.4 %
ERYTHROCYTE [DISTWIDTH] IN BLOOD BY AUTOMATED COUNT: 16.9 % (ref 11–15)
GLUCOSE BLD-MCNC: 99 MG/DL (ref 70–99)
HCT VFR BLD AUTO: 23.9 %
HGB BLD-MCNC: 7.1 G/DL
HGB BLD-MCNC: 7.2 G/DL
IMM GRANULOCYTES # BLD AUTO: 0.03 X10(3) UL (ref 0–1)
IMM GRANULOCYTES NFR BLD: 0.5 %
LYMPHOCYTES # BLD AUTO: 0.51 X10(3) UL (ref 1–4)
LYMPHOCYTES NFR BLD AUTO: 9.3 %
MAGNESIUM SERPL-MCNC: 1.8 MG/DL (ref 1.6–2.6)
MCH RBC QN AUTO: 27.3 PG (ref 26–34)
MCHC RBC AUTO-ENTMCNC: 29.7 G/DL (ref 31–37)
MCV RBC AUTO: 91.9 FL
MONOCYTES # BLD AUTO: 0.59 X10(3) UL (ref 0.1–1)
MONOCYTES NFR BLD AUTO: 10.7 %
NEUTROPHILS # BLD AUTO: 3.87 X10 (3) UL (ref 1.5–7.7)
NEUTROPHILS # BLD AUTO: 3.87 X10(3) UL (ref 1.5–7.7)
NEUTROPHILS NFR BLD AUTO: 70.4 %
OSMOLALITY SERPL CALC.SUM OF ELEC: 291 MOSM/KG (ref 275–295)
PHOSPHATE SERPL-MCNC: 3.5 MG/DL (ref 2.5–4.9)
PLATELET # BLD AUTO: 245 10(3)UL (ref 150–450)
POTASSIUM SERPL-SCNC: 4.3 MMOL/L (ref 3.5–5.1)
RBC # BLD AUTO: 2.6 X10(6)UL
SODIUM SERPL-SCNC: 141 MMOL/L (ref 136–145)
WBC # BLD AUTO: 5.5 X10(3) UL (ref 4–11)

## 2023-09-16 PROCEDURE — 99232 SBSQ HOSP IP/OBS MODERATE 35: CPT | Performed by: HOSPITALIST

## 2023-09-16 RX ORDER — CEPHALEXIN 500 MG/1
500 CAPSULE ORAL EVERY 8 HOURS SCHEDULED
Status: DISCONTINUED | OUTPATIENT
Start: 2023-09-16 | End: 2023-09-17

## 2023-09-16 RX ORDER — OXYCODONE AND ACETAMINOPHEN 10; 325 MG/1; MG/1
2 TABLET ORAL EVERY 6 HOURS PRN
Qty: 60 TABLET | Refills: 0 | Status: SHIPPED | OUTPATIENT
Start: 2023-09-16

## 2023-09-16 RX ORDER — MAGNESIUM OXIDE 400 MG/1
400 TABLET ORAL ONCE
Status: COMPLETED | OUTPATIENT
Start: 2023-09-16 | End: 2023-09-16

## 2023-09-16 RX ORDER — HYDROMORPHONE HYDROCHLORIDE 2 MG/1
2 TABLET ORAL
Qty: 30 TABLET | Refills: 0 | Status: SHIPPED | OUTPATIENT
Start: 2023-09-16

## 2023-09-16 NOTE — PLAN OF CARE
Patient is alert and oriented. On 1 liter of O2 through the night. SCD's on. Voiding via purewick. IV fluids running. Dressing, prevena and abductor pillow in place. Percocet, Dilaudid and Toradol given for pain management. Call light within reach. Frequent rounding done. Problem: Patient Centered Care  Goal: Patient preferences are identified and integrated in the patient's plan of care  Description: Interventions:  - What would you like us to know as we care for you?   - Provide timely, complete, and accurate information to patient/family  - Incorporate patient and family knowledge, values, beliefs, and cultural backgrounds into the planning and delivery of care  - Encourage patient/family to participate in care and decision-making at the level they choose  - Honor patient and family perspectives and choices  Outcome: Progressing     Problem: RISK FOR INFECTION - ADULT  Goal: Absence of fever/infection during anticipated neutropenic period  Description: INTERVENTIONS  - Monitor WBC  - Administer growth factors as ordered  - Implement neutropenic guidelines  Outcome: Progressing     Problem: SAFETY ADULT - FALL  Goal: Free from fall injury  Description: INTERVENTIONS:  - Assess pt frequently for physical needs  - Identify cognitive and physical deficits and behaviors that affect risk of falls.   - [place] fall precautions as indicated by assessment.  - Educate pt/family on patient safety including physical limitations  - Instruct pt to call for assistance with activity based on assessment  - Modify environment to reduce risk of injury  - Provide assistive devices as appropriate  - Consider OT/PT consult to assist with strengthening/mobility  - Encourage toileting schedule  Outcome: Progressing

## 2023-09-16 NOTE — PLAN OF CARE
POD #3. Pt is aox 4, ambulating up to chair 1 assist and walker. Voiding wnl. SCD for DVT prophylaxis. Dressing CDI. Percocet prn for pain. Pt plans to d/c Rehab. IV iron given Hg repeated 7.2. Disease process discussed with pt. Bed in lowest position, call light and personal possessions within reach. Pt instructed to call for assistance before getting up.

## 2023-09-16 NOTE — PHYSICAL THERAPY NOTE
PHYSICAL THERAPY HIP TREATMENT NOTE - INPATIENT    Room Number: 624/486-S            Presenting Problem: R hip dislocation with pubic remus and acetabular fx  Co-Morbidities : HTN, arthritis, asthma, breast cancer, carpal tunnel syndrome, lupus, rheumatoid arthritis, Sjogren's syndrome, S/P lumbar spinal surgery, carpal tunnel release, L TKA, R COLTON with revision 8/30/23    Problem List  Active Problems:    Dislocation of hip, right, initial encounter ([facility] [address]    Dislocation of prosthetic joint ([facility] [address]      PHYSICAL THERAPY ASSESSMENT     RN approved PT session and medicated prior PT session. Therapist donned with PPE, gowns and gloves. Pt in supine and  instructed on thera exs with maintain hip posterior precautions. Supine to sit with min A and cues for sliding b le's to EOB. Pt sitting florian on EOB and focus on sitting florian. Pt needs cues for holding posture and seated domenico exs with ble's. Pt needs encouragement with sit to stand transfers and standing florian with maintain TTWB on RLE. Sit to stand with Min  and pt hopping to chair with RW and mi A. Pt able to maintain WBS. Pt position in chair, but a few minutes pt stated she needs a bed pan, Rn present in room and assist with bed pen. Pt left in sitting in chair with bed pan underneath her. All needs in reach with RN present in room, alarm activated. The patient's Approx Degree of Impairment: 50.57% has been calculated based on documentation in the [facility] '6 clicks' Inpatient Basic Mobility Short Form. Research supports that patients with this level of impairment may benefit from sub-acute rehabilitation. [place] DISCHARGE RECOMMENDATIONS  PT Discharge Recommendations: Sub-acute rehabilitation    PLAN  PT Treatment Plan: Bed mobility; Body mechanics; Endurance; Patient education;Gait training;Strengthening;Transfer training;Balance training  Frequency (Obs): Daily    SUBJECTIVE  I need a bed pan.     OBJECTIVE  Precautions: Bed/chair alarm    WEIGHT BEARING RESTRICTION        R Lower Extremity: Toe Touch Weight Bearing       PAIN ASSESSMENT   Ratin  Location: R hip area  Management Techniques: Activity promotion; Body mechanics; Relaxation;Repositioning    BALANCE  Static Sitting: Normal  Dynamic Sitting: Normal  Static Standing: Good  Dynamic Standing: Good  ACTIVITY TOLERANCE                         O2 WALK       AM-PAC '6-Clicks' INPATIENT SHORT FORM - BASIC MOBILITY  How much difficulty does the patient currently have. .. Patient Difficulty: Turning over in bed (including adjusting bedclothes, sheets and blankets)?: A Little   Patient Difficulty: Sitting down on and standing up from a chair with arms (e.g., wheelchair, bedside commode, etc.): A Little   Patient Difficulty: Moving from lying on back to sitting on the side of the bed?: A Little   How much help from another person does the patient currently need. .. Help from Another: Moving to and from a bed to a chair (including a wheelchair)?: A Little   Help from Another: Need to walk in hospital room?: A Little   Help from Another: Climbing 3-5 steps with a railing?: A Lot     AM-PAC Score:  Raw Score: 17   Approx Degree of Impairment: 50.57%   Standardized Score (AM-PAC Scale): 42.13   CMS Modifier (G-Code): CK    FUNCTIONAL ABILITY STATUS  Functional Mobility/Gait Assessment  Gait Assistance: Minimum assistance  Distance (ft): 6  Assistive Device: Rolling walker  Pattern: Shuffle  Stairs: Other (comment)    Additional Information:     Exercises  PM Session   Ankle Pumps      10 reps   Quad Sets  10 reps   Glut Sets  10 reps   Hip Abd/Add  10 reps   Heel slides  10 reps   Saq  0 reps   Sitting Knee Extension  10 reps   Standing heel/toe raises  0 reps   Hamstring Curls  0 reps   Forward, back steps  0 reps   Short Squats  0 reps     Patient End of Session: Up in chair; With [facility] staff;Needs met;Call light within reach;RN aware of session/findings;Bracing education provided per handout; All patient questions and concerns addressed; Alarm set    CURRENT GOALS     Patient Goal Patient's self-stated goal is: decrease pain   Goal #1 Patient is able to demonstrate supine - sit EOB @ level: supervision     Goal #1   Current Status Min A   Goal #2 Patient is able to demonstrate transfers EOB to/from [facility] at assistance level: supervision with walker - rolling     Goal #2  Current Status Min A   Goal #3 Patient is able to ambulate 30 feet with assist device: walker - rolling at assistance level: minimum assistance   Goal #3   Current Status Pt amb 6 steps with RW and min a;TTWB status R LE maintain   Goal #4 Patient will negotiate 1 stairs/one curb w/ assistive device and moderate assistance   Goal #4   Current Status NT   Goal #5 Patient to demonstrate independence with home activity/exercise instructions provided to patient in preparation for discharge. Goal #5   Current Status In progress   Goal #6    Goal #6  Current Status      Gait-15 minutes; There ex-12 minutes  Thera activity 18 minutes

## 2023-09-17 VITALS
WEIGHT: 223 LBS | OXYGEN SATURATION: 94 % | RESPIRATION RATE: 20 BRPM | HEART RATE: 84 BPM | DIASTOLIC BLOOD PRESSURE: 52 MMHG | BODY MASS INDEX: 38.07 KG/M2 | HEIGHT: 64 IN | SYSTOLIC BLOOD PRESSURE: 133 MMHG | TEMPERATURE: 99 F

## 2023-09-17 LAB
ANION GAP SERPL CALC-SCNC: 3 MMOL/L (ref 0–18)
BASOPHILS # BLD AUTO: 0.06 X10(3) UL (ref 0–0.2)
BASOPHILS NFR BLD AUTO: 1.1 %
BUN BLD-MCNC: 10 MG/DL (ref 7–18)
BUN/CREAT SERPL: 11.2 (ref 10–20)
CALCIUM BLD-MCNC: 8.6 MG/DL (ref 8.5–10.1)
CHLORIDE SERPL-SCNC: 107 MMOL/L (ref 98–112)
CO2 SERPL-SCNC: 30 MMOL/L (ref 21–32)
CREAT BLD-MCNC: 0.89 MG/DL
DEPRECATED RDW RBC AUTO: 56.1 FL (ref 35.1–46.3)
EGFRCR SERPLBLD CKD-EPI 2021: 70 ML/MIN/1.73M2 (ref 60–?)
EOSINOPHIL # BLD AUTO: 0.66 X10(3) UL (ref 0–0.7)
EOSINOPHIL NFR BLD AUTO: 12.1 %
ERYTHROCYTE [DISTWIDTH] IN BLOOD BY AUTOMATED COUNT: 17 % (ref 11–15)
GLUCOSE BLD-MCNC: 98 MG/DL (ref 70–99)
HCT VFR BLD AUTO: 25 %
HGB BLD-MCNC: 7.4 G/DL
IMM GRANULOCYTES # BLD AUTO: 0.02 X10(3) UL (ref 0–1)
IMM GRANULOCYTES NFR BLD: 0.4 %
LYMPHOCYTES # BLD AUTO: 0.62 X10(3) UL (ref 1–4)
LYMPHOCYTES NFR BLD AUTO: 11.4 %
MAGNESIUM SERPL-MCNC: 2.2 MG/DL (ref 1.6–2.6)
MCH RBC QN AUTO: 26.5 PG (ref 26–34)
MCHC RBC AUTO-ENTMCNC: 29.6 G/DL (ref 31–37)
MCV RBC AUTO: 89.6 FL
MONOCYTES # BLD AUTO: 0.53 X10(3) UL (ref 0.1–1)
MONOCYTES NFR BLD AUTO: 9.7 %
NEUTROPHILS # BLD AUTO: 3.57 X10 (3) UL (ref 1.5–7.7)
NEUTROPHILS # BLD AUTO: 3.57 X10(3) UL (ref 1.5–7.7)
NEUTROPHILS NFR BLD AUTO: 65.3 %
OSMOLALITY SERPL CALC.SUM OF ELEC: 289 MOSM/KG (ref 275–295)
PLATELET # BLD AUTO: 283 10(3)UL (ref 150–450)
POTASSIUM SERPL-SCNC: 4.3 MMOL/L (ref 3.5–5.1)
RBC # BLD AUTO: 2.79 X10(6)UL
SODIUM SERPL-SCNC: 140 MMOL/L (ref 136–145)
WBC # BLD AUTO: 5.5 X10(3) UL (ref 4–11)

## 2023-09-17 PROCEDURE — 99239 HOSP IP/OBS DSCHRG MGMT >30: CPT | Performed by: HOSPITALIST

## 2023-09-17 RX ORDER — MAGNESIUM OXIDE 400 MG (241.3 MG MAGNESIUM) TABLET
3 TABLET NIGHTLY
Qty: 30 TABLET | Refills: 0 | Status: SHIPPED | OUTPATIENT
Start: 2023-09-17

## 2023-09-17 RX ORDER — BISACODYL 10 MG
10 SUPPOSITORY, RECTAL RECTAL
Qty: 20 SUPPOSITORY | Refills: 0 | Status: SHIPPED | OUTPATIENT
Start: 2023-09-17

## 2023-09-17 RX ORDER — PSEUDOEPHEDRINE HCL 30 MG
100 TABLET ORAL 2 TIMES DAILY PRN
Qty: 20 CAPSULE | Refills: 0 | Status: SHIPPED | OUTPATIENT
Start: 2023-09-17

## 2023-09-17 RX ORDER — CEPHALEXIN 500 MG/1
500 CAPSULE ORAL EVERY 8 HOURS SCHEDULED
Qty: 16 CAPSULE | Refills: 0 | Status: SHIPPED | OUTPATIENT
Start: 2023-09-17

## 2023-09-17 NOTE — PLAN OF CARE
Problem: Patient Centered Care  Goal: Patient preferences are identified and integrated in the patient's plan of care  Description: Interventions:  - What would you like us to know as we care for you?   - Provide timely, complete, and accurate information to patient/family  - Incorporate patient and family knowledge, values, beliefs, and cultural backgrounds into the planning and delivery of care  - Encourage patient/family to participate in care and decision-making at the level they choose  - Honor patient and family perspectives and choices  Outcome: Progressing     Problem: Patient/Family Goals  Goal: Patient/Family Long Term Goal  Description: Patient's Long Term Goal:     Interventions:  - Pain medication  - PT/OT  - Follow up with MD  - See additional Care Plan goals for specific interventions  Outcome: Progressing  Goal: Patient/Family Short Term Goal  Description: Patient's Short Term Goal:     Interventions:   - Pain medication  - PT/OT  - Monitor labs  - See additional Care Plan goals for specific interventions  Outcome: Progressing     Problem: PAIN - ADULT  Goal: Verbalizes/displays adequate comfort level or patient's stated pain goal  Description: INTERVENTIONS:  - Encourage pt to monitor pain and request assistance  - Assess pain using appropriate pain scale  - Administer analgesics based on type and severity of pain and evaluate response  - Implement non-pharmacological measures as appropriate and evaluate response  - Consider cultural and social influences on pain and pain management  - Manage/alleviate anxiety  - Utilize distraction and/or relaxation techniques  - Monitor for opioid side effects  - Notify MD/LIP if interventions unsuccessful or patient reports new pain  - Anticipate increased pain with activity and pre-medicate as appropriate  Outcome: Progressing     Problem: RISK FOR INFECTION - ADULT  Goal: Absence of fever/infection during anticipated neutropenic period  Description: INTERVENTIONS  - Monitor WBC  - Administer growth factors as ordered  - Implement neutropenic guidelines  Outcome: Progressing     Problem: SAFETY ADULT - FALL  Goal: Free from fall injury  Description: INTERVENTIONS:  - Assess pt frequently for physical needs  - Identify cognitive and physical deficits and behaviors that affect risk of falls. - [place] fall precautions as indicated by assessment.  - Educate pt/family on patient safety including physical limitations  - Instruct pt to call for assistance with activity based on assessment  - Modify environment to reduce risk of injury  - Provide assistive devices as appropriate  - Consider OT/PT consult to assist with strengthening/mobility  - Encourage toileting schedule  Outcome: Progressing     Problem: DISCHARGE PLANNING  Goal: Discharge to home or other facility with appropriate resources  Description: INTERVENTIONS:  - Identify barriers to discharge w/pt and caregiver  - Include patient/family/discharge partner in discharge planning  - Arrange for needed discharge resources and transportation as appropriate  - Identify discharge learning needs (meds, wound care, etc)  - Arrange for interpreters to assist at discharge as needed  - Consider post-discharge preferences of patient/family/discharge partner  - Complete POLST form as appropriate  - Assess patient's ability to be responsible for managing their own health  - Refer to Case Management Department for coordinating discharge planning if the patient needs post-hospital services based on physician/LIP order or complex needs related to functional status, cognitive ability or social support system  Outcome: Progressing     Kristine Razo was resting comfortably in bed, pain managed with PRN Dilaudid and Percocet at this time. Ice pack also applied to the hip. Prevena wound vac in place, intact, and maintained on. She's voiding via purewick when in bed. She was only able to ambulate x 1-2 assist with walker to the chair. Plan for discharge back to [facility] when medically stable.

## 2023-09-17 NOTE — CM/SW NOTE
Addendum 9:52:   Amanda Calderón at [facility] states that they can accept pt for discharge at 1pm.  Annamaria served MD to confirm discharge as pt hemoglobin is low per RN. SW uploaded current clinicals to [facility] at requested discharge time. SW/CM to remain available for support and/or discharge planning.       LUCIEN Rudolph, [facility] Work   SMZ:#[id number]

## 2023-09-17 NOTE — PLAN OF CARE
POD #3. Pt is aox 4, ambulating up to chair 1 assist, walker and rolling chair. Voiding wnl. SCD for DVT prophylaxis. Dressing CDI. Percocet prn for pain. Pt plans to d/c Rehab. Hg 7.4 today. Disease process discussed with pt. Bed in lowest position, call light and personal possessions within reach. Pt instructed to call for assistance before getting up. Patient cleared by internal medicine, ortho surgery, PT/OT, and social work. Going to Rehab. IV removed, discharge report provided to RN, patient sent with all personal belongings, medications, scripts, and discharge instructions. Addressed additional questions.        Problem: Patient Centered Care  Goal: Patient preferences are identified and integrated in the patient's plan of care  Description: Interventions:  - What would you like us to know as we care for you?   - Provide timely, complete, and accurate information to patient/family  - Incorporate patient and family knowledge, values, beliefs, and cultural backgrounds into the planning and delivery of care  - Encourage patient/family to participate in care and decision-making at the level they choose  - Honor patient and family perspectives and choices  Outcome: Progressing     Problem: Patient/Family Goals  Goal: Patient/Family Long Term Goal  Description: Patient's Long Term Goal:     Interventions:  - Pain medication  - PT/OT  - Follow up with MD  - See additional Care Plan goals for specific interventions  Outcome: Progressing  Goal: Patient/Family Short Term Goal  Description: Patient's Short Term Goal:     Interventions:   - Pain medication  - PT/OT  - Monitor labs  - See additional Care Plan goals for specific interventions  Outcome: Progressing     Problem: PAIN - ADULT  Goal: Verbalizes/displays adequate comfort level or patient's stated pain goal  Description: INTERVENTIONS:  - Encourage pt to monitor pain and request assistance  - Assess pain using appropriate pain scale  - Administer analgesics based on type and severity of pain and evaluate response  - Implement non-pharmacological measures as appropriate and evaluate response  - Consider cultural and social influences on pain and pain management  - Manage/alleviate anxiety  - Utilize distraction and/or relaxation techniques  - Monitor for opioid side effects  - Notify MD/LIP if interventions unsuccessful or patient reports new pain  - Anticipate increased pain with activity and pre-medicate as appropriate  Outcome: Progressing

## 2023-09-17 NOTE — CM/SW NOTE
09/17/23 1000   Discharge disposition   Expected discharge disposition subacute   Post Acute Care Provider [facility]   Discharge transportation [facility]  (1pm per Maryuri Montes)       [facility] 1pm discharge. Pt made aware of 1pm discharge and is agreeable. RN to call report to [phone].     LUCIEN Garcia, [facility]   QRO:#[id number]

## 2023-09-17 NOTE — PHYSICAL THERAPY NOTE
PHYSICAL THERAPY TREATMENT NOTE - INPATIENT     Room Number: 944/320-R       Presenting Problem: R hip dislocation with pubic remus and acetabular fx    Problem List  Active Problems:    Dislocation of hip, right, initial encounter ([facility] [address]    Dislocation of prosthetic joint ([facility] [address]      PHYSICAL THERAPY ASSESSMENT   Chart reviewed. RN Brenda De La Torre approved participation in physical therapy. PPE worn by therapist: mask, gloves, and gown. Patient was not wearing a mask during session. Patient presented in bed with 8/10 pain. Patient with fair  progress towards goals during this session. Education provided on Total Hip precautions, Weight bearing status, Physical therapy plan of care, and physiological benefits of out of bed mobility. Patient with good carryover. Pt is received in the bed and was cleared for therapy session. Pt reported that she need to use the bathroom. Pt is min A with bed mobility and to transfer to the EOB. Pt required assist with her R LE to transfer to the EOB. Pt sat EOB for a few minutes and denied any dizziness and light headedness. Pt is min A with sit<>stand transfers with the RW. Pt was able to maintain her TTWB status with no cueing. Pt was min A with SPT from the bed to the chair with the RW min A. Pt with good balance and safety awareness. Pt was brought into the bathroom with call light within reach. Reported to the RN on the status of the pt. Bed mobility: Min assist  Transfers: Min assist  Gait Assistance: Minimum assistance  Distance (ft): SPT  Assistive Device: Rolling walker  Pattern: Shuffle (TTWB R LE)          . Patient was left in  bathroom  at end of session with all needs in reach. The patient's Approx Degree of Impairment: 61.29% has been calculated based on documentation in the [facility] '6 clicks' Inpatient Basic Mobility Short Form. Research supports that patients with this level of impairment may benefit from Subacute Rehab. RN aware of patient status post session.     DISCHARGE Patient notified that new CPAP was ordered and also that there is a nationwide CPAP shortage causing delays. Pt verbalized good understanding and thanks.   RECOMMENDATIONS  PT Discharge Recommendations: Sub-acute rehabilitation     PLAN  PT Treatment Plan: Bed mobility; Body mechanics; Coordination; Endurance; Patient education;Gait training;Strengthening;Transfer training;Balance training    SUBJECTIVE  Pt was agreeable to therapy session. OBJECTIVE  Precautions: Bed/chair alarm    WEIGHT BEARING RESTRICTION  Weight Bearing Restriction: R lower extremity        R Lower Extremity: Toe Touch Weight Bearing       PAIN ASSESSMENT   Ratin  Location: R hip  Management Techniques: Activity promotion; Body mechanics; Relaxation;Repositioning    BALANCE                                                                                                                       Static Sitting: Good  Dynamic Sitting: Fair +           Static Standing: Poor +  Dynamic Standing: Poor +    ACTIVITY TOLERANCE                         O2 WALK       AM-PAC '6-Clicks' INPATIENT SHORT FORM - BASIC MOBILITY  How much difficulty does the patient currently have. .. Patient Difficulty: Turning over in bed (including adjusting bedclothes, sheets and blankets)?: A Little   Patient Difficulty: Sitting down on and standing up from a chair with arms (e.g., wheelchair, bedside commode, etc.): A Little   Patient Difficulty: Moving from lying on back to sitting on the side of the bed?: A Little   How much help from another person does the patient currently need. .. Help from Another: Moving to and from a bed to a chair (including a wheelchair)?: A Little   Help from Another: Need to walk in hospital room?: Total   Help from Another: Climbing 3-5 steps with a railing?: Total     AM-PAC Score:  Raw Score: 14   Approx Degree of Impairment: 61.29%   Standardized Score (AM-PAC Scale): 38.1   CMS Modifier (G-Code): CL          Patient End of Session: Needs met;Call light within reach;RN aware of session/findings; All patient questions and concerns addressed; In bathroom - nursing staff aware    CURRENT GOALS Goals to be met by: 10/1/23  Patient Goal Patient's self-stated goal is: decrease pain   Goal #1 Patient is able to demonstrate supine - sit EOB @ level: supervision     Goal #1   Current Status Min A    Goal #2 Patient is able to demonstrate transfers EOB to/from [facility] at assistance level: supervision with walker - rolling     Goal #2  Current Status Min A with the RW   Goal #3 Patient is able to ambulate 30 feet with assist device: walker - rolling at assistance level: minimum assistance   Goal #3   Current Status SPT to the commode chair with the RW min A   Goal #4 Patient will negotiate 1 stairs/one curb w/ assistive device and moderate assistance   Goal #4   Current Status NT   Goal #5 Patient to demonstrate independence with home activity/exercise instructions provided to patient in preparation for discharge.    Goal #5   Current Status IN PROGRESS   Goal #6    Goal #6  Current Status            Therapeutic Activity: 25 minutes

## 2023-09-17 NOTE — DISCHARGE SUMMARY
Dc summary#[id number]  > 30 min spent on [address] Discharge Diagnoses: right siobhan    Lace+ Score: 81  59-90 High Risk  29-58 Medium Risk  0-28   Low Risk. TCM Follow-Up Recommendation:  LACE > 58:  High Risk of readmission after discharge from the hospital.  Tcm fu recommended

## 2023-09-18 NOTE — DISCHARGE SUMMARY
[facility]    PATIENT'S NAME: Sanna MARTINS   ATTENDING PHYSICIAN: Alok Galindo MD   PATIENT ACCOUNT#:   [id number]    LOCATION:  [address] [place] [address] #:   [id number]       YOB: 1955  ADMISSION DATE:       09/12/2023      DISCHARGE DATE:  09/17/2023    DISCHARGE SUMMARY    Greater than 30 minutes were spent preparing this discharge. DISCHARGE DIAGNOSIS:  Right total hip arthroplasty revision. HISTORY AND HOSPITAL COURSE:  This is a very pleasant 70-year-old white female who presents with a history of having come to the hospital with prosthetic hip dislocation. She was seen in consultation by Dr. Lucie Loza, who in turn recommended OR for reduction of a right total hip arthroplasty, and Dr. Shaji Molina performed the procedure as an open reduction because closed reduction was attempted and failed. The patient had several problems postoperatively including postoperative blood loss anemia and difficulties with pain control. Her pain was finally controlled by adding oral Dilaudid and oxycodone, but she had mental status issues with that. She knew who was president, her birthday, and she knew she was at [facility], but she had a very difficult time remembering the month, day, and year. She understood the situation. She went to [facility] for rehabilitation, and her medicines were will were ordered. PHYSICAL EXAMINATION ON DISCHARGE:    VITAL SIGNS:  Temperature 98.5, pulse 84, respiratory rate 20, blood pressure 132/52, 94%. LUNGS:  occasional rhonchi. HEART:  Normal S1, S2. No S3.  ABDOMEN:  Soft and nontender. EXTREMITIES:  Without cyanosis or clubbing, with normal neurovascular exam.  NEUROLOGIC:  She is alert, oriented to her location, her situation, herself, her birthday, but not to the day and date. There are no obvious other neurologic deficits and is friendly and engaging. LABORATORY STUDIES:  Please see chart.     ASSESSMENT AND PLAN:    1. Status post repair of dislocated total hip arthroplasty prosthesis. Patient doing well except for pain control. Hemoglobin is also low from acute postop blood loss anemia. We will continue iron. Continue PT and OT. DVT prophylaxis as per Orthopedics. 2.   Acute kidney injury on chronic kidney disease improved to normal, probably from hypotension, hypovolemia, and blood loss. 3.   COPD with acute metabolic encephalopathy likely from pain medications. The patient says that she needs pain control in order to function in therapy. I asked her to use whatever the minimum amount of pain medicines that she needs to participate in the therapy and be comfortable and do not take them unless she needs them. 4.   Nausea seems to have completely resolved while addressing constipation. 5.   Pain in the toes 10/10 the first day I saw her the 15th but now that is completely resolved and her toes appear intact. CONDITION ON DISCHARGE:  Stable. CODE STATUS:  Full code. DISCHARGE MEDICATIONS:    1. Tylenol 1000 mg every 6 hours as needed. Watch total daily Tylenol to 3 g.   2.   Ventolin 2 puffs every 6 hours as needed. 3.   Ecotrin 325 mg twice a day for DVT prophylaxis. 4.   Breo Ellipta 20/25 one puff daily. Rinse mouth after use. 5.   Calcium carbonate/vitamin D 500/10 one tablet daily. 6.   Cevimeline 30 mg 3 times a day. 7.   EpiPen as needed. 8.   Ferrous sulfate 325 mg twice a day. 9.   Guaifenesin 30 mL 3 times a day as needed for cough. 10.   Hydrochlorothiazide 25 mg daily. 11.   Hydroxychloroquine 200 mg twice a day. 12.   Ipratropium bromide 2 sprays twice a day. 13.   Loratadine 10 mg daily. 14.   Multivitamin once a day. 15.   Naloxone as needed. 16.   Nystatin powder at bedtime. 17.   MiraLAX 17 g daily. 18.   Lyrica 50 mg in the morning and 75 in the evening. 19.   PreviDent to teeth daily as needed  20. Senokot 17.2 mg daily.   21.   Saline nasal spray as needed. 22.   Bactrim DS 1 tablet every 12 hours. 23.   Tizanidine 1 mg every 6 hours as needed. 24.   Vitamin D3 at 5000 units daily. 25.   Zinc 50 mg daily. 26.   Keflex 500 mg every 8 hours as needed, started by Dr. Robin Abdul, 16 doses. Stop if rash. 27.   Dulcolax suppository rectally as needed. 28.   Colace 100 mg p.o. b.i.d. p.r.n. constipation. 29.   Dilaudid 2 mg every 3 hours as needed. Please use sparingly. 30.   Melatonin 3 mg nightly. 31.   Percocet 10/325 two tablets every 6 hours as needed. Watch for drowsiness. No alcohol. DISCHARGE INSTRUCTIONS:  Diet is low fat, low salt. Activity is as tolerated. Followup is with Dr. Howard Thomas in 2 weeks. Dr. Howard Thomas or her designate to see in rehab and CBC, BMP, magnesium, phos on Monday a.m. Please call rehab MD with results. PT and OT twice, and the patient is Full Code. RISK OF READMISSION:  Very high. TCM followup recommended. Dictated By Maricruz Manuel.  MD Josie  d: 09/17/2023 17:48:47  t: 09/17/2023 18:59:36  Job [id number]  [facility]/

## 2023-09-21 ENCOUNTER — HOSPITAL ENCOUNTER (EMERGENCY)
Facility: HOSPITAL | Age: 68
Discharge: HOME | End: 2023-09-21
Attending: EMERGENCY MEDICINE
Payer: MEDICARE

## 2023-09-21 VITALS
OXYGEN SATURATION: 94 % | BODY MASS INDEX: 38.41 KG/M2 | SYSTOLIC BLOOD PRESSURE: 115 MMHG | TEMPERATURE: 99 F | HEIGHT: 64 IN | WEIGHT: 225 LBS | RESPIRATION RATE: 15 BRPM | HEART RATE: 63 BPM | DIASTOLIC BLOOD PRESSURE: 71 MMHG

## 2023-09-21 DIAGNOSIS — D64.9 ANEMIA, UNSPECIFIED TYPE: Primary | ICD-10-CM

## 2023-09-21 LAB
ANION GAP SERPL CALC-SCNC: 7 MMOL/L (ref 0–18)
APTT PPP: 35.2 SECONDS (ref 23.3–35.6)
BASOPHILS # BLD AUTO: 0.08 X10(3) UL (ref 0–0.2)
BASOPHILS NFR BLD AUTO: 1.4 %
BUN BLD-MCNC: 16 MG/DL (ref 7–18)
BUN/CREAT SERPL: 12 (ref 10–20)
CALCIUM BLD-MCNC: 9 MG/DL (ref 8.5–10.1)
CHLORIDE SERPL-SCNC: 104 MMOL/L (ref 98–112)
CO2 SERPL-SCNC: 30 MMOL/L (ref 21–32)
CREAT BLD-MCNC: 1.33 MG/DL
DEPRECATED RDW RBC AUTO: 58.2 FL (ref 35.1–46.3)
EGFRCR SERPLBLD CKD-EPI 2021: 43 ML/MIN/1.73M2 (ref 60–?)
EOSINOPHIL # BLD AUTO: 0.62 X10(3) UL (ref 0–0.7)
EOSINOPHIL NFR BLD AUTO: 10.6 %
ERYTHROCYTE [DISTWIDTH] IN BLOOD BY AUTOMATED COUNT: 18.1 % (ref 11–15)
GLUCOSE BLD-MCNC: 98 MG/DL (ref 70–99)
HCT VFR BLD AUTO: 25.5 %
HGB BLD-MCNC: 7.7 G/DL
IMM GRANULOCYTES # BLD AUTO: 0.05 X10(3) UL (ref 0–1)
IMM GRANULOCYTES NFR BLD: 0.9 %
INR BLD: 1.08 (ref 0.85–1.16)
LYMPHOCYTES # BLD AUTO: 1.14 X10(3) UL (ref 1–4)
LYMPHOCYTES NFR BLD AUTO: 19.5 %
MCH RBC QN AUTO: 27.2 PG (ref 26–34)
MCHC RBC AUTO-ENTMCNC: 30.2 G/DL (ref 31–37)
MCV RBC AUTO: 90.1 FL
MONOCYTES # BLD AUTO: 0.77 X10(3) UL (ref 0.1–1)
MONOCYTES NFR BLD AUTO: 13.2 %
NEUTROPHILS # BLD AUTO: 3.18 X10 (3) UL (ref 1.5–7.7)
NEUTROPHILS # BLD AUTO: 3.18 X10(3) UL (ref 1.5–7.7)
NEUTROPHILS NFR BLD AUTO: 54.4 %
OSMOLALITY SERPL CALC.SUM OF ELEC: 293 MOSM/KG (ref 275–295)
PLATELET # BLD AUTO: 334 10(3)UL (ref 150–450)
POTASSIUM SERPL-SCNC: 4.4 MMOL/L (ref 3.5–5.1)
PROTHROMBIN TIME: 13.9 SECONDS (ref 11.6–14.8)
RBC # BLD AUTO: 2.83 X10(6)UL
SODIUM SERPL-SCNC: 141 MMOL/L (ref 136–145)
WBC # BLD AUTO: 5.8 X10(3) UL (ref 4–11)

## 2023-09-21 PROCEDURE — 80048 BASIC METABOLIC PNL TOTAL CA: CPT | Performed by: EMERGENCY MEDICINE

## 2023-09-21 PROCEDURE — 99284 EMERGENCY DEPT VISIT MOD MDM: CPT

## 2023-09-21 PROCEDURE — 99285 EMERGENCY DEPT VISIT HI MDM: CPT

## 2023-09-21 PROCEDURE — 85025 COMPLETE CBC W/AUTO DIFF WBC: CPT | Performed by: EMERGENCY MEDICINE

## 2023-09-21 PROCEDURE — 82272 OCCULT BLD FECES 1-3 TESTS: CPT

## 2023-09-21 PROCEDURE — 85730 THROMBOPLASTIN TIME PARTIAL: CPT | Performed by: EMERGENCY MEDICINE

## 2023-09-21 PROCEDURE — 96374 THER/PROPH/DIAG INJ IV PUSH: CPT

## 2023-09-21 PROCEDURE — 85610 PROTHROMBIN TIME: CPT | Performed by: EMERGENCY MEDICINE

## 2023-09-21 RX ORDER — MORPHINE SULFATE 4 MG/ML
4 INJECTION, SOLUTION INTRAMUSCULAR; INTRAVENOUS ONCE
Status: COMPLETED | OUTPATIENT
Start: 2023-09-21 | End: 2023-09-21

## 2023-09-26 ENCOUNTER — PATIENT OUTREACH (OUTPATIENT)
Dept: CASE MANAGEMENT | Age: 68
End: 2023-09-26

## 2023-10-07 ENCOUNTER — APPOINTMENT (OUTPATIENT)
Dept: GENERAL RADIOLOGY | Facility: HOSPITAL | Age: 68
End: 2023-10-07
Attending: STUDENT IN AN ORGANIZED HEALTH CARE EDUCATION/TRAINING PROGRAM
Payer: MEDICARE

## 2023-10-07 ENCOUNTER — HOSPITAL ENCOUNTER (INPATIENT)
Facility: HOSPITAL | Age: 68
LOS: 4 days | Discharge: NURSING, REHAB OR LONG-TERM CARE | End: 2023-10-11
Attending: STUDENT IN AN ORGANIZED HEALTH CARE EDUCATION/TRAINING PROGRAM | Admitting: FAMILY MEDICINE
Payer: MEDICARE

## 2023-10-07 DIAGNOSIS — S73.004A DISLOCATION OF RIGHT HIP, INITIAL ENCOUNTER (HCC): Primary | ICD-10-CM

## 2023-10-07 LAB
ALBUMIN SERPL-MCNC: 2.7 G/DL (ref 3.4–5)
ALBUMIN/GLOB SERPL: 0.7 {RATIO} (ref 1–2)
ALP LIVER SERPL-CCNC: 118 U/L
ALT SERPL-CCNC: 14 U/L
ANION GAP SERPL CALC-SCNC: 7 MMOL/L (ref 0–18)
ANTIBODY SCREEN: NEGATIVE
AST SERPL-CCNC: 24 U/L (ref 15–37)
BASOPHILS # BLD AUTO: 0.06 X10(3) UL (ref 0–0.2)
BASOPHILS NFR BLD AUTO: 1.4 %
BILIRUB SERPL-MCNC: 0.2 MG/DL (ref 0.1–2)
BUN BLD-MCNC: 18 MG/DL (ref 7–18)
BUN/CREAT SERPL: 11.1 (ref 10–20)
CALCIUM BLD-MCNC: 9.1 MG/DL (ref 8.5–10.1)
CHLORIDE SERPL-SCNC: 102 MMOL/L (ref 98–112)
CO2 SERPL-SCNC: 32 MMOL/L (ref 21–32)
CREAT BLD-MCNC: 1.62 MG/DL
DEPRECATED RDW RBC AUTO: 66.2 FL (ref 35.1–46.3)
EGFRCR SERPLBLD CKD-EPI 2021: 34 ML/MIN/1.73M2 (ref 60–?)
EOSINOPHIL # BLD AUTO: 0.55 X10(3) UL (ref 0–0.7)
EOSINOPHIL NFR BLD AUTO: 12.6 %
ERYTHROCYTE [DISTWIDTH] IN BLOOD BY AUTOMATED COUNT: 19 % (ref 11–15)
GLOBULIN PLAS-MCNC: 4 G/DL (ref 2.8–4.4)
GLUCOSE BLD-MCNC: 119 MG/DL (ref 70–99)
HCT VFR BLD AUTO: 27.4 %
HGB BLD-MCNC: 8.1 G/DL
IMM GRANULOCYTES # BLD AUTO: 0.01 X10(3) UL (ref 0–1)
IMM GRANULOCYTES NFR BLD: 0.2 %
INR BLD: 1.05 (ref 0.85–1.16)
LYMPHOCYTES # BLD AUTO: 0.9 X10(3) UL (ref 1–4)
LYMPHOCYTES NFR BLD AUTO: 20.6 %
MCH RBC QN AUTO: 27.8 PG (ref 26–34)
MCHC RBC AUTO-ENTMCNC: 29.6 G/DL (ref 31–37)
MCV RBC AUTO: 94.2 FL
MONOCYTES # BLD AUTO: 0.58 X10(3) UL (ref 0.1–1)
MONOCYTES NFR BLD AUTO: 13.3 %
NEUTROPHILS # BLD AUTO: 2.26 X10 (3) UL (ref 1.5–7.7)
NEUTROPHILS # BLD AUTO: 2.26 X10(3) UL (ref 1.5–7.7)
NEUTROPHILS NFR BLD AUTO: 51.9 %
OSMOLALITY SERPL CALC.SUM OF ELEC: 295 MOSM/KG (ref 275–295)
PLATELET # BLD AUTO: 240 10(3)UL (ref 150–450)
PLATELET MORPHOLOGY: NORMAL
POTASSIUM SERPL-SCNC: 4.7 MMOL/L (ref 3.5–5.1)
PROT SERPL-MCNC: 6.7 G/DL (ref 6.4–8.2)
PROTHROMBIN TIME: 14.3 SECONDS (ref 11.6–14.8)
RBC # BLD AUTO: 2.91 X10(6)UL
RH BLOOD TYPE: POSITIVE
SODIUM SERPL-SCNC: 141 MMOL/L (ref 136–145)
WBC # BLD AUTO: 4.4 X10(3) UL (ref 4–11)

## 2023-10-07 PROCEDURE — 99223 1ST HOSP IP/OBS HIGH 75: CPT | Performed by: FAMILY MEDICINE

## 2023-10-07 PROCEDURE — 73502 X-RAY EXAM HIP UNI 2-3 VIEWS: CPT | Performed by: STUDENT IN AN ORGANIZED HEALTH CARE EDUCATION/TRAINING PROGRAM

## 2023-10-07 PROCEDURE — 72170 X-RAY EXAM OF PELVIS: CPT | Performed by: STUDENT IN AN ORGANIZED HEALTH CARE EDUCATION/TRAINING PROGRAM

## 2023-10-07 RX ORDER — ALBUTEROL SULFATE 90 UG/1
2 AEROSOL, METERED RESPIRATORY (INHALATION) EVERY 4 HOURS PRN
Status: DISCONTINUED | OUTPATIENT
Start: 2023-10-07 | End: 2023-10-11

## 2023-10-07 RX ORDER — HYDROXYCHLOROQUINE SULFATE 200 MG/1
200 TABLET, FILM COATED ORAL 2 TIMES DAILY
Status: DISCONTINUED | OUTPATIENT
Start: 2023-10-07 | End: 2023-10-11

## 2023-10-07 RX ORDER — MORPHINE SULFATE 4 MG/ML
4 INJECTION, SOLUTION INTRAMUSCULAR; INTRAVENOUS EVERY 2 HOUR PRN
Status: DISCONTINUED | OUTPATIENT
Start: 2023-10-07 | End: 2023-10-11

## 2023-10-07 RX ORDER — HYDROCODONE BITARTRATE AND ACETAMINOPHEN 5; 325 MG/1; MG/1
2 TABLET ORAL EVERY 4 HOURS PRN
Status: DISCONTINUED | OUTPATIENT
Start: 2023-10-07 | End: 2023-10-11

## 2023-10-07 RX ORDER — ONDANSETRON 2 MG/ML
4 INJECTION INTRAMUSCULAR; INTRAVENOUS EVERY 6 HOURS PRN
Status: DISCONTINUED | OUTPATIENT
Start: 2023-10-07 | End: 2023-10-09

## 2023-10-07 RX ORDER — HYDRALAZINE HYDROCHLORIDE 20 MG/ML
10 INJECTION INTRAMUSCULAR; INTRAVENOUS ONCE
Status: DISCONTINUED | OUTPATIENT
Start: 2023-10-07 | End: 2023-10-11

## 2023-10-07 RX ORDER — SODIUM CHLORIDE 9 MG/ML
75 INJECTION, SOLUTION INTRAVENOUS CONTINUOUS
Status: DISCONTINUED | OUTPATIENT
Start: 2023-10-07 | End: 2023-10-11

## 2023-10-07 RX ORDER — FLUTICASONE FUROATE AND VILANTEROL 200; 25 UG/1; UG/1
1 POWDER RESPIRATORY (INHALATION) DAILY
Status: DISCONTINUED | OUTPATIENT
Start: 2023-10-08 | End: 2023-10-11

## 2023-10-07 RX ORDER — ENOXAPARIN SODIUM 100 MG/ML
40 INJECTION SUBCUTANEOUS DAILY
Status: DISCONTINUED | OUTPATIENT
Start: 2023-10-08 | End: 2023-10-08

## 2023-10-07 RX ORDER — MORPHINE SULFATE 2 MG/ML
2 INJECTION, SOLUTION INTRAMUSCULAR; INTRAVENOUS EVERY 2 HOUR PRN
Status: DISCONTINUED | OUTPATIENT
Start: 2023-10-07 | End: 2023-10-11

## 2023-10-07 RX ORDER — MORPHINE SULFATE 2 MG/ML
1 INJECTION, SOLUTION INTRAMUSCULAR; INTRAVENOUS EVERY 2 HOUR PRN
Status: DISCONTINUED | OUTPATIENT
Start: 2023-10-07 | End: 2023-10-11

## 2023-10-07 RX ORDER — PREGABALIN 50 MG/1
50 CAPSULE ORAL EVERY MORNING
Status: DISCONTINUED | OUTPATIENT
Start: 2023-10-08 | End: 2023-10-11

## 2023-10-07 RX ORDER — HYDROCODONE BITARTRATE AND ACETAMINOPHEN 5; 325 MG/1; MG/1
1 TABLET ORAL EVERY 4 HOURS PRN
Status: DISCONTINUED | OUTPATIENT
Start: 2023-10-07 | End: 2023-10-11

## 2023-10-07 RX ORDER — KETAMINE HYDROCHLORIDE 50 MG/ML
0.5 INJECTION, SOLUTION, CONCENTRATE INTRAMUSCULAR; INTRAVENOUS ONCE
Status: COMPLETED | OUTPATIENT
Start: 2023-10-07 | End: 2023-10-07

## 2023-10-07 RX ORDER — ACETAMINOPHEN 500 MG
500 TABLET ORAL EVERY 4 HOURS PRN
Status: DISCONTINUED | OUTPATIENT
Start: 2023-10-07 | End: 2023-10-11

## 2023-10-07 RX ORDER — KETOROLAC TROMETHAMINE 15 MG/ML
15 INJECTION, SOLUTION INTRAMUSCULAR; INTRAVENOUS ONCE
Status: COMPLETED | OUTPATIENT
Start: 2023-10-07 | End: 2023-10-07

## 2023-10-07 RX ORDER — BENZONATATE 100 MG/1
200 CAPSULE ORAL 3 TIMES DAILY PRN
Status: DISCONTINUED | OUTPATIENT
Start: 2023-10-07 | End: 2023-10-11

## 2023-10-07 RX ORDER — PREGABALIN 75 MG/1
75 CAPSULE ORAL NIGHTLY
Status: DISCONTINUED | OUTPATIENT
Start: 2023-10-07 | End: 2023-10-11

## 2023-10-07 RX ORDER — ACETAMINOPHEN 325 MG/1
650 TABLET ORAL EVERY 4 HOURS PRN
Status: DISCONTINUED | OUTPATIENT
Start: 2023-10-07 | End: 2023-10-11

## 2023-10-07 RX ORDER — METOCLOPRAMIDE HYDROCHLORIDE 5 MG/ML
5 INJECTION INTRAMUSCULAR; INTRAVENOUS EVERY 8 HOURS PRN
Status: DISCONTINUED | OUTPATIENT
Start: 2023-10-07 | End: 2023-10-09

## 2023-10-07 NOTE — ED INITIAL ASSESSMENT (HPI)
Patient presents to ED with right hip dislocation. Per EMT, patient was moving around in bed and she heard it pop. Per patient this is the 4th time it happened since having hip replacement surgery in December.  Right foot is significantly shorter than left

## 2023-10-07 NOTE — ED QUICK NOTES
Orders for admission, patient is aware of plan and ready to go upstairs. Any questions, please call ED RN Kenya MCGOWAN at extension [phone].      Patient Covid vaccination status: Fully vaccinated     COVID Test Ordered in ED: None    COVID Suspicion at Admission: N/A    Running Infusions:  None    Mental Status/LOC at time of transport: A&Ox4    Other pertinent information:   CIWA score: N/A   NIH score:  N/A

## 2023-10-08 LAB
ALBUMIN SERPL-MCNC: 2.4 G/DL (ref 3.4–5)
ALBUMIN/GLOB SERPL: 0.7 {RATIO} (ref 1–2)
ALP LIVER SERPL-CCNC: 105 U/L
ALT SERPL-CCNC: 14 U/L
ANION GAP SERPL CALC-SCNC: 5 MMOL/L (ref 0–18)
AST SERPL-CCNC: 19 U/L (ref 15–37)
BASOPHILS # BLD AUTO: 0.05 X10(3) UL (ref 0–0.2)
BASOPHILS NFR BLD AUTO: 1.4 %
BILIRUB SERPL-MCNC: 0.3 MG/DL (ref 0.1–2)
BUN BLD-MCNC: 18 MG/DL (ref 7–18)
BUN/CREAT SERPL: 12.2 (ref 10–20)
CALCIUM BLD-MCNC: 9 MG/DL (ref 8.5–10.1)
CHLORIDE SERPL-SCNC: 104 MMOL/L (ref 98–112)
CO2 SERPL-SCNC: 31 MMOL/L (ref 21–32)
CREAT BLD-MCNC: 1.48 MG/DL
DEPRECATED RDW RBC AUTO: 65.1 FL (ref 35.1–46.3)
EGFRCR SERPLBLD CKD-EPI 2021: 38 ML/MIN/1.73M2 (ref 60–?)
EOSINOPHIL # BLD AUTO: 0.61 X10(3) UL (ref 0–0.7)
EOSINOPHIL NFR BLD AUTO: 17.5 %
ERYTHROCYTE [DISTWIDTH] IN BLOOD BY AUTOMATED COUNT: 18.8 % (ref 11–15)
GLOBULIN PLAS-MCNC: 3.4 G/DL (ref 2.8–4.4)
GLUCOSE BLD-MCNC: 95 MG/DL (ref 70–99)
HCT VFR BLD AUTO: 24.9 %
HGB BLD-MCNC: 7.4 G/DL
IMM GRANULOCYTES # BLD AUTO: 0.01 X10(3) UL (ref 0–1)
IMM GRANULOCYTES NFR BLD: 0.3 %
INR BLD: 1.1 (ref 0.85–1.16)
LYMPHOCYTES # BLD AUTO: 0.8 X10(3) UL (ref 1–4)
LYMPHOCYTES NFR BLD AUTO: 23 %
MAGNESIUM SERPL-MCNC: 1.8 MG/DL (ref 1.6–2.6)
MCH RBC QN AUTO: 27.7 PG (ref 26–34)
MCHC RBC AUTO-ENTMCNC: 29.7 G/DL (ref 31–37)
MCV RBC AUTO: 93.3 FL
MONOCYTES # BLD AUTO: 0.54 X10(3) UL (ref 0.1–1)
MONOCYTES NFR BLD AUTO: 15.5 %
NEUTROPHILS # BLD AUTO: 1.47 X10 (3) UL (ref 1.5–7.7)
NEUTROPHILS # BLD AUTO: 1.47 X10(3) UL (ref 1.5–7.7)
NEUTROPHILS NFR BLD AUTO: 42.3 %
OSMOLALITY SERPL CALC.SUM OF ELEC: 292 MOSM/KG (ref 275–295)
PLATELET # BLD AUTO: 200 10(3)UL (ref 150–450)
POTASSIUM SERPL-SCNC: 4.6 MMOL/L (ref 3.5–5.1)
PROT SERPL-MCNC: 5.8 G/DL (ref 6.4–8.2)
PROTHROMBIN TIME: 14.8 SECONDS (ref 11.6–14.8)
RBC # BLD AUTO: 2.67 X10(6)UL
SODIUM SERPL-SCNC: 140 MMOL/L (ref 136–145)
WBC # BLD AUTO: 3.5 X10(3) UL (ref 4–11)

## 2023-10-08 PROCEDURE — 99233 SBSQ HOSP IP/OBS HIGH 50: CPT | Performed by: INTERNAL MEDICINE

## 2023-10-08 RX ORDER — DOCUSATE SODIUM 100 MG/1
100 CAPSULE, LIQUID FILLED ORAL 2 TIMES DAILY
Status: DISCONTINUED | OUTPATIENT
Start: 2023-10-08 | End: 2023-10-09

## 2023-10-08 RX ORDER — MAGNESIUM OXIDE 400 MG/1
400 TABLET ORAL ONCE
Status: COMPLETED | OUTPATIENT
Start: 2023-10-08 | End: 2023-10-08

## 2023-10-08 RX ORDER — ENEMA 19; 7 G/133ML; G/133ML
1 ENEMA RECTAL ONCE AS NEEDED
Status: DISCONTINUED | OUTPATIENT
Start: 2023-10-08 | End: 2023-10-09

## 2023-10-08 RX ORDER — BISACODYL 10 MG
10 SUPPOSITORY, RECTAL RECTAL
Status: DISCONTINUED | OUTPATIENT
Start: 2023-10-08 | End: 2023-10-09

## 2023-10-08 RX ORDER — POLYETHYLENE GLYCOL 3350 17 G/17G
17 POWDER, FOR SOLUTION ORAL DAILY PRN
Status: DISCONTINUED | OUTPATIENT
Start: 2023-10-08 | End: 2023-10-09

## 2023-10-08 NOTE — PLAN OF CARE
Patient Alert and Oriented X4, Given PRN norco. NPO @ midnight. Monitoring vital signs- stable at this time. No acute changes noted throughout shift. Receiving IV fluids per MD order. Patient is on 2L/min of oxygen via nasal cannula. Fall precautions maintained- bed alarm on, bed locked in lowest position, call light and personal belongings within reach, non-skid socks in place to bilateral feet. Given Flu Vaccine. Problem: PAIN - ADULT  Goal: Verbalizes/displays adequate comfort level or patient's stated pain goal  Description: INTERVENTIONS:  - Encourage pt to monitor pain and request assistance  - Assess pain using appropriate pain scale  - Administer analgesics based on type and severity of pain and evaluate response  - Implement non-pharmacological measures as appropriate and evaluate response  - Consider cultural and social influences on pain and pain management  - Manage/alleviate anxiety  - Utilize distraction and/or relaxation techniques  - Monitor for opioid side effects  - Notify MD/LIP if interventions unsuccessful or patient reports new pain  - Anticipate increased pain with activity and pre-medicate as appropriate  10/8/2023 0234 by Devin Escobedo RN  Outcome: Progressing     Problem: Patient Centered Care  Goal: Patient preferences are identified and integrated in the patient's plan of care  Description: Interventions:  - What would you like us to know as we care for you?    Problem: MUSCULOSKELETAL - ADULT  Goal: Return mobility to safest level of function  Description: INTERVENTIONS:  - Assess patient stability and activity tolerance for standing, transferring and ambulating w/ or w/o assistive devices  - Assist with transfers and ambulation using safe patient handling equipment as needed  - Ensure adequate protection for wounds/incisions during mobilization  - Obtain PT/OT consults as needed  - Advance activity as appropriate  - Communicate ordered activity level and limitations with patient/family  10/8/2023 0234 by Devin Escobedo RN  Outcome: Progressing     - Provide timely, complete, and accurate information to patient/family  - Incorporate patient and family knowledge, values, beliefs, and cultural backgrounds into the planning and delivery of care  - Encourage patient/family to participate in care and decision-making at the level they choose  - Honor patient and family perspectives and choices  10/8/2023 0234 by Devin Escobedo RN  Outcome: Progressing

## 2023-10-08 NOTE — CM/SW NOTE
Patient is from [facility].  Updates sent via Aidin per request.    LUCIEN Jaime, [facility]    [id number]

## 2023-10-08 NOTE — PROGRESS NOTES
10/08/23 1205   Clinical Encounter Type   Visited With Patient not available   Routine Visit Introduction      responded to POA consult. Patient was sleeping at time of visit.  [facility] consult or by phone.

## 2023-10-09 ENCOUNTER — APPOINTMENT (OUTPATIENT)
Dept: GENERAL RADIOLOGY | Facility: HOSPITAL | Age: 68
End: 2023-10-09
Attending: STUDENT IN AN ORGANIZED HEALTH CARE EDUCATION/TRAINING PROGRAM
Payer: MEDICARE

## 2023-10-09 ENCOUNTER — ANESTHESIA (OUTPATIENT)
Dept: SURGERY | Facility: HOSPITAL | Age: 68
End: 2023-10-09
Payer: MEDICARE

## 2023-10-09 ENCOUNTER — ANESTHESIA EVENT (OUTPATIENT)
Dept: SURGERY | Facility: HOSPITAL | Age: 68
End: 2023-10-09
Payer: MEDICARE

## 2023-10-09 LAB
ANION GAP SERPL CALC-SCNC: 4 MMOL/L (ref 0–18)
BASOPHILS # BLD AUTO: 0.06 X10(3) UL (ref 0–0.2)
BASOPHILS NFR BLD AUTO: 1.2 %
BUN BLD-MCNC: 13 MG/DL (ref 7–18)
BUN/CREAT SERPL: 11.3 (ref 10–20)
CALCIUM BLD-MCNC: 8.7 MG/DL (ref 8.5–10.1)
CHLORIDE SERPL-SCNC: 108 MMOL/L (ref 98–112)
CO2 SERPL-SCNC: 30 MMOL/L (ref 21–32)
CREAT BLD-MCNC: 1.15 MG/DL
CRP SERPL-MCNC: 4.34 MG/DL (ref ?–0.3)
DEPRECATED RDW RBC AUTO: 64.7 FL (ref 35.1–46.3)
EGFRCR SERPLBLD CKD-EPI 2021: 51 ML/MIN/1.73M2 (ref 60–?)
EOSINOPHIL # BLD AUTO: 0.52 X10(3) UL (ref 0–0.7)
EOSINOPHIL NFR BLD AUTO: 10.5 %
ERYTHROCYTE [DISTWIDTH] IN BLOOD BY AUTOMATED COUNT: 19 % (ref 11–15)
ERYTHROCYTE [SEDIMENTATION RATE] IN BLOOD: 50 MM/HR
GLUCOSE BLD-MCNC: 98 MG/DL (ref 70–99)
HCT VFR BLD AUTO: 26.8 %
HGB BLD-MCNC: 8 G/DL
HGB BLD-MCNC: 9.1 G/DL
IMM GRANULOCYTES # BLD AUTO: 0.03 X10(3) UL (ref 0–1)
IMM GRANULOCYTES NFR BLD: 0.6 %
LYMPHOCYTES # BLD AUTO: 0.66 X10(3) UL (ref 1–4)
LYMPHOCYTES NFR BLD AUTO: 13.4 %
MAGNESIUM SERPL-MCNC: 2.1 MG/DL (ref 1.6–2.6)
MCH RBC QN AUTO: 27.8 PG (ref 26–34)
MCHC RBC AUTO-ENTMCNC: 29.9 G/DL (ref 31–37)
MCV RBC AUTO: 93.1 FL
MONOCYTES # BLD AUTO: 0.55 X10(3) UL (ref 0.1–1)
MONOCYTES NFR BLD AUTO: 11.2 %
MRSA DNA SPEC QL NAA+PROBE: NEGATIVE
NEUTROPHILS # BLD AUTO: 3.11 X10 (3) UL (ref 1.5–7.7)
NEUTROPHILS # BLD AUTO: 3.11 X10(3) UL (ref 1.5–7.7)
NEUTROPHILS NFR BLD AUTO: 63.1 %
OSMOLALITY SERPL CALC.SUM OF ELEC: 294 MOSM/KG (ref 275–295)
PLATELET # BLD AUTO: 218 10(3)UL (ref 150–450)
POTASSIUM SERPL-SCNC: 4.9 MMOL/L (ref 3.5–5.1)
RBC # BLD AUTO: 2.88 X10(6)UL
SODIUM SERPL-SCNC: 142 MMOL/L (ref 136–145)
WBC # BLD AUTO: 4.9 X10(3) UL (ref 4–11)

## 2023-10-09 PROCEDURE — 99233 SBSQ HOSP IP/OBS HIGH 50: CPT | Performed by: INTERNAL MEDICINE

## 2023-10-09 PROCEDURE — 73501 X-RAY EXAM HIP UNI 1 VIEW: CPT | Performed by: STUDENT IN AN ORGANIZED HEALTH CARE EDUCATION/TRAINING PROGRAM

## 2023-10-09 PROCEDURE — 30233N1 TRANSFUSION OF NONAUTOLOGOUS RED BLOOD CELLS INTO PERIPHERAL VEIN, PERCUTANEOUS APPROACH: ICD-10-PCS | Performed by: STUDENT IN AN ORGANIZED HEALTH CARE EDUCATION/TRAINING PROGRAM

## 2023-10-09 PROCEDURE — 0SW9XJZ REVISION OF SYNTHETIC SUBSTITUTE IN RIGHT HIP JOINT, EXTERNAL APPROACH: ICD-10-PCS | Performed by: STUDENT IN AN ORGANIZED HEALTH CARE EDUCATION/TRAINING PROGRAM

## 2023-10-09 PROCEDURE — 72170 X-RAY EXAM OF PELVIS: CPT | Performed by: STUDENT IN AN ORGANIZED HEALTH CARE EDUCATION/TRAINING PROGRAM

## 2023-10-09 RX ORDER — OXYCODONE HYDROCHLORIDE 5 MG/1
5 TABLET ORAL EVERY 4 HOURS PRN
Status: DISCONTINUED | OUTPATIENT
Start: 2023-10-09 | End: 2023-10-11

## 2023-10-09 RX ORDER — FAMOTIDINE 10 MG/ML
20 INJECTION, SOLUTION INTRAVENOUS 2 TIMES DAILY
Status: DISCONTINUED | OUTPATIENT
Start: 2023-10-09 | End: 2023-10-09

## 2023-10-09 RX ORDER — ACETAMINOPHEN 500 MG
1000 TABLET ORAL EVERY 6 HOURS
Status: DISCONTINUED | OUTPATIENT
Start: 2023-10-09 | End: 2023-10-11

## 2023-10-09 RX ORDER — BISACODYL 10 MG
10 SUPPOSITORY, RECTAL RECTAL
Status: DISCONTINUED | OUTPATIENT
Start: 2023-10-09 | End: 2023-10-11

## 2023-10-09 RX ORDER — SODIUM CHLORIDE 9 MG/ML
INJECTION, SOLUTION INTRAVENOUS ONCE
Status: COMPLETED | OUTPATIENT
Start: 2023-10-09 | End: 2023-10-09

## 2023-10-09 RX ORDER — DOCUSATE SODIUM 100 MG/1
100 CAPSULE, LIQUID FILLED ORAL 2 TIMES DAILY
Status: DISCONTINUED | OUTPATIENT
Start: 2023-10-09 | End: 2023-10-11

## 2023-10-09 RX ORDER — ROCURONIUM BROMIDE 10 MG/ML
INJECTION, SOLUTION INTRAVENOUS AS NEEDED
Status: DISCONTINUED | OUTPATIENT
Start: 2023-10-09 | End: 2023-10-09 | Stop reason: SURG

## 2023-10-09 RX ORDER — FAMOTIDINE 10 MG/ML
20 INJECTION, SOLUTION INTRAVENOUS NIGHTLY
Status: DISCONTINUED | OUTPATIENT
Start: 2023-10-09 | End: 2023-10-11

## 2023-10-09 RX ORDER — SODIUM CHLORIDE 9 MG/ML
INJECTION, SOLUTION INTRAVENOUS ONCE
Status: DISCONTINUED | OUTPATIENT
Start: 2023-10-09 | End: 2023-10-11

## 2023-10-09 RX ORDER — ONDANSETRON 2 MG/ML
INJECTION INTRAMUSCULAR; INTRAVENOUS AS NEEDED
Status: DISCONTINUED | OUTPATIENT
Start: 2023-10-09 | End: 2023-10-09 | Stop reason: SURG

## 2023-10-09 RX ORDER — SODIUM CHLORIDE, SODIUM LACTATE, POTASSIUM CHLORIDE, CALCIUM CHLORIDE 600; 310; 30; 20 MG/100ML; MG/100ML; MG/100ML; MG/100ML
INJECTION, SOLUTION INTRAVENOUS CONTINUOUS
Status: DISCONTINUED | OUTPATIENT
Start: 2023-10-09 | End: 2023-10-09 | Stop reason: HOSPADM

## 2023-10-09 RX ORDER — METOCLOPRAMIDE HYDROCHLORIDE 5 MG/ML
5 INJECTION INTRAMUSCULAR; INTRAVENOUS EVERY 8 HOURS PRN
Status: DISCONTINUED | OUTPATIENT
Start: 2023-10-09 | End: 2023-10-11

## 2023-10-09 RX ORDER — LIDOCAINE HYDROCHLORIDE 10 MG/ML
INJECTION, SOLUTION EPIDURAL; INFILTRATION; INTRACAUDAL; PERINEURAL AS NEEDED
Status: DISCONTINUED | OUTPATIENT
Start: 2023-10-09 | End: 2023-10-09 | Stop reason: SURG

## 2023-10-09 RX ORDER — MORPHINE SULFATE 10 MG/ML
6 INJECTION, SOLUTION INTRAMUSCULAR; INTRAVENOUS EVERY 10 MIN PRN
Status: DISCONTINUED | OUTPATIENT
Start: 2023-10-09 | End: 2023-10-09 | Stop reason: HOSPADM

## 2023-10-09 RX ORDER — CEFAZOLIN SODIUM/WATER 2 G/20 ML
2 SYRINGE (ML) INTRAVENOUS EVERY 8 HOURS
Status: CANCELLED | OUTPATIENT
Start: 2023-10-09 | End: 2023-10-10

## 2023-10-09 RX ORDER — FAMOTIDINE 20 MG/1
20 TABLET, FILM COATED ORAL NIGHTLY
Status: DISCONTINUED | OUTPATIENT
Start: 2023-10-09 | End: 2023-10-11

## 2023-10-09 RX ORDER — SENNOSIDES 8.6 MG
17.2 TABLET ORAL NIGHTLY
Status: DISCONTINUED | OUTPATIENT
Start: 2023-10-09 | End: 2023-10-11

## 2023-10-09 RX ORDER — FAMOTIDINE 20 MG/1
20 TABLET, FILM COATED ORAL 2 TIMES DAILY
Status: DISCONTINUED | OUTPATIENT
Start: 2023-10-09 | End: 2023-10-09

## 2023-10-09 RX ORDER — SODIUM CHLORIDE, SODIUM LACTATE, POTASSIUM CHLORIDE, CALCIUM CHLORIDE 600; 310; 30; 20 MG/100ML; MG/100ML; MG/100ML; MG/100ML
INJECTION, SOLUTION INTRAVENOUS CONTINUOUS PRN
Status: DISCONTINUED | OUTPATIENT
Start: 2023-10-09 | End: 2023-10-09 | Stop reason: SURG

## 2023-10-09 RX ORDER — ASPIRIN 81 MG/1
81 TABLET ORAL 2 TIMES DAILY
Status: DISCONTINUED | OUTPATIENT
Start: 2023-10-09 | End: 2023-10-11

## 2023-10-09 RX ORDER — MORPHINE SULFATE 4 MG/ML
2 INJECTION, SOLUTION INTRAMUSCULAR; INTRAVENOUS EVERY 10 MIN PRN
Status: DISCONTINUED | OUTPATIENT
Start: 2023-10-09 | End: 2023-10-09 | Stop reason: HOSPADM

## 2023-10-09 RX ORDER — ONDANSETRON 2 MG/ML
4 INJECTION INTRAMUSCULAR; INTRAVENOUS EVERY 6 HOURS PRN
Status: DISCONTINUED | OUTPATIENT
Start: 2023-10-09 | End: 2023-10-11

## 2023-10-09 RX ORDER — POLYETHYLENE GLYCOL 3350 17 G/17G
17 POWDER, FOR SOLUTION ORAL DAILY PRN
Status: DISCONTINUED | OUTPATIENT
Start: 2023-10-09 | End: 2023-10-11

## 2023-10-09 RX ORDER — NALOXONE HYDROCHLORIDE 0.4 MG/ML
0.08 INJECTION, SOLUTION INTRAMUSCULAR; INTRAVENOUS; SUBCUTANEOUS AS NEEDED
Status: DISCONTINUED | OUTPATIENT
Start: 2023-10-09 | End: 2023-10-09 | Stop reason: HOSPADM

## 2023-10-09 RX ORDER — HYDROMORPHONE HYDROCHLORIDE 1 MG/ML
0.6 INJECTION, SOLUTION INTRAMUSCULAR; INTRAVENOUS; SUBCUTANEOUS EVERY 5 MIN PRN
Status: DISCONTINUED | OUTPATIENT
Start: 2023-10-09 | End: 2023-10-09 | Stop reason: HOSPADM

## 2023-10-09 RX ORDER — HYDROMORPHONE HYDROCHLORIDE 1 MG/ML
0.4 INJECTION, SOLUTION INTRAMUSCULAR; INTRAVENOUS; SUBCUTANEOUS EVERY 5 MIN PRN
Status: DISCONTINUED | OUTPATIENT
Start: 2023-10-09 | End: 2023-10-09 | Stop reason: HOSPADM

## 2023-10-09 RX ORDER — CEFAZOLIN SODIUM/WATER 2 G/20 ML
2 SYRINGE (ML) INTRAVENOUS ONCE
Status: COMPLETED | OUTPATIENT
Start: 2023-10-09 | End: 2023-10-09

## 2023-10-09 RX ORDER — VANCOMYCIN HYDROCHLORIDE
1250 ONCE
Status: COMPLETED | OUTPATIENT
Start: 2023-10-09 | End: 2023-10-09

## 2023-10-09 RX ORDER — ENEMA 19; 7 G/133ML; G/133ML
1 ENEMA RECTAL ONCE AS NEEDED
Status: DISCONTINUED | OUTPATIENT
Start: 2023-10-09 | End: 2023-10-11

## 2023-10-09 RX ORDER — HYDROMORPHONE HYDROCHLORIDE 1 MG/ML
0.2 INJECTION, SOLUTION INTRAMUSCULAR; INTRAVENOUS; SUBCUTANEOUS EVERY 5 MIN PRN
Status: DISCONTINUED | OUTPATIENT
Start: 2023-10-09 | End: 2023-10-09 | Stop reason: HOSPADM

## 2023-10-09 RX ORDER — MORPHINE SULFATE 4 MG/ML
4 INJECTION, SOLUTION INTRAMUSCULAR; INTRAVENOUS EVERY 10 MIN PRN
Status: DISCONTINUED | OUTPATIENT
Start: 2023-10-09 | End: 2023-10-09 | Stop reason: HOSPADM

## 2023-10-09 RX ADMIN — CEFAZOLIN SODIUM/WATER 2 G: 2 G/20 ML SYRINGE (ML) INTRAVENOUS at 16:49:00

## 2023-10-09 RX ADMIN — ROCURONIUM BROMIDE 40 MG: 10 INJECTION, SOLUTION INTRAVENOUS at 16:52:00

## 2023-10-09 RX ADMIN — ONDANSETRON 4 MG: 2 INJECTION INTRAMUSCULAR; INTRAVENOUS at 17:03:00

## 2023-10-09 RX ADMIN — SODIUM CHLORIDE, SODIUM LACTATE, POTASSIUM CHLORIDE, CALCIUM CHLORIDE: 600; 310; 30; 20 INJECTION, SOLUTION INTRAVENOUS at 16:49:00

## 2023-10-09 RX ADMIN — LIDOCAINE HYDROCHLORIDE 50 MG: 10 INJECTION, SOLUTION EPIDURAL; INFILTRATION; INTRACAUDAL; PERINEURAL at 16:52:00

## 2023-10-09 NOTE — PROGRESS NOTES
10/08/23 2001   Clinical Encounter Type   Visited With Patient not available   Routine Visit Introduction   Muslim Encounters   Muslim Needs Pastoral care brochure   Taxonomy   Intended Effects Build relationship of care and support   Methods Collaborate with care team member   Interventions Provide hospitality   Trigger for Consult   Trigger for [facility] No     Pt would like a visit tomorrow. She declined the visit when the [address] arrived today.

## 2023-10-09 NOTE — ANESTHESIA PROCEDURE NOTES
Airway  Date/Time: 10/9/2023 4:54 PM  Urgency: elective    Airway not difficult    General Information and Staff    Patient location during procedure: OR  Anesthesiologist: Juwan You MD  Performed: anesthesiologist   Performed by: Juwan You MD  Authorized by:  Juwan You MD      Indications and Patient Condition  Indications for airway management: anesthesia  Spontaneous Ventilation: absent  Sedation level: deep  Preoxygenated: yes  Patient position: sniffing  Mask difficulty assessment: 1 - vent by mask  Planned trial extubation    Final Airway Details  Final airway type: endotracheal airway      Successful airway: ETT  Cuffed: yes   Endotracheal tube insertion site: oral  Blade: GlideScope  Blade size: #3  ETT size (mm): 7.0    Placement verified by: capnometry   Inital cuff pressure (cm H2O): 20  Measured from: lips  ETT to lips (cm): 21  Number of attempts at approach: 1  Number of other approaches attempted: 0

## 2023-10-09 NOTE — OPERATIVE REPORT
[facility] at [address] OF SURGERY: 10/9/2023     PREOPERATIVE DIAGNOSIS:   Right Total Hip Arthroplasty Dislocation  Status post right revision total hip arthroplasty     POST-OPERATIVE DIAGNOSIS:   Right Total Hip Arthroplasty Dislocation  Status post right revision total hip arthroplasty     PROCEDURE:  Closed reduction of right Total Hip Arthroplasty under general anesthesia       Location: [place]     SURGEON: Mike Maurer MD  Assistant(s): None     Anesthesia type: General  Estimated Blood Loss: none     Drains: None     Findings: posterosuperior hip dislocation. Specimen: None     Complications: None immediately apparent     Indication: This is a 77year old lady, who is 5.5 weeks s/p right revision COLTON with cup cage acetabular component following spacer treatment of MRSA PJI, complicated by dislocation requiring open reduction head/liner exchange by my partner 3 weeks ago, who unfortunately has sustained another dislocation of her revision right total hip arthroplasty. Surgical versus non-surgical options were discussed with the patient, and together we decided it is best to proceed with closed reduction under general anesthesia in the OR, possible open reduction and constrained liner placement. All risks and benefits of surgery including mal-prosthetic fracture, neurovascular injury, as well as medical and anesthesia-related complications were discussed with the patient / family, who elected to proceed with surgery. I explained that there is no guarantee that his hip will not dislocate in the future, and if so, they may require revision surgery. Procedure in detail:     Following induction of general anesthesia, the patient was positioned supine on a regular bed. Traction was applied, as well as flexion, adduction and external rotation and a palpable clunk was felt. Intra-operative portable plain film was used to confirm concentric hip reduction.    An abduction pillow and a knee immobilizer were applied. She was awakened from anesthesia without issues and taken to recovery in a stable condition. POST-OPERATIVE PLAN  The patient will be permitted toe weight bearing as tolerated on the operative extremity for a total of 8 weeks from 8/30/2023 to allow acetabular cup osseointegration. The patient will be placed on posterior hip precautions for 3 months. An abduction brace will be ordered and fitted while inpatient. Venous thromboembolic prophylaxis will consist of early mobilization, mechanical compressive devices, and ASA 81 BID  The patient should be scheduled for follow up in 2 weeks for radiographic evaluation.

## 2023-10-09 NOTE — DIETARY NOTE
Brief Nutrition Note:     Consult for Albumin of 2.4 Optimize nutritional status perioperatively. Pt NPO today for surgery this afternoon. Pt was seen by dietitian for a recent admission 5/18/23 and determined to have acute non severe malnutrition due to wt loss and poor po intake. Since then, pt has been eating well at [facility]. Regain of wt 10-12#. Well nourished per visual exam.  No nutritional risk, but will add ONS (ensure compact) bid as pt liked and tolerated well last admission. Discussed importance of adequate protein--reviewed sources. Alb 2.4     --albumin and pre-albumin have little to no correlation to nutritional status and do not accurately represent nutrition status. According to the Academy of Nutrition and Dietetics albumin and pre-albumin are not recommended for determining nutrition status or intake because there is no consistent evidence that these markers increase or decrease with increased or decreased protein intake, thus determining nutrition status based on serum protein levels is no longer recommended.      [address], [address] [facility] Dietitian [id number]

## 2023-10-09 NOTE — PLAN OF CARE
Patient Alert and Oriented X4, Given prn norco for pain. Constipated - given scheduled stool softeners and prn suppository. NPO @ 5AM. Voiding with purewick. Receiving IV fluids as ordered. Fall precautions in place- belongings and call light within reach, non skid socks in place. Able to reposition herself in bed.      Problem: Patient Centered Care  Goal: Patient preferences are identified and integrated in the patient's plan of care  Description: Interventions:  - What would you like us to know as we care for you?   - Provide timely, complete, and accurate information to patient/family  - Incorporate patient and family knowledge, values, beliefs, and cultural backgrounds into the planning and delivery of care  - Encourage patient/family to participate in care and decision-making at the level they choose  - Honor patient and family perspectives and choices  Outcome: Progressing     Problem: PAIN - ADULT  Goal: Verbalizes/displays adequate comfort level or patient's stated pain goal  Description: INTERVENTIONS:  - Encourage pt to monitor pain and request assistance  - Assess pain using appropriate pain scale  - Administer analgesics based on type and severity of pain and evaluate response  - Implement non-pharmacological measures as appropriate and evaluate response  - Consider cultural and social influences on pain and pain management  - Manage/alleviate anxiety  - Utilize distraction and/or relaxation techniques  - Monitor for opioid side effects  - Notify MD/LIP if interventions unsuccessful or patient reports new pain  - Anticipate increased pain with activity and pre-medicate as appropriate  Outcome: Progressing     Problem: GASTROINTESTINAL - ADULT  Goal: Maintains or returns to baseline bowel function  Description: INTERVENTIONS:  - Assess bowel function  - Maintain adequate hydration with IV or PO as ordered and tolerated  - Evaluate effectiveness of GI medications  - Encourage mobilization and activity  - Obtain nutritional consult as needed  - Establish a toileting routine/schedule  - Consider collaborating with pharmacy to review patient's medication profile  Outcome: Progressing

## 2023-10-09 NOTE — CM/SW NOTE
CM met with patient at bedside to confirm she is [facility]. Patient also confirmed she has been paying out of pocket at the facility and will continue to when she returns. Patient anticipates returning to [facility] at discharge. Surgery scheduled for this evening.     CM/DERRICK will continue to follow    Plan: Return to [facility] when medically ready    Caleb Davenport, RN, BSN

## 2023-10-09 NOTE — ANESTHESIA POSTPROCEDURE EVALUATION
Patient: Colby Hernandez    Procedure Summary       Date: 10/09/23 Room / Location: [facility] OR 09 / [facility] OR    Anesthesia Start: 0489 Anesthesia Stop: 3007    Procedures:       right hip closed reduction, possible head liner change (Right)      HIP CLOSED REDUCTION (Right) Diagnosis: (Dislocation of prosthetic joint)    Surgeons: Geovanna Mclaughlin MD Anesthesiologist: Radha Roth MD    Anesthesia Type: general ASA Status: 3            Anesthesia Type: general    Vitals Value Taken Time   /75 10/09/23 1717   Temp  10/09/23 1717   Pulse 75 10/09/23 1716   Resp 10 10/09/23 1716   SpO2 89 % 10/09/23 1716   Vitals shown include unfiled device data.     [facility] Post Evaluation:   Patient Evaluated in PACU  Patient Participation: complete - patient participated  Level of Consciousness: awake  Pain Management: adequate  Airway Patency:patent  Yes    Cardiovascular Status: acceptable  Respiratory Status: acceptable  Postoperative Hydration acceptable      Gladys Angulo MD  10/9/2023 5:17 PM

## 2023-10-10 LAB
ANION GAP SERPL CALC-SCNC: 2 MMOL/L (ref 0–18)
BASOPHILS # BLD AUTO: 0.06 X10(3) UL (ref 0–0.2)
BASOPHILS NFR BLD AUTO: 1.5 %
BLOOD TYPE BARCODE: 5100
BUN BLD-MCNC: 11 MG/DL (ref 7–18)
BUN/CREAT SERPL: 10 (ref 10–20)
CALCIUM BLD-MCNC: 8.8 MG/DL (ref 8.5–10.1)
CHLORIDE SERPL-SCNC: 107 MMOL/L (ref 98–112)
CO2 SERPL-SCNC: 32 MMOL/L (ref 21–32)
CREAT BLD-MCNC: 1.1 MG/DL
DEPRECATED RDW RBC AUTO: 61.1 FL (ref 35.1–46.3)
DEPRECATED RDW RBC AUTO: 64 FL (ref 35.1–46.3)
EGFRCR SERPLBLD CKD-EPI 2021: 54 ML/MIN/1.73M2 (ref 60–?)
EOSINOPHIL # BLD AUTO: 0.73 X10(3) UL (ref 0–0.7)
EOSINOPHIL NFR BLD AUTO: 18.3 %
ERYTHROCYTE [DISTWIDTH] IN BLOOD BY AUTOMATED COUNT: 18.1 % (ref 11–15)
ERYTHROCYTE [DISTWIDTH] IN BLOOD BY AUTOMATED COUNT: 18.3 % (ref 11–15)
GLUCOSE BLD-MCNC: 90 MG/DL (ref 70–99)
HCT VFR BLD AUTO: 28.9 %
HCT VFR BLD AUTO: 29.9 %
HGB BLD-MCNC: 8.9 G/DL
HGB BLD-MCNC: 8.9 G/DL
IMM GRANULOCYTES # BLD AUTO: 0.01 X10(3) UL (ref 0–1)
IMM GRANULOCYTES NFR BLD: 0.3 %
LYMPHOCYTES # BLD AUTO: 0.71 X10(3) UL (ref 1–4)
LYMPHOCYTES NFR BLD AUTO: 17.8 %
MCH RBC QN AUTO: 28.5 PG (ref 26–34)
MCH RBC QN AUTO: 28.8 PG (ref 26–34)
MCHC RBC AUTO-ENTMCNC: 29.8 G/DL (ref 31–37)
MCHC RBC AUTO-ENTMCNC: 30.8 G/DL (ref 31–37)
MCV RBC AUTO: 93.5 FL
MCV RBC AUTO: 95.8 FL
MONOCYTES # BLD AUTO: 0.58 X10(3) UL (ref 0.1–1)
MONOCYTES NFR BLD AUTO: 14.5 %
NEUTROPHILS # BLD AUTO: 1.91 X10 (3) UL (ref 1.5–7.7)
NEUTROPHILS # BLD AUTO: 1.91 X10(3) UL (ref 1.5–7.7)
NEUTROPHILS NFR BLD AUTO: 47.6 %
OSMOLALITY SERPL CALC.SUM OF ELEC: 291 MOSM/KG (ref 275–295)
PLATELET # BLD AUTO: 199 10(3)UL (ref 150–450)
PLATELET # BLD AUTO: 209 10(3)UL (ref 150–450)
POTASSIUM SERPL-SCNC: 4.5 MMOL/L (ref 3.5–5.1)
RBC # BLD AUTO: 3.09 X10(6)UL
RBC # BLD AUTO: 3.12 X10(6)UL
SODIUM SERPL-SCNC: 141 MMOL/L (ref 136–145)
UNIT VOLUME: 350 ML
WBC # BLD AUTO: 4 X10(3) UL (ref 4–11)
WBC # BLD AUTO: 4 X10(3) UL (ref 4–11)

## 2023-10-10 PROCEDURE — 99233 SBSQ HOSP IP/OBS HIGH 50: CPT | Performed by: INTERNAL MEDICINE

## 2023-10-10 NOTE — CM/SW NOTE
10/10/23 1300   CM/DERRICK Referral Data   Referral Source    Reason for Referral Discharge planning   Informant Patient   Readmission Assessment   Factors that patient feels contributed to this readmission Acute/Chronic Clinical Presentation   Pt's living situation prior to admission? Subacute rehab   Pt's level of independence at discharge? Some assist (mod)   Pt. received education on diagnoses at time of discharge? Yes   Did you know who and how to call someone if you felt worse? Yes   Did any new symptoms or issues develop after you were discharged? Yes   ----Post D/C symptoms: Symptoms/issue related to previous hospitalization Yes   Did you understand your discharge instructions? Yes   Were medications taken as indicated on discharge instructions? Yes   Was full assessment completed by DERRICK/BERTHA on prior admission? Yes   Was the recommended discharge plan achieved? Yes   Was pt. discharged w/out services? No   Medical Hx   Does patient have an established PCP? Yes  (Dr Jesse Machado)   Patient Info   Patient's Current Mental Status at Time of Assessment Alert;Oriented   Patient's Home Environment Subacute Rehab   Post Acute Care Provider Upon Admission Rhonda Palmer   Patient Status Prior to Admission   Independent with ADLs and Mobility No   Pt. requires assistance with Housework;Driving; Bathing; Ambulating;Dressing;Medications; Toileting   Discharge Needs   Anticipated D/C needs Subacute rehab       MDO received for discharge planning. CM met with patient at bedside, above information received. Patient states she has been living at [facility] since March of this year. PT recommendations for [facility]. Patient agreeable to returning to [place] at discharge. Patient will have abduction brace fitted while inpatient.      Plan: Return to [facility] when medically cleared    Kasandra Jorge, RN, BSN

## 2023-10-10 NOTE — PHYSICAL THERAPY NOTE
PHYSICAL THERAPY EVALUATION - INPATIENT     Room Number: 433/433-A  Evaluation Date: 10/10/2023  Type of Evaluation: Initial   Physician Order: PT Eval and Treat    Presenting Problem: dislocation of R hip COLTON requiring closed reduction on 10/9/23     Reason for Therapy: Mobility Dysfunction and Discharge Planning    PHYSICAL THERAPY ASSESSMENT     Patient is a 77year old female admitted 10/7/2023 for dislocation of R hip COLTON requiring closed reduction on 10/9/23. Per Dr. Corby Rodríguez post operative plan:   The patient will be permitted toe weight bearing as tolerated on the operative extremity for a total of 8 weeks from 8/30/2023 to allow acetabular cup osseointegration. The patient will be placed on posterior hip precautions for 3 months. An abduction brace will be ordered and fitted while inpatient. Patient's current functional deficits include impaired tolerance toward activity due to R hip pain, impaired bed mobility, transfers, ambulation, RLE strength and ROM and impaired balance, which are below the patient's pre-admission status. Patient received supine in bed. RN approved activity. Therapist educated pt on POC and physiological benefits of mobilization. Reviewed post operative posterior COLTON protocol, precautions and weight bearing status (TTWB'ing) RLE as specified by surgeon. Patient agreeable to participate Primary complaint is R hip pain which she rates at 5 out of 10 per pain scale. Bed mobility: CG supine>sit. Assist in guiding RLE toward EOB. Sat EOB for 6 minutes requiring supervision for balance. Transfers: CG with use of RW. Cues for appropriate UE positioning with transitional movements. Instruction for pt to gently rest heel on ground vs lifting off ground when preparing to sit<>stand. X 3 sit<>stand trials from chair. Chair height elevated with cushion to ease future transfers. CG to maintain static standing with bilateral UE support on RW.    Ambulation: Min A x 1 with use of RW. 6ft bed>chair. Step-to gait pattern. Demonstrating NWB'ing RLE, although therapist encouraged toe touch to encourage appropriate positioning technique. Patient in chair at end of session with needs in reach and ice applied to R hip. The patient's Approx Degree of Impairment: 50.57% has been calculated based on documentation in the [facility] '6 clicks' Inpatient Basic Mobility Short Form. Research supports that patients with this level of impairment may benefit from sub-acute rehab to optimize functional outcomes. Patient will benefit from continued IP PT services to address these deficits in preparation for discharge. DISCHARGE RECOMMENDATIONS  PT Discharge Recommendations: Sub-acute rehabilitation    PLAN  PT Treatment Plan: Bed mobility; Body mechanics; Endurance; Family education;Patient education;Gait training;Energy conservation;Range of motion;Strengthening;Transfer training;Balance training  Rehab Potential : Good  Frequency (Obs): Daily       PHYSICAL THERAPY MEDICAL/SOCIAL HISTORY   Problem List  Principal Problem:    Dislocation of right hip, initial encounter [facility])      [address] [facility] Situation  [facility]  Home Layout: One level  Lives With: Staff 24 hours  Patient Owned Equipment: Rolling walker; Hospital bed     Prior Level of [place]: Assist x 1 with ADL's and IADL's. Ambulates short distances with RW. SUBJECTIVE  \"My sister was right. I do need a hip brace! \"    PHYSICAL THERAPY EXAMINATION     OBJECTIVE  Precautions: Bed/chair alarm; Hip abduction pillow;Limb alert - right;COLTON - posterior (KI brace on RLE (Hip abduction brace ordered))  Fall Risk: High fall risk    WEIGHT BEARING RESTRICTION        R Lower Extremity: Toe Touch Weight Bearing       PAIN ASSESSMENT  Ratin  Location: R hip  Management Techniques: Activity promotion; Body mechanics;Repositioning    COGNITION  Overall Cognitive Status:  WFL - within functional limits    RANGE OF MOTION AND STRENGTH ASSESSMENT  Upper extremity ROM and strength are within functional limits   Lower extremity ROM is impaired R hip- tolerated hip at 90* sitting EOB/chair  Lower extremity strength is impaired R hip- deferred formal testing 2* recent procedure    BALANCE  Static Sitting: Good  Dynamic Sitting: Fair +  Static Standing: Fair  Dynamic Standing: Fair -      ACTIVITY TOLERANCE  Pulse: 58  Heart Rate Source: Monitor     BP: 115/64  BP Location: Right arm  BP Method: Automatic  Patient Position: Sitting    O2 WALK  Oxygen Therapy  SPO2% on Room Air at Rest: 95  SPO2% Ambulation on Room Air: 87    AM-PAC '6-Clicks' INPATIENT SHORT FORM - BASIC MOBILITY  How much difficulty does the patient currently have. .. Patient Difficulty: Turning over in bed (including adjusting bedclothes, sheets and blankets)?: A Little   Patient Difficulty: Sitting down on and standing up from a chair with arms (e.g., wheelchair, bedside commode, etc.): A Little   Patient Difficulty: Moving from lying on back to sitting on the side of the bed?: A Little   How much help from another person does the patient currently need. .. Help from Another: Moving to and from a bed to a chair (including a wheelchair)?: A Little   Help from Another: Need to walk in hospital room?: A Little   Help from Another: Climbing 3-5 steps with a railing?: A Lot     AM-PAC Score:  Raw Score: 17   Approx Degree of Impairment: 50.57%   Standardized Score (AM-PAC Scale): 42.13   CMS Modifier (G-Code): CK    FUNCTIONAL ABILITY STATUS  Functional Mobility/Gait Assessment  Gait Assistance: Minimum assistance  Distance (ft): 6ft  Assistive Device: Rolling walker  Pattern: R Decreased stance time (compliant with TTWB'ing/NWB'ing RLE)    Exercise/Education Provided:  Bed mobility  Body mechanics  Energy conservation  Functional activity tolerated  Gait training  Posture  ROM  Strengthening  Lower therapeutic exercise:   Ankle pumps  Transfer training    Patient End of Session: Up in chair;Needs met;Call light within reach;RN aware of session/findings    CURRENT GOALS    Goals to be met by: 10/24/23  Patient Goal Patient's self-stated goal is: return to PLOF   Goal #1 Patient is able to demonstrate supine - sit EOB @ level: independent     Goal #1   Current Status    Goal #2 Patient is able to demonstrate transfers Sit to/from Stand at assistance level: modified independent with walker - rolling     Goal #2  Current Status    Goal #3 Patient is able to ambulate 50 feet with assist device: walker - rolling at assistance level: supervision while maintaining TTWB'ing RLE   Goal #3   Current Status    Goal #4 Patient to demonstrate independence with home activity/exercise instructions provided to patient in preparation for discharge.    Goal #4   Current Status      Patient Evaluation Complexity Level:  History Low - no personal factors and/or co-morbidities   Examination of body systems Low - addressing 1-2 elements   Clinical Presentation Low - Stable   Clinical Decision Making Low Complexity       Gait Trainin minutes  Therapeutic Activity: 24 minutes

## 2023-10-10 NOTE — PROGRESS NOTES
[facility] Pharmacy Note:  Renal Dose Adjustment    Lindsey Moran has been prescribed famotidine (PEPCID) 20 mg intravenously every 12 hours. Estimated Creatinine Clearance: 39.3 mL/min (A) (based on SCr of 1.15 mg/dL (H)). Calculated creatinine clearance is < 50 ml/min, therefore the dose of famotidine (Pepcid) has been changed to 20 mg intravenously every 24 hours per P&T approved protocol. Pharmacy will continue to follow, and if renal function improves, will resume the original order.     Thank you,  Maureen Templeton, PharmD  10/9/2023 7:09 PM

## 2023-10-11 VITALS
RESPIRATION RATE: 18 BRPM | BODY MASS INDEX: 38 KG/M2 | OXYGEN SATURATION: 98 % | HEART RATE: 52 BPM | SYSTOLIC BLOOD PRESSURE: 128 MMHG | TEMPERATURE: 98 F | DIASTOLIC BLOOD PRESSURE: 71 MMHG | WEIGHT: 223.5 LBS

## 2023-10-11 LAB
ANION GAP SERPL CALC-SCNC: 4 MMOL/L (ref 0–18)
BASOPHILS # BLD AUTO: 0.07 X10(3) UL (ref 0–0.2)
BASOPHILS NFR BLD AUTO: 1.8 %
BLOOD TYPE BARCODE: 5100
BUN BLD-MCNC: 14 MG/DL (ref 7–18)
BUN/CREAT SERPL: 14 (ref 10–20)
CALCIUM BLD-MCNC: 8.7 MG/DL (ref 8.5–10.1)
CHLORIDE SERPL-SCNC: 108 MMOL/L (ref 98–112)
CO2 SERPL-SCNC: 31 MMOL/L (ref 21–32)
CREAT BLD-MCNC: 1 MG/DL
DEPRECATED RDW RBC AUTO: 63.7 FL (ref 35.1–46.3)
EGFRCR SERPLBLD CKD-EPI 2021: 61 ML/MIN/1.73M2 (ref 60–?)
EOSINOPHIL # BLD AUTO: 0.8 X10(3) UL (ref 0–0.7)
EOSINOPHIL NFR BLD AUTO: 20.8 %
ERYTHROCYTE [DISTWIDTH] IN BLOOD BY AUTOMATED COUNT: 18 % (ref 11–15)
GLUCOSE BLD-MCNC: 89 MG/DL (ref 70–99)
HCT VFR BLD AUTO: 29.2 %
HGB BLD-MCNC: 8.7 G/DL
IMM GRANULOCYTES # BLD AUTO: 0.01 X10(3) UL (ref 0–1)
IMM GRANULOCYTES NFR BLD: 0.3 %
LYMPHOCYTES # BLD AUTO: 0.69 X10(3) UL (ref 1–4)
LYMPHOCYTES NFR BLD AUTO: 17.9 %
MCH RBC QN AUTO: 28.7 PG (ref 26–34)
MCHC RBC AUTO-ENTMCNC: 29.8 G/DL (ref 31–37)
MCV RBC AUTO: 96.4 FL
MONOCYTES # BLD AUTO: 0.55 X10(3) UL (ref 0.1–1)
MONOCYTES NFR BLD AUTO: 14.3 %
NEUTROPHILS # BLD AUTO: 1.73 X10 (3) UL (ref 1.5–7.7)
NEUTROPHILS # BLD AUTO: 1.73 X10(3) UL (ref 1.5–7.7)
NEUTROPHILS NFR BLD AUTO: 44.9 %
OSMOLALITY SERPL CALC.SUM OF ELEC: 296 MOSM/KG (ref 275–295)
PLATELET # BLD AUTO: 215 10(3)UL (ref 150–450)
POTASSIUM SERPL-SCNC: 4.5 MMOL/L (ref 3.5–5.1)
RBC # BLD AUTO: 3.03 X10(6)UL
SODIUM SERPL-SCNC: 143 MMOL/L (ref 136–145)
UNIT VOLUME: 350 ML
WBC # BLD AUTO: 3.9 X10(3) UL (ref 4–11)

## 2023-10-11 RX ORDER — TIZANIDINE 2 MG/1
1 TABLET ORAL EVERY 6 HOURS PRN
Qty: 20 TABLET | Refills: 0 | Status: SHIPPED | OUTPATIENT
Start: 2023-10-11 | End: 2023-10-31

## 2023-10-11 RX ORDER — OXYCODONE AND ACETAMINOPHEN 10; 325 MG/1; MG/1
2 TABLET ORAL EVERY 6 HOURS PRN
Qty: 20 TABLET | Refills: 0 | Status: SHIPPED | OUTPATIENT
Start: 2023-10-11

## 2023-10-11 RX ORDER — HYDROMORPHONE HYDROCHLORIDE 2 MG/1
2 TABLET ORAL
Qty: 10 TABLET | Refills: 0 | Status: SHIPPED | OUTPATIENT
Start: 2023-10-11

## 2023-10-11 RX ORDER — ASPIRIN 81 MG/1
81 TABLET ORAL 2 TIMES DAILY
Qty: 30 TABLET | Refills: 0 | Status: SHIPPED | OUTPATIENT
Start: 2023-10-11

## 2023-10-11 NOTE — PHYSICAL THERAPY NOTE
Chart reviewed for PT treatment, spoke with VALERIE Herrera. Patient is scheduled to leave in 1 hour and will go to [facility] at 2.  NO PT treatment provided

## 2023-10-11 NOTE — DISCHARGE SUMMARY
[facility]     Discharge Summary    Scott Arriola Patient Status:  Inpatient    1955 MRN [id number]   Location [facility] 4W/SW/SE Attending Pradip Rosario MD   [facility] Day # 4 PCP Romeo Villeda DO     Date of Admission: 10/7/2023    Date of Discharge: 10/11/23    Admitting Diagnosis: Dislocation of right hip, initial encounter [facility]) [S73.004A]    Discharge Diagnosis: Patient Active Problem List:     Systemic lupus erythematosus ([address]     Vitamin D deficiency     Osteoporosis, senile     IBS (irritable bowel syndrome)     Rheumatoid arthritis ([address]     Fibromyalgia     Osteoarthritis     Atherosclerosis     Mild mitral regurgitation     Sjogren's disease ([address]     Lumbar facet arthropathy     History of lumbar spinal fusion     Anemia of chronic disease     Smoking greater than 25 pack years     ILD (interstitial lung disease) ([address]     Seasonal allergic reaction     Neutropenia ([address]     Hyperglycemia     Essential hypertension     Immunosuppressed status ([address]     Tortuous aorta (HCC)     Chronic bronchitis, unspecified chronic bronchitis type ([address]     Pulmonary HTN (HCC)     Obesity, morbid ([address]     History of compression fracture of spine     Interstitial lung disease ([address]     Preop testing     Primary osteoarthritis of right hip     Pain in hip region after hip replacement     Cellulitis of right lower extremity     Hypoxia     Dislocation of hip, right, initial encounter ([address]     Prosthetic hip infection, sequela     Dislocation of prosthetic joint ([address]     Dislocation of right hip, initial encounter [facility])      Reason for Admission:   Dislocation of R hip    Physical Exam:     GENERAL:  Awake and alert, in no acute distress. HEART:  S1 and S2 heard. RRR   LUNGS:  Air entry was decreased. No crackles or wheezes   ABDOMEN: Soft and non-tender.     PSYCHIATRIC: Normal mood    Hospital Course:     Dislocation of right hip, initial encounter ([facility]  -History of right hip arthroplasty  - cont pain control  - Encourage Incentive spirometry  -Orthopedic surgery consulted, status post closed reduction of right hip on 10/9.    - PT/OT per orthopedic recommendations  -Awaiting brace  -Plan for discharge to SNF. HTN  - controlled  - CPM  - Monitor and adjust as needed     COPD  -Noted to have some mild hypoxia post procedure.  -Weaning supplemental O2 as able. -Encouraging incentive spirometry  -Continue inhalers  -Continue to monitor     Other problems  Sjogrens syndrome  ILD  Pulmonary hypertension  Osteoarthritis  SLE        History of Present Illness:     Per admitting team:    Cameron Medeiros is a 77year old female with past medical history significant for hypertension, interstitial lung disease, pulmonary hypertension, Sjogren's disease, osteoarthritis, SLE, fibromyalgia, COPD, h/o right total hip arthroplasty complicated by prosthetic hip infection s/p Antibiotic spacer in 5/2023, s/p removal of right hip static spacer, revision of right total hip arthroplasty complicated by recurrent dislocation, now s/p repair of dislocated total hip arthroplasty prosthesis on 9/13/23 presents today from [address] [place] [address] secondary to hearing pop from right prosthetic hip joint while sitting in bed. She did not report any fall or any injury. In Er closed reductions was attempted without any improvement. Patient pain is controlled at this time. Denies any nausea or vomiting    Disposition: Home or Self Care    Discharge Condition: Stable    Discharge Medications: Current Discharge Medication List    CONTINUE these medications which have CHANGED    aspirin 81 MG Oral Tab EC  Take 1 tablet (81 mg total) by mouth in the morning and 1 tablet (81 mg total) before bedtime. Qty: 30 tablet Refills: 0    tiZANidine 2 MG Oral Tab  Take 0.5 tablets (1 mg total) by mouth every 6 (six) hours as needed.   Qty: 20 tablet Refills: 0      CONTINUE these medications which have NOT CHANGED    cephalexin 500 MG Oral Cap  Take 1 capsule (500 mg total) by mouth every 8 (eight) hours. Start tonight and stop if rash  Qty: 16 capsule Refills: 0    docusate sodium 100 MG Oral Cap  Take 100 mg by mouth 2 (two) times daily as needed for constipation. Qty: 20 capsule Refills: 0    oxyCODONE-acetaminophen  MG Oral Tab  Take 2 tablets by mouth every 6 (six) hours as needed. Qty: 60 tablet Refills: 0    loratadine 10 MG Oral Tab  Take 1 tablet (10 mg total) by mouth daily. ferrous sulfate 325 (65 FE) MG Oral Tab EC  Take 1 tablet (325 mg total) by mouth 2 (two) times daily with meals. sodium chloride 0.65 % Nasal Solution  2 sprays by Nasal route as needed for congestion. sennosides 17.2 MG Oral Tab  Take 1 tablet (17.2 mg total) by mouth nightly. Refills: 0    BREO ELLIPTA 200-25 MCG/ACT Inhalation Aerosol Powder, Breath Activated  USE 1 INHALATION ORALLY    ONCE DAILY AS DIRECTED  Qty: 3 each Refills: 3    hydroCHLOROthiazide 25 MG Oral Tab  Take 1 tablet (25 mg total) by mouth daily. Qty: 90 tablet Refills: 3    hydroxychloroquine 200 MG Oral Tab  Take 1 tablet (200 mg total) by mouth 2 (two) times daily. Qty: 180 tablet Refills: 1  Associated Diagnoses:Rheumatoid arthritis involving both hands, unspecified whether rheumatoid factor present ([facility] Sjogren's syndrome without extraglandular involvement ([facility] Other systemic lupus erythematosus with other organ involvement [facility])    ! ! pregabalin 50 MG Oral Cap  TAKE 1 CAPSULE EVERY       MORNING WITH 75MG IN THE   EVENING  Qty: 90 capsule Refills: 0  Associated Diagnoses:Fibromyalgia    Zinc 50 MG Oral Cap  Take 50 mg by mouth daily. PREVIDENT 5000 DRY MOUTH 1.1 % Dental Gel  Place 1 Application onto teeth daily. bisacodyl 10 MG Rectal Suppos  Place 1 suppository (10 mg total) rectally daily as needed. Qty: 20 suppository Refills: 0    melatonin 1 MG Oral Tab  Take 3 tablets (3 mg total) by mouth nightly.   Qty: 30 tablet Refills: 0    HYDROmorphone 2 MG Oral Tab  Take 1 tablet (2 mg total) by mouth every 3 (three) hours as needed. Qty: 30 tablet Refills: 0    !! pregabalin 75 MG Oral Cap  Take 1 capsule (75 mg total) by mouth nightly. Nystatin 347387 UNIT/GM External Powder  Apply topically at bedtime. Apply to abdominal folds/ groin area    guaiFENesin (ROBAFEN MUCUS/CHEST CONGESTION) 100 MG/5ML Oral Liquid  Take 30 mL by mouth 3 (three) times daily as needed for cough. Naloxone HCl 4 MG/0.1ML Nasal Liquid  4 mg by Nasal route as needed. If patient remains unresponsive, repeat dose in other nostril 2-5 minutes after first dose. Qty: 1 kit Refills: 0    polyethylene glycol, PEG 3350, 17 g Oral Powd Pack  Take 17 g by mouth daily as needed. Refills: 0    Calcium Carb-Cholecalciferol 500-10 MG-MCG Oral Tab  Take 1 tablet by mouth daily. Cevimeline HCl 30 MG Oral Cap  Take 1 capsule (30 mg total) by mouth 3 (three) times daily. Qty: 270 capsule Refills: 2  Associated Diagnoses:Sjogren's syndrome without extraglandular involvement ([address] Dry mouth    acetaminophen 500 MG Oral Tab  Take 2 tablets (1,000 mg total) by mouth every 6 (six) hours as needed for Pain. Ipratropium Bromide 0.06 % Nasal Solution  2 sprays by Nasal route 2 (two) times daily. Vitamin D3, Cholecalciferol, (D-3-5) 125 MCG (5000 UT) Oral Cap  Take 1 capsule (5,000 Units total) by mouth daily. Multiple Vitamin (TAB-A-RIYA) Oral Tab  Take 1 tablet by mouth daily. ALBUTEROL SULFATE  (90 Base) MCG/ACT Inhalation Aero Soln  USE 2 INHALATIONS ORALLY   EVERY 6 HOURS AS NEEDED FORWHEEZING (APPOINTMENT      NEEDED FOR MORE REFILLS)  Qty: 3 Inhaler Refills: 3    EPINEPHrine (EPIPEN 2-RACHELE) 0.3 MG/0.3ML Injection Solution Auto-injector  Inject 0.3 mL (1 each total) as directed as needed. Qty: 1 each Refills: 1  Associated Diagnoses:Bee sting allergy    !! - Potential duplicate medications found. Please discuss with provider.       STOP taking these medications    sulfamethoxazole-trimethoprim -160 MG Oral Tab per tablet          Total dc time > 30 min    Lizbet Maurer MD  10/11/2023  11:13 AM     Hospital Discharge Diagnoses:  Dislocation of R hip    Lace+ Score: 80  59-90 High Risk  29-58 Medium Risk  0-28   Low Risk. TCM Follow-Up Recommendation:  LACE > 58:  High Risk of readmission after discharge from the hospital.

## 2023-10-11 NOTE — CM/SW NOTE
10/11/23 1119   Discharge disposition   Expected discharge disposition subacute   Post Acute Care Provider [address] services after discharge None   Discharge transportation [facility]     The patient received a MDO for discharge. The patient will be transported to [facility] via [employer] [facility].  PCS complete. The number to call report is [phone]. Osito Quest from admission at [facility] has been notified of transport time. SW/CM to remain available for support and/or discharge planning.      Mickie Solorio MSW, [facility]  Discharge Planner [id number]

## 2023-12-31 ENCOUNTER — APPOINTMENT (OUTPATIENT)
Dept: GENERAL RADIOLOGY | Facility: HOSPITAL | Age: 68
End: 2023-12-31
Attending: EMERGENCY MEDICINE
Payer: MEDICARE

## 2023-12-31 ENCOUNTER — HOSPITAL ENCOUNTER (EMERGENCY)
Facility: HOSPITAL | Age: 68
Discharge: HOME | End: 2023-12-31
Attending: EMERGENCY MEDICINE
Payer: MEDICARE

## 2023-12-31 VITALS
WEIGHT: 223.56 LBS | SYSTOLIC BLOOD PRESSURE: 140 MMHG | BODY MASS INDEX: 38 KG/M2 | DIASTOLIC BLOOD PRESSURE: 78 MMHG | RESPIRATION RATE: 16 BRPM | HEART RATE: 67 BPM | OXYGEN SATURATION: 95 % | TEMPERATURE: 99 F

## 2023-12-31 DIAGNOSIS — T84.020A DISLOCATION OF INTERNAL RIGHT HIP PROSTHESIS, INITIAL ENCOUNTER (HCC): Primary | ICD-10-CM

## 2023-12-31 PROCEDURE — 99285 EMERGENCY DEPT VISIT HI MDM: CPT

## 2023-12-31 PROCEDURE — 94799 UNLISTED PULMONARY SVC/PX: CPT

## 2023-12-31 PROCEDURE — 27265 TREAT HIP DISLOCATION: CPT

## 2023-12-31 PROCEDURE — 96375 TX/PRO/DX INJ NEW DRUG ADDON: CPT

## 2023-12-31 PROCEDURE — 96374 THER/PROPH/DIAG INJ IV PUSH: CPT

## 2023-12-31 PROCEDURE — 73501 X-RAY EXAM HIP UNI 1 VIEW: CPT | Performed by: EMERGENCY MEDICINE

## 2023-12-31 PROCEDURE — 73502 X-RAY EXAM HIP UNI 2-3 VIEWS: CPT | Performed by: EMERGENCY MEDICINE

## 2023-12-31 RX ORDER — MORPHINE SULFATE 4 MG/ML
INJECTION, SOLUTION INTRAMUSCULAR; INTRAVENOUS
Status: DISCONTINUED
Start: 2023-12-31 | End: 2023-12-31

## 2023-12-31 RX ORDER — MORPHINE SULFATE 4 MG/ML
4 INJECTION, SOLUTION INTRAMUSCULAR; INTRAVENOUS ONCE
Status: COMPLETED | OUTPATIENT
Start: 2023-12-31 | End: 2023-12-31

## 2023-12-31 NOTE — ED INITIAL ASSESSMENT (HPI)
Pt coming from ED d/t hip \"popping\" out. Per pt, this is the 5th time this has occurred. R leg appears shorter than L. Pt reports the pain 5/10. Pt reports she took an 10mg oxycodone about an hour ago.

## 2024-01-04 ENCOUNTER — PATIENT OUTREACH (OUTPATIENT)
Dept: CASE MANAGEMENT | Age: 69
End: 2024-01-04

## 2024-01-04 NOTE — PROGRESS NOTES
1st attempt ER f/up apt request  PCP -decline, pt stated she will make own apt  ORTHO -existing apt (2/5)  Closing encounter

## 2024-01-09 ENCOUNTER — TELEPHONE (OUTPATIENT)
Dept: ORTHOPEDICS CLINIC | Facility: CLINIC | Age: 69
End: 2024-01-09

## 2024-01-09 NOTE — TELEPHONE ENCOUNTER
Patient has an appointment with Dr. Parmar on 2/5/24 for Recurrent right hip dislocations. Please advise if imaging is needed prior.

## 2024-01-09 NOTE — TELEPHONE ENCOUNTER
No further imaging needed.     Future Appointments   Date Time Provider Department Center   2/5/2024  2:20 PM Arturo Parmar MD [facility]

## 2024-01-29 ENCOUNTER — APPOINTMENT (OUTPATIENT)
Dept: GENERAL RADIOLOGY | Facility: HOSPITAL | Age: 69
End: 2024-01-29
Attending: EMERGENCY MEDICINE
Payer: MEDICARE

## 2024-01-29 ENCOUNTER — ANESTHESIA (OUTPATIENT)
Dept: SURGERY | Facility: HOSPITAL | Age: 69
End: 2024-01-29
Payer: MEDICARE

## 2024-01-29 ENCOUNTER — HOSPITAL ENCOUNTER (OUTPATIENT)
Facility: HOSPITAL | Age: 69
Setting detail: OBSERVATION
Discharge: HOME HEALTH | End: 2024-01-31
Attending: EMERGENCY MEDICINE | Admitting: HOSPITALIST
Payer: MEDICARE

## 2024-01-29 ENCOUNTER — ANESTHESIA EVENT (OUTPATIENT)
Dept: SURGERY | Facility: HOSPITAL | Age: 69
End: 2024-01-29
Payer: MEDICARE

## 2024-01-29 ENCOUNTER — APPOINTMENT (OUTPATIENT)
Dept: GENERAL RADIOLOGY | Facility: HOSPITAL | Age: 69
End: 2024-01-29
Attending: ORTHOPAEDIC SURGERY
Payer: MEDICARE

## 2024-01-29 DIAGNOSIS — S73.004A DISLOCATION OF RIGHT HIP, INITIAL ENCOUNTER (HCC): Primary | ICD-10-CM

## 2024-01-29 DIAGNOSIS — S73.004A: ICD-10-CM

## 2024-01-29 PROCEDURE — 73503 X-RAY EXAM HIP UNI 4/> VIEWS: CPT | Performed by: EMERGENCY MEDICINE

## 2024-01-29 PROCEDURE — 99223 1ST HOSP IP/OBS HIGH 75: CPT | Performed by: HOSPITALIST

## 2024-01-29 PROCEDURE — 73501 X-RAY EXAM HIP UNI 1 VIEW: CPT | Performed by: EMERGENCY MEDICINE

## 2024-01-29 PROCEDURE — 73502 X-RAY EXAM HIP UNI 2-3 VIEWS: CPT | Performed by: EMERGENCY MEDICINE

## 2024-01-29 PROCEDURE — 76000 FLUOROSCOPY <1 HR PHYS/QHP: CPT | Performed by: ORTHOPAEDIC SURGERY

## 2024-01-29 PROCEDURE — 99497 ADVNCD CARE PLAN 30 MIN: CPT | Performed by: HOSPITALIST

## 2024-01-29 PROCEDURE — 27266 TREAT HIP DISLOCATION: CPT | Performed by: ORTHOPAEDIC SURGERY

## 2024-01-29 PROCEDURE — 0SWRXJZ REVISION OF SYNTHETIC SUBSTITUTE IN RIGHT HIP JOINT, FEMORAL SURFACE, EXTERNAL APPROACH: ICD-10-PCS | Performed by: ORTHOPAEDIC SURGERY

## 2024-01-29 PROCEDURE — 99204 OFFICE O/P NEW MOD 45 MIN: CPT | Performed by: ORTHOPAEDIC SURGERY

## 2024-01-29 RX ORDER — SODIUM CHLORIDE, SODIUM LACTATE, POTASSIUM CHLORIDE, CALCIUM CHLORIDE 600; 310; 30; 20 MG/100ML; MG/100ML; MG/100ML; MG/100ML
INJECTION, SOLUTION INTRAVENOUS CONTINUOUS
Status: DISCONTINUED | OUTPATIENT
Start: 2024-01-29 | End: 2024-01-29 | Stop reason: HOSPADM

## 2024-01-29 RX ORDER — DIPHENHYDRAMINE HYDROCHLORIDE 50 MG/ML
12.5 INJECTION INTRAMUSCULAR; INTRAVENOUS AS NEEDED
Status: DISCONTINUED | OUTPATIENT
Start: 2024-01-29 | End: 2024-01-29 | Stop reason: HOSPADM

## 2024-01-29 RX ORDER — HYDROMORPHONE HYDROCHLORIDE 1 MG/ML
0.4 INJECTION, SOLUTION INTRAMUSCULAR; INTRAVENOUS; SUBCUTANEOUS EVERY 5 MIN PRN
Status: DISCONTINUED | OUTPATIENT
Start: 2024-01-29 | End: 2024-01-29 | Stop reason: HOSPADM

## 2024-01-29 RX ORDER — MORPHINE SULFATE 4 MG/ML
4 INJECTION, SOLUTION INTRAMUSCULAR; INTRAVENOUS ONCE
Status: COMPLETED | OUTPATIENT
Start: 2024-01-29 | End: 2024-01-29

## 2024-01-29 RX ORDER — OXYCODONE HYDROCHLORIDE 5 MG/1
5 TABLET ORAL EVERY 4 HOURS PRN
Status: DISCONTINUED | OUTPATIENT
Start: 2024-01-29 | End: 2024-01-31

## 2024-01-29 RX ORDER — HYDROMORPHONE HYDROCHLORIDE 1 MG/ML
0.6 INJECTION, SOLUTION INTRAMUSCULAR; INTRAVENOUS; SUBCUTANEOUS EVERY 5 MIN PRN
Status: DISCONTINUED | OUTPATIENT
Start: 2024-01-29 | End: 2024-01-29 | Stop reason: HOSPADM

## 2024-01-29 RX ORDER — HYDROMORPHONE HYDROCHLORIDE 1 MG/ML
0.4 INJECTION, SOLUTION INTRAMUSCULAR; INTRAVENOUS; SUBCUTANEOUS EVERY 2 HOUR PRN
Status: DISCONTINUED | OUTPATIENT
Start: 2024-01-29 | End: 2024-01-31

## 2024-01-29 RX ORDER — MEPERIDINE HYDROCHLORIDE 25 MG/ML
12.5 INJECTION INTRAMUSCULAR; INTRAVENOUS; SUBCUTANEOUS AS NEEDED
Status: DISCONTINUED | OUTPATIENT
Start: 2024-01-29 | End: 2024-01-29 | Stop reason: HOSPADM

## 2024-01-29 RX ORDER — HYDROMORPHONE HYDROCHLORIDE 1 MG/ML
0.8 INJECTION, SOLUTION INTRAMUSCULAR; INTRAVENOUS; SUBCUTANEOUS EVERY 2 HOUR PRN
Status: DISCONTINUED | OUTPATIENT
Start: 2024-01-29 | End: 2024-01-29 | Stop reason: DRUGHIGH

## 2024-01-29 RX ORDER — ONDANSETRON 2 MG/ML
4 INJECTION INTRAMUSCULAR; INTRAVENOUS EVERY 6 HOURS PRN
Status: DISCONTINUED | OUTPATIENT
Start: 2024-01-29 | End: 2024-01-29 | Stop reason: DRUGHIGH

## 2024-01-29 RX ORDER — POLYETHYLENE GLYCOL 3350 17 G/17G
17 POWDER, FOR SOLUTION ORAL DAILY PRN
Status: DISCONTINUED | OUTPATIENT
Start: 2024-01-29 | End: 2024-01-31

## 2024-01-29 RX ORDER — ACETAMINOPHEN 500 MG
1000 TABLET ORAL ONCE AS NEEDED
Status: DISCONTINUED | OUTPATIENT
Start: 2024-01-29 | End: 2024-01-29 | Stop reason: HOSPADM

## 2024-01-29 RX ORDER — MIDAZOLAM HYDROCHLORIDE 1 MG/ML
INJECTION INTRAMUSCULAR; INTRAVENOUS AS NEEDED
Status: DISCONTINUED | OUTPATIENT
Start: 2024-01-29 | End: 2024-01-29 | Stop reason: SURG

## 2024-01-29 RX ORDER — METOCLOPRAMIDE HYDROCHLORIDE 5 MG/ML
10 INJECTION INTRAMUSCULAR; INTRAVENOUS EVERY 8 HOURS PRN
Status: DISCONTINUED | OUTPATIENT
Start: 2024-01-29 | End: 2024-01-31

## 2024-01-29 RX ORDER — BISACODYL 10 MG
10 SUPPOSITORY, RECTAL RECTAL
Status: DISCONTINUED | OUTPATIENT
Start: 2024-01-29 | End: 2024-01-31

## 2024-01-29 RX ORDER — OXYCODONE HYDROCHLORIDE 5 MG/1
2.5 TABLET ORAL EVERY 4 HOURS PRN
Status: DISCONTINUED | OUTPATIENT
Start: 2024-01-29 | End: 2024-01-31

## 2024-01-29 RX ORDER — HEPARIN SODIUM 5000 [USP'U]/ML
5000 INJECTION, SOLUTION INTRAVENOUS; SUBCUTANEOUS EVERY 12 HOURS SCHEDULED
Status: DISCONTINUED | OUTPATIENT
Start: 2024-01-29 | End: 2024-01-31

## 2024-01-29 RX ORDER — HYDROMORPHONE HYDROCHLORIDE 1 MG/ML
0.2 INJECTION, SOLUTION INTRAMUSCULAR; INTRAVENOUS; SUBCUTANEOUS EVERY 5 MIN PRN
Status: DISCONTINUED | OUTPATIENT
Start: 2024-01-29 | End: 2024-01-29 | Stop reason: HOSPADM

## 2024-01-29 RX ORDER — MIDAZOLAM HYDROCHLORIDE 1 MG/ML
1 INJECTION INTRAMUSCULAR; INTRAVENOUS EVERY 5 MIN PRN
Status: DISCONTINUED | OUTPATIENT
Start: 2024-01-29 | End: 2024-01-29 | Stop reason: HOSPADM

## 2024-01-29 RX ORDER — HYDROCODONE BITARTRATE AND ACETAMINOPHEN 5; 325 MG/1; MG/1
1 TABLET ORAL ONCE AS NEEDED
Status: DISCONTINUED | OUTPATIENT
Start: 2024-01-29 | End: 2024-01-29 | Stop reason: HOSPADM

## 2024-01-29 RX ORDER — HYDROMORPHONE HYDROCHLORIDE 1 MG/ML
0.2 INJECTION, SOLUTION INTRAMUSCULAR; INTRAVENOUS; SUBCUTANEOUS EVERY 2 HOUR PRN
Status: DISCONTINUED | OUTPATIENT
Start: 2024-01-29 | End: 2024-01-29 | Stop reason: DRUGHIGH

## 2024-01-29 RX ORDER — POLYETHYLENE GLYCOL 3350 17 G/17G
17 POWDER, FOR SOLUTION ORAL DAILY PRN
Status: DISCONTINUED | OUTPATIENT
Start: 2024-01-29 | End: 2024-01-29 | Stop reason: DRUGHIGH

## 2024-01-29 RX ORDER — NALOXONE HYDROCHLORIDE 0.4 MG/ML
80 INJECTION, SOLUTION INTRAMUSCULAR; INTRAVENOUS; SUBCUTANEOUS AS NEEDED
Status: DISCONTINUED | OUTPATIENT
Start: 2024-01-29 | End: 2024-01-29 | Stop reason: HOSPADM

## 2024-01-29 RX ORDER — ACETAMINOPHEN 500 MG
500 TABLET ORAL EVERY 4 HOURS PRN
Status: DISCONTINUED | OUTPATIENT
Start: 2024-01-29 | End: 2024-01-31

## 2024-01-29 RX ORDER — BISACODYL 10 MG
10 SUPPOSITORY, RECTAL RECTAL
Status: DISCONTINUED | OUTPATIENT
Start: 2024-01-29 | End: 2024-01-29

## 2024-01-29 RX ORDER — METOCLOPRAMIDE HYDROCHLORIDE 5 MG/ML
10 INJECTION INTRAMUSCULAR; INTRAVENOUS EVERY 8 HOURS PRN
Status: DISCONTINUED | OUTPATIENT
Start: 2024-01-29 | End: 2024-01-29 | Stop reason: DRUGHIGH

## 2024-01-29 RX ORDER — DOXEPIN HYDROCHLORIDE 50 MG/1
1 CAPSULE ORAL DAILY
Status: DISCONTINUED | OUTPATIENT
Start: 2024-01-30 | End: 2024-01-31

## 2024-01-29 RX ORDER — ENEMA 19; 7 G/133ML; G/133ML
1 ENEMA RECTAL ONCE AS NEEDED
Status: DISCONTINUED | OUTPATIENT
Start: 2024-01-29 | End: 2024-01-31

## 2024-01-29 RX ORDER — HYDROCODONE BITARTRATE AND ACETAMINOPHEN 5; 325 MG/1; MG/1
2 TABLET ORAL ONCE AS NEEDED
Status: DISCONTINUED | OUTPATIENT
Start: 2024-01-29 | End: 2024-01-29 | Stop reason: HOSPADM

## 2024-01-29 RX ORDER — CEFAZOLIN SODIUM/WATER 2 G/20 ML
2 SYRINGE (ML) INTRAVENOUS EVERY 8 HOURS
Status: CANCELLED | OUTPATIENT
Start: 2024-01-29 | End: 2024-01-30

## 2024-01-29 RX ORDER — HYDROMORPHONE HYDROCHLORIDE 1 MG/ML
0.4 INJECTION, SOLUTION INTRAMUSCULAR; INTRAVENOUS; SUBCUTANEOUS EVERY 2 HOUR PRN
Status: DISCONTINUED | OUTPATIENT
Start: 2024-01-29 | End: 2024-01-29 | Stop reason: DRUGHIGH

## 2024-01-29 RX ORDER — ONDANSETRON 2 MG/ML
4 INJECTION INTRAMUSCULAR; INTRAVENOUS EVERY 6 HOURS PRN
Status: DISCONTINUED | OUTPATIENT
Start: 2024-01-29 | End: 2024-01-29 | Stop reason: HOSPADM

## 2024-01-29 RX ORDER — SENNOSIDES 8.6 MG
17.2 TABLET ORAL NIGHTLY
Status: DISCONTINUED | OUTPATIENT
Start: 2024-01-29 | End: 2024-01-31

## 2024-01-29 RX ORDER — METOCLOPRAMIDE HYDROCHLORIDE 5 MG/ML
10 INJECTION INTRAMUSCULAR; INTRAVENOUS EVERY 8 HOURS PRN
Status: DISCONTINUED | OUTPATIENT
Start: 2024-01-29 | End: 2024-01-29 | Stop reason: HOSPADM

## 2024-01-29 RX ORDER — DOCUSATE SODIUM 100 MG/1
100 CAPSULE, LIQUID FILLED ORAL 2 TIMES DAILY
Status: DISCONTINUED | OUTPATIENT
Start: 2024-01-29 | End: 2024-01-31

## 2024-01-29 RX ORDER — SENNOSIDES 8.6 MG
17.2 TABLET ORAL NIGHTLY PRN
Status: DISCONTINUED | OUTPATIENT
Start: 2024-01-29 | End: 2024-01-31

## 2024-01-29 RX ORDER — ACETAMINOPHEN 325 MG/1
650 TABLET ORAL EVERY 6 HOURS PRN
Status: DISCONTINUED | OUTPATIENT
Start: 2024-01-29 | End: 2024-01-31

## 2024-01-29 RX ORDER — HYDROMORPHONE HYDROCHLORIDE 1 MG/ML
INJECTION, SOLUTION INTRAMUSCULAR; INTRAVENOUS; SUBCUTANEOUS
Status: COMPLETED
Start: 2024-01-29 | End: 2024-01-29

## 2024-01-29 RX ORDER — HYDRALAZINE HYDROCHLORIDE 20 MG/ML
10 INJECTION INTRAMUSCULAR; INTRAVENOUS EVERY 6 HOURS PRN
Status: DISCONTINUED | OUTPATIENT
Start: 2024-01-29 | End: 2024-01-31

## 2024-01-29 RX ORDER — ONDANSETRON 2 MG/ML
4 INJECTION INTRAMUSCULAR; INTRAVENOUS EVERY 6 HOURS PRN
Status: DISCONTINUED | OUTPATIENT
Start: 2024-01-29 | End: 2024-01-31

## 2024-01-29 RX ORDER — SODIUM CHLORIDE 9 MG/ML
INJECTION, SOLUTION INTRAVENOUS CONTINUOUS
Status: ACTIVE | OUTPATIENT
Start: 2024-01-29 | End: 2024-01-30

## 2024-01-29 RX ORDER — HYDROMORPHONE HYDROCHLORIDE 1 MG/ML
0.2 INJECTION, SOLUTION INTRAMUSCULAR; INTRAVENOUS; SUBCUTANEOUS EVERY 2 HOUR PRN
Status: DISCONTINUED | OUTPATIENT
Start: 2024-01-29 | End: 2024-01-31

## 2024-01-29 RX ORDER — MELATONIN
3 NIGHTLY PRN
Status: DISCONTINUED | OUTPATIENT
Start: 2024-01-29 | End: 2024-01-31

## 2024-01-29 RX ADMIN — SODIUM CHLORIDE: 9 INJECTION, SOLUTION INTRAVENOUS at 18:59:00

## 2024-01-29 RX ADMIN — MIDAZOLAM HYDROCHLORIDE 2 MG: 1 INJECTION INTRAMUSCULAR; INTRAVENOUS at 18:36:00

## 2024-01-29 NOTE — ED INITIAL ASSESSMENT (HPI)
Patient sitting in a recliner when ambulating patient's right hip, leg and foot all went numb with discomfort.  Patient noticed that hip was out of socket.  Previously happened Dec 31, 2023, patient treated here at ED.  Patient had right hip replacement Dec 2, 2022.  Medics administered fentanyl to patient on way over and pain currently 2/10

## 2024-01-29 NOTE — ED PROVIDER NOTES
Patient Seen in: [facility] Emergency Department      History     Chief Complaint   Patient presents with    Dislocation     Dislocation of right hip     Stated Complaint:     Subjective:   HPI    68-year-old female history of right total hip replacement status post right hip arthroplasty 12/2/2022 presents to ED with complaint of likely right hip dislocation.  She states that she just got out of rehab and forgot to follow the hip precautions and sat forward too much to stand up over flexing her right hip and then felt a pop and now having pain.  She arrives with right leg shortened and internally rotated.  She states that she has had recurrent hip dislocations and states that this is her sixth hip dislocation.  She tells me that about half the time and has to be reduced in the OR and half the time it can be reduced in the ER.    Objective:   Past Medical History:   Diagnosis Date    Achilles tendonitis     Arthritis     Asthma     Atherosclerosis     with ectasia    Back problem     back surgery lumbar    Cancer (HCC) 09/2004    rt breast dcis    Carpal tunnel syndrome 05/29/2009    bilateral    Cataract     Chronic foot pain     right    Diarrhea, unspecified     Duodenitis 12/18/2008    peptic    Dyslipidemia     Dysphagia     Essential hypertension     Exposure to radiation     Fasciitis     Fatigue     Food intolerance     Foot fracture, left 10/2008    left foot fx: excessive walking    H. pylori infection     Hammertoe     second toe right foot    Heart valve disease     Hemorrhoids     High blood pressure     History of blood transfusion 2023    no reactions    Hypercalcemia     IBS (irritable bowel syndrome)     Internal hemorrhoids 12/18/2008    Grade II    Irregular Z line of esophagus 12/18/2008    Localized primary carpometacarpal osteoarthritis 07/30/2012    Lupus (HCC)     Macular degeneration 12/07/2010    early signs, bilaterally, remained stable    Osteoarthritis     left thumb, severe     Osteomyelitis of right foot (HCC) 2018    [facility] ID, Dr. Allan    Osteoporosis     Pain in joints     Pneumonia due to organism     Postmenopausal atrophic vaginitis     Pulmonary fibrosis ([facility]) 2018    referring to pulm, possible Sjogren's involvement?    Rheumatoid arthritis ([facility])     Rheumatoid arthritis(714.0)     Sicca syndrome ([facility]) 2009    Sjogren's syndrome ([facility]) 10/2007    Stress fracture of the metatarsals     right foot    Systemic lupus erythematosus ([facility]) 2009    Visual impairment     glasses    Vitamin D deficiency               Past Surgical History:   Procedure Laterality Date    ADENOIDECTOMY      APPENDECTOMY  age 8    [place]    BACK SURGERY      CARPAL TUNNEL RELEASE Bilateral     CATARACT      COLONOSCOPY      COLONOSCOPY      DERMATOLOGY SHAVE BIOPSY (D1K.1)  2011    Shave biopsy, skin, right shoulder    FOOT SURGERY  2013    revision right foot surgery    FOOT/TOES SURGERY PROC UNLISTED  2011    first MTM joint fusion with ORIF of second and third MTs with correction of the second hammertoe of the right foot by Dr. Hatfield    KNEE REPLACEMENT SURGERY      LUMPECTOMY RIGHT      rt breast lumpectomy, [facility] BIOPSY STEREO NODULE 1 SITE RIGHT (CPT=19081)      OTHER SURGICAL HISTORY  2013    trigger thumb injections    RADIATION RIGHT  2004    MAMMOSITE    SPINE SURGERY PROCEDURE UNLISTED      L4-5    TONSILLECTOMY      TOTAL HIP REPLACEMENT Right     TOTAL KNEE REPLACEMENT Left 2018    UPPER GI ENDOSCOPY,EXAM  2008                Social History     Socioeconomic History    Marital status:    Tobacco Use    Smoking status: Former     Packs/day: 1.00     Years: 37.00     Additional pack years: 0.00     Total pack years: 37.00     Types: Cigarettes     Start date: 1970     Quit date: 2007     Years since quittin.6    Smokeless tobacco: Never    Tobacco comments:     quit in    Vaping Use    Vaping  Use: Never used   Substance and Sexual Activity    Alcohol use: Not Currently     Alcohol/week: 0.0 - 1.0 standard drinks of alcohol     Comment: Rare    Drug use: Not Currently     Comment: CBD gummies for pain   Other Topics Concern    Caffeine Concern Yes     Comment: 2-3 cups a day    Exercise No     Comment: minimal    Seat Belt Yes     Social Determinants of Health     Food Insecurity: No Food Insecurity (1/29/2024)    Food Insecurity     Food Insecurity: Never true   Transportation Needs: No Transportation Needs (1/29/2024)    Transportation Needs     Lack of Transportation: No   Housing Stability: Low Risk  (1/29/2024)    Housing Stability     Housing Instability: No              Review of Systems    Positive for stated complaint:   Other systems are as noted in HPI.  Constitutional and vital signs reviewed.      All other systems reviewed and negative except as noted above.    Physical Exam     ED Triage Vitals   BP 01/29/24 1307 155/80   Pulse 01/29/24 1307 64   Resp 01/29/24 1307 17   Temp 01/29/24 1655 98.1 °F (36.7 °C)   Temp src 01/29/24 1733 Oral   SpO2 01/29/24 1307 96 %   O2 Device 01/29/24 1307 None (Room air)       Current:/74 (BP Location: Right arm)   Pulse 58   Temp 98.7 °F (37.1 °C) (Oral)   Resp 17   Ht 162.6 cm (5' 4\")   Wt 97.5 kg   SpO2 95%   BMI 36.90 kg/m²         Physical Exam    Vital signs reviewed.  Nursing note reviewed.  Constitutional: Alert, well-appearing  Head: Normocephalic, atraumatic  Mouth: Moist  Eyes: Extraocular muscles intact, pupils equal  Skin: Warm and dry  Musculoskeletal right leg shortened and internally rotated, pain at right hip, 2+ right DP pulse, sensation intact to light touch  Neuro: Alert, at baseline, no focal neuro deficit.  Moves all extremities against gravity  Psych: Mood normal          ED Course   Labs Reviewed - No data to display          XR FLUOROSCOPY C-ARM TIME LESS THAN 1 HOUR (CPT=76000)    Result Date: 1/29/2024  PROCEDURE:  XR  FLUOROSCOPY C-ARM TIME <1 HOUR  (CPT=76000)  INDICATIONS:  Hip arthroscopy  COMPARISON:  [facility], XR FLUOROSCOPY C-ARM TIME LESS THAN 1 HOUR (CPT=76000), 9/13/2023, 6:40 PM.   TECHNIQUE:  FLUOROSCOPY IMAGES OBTAINED:  2 FLUOROSCOPY TIME:  6 seconds TECHNOLOGIST TIME:  30 mintues RADIATION DOSE (AIR KERMA PRODUCT):  .2630 mGy   FINDINGS:  Fluoroscopy provided for guidance. No radiologist was present for the procedure. There are expected intraoperative changes present. Please refer to the operative report for further details.            CONCLUSION:  See above.   LOCATION:  [facility]    Dictated by (CST): Jhonatan Aguillon MD on 1/29/2024 at 7:26 PM     Finalized by (CST): Jhonatan Aguillon MD on 1/29/2024 at 7:27 PM       XR HIP W OR WO PELVIS 1 VIEW, RIGHT (CPT=73501)    Result Date: 1/29/2024  PROCEDURE:  XR HIP W OR WO PELVIS 1 VIEW, RIGHT (CPT=73501)  TECHNIQUE:  Unilateral view of the hip.  COMPARISON:  [facility] , XR, XR HIP W OR WO PELVIS 1 VIEW, RIGHT (CPT=73501), 12/31/2023, 2:53 PM.  [facility] , XR, XR HIP W OR WO PELVIS 2 OR 3 VIEWS, RIGHT (CPT=73502), 1/29/2024, 1:44 PM.  [facility] , XR, XR HIP W OR WO PELVIS 1 VIEW, RIGHT (CPT=73501), 1/29/2024, 3:34 PM.  INDICATIONS:  right hip dislocation  PATIENT STATED HISTORY: (As transcribed by Technologist)  Pt. with right hip dislocation.    FINDINGS:   The right femoral prosthesis is dislocated superiorly.  This dislocation has improved since the initial radiographs from today.  However, the right femoral prosthesis is still dislocated.            CONCLUSION:  See above.   LOCATION:  [id number]   Dictated by (CST): Stromberg, LeRoy, MD on 1/29/2024 at 4:09 PM     Finalized by (CST): Stromberg, LeRoy, MD on 1/29/2024 at 4:10 PM       XR HIP W OR WO PELVIS 1 VIEW, RIGHT (CPT=73501)    Result Date: 1/29/2024  PROCEDURE:  XR HIP W OR WO PELVIS 1 VIEW, RIGHT (CPT=73501)  TECHNIQUE:  Unilateral view of the hip.  COMPARISON:  [facility] , XR, XR HIP W OR WO PELVIS 2 OR 3 VIEWS, RIGHT  (CPT=73502), 1/29/2024, 1:44 PM.  INDICATIONS:  hip dislocation  PATIENT STATED HISTORY: (As transcribed by Technologist)  Pt. with hip dislocation    FINDINGS:             CONCLUSION:   Persistent dislocation of the femoral component of the arthroplasty superior to the acetabular cup.  Is unclear on these images if it has been displaced anteriorly or posteriorly.  Chronic right inferior pubic ramus fracture is again noted.  Avulsion fracture off the right iliac wing versus heterotopic calcification has a chronic appearance as well.   LOCATION:  [facility]   Dictated by (CST): Saqib Moreno MD on 1/29/2024 at 4:09 PM     Finalized by (CST): Saqib Moreno MD on 1/29/2024 at 4:10 PM       XR HIP W OR WO PELVIS 2 OR 3 VIEWS, RIGHT (CPT=73502)    Result Date: 1/29/2024  PROCEDURE:  XR HIP W OR WO PELVIS 2 OR 3 VIEWS, RIGHT ( CPT=73502)  TECHNIQUE:  Unilateral 2 to 3 views of the hip and pelvis if performed.  COMPARISON:  [facility] , XR, XR HIP W OR WO PELVIS 1 VIEW, RIGHT (CPT=73501), 12/31/2023, 2:53 PM.  INDICATIONS:  pain  PATIENT STATED HISTORY: (As transcribed by Technologist)  Patient offered no additional history at this time.    FINDINGS:  There is superior dislocation of the right femoral prosthesis relative to the acetabulum.  There are postoperative changes from right hip arthroplasty.  There is a prominent plate and screws associated with the right acetabular component.  No acute displaced osseous fracture is seen.  There is a chronic lucency at the right iliac wing.             CONCLUSION:  Superior dislocation of the right femoral prosthesis.   LOCATION:  [id number]   Dictated by (CST): Stromberg, LeRoy, MD on 1/29/2024 at 2:08 PM     Finalized by (CST): Stromberg, LeRoy, MD on 1/29/2024 at 2:09 PM       XR HIP W OR WO PELVIS 1 VIEW, RIGHT (CPT=73501)    Result Date: 12/31/2023  PROCEDURE:  XR HIP W OR WO PELVIS 1 VIEW, RIGHT (CPT=73501)  TECHNIQUE:  Unilateral view of the hip.  COMPARISON:  12/31/2023, CT  hip 08/25/2023  INDICATIONS:  Hip pain  PATIENT STATED HISTORY: (As transcribed by Technologist)  Patient offered no additional history at this time.               CONCLUSION:   Interval reduction of the right hip.  Alignment is anatomic.  The hardware is grossly intact.  No periprosthetic fractures identified.  Expansion/erosion of the right medial acetabular wall is seen to better advantage on CT of 08/25/2023.  Scattered micro metallic particles noted about the right hip, as seen on prior imaging.  Moderate/severe osteoarthritis of left hip.  Obturator rings are intact.  Normal sacroiliac joints.  Partially visualized lumbar fusion hardware.   LOCATION:  [facility]   Dictated by (CST): Chandan Hargrove MD on 12/31/2023 at 3:14 PM     Finalized by (CST): Chandan Hargrove MD on 12/31/2023 at 3:16 PM       XR HIP W OR WO PELVIS 2 OR 3 VIEWS, RIGHT (CPT=73502)    Result Date: 12/31/2023  PROCEDURE:  XR HIP W OR WO PELVIS 2 OR 3 VIEWS, RIGHT ( CPT=73502)  TECHNIQUE:  Unilateral 2 to 3 views of the hip and pelvis if performed.  COMPARISON:  [facility], XR HIP W OR WO PELVIS 2 OR 3 VIEWS, RIGHT (CPT=73502), 10/07/2023, 5:59 PM.  [facility], XR HIP W OR WO PELVIS 1 VIEW, RIGHT (CPT=73501), 9/13/2023, 9:30 PM.  INDICATIONS:  Hip pain  PATIENT STATED HISTORY: (As transcribed by Technologist)  Patient shares that she stood up from a chair this morning and could not feel the ground, believes that her right hip popped out of placement. She shares that she had a right hip replacement in December 2022, and 5 other right hip disclocations since the surgery.    FINDINGS:  There are postsurgical changes of right total hip arthroplasty.  The femoral component is superiorly dislocated from the acetabular component.  There are small metallic fragments overlying the right hip joint, which is similar to prior.  No acute fracture identified.  There is a remote right inferior pubic ramus fracture.            CONCLUSION:   Right hip prosthetic joint dislocation.   LOCATION:  [facility]   Dictated by (CST): Mauricio Abbott MD on 12/31/2023 at 1:34 PM     Finalized by (CST): Mauricio Abbott MD on 12/31/2023 at 1:36 PM               [facility]      Medical Decision Making:    Differential diagnosis before testing includes hip dislocation, hip fracture potential life threatening diagnosis which is a medical condition that poses a threat to life/function.     Comorbidities that add complexity to management: Recurrent hip dislocations    I reviewed prior external ED notes including thoroughly reviewed her Ortho history including that she has had 2 hip revisions 5/23 and 9/23, history of MRSA joint infection last year in which she had a cage and liner.  Occasionally her hip dislocations and had to be brought to the OR for reduction.    Discussions of management with: Dr. Cantu, ortho     I personally reviewed the radiographs and my independent interpretation includes superiorly dislocated femoral component in relation acetabulum    Shared decision making:  Admission disposition: 1/29/2024  4:40 PM       CONSCIOUS SEDATION    PROCEDURE NOTE: Procedural Sedation    Performed by: Lashawn Hurtado    Universal Protocol: a time out was performed and the correct patient and site were verified    Consent: The risks and benefits of monitored anesthesia care, including the risk of aspiration, deep sedation requiring airway management including possible intubation, nausea/vomiting and the risks of not performing the procedure, including severe pain and inability to complete the procedure, were all discussed with the patient. The alternatives of performing the procedure, including local anesthesia and IV analgesia, also discussed. The patient has a ride home available.    Pre-anesthesia evaluation, including history, exam, and informed consent is documented in the note above.    Monitoring: Continuous monitoring of heart rate, respiratory rate, pulse oximetry and  ETCO2. Supplemental oxygen prior to and during procedure via nasal cannula. Resuscitation equipment available at the bedside during sedation.    Total service time: 17 min    See sedation form for total dosages recorded. The patient was recovered from the sedation without complication or incident. Patient returned to pre-sedation level of awareness. The monitoring was discontinued at this time.    Post-anesthesia evaluation:    Respiratory function, cardiovascular function, temperature, and mental status did return to pre-anesthetic state.    Pain was controlled.      PROCEDURE:  Reduction of right hip dislocation   The patient / caregivers were apprised of diagnostic / treatment options including alternate modes of care, in addition to risks and benefits, for this medical condition. Based on this discussion the patient / caregiver agree with reduction and verbal consent was obtained.    Timeout was performed and the correct patient, site, location was confirmed prior to initiation. Neurovascular status was assessed.  Patient obtained adequate analgesia via Propofol.    Captain elisha technique was utilized. Also, standing hip distraction, flexion and IR/ER attempted.  Reduction of hip performed by me on 3 separate occasions with audible loud clunk and correction of leg length discrepancy, and then leg subluxed back into dislocated position by time of repeat radiograph. Patient was neurovascularly intact post procedure as checked by me.  No complications.    Pt was immobilized using a hip abductor pillow.    Post procedure X-Ray was ordered showing improvement but continued dislocation.    Therefore, ortho consulted, Dr. Cantu to take pt to OR for reduction in OR.                               Medical Decision Making      Disposition and Plan     Clinical Impression:  1. Dislocation of right hip, initial encounter ([facility])         Disposition:  Admit  1/29/2024  4:40 pm    Follow-up:  No follow-up provider  specified.        Medications Prescribed:  Current Discharge Medication List                            Hospital Problems       Present on Admission  Date Reviewed: 9/13/2023            ICD-10-CM Noted POA    * (Principal) Dislocation of right hip, initial encounter ([facility]) S73.004A 10/7/2023 Unknown

## 2024-01-29 NOTE — H&P
[facility]     Ngozi Collazo Patient Status:  Emergency    1955 MRN [id number]   Location [facility] EMERGENCY DEPARTMENT Attending Lashawn Hurtado DO   Hosp Day # 0 PCP Irma Valera DO     Chief Complaint: \"I think I dislocated my right hip\"    Subjective:    History of Present Illness:     Ngozi Collazo is a 68 year old female with PMHx peripheral neuropathy, asthma, lupus, OA, morbid obesity presents to the ER w/ discomfort to her right hip. Pt had a dislocation on her hip previously multiple times. She said she was sitting in her chair and all of a sudden felt it \"pop out\". She does have hip to that area. She otherwise denies any other systemic symptoms of cp, sob, fevers, chills, n/v/diarrhea.         History/Other:    Past Medical History:  Past Medical History:   Diagnosis Date    Achilles tendonitis     Arthritis     Asthma     Atherosclerosis     with ectasia    Back problem     back surgery lumbar    Cancer (HCC) 2004    rt breast dcis    Carpal tunnel syndrome 2009    bilateral    Cataract     Chronic foot pain     right    Diarrhea, unspecified     Duodenitis 2008    peptic    Dyslipidemia     Dysphagia     Essential hypertension     Exposure to radiation     Fasciitis     Fatigue     Food intolerance     Foot fracture, left 10/2008    left foot fx: excessive walking    H. pylori infection     Hammertoe     second toe right foot    Heart valve disease     Hemorrhoids     High blood pressure     History of blood transfusion     no reactions    Hypercalcemia     IBS (irritable bowel syndrome)     Internal hemorrhoids 2008    Grade II    Irregular Z line of esophagus 2008    Localized primary carpometacarpal osteoarthritis 2012    Lupus (HCC)     Macular degeneration 2010    early signs, bilaterally, remained stable    Osteoarthritis     left thumb, severe    Osteomyelitis of right foot (HCC) 2018    [facility] OSCAR Dr.  Shankaran    Osteoporosis     Pain in joints     Pneumonia due to organism     Postmenopausal atrophic vaginitis     Pulmonary fibrosis (HCC) 07/2018    referring to pulm, possible Sjogren's involvement?    Rheumatoid arthritis (HCC)     Rheumatoid arthritis(714.0)     Sicca syndrome (formerly [facility]) 02/26/2009    Sjogren's syndrome (formerly [facility]) 10/2007    Stress fracture of the metatarsals     right foot    Systemic lupus erythematosus (formerly [facility]) 02/26/2009    Visual impairment     glasses    Vitamin D deficiency      Past Surgical History:   Past Surgical History:   Procedure Laterality Date    ADENOIDECTOMY      APPENDECTOMY  age 8    [facility]    BACK SURGERY      CARPAL TUNNEL RELEASE Bilateral     CATARACT      COLONOSCOPY      COLONOSCOPY      DERMATOLOGY SHAVE BIOPSY (D1K.1)  12/29/2011    Shave biopsy, skin, right shoulder    FOOT SURGERY  04/2013    revision right foot surgery    FOOT/TOES SURGERY PROC UNLISTED  09/2011    first MTM joint fusion with ORIF of second and third MTs with correction of the second hammertoe of the right foot by Dr. Hatfield    KNEE REPLACEMENT SURGERY      LUMPECTOMY RIGHT  2004    rt breast lumpectomy, [facility]    LUDY BIOPSY STEREO NODULE 1 SITE RIGHT (CPT=19081)  2004    OTHER SURGICAL HISTORY  01/11/2013    trigger thumb injections    RADIATION RIGHT  2004    MAMMOSITE    SPINE SURGERY PROCEDURE UNLISTED      L4-5    TONSILLECTOMY      TOTAL HIP REPLACEMENT Right     TOTAL KNEE REPLACEMENT Left 05/24/2018    UPPER GI ENDOSCOPY,EXAM  12/18/2008      Family History:   Family History   Problem Relation Age of Onset    Heart Disorder Father     Other (Other) Father     Other (Pulmonary Embolism) Father     Diabetes Mother     Asthma Mother     Other (Other) Mother     Other (Abdominal Aortic Aneurysm) Other     Fibromyalgia Sister     Asthma Sister     Breast Cancer Self 50    DCIS Self     No Known Problems Son     No Known Problems Brother      Social History:    reports that she quit smoking  about 16 years ago. Her smoking use included cigarettes. She started smoking about 54 years ago. She has a 37 pack-year smoking history. She has never used smokeless tobacco. She reports that she does not currently use alcohol. She reports that she does not currently use drugs.     Allergies:   Allergies   Allergen Reactions    Allergy ANAPHYLAXIS and SWELLING     Bee Stings, Catgut Sutures      Bee Sting [Bee Venom] SWELLING     Bee sting    Ioxaglate DIZZINESS    Erythromycin Base NAUSEA ONLY    Vicryl Sutures OTHER (SEE COMMENTS)     Cat gut sutures as a child, developed an infection       Medications:    Current Facility-Administered Medications on File Prior to Encounter   Medication Dose Route Frequency Provider Last Rate Last Admin    [COMPLETED] morphINE PF 4 MG/ML injection 4 mg  4 mg Intravenous Once Radames Magana MD   4 mg at 12/31/23 1201    [COMPLETED] propofol (Diprivan) 10 MG/ML injection 100 mg  100 mg Intravenous Once Radames Magana MD   80 mg at 12/31/23 1404     Current Outpatient Medications on File Prior to Encounter   Medication Sig Dispense Refill    pregabalin 75 MG Oral Cap Take 1 capsule (75 mg total) by mouth nightly.      sodium chloride 0.65 % Nasal Solution 2 sprays by Nasal route as needed for congestion.      BREO ELLIPTA 200-25 MCG/ACT Inhalation Aerosol Powder, Breath Activated USE 1 INHALATION ORALLY    ONCE DAILY AS DIRECTED (Patient taking differently: Inhale 1 puff into the lungs daily.) 3 each 3    hydroxychloroquine 200 MG Oral Tab Take 1 tablet (200 mg total) by mouth 2 (two) times daily. 180 tablet 1    pregabalin 50 MG Oral Cap TAKE 1 CAPSULE EVERY       MORNING WITH 75MG IN THE   EVENING (Patient taking differently: Take 1 capsule (50 mg total) by mouth every morning.) 90 capsule 0    Cevimeline HCl 30 MG Oral Cap Take 1 capsule (30 mg total) by mouth 3 (three) times daily. 270 capsule 2    ALBUTEROL SULFATE  (90 Base) MCG/ACT Inhalation Aero Soln USE 2  INHALATIONS ORALLY   EVERY 6 HOURS AS NEEDED FORWHEEZING (APPOINTMENT      NEEDED FOR MORE REFILLS) (Patient taking differently: Inhale 2 puffs into the lungs every 4 (four) hours as needed for Wheezing or Shortness of Breath.) 3 Inhaler 3    EPINEPHrine (EPIPEN 2-RACHELE) 0.3 MG/0.3ML Injection Solution Auto-injector Inject 0.3 mL (1 each total) as directed as needed. 1 each 1    PREVIDENT 5000 DRY MOUTH 1.1 % Dental Gel Place 1 Application onto teeth daily.        aspirin 81 MG Oral Tab EC Take 1 tablet (81 mg total) by mouth in the morning and 1 tablet (81 mg total) before bedtime. (Patient not taking: Reported on 2024) 30 tablet 0    [] tiZANidine 2 MG Oral Tab Take 0.5 tablets (1 mg total) by mouth every 6 (six) hours as needed. 20 tablet 0    hydroCHLOROthiazide 25 MG Oral Tab Take 1 tablet (25 mg total) by mouth daily. (Patient not taking: Reported on 2024) 90 tablet 3    Zinc 50 MG Oral Cap Take 50 mg by mouth daily. (Patient not taking: Reported on 2024)         Review of Systems:   A comprehensive review of systems was completed.    Pertinent positives and negatives noted in the HPI.    Objective:   Physical Exam:    /79   Pulse 55   Resp (!) 33   Ht 5' 4\" (1.626 m)   Wt 215 lb (97.5 kg)   SpO2 95%   BMI 36.90 kg/m²   General: No acute distress, Alert  Respiratory: No rhonchi, no wheezes  Cardiovascular: S1, S2. Regular rate and rhythm  Abdomen: Soft, Non-tender, non-distended, positive bowel sounds  Neuro: No new focal deficits  Extremities: No edema      Results:    Labs:      Labs Last 24 Hours:    No results for input(s): \"RBC\", \"HGB\", \"HCT\", \"MCV\", \"MCH\", \"MCHC\", \"RDW\", \"NEPRELIM\", \"WBC\", \"PLT\" in the last 168 hours.    No results for input(s): \"GLU\", \"BUN\", \"CREATSERUM\", \"GFRAA\", \"GFRNAA\", \"EGFRCR\", \"CA\", \"ALB\", \"NA\", \"K\", \"CL\", \"CO2\", \"ALKPHO\", \"AST\", \"ALT\", \"BILT\", \"TP\" in the last 168 hours.    Lab Results   Component Value Date    PT 12.5 2011    INR 1.10  10/08/2023    INR 1.05 10/07/2023    INR 1.08 09/21/2023       No results for input(s): \"TROP\", \"TROPHS\", \"CK\" in the last 168 hours.    No results for input(s): \"TROP\", \"PBNP\" in the last 168 hours.    No results for input(s): \"PCT\" in the last 168 hours.    Imaging: Imaging data reviewed in Epic.    Assessment & Plan:      #Persistent dislocation of the femoral component of the arthroplasty superior to the acetabular cup  -Orthopedic surgery to see; plan for OR possibly later today  -S/p attempted reduction of the hip in the ER  -Pain control    #Peripheral neuropathy    #Asthma  #Lupus  #Osteoarthritis    #Breast cancer    #Morbid obesity  -BMI 36.90    #Code status  -full code, 16 mins spent face to face w/ patient reviewing the meaning of cardiac arrest and the use of cpr/acls protocol. This was a voluntary conversation. Order updated on epic.         Plan of care discussed with ER physician & patient, patient's RN    Zi Whalen DO    Supplementary Documentation:     The 21st Century Cures Act makes medical notes like these available to patients in the interest of transparency. Please be advised this is a medical document. Medical documents are intended to carry relevant information, facts as evident, and the clinical opinion of the practitioner. The medical note is intended as peer to peer communication and may appear blunt or direct. It is written in medical language and may contain abbreviations or verbiage that are unfamiliar.

## 2024-01-29 NOTE — RESPIRATORY THERAPY NOTE
Called for reduction right hip.  End tidal at bedside with nasal canuula at 4lpm reading 40.  Mask/bag at bedside with suction set up. Patient tolerated well.  Did need jaw thrust dusting procedure.

## 2024-01-29 NOTE — ED QUICK NOTES
Contacted [facility] to let them know we will be doing a conscious sedation on this patient in order to put right hip back in place

## 2024-01-29 NOTE — ANESTHESIA PREPROCEDURE EVALUATION
PRE-OP EVALUATION    Patient Name: Ngozi Collazo    Admit Diagnosis: No admission diagnoses are documented for this encounter.    Pre-op Diagnosis: Dislocation, hip closed, right, sequela [S73.004S]    HIP CLOSED REDUCTION right    Anesthesia Procedure: HIP CLOSED REDUCTION right (Right)    Surgeon(s) and Role:     * Chanel Cantu MD - Primary    Pre-op vitals reviewed.  Temp: 98 °F (36.7 °C)  Pulse: 58  Resp: 19  BP: 138/75  SpO2: 96 %  Body mass index is 36.9 kg/m².    Current medications reviewed.  Hospital Medications:   [COMPLETED] propofol (Diprivan) 10 MG/ML injection 97.5 mg  1 mg/kg Intravenous Once    [COMPLETED] propofol (Diprivan) 10 MG/ML injection 50 mg  50 mg Intravenous Once    [COMPLETED] propofol (Diprivan) 10 MG/ML injection 100 mg  100 mg Intravenous Once    [COMPLETED] propofol (Diprivan) 10 MG/ML injection 50 mg  50 mg Intravenous Once    [COMPLETED] morphINE PF 4 MG/ML injection 4 mg  4 mg Intravenous Once    [MAR Hold] hydrALAzine (Apresoline) 20 mg/mL injection 10 mg  10 mg Intravenous Q6H PRN    [MAR Hold] acetaminophen (Tylenol Extra Strength) tab 500 mg  500 mg Oral Q4H PRN    [MAR Hold] melatonin tab 3 mg  3 mg Oral Nightly PRN    [MAR Hold] ondansetron (Zofran) 4 MG/2ML injection 4 mg  4 mg Intravenous Q6H PRN    [MAR Hold] metoclopramide (Reglan) 5 mg/mL injection 10 mg  10 mg Intravenous Q8H PRN    sodium chloride 0.9% infusion   Intravenous Continuous    [MAR Hold] heparin (Porcine) 5000 UNIT/ML injection 5,000 Units  5,000 Units Subcutaneous 2 times per day    [MAR Hold] HYDROmorphone (Dilaudid) 1 MG/ML injection 0.2 mg  0.2 mg Intravenous Q2H PRN    Or    [MAR Hold] HYDROmorphone (Dilaudid) 1 MG/ML injection 0.4 mg  0.4 mg Intravenous Q2H PRN    Or    [MAR Hold] HYDROmorphone (Dilaudid) 1 MG/ML injection 0.8 mg  0.8 mg Intravenous Q2H PRN    [MAR Hold] polyethylene glycol (PEG 3350) (Miralax) 17 g oral packet 17 g  17 g Oral Daily PRN    [MAR Hold] sennosides (Senokot) tab 17.2 mg   17.2 mg Oral Nightly PRN    [MAR Hold] bisacodyl (Dulcolax) 10 MG rectal suppository 10 mg  10 mg Rectal Daily PRN    [MAR Hold] fleet enema (Fleet) 7-19 GM/118ML rectal enema 133 mL  1 enema Rectal Once PRN       Outpatient Medications:     Medications Prior to Admission   Medication Sig Dispense Refill Last Dose    pregabalin 75 MG Oral Cap Take 1 capsule (75 mg total) by mouth nightly.   Taking    sodium chloride 0.65 % Nasal Solution 2 sprays by Nasal route as needed for congestion.   Taking    BREO ELLIPTA 200-25 MCG/ACT Inhalation Aerosol Powder, Breath Activated USE 1 INHALATION ORALLY    ONCE DAILY AS DIRECTED (Patient taking differently: Inhale 1 puff into the lungs daily.) 3 each 3 Taking    hydroxychloroquine 200 MG Oral Tab Take 1 tablet (200 mg total) by mouth 2 (two) times daily. 180 tablet 1 Taking    pregabalin 50 MG Oral Cap TAKE 1 CAPSULE EVERY       MORNING WITH 75MG IN THE   EVENING (Patient taking differently: Take 1 capsule (50 mg total) by mouth every morning.) 90 capsule 0 Taking    Cevimeline HCl 30 MG Oral Cap Take 1 capsule (30 mg total) by mouth 3 (three) times daily. 270 capsule 2 Taking    ALBUTEROL SULFATE  (90 Base) MCG/ACT Inhalation Aero Soln USE 2 INHALATIONS ORALLY   EVERY 6 HOURS AS NEEDED FORWHEEZING (APPOINTMENT      NEEDED FOR MORE REFILLS) (Patient taking differently: Inhale 2 puffs into the lungs every 4 (four) hours as needed for Wheezing or Shortness of Breath.) 3 Inhaler 3 Taking    EPINEPHrine (EPIPEN 2-RACHELE) 0.3 MG/0.3ML Injection Solution Auto-injector Inject 0.3 mL (1 each total) as directed as needed. 1 each 1 Taking    PREVIDENT 5000 DRY MOUTH 1.1 % Dental Gel Place 1 Application onto teeth daily.     Taking    aspirin 81 MG Oral Tab EC Take 1 tablet (81 mg total) by mouth in the morning and 1 tablet (81 mg total) before bedtime. (Patient not taking: Reported on 2024) 30 tablet 0 Not Taking    [] tiZANidine 2 MG Oral Tab Take 0.5 tablets (1 mg  total) by mouth every 6 (six) hours as needed. 20 tablet 0     hydroCHLOROthiazide 25 MG Oral Tab Take 1 tablet (25 mg total) by mouth daily. (Patient not taking: Reported on 1/29/2024) 90 tablet 3 Not Taking    Zinc 50 MG Oral Cap Take 50 mg by mouth daily. (Patient not taking: Reported on 1/29/2024)   Not Taking       Allergies: Allergy, Bee sting [bee venom], Ioxaglate, Erythromycin base, and Vicryl sutures      Anesthesia Evaluation    Patient summary reviewed.    Anesthetic Complications  (-) history of anesthetic complications         GI/Hepatic/Renal                            (+) irritable bowel syndrome     Cardiovascular                (+) obesity  (+) hypertension   (+) hyperlipidemia                                  Endo/Other               (+) anemia            (+) arthritis  (+) rheumatoid arthritis     Pulmonary  Comment: ILD    (+) asthma                     Neuro/Psych                 (+) neuromuscular disease             SLE        Past Surgical History:   Procedure Laterality Date    ADENOIDECTOMY      APPENDECTOMY  age 8    [facility]    BACK SURGERY      CARPAL TUNNEL RELEASE Bilateral     CATARACT      COLONOSCOPY      COLONOSCOPY      DERMATOLOGY SHAVE BIOPSY (D1K.1)  12/29/2011    Shave biopsy, skin, right shoulder    FOOT SURGERY  04/2013    revision right foot surgery    FOOT/TOES SURGERY PROC UNLISTED  09/2011    first MTM joint fusion with ORIF of second and third MTs with correction of the second hammertoe of the right foot by Dr. Hatfield    KNEE REPLACEMENT SURGERY      LUMPECTOMY RIGHT  2004    rt breast lumpectomy, [facility]    LUDY BIOPSY STEREO NODULE 1 SITE RIGHT (CPT=19081)  2004    OTHER SURGICAL HISTORY  01/11/2013    trigger thumb injections    RADIATION RIGHT  2004    MAMMOSITE    SPINE SURGERY PROCEDURE UNLISTED      L4-5    TONSILLECTOMY      TOTAL HIP REPLACEMENT Right     TOTAL KNEE REPLACEMENT Left 05/24/2018    UPPER GI ENDOSCOPY,EXAM  12/18/2008     Social History      Socioeconomic History    Marital status:    Tobacco Use    Smoking status: Former     Packs/day: 1.00     Years: 37.00     Additional pack years: 0.00     Total pack years: 37.00     Types: Cigarettes     Start date: 1970     Quit date: 2007     Years since quittin.6    Smokeless tobacco: Never    Tobacco comments:     quit in 2006   Vaping Use    Vaping Use: Never used   Substance and Sexual Activity    Alcohol use: Not Currently     Alcohol/week: 0.0 - 1.0 standard drinks of alcohol     Comment: Rare    Drug use: Not Currently     Comment: CBD gummies for pain   Other Topics Concern    Caffeine Concern Yes     Comment: 2-3 cups a day    Exercise No     Comment: minimal    Seat Belt Yes     History   Drug Use Unknown     Comment: CBD gummies for pain     Available pre-op labs reviewed.               Airway      Mallampati: II  Mouth opening: 3 FB  TM distance: 4 - 6 cm  Neck ROM: full Cardiovascular      Rhythm: regular  Rate: normal     Dental  Comment: No loose teeth reported by patient           Pulmonary      Breath sounds clear to auscultation bilaterally.               Other findings              ASA: 3   Plan: general  NPO status verified and patient meets guidelines.        Comment: Discussed general anesthesia with endotracheal tube/supraglottic airway with patient. Discussed risk of oral/dental trauma, nausea, rare allergic reactions. Patient understands the risks, benefits, and requirement for anesthesia and willing to proceed. Consent signed.       Plan/risks discussed with: patient                Present on Admission:  **None**

## 2024-01-30 LAB
ALBUMIN SERPL-MCNC: 3 G/DL (ref 3.4–5)
ALBUMIN/GLOB SERPL: 1.1 {RATIO} (ref 1–2)
ALP LIVER SERPL-CCNC: 88 U/L
ANION GAP SERPL CALC-SCNC: 5 MMOL/L (ref 0–18)
AST SERPL-CCNC: 13 U/L (ref 15–37)
BASOPHILS # BLD AUTO: 0.05 X10(3) UL (ref 0–0.2)
BASOPHILS NFR BLD AUTO: 1.4 %
BILIRUB SERPL-MCNC: 0.6 MG/DL (ref 0.1–2)
BUN BLD-MCNC: 16 MG/DL (ref 9–23)
CALCIUM BLD-MCNC: 8.8 MG/DL (ref 8.5–10.1)
CHLORIDE SERPL-SCNC: 111 MMOL/L (ref 98–112)
CO2 SERPL-SCNC: 27 MMOL/L (ref 21–32)
CREAT BLD-MCNC: 0.68 MG/DL
EGFRCR SERPLBLD CKD-EPI 2021: 94 ML/MIN/1.73M2 (ref 60–?)
EOSINOPHIL # BLD AUTO: 0.21 X10(3) UL (ref 0–0.7)
EOSINOPHIL NFR BLD AUTO: 5.9 %
ERYTHROCYTE [DISTWIDTH] IN BLOOD BY AUTOMATED COUNT: 14.7 %
GLOBULIN PLAS-MCNC: 2.8 G/DL (ref 2.8–4.4)
GLUCOSE BLD-MCNC: 98 MG/DL (ref 70–99)
HCT VFR BLD AUTO: 33.1 %
HGB BLD-MCNC: 10.4 G/DL
IMM GRANULOCYTES # BLD AUTO: 0.02 X10(3) UL (ref 0–1)
IMM GRANULOCYTES NFR BLD: 0.6 %
LYMPHOCYTES # BLD AUTO: 0.76 X10(3) UL (ref 1–4)
LYMPHOCYTES NFR BLD AUTO: 21.3 %
MAGNESIUM SERPL-MCNC: 2.2 MG/DL (ref 1.6–2.6)
MCH RBC QN AUTO: 29.4 PG (ref 26–34)
MCHC RBC AUTO-ENTMCNC: 31.4 G/DL (ref 31–37)
MCV RBC AUTO: 93.5 FL
MONOCYTES # BLD AUTO: 0.43 X10(3) UL (ref 0.1–1)
MONOCYTES NFR BLD AUTO: 12 %
NEUTROPHILS # BLD AUTO: 2.1 X10 (3) UL (ref 1.5–7.7)
NEUTROPHILS # BLD AUTO: 2.1 X10(3) UL (ref 1.5–7.7)
NEUTROPHILS NFR BLD AUTO: 58.8 %
OSMOLALITY SERPL CALC.SUM OF ELEC: 297 MOSM/KG (ref 275–295)
PLATELET # BLD AUTO: 158 10(3)UL (ref 150–450)
POTASSIUM SERPL-SCNC: 3.7 MMOL/L (ref 3.5–5.1)
PROT SERPL-MCNC: 5.8 G/DL (ref 6.4–8.2)
RBC # BLD AUTO: 3.54 X10(6)UL
SODIUM SERPL-SCNC: 143 MMOL/L (ref 136–145)
WBC # BLD AUTO: 3.6 X10(3) UL (ref 4–11)

## 2024-01-30 PROCEDURE — 99232 SBSQ HOSP IP/OBS MODERATE 35: CPT | Performed by: HOSPITALIST

## 2024-01-30 RX ORDER — CEVIMELINE HYDROCHLORIDE 30 MG/1
30 CAPSULE ORAL 2 TIMES DAILY
Status: DISCONTINUED | OUTPATIENT
Start: 2024-01-30 | End: 2024-01-31

## 2024-01-30 RX ORDER — ALBUTEROL SULFATE 90 UG/1
2 AEROSOL, METERED RESPIRATORY (INHALATION) EVERY 4 HOURS PRN
Status: DISCONTINUED | OUTPATIENT
Start: 2024-01-30 | End: 2024-01-31

## 2024-01-30 RX ORDER — PREGABALIN 50 MG/1
50 CAPSULE ORAL EVERY MORNING
Status: DISCONTINUED | OUTPATIENT
Start: 2024-01-30 | End: 2024-01-31

## 2024-01-30 RX ORDER — PREGABALIN 75 MG/1
75 CAPSULE ORAL NIGHTLY
Status: DISCONTINUED | OUTPATIENT
Start: 2024-01-30 | End: 2024-01-31

## 2024-01-30 RX ORDER — HYDROXYCHLOROQUINE SULFATE 200 MG/1
200 TABLET, FILM COATED ORAL 2 TIMES DAILY
Status: DISCONTINUED | OUTPATIENT
Start: 2024-01-30 | End: 2024-01-31

## 2024-01-30 RX ORDER — FLUTICASONE FUROATE AND VILANTEROL 200; 25 UG/1; UG/1
1 POWDER RESPIRATORY (INHALATION) DAILY
Status: DISCONTINUED | OUTPATIENT
Start: 2024-01-30 | End: 2024-01-31

## 2024-01-30 NOTE — PROGRESS NOTES
[facility]     Hospitalist Progress Note     Ngozi Collazo Patient Status:  Observation    1955 MRN [id number]   Location [facility] 3SW-A Attending Lamine Allan MD   Hosp Day # 0 PCP Irma Valera DO     Chief Complaint: Hip dislocation     Subjective:   Patient admits to right hip pain with movement. No chest pain or shortness of breath. No nausea, vomiting, diarrhea.     Current medications:   fluticasone furoate-vilanterol  1 puff Inhalation Daily    [Held by provider] Cevimeline HCl  30 mg Oral BID    hydroxychloroquine  200 mg Oral BID    pregabalin  50 mg Oral QAM    pregabalin  75 mg Oral Nightly    heparin  5,000 Units Subcutaneous 2 times per day    sennosides  17.2 mg Oral Nightly    docusate sodium  100 mg Oral BID    multivitamin  1 tablet Oral Daily       Objective:    Review of Systems:   10 point ROS completed and was negative, except for pertinent positive and negatives stated in subjective.    Vital signs:  Temp:  [97.8 °F (36.6 °C)-98.7 °F (37.1 °C)] 97.8 °F (36.6 °C)  Pulse:  [53-68] 60  Resp:  [12-33] 18  BP: (100-156)/(57-98) 133/67  SpO2:  [92 %-100 %] 95 %  Patient Weight for the past 72 hrs:   Weight   24 1307 215 lb (97.5 kg)     Physical Exam:    General: No acute distress.   Respiratory: Clear to auscultation bilaterally.   Cardiovascular: S1, S2. Regular rate and rhythm.   Abdomen: Soft, nontender, nondistended.  Positive bowel sounds.  Extremities: No edema.  Neuro: AAOx3    Diagnostic Data:    Labs:  Recent Labs   Lab 24  0535   WBC 3.6*   HGB 10.4*   MCV 93.5   .0       Recent Labs   Lab 24  0535   GLU 98   BUN 16   CREATSERUM 0.68   CA 8.8   ALB 3.0*      K 3.7      CO2 27.0   ALKPHO 88   AST 13*   BILT 0.6   TP 5.8*       Estimated Creatinine Clearance: 66.5 mL/min (based on SCr of 0.68 mg/dL).    No results for input(s): \"PTP\", \"INR\" in the last 168 hours.         COVID-19 Lab Results    COVID-19  Lab Results   Component Value  Date    COVID19 Not Detected 05/17/2023    COVID19 Not Detected 05/12/2023    COVID19 Not Detected 04/11/2023       Pro-Calcitonin  No results for input(s): \"PCT\" in the last 168 hours.    Cardiac  No results for input(s): \"TROP\", \"PBNP\" in the last 168 hours.    Creatinine Kinase  No results for input(s): \"CK\" in the last 168 hours.    Inflammatory Markers  No results for input(s): \"CRP\", \"LUCIANA\", \"LDH\", \"DDIMER\" in the last 168 hours.    No results for input(s): \"TROP\", \"TROPHS\", \"CK\" in the last 168 hours.    Imaging: Imaging data reviewed in Epic.    Medications:    fluticasone furoate-vilanterol  1 puff Inhalation Daily    [Held by provider] Cevimeline HCl  30 mg Oral BID    hydroxychloroquine  200 mg Oral BID    pregabalin  50 mg Oral QAM    pregabalin  75 mg Oral Nightly    heparin  5,000 Units Subcutaneous 2 times per day    sennosides  17.2 mg Oral Nightly    docusate sodium  100 mg Oral BID    multivitamin  1 tablet Oral Daily       Assessment & Plan:    Right hip dislocation sp reduction in ER but with continued dislocation sp closed reduction in the OR 1/29  History of right hip replacement  Pain control  PT/OT  Ortho following   COPD  BD  Rheumatoid arthritis  Sjogren's   Lupus  Plaquenil  Follow-up with rheumatologist re: Leflunomide  Neuropathy  Resume Lyrica  Dyslipidemia  Breast cancer    Supplementary Documentation:   Quality:  DVT Prophylaxis: Heparin     At this point Ms. Collazo is expected to be discharge to: TBD - await PT    Plan of care discussed with patient, RN and SW.    Lamine Allan MD

## 2024-01-30 NOTE — BRIEF OP NOTE
Pre-Operative Diagnosis: Dislocation, hip closed, right, sequela [S73.004S]     Post-Operative Diagnosis: Dislocation, hip closed, right, sequela [S73.004S]      Procedure Performed: Closed reduction anesthesia right total hip arthroplasty dislocation, posterior    Surgeon(s) and Role:     * Chanel Cantu MD - Primary    Assistant(s):   [facility] OR staff     Surgical Findings: No evidence of periprosthetic fracture     Specimen: None     Estimated Blood Loss: No data recorded      Chanel Cantu MD  1/29/2024  7:13 PM

## 2024-01-30 NOTE — PLAN OF CARE
Received from PACU around 2000. Patient A & O x4. VSS, on RA. C/o mod pain, PO oxy given. Abductor pillow in place. Purewick in place. DTV by 0200. HOB no > than 30 degrees per order. Safety measures in place. Instructed to use call light.

## 2024-01-30 NOTE — OPERATIVE REPORT
[facility]    PATIENT'S NAME: PJ BROWN   ATTENDING PHYSICIAN: Zi Whalen DO   OPERATING PHYSICIAN: Chanel Cantu M.D.   PATIENT ACCOUNT#:   [id number]    LOCATION:  [address] [place] [address]  MEDICAL RECORD #:   [id number]       YOB: 1955  ADMISSION DATE:       01/29/2024      OPERATION DATE:  01/29/2024    OPERATIVE REPORT      PREOPERATIVE DIAGNOSIS:  Right total hip arthroplasty dislocation.  POSTOPERATIVE DIAGNOSIS:  Right total hip arthroplasty posterior dislocation.  PROCEDURE:  Closed reduction, right total hip arthroplasty dislocation, posterior.    ANESTHESIA:  General with IV sedation.    COMPLICATIONS:  None.    DRAINS:  None.    SPECIMENS:  None.    CONDITION:  Stable.    BLOOD LOSS:  None.    INDICATIONS:  The patient is a 68-year-old female with multiple medical problems who was admitted to the ED from her home when she sustained a dislocation to her chronically unstable right total hip arthroplasty.  She has a history of replacement through [facility], complicated by deep infection reportedly due to MRSA.  She underwent revision surgery in May 2023, including antibiotic spacer placement followed by subsequent replantation.  Unfortunately, she has experienced multiple episodes of instability postoperatively with the most recent being on 12/31/2023.  This was successfully reduced in the ED.  Unfortunately, her current dislocation today was not reducible by the ED staff.  I was therefore asked to help with close reduction in the OR.  On questioning today, she denies distal numbness or tingling, but she has had foot problems from previous foot fracture requiring surgery.  She denies any preceding symptoms, injury, or unusual circumstances prior to this current dislocation.    A long discussion took place regarding her treatment options and my recommendation is for a closed reduction under anesthesia.  The patient has tolerated this well in the past and states that  previous dislocations that were irreducible in the ED were successfully reduced in the OR.  I discussed risks, benefits, and alternatives to a closed reduction under anesthesia including, but not limited to, possible failed reduction, periprosthetic fracture, hardware complications or redislocation despite reduction.  The patient expressed understanding and a desire to proceed.  Risks of anesthesia were also reviewed, and the patient understands the urgency of the procedure.  Cardiac, pulmonary, or cerebrovascular complications, any of which could be serious could occur even under brief general.  She understands and gives informed consent.  The plan was to proceed under anesthesia this evening.    OPERATIVE TECHNIQUE:  Patient was identified in the preop holding area where the right lower extremity and thigh was found to be shortened and internally rotated.  She was found to be neurovascularly intact to the foot and ankle.  Informed consent was confirmed, and the right lower extremity was signed by me.  She was taken to the operating room and carefully transferred to the operating table with a roller.  She was then placed under deep IV sedation with face mask supplementation by Anesthesia.  Once adequate sedation was felt to be achieved, a closed reduction was performed.  In the supine position, our nursing assistant stabilized the pelvis at the iliac crest.  I then flexed the knee and hip to 90 degrees with slight adduction and internal rotation.  Longitudinal traction was applied and a satisfying clunk was palpable.  The image intensification was brought in and it did appear that the hip was reduced.  There was some mild limitation in hip external rotation.  The hip was flexed to about 90 degrees and it re-dislocated.  I repeated the closed reduction in identical fashion and I was able to easily re-reduce the hip.  Upon repeat image intensification, it did appear that the hip was reduced, but it was unclear  whether it had fully seated concentrically.  I took the hip through a range of motion in flexion, abduction, and external rotation, and it remained stable centered in the acetabular cup, although it did not quite seem to seat deeply.  I suspect either it could be some interposed soft tissue or possible abnormality with the polyethylene insert.  I laid the leg supine, and we placed an abduction pillow.  Image intensification confirmed that the hip remained in the same position.  The patient was awoken and taken to the postanesthesia recovery room in stable condition.  There were no sponge, needles, or instruments used.  The plan was to consult our total joint arthroplasty specialist for recommendations given that it appears that she may require additional procedure including either a liner exchange or conversion to a constrained cup.  The patient was awoken and taken to the postanesthesia recovery room in stable condition.  No sponge, needle, or instruments were used.    Dictated By Chanel Cantu M.D.  d: 01/29/2024 19:29:27  t: 01/30/2024 04:29:58  Job [id number]  /

## 2024-01-30 NOTE — ANESTHESIA POSTPROCEDURE EVALUATION
[facility]    Ngozi Collazo Patient Status:  Observation   Age/Gender 68 year old female MRN [id number]   Location [facility] POST ANESTHESIA CARE UNIT Attending Zi Whalen DO   Hosp Day # 0 PCP Irma Valera DO       Anesthesia Post-op Note    HIP CLOSED REDUCTION RIGHT HIP DISCLOCATIOIN    Procedure Summary       Date: 01/29/24 Room / Location:  MAIN OR 05 /  MAIN OR    Anesthesia Start: 1830 Anesthesia Stop:     Procedure: HIP CLOSED REDUCTION RIGHT HIP DISCLOCATIOIN (Right) Diagnosis:       Dislocation, hip closed, right, sequela      (Dislocation, hip closed, right, sequela [S73.004S])    Surgeons: Chanel Cantu MD Anesthesiologist: Ga Esposito MD    Anesthesia Type: MAC ASA Status: 3            Anesthesia Type: MAC    Vitals Value Taken Time   /66 01/29/24 1900   Temp 98.1 01/29/24 1900   Pulse 72 01/29/24 1900   Resp 15 01/29/24 1900   SpO2 98 01/29/24 1900       Patient Location: PACU    Anesthesia Type: MAC    Airway Patency: patent    Postop Pain Control: adequate    Mental Status: mildly sedated but able to meaningfully participate in the post-anesthesia evaluation    Nausea/Vomiting: none    Cardiopulmonary/Hydration status: stable euvolemic    Complications: no apparent anesthesia related complications    Postop vital signs: stable    Dental Exam: Unchanged from Preop    Patient to be discharged from PACU when criteria met.

## 2024-01-30 NOTE — PLAN OF CARE
Pt A & O x4, on RA.  /IS.  Purewick placed.  Right leg externally rotated, swelling noted which is chronic in RLE.  NPO, pt taken immediately to OR after transfer from ER.

## 2024-01-30 NOTE — CONSULTS
[facility] Consult Note    CC: Right total hip arthroplasty dislocation    HPI: The patient is a 68 year old female with multiple medical problems status post right total hip arthroplasty through [facility] complicated by infection requiring revision in May 2023 including spacer placement and subsequent reimplantation surgery who has struggled with multiple recurrent dislocations of this revised hip.  Her most recent episode was on 12/31/2023 which was reduced in the ER.  This evening she reportedly was getting up from a recliner chair when she felt her hip dislocate.  Closed reduction was attempted in the ED but persistent dislocation was documented on follow-up films.  I was informed by Dr. Lashawn Hurtado that closed reduction in the ED was unsuccessful.  Recommendation was for admission for reduction in the OR.    Past Medical History:   Diagnosis Date    Achilles tendonitis     Arthritis     Asthma     Atherosclerosis     with ectasia    Back problem     back surgery lumbar    Cancer (HCC) 09/2004    rt breast dcis    Carpal tunnel syndrome 05/29/2009    bilateral    Cataract     Chronic foot pain     right    Diarrhea, unspecified     Duodenitis 12/18/2008    peptic    Dyslipidemia     Dysphagia     Essential hypertension     Exposure to radiation     Fasciitis     Fatigue     Food intolerance     Foot fracture, left 10/2008    left foot fx: excessive walking    H. pylori infection     Hammertoe     second toe right foot    Heart valve disease     Hemorrhoids     High blood pressure     History of blood transfusion 2023    no reactions    Hypercalcemia     IBS (irritable bowel syndrome)     Internal hemorrhoids 12/18/2008    Grade II    Irregular Z line of esophagus 12/18/2008    Localized primary carpometacarpal osteoarthritis 07/30/2012    Lupus (HCC)     Macular degeneration 12/07/2010    early signs, bilaterally, remained stable    Osteoarthritis     left thumb, severe    Osteomyelitis  of right foot ([facility]) 01/2018    Balaji ID, Dr. Allan    Osteoporosis     Pain in joints     Pneumonia due to organism     Postmenopausal atrophic vaginitis     Pulmonary fibrosis ([facility]) 07/2018    referring to pulm, possible Sjogren's involvement?    Rheumatoid arthritis ([facility])     Rheumatoid arthritis(714.0)     Sicca syndrome ([facility]) 02/26/2009    Sjogren's syndrome ([facility]) 10/2007    Stress fracture of the metatarsals     right foot    Systemic lupus erythematosus ([facility]) 02/26/2009    Visual impairment     glasses    Vitamin D deficiency      Past Surgical History:   Procedure Laterality Date    ADENOIDECTOMY      APPENDECTOMY  age 8    [place]    BACK SURGERY      CARPAL TUNNEL RELEASE Bilateral     CATARACT      COLONOSCOPY      COLONOSCOPY      DERMATOLOGY SHAVE BIOPSY (D1K.1)  12/29/2011    Shave biopsy, skin, right shoulder    FOOT SURGERY  04/2013    revision right foot surgery    FOOT/TOES SURGERY PROC UNLISTED  09/2011    first MTM joint fusion with ORIF of second and third MTs with correction of the second hammertoe of the right foot by Dr. Hatfield    KNEE REPLACEMENT SURGERY      LUMPECTOMY RIGHT  2004    rt breast lumpectomy, [facility] BIOPSY STEREO NODULE 1 SITE RIGHT (CPT=19081)  2004    OTHER SURGICAL HISTORY  01/11/2013    trigger thumb injections    RADIATION RIGHT  2004    MAMMOSITE    SPINE SURGERY PROCEDURE UNLISTED      L4-5    TONSILLECTOMY      TOTAL HIP REPLACEMENT Right     TOTAL KNEE REPLACEMENT Left 05/24/2018    UPPER GI ENDOSCOPY,EXAM  12/18/2008     No current outpatient medications on file.     Allergies   Allergen Reactions    Allergy ANAPHYLAXIS and SWELLING     Bee Stings, Catgut Sutures      Bee Sting [Bee Venom] SWELLING     Bee sting    Ioxaglate DIZZINESS    Erythromycin Base NAUSEA ONLY    Vicryl Sutures OTHER (SEE COMMENTS)     Cat gut sutures as a child, developed an infection     Family History   Problem Relation Age of Onset    Heart Disorder Father     Other  (Other) Father     Other (Pulmonary Embolism) Father     Diabetes Mother     Asthma Mother     Other (Other) Mother     Other (Abdominal Aortic Aneurysm) Other     Fibromyalgia Sister     Asthma Sister     Breast Cancer Self 48    DCIS Self     No Known Problems Son     No Known Problems Brother      Social History     Occupational History    Not on file   Tobacco Use    Smoking status: Former     Packs/day: 1.00     Years: 37.00     Additional pack years: 0.00     Total pack years: 37.00     Types: Cigarettes     Start date: 1970     Quit date: 2007     Years since quittin.6    Smokeless tobacco: Never    Tobacco comments:     quit in 2006   Vaping Use    Vaping Use: Never used   Substance and Sexual Activity    Alcohol use: Not Currently     Alcohol/week: 0.0 - 1.0 standard drinks of alcohol     Comment: Rare    Drug use: Not Currently     Comment: CBD gummies for pain    Sexual activity: Not on file        ROS:  Complete system review obtained and negative except as mentioned above      Physical Exam:    /75 (BP Location: Right arm)   Pulse 58   Temp 98 °F (36.7 °C) (Oral)   Resp 19   Ht 5' 4\" (1.626 m)   Wt 215 lb (97.5 kg)   SpO2 96%   BMI 36.90 kg/m²   Constitutional: Well-developed well-nourished 68-year-old female in no acute distress supine in her hospital bed  Psychological: Alert and oriented x 3  Respiratory: Breathing comfortably on room air  Cardiac: Regular rate and rhythm  Right lower extremity:  Inspection: skin well-healed posterior lateral surgical scar about the right hip with an internally rotated and shortened right lower extremity  Palpation: Tender over the hip  Range of motion: Not tested due to a dislocation at the hip although she was able to wiggle the foot and ankle actively in dorsi and plantarflexion  Neurovascular: Intact motor, sensory and pedal pulses right lower extremity at the foot and ankle      Imaging: Multiple views right hip and pelvis from the ED  personally viewed, independently interpreted and radiology report read.  Persistent superior dislocation of the right femoral head noted on postreduction views.  No obvious periprosthetic fracture or migrated hardware in comparison to previous plain films.    XR HIP W OR WO PELVIS 1 VIEW, RIGHT (CPT=73501)    Result Date: 1/29/2024  CONCLUSION:  See above.   LOCATION:  [id number]   Dictated by (CST): Stromberg, LeRoy, MD on 1/29/2024 at 4:09 PM     Finalized by (CST): Stromberg, LeRoy, MD on 1/29/2024 at 4:10 PM       XR HIP W OR WO PELVIS 1 VIEW, RIGHT (CPT=73501)    Result Date: 1/29/2024  CONCLUSION:   Persistent dislocation of the femoral component of the arthroplasty superior to the acetabular cup.  Is unclear on these images if it has been displaced anteriorly or posteriorly.  Chronic right inferior pubic ramus fracture is again noted.  Avulsion fracture off the right iliac wing versus heterotopic calcification has a chronic appearance as well.   LOCATION:  [facility]   Dictated by (CST): Saqib Moreno MD on 1/29/2024 at 4:09 PM     Finalized by (CST): Saqib Moreno MD on 1/29/2024 at 4:10 PM       XR HIP W OR WO PELVIS 2 OR 3 VIEWS, RIGHT (CPT=73502)    Result Date: 1/29/2024  CONCLUSION:  Superior dislocation of the right femoral prosthesis.   LOCATION:  [id number]   Dictated by (CST): Stromberg, LeRoy, MD on 1/29/2024 at 2:08 PM     Finalized by (CST): Stromberg, LeRoy, MD on 1/29/2024 at 2:09 PM        Labs:      No results found for this or any previous visit (from the past 72 hour(s)).       Assessment/Plan: Recurrent dislocation right revision total hip arthroplasty    I reviewed imaging and exam findings with the patient.  She unfortunately is experiencing recurrent dislocations of this right total hip after revision surgery last year through [facility].  She is struggling with chronic instability about this hip requiring multiple reductions most recently on 12/31/2023.  Given failed  reduction in the ED today my recommendation is for a closed reduction in the OR.  Given the complexity of her condition I told her frankly that it may or may not be reducible depending on the condition of the condition of the revision implants.  Risk benefits and alternatives were reviewed including but not limited to possible failed reduction, incomplete reduction, periprosthetic fracture, hardware complications during attempts at reduction.  Risk of anesthesia were also well understood having undergone multiple procedures in the past.  She understands and accepts these risks and gives informed consent.  All questions were answered at the bedside and will proceed as soon as the OR is available.  She will likely stay overnight for observation with possible discharge tomorrow depending on her condition and surgical outcome.    Chanel Cantu MD  [facility]

## 2024-01-30 NOTE — PROGRESS NOTES
Pt arrived from ER 10 minutes ago.  Transport here now to take pt to OR.  Dr. Whalen just saw pt.

## 2024-01-30 NOTE — OCCUPATIONAL THERAPY NOTE
Received order for OT evaluation. Pt was admitted from home on 1/29 with R hip pain.  R total hip arthroplasty dislocation.  1/29 s/p Closed reduction, right total hip arthroplasty dislocation, posterior.  Per chart, patient with history of multiple hip dislocations.   PT communicated with Ortho MD about possible use of Irene brace.   Per MD, \"Yes, I'm open to this if she does not have one at home.  Very unstable, no flexion beyond 70-80 if possible.\"   Per RNSharmin will be delivering the brace.  OT will wait for the brace delivery before initiating therapy.

## 2024-01-30 NOTE — PHYSICAL THERAPY NOTE
PT order received. Chart reviewed. Pt admitted from home on 1/29 with R hip pain. Pt diagnosed with R total hip arthroplasty dislocation. Pt now s/p closed reduction of R COLTON dislocation. Pt with multiple hip dislocations within the past year.   This writer secure chatted with Dr. Cantu about use of [place] brace. Per MD \"Yes, I'm open to this if she does not have one at home. Very unstable, no flexion beyond 70-80 degrees if possible.\" Per Sharmin PADILLA will be delivering the brace. PT will await to initiate therapy services until brace is delivered. RN aware.

## 2024-01-31 VITALS
TEMPERATURE: 98 F | SYSTOLIC BLOOD PRESSURE: 145 MMHG | WEIGHT: 215 LBS | OXYGEN SATURATION: 94 % | HEIGHT: 64 IN | DIASTOLIC BLOOD PRESSURE: 72 MMHG | RESPIRATION RATE: 18 BRPM | BODY MASS INDEX: 36.7 KG/M2 | HEART RATE: 57 BPM

## 2024-01-31 PROCEDURE — 99239 HOSP IP/OBS DSCHRG MGMT >30: CPT | Performed by: HOSPITALIST

## 2024-01-31 RX ORDER — OXYCODONE HYDROCHLORIDE 5 MG/1
2.5 TABLET ORAL EVERY 6 HOURS PRN
Qty: 15 TABLET | Refills: 0 | Status: SHIPPED | OUTPATIENT
Start: 2024-01-31

## 2024-01-31 RX ORDER — NALOXONE HYDROCHLORIDE 4 MG/.1ML
4 SPRAY NASAL AS NEEDED
Qty: 1 KIT | Refills: 0 | Status: SHIPPED | OUTPATIENT
Start: 2024-01-31

## 2024-01-31 NOTE — CM/SW NOTE
01/31/24 1100   CM/SW Referral Data   Referral Source    Reason for Referral Discharge planning   Informant Patient   Patient Info   Patient's Current Mental Status at Time of Assessment Alert;Oriented   Patient lives with Alone   Patient Status Prior to Admission   Independent with ADLs and Mobility Yes   Services in place prior to admission DME/Supplies at home   Type of DME/Supplies Wheeled Walker;Wheelchair   Discharge Needs   Anticipated D/C needs To be determined   Choice of Post-Acute Provider   Informed patient of right to choose their preferred provider Yes       Pt discussed in rounds and she is ready for discharge.  Plan was to discharge home, but   Notified by PT/OT that patient will need to be NWB for RLE    Met w/pt to discuss options and she has been partial weight bearing for a while and can manage at home with her DME, but has never been home alone with NWB   Pt states it's not an option to stay with her son as he has 2 big dogs and no room for her and he works full time.     Pt agreeable to self pay [facility] for a short stay if necessary and wants to go back to [facility].  Sent [place] referral and message and left VM for liaison requesting respite rates.  / to remain available for support and/or discharge planning.     Addendum 1549  Notified by RN that orders for weight bearing were changed by Dr Cantu and pt will now be able to manage at home.  Met with patient at the bedside, therapy in to re-evaluate.  They recommended a reclining wheelchair. Referral started in [place].  Confirmed w/HME liaison that they can deliver tomorrow if everything submitted by 5pm  Home Health referral sent- pt requesting [facility].  Reservied in [place].    Jenny Hayes MBA MSN, RN CTL/  [id number]

## 2024-01-31 NOTE — PLAN OF CARE
Alert and oriented x 4. Vitals stable on room air, telemetry monitoring, NSR. Pain managed on PO medications. Global hip precautions, new port brace. Voiding without difficulty. Last BM 01/31. Tolerating regular diet. SCD's on bilaterally. Safety precautions in place. PT/OT . Plan of care discussed with patient.

## 2024-01-31 NOTE — PROGRESS NOTES
[facility]     Hospitalist Progress Note     Ngozi Collazo Patient Status:  Observation    1955 MRN [id number]   [facility] 3SW-A Attending Edgar Shepherd MD   Hosp Day # 0 PCP Irma Valera DO     Chief Complaint: Hip pain    Subjective:     Patient still some mild discomfort but better overall.  She is distressed about discharge planning and being nonweightbearing.  Denies any chest pain, shortness of breath, no other acute complaints.    Objective:    Review of Systems:   A comprehensive review of systems was completed; pertinent positive and negatives stated in subjective.    Vital signs:  Temp:  [97.8 °F (36.6 °C)-98.2 °F (36.8 °C)] 97.9 °F (36.6 °C)  Pulse:  [50-66] 53  Resp:  [17-18] 17  BP: (117-160)/(56-85) 159/74  SpO2:  [91 %-99 %] 91 %    Physical Exam:    General: No acute distress, pleasant   Respiratory: No wheezes, no rhonchi  Cardiovascular: S1, S2, regular rate and rhythm  Abdomen: Soft, Non-tender, non-distended, positive bowel sounds  Neuro: No new focal deficits.   Extremities: No edema    Diagnostic Data:    Labs:  Recent Labs   Lab 24  0535   WBC 3.6*   HGB 10.4*   MCV 93.5   .0       Recent Labs   Lab 24  0535   GLU 98   BUN 16   CREATSERUM 0.68   CA 8.8   ALB 3.0*      K 3.7      CO2 27.0   ALKPHO 88   AST 13*   BILT 0.6   TP 5.8*       Estimated Creatinine Clearance: 66.5 mL/min (based on SCr of 0.68 mg/dL).    No results for input(s): \"TROP\", \"TROPHS\", \"CK\" in the last 168 hours.    No results for input(s): \"PTP\", \"INR\" in the last 168 hours.               Microbiology    No results found for this visit on 24.      Imaging: Reviewed in Epic.    Medications:    fluticasone furoate-vilanterol  1 puff Inhalation Daily    [Held by provider] Cevimeline HCl  30 mg Oral BID    hydroxychloroquine  200 mg Oral BID    pregabalin  50 mg Oral QAM    pregabalin  75 mg Oral Nightly    heparin  5,000 Units Subcutaneous 2 times per day     sennosides  17.2 mg Oral Nightly    docusate sodium  100 mg Oral BID    multivitamin  1 tablet Oral Daily       Assessment & Plan:      # Right hip dislocation  s/p reduction in ER  Status post closed reduction in the OR on 1/29  Patient currently nonweightbearing  Brace in place  Orthopedic following  PT/OT, pain control antiemetics    #History of right hip replacement    #COPD - Breo, nebs    #Rheumatoid arthritis  #Sjogren's  #Lupus  -> Plaquinel     #Neuropathy - Lyrica  #Dyslipidemia  #Breast cancer - outpatient surveillance       Pt is s/p closed reduction in R hip following dislocation.  She is partial weight bearing only and on global hip precautions and will need a reclining wheelchair for safe positioning and transferring in her home.  She requires a manual wheelchair to participate in her daily activities such as bathing and dressing in a reasonable timeframe. She would not be able to accomplish her MRADL’s without the wheelchair. The wheelchair will be used on a daily basis in the home and the patient is able to self-propel.      Edgar Shepherd MD    Supplementary Documentation:     Quality:  DVT Mechanical Prophylaxis:   SCDs, Early ambuation  DVT Pharmacologic Prophylaxis   Medication    heparin (Porcine) 5000 UNIT/ML injection 5,000 Units                Code Status: Full Code  Manzanares: External urinary catheter in place  Manzanares Duration (in days):   Central line:    ELISE: 1/31/2024    Discharge is dependent on: PT/OT  At this point Ms. Collazo is expected to be discharge to: IPR?    The 21st Century Cures Act makes medical notes like these available to patients in the interest of transparency. Please be advised this is a medical document. Medical documents are intended to carry relevant information, facts as evident, and the clinical opinion of the practitioner. The medical note is intended as peer to peer communication and may appear blunt or direct. It is written in medical language and may contain  abbreviations or verbiage that are unfamiliar.

## 2024-01-31 NOTE — CM/SW NOTE
Notified by PT/OT that patient will need a reclining wheelchair.    MD will need to copy/paste into a progress note:    Pt is s/p closed reduction in R hip following dislocation.  She is partial weight bearing only and on global hip precautions and will need a reclining wheelchair for safe positioning and transferring in her home.  She requires a manual wheelchair to participate in   her daily activities such as bathing and dressing in a reasonable timeframe. She would not be able   to accomplish her MRADL’s without the wheelchair. The wheelchair will be used on a daily basis in   the home and the patient is able to self-propel.    Jenny Hayes MBA MSN, RN CTL/  [id number]

## 2024-01-31 NOTE — OCCUPATIONAL THERAPY NOTE
OCCUPATIONAL THERAPY EVALUATION - INPATIENT     Room Number: 381/381-A  Evaluation Date: 1/31/2024  Type of Evaluation: Initial  Presenting Problem: Right total hip arthroplasty dislocation., 1/29 s/p Closed reduction, right total hip arthroplasty dislocation, posterior.    Physician Order: IP Consult to Occupational Therapy  Reason for Therapy: ADL/IADL Dysfunction and Discharge Planning    History: Patient is a 68 year old female admitted on 1/29/2024 with Presenting Problem: Right total hip arthroplasty dislocation., 1/29 s/p Closed reduction, right total hip arthroplasty dislocation, posterior.. Co-Morbidities : R COLTON 12/22, multiple R hip dislocations over last year, RA, fibromyalgia, OA, compression fractures spine, HTN. Pt was admitted from home on 1/29 with R hip pain,Imaging showed Right total hip arthroplasty dislocation., 1/29 s/p Closed reduction, right total hip arthroplasty dislocation, posterior. Patient with multiple R hip dislocations since R THR 12/02/2022.  Copied from PT eval 1/31/24 \"right hip revision 5/2023. Pt had multiple hospital admission and prolonged [facility] admission from March to December 2023 per pt report. Pt most recently was treated for dislocation in ED 12/31/23 when hip was reduced and pt returned home.\"      ASSESSMENT   Patient presents with the following performance deficits: R LE PWB 50%,limited R hip ROM (in iglesia brace), and decreased standing balance. These deficits impact the patient’s ability to participate in ADL, transfers, instrumental activities of daily living, rest and sleep, leisure and social participation. If planning for safe discharge home, pt would benefit from family or caregiver assistance with toileting, toilet transfer, LB dressing task, and household tasks.   Bedside commode and wheelchair that has recliner feature and adjustable leg rests.  OT and PT spoke with DERRICK.       ADDENDUM: Pt was seen again in the afternoon. Per RN who spoke with ortho MD, pt is  now 50% PWB.      OT Discharge Recommendations: Home with home health PT/OT  OT Device Recommendations: Reacher;Sock aid;Long-handled shoehorn;Long-handled sponge;3-in-1 commode;Other (Comment) (wheelchair with reclining back feature and adjustable leg rests, bedside commode)    WEIGHT BEARING RESTRICTION  Weight Bearing Restriction: R lower extremity        R LE: 50% PWB       Recommendations for nursing staff:   Transfers: [facility]  Toileting location: bedside commode    EVALUATION SESSION:  Patient Start of Session: supine.    FUNCTIONAL TRANSFER ASSESSMENT  Sit to Stand: Edge of Bed; Chair  Edge of Bed: Minimal Assist  Chair: Contact Guard Assist    BED MOBILITY  Supine to Sit : Minimal Assist  COGNITION  Overall Cognitive Status:  WFL - within functional limits    Upper Extremity   ROM: within functional limits   Strength: within functional limits     EDUCATION PROVIDED  Patient : Role of Occupational Therapy; Plan of Care; Discharge Recommendations; Adaptive Equipment Recommendations; DME Recommendations; Functional Transfer Techniques; Weight Bear Status; Fall Prevention; Surgical Precautions; Posture/Positioning; Compensatory ADL Techniques; Energy Conservation  Patient's Response to Education: Verbalized Understanding; Requires Further Education    Equipment used: rw  Demonstrates functional use,      Therapist comments: Pt was fitted with [place] brace this morning.   OT and PT re-educated the pt about global hip precautions, purpose of [place] brace, and safe transfer sequencing. Also, educated the pt about R LE NWB status.  Min A supine to sit. Noticed that her L hip support (plastic) was not aligned with her hip.   Informed RN who called .  OT later met with  to help adjust the L hip support. Min A to stand and to pivot on her L foot to transfer to the chair (placed on L side).   This transfer was completed in preparation for bedside commode transfer.       ADDENDUM: Pt was seen in the afternoon  after RN told OT about PWB 50% precaution.   Independent supine to sit, supervision to stand. Refer to PT note about gait.  Per pt, she feels confident about LB dressing and toilet transfer, since pt was practicing all of these tasks at rehab and at home when she was only allowed to do TTWB (in October).        Patient End of Session: Up in chair;Needs met;Call light within reach;RN aware of session/findings;All patient questions and concerns addressed;Alarm set (pt did not want SCDs placed, educated the pt about benefits of using SCD)    OCCUPATIONAL PROFILE    HOME SITUATION  Type of Home: House  Home Layout: Two level;Able to live on main level  Lives With: Alone (son lives close,)    Toilet and Equipment: Toilet riser with arms  Shower/Tub and Equipment: Walk-in shower (corner bench, [facility] helped with showering)  Other Equipment: Hip kit    Occupation/Status: worked in healthcare, unit secretary     Drives: No  Patient Regularly Uses: Reading glasses    Prior Level of Function: Independent with ADL. Per pt, she has been in/out of hospital and [facility] since March to 2023. Pt just returned home last month. Pt was allowed to weight bear, per pt. She was independent with ADL. Used RW.  Pt eats microwave ready meals.  Son lives close by. Cleaning service.  Per pt, she had [place] brace before, but it didn't fit well, because she lost 25 lbs.      Subjective:  \"I could write a book, Dos and Don't\"    PAIN ASSESSMENT  Ratin  Location: R hip area  Management Techniques: Repositioning    OBJECTIVE  Precautions: COLTON global precautions;Bed/chair alarm ([place] Brace, no flexion beyond 70-80 deg R hip)  Fall Risk: High fall risk      ASSESSMENTS    AM-PAC ‘6-Clicks’ Inpatient Daily Activity Short Form  -   Putting on and taking off regular lower body clothing?: A Lot  -   Bathing (including washing, rinsing, drying)?: A Lot  -   Toileting, which includes using toilet, bedpan or urinal? : A Lot  -   Putting  on and taking off regular upper body clothing?: A Little  -   Taking care of personal grooming such as brushing teeth?: A Little  -   Eating meals?: A Little    AM-PAC Score:  Score: 15  Approx Degree of Impairment: 56.46%  Standardized Score (AM-PAC Scale): 34.69    ADDITIONAL TESTS     NEUROLOGICAL FINDINGS      COGNITION ASSESSMENTS       PLAN  OT Treatment Plan: Balance activities;Energy conservation/work simplification techniques;ADL training;Functional transfer training;UE strengthening/ROM;Patient/Family education;Patient/Family training;Equipment eval/education;Compensatory technique education  Rehab Potential : Good  Frequency: 3-5x/week  Number of Visits to Meet Established Goals: 5    ADL Goals   Patient will perform lower body dressing:  with supervision and while maintaining hip precautions  Patient will perform toileting: with supervision    Functional Transfer Goals  Patient will transfer from supine to sit:  with supervision  Patient will transfer to bedside commode:  with supervision    UE Exercise Program Goal  Patient will be independent with bilateral AROM HEP (home exercise program).    Additional Goals  Pt will recall global hip precautions.      Patient Evaluation Complexity Level:   Occupational Profile/Medical History MODERATE - Expanded review of history including review of medical or therapy record   Specific performance deficits impacting engagement in ADL/IADL MODERATE  3 - 5 performance deficits   Client Assessment/Performance Deficits MODERATE - Comorbidities and min to mod modifications of tasks    Clinical Decision Making MODERATE - Analysis of occupational profile, detailed assessments, several treatment options    Overall Complexity MODERATE     OT Session Time: 30 minutes  Self-Care Home Management: 3 minutes  Therapeutic Activity: 16 minutes

## 2024-01-31 NOTE — DISCHARGE INSTRUCTIONS
Sometimes managing your health at home requires assistance.  The [facility] team has recognized your preference to use [facility].  They can be reached by phone at [phone].  The fax number for your reference is [phone].  A representative from the home health agency will contact you or your family to schedule your first visit.     [facility] - wheelchair  Main Phone: [phone]

## 2024-01-31 NOTE — PLAN OF CARE
Post closed reduction of R hip. AxO4. VSS on RA. O2 NC as needed. On tele-NSR. SCD. On heparin. Denies nausea, voids w/o issue. PRN pain meds available. Bruising to R hip - global hip precautions kept. Waiting for [place] brace -  to deliver 1/31. PT/OT to see. Updated on POC. Safety measures placed. Care ongoing.

## 2024-01-31 NOTE — PROGRESS NOTES
[facility] PROGRESS NOTE    Subjective: Patient is comfortable with mild right hip pain, waiting for the brace. No CP/SOB/numbness/tinging.     Objective:        1/30/2024     6:07 PM 1/30/2024     8:07 PM   Vitals History   /57 140/67   BP Location  Right arm   Pulse 57 55   Resp  18   Temp  98.1 °F (36.7 °C)   SpO2  95 %          Recent Labs   Lab 01/30/24  0535   HGB 10.4*       CBC  Recent Labs   Lab 01/30/24  0535   WBC 3.6*   HGB 10.4*   .0       BMP  Recent Labs   Lab 01/30/24  0535      K 3.7      CO2 27.0   BUN 16         Physical Exam:  Right hip non-tender. Minimal swelling without erythema. Able to SLR off the bed with right leg, which rests internally rotated compared to left.  Per patient, this is baseline since her most recent hip revision. NVI at the foot and ankle.    Impression: POD #1 S/P right revision COLTON closed reduction posterior dislocation  Discussed with Dr. Parmar and he feels hip is reduced with hybrid revision implants  Strict global hip precautions, await brace and PT to see  Follow-up with operative hip surgeon to discuss possible constrained cup options  DVT prophylaxis  OK to DC when braced      Chanel Cantu MD  1/30/2024

## 2024-01-31 NOTE — CM/SW NOTE
01/31/24 1603   Discharge disposition   Expected discharge disposition Home-Health   Post [facility] Provider [facility]   DME/Infusion Providers [facility]   Discharge transportation [facility]     Discharging home w/[facility]    [facility] at [phone]  PCS completed in Epic    Pt agreeable to charge for [facility]    All documentation for wheelchair uploaded in [facility] and it will be delivered tomorrow.        / to remain available for support and/or discharge planning.     Jenny Hayes MBA MSN, RN CTL/  [id number]

## 2024-01-31 NOTE — PHYSICAL THERAPY NOTE
Physical Therapy update: Pt now with orders for PWB R LE, 50%. Will continue to assess pt mobility and discharge needs with new weightbearing orders.

## 2024-01-31 NOTE — PHYSICAL THERAPY NOTE
PHYSICAL THERAPY EVALUATION - INPATIENT     Room Number: 381/381-A  Evaluation Date: 1/31/2024  Type of Evaluation: Initial  Physician Order: PT Eval and Treat    Presenting Problem: s/p closed reduction R hip following dislocation 1/29/24  Co-Morbidities : R COLTON 12/22, multiple R hip dislocations over last year, RA, fibromyalgia, OA, compression fractures spine, HTN  Reason for Therapy: Mobility Dysfunction and Discharge Planning    History related to current admission: Patient is a 68 year old female admitted on 1/29/2024 from home for R hip and leg pain.  Pt diagnosed with R hip posterior dislocation. Pt is s/p Closed reduction completed in OR on  1/29/24. Pt with repeated hip dislocations over last year since R COLTON 12/2/22, right hip revision 5/2023. Pt had multiple hospital admission and prolonged [facility] admission from March to December 2023 per pt report. Pt most recently was treated for dislocation in ED 12/31/23 when hip was reduced and pt returned home.    ASSESSMENT   In this PT evaluation, the patient presents with the following impairments decreased ROM/strength R hip, NWB R LE, global hip precautions, dec transfer technique.  These impairments and comorbidities manifest themselves as functional limitations in independent bed mobility, transfers, gait and stair negotiation.  The patient is below baseline and would benefit from skilled inpatient PT to address the above deficits to assist patient in returning to prior to level of function.   Functional outcome measures completed include AMPAC.  The AM-PAC '6-Clicks' Inpatient Basic Mobility Short Form was completed and this patient is demonstrating a Approx Degree of Impairment: 54.16%  degree of impairment in mobility. Research supports that patients with this level of impairment may benefit from [facility].    DISCHARGE RECOMMENDATIONS  PT Discharge Recommendations: Sub-acute rehabilitation    PLAN  PT Treatment Plan: Bed mobility;Coordination;Energy  conservation;Endurance;Patient education;Gait training;Range of motion;Strengthening;Transfer training;Balance training;Body mechanics  Rehab Potential : Good  Frequency (Obs): 3-5x/week  Number of Visits to Meet Established Goals: 5      CURRENT GOALS    Goal #1 Patient is able to demonstrate supine - sit EOB @ level: supervision     Goal #2 Patient is able to demonstrate transfers EOB to/from Chair/Wheelchair at assistance level: supervision while maintaining NWB R LE.     Goal #3 Patient is able to ambulate 115 feet with assist device: walker - rolling at assistance level: supervision while maintaining NWB R LE.     Goal #4    Goal #5    Goal #6    Goal Comments: Goals established on 2024    HOME SITUATION  Type of Home: House   Home Layout: Two level;Able to live on main level  Stairs to Enter : 3  Railing: Yes  Stairs to Bedroom: 0       Lives With: Alone (son lives close,)  Drives: No  Patient Owned Equipment: Rolling walker  Patient Regularly Uses: Reading glasses    Prior Level of [place]: Pt reports she is typically ind with all self care and mobility. Has used iglesia brace in past, but has lost weight and feel like it no longer fit well. Pt recently discharged home from [facility] 2023 after multiple hospital admissions and [facility] admission due to recurring R hip dislocations. Pt son lives nearby, however per social work pt is unable to stay at his house or have him stay with her.    SUBJECTIVE  \"Before I could put some weight on it\"      OBJECTIVE  Precautions: COLTON global precautions;Bed/chair alarm ([place] Brace, no flexion beyond 70-80 deg R hip)  Fall Risk: High fall risk    WEIGHT BEARING RESTRICTION  Weight Bearing Restriction: R lower extremity        R Lower Extremity: Non-Weight Bearing       PAIN ASSESSMENT  Ratin  Location: R hip  Management Techniques: Activity promotion;Repositioning    COGNITION  Overall Cognitive Status:  WFL - within functional limits    RANGE OF MOTION AND  STRENGTH ASSESSMENT  Upper extremity ROM and strength See OT note    Lower extremity ROM is within functional limits except for the following: R hip limited due to global restrictions    Lower extremity strength is within functional limits except R hip , good quad set, glut set.      BALANCE  Static Sitting: Good  Dynamic Sitting: Not tested  Static Standing: Poor +  Dynamic Standing: Poor +      ACTIVITY TOLERANCE           BP: 143/72  BP Location: Right arm  BP Method: Automatic  Patient Position: Sitting    O2 WALK       NEUROLOGICAL FINDINGS  Neurological Findings:  (no c/o numbness, tingling B LE, sensation in tact B LE)                     AM-PAC '6-Clicks' INPATIENT SHORT FORM - BASIC MOBILITY  How much difficulty does the patient currently have...  Patient Difficulty: Turning over in bed (including adjusting bedclothes, sheets and blankets)?: A Little   Patient Difficulty: Sitting down on and standing up from a chair with arms (e.g., wheelchair, bedside commode, etc.): A Little   Patient Difficulty: Moving from lying on back to sitting on the side of the bed?: A Little   How much help from another person does the patient currently need...   Help from Another: Moving to and from a bed to a chair (including a wheelchair)?: A Little   Help from Another: Need to walk in hospital room?: A Little   Help from Another: Climbing 3-5 steps with a railing?: Total       AM-PAC Score:  Raw Score: 16   Approx Degree of Impairment: 54.16%   Standardized Score (AM-PAC Scale): 40.78   CMS Modifier (G-Code): CK    FUNCTIONAL ABILITY STATUS  Gait Assessment   Functional Mobility/Gait Assessment  Gait Assistance: Minimum assistance (second assist for safety)  Distance (ft): 3  Assistive Device: Rolling walker  Pattern:  (NWB R LE, [place] brace in place)    Skilled Therapy Provided     Bed Mobility:  Rolling: NT  Supine to sit: min assist   Sit to supine: NT     Transfer Mobility:  Sit to stand: min assist   Stand to sit: min  assist  Gait = min assist, cueing to maintain NWB R LE    Therapist's Comments: Pt presents supine in bed, agreeable to PT. Cleared by RN for session. [place] brace in place. Educated on global hip precautions and NWB R LE status. Mobility as above. Pt requires min assist at this time. Able to maintain NWB well with cueing. Current rec is for [facility], if pt return home recommend reclining w/c and 24 hour assist. Pt would require total assist into home due to 3 step entry.    Exercise/Education Provided:  Bed mobility  Body mechanics  Functional activity tolerated  Gait training  Posture  Lower therapeutic exercise:  Ankle pumps  Glut sets  Quad sets  Transfer training    Patient End of Session: Up in chair;Call light within reach;Needs met;RN aware of session/findings;All patient questions and concerns addressed;Alarm set      Patient Evaluation Complexity Level:  History Moderate - 1 or 2 personal factors and/or co-morbidities   Examination of body systems Moderate - addressing a total of 3 or more elements   Clinical Presentation Low - Stable   Clinical Decision Making Low - Stable       PT Session Time: 30 minutes  Gait Trainin minutes  Therapeutic Activity: 15 minutes    Therapeutic Exercise: 5 minutes

## 2024-01-31 NOTE — HOME CARE LIAISON
Received referral via [facility] for Home Health services. Spoke w/ patient via the phone and is agreeable for home health

## 2024-02-01 ENCOUNTER — MED REC SCAN ONLY (OUTPATIENT)
Dept: ORTHOPEDICS CLINIC | Facility: CLINIC | Age: 69
End: 2024-02-01

## 2024-02-01 ENCOUNTER — PATIENT OUTREACH (OUTPATIENT)
Dept: CASE MANAGEMENT | Age: 69
End: 2024-02-01

## 2024-02-01 ENCOUNTER — TELEPHONE (OUTPATIENT)
Dept: FAMILY MEDICINE CLINIC | Facility: CLINIC | Age: 69
End: 2024-02-01

## 2024-02-01 DIAGNOSIS — Z02.9 ENCOUNTERS FOR UNSPECIFIED ADMINISTRATIVE PURPOSE: Primary | ICD-10-CM

## 2024-02-01 DIAGNOSIS — S73.004A DISLOCATION OF RIGHT HIP, INITIAL ENCOUNTER (HCC): ICD-10-CM

## 2024-02-01 PROCEDURE — 1111F DSCHRG MED/CURRENT MED MERGE: CPT

## 2024-02-01 NOTE — PROGRESS NOTES
Initial Post Discharge Follow Up   Discharge Date: 1/31/24  Contact Date: 2/1/2024    Consent Verification:  Assessment Completed With: Patient  HIPAA Verified?  Yes    Discharge Dx:   Dislocation of right hip, initial encounter ([facility])     General:   How have you been since your discharge from the hospital? NCM spoke with patient states she is feeling good. Pt states feels pretty sore, rates pain at 7-8/10 when walking and 4-5/10 with sitting. Pt is taking Tylenol to see if this helps. Pt has Oxydocone on hand as well. Pt denies any fevers, chills, nausea, vomiting, shortness of breath, or chest pain.    Do you have any pain since discharge?  Yes  Where: right hip pain   Rating on pain scale 1-10, 10 being the worst pain you have ever experienced, what is current pain: 7  Alleviating factors: Tylenol and Oxycodone  Aggravating factors: moving   Is the pain manageable at home? Yes  How well was your pain managed while in the hospital?   On a scale of 1-5   1- Very Poor and 5- Very well   5  When you were leaving the hospital were your discharge instructions reviewed with you? Yes  How well were your discharge instructions explained to you?   On a scale of 1-5   1- Very Poor and 5- Very well   5  Do you have any questions about your discharge instructions?  No  Before leaving the hospital was your diagnoses explained to you? Yes  Do you have any questions about your diagnoses? No  Are you able to perform normal daily activities of living as you have prior to your hospital stay (dressing, bathing, ambulating to the bathroom, etc)? yes  (YANY) Was patient given a different diet per AVS? no      Medications: Reviewed medication list.  Medications are up to date.  Current Outpatient Medications   Medication Sig Dispense Refill    oxyCODONE 5 MG Oral Tab Take 0.5 tablets (2.5 mg total) by mouth every 6 (six) hours as needed. 15 tablet 0    Naloxone HCl 4 MG/0.1ML Nasal Liquid 4 mg by Nasal route as needed. If patient remains  unresponsive, repeat dose in other nostril 2-5 minutes after first dose. 1 kit 0    pregabalin 75 MG Oral Cap Take 1 capsule (75 mg total) by mouth nightly.      sodium chloride 0.65 % Nasal Solution 2 sprays by Nasal route as needed for congestion.      BREO ELLIPTA 200-25 MCG/ACT Inhalation Aerosol Powder, Breath Activated USE 1 INHALATION ORALLY    ONCE DAILY AS DIRECTED (Patient taking differently: Inhale 1 puff into the lungs daily.) 3 each 3    hydroxychloroquine 200 MG Oral Tab Take 1 tablet (200 mg total) by mouth 2 (two) times daily. 180 tablet 1    pregabalin 50 MG Oral Cap TAKE 1 CAPSULE EVERY       MORNING WITH 75MG IN THE   EVENING (Patient taking differently: Take 1 capsule (50 mg total) by mouth every morning.) 90 capsule 0    Cevimeline HCl 30 MG Oral Cap Take 1 capsule (30 mg total) by mouth 3 (three) times daily. 270 capsule 2    ALBUTEROL SULFATE  (90 Base) MCG/ACT Inhalation Aero Soln USE 2 INHALATIONS ORALLY   EVERY 6 HOURS AS NEEDED FORWHEEZING (APPOINTMENT      NEEDED FOR MORE REFILLS) (Patient taking differently: Inhale 2 puffs into the lungs every 4 (four) hours as needed for Wheezing or Shortness of Breath.) 3 Inhaler 3    EPINEPHrine (EPIPEN 2-RACHELE) 0.3 MG/0.3ML Injection Solution Auto-injector Inject 0.3 mL (1 each total) as directed as needed. 1 each 1    PREVIDENT 5000 DRY MOUTH 1.1 % Dental Gel Place 1 Application onto teeth daily.         Were there any changes to your current medication(s) noted on the AVS? Yes  If so, were these medication changes discussed with you prior to leaving the hospital? Yes  If a new medication was prescribed:    Was the new medication's purpose & side effects reviewed? Yes  Do you have any questions about your new medication? No  Did you  your discharge medications when you left the hospital? Yes  Let's go over your medications together to make sure we are not missing anything. Medications Reviewed  Are there any reasons that keep you from  taking your medication as prescribed? No  Are you having any concerns with constipation? No  Did patient receive their flu shot (Sept-March)? Yes    Discharge medications reviewed/discussed/and reconciled against outpatient medications with patient.  Any changes or updates to medications sent to PCP.  Patient Acknowledged     Referrals/orders at D/C:  Referrals/orders placed at D/C? yes  What services:   Home health   (If HH was ordered) Has HH been set up?  Yes    If Yes: With Whom: [facility]    DME ordered at D/C? Yes  What? Wheelchair  From where? HME  Have you received your (DME)? no, pt spoke with HME and states insurance only covers wheelchair one time, she had received one in the past.     Discharge orders, AVS reviewed and discussed with patient. Any changes or updates to orders sent to PCP.  Patient Acknowledged      SDOH:   Transportation Needs: No Transportation Needs (1/29/2024)    Transportation Needs     Lack of Transportation: No     Financial Resource Strain: Low Risk  (2/1/2024)    Financial Resource Strain     Difficulty of Paying Living Expenses: Not hard at all     Med Affordability: No             Follow up appointments:      Your appointments       Date & Time Appointment Department (Center)    Feb 19, 2024  1:00 PM [facility] Medicare Annual Health Assessment Video Visit with Irma Valera DO [facility], [address] [place] [address] ([facility] [address])    Please verify your telehealth insurance benefits prior to your appointment.    You must be in the [facility] during the virtual visit.     Please use the [employer] Libby and launch the video visit 10 minutes prior to your scheduled appointment time to ensure your camera and microphone are working properly. Once the video visit has started you will be placed in a waiting room until the provider begins the visit.     You will receive an email confirmation with instructions.  If you have questions, call your  doctor's office directly.    If you are having issues or need to use a desktop/laptop, please follow the below steps:        1.       Close out all other open apps (could be competing for audio resources)  2.       Disable Bluetooth  3.       Reboot mobile device before joining the video  4.       Come off Wi-Fi and switch over to Data    Please see our Video Visit Tip Sheet if you need additional assistance.     If you believe this is an emergency, please dial 911 immediately.                [facility], [address] [place] [address], [address]  [address] [place] [id number] [phone]            TCC  Was TCC ordered: No      PCP (If no TCC appointment)  Does patient already have a PCP appointment scheduled? Yes  NCM Attempted to schedule PCP office TCM appointment with patient   If no appointment scheduled: Explain: no availability with PCP.     Specialist    Does the patient have any other follow up appointment(s) needing to be scheduled? Yes  If yes: NCM reviewed upcoming specialist appointment with patient: Yes  Does the patient need assistance scheduling appointment(s): No    Is there any reason as to why you cannot make your appointment(s)?  No     Needs post D/C:   Now that you are home, are there any needs or concerns you need addressed before your next visit with your PCP?  (DME, meds, questions, etc.): No    Interventions by NCM:   All discharge instructions reviewed with the patient. Reviewed when to call MD vs when to call 911 or go the ED. Educated patient on the importance of taking all meds as prescribed as well as close f/u with PCP/specialists. Pt verbalized understanding and will contact the office with any further questions or concerns. Patient denies fevers, chills, nausea, vomiting, shortness of breath, chest pain, or any other symptoms at this time.  NCM attempted to schedule HFU, no availability with PCP. NCM sent TE to PCP office re:  assistance in scheduling HFU appt. NCM provided contact information for any further questions/concerns. Patient verbalized understanding and agreeable.       Overall Rating:   How would you rate the care you received while in the hospital? good    CCM referral placed:    No    BOOK BY DATE: 02/14/24

## 2024-02-01 NOTE — PHYSICAL THERAPY NOTE
PHYSICAL THERAPY TREATMENT NOTE - INPATIENT    Room Number: 381/381-A     Session: 1     Number of Visits to Meet Established Goals: 5    Presenting Problem: s/p closed reduction R hip following dislocation 1/29/24  Co-Morbidities : R COLTON 12/22, multiple R hip dislocations over last year, RA, fibromyalgia, OA, compression fractures spine, HTN    History related to current admission: Patient is a 68 year old female admitted on 1/29/2024 from home for R hip and leg pain.  Pt diagnosed with R hip posterior dislocation. Pt is s/p Closed reduction completed in OR on  1/29/24. Pt with repeated hip dislocations over last year since R COLTON 12/2/22, right hip revision 5/2023. Pt had multiple hospital admission and prolonged BENITO admission from March to December 2023 per pt report. Pt most recently was treated for dislocation in ED 12/31/23 when hip was reduced and pt returned home.       ASSESSMENT   Pt is making progress towards IP PT goals. Now that pt is cleared to PWB 50% RLE, she was able to progress to ambulating with RW and CGA. Pt continues with decreased strength, ROM, balance and would benefit from continued PT to address deficits and return to PLOF.     DISCHARGE RECOMMENDATIONS  PT Discharge Recommendations: Home with home health PT/OT     PLAN  PT Treatment Plan: Bed mobility;Coordination;Energy conservation;Endurance;Patient education;Gait training;Range of motion;Strengthening;Transfer training;Balance training;Body mechanics  Rehab Potential : Good  Frequency (Obs): 3-5x/week    CURRENT GOALS   Goal #1 Patient is able to demonstrate supine - sit EOB @ level: supervision      Goal #2 Patient is able to demonstrate transfers EOB to/from Chair/Wheelchair at assistance level: supervision while maintaining NWB R LE.    Updated goal: Pt is able to demonstrate transfers sit to/from stand with supervision while maintaining PWB 50% RLE      Goal #3 Patient is able to ambulate 115 feet with assist device: walker -  rolling at assistance level: supervision while maintaining NWB R LE.    Update goal: Pt is able to ambulate 150 feet with AD: RW - at assistance level supervision while maintaining PWB 50% RLE      Goal #4  New goal: Pt is able to stair climb 3 stairs with 1 rail and supervision while maintaining PWB 50% RLE   Goal #5     Goal #6     Goal Comments: Goals established on 1/31/2024 1/31/2024 all goals ongoing       SUBJECTIVE  \"I have been dealing with this a lot\"     OBJECTIVE  Precautions: COLTON global precautions;Bed/chair alarm (iglesia brace, no flexion beyond 70-80 degrees R hip)     WEIGHT BEARING RESTRICTION  Weight Bearing Restriction: R lower extremity        R Lower Extremity: Partial Weight Bearing 50%       PAIN ASSESSMENT   Rating: Unable to rate  Location: R hip  Management Techniques: Activity promotion;Relaxation;Repositioning    BALANCE                                                                                                                       Static Sitting: Good  Dynamic Sitting: Fair +           Static Standing: Fair -  Dynamic Standing: Poor +    ACTIVITY TOLERANCE           BP: 143/72  BP Location: Right arm  BP Method: Automatic  Patient Position: Sitting    O2 WALK         AM-PAC '6-Clicks' INPATIENT SHORT FORM - BASIC MOBILITY  How much difficulty does the patient currently have...  Patient Difficulty: Turning over in bed (including adjusting bedclothes, sheets and blankets)?: A Little   Patient Difficulty: Sitting down on and standing up from a chair with arms (e.g., wheelchair, bedside commode, etc.): A Little   Patient Difficulty: Moving from lying on back to sitting on the side of the bed?: A Little   How much help from another person does the patient currently need...   Help from Another: Moving to and from a bed to a chair (including a wheelchair)?: A Little   Help from Another: Need to walk in hospital room?: A Little   Help from Another: Climbing 3-5 steps with a railing?: A  Erica       AM-PAC Score:  Raw Score: 18   Approx Degree of Impairment: 46.58%   Standardized Score (AM-PAC Scale): 43.63   CMS Modifier (G-Code): CK    FUNCTIONAL ABILITY STATUS  Gait Assessment   Functional Mobility/Gait Assessment  Gait Assistance: Contact guard assist  Distance (ft): 30  Assistive Device: Rolling walker  Pattern: R Decreased stance time (irene brace donned)    Skilled Therapy Provided: Per RN okay to work with pt. Pt received in supine and was agreeable to PT session. Irene brace donned.     Bed Mobility:  Rolling: NT   Supine<>Sit: supervision    Sit<>Supine: supervision     Transfer Mobility:  Sit<>Stand: CGA   Stand<>Sit: CGA   Gait: pt ambulated with RW and CGA, irene brace donned     Pt verbalized understanding of global hip precautions and PWB 50% RLE and irene brace.     Therapist's Comments: Pt educated on role of therapy, goals for session, safety, fall prevention, irene brace, PWB 50% RLE, global hip precautions, and discharge planning.       Patient End of Session: In bed;Call light within reach;Needs met;RN aware of session/findings;All patient questions and concerns addressed;Alarm set;Discussed recommendations with /    PT Session Time: 15 minutes  Therapeutic activity: 15 minutes

## 2024-02-01 NOTE — DISCHARGE SUMMARY
[facility]     Ngozi Collazo Patient Status:  Observation    1955 MRN [id number]   Location [facility] 3SW-A Attending No att. providers found   Hosp Day # 0 PCP Irma Valera DO     Date of Admission: 2024  Date of Discharge:  2024     Discharge Disposition: Home or Self Care    Discharge Diagnosis:    #Right hip dislocation  #History of right hip replacement  #COPD   #Rheumatoid arthritis  #Sjogren's  #Lupus  #Neuropathy   #Dyslipidemia  #Breast cancer     History of Present Illness: Ngozi Collazo is a 68 year old female with PMHx peripheral neuropathy, asthma, lupus, OA, morbid obesity presents to the ER w/ discomfort to her right hip. Pt had a dislocation on her hip previously multiple times. She said she was sitting in her chair and all of a sudden felt it \"pop out\". She does have hip to that area. She otherwise denies any other systemic symptoms of cp, sob, fevers, chills, n/v/diarrhea.     Brief Synopsis:   Patient presented with right hip pain.  Patient was found to have a right hip dislocation.  She was taken by orthopedic surgery for close reduction of posterior dislocation.  Patient tolerated procedure well.  She was seen by PT and OT postprocedure.  Patient was cleared by orthopedic surgery for discharge home.  Patient can resume all regular home meds at time of discharge.  All questions and concerns were addressed with patient prior to discharge, she is agreeable to plan of care as stated.  She is encouraged return to the ER for symptoms come back or worsen.    Lace+ Score: 78  59-90 High Risk  29-58 Medium Risk  0-28   Low Risk       TCM Follow-Up Recommendation:  LACE > 58: High Risk of readmission after discharge from the hospital.      Procedures during hospitalization:   Closed reduction anesthesia right total hip arthroplasty dislocation, posterior     Incidental or significant findings and recommendations (brief descriptions):  None    Lab/Test  results pending at Discharge:   N/a    Consultants:  Ortho surgery     Discharge Medication List:     Discharge Medications        START taking these medications        Instructions Prescription details   Naloxone HCl 4 MG/0.1ML Liqd      4 mg by Nasal route as needed. If patient remains unresponsive, repeat dose in other nostril 2-5 minutes after first dose.   Quantity: 1 kit  Refills: 0     oxyCODONE 5 MG Tabs      Take 0.5 tablets (2.5 mg total) by mouth every 6 (six) hours as needed.   Quantity: 15 tablet  Refills: 0            CHANGE how you take these medications        Instructions Prescription details   albuterol 108 (90 Base) MCG/ACT Aers  Commonly known as: Ventolin HFA  What changed: See the new instructions.      USE 2 INHALATIONS ORALLY   EVERY 6 HOURS AS NEEDED FORWHEEZING (APPOINTMENT      NEEDED FOR MORE REFILLS)   Quantity: 3 Inhaler  Refills: 3     pregabalin 75 MG Caps  Commonly known as: Lyrica  What changed: Another medication with the same name was changed. Make sure you understand how and when to take each.      Take 1 capsule (75 mg total) by mouth nightly.   Refills: 0     pregabalin 50 MG Caps  Commonly known as: Lyrica  What changed:   how much to take  how to take this  when to take this  additional instructions      TAKE 1 CAPSULE EVERY       MORNING WITH 75MG IN THE   EVENING   Quantity: 90 capsule  Refills: 0            CONTINUE taking these medications        Instructions Prescription details   Breo Ellipta 200-25 MCG/ACT Aepb  Generic drug: fluticasone furoate-vilanterol      USE 1 INHALATION ORALLY    ONCE DAILY AS DIRECTED   Quantity: 3 each  Refills: 3     Cevimeline HCl 30 MG Caps  Commonly known as: EVOXAC      Take 1 capsule (30 mg total) by mouth 3 (three) times daily.   Quantity: 270 capsule  Refills: 2     EPINEPHrine 0.3 MG/0.3ML Soaj  Commonly known as: EpiPen 2-Farhan      Inject 0.3 mL (1 each total) as directed as needed.   Quantity: 1 each  Refills: 1      hydroxychloroquine 200 MG Tabs  Commonly known as: Plaquenil      Take 1 tablet (200 mg total) by mouth 2 (two) times daily.   Quantity: 180 tablet  Refills: 1     PreviDent 5000 Dry Mouth 1.1 % Gel  Generic drug: Sodium Fluoride      Place 1 Application onto teeth daily.   Refills: 0     sodium chloride 0.65 % Soln  Commonly known as: Saline Mist      2 sprays by Nasal route as needed for congestion.   Refills: 0            STOP taking these medications      hydroCHLOROthiazide 25 MG Tabs  Commonly known as: Hydrodiuril        tiZANidine 2 MG Tabs  Commonly known as: Zanaflex        Zinc 50 MG Caps                  Where to Get Your Medications        These medications were sent to [id number] - [place], [place] - [address] AT [facility] [address]  & , [phone], [phone] [address], [facility] [address]      Phone: [phone]   Naloxone HCl 4 MG/0.1ML Liqd  oxyCODONE 5 MG Tabs         [place]    Follow-up appointment:   Irma Valera DO  [address] [place] [address]  [facility] [id number] [phone]    Follow up in 1 week(s)      Behery, Omar Atef, MD  [address] [facility] [address]  [facility] [id number] [phone]    Schedule an appointment as soon as possible for a visit in 2 week(s)      Appointments for Next 30 Days 2/1/2024 - 3/2/2024        Date Arrival Time Visit Type Length Department Provider     2/19/2024  1:00 PM  See[employer] VIDEO MEDICARE/AHA [7509] 30 min [facility], [address] [place] [address] Irma Valera DO    Patient Instructions:     Please verify your telehealth insurance benefits prior to your appointment.    You must be in the [facility] during the virtual visit.     Please use the [employer] Mobile Libby and launch the video visit 10 minutes prior to your scheduled appointment time to ensure your camera and microphone are working properly. Once the video visit has started you will be placed in a waiting room until the provider  begins the visit.     You will receive an email confirmation with instructions.  If you have questions, call your doctor's office directly.    If you are having issues or need to use a desktop/laptop, please follow the below steps:        1.       Close out all other open apps (could be competing for audio resources)  2.       Disable Bluetooth  3.       Reboot mobile device before joining the video  4.       Come off Wi-Fi and switch over to Data    Please see our Video Visit Tip Sheet if you need additional assistance.     If you believe this is an emergency, please dial 911 immediately.          Location Instructions:     Masks are optional for all patients and visitors, unless otherwise indicated.                        -----------------------------------------------------------------------------------------------  PATIENT DISCHARGE INSTRUCTIONS: See electronic chart    Edgar Shepherd MD    Total time spent on discharge plannin minutes     The 21st Century Cures Act makes medical notes like these available to patients in the interest of transparency. Please be advised this is a medical document. Medical documents are intended to carry relevant information, facts as evident, and the clinical opinion of the practitioner. The medical note is intended as peer to peer communication and may appear blunt or direct. It is written in medical language and may contain abbreviations or verbiage that are unfamiliar.

## 2024-02-01 NOTE — TELEPHONE ENCOUNTER
Spoke to pt for TCM today.  Pt does not have an appointment scheduled at this time.  TCM appt recommended by 2/7/24 as pt is a high risk for readmission.  Please advise.    BOOK BY DATE (last date for TCM): 2/14/24    Clinical staff:  Please f/u with pt and try to get them to schedule as pt would greatly benefit from TCM appt.  Thank you!

## 2024-02-01 NOTE — CM/SW NOTE
Received call from [facility] liaison, Molly, and pt has had a wheelchair covered by Medicare in the past few years.  Option to rent for Medicare eligible patients and rate is $77/month.  Called patient and she said she is not currently using her wheelchair so she does not want the reclining wheelchair.  Also offered option of contacting the vendor that supplied her current wheelchair to see if she can exchange for a reclining wheelchair.    Notified her that contact info for [facility] is on her discharge instructions if she changes her mind    / to remain available for support and/or discharge planning.     Jenny Hayes MBA MSN, RN CTL/  [id number]

## 2024-02-01 NOTE — PROGRESS NOTES
Discharge AVS reviewed with patient. All questions answered. IV removed. Bedside belongings packed up and returned to patient. Scripts sent to patient pharmacy. New port brace, abductor pillow with patient. Picked up by [facility]

## 2024-02-06 ENCOUNTER — HOSPITAL ENCOUNTER (EMERGENCY)
Facility: HOSPITAL | Age: 69
Discharge: HOME | End: 2024-02-06
Attending: EMERGENCY MEDICINE
Payer: MEDICARE

## 2024-02-06 ENCOUNTER — APPOINTMENT (OUTPATIENT)
Dept: GENERAL RADIOLOGY | Facility: HOSPITAL | Age: 69
End: 2024-02-06
Attending: EMERGENCY MEDICINE
Payer: MEDICARE

## 2024-02-06 VITALS
BODY MASS INDEX: 37.56 KG/M2 | SYSTOLIC BLOOD PRESSURE: 137 MMHG | RESPIRATION RATE: 11 BRPM | OXYGEN SATURATION: 96 % | DIASTOLIC BLOOD PRESSURE: 82 MMHG | WEIGHT: 220 LBS | HEART RATE: 56 BPM | TEMPERATURE: 98 F | HEIGHT: 64 IN

## 2024-02-06 DIAGNOSIS — S73.004A CLOSED DISLOCATION OF RIGHT HIP, INITIAL ENCOUNTER (HCC): Primary | ICD-10-CM

## 2024-02-06 PROCEDURE — 96376 TX/PRO/DX INJ SAME DRUG ADON: CPT

## 2024-02-06 PROCEDURE — 99285 EMERGENCY DEPT VISIT HI MDM: CPT

## 2024-02-06 PROCEDURE — 96361 HYDRATE IV INFUSION ADD-ON: CPT

## 2024-02-06 PROCEDURE — 73501 X-RAY EXAM HIP UNI 1 VIEW: CPT | Performed by: EMERGENCY MEDICINE

## 2024-02-06 PROCEDURE — 96375 TX/PRO/DX INJ NEW DRUG ADDON: CPT

## 2024-02-06 PROCEDURE — 27265 TREAT HIP DISLOCATION: CPT

## 2024-02-06 PROCEDURE — 96374 THER/PROPH/DIAG INJ IV PUSH: CPT

## 2024-02-06 PROCEDURE — 73502 X-RAY EXAM HIP UNI 2-3 VIEWS: CPT | Performed by: EMERGENCY MEDICINE

## 2024-02-06 RX ORDER — KETAMINE HYDROCHLORIDE 50 MG/ML
1 INJECTION, SOLUTION INTRAMUSCULAR; INTRAVENOUS ONCE
Status: COMPLETED | OUTPATIENT
Start: 2024-02-06 | End: 2024-02-06

## 2024-02-06 RX ORDER — KETAMINE HYDROCHLORIDE 50 MG/ML
INJECTION, SOLUTION INTRAMUSCULAR; INTRAVENOUS
Status: COMPLETED
Start: 2024-02-06 | End: 2024-02-06

## 2024-02-06 RX ORDER — ETOMIDATE 2 MG/ML
14 INJECTION INTRAVENOUS AS NEEDED
Status: DISCONTINUED | OUTPATIENT
Start: 2024-02-06 | End: 2024-02-06

## 2024-02-06 RX ORDER — ONDANSETRON 2 MG/ML
4 INJECTION INTRAMUSCULAR; INTRAVENOUS ONCE
Status: COMPLETED | OUTPATIENT
Start: 2024-02-06 | End: 2024-02-06

## 2024-02-06 NOTE — ED PROVIDER NOTES
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  Patient is actually scheduled to see her doctor on the 20th for consideration of revision of the hip prosthesis.  Patient dislocated her hip again tonight when trying to get out of her wheelchair.  Patient has shortening of her right hip.  X-ray does confirm there is a hip dislocation she gave verbal consent for closed reduction of the hip.  We able to give sedation and reduce her hip here after multiple attempts were able to finally reduce the hip.  X-ray confirms a hip dislocation has been reduced.  Patient will need to follow-up with her orthopedic surgeon.  Patient also requested to have us cancel her home health appointments for today.  We will contact case management to see if that can be arranged.    The history is provided by the patient and the EMS personnel.           Objective:   Past Medical History:   Diagnosis Date    Achilles tendonitis     Arthritis     Asthma     Atherosclerosis     with ectasia    Back problem     back surgery lumbar    Cancer (HCC) 09/2004    rt breast dcis    Carpal tunnel syndrome 05/29/2009    bilateral    Cataract     Chronic foot pain     right    Diarrhea, unspecified     Duodenitis 12/18/2008    peptic    Dyslipidemia     Dysphagia     Essential hypertension     Exposure to radiation     Fasciitis     Fatigue     Food intolerance     Foot fracture, left 10/2008    left foot fx: excessive walking    H. pylori infection     Hammertoe     second toe right foot    Heart valve disease     Hemorrhoids     High blood pressure     History of blood transfusion 2023    no reactions    Hypercalcemia     IBS (irritable bowel syndrome)     Internal hemorrhoids 12/18/2008    Grade II    Irregular Z line of esophagus  12/18/2008    Localized primary carpometacarpal osteoarthritis 07/30/2012    Lupus (HCC)     Macular degeneration 12/07/2010    early signs, bilaterally, remained stable    Osteoarthritis     left thumb, severe    Osteomyelitis of right foot (HCC) 01/2018    Balaji OSCAR, Dr. Allan    Osteoporosis     Pain in joints     Pneumonia due to organism     Postmenopausal atrophic vaginitis     Pulmonary fibrosis ([facility]) 07/2018    referring to pulm, possible Sjogren's involvement?    Rheumatoid arthritis ([facility])     Rheumatoid arthritis(714.0)     Sicca syndrome ([facility]) 02/26/2009    Sjogren's syndrome ([facility]) 10/2007    Stress fracture of the metatarsals     right foot    Systemic lupus erythematosus ([facility]) 02/26/2009    Visual impairment     glasses    Vitamin D deficiency               Past Surgical History:   Procedure Laterality Date    ADENOIDECTOMY      APPENDECTOMY  age 8    [place]    BACK SURGERY      CARPAL TUNNEL RELEASE Bilateral     CATARACT      COLONOSCOPY      COLONOSCOPY      DERMATOLOGY SHAVE BIOPSY (D1K.1)  12/29/2011    Shave biopsy, skin, right shoulder    FOOT SURGERY  04/2013    revision right foot surgery    FOOT/TOES SURGERY PROC UNLISTED  09/2011    first MTM joint fusion with ORIF of second and third MTs with correction of the second hammertoe of the right foot by Dr. Hatfield    KNEE REPLACEMENT SURGERY      LUMPECTOMY RIGHT  2004    rt breast lumpectomy, [facility] BIOPSY STEREO NODULE 1 SITE RIGHT (CPT=19081)  2004    OTHER SURGICAL HISTORY  01/11/2013    trigger thumb injections    RADIATION RIGHT  2004    MAMMOSITE    SPINE SURGERY PROCEDURE UNLISTED      L4-5    TONSILLECTOMY      TOTAL HIP REPLACEMENT Right     TOTAL KNEE REPLACEMENT Left 05/24/2018    UPPER GI ENDOSCOPY,EXAM  12/18/2008                Social History     Socioeconomic History    Marital status:    Tobacco Use    Smoking status: Former     Packs/day: 1.00     Years: 37.00     Additional pack years: 0.00      Total pack years: 37.00     Types: Cigarettes     Start date: 1970     Quit date: 2007     Years since quittin.6    Smokeless tobacco: Never    Tobacco comments:     quit in 2006   Vaping Use    Vaping Use: Never used   Substance and Sexual Activity    Alcohol use: Not Currently     Alcohol/week: 0.0 - 1.0 standard drinks of alcohol     Comment: Rare    Drug use: Not Currently     Comment: CBD gummies for pain   Other Topics Concern    Caffeine Concern Yes     Comment: 2-3 cups a day    Exercise No     Comment: minimal    Seat Belt Yes     Social Determinants of Health     Financial Resource Strain: Low Risk  (2024)    Financial Resource Strain     Difficulty of Paying Living Expenses: Not hard at all     Med Affordability: No   Food Insecurity: No Food Insecurity (2024)    Food Insecurity     Food Insecurity: Never true   Transportation Needs: No Transportation Needs (2024)    Transportation Needs     Lack of Transportation: No   Housing Stability: Low Risk  (2024)    Housing Stability     Housing Instability: No              Review of Systems   Musculoskeletal:  Positive for arthralgias and gait problem.       Positive for stated complaint: Hip pain  Other systems are as noted in HPI.  Constitutional and vital signs reviewed.      All other systems reviewed and negative except as noted above.    Physical Exam     ED Triage Vitals   BP 24 0035 (!) 198/119   Pulse 24 0035 87   Resp 24 0035 20   Temp 24 0104 98.1 °F (36.7 °C)   Temp src 24 0104 Oral   SpO2 24 0035 98 %   O2 Device 24 0035 Nasal cannula       Current:/82   Pulse 56   Temp 98.1 °F (36.7 °C) (Oral)   Resp 11   Ht 162.6 cm (5' 4\")   Wt 99.8 kg   SpO2 96%   BMI 37.76 kg/m²         Physical Exam  Vitals and nursing note reviewed.   Constitutional:       General: She is not in acute distress.     Appearance: Normal appearance. She is obese. She is not toxic-appearing.    HENT:      Head: Normocephalic and atraumatic.   Eyes:      Extraocular Movements: Extraocular movements intact.      Pupils: Pupils are equal, round, and reactive to light.   Cardiovascular:      Rate and Rhythm: Normal rate and regular rhythm.      Pulses: Normal pulses.   Pulmonary:      Effort: Pulmonary effort is normal. No respiratory distress.      Breath sounds: Normal breath sounds. No wheezing.   Abdominal:      Palpations: Abdomen is soft.      Tenderness: There is no abdominal tenderness.      Comments: Obese abdomen   Musculoskeletal:         General: Tenderness present. No swelling.      Comments: Internal rotation and shortening of the right hip consistent with dislocation   Skin:     General: Skin is warm.      Capillary Refill: Capillary refill takes less than 2 seconds.   Neurological:      General: No focal deficit present.      Mental Status: She is alert and oriented to person, place, and time.   Psychiatric:         Mood and Affect: Mood normal.         Behavior: Behavior normal.               ED Course     Labs Reviewed   RAINBOW DRAW LAVENDER   RAINBOW DRAW LIGHT GREEN     Procedure   Patient gave verbal consent for conscious sedation and closed reduction of right hip prosthesis dislocation.  Patient was given initially etomidate and fentanyl mixture and then a round of ketamine in order to the achieve sedation after her initial attempt at reduction was unsuccessful.  With the second attempt patient was able to have the hip reduced using a pivot method applied to the opposite knee.  Assistance with Dr. Arturo Urias at bedside.  Patient tolerated procedure well and hip was reduced into anatomic location +2 dorsal pedal pulse shortening was reduced.  Patient had no vomiting and tolerated the sedation well.  Presedation assessment included normal oral airway and no recent oral intake.   The patient required sedation for closed reduction of right hip dislocation.  The risks, benefits, and  alternatives were discussed with the patient and/or the family who consented.  The patient had a screening history and exam completed and there are no contraindications to sedation.  The patient has been NPO for 4+ hours. ASA designation is ASA 2 - Patient with mild systemic disease with no functional limitations.  Mallampati score: I (soft palate, uvula, fauces, and tonsillar pillars visible).  The patient was placed on monitors and was under constant nursing observation.  Under my direct supervision the patient was given etomidate, fentanyl, ketamine.  An appropriate level of sedation was achieved.  The patient remained hemodynamically stable with normal oxygen saturations during the procedure.  There were no complications related to the sedation.  Patient was observed until mental status returned to baseline.  Patient was subsequently deemed appropriate for discharge home with responsible caretaker.  The sedation lasted 3 minutes minutes on initial attempt, on secondary attempt 8 minutes total during which I was present.      Initial x-ray of the hip shows recurrent right hip dislocation with femoral head prosthesis superior and lateral to the acetabular cup right total hip replacement without complication.  No acute fracture.  Old trauma to the right inferior pubic rami    Postreduction x-ray shows status post reduction with the hip joint in anatomic alignment total hip hardware without acute hardware complication no acute fracture or malalignment       MDM      Social -negative tobacco, negative etoh, negative drugs  Family History-noncontributory  Past Medical History-asthma, osteoporosis, lupus, rheumatoid arthritis, obesity, hypertension, pulmonary fibrosis, duodenitis, Sojourn syndrome, sicca syndrome, IBS    Differential diagnosis before testing included hip dislocation, fracture    Co-morbidities that add to the complexity of management include: Frequent hip dislocations, total hip arthroplasty,  obesity    Testing ordered during this visit included x-rays    Radiographic images  I personally reviewed the radiographs and my individual interpretation shows initial x-ray shows dislocation of the right hip reduction x-ray shows reduction of the  I also reviewed the official reports that showed Initial x-ray of the hip shows recurrent right hip dislocation with femoral head prosthesis superior and lateral to the acetabular cup right total hip replacement without complication.  No acute fracture.  Old trauma to the right inferior pubic rami    Postreduction x-ray shows status post reduction with the hip joint in anatomic alignment total hip hardware without acute hardware complication no acute fracture or malalignment       External chart review showed review of care everywhere in [employer] system shows the patient has had to go to the OR multiple times for the same productions in the past    History obtained by an independent source included from patient, EMS    Discussion of management with patient, case management    Social determinants of health that affect care include patient has had frequent hip dislocations, recommend following up with her orthopedic doctor regarding revision      Medications Provided: Etomidate, fentanyl, Zofran, ketamine    Course of Events during Emergency Room Visit include 68-year-old female with recurrent hip dislocation that was confirmed on x-ray that was able to be reduced after attempts for reduction.  Confirmed reduction with x-ray.  Patient to follow-up with her orthopedic doctor.  Patient requested that we cancel her home health appointments today.  We will contact case management to see if they can arrange for that to be canceled.          Disposition:        Discharge  I have discussed with the patient the results of test, differential diagnosis, treatment plan, warning signs and symptoms which should prompt immediate return.  They expressed understanding of these instructions  and agrees to the following plan provided.  They were given written discharge instructions and agrees to return for any concerns and voiced understanding and all questions were answered.                                Medical Decision Making      Disposition and Plan     Clinical Impression:  1. Closed dislocation of right hip, initial encounter ([facility])         Disposition:  Discharge  2/6/2024  4:17 am    Follow-up:  Irma Valera DO  [address] [place] [address] [address]  [facility] [address]  [phone]    Schedule an appointment as soon as possible for a visit      Behery, Omar Atef, MD  [address] [place] [address]  [facility] [id number] [phone]    Schedule an appointment as soon as possible for a visit            Medications Prescribed:  Discharge Medication List as of 2/6/2024  4:30 AM

## 2024-02-07 ENCOUNTER — TELEPHONE (OUTPATIENT)
Dept: ORTHOPEDICS CLINIC | Facility: CLINIC | Age: 69
End: 2024-02-07

## 2024-02-07 NOTE — TELEPHONE ENCOUNTER
[facility] called stating that patient has refused PT and OT until after hip surgery. KARINA

## 2024-02-07 NOTE — TELEPHONE ENCOUNTER
Simona from [facility] is requesting to know if patient is having another surgery on her hip, patient stated it was re-dislocated, and if she needs to hold off on home health for now.

## 2024-02-08 NOTE — TELEPHONE ENCOUNTER
Simona stated that the PT had already gotten a hold of someone to discontinue PT until after surgery scheduled for 2/22/24.

## 2024-02-12 ENCOUNTER — PATIENT OUTREACH (OUTPATIENT)
Dept: CASE MANAGEMENT | Age: 69
End: 2024-02-12

## 2024-02-12 NOTE — PROGRESS NOTES
1st attempt ER f/up apt request  PCP -existing apt (2/13)  ORTHO -existing apt (2/20)  Closing encounter

## 2024-02-13 ENCOUNTER — OFFICE VISIT (OUTPATIENT)
Dept: FAMILY MEDICINE CLINIC | Facility: CLINIC | Age: 69
End: 2024-02-13
Payer: MEDICARE

## 2024-02-13 ENCOUNTER — EKG ENCOUNTER (OUTPATIENT)
Dept: LAB | Age: 69
End: 2024-02-13
Attending: FAMILY MEDICINE
Payer: MEDICARE

## 2024-02-13 ENCOUNTER — LAB ENCOUNTER (OUTPATIENT)
Dept: LAB | Age: 69
End: 2024-02-13
Attending: FAMILY MEDICINE
Payer: MEDICARE

## 2024-02-13 VITALS — DIASTOLIC BLOOD PRESSURE: 92 MMHG | HEART RATE: 76 BPM | SYSTOLIC BLOOD PRESSURE: 138 MMHG | OXYGEN SATURATION: 96 %

## 2024-02-13 DIAGNOSIS — S73.004S CLOSED DISLOCATION OF RIGHT HIP, SEQUELA: ICD-10-CM

## 2024-02-13 DIAGNOSIS — M16.11 ARTHRITIS OF RIGHT HIP: ICD-10-CM

## 2024-02-13 DIAGNOSIS — Z01.818 PREOP EXAMINATION: ICD-10-CM

## 2024-02-13 DIAGNOSIS — Z01.818 PREOP EXAMINATION: Primary | ICD-10-CM

## 2024-02-13 LAB
ALBUMIN SERPL-MCNC: 3.4 G/DL (ref 3.4–5)
ALBUMIN/GLOB SERPL: 1 {RATIO} (ref 1–2)
ALP LIVER SERPL-CCNC: 100 U/L
ALT SERPL-CCNC: 10 U/L
ANION GAP SERPL CALC-SCNC: 3 MMOL/L (ref 0–18)
AST SERPL-CCNC: 16 U/L (ref 15–37)
BASOPHILS # BLD AUTO: 0.05 X10(3) UL (ref 0–0.2)
BASOPHILS NFR BLD AUTO: 1.3 %
BILIRUB SERPL-MCNC: 0.4 MG/DL (ref 0.1–2)
BUN BLD-MCNC: 11 MG/DL (ref 9–23)
CALCIUM BLD-MCNC: 9.2 MG/DL (ref 8.5–10.1)
CHLORIDE SERPL-SCNC: 111 MMOL/L (ref 98–112)
CO2 SERPL-SCNC: 30 MMOL/L (ref 21–32)
CREAT BLD-MCNC: 0.78 MG/DL
CRP SERPL-MCNC: 1.26 MG/DL (ref ?–0.3)
EGFRCR SERPLBLD CKD-EPI 2021: 82 ML/MIN/1.73M2 (ref 60–?)
EOSINOPHIL # BLD AUTO: 0.2 X10(3) UL (ref 0–0.7)
EOSINOPHIL NFR BLD AUTO: 5.3 %
ERYTHROCYTE [DISTWIDTH] IN BLOOD BY AUTOMATED COUNT: 15.2 %
ERYTHROCYTE [SEDIMENTATION RATE] IN BLOOD: 39 MM/HR
FASTING STATUS PATIENT QL REPORTED: NO
GLOBULIN PLAS-MCNC: 3.4 G/DL (ref 2.8–4.4)
GLUCOSE BLD-MCNC: 83 MG/DL (ref 70–99)
HCT VFR BLD AUTO: 33.6 %
HGB BLD-MCNC: 10.4 G/DL
IMM GRANULOCYTES # BLD AUTO: 0.01 X10(3) UL (ref 0–1)
IMM GRANULOCYTES NFR BLD: 0.3 %
LYMPHOCYTES # BLD AUTO: 0.8 X10(3) UL (ref 1–4)
LYMPHOCYTES NFR BLD AUTO: 21.2 %
MCH RBC QN AUTO: 29.2 PG (ref 26–34)
MCHC RBC AUTO-ENTMCNC: 31 G/DL (ref 31–37)
MCV RBC AUTO: 94.4 FL
MONOCYTES # BLD AUTO: 0.55 X10(3) UL (ref 0.1–1)
MONOCYTES NFR BLD AUTO: 14.6 %
NEUTROPHILS # BLD AUTO: 2.16 X10 (3) UL (ref 1.5–7.7)
NEUTROPHILS # BLD AUTO: 2.16 X10(3) UL (ref 1.5–7.7)
NEUTROPHILS NFR BLD AUTO: 57.3 %
OSMOLALITY SERPL CALC.SUM OF ELEC: 297 MOSM/KG (ref 275–295)
PLATELET # BLD AUTO: 200 10(3)UL (ref 150–450)
POTASSIUM SERPL-SCNC: 3.2 MMOL/L (ref 3.5–5.1)
PROT SERPL-MCNC: 6.8 G/DL (ref 6.4–8.2)
RBC # BLD AUTO: 3.56 X10(6)UL
SODIUM SERPL-SCNC: 144 MMOL/L (ref 136–145)
WBC # BLD AUTO: 3.8 X10(3) UL (ref 4–11)

## 2024-02-13 PROCEDURE — 80053 COMPREHEN METABOLIC PANEL: CPT

## 2024-02-13 PROCEDURE — 87081 CULTURE SCREEN ONLY: CPT

## 2024-02-13 PROCEDURE — 36415 COLL VENOUS BLD VENIPUNCTURE: CPT

## 2024-02-13 PROCEDURE — 93005 ELECTROCARDIOGRAM TRACING: CPT

## 2024-02-13 PROCEDURE — 86140 C-REACTIVE PROTEIN: CPT

## 2024-02-13 PROCEDURE — 99214 OFFICE O/P EST MOD 30 MIN: CPT | Performed by: FAMILY MEDICINE

## 2024-02-13 PROCEDURE — 93010 ELECTROCARDIOGRAM REPORT: CPT | Performed by: INTERNAL MEDICINE

## 2024-02-13 PROCEDURE — 85652 RBC SED RATE AUTOMATED: CPT

## 2024-02-13 PROCEDURE — 85025 COMPLETE CBC W/AUTO DIFF WBC: CPT

## 2024-02-13 RX ORDER — METOPROLOL SUCCINATE 25 MG/1
25 TABLET, EXTENDED RELEASE ORAL DAILY
Qty: 90 TABLET | Refills: 1 | Status: SHIPPED | OUTPATIENT
Start: 2024-02-13

## 2024-02-14 ENCOUNTER — TELEPHONE (OUTPATIENT)
Dept: ORTHOPEDICS CLINIC | Facility: CLINIC | Age: 69
End: 2024-02-14

## 2024-02-14 LAB
ATRIAL RATE: 69 BPM
P AXIS: 48 DEGREES
P-R INTERVAL: 138 MS
Q-T INTERVAL: 408 MS
QRS DURATION: 86 MS
QTC CALCULATION (BEZET): 437 MS
R AXIS: 1 DEGREES
T AXIS: 26 DEGREES
VENTRICULAR RATE: 69 BPM

## 2024-02-14 NOTE — TELEPHONE ENCOUNTER
Ursula from [facility] is requesting a call back in regards to rescheduling a nursing visit for patient.

## 2024-02-14 NOTE — TELEPHONE ENCOUNTER
Spoke with [facility] nurse Ursula to advise her of Mary Ellen's message below.         Mary Ellen Phillips PA-C  [facility] Clinical Pool1 hour ago (3:22 PM)       There was no incision just a closed reduction so she may not need home health nursing from an ortho standpoitn until after her next surgery thank you

## 2024-02-14 NOTE — TELEPHONE ENCOUNTER
TERENCEI  Patient refusing RN visit till after second surgery. Estimated RN visit will be 2/26 or 2/27.   DOS: 1/29/24   Scheduled surgery:2/22/24

## 2024-02-15 RX ORDER — TRAMADOL HYDROCHLORIDE 50 MG/1
50 TABLET ORAL EVERY 6 HOURS PRN
COMMUNITY

## 2024-02-15 RX ORDER — ACETAMINOPHEN 500 MG
500 TABLET ORAL EVERY 6 HOURS PRN
COMMUNITY

## 2024-02-15 NOTE — PROGRESS NOTES
Subjective:   Patient ID: Ngozi Collazo is a 68 year old female.    Pt is here for preop clearance.  Having right hip replacement revision with Dr. Behery on 2/22/24.  Had surgery a few months ago no complications.  No CP/SOB/HA.  + right hip pain.          History/Other:   Review of Systems   All other systems reviewed and are negative.    Current Outpatient Medications   Medication Sig Dispense Refill    metoprolol succinate ER 25 MG Oral Tablet 24 Hr Take 1 tablet (25 mg total) by mouth daily. 90 tablet 1    Potassium Chloride ER 10 MEQ Oral Tab CR Take 1 tablet (10 mEq total) by mouth 2 (two) times daily for 6 days. 12 tablet 0    oxyCODONE 5 MG Oral Tab Take 0.5 tablets (2.5 mg total) by mouth every 6 (six) hours as needed. 15 tablet 0    Naloxone HCl 4 MG/0.1ML Nasal Liquid 4 mg by Nasal route as needed. If patient remains unresponsive, repeat dose in other nostril 2-5 minutes after first dose. 1 kit 0    pregabalin 75 MG Oral Cap Take 1 capsule (75 mg total) by mouth nightly.      sodium chloride 0.65 % Nasal Solution 2 sprays by Nasal route as needed for congestion.      BREO ELLIPTA 200-25 MCG/ACT Inhalation Aerosol Powder, Breath Activated USE 1 INHALATION ORALLY    ONCE DAILY AS DIRECTED (Patient taking differently: Inhale 1 puff into the lungs daily.) 3 each 3    hydroxychloroquine 200 MG Oral Tab Take 1 tablet (200 mg total) by mouth 2 (two) times daily. 180 tablet 1    pregabalin 50 MG Oral Cap TAKE 1 CAPSULE EVERY       MORNING WITH 75MG IN THE   EVENING (Patient taking differently: Take 1 capsule (50 mg total) by mouth every morning.) 90 capsule 0    Cevimeline HCl 30 MG Oral Cap Take 1 capsule (30 mg total) by mouth 3 (three) times daily. 270 capsule 2    ALBUTEROL SULFATE  (90 Base) MCG/ACT Inhalation Aero Soln USE 2 INHALATIONS ORALLY   EVERY 6 HOURS AS NEEDED FORWHEEZING (APPOINTMENT      NEEDED FOR MORE REFILLS) (Patient taking differently: Inhale 2 puffs into the lungs every 4 (four)  hours as needed for Wheezing or Shortness of Breath.) 3 Inhaler 3    EPINEPHrine (EPIPEN 2-RACHELE) 0.3 MG/0.3ML Injection Solution Auto-injector Inject 0.3 mL (1 each total) as directed as needed. 1 each 1    PREVIDENT 5000 DRY MOUTH 1.1 % Dental Gel Place 1 Application onto teeth daily.         Allergies:  Allergies   Allergen Reactions    Allergy ANAPHYLAXIS and SWELLING     Bee Stings, Catgut Sutures      Bee Sting [Bee Venom] SWELLING     Bee sting    Erythromycin Base NAUSEA ONLY    Vicryl Sutures OTHER (SEE COMMENTS)     Cat gut sutures as a child, developed an infection       Objective:   Physical Exam  Vitals reviewed.   Constitutional:       General: She is not in acute distress.     Appearance: She is well-developed. She is not diaphoretic.   HENT:      Right Ear: External ear normal.      Left Ear: External ear normal.      Nose: Nose normal.      Mouth/Throat:      Pharynx: No oropharyngeal exudate.   Eyes:      General: No scleral icterus.        Right eye: No discharge.         Left eye: No discharge.      Conjunctiva/sclera: Conjunctivae normal.   Neck:      Thyroid: No thyromegaly.   Cardiovascular:      Rate and Rhythm: Normal rate and regular rhythm.      Heart sounds: Normal heart sounds.   Pulmonary:      Effort: Pulmonary effort is normal. No respiratory distress.      Breath sounds: Normal breath sounds. No wheezing or rales.   Abdominal:      General: Bowel sounds are normal. There is no distension.      Palpations: Abdomen is soft. There is no mass.      Tenderness: There is no abdominal tenderness. There is no guarding or rebound.   Musculoskeletal:         General: Swelling present.      Cervical back: Normal range of motion and neck supple.      Comments: B/L leg swelling + 1-2 .   Lymphadenopathy:      Cervical: No cervical adenopathy.   Skin:     General: Skin is warm and dry.      Findings: No erythema or rash.   Neurological:      Mental Status: She is alert and oriented to person, place,  and time.      Motor: No abnormal muscle tone.      Deep Tendon Reflexes: Reflexes are normal and symmetric.   Psychiatric:         Behavior: Behavior normal.         Thought Content: Thought content normal.         Judgment: Judgment normal.         Assessment & Plan:   1. Preop examination    2. Closed dislocation of right hip, sequela    3. Arthritis of right hip      Leg elevation.    Reviewed results.  Potassium slightly off.  Replacing potassium.  EKG looks ok.  Mild to moderate risk  Cleared for Surgery    1. Preop examination  - CBC W Differential W Platelet [E]; Future  - Comp Metabolic Panel (14) [E]; Future  - MSSA and MRSA Culture Screen; Future  - EKG 12 Lead; Future  - C-Reactive Protein [E]; Future  - Sed Rate, Westergren (Automated) [E]; Future    2. Closed dislocation of right hip, sequela  - CBC W Differential W Platelet [E]; Future  - Comp Metabolic Panel (14) [E]; Future  - MSSA and MRSA Culture Screen; Future  - EKG 12 Lead; Future  - C-Reactive Protein [E]; Future  - Sed Rate, Westergren (Automated) [E]; Future    3. Arthritis of right hip  - CBC W Differential W Platelet [E]; Future  - Comp Metabolic Panel (14) [E]; Future  - MSSA and MRSA Culture Screen; Future  - EKG 12 Lead; Future  - C-Reactive Protein [E]; Future  - Sed Rate, Westergren (Automated) [E]; Future    Orders Placed This Encounter   Procedures    CBC W Differential W Platelet [E]    Comp Metabolic Panel (14) [E]    C-Reactive Protein [E]    Sed Rate, Westergren (Automated) [E]    MSSA and MRSA Culture Screen       Meds This Visit:  Requested Prescriptions     Signed Prescriptions Disp Refills    metoprolol succinate ER 25 MG Oral Tablet 24 Hr 90 tablet 1     Sig: Take 1 tablet (25 mg total) by mouth daily.       Imaging & Referrals:  None

## 2024-02-22 ENCOUNTER — ANESTHESIA EVENT (OUTPATIENT)
Dept: SURGERY | Facility: HOSPITAL | Age: 69
End: 2024-02-22
Payer: MEDICARE

## 2024-02-22 ENCOUNTER — HOSPITAL ENCOUNTER (INPATIENT)
Facility: HOSPITAL | Age: 69
LOS: 5 days | Discharge: HOME HEALTH | End: 2024-02-27
Attending: STUDENT IN AN ORGANIZED HEALTH CARE EDUCATION/TRAINING PROGRAM | Admitting: STUDENT IN AN ORGANIZED HEALTH CARE EDUCATION/TRAINING PROGRAM
Payer: MEDICARE

## 2024-02-22 ENCOUNTER — ANESTHESIA (OUTPATIENT)
Dept: SURGERY | Facility: HOSPITAL | Age: 69
End: 2024-02-22
Payer: MEDICARE

## 2024-02-22 ENCOUNTER — APPOINTMENT (OUTPATIENT)
Dept: GENERAL RADIOLOGY | Facility: HOSPITAL | Age: 69
End: 2024-02-22
Attending: STUDENT IN AN ORGANIZED HEALTH CARE EDUCATION/TRAINING PROGRAM
Payer: MEDICARE

## 2024-02-22 DIAGNOSIS — Z01.818 PREOP TESTING: Primary | ICD-10-CM

## 2024-02-22 DIAGNOSIS — M45.9 RHEUMATOID ARTHRITIS INVOLVING VERTEBRA, UNSPECIFIED WHETHER RHEUMATOID FACTOR PRESENT (HCC): Chronic | ICD-10-CM

## 2024-02-22 PROBLEM — T84.018A PROSTHETIC HIP IMPLANT FAILURE (HCC): Status: ACTIVE | Noted: 2023-08-30

## 2024-02-22 PROBLEM — Z96.649 PROSTHETIC HIP IMPLANT FAILURE: Status: ACTIVE | Noted: 2023-08-30

## 2024-02-22 PROBLEM — T84.018A PROSTHETIC HIP IMPLANT FAILURE: Status: ACTIVE | Noted: 2023-08-30

## 2024-02-22 PROBLEM — Z96.649 PROSTHETIC HIP IMPLANT FAILURE (HCC): Status: ACTIVE | Noted: 2023-08-30

## 2024-02-22 LAB
ANTIBODY SCREEN: NEGATIVE
BASOPHILS NFR SNV: 0 %
DEPRECATED RDW RBC AUTO: 51.9 FL (ref 35.1–46.3)
EOSINOPHIL NFR SNV: 3 %
ERYTHROCYTE [DISTWIDTH] IN BLOOD BY AUTOMATED COUNT: 15 % (ref 11–15)
HCT VFR BLD AUTO: 34.8 %
HGB BLD-MCNC: 11 G/DL
LYMPHOCYTES NFR SNV: 15 %
MCH RBC QN AUTO: 29.7 PG (ref 26–34)
MCHC RBC AUTO-ENTMCNC: 31.6 G/DL (ref 31–37)
MCV RBC AUTO: 94.1 FL
MONOS+MACROS NFR SNV: 47 %
NEUTROPHILS NFR FLD: 35 %
PLATELET # BLD AUTO: 177 10(3)UL (ref 150–450)
RBC # BLD AUTO: 3.7 X10(6)UL
RBC # FLD AUTO: ABNORMAL /CUMM (ref ?–1)
RH BLOOD TYPE: POSITIVE
TOTAL CELLS COUNTED FLD: 100
TOTAL CELLS COUNTED SNV: 1216 /CUMM (ref 0–200)
WBC # BLD AUTO: 4.6 X10(3) UL (ref 4–11)
WBC # SNV: 1216 /CUMM

## 2024-02-22 PROCEDURE — 0SPR0JZ REMOVAL OF SYNTHETIC SUBSTITUTE FROM RIGHT HIP JOINT, FEMORAL SURFACE, OPEN APPROACH: ICD-10-PCS | Performed by: STUDENT IN AN ORGANIZED HEALTH CARE EDUCATION/TRAINING PROGRAM

## 2024-02-22 PROCEDURE — 72170 X-RAY EXAM OF PELVIS: CPT | Performed by: STUDENT IN AN ORGANIZED HEALTH CARE EDUCATION/TRAINING PROGRAM

## 2024-02-22 PROCEDURE — 0SRR0J9 REPLACEMENT OF RIGHT HIP JOINT, FEMORAL SURFACE WITH SYNTHETIC SUBSTITUTE, CEMENTED, OPEN APPROACH: ICD-10-PCS | Performed by: STUDENT IN AN ORGANIZED HEALTH CARE EDUCATION/TRAINING PROGRAM

## 2024-02-22 PROCEDURE — 99223 1ST HOSP IP/OBS HIGH 75: CPT | Performed by: HOSPITALIST

## 2024-02-22 PROCEDURE — 0SUA09Z SUPPLEMENT RIGHT HIP JOINT, ACETABULAR SURFACE WITH LINER, OPEN APPROACH: ICD-10-PCS | Performed by: STUDENT IN AN ORGANIZED HEALTH CARE EDUCATION/TRAINING PROGRAM

## 2024-02-22 PROCEDURE — 0SP909Z REMOVAL OF LINER FROM RIGHT HIP JOINT, OPEN APPROACH: ICD-10-PCS | Performed by: STUDENT IN AN ORGANIZED HEALTH CARE EDUCATION/TRAINING PROGRAM

## 2024-02-22 DEVICE — IMPLANTABLE DEVICE: Type: IMPLANTABLE DEVICE | Site: HIP | Status: FUNCTIONAL

## 2024-02-22 RX ORDER — SENNOSIDES 8.6 MG
17.2 TABLET ORAL NIGHTLY
Status: DISCONTINUED | OUTPATIENT
Start: 2024-02-22 | End: 2024-02-27

## 2024-02-22 RX ORDER — POLYETHYLENE GLYCOL 3350 17 G/17G
17 POWDER, FOR SOLUTION ORAL DAILY PRN
Status: DISCONTINUED | OUTPATIENT
Start: 2024-02-22 | End: 2024-02-27

## 2024-02-22 RX ORDER — METOPROLOL SUCCINATE 25 MG/1
25 TABLET, EXTENDED RELEASE ORAL DAILY
Status: DISCONTINUED | OUTPATIENT
Start: 2024-02-23 | End: 2024-02-27

## 2024-02-22 RX ORDER — BUPIVACAINE HYDROCHLORIDE 7.5 MG/ML
INJECTION, SOLUTION INTRASPINAL
Status: COMPLETED | OUTPATIENT
Start: 2024-02-22 | End: 2024-02-22

## 2024-02-22 RX ORDER — PREGABALIN 75 MG/1
75 CAPSULE ORAL NIGHTLY
Status: DISCONTINUED | OUTPATIENT
Start: 2024-02-22 | End: 2024-02-27

## 2024-02-22 RX ORDER — ALBUTEROL SULFATE 90 UG/1
2 AEROSOL, METERED RESPIRATORY (INHALATION) EVERY 4 HOURS PRN
Status: DISCONTINUED | OUTPATIENT
Start: 2024-02-22 | End: 2024-02-27

## 2024-02-22 RX ORDER — ONDANSETRON 2 MG/ML
4 INJECTION INTRAMUSCULAR; INTRAVENOUS EVERY 6 HOURS PRN
Status: DISCONTINUED | OUTPATIENT
Start: 2024-02-22 | End: 2024-02-27

## 2024-02-22 RX ORDER — MORPHINE SULFATE 10 MG/ML
6 INJECTION, SOLUTION INTRAMUSCULAR; INTRAVENOUS EVERY 10 MIN PRN
Status: DISCONTINUED | OUTPATIENT
Start: 2024-02-22 | End: 2024-02-22 | Stop reason: HOSPADM

## 2024-02-22 RX ORDER — FAMOTIDINE 20 MG/1
20 TABLET, FILM COATED ORAL ONCE
Status: COMPLETED | OUTPATIENT
Start: 2024-02-22 | End: 2024-02-22

## 2024-02-22 RX ORDER — VANCOMYCIN HYDROCHLORIDE 1 G/20ML
INJECTION, POWDER, LYOPHILIZED, FOR SOLUTION INTRAVENOUS AS NEEDED
Status: DISCONTINUED | OUTPATIENT
Start: 2024-02-22 | End: 2024-02-22 | Stop reason: HOSPADM

## 2024-02-22 RX ORDER — TRAMADOL HYDROCHLORIDE 50 MG/1
50 TABLET ORAL EVERY 6 HOURS
Qty: 28 TABLET | Refills: 0 | Status: DISCONTINUED | OUTPATIENT
Start: 2024-02-22 | End: 2024-02-27

## 2024-02-22 RX ORDER — CEFAZOLIN SODIUM/WATER 2 G/20 ML
2 SYRINGE (ML) INTRAVENOUS EVERY 8 HOURS
Qty: 120 ML | Refills: 0 | Status: COMPLETED | OUTPATIENT
Start: 2024-02-23 | End: 2024-02-24

## 2024-02-22 RX ORDER — MORPHINE SULFATE 1 MG/ML
INJECTION, SOLUTION EPIDURAL; INTRATHECAL; INTRAVENOUS
Status: COMPLETED | OUTPATIENT
Start: 2024-02-22 | End: 2024-02-22

## 2024-02-22 RX ORDER — SODIUM CHLORIDE 9 MG/ML
INJECTION, SOLUTION INTRAVENOUS CONTINUOUS
Status: DISCONTINUED | OUTPATIENT
Start: 2024-02-22 | End: 2024-02-23

## 2024-02-22 RX ORDER — MORPHINE SULFATE 4 MG/ML
2 INJECTION, SOLUTION INTRAMUSCULAR; INTRAVENOUS EVERY 10 MIN PRN
Status: DISCONTINUED | OUTPATIENT
Start: 2024-02-22 | End: 2024-02-22 | Stop reason: HOSPADM

## 2024-02-22 RX ORDER — ACETAMINOPHEN 500 MG
1000 TABLET ORAL EVERY 6 HOURS
Status: DISCONTINUED | OUTPATIENT
Start: 2024-02-22 | End: 2024-02-27

## 2024-02-22 RX ORDER — HYDROMORPHONE HYDROCHLORIDE 1 MG/ML
0.2 INJECTION, SOLUTION INTRAMUSCULAR; INTRAVENOUS; SUBCUTANEOUS EVERY 5 MIN PRN
Status: DISCONTINUED | OUTPATIENT
Start: 2024-02-22 | End: 2024-02-22 | Stop reason: HOSPADM

## 2024-02-22 RX ORDER — TRANEXAMIC ACID 10 MG/ML
INJECTION, SOLUTION INTRAVENOUS AS NEEDED
Status: DISCONTINUED | OUTPATIENT
Start: 2024-02-22 | End: 2024-02-22 | Stop reason: SURG

## 2024-02-22 RX ORDER — LIDOCAINE HYDROCHLORIDE 10 MG/ML
INJECTION, SOLUTION INFILTRATION; PERINEURAL
Status: COMPLETED | OUTPATIENT
Start: 2024-02-22 | End: 2024-02-22

## 2024-02-22 RX ORDER — NALOXONE HYDROCHLORIDE 0.4 MG/ML
0.08 INJECTION, SOLUTION INTRAMUSCULAR; INTRAVENOUS; SUBCUTANEOUS AS NEEDED
Status: DISCONTINUED | OUTPATIENT
Start: 2024-02-22 | End: 2024-02-22 | Stop reason: HOSPADM

## 2024-02-22 RX ORDER — HYDROMORPHONE HYDROCHLORIDE 1 MG/ML
0.4 INJECTION, SOLUTION INTRAMUSCULAR; INTRAVENOUS; SUBCUTANEOUS EVERY 5 MIN PRN
Status: DISCONTINUED | OUTPATIENT
Start: 2024-02-22 | End: 2024-02-22 | Stop reason: HOSPADM

## 2024-02-22 RX ORDER — HYDROMORPHONE HYDROCHLORIDE 1 MG/ML
0.6 INJECTION, SOLUTION INTRAMUSCULAR; INTRAVENOUS; SUBCUTANEOUS EVERY 5 MIN PRN
Status: DISCONTINUED | OUTPATIENT
Start: 2024-02-22 | End: 2024-02-22 | Stop reason: HOSPADM

## 2024-02-22 RX ORDER — OXYCODONE HYDROCHLORIDE 5 MG/1
5 TABLET ORAL EVERY 4 HOURS PRN
Status: DISCONTINUED | OUTPATIENT
Start: 2024-02-22 | End: 2024-02-27

## 2024-02-22 RX ORDER — LIDOCAINE HYDROCHLORIDE 10 MG/ML
INJECTION, SOLUTION EPIDURAL; INFILTRATION; INTRACAUDAL; PERINEURAL AS NEEDED
Status: DISCONTINUED | OUTPATIENT
Start: 2024-02-22 | End: 2024-02-22 | Stop reason: SURG

## 2024-02-22 RX ORDER — PREGABALIN 50 MG/1
50 CAPSULE ORAL EVERY MORNING
Status: DISCONTINUED | OUTPATIENT
Start: 2024-02-23 | End: 2024-02-27

## 2024-02-22 RX ORDER — MORPHINE SULFATE 4 MG/ML
4 INJECTION, SOLUTION INTRAMUSCULAR; INTRAVENOUS EVERY 10 MIN PRN
Status: DISCONTINUED | OUTPATIENT
Start: 2024-02-22 | End: 2024-02-22 | Stop reason: HOSPADM

## 2024-02-22 RX ORDER — FAMOTIDINE 10 MG/ML
20 INJECTION, SOLUTION INTRAVENOUS 2 TIMES DAILY
Status: DISCONTINUED | OUTPATIENT
Start: 2024-02-22 | End: 2024-02-27

## 2024-02-22 RX ORDER — HYDROXYCHLOROQUINE SULFATE 200 MG/1
200 TABLET, FILM COATED ORAL 2 TIMES DAILY
Status: DISCONTINUED | OUTPATIENT
Start: 2024-02-22 | End: 2024-02-27

## 2024-02-22 RX ORDER — SODIUM CHLORIDE, SODIUM LACTATE, POTASSIUM CHLORIDE, CALCIUM CHLORIDE 600; 310; 30; 20 MG/100ML; MG/100ML; MG/100ML; MG/100ML
INJECTION, SOLUTION INTRAVENOUS CONTINUOUS
Status: DISCONTINUED | OUTPATIENT
Start: 2024-02-22 | End: 2024-02-22 | Stop reason: HOSPADM

## 2024-02-22 RX ORDER — METOCLOPRAMIDE 10 MG/1
10 TABLET ORAL ONCE
Status: COMPLETED | OUTPATIENT
Start: 2024-02-22 | End: 2024-02-22

## 2024-02-22 RX ORDER — BISACODYL 10 MG
10 SUPPOSITORY, RECTAL RECTAL
Status: DISCONTINUED | OUTPATIENT
Start: 2024-02-22 | End: 2024-02-27

## 2024-02-22 RX ORDER — ACETAMINOPHEN 500 MG
1000 TABLET ORAL ONCE
Status: DISCONTINUED | OUTPATIENT
Start: 2024-02-22 | End: 2024-02-22

## 2024-02-22 RX ORDER — ASPIRIN 81 MG/1
81 TABLET ORAL 2 TIMES DAILY
Status: DISCONTINUED | OUTPATIENT
Start: 2024-02-22 | End: 2024-02-27

## 2024-02-22 RX ORDER — CEVIMELINE HYDROCHLORIDE 30 MG/1
30 CAPSULE ORAL 3 TIMES DAILY
Status: DISCONTINUED | OUTPATIENT
Start: 2024-02-22 | End: 2024-02-24

## 2024-02-22 RX ORDER — METOCLOPRAMIDE HYDROCHLORIDE 5 MG/ML
10 INJECTION INTRAMUSCULAR; INTRAVENOUS EVERY 8 HOURS PRN
Status: DISCONTINUED | OUTPATIENT
Start: 2024-02-22 | End: 2024-02-27

## 2024-02-22 RX ORDER — CEFAZOLIN SODIUM/WATER 2 G/20 ML
2 SYRINGE (ML) INTRAVENOUS ONCE
Status: COMPLETED | OUTPATIENT
Start: 2024-02-22 | End: 2024-02-22

## 2024-02-22 RX ORDER — METOCLOPRAMIDE HYDROCHLORIDE 5 MG/ML
10 INJECTION INTRAMUSCULAR; INTRAVENOUS ONCE
Status: COMPLETED | OUTPATIENT
Start: 2024-02-22 | End: 2024-02-22

## 2024-02-22 RX ORDER — FLUTICASONE FUROATE AND VILANTEROL 200; 25 UG/1; UG/1
1 POWDER RESPIRATORY (INHALATION) DAILY
Status: DISCONTINUED | OUTPATIENT
Start: 2024-02-23 | End: 2024-02-27

## 2024-02-22 RX ORDER — ACETAMINOPHEN 500 MG
1000 TABLET ORAL ONCE
Status: COMPLETED | OUTPATIENT
Start: 2024-02-22 | End: 2024-02-22

## 2024-02-22 RX ORDER — FAMOTIDINE 10 MG/ML
20 INJECTION, SOLUTION INTRAVENOUS ONCE
Status: COMPLETED | OUTPATIENT
Start: 2024-02-22 | End: 2024-02-22

## 2024-02-22 RX ORDER — VANCOMYCIN HYDROCHLORIDE
15 EVERY 12 HOURS
Qty: 1000 ML | Refills: 0 | Status: COMPLETED | OUTPATIENT
Start: 2024-02-22 | End: 2024-02-23

## 2024-02-22 RX ORDER — DOCUSATE SODIUM 100 MG/1
100 CAPSULE, LIQUID FILLED ORAL 2 TIMES DAILY
Status: DISCONTINUED | OUTPATIENT
Start: 2024-02-22 | End: 2024-02-27

## 2024-02-22 RX ORDER — ENEMA 19; 7 G/133ML; G/133ML
1 ENEMA RECTAL ONCE AS NEEDED
Status: DISCONTINUED | OUTPATIENT
Start: 2024-02-22 | End: 2024-02-27

## 2024-02-22 RX ORDER — VANCOMYCIN HYDROCHLORIDE
1250 ONCE
Status: DISCONTINUED | OUTPATIENT
Start: 2024-02-22 | End: 2024-02-22 | Stop reason: HOSPADM

## 2024-02-22 RX ORDER — FAMOTIDINE 20 MG/1
20 TABLET, FILM COATED ORAL 2 TIMES DAILY
Status: DISCONTINUED | OUTPATIENT
Start: 2024-02-22 | End: 2024-02-27

## 2024-02-22 RX ORDER — SODIUM CHLORIDE, SODIUM LACTATE, POTASSIUM CHLORIDE, CALCIUM CHLORIDE 600; 310; 30; 20 MG/100ML; MG/100ML; MG/100ML; MG/100ML
INJECTION, SOLUTION INTRAVENOUS CONTINUOUS
Status: DISCONTINUED | OUTPATIENT
Start: 2024-02-22 | End: 2024-02-23

## 2024-02-22 RX ADMIN — SODIUM CHLORIDE, SODIUM LACTATE, POTASSIUM CHLORIDE, CALCIUM CHLORIDE: 600; 310; 30; 20 INJECTION, SOLUTION INTRAVENOUS at 16:16:00

## 2024-02-22 RX ADMIN — TRANEXAMIC ACID 1000 MG: 10 INJECTION, SOLUTION INTRAVENOUS at 16:39:00

## 2024-02-22 RX ADMIN — CEFAZOLIN SODIUM/WATER 2 G: 2 G/20 ML SYRINGE (ML) INTRAVENOUS at 16:15:00

## 2024-02-22 RX ADMIN — TRANEXAMIC ACID 1000 MG: 10 INJECTION, SOLUTION INTRAVENOUS at 18:17:00

## 2024-02-22 RX ADMIN — MORPHINE SULFATE 0.2 MG: 1 INJECTION, SOLUTION EPIDURAL; INTRATHECAL; INTRAVENOUS at 16:26:00

## 2024-02-22 RX ADMIN — LIDOCAINE HYDROCHLORIDE 25 MG: 10 INJECTION, SOLUTION EPIDURAL; INFILTRATION; INTRACAUDAL; PERINEURAL at 16:30:00

## 2024-02-22 RX ADMIN — LIDOCAINE HYDROCHLORIDE 5 ML: 10 INJECTION, SOLUTION INFILTRATION; PERINEURAL at 16:25:00

## 2024-02-22 RX ADMIN — BUPIVACAINE HYDROCHLORIDE 1.5 ML: 7.5 INJECTION, SOLUTION INTRASPINAL at 16:26:00

## 2024-02-22 NOTE — ANESTHESIA PROCEDURE NOTES
Spinal Block    Date/Time: 2/22/2024 4:23 PM    Performed by: Alex Crowe MD  Authorized by: Alex Crowe MD      General Information and Staff    Start Time:  2/22/2024 4:23 PM  End Time:  2/22/2024 4:27 PM  Anesthesiologist:  Alex Crowe MD  Performed by:  Anesthesiologist  Site identification: surface landmarks    Reason for Block: at surgeon's request, post-op pain management, procedure for pain and surgical anesthesia    Preanesthetic Checklist: patient identified, IV checked, risks and benefits discussed, monitors and equipment checked, pre-op evaluation, timeout performed, anesthesia consent and sterile technique used      Procedure Details    Patient Position:  Sitting  Prep: Betadine    Monitoring:  Heart rate, cardiac monitor and continuous pulse ox  Approach:  Midline  Location:  L4-5  Injection Technique:  Single-shot    Needle    Needle Type:  Pencil-tip  Needle Gauge:  24 G    Assessment    Sensory Level:  T10  Events: clear CSF, well tolerated and blood negative      Additional Comments

## 2024-02-22 NOTE — INTERVAL H&P NOTE
Pre-op Diagnosis: Right Total Hip Dislocation    The above referenced H&P was reviewed by Omar Atef Behery, MD on 2/22/2024, the patient was examined and no significant changes have occurred in the patient's condition since the H&P was performed.  I discussed with the patient and/or legal representative the potential benefits, risks and side effects of this procedure; the likelihood of the patient achieving goals; and potential problems that might occur during recuperation.  I discussed reasonable alternatives to the procedure, including risks, benefits and side effects related to the alternatives and risks related to not receiving this procedure.  We will proceed with procedure as planned.

## 2024-02-22 NOTE — ANESTHESIA PREPROCEDURE EVALUATION
Anesthesia PreOp Note    HPI:     Ngozi Collazo is a 68 year old female who presents for preoperative consultation requested by: Behery, Omar Atef, MD    Date of Surgery: 2/22/2024    Procedure(s):  Right revision total hip arthroplasty  Indication: Right Total Hip Dislocation    Relevant Problems   No relevant active problems       NPO:  Last Liquid Consumption Date: 02/21/24  Last Liquid Consumption Time: 1800  Last Solid Consumption Date: 02/21/24  Last Solid Consumption Time: 1800  Last Liquid Consumption Date: 02/21/24          History Review:  Patient Active Problem List    Diagnosis Date Noted    Dislocation of right hip, initial encounter ([facility]) 10/07/2023    Dislocation of prosthetic joint ([facility]) 09/13/2023    Dislocation of hip, right, initial encounter ([facility]) 05/12/2023    Prosthetic hip infection, sequela 05/12/2023    Cellulitis of right lower extremity 04/11/2023    Hypoxia 04/11/2023    Pain in hip region after hip replacement 03/06/2023    Primary osteoarthritis of right hip 12/02/2022    Preop testing 12/01/2022    Interstitial lung disease ([facility])     Chronic bronchitis, unspecified chronic bronchitis type ([facility]) 01/06/2022    Pulmonary HTN ([facility]) 01/06/2022    Obesity, morbid ([facility]) 01/06/2022    History of compression fracture of spine 01/06/2022    Tortuous aorta ([facility]) 12/01/2020    Immunosuppressed status ([facility]) 09/15/2020    Hyperglycemia 03/29/2020    Essential hypertension     Neutropenia ([facility]) 12/10/2019    ILD (interstitial lung disease) ([facility]) 08/27/2018    Smoking greater than 25 pack years 08/06/2018    Seasonal allergic reaction 05/21/2018    History of lumbar spinal fusion 10/19/2016    Anemia of chronic disease 10/19/2016    Lumbar facet arthropathy 09/08/2014    Sjogren's disease ([facility]) 06/04/2014    IBS (irritable bowel syndrome)     Rheumatoid arthritis ([facility])     Fibromyalgia     Osteoarthritis     Atherosclerosis     Mild mitral regurgitation     Osteoporosis, senile 10/29/2012    Vitamin D  deficiency 03/12/2009    Systemic lupus erythematosus ([facility]) 02/26/2009       Past Medical History:   Diagnosis Date    Achilles tendonitis     Arthritis     Asthma ([facility])     Atherosclerosis     with ectasia    Back problem     back surgery lumbar    Cancer ([facility]) 09/2004    rt breast dcis    Carpal tunnel syndrome 05/29/2009    bilateral    Cataract     Chronic foot pain     right    Diarrhea, unspecified     Duodenitis 12/18/2008    peptic    Dyslipidemia     Dysphagia     Essential hypertension     Exposure to radiation     Fasciitis     Fatigue     Food intolerance     Foot fracture, left 10/2008    left foot fx: excessive walking    H. pylori infection     Hammertoe     second toe right foot    Heart valve disease     Hemorrhoids     High blood pressure     History of blood transfusion 2023    no reactions [facility]    Hypercalcemia     IBS (irritable bowel syndrome)     Internal hemorrhoids 12/18/2008    Grade II    Irregular Z line of esophagus 12/18/2008    Localized primary carpometacarpal osteoarthritis 07/30/2012    Lupus ([facility])     Macular degeneration 12/07/2010    early signs, bilaterally, remained stable    Osteoarthritis     left thumb, severe    Osteomyelitis of right foot ([facility]) 01/2018    [place]io ID, Dr. Allan    Osteoporosis     Pain in joints     Pneumonia due to organism     Postmenopausal atrophic vaginitis     Pulmonary fibrosis ([facility]) 07/2018    referring to pulm, possible Sjogren's involvement?    Rheumatoid arthritis ([facility])     Rheumatoid arthritis(714.0)     Sicca syndrome ([facility]) 02/26/2009    Sjogren's syndrome ([facility]) 10/2007    Stress fracture of the metatarsals     right foot    Systemic lupus erythematosus ([facility]) 02/26/2009    Visual impairment     glasses    Vitamin D deficiency        Past Surgical History:   Procedure Laterality Date    ADENOIDECTOMY      APPENDECTOMY  age 8    [place] [facility]    BACK SURGERY      CARPAL TUNNEL RELEASE Bilateral     CATARACT      COLONOSCOPY      COLONOSCOPY       DERMATOLOGY SHAVE BIOPSY (D1K.1)  2011    Shave biopsy, skin, right shoulder    FOOT SURGERY  2013    revision right foot surgery    FOOT/TOES SURGERY PROC UNLISTED  2011    first MTM joint fusion with ORIF of second and third MTs with correction of the second hammertoe of the right foot by Dr. Hatfield    KNEE REPLACEMENT SURGERY      LUMPECTOMY RIGHT      rt breast lumpectomy, [facility]    LUDY BIOPSY STEREO NODULE 1 SITE RIGHT (CPT=19081)      OTHER SURGICAL HISTORY  2013    trigger thumb injections    RADIATION RIGHT      MAMMOSITE    SPINE SURGERY PROCEDURE UNLISTED      L4-5    TONSILLECTOMY      TOTAL HIP REPLACEMENT Right     TOTAL KNEE REPLACEMENT Left 2018    UPPER GI ENDOSCOPY,EXAM  2008       Medications Prior to Admission   Medication Sig Dispense Refill Last Dose    traMADol 50 MG Oral Tab Take 1 tablet (50 mg total) by mouth every 6 (six) hours as needed for Pain.   2024 at 1300    acetaminophen 500 MG Oral Tab Take 1 tablet (500 mg total) by mouth every 6 (six) hours as needed for Pain.   2024 at 2100    [] Potassium Chloride ER 10 MEQ Oral Tab CR Take 1 tablet (10 mEq total) by mouth 2 (two) times daily for 6 days. 12 tablet 0     metoprolol succinate ER 25 MG Oral Tablet 24 Hr Take 1 tablet (25 mg total) by mouth daily. 90 tablet 1 2024 at 0800    Naloxone HCl 4 MG/0.1ML Nasal Liquid 4 mg by Nasal route as needed. If patient remains unresponsive, repeat dose in other nostril 2-5 minutes after first dose. 1 kit 0 2024 at 0800    pregabalin 75 MG Oral Cap Take 1 capsule (75 mg total) by mouth nightly.   2024 at 0800    sodium chloride 0.65 % Nasal Solution 2 sprays by Nasal route as needed for congestion.   2024 at 0800    BREO ELLIPTA 200-25 MCG/ACT Inhalation Aerosol Powder, Breath Activated USE 1 INHALATION ORALLY    ONCE DAILY AS DIRECTED (Patient taking differently: Inhale 1 puff into the lungs daily.) 3 each 3  2/22/2024 at 0800    hydroxychloroquine 200 MG Oral Tab Take 1 tablet (200 mg total) by mouth 2 (two) times daily. 180 tablet 1 2/22/2024 at 0800    pregabalin 50 MG Oral Cap TAKE 1 CAPSULE EVERY       MORNING WITH 75MG IN THE   EVENING (Patient taking differently: Take 1 capsule (50 mg total) by mouth every morning.) 90 capsule 0 2/22/2024 at 0800    Cevimeline HCl 30 MG Oral Cap Take 1 capsule (30 mg total) by mouth 3 (three) times daily. 270 capsule 2 2/22/2024 at 0800    ALBUTEROL SULFATE  (90 Base) MCG/ACT Inhalation Aero Soln USE 2 INHALATIONS ORALLY   EVERY 6 HOURS AS NEEDED FORWHEEZING (APPOINTMENT      NEEDED FOR MORE REFILLS) (Patient taking differently: Inhale 2 puffs into the lungs every 4 (four) hours as needed for Wheezing or Shortness of Breath.) 3 Inhaler 3 Past Month    EPINEPHrine (EPIPEN 2-RACHELE) 0.3 MG/0.3ML Injection Solution Auto-injector Inject 0.3 mL (1 each total) as directed as needed. 1 each 1     PREVIDENT 5000 DRY MOUTH 1.1 % Dental Gel Place 1 Application onto teeth daily.     2/21/2024 at 2100     Current Facility-Administered Medications Ordered in Epic   Medication Dose Route Frequency Provider Last Rate Last Admin    lactated ringers infusion   Intravenous Continuous Behery, Omar Atef, MD 20 mL/hr at 02/22/24 1533 New Bag at 02/22/24 1533    metoprolol tartrate (Lopressor) tab 25 mg  25 mg Oral Once PRN Behery, Omar Atef, MD        vancomycin (Vancocin) 1.25 g in sodium chloride 0.9% 250mL IVPB premix  1,250 mg Intravenous Once Behery, Omar Atef, MD        ceFAZolin (Ancef) 2 g in 20mL IV syringe premix  2 g Intravenous Once Behery, Omar Atef, MD        clonidine-EPINEPHrine-ropivacaine-ketorolac (CERTS) (Duraclon-Adrenalin-Naropin-Toradol) pain cocktail irrigation   Intra-articular Once (Intra-Op) Behery, Omar Atef, MD        povidone-iodine (Betadine) 10 % 17.5 mL in sodium chloride 0.9 % 500 mL irrigation   Irrigation Once (Intra-Op) Behery, Omar Atef, MD         No current  Epic-ordered outpatient medications on file.       Allergies   Allergen Reactions    Allergy ANAPHYLAXIS and SWELLING     Bee Stings, Catgut Sutures      Bee Sting [Bee Venom] SWELLING     Bee sting    Erythromycin Base NAUSEA ONLY    Vicryl Sutures OTHER (SEE COMMENTS)     Cat gut sutures as a child, developed an infection       Family History   Problem Relation Age of Onset    Heart Disorder Father     Other (Other) Father     Other (Pulmonary Embolism) Father     Diabetes Mother     Asthma Mother     Other (Other) Mother     Other (Abdominal Aortic Aneurysm) Other     Fibromyalgia Sister     Asthma Sister     Breast Cancer Self 50    DCIS Self     No Known Problems Son     No Known Problems Brother      Social History     Socioeconomic History    Marital status:    Tobacco Use    Smoking status: Former     Packs/day: 1.00     Years: 37.00     Additional pack years: 0.00     Total pack years: 37.00     Types: Cigarettes     Start date: 1970     Quit date: 2007     Years since quittin.7    Smokeless tobacco: Never    Tobacco comments:     quit in    Vaping Use    Vaping Use: Never used   Substance and Sexual Activity    Alcohol use: Not Currently     Alcohol/week: 0.0 - 1.0 standard drinks of alcohol     Comment: Rare    Drug use: Not Currently     Comment: CBD gummies for pain   Other Topics Concern    Caffeine Concern Yes     Comment: 2-3 cups a day    Exercise No     Comment: minimal    Seat Belt Yes       Available pre-op labs reviewed.  Lab Results   Component Value Date    WBC 3.8 (L) 2024    RBC 3.56 (L) 2024    HGB 10.4 (L) 2024    HCT 33.6 (L) 2024    MCV 94.4 2024    MCH 29.2 2024    MCHC 31.0 2024    RDW 15.2 2024    .0 2024     Lab Results   Component Value Date     2024    K 3.2 (L) 2024     2024    CO2 30.0 2024    BUN 11 2024    CREATSERUM 0.78 2024    GLU 83  02/13/2024    CA 9.2 02/13/2024          Vital Signs:  Body mass index is 37.76 kg/m².   height is 1.626 m (5' 4\") and weight is 99.8 kg (220 lb). Her blood pressure is 184/79 (abnormal) and her pulse is 60. Her respiration is 18 and oxygen saturation is 94%.   Vitals:    02/15/24 1036 02/22/24 1514   BP:  (!) 184/79   Pulse:  60   Resp:  18   SpO2:  94%   Weight: 99.8 kg (220 lb) 99.8 kg (220 lb)   Height: 1.626 m (5' 4\") 1.626 m (5' 4\")        Anesthesia Evaluation      Airway   Mallampati: II  TM distance: >3 FB  Neck ROM: full  Dental      Pulmonary     breath sounds clear to auscultation  (+) asthma  Cardiovascular   (+) hypertension    Rhythm: regular  Rate: normal    Neuro/Psych    (+)  neuromuscular disease,        GI/Hepatic/Renal      Endo/Other    (+) arthritis  Abdominal   (+) obese    Abdomen: soft.     Other findings: Deny loose teeth            Anesthesia Plan:   ASA:  3  Plan:   Spinal and MAC  Informed Consent Plan and Risks Discussed With:  Patient  Use of Blood Products Discussed With:  Patient  Discussed plan with:  Surgeon      I have informed Ngozi Collazo and/or legal guardian or family member of the nature of the anesthetic plan, benefits, risks including possible dental damage if relevant, major complications, and any alternative forms of anesthetic management.   All of the patient's questions were answered to the best of my ability. The patient desires the anesthetic management as planned.  Alex Crowe MD  2/22/2024 3:36 PM  Present on Admission:  **None**

## 2024-02-22 NOTE — DISCHARGE INSTRUCTIONS
DISCHARGE INSTRUCTIONS:  PLACE ICE TO SURGICAL AREA 20-30 MINUTES ON/ 20-30 MINUTES OFF. THIS IS TO HELP WITH PAIN & SWELLING.    TAKE YOUR MEDICATIONS AS PRESCRIBED BY YOUR DOCTOR BELOW.THESE HAVE BEEN SENT TO YOUR PHARMACY.    AS YOU WERE INSTRUCTED BY PHYSICAL THERAPY TO EXERCISE HIP PRECAUTIONS, CONTINUE TO DO SO AFTER DISCHARGE    IT IS OK TO BEAR WEIGHT AS TOLERATED ON THE OPERATIVE EXTREMITY.     CALL TO CONFIRM YOUR FOLLOW UP APPOINTMENT WITH DR. BEHERY TO BE SEEN IN 2-3 WEEKS POSTOP. [phone]    MEDICATIONS    POST OP MEDICATION REGIMEN              MULTI-MODAL   PAIN REGIMEN       DRUG   FOR   FREQUENCY & DURATION   QTY   NOTES     WEANING  TIPS                Tylenol 500mg     Mild pain   Take 2 tabs every 6 hours     90   Can purchase over-the-counter    Stop using medication if no longer having pain.      Tramadol 50mg     Moderate pain   Take 1-2 tabs every 6 hours only as needed   45 May prescribe a refill, but ideally, this should be tapered off after surgery Stop using this medication 2nd   As pain decreases over time, increase the interval between doses (6 hours to 8, then 12, then 24) to taper off the medication.                    BREAKTHROUGH PAIN         Oxycodone 5mg       Severe pain     Take 1-2 tabs every 4-6 hours only as needed for severe pain       40   Take at least 1 hr apart from Tramadol.    Ideally, no refill. The goal is to taper off this medication as soon as possible, as it can be addictive and have side effects.    Stop using this medication 1st if no longer having severe pain.         BLOOD THINNER     Aspirin 81mg   Blood clot prevention   Take 1 tab twice daily for 30 days     60     No refills   Complete the entire course of your blood thinner.         ANTIBIOTIC       Cefadroxil 500mg       Infection prevention     Resume your home dosing after discharge          No refills              STOOL SOFTENER     Sennokot 8.6/50mg   Constipation   Take 1 tab twice daily  while  on opioids     60 Refer to discharge instructions if constipation persists even after taking this medication   Stop taking if having diarrhea or loose stools.           ANTI-NAUSEA   Ondansetron 4mg   Nausea   Take 1 tab every 8 hours as needed if nauseous     20   No Refill      ANTI-ACID REFLUX / GASTRITIS   Pantoprazole 40mg   Acid reflux, gastric ulcer prophylaxis   Take 1 tab every day along with Meloxicam     60   May refill if Meloxicam refilled        DRESSING:    YOU HAVE A SPECIAL DRESSING CALLED A PREVENA DRESSING, OVER YOUR INCISION. THIS IS A TYPE OF NEGATIVE PRESSURE DRESSING THAT KEEPS THE WOUND DRY. IT IS CONNECTED TO A CANNISTER. MAKE SURE THAT THE CANNISTER IS POWERED ON AND NOT OUT OF BATTERY.     THE DRESSING STAYS FOR 7 DAYS FROM SURGERY. THIS DRESSING SHOULD BE CHANGED TO AN AQUACEL AT 7 DAYS POST-OPERATIVELY. IF THE PREVENA DRESSING HAS CONTINUOUS AND PERSISTENT DRAINAGE, CALL YOUR SURGEON FIRST BEFORE CHANGING THE DRESSING TO AN AQUACEL DRESSING.     YOU MAY SHOWER AS SOON AS THE AQUACEL DRESSING IS PLACED INSTEAD OF THE PREVENA DRESSING OVER YOUR INCISION AREA.    IF THE DRESSING LEAKS OR COMES LOOSE BEFORE THE 7 DAY PERIOD CONTACT YOUR DOCTOR / SURGEON.    AFTER   14 DAYS POST OPERATIVELY, THE AQUACEL DRESSING IS REMOVED BY PULLING ON THE EDGE OF THE DRESSING AND GENTLY STRETCHING IT, THEN PEEL IT OFF SLOWLY LIKE A BANDAID. IF YOU HAVE ANY QUESTIONS OR CONCERNS ABOUT THE DRESSING CONTACT YOUR DOCTOR.        REASONS TO CALL YOUR DOCTOR:  TEMPERATURE .0 OR MORE  PERSISTANT NAUSEA OR VOMITTING - ESPECIALLY IF YOU ARE UNABLE TO EAT OR DRINK.  INCREASED LEVEL OF PAIN OR PAIN WHICH IS NOT CONTROLLED BY YOUR PAIN MEDICINE.  PERSISTENT DRAINAGE AT ANYTIME FROM YOUR SURGICAL AREA / INCISION.  PAIN, TENDERNESS OR SUDDEN SWELLING IN YOUR CALF REGION OF THE LOWER EXTREMITIES - THIS IS A POSSIBLE SIGN OF A BLOOD CLOT.  ANY CHANGES IN SENSATION IN YOUR BODY IN THE AREA OF YOUR SURGERY = NUMBNESS OR  TINGLING.  ANY CHANGES IN CIRCULATION IN YOUR BODY IN THE AREA OF YOUR SURGERY = CHANGES  IN COLOR EITHER DARKER OR VERY PALE; OR  IF AREA FEELS COLD TO THE TOUCH AS COMPARED TO OTHER AREAS.  ANY CHANGES IN FUNCTION IN YOUR BODY IN THE AREA OF YOUR SURGERY = CHANGE IN MOVEMENT - UNABLE TO MOVE OR WIGGLE FINGERS, TOES OR FOOT OR ANY OTHER CHANGES IN NORMAL BODY FUNCTIONING.  FOR ANY OF THE ABOVE OR ANY OTHER QUESTIONS OR CONCERNS CALL YOUR DOCTOR/ SURGEON AS SOON AS POSSIBLE.      Omar Behery, MD MPH  Orthopaedic Surgeon, Adult Hip and Knee Reconstruction  [facility] at [address]  [place] [address]  Office: ([phone] (F) [phone]  Adminstrative Assistant: Gloria Sinclair     [address], [place] [address]  Phone: [phone]  Fax: [phone]

## 2024-02-23 LAB
ANION GAP SERPL CALC-SCNC: 5 MMOL/L (ref 0–18)
BUN BLD-MCNC: 10 MG/DL (ref 9–23)
BUN/CREAT SERPL: 11.2 (ref 10–20)
CALCIUM BLD-MCNC: 9.1 MG/DL (ref 8.7–10.4)
CHLORIDE SERPL-SCNC: 110 MMOL/L (ref 98–112)
CO2 SERPL-SCNC: 29 MMOL/L (ref 21–32)
CREAT BLD-MCNC: 0.89 MG/DL
DEPRECATED RDW RBC AUTO: 52.9 FL (ref 35.1–46.3)
EGFRCR SERPLBLD CKD-EPI 2021: 70 ML/MIN/1.73M2 (ref 60–?)
ERYTHROCYTE [DISTWIDTH] IN BLOOD BY AUTOMATED COUNT: 15.1 % (ref 11–15)
GLUCOSE BLD-MCNC: 87 MG/DL (ref 70–99)
HCT VFR BLD AUTO: 33.6 %
HGB BLD-MCNC: 10.1 G/DL
MCH RBC QN AUTO: 28.6 PG (ref 26–34)
MCHC RBC AUTO-ENTMCNC: 30.1 G/DL (ref 31–37)
MCV RBC AUTO: 95.2 FL
OSMOLALITY SERPL CALC.SUM OF ELEC: 296 MOSM/KG (ref 275–295)
PLATELET # BLD AUTO: 154 10(3)UL (ref 150–450)
POTASSIUM SERPL-SCNC: 4.4 MMOL/L (ref 3.5–5.1)
RBC # BLD AUTO: 3.53 X10(6)UL
SODIUM SERPL-SCNC: 144 MMOL/L (ref 136–145)
WBC # BLD AUTO: 4.5 X10(3) UL (ref 4–11)

## 2024-02-23 PROCEDURE — 99233 SBSQ HOSP IP/OBS HIGH 50: CPT | Performed by: HOSPITALIST

## 2024-02-23 NOTE — PHYSICAL THERAPY NOTE
PHYSICAL THERAPY HIP EVALUATION - INPATIENT     Room Number: 420/420-A  Evaluation Date: 2/23/2024  Type of Evaluation: Initial  Physician Order: PT Eval and Treat    Presenting Problem: right hip revision, WBAT, posterior hip precautions  Co-Morbidities : multiple right hip revisions and dislocations, OA, obesity, pHTN, Sjogren's IBS, asthma  Reason for Therapy: Mobility Dysfunction and Discharge Planning    PHYSICAL THERAPY ASSESSMENT   Patient is a 68 year old female admitted 2/22/2024 for right hip revision for recurrent dislocation. Initial COLTON was Dec 2022 and she has been in and out of rehabs for months with infection, revisions and recurrent dislocations.  Prior to admission, patient's baseline is independent and living alone, was using a cane or walker as needed in home.  Patient is currently functioning below baseline with bed mobility, transfers, gait, stair negotiation, maintaining seated position, standing prolonged periods, and performing household tasks.  Patient is requiring minimal assist and moderate assist as a result of the following impairments: decreased functional strength, pain, impaired standing balance, impaired motor planning, decreased muscular endurance, difficulty maintaining precautions, and limited right hip ROM.  Physical Therapy will continue to follow for duration of hospitalization.    Patient will benefit from continued skilled PT Services to promote return to prior level of function and safety with continuous assistance and gradual rehabilitative therapy . Patient is adamantly against going to rehab but was open to a discussion about the importance of maintaining her hip precautions for then next months and that initially she will need 24 hr hands on care. Pt does not agree with this because her son is just 2 min away. Discussed with patient that if she falls and can't get to a phone that would be a huge problem and that 24 hr care is the solution. She will consider this  information.    PLAN  PT Treatment Plan: Bed mobility;Patient education;Family education;Gait training;Range of motion;Strengthening;Stair training;Transfer training;Balance training  Rehab Potential : Good  Frequency (Obs): Daily    PHYSICAL THERAPY MEDICAL/SOCIAL HISTORY   History related to current admission: Patient is a 68-year-old  female with chronic osteoarthritis and chronic right hip pain. She is status post right total hip arthroplasty. She had a recent dislocation that required closed reduction on 2024. Because of her prosthetic instability, she was scheduled today for above-mentioned procedure by her orthopedic surgeon, Dr. Omar Behery.      Problem List  Principal Problem:    Prosthetic hip implant failure (HCC)  Active Problems:    Sjogren's disease (HCC)    ILD (interstitial lung disease) (HCC)    Essential hypertension    Obesity, morbid (HCC)    Preop testing      HOME SITUATION  Home Situation  Type of Home: House  Home Layout: Two level;Able to live on main level  Stairs to Enter : 5  Railing: Yes  Lives With: Alone (My son lives 2 min away)  Patient Owned Equipment: Rolling walker;Cane;Wheelchair  Patient Regularly Uses: Glasses     Prior Level of [place]: Patient reports living in her home alone. She uses a cane or walker at times since the most recent dislocation but is independent. She has 5 steps to enter and uses the railing. She has adapted her home with all the hip surgeries and dislocations and feels she can go home.    SUBJECTIVE  \"I am not going back to rehab\"    PHYSICAL THERAPY EXAMINATION   OBJECTIVE  Precautions: Hip abduction pillow;COLTON - posterior;Limb alert - right  Fall Risk: High fall risk    WEIGHT BEARING RESTRICTION  Weight Bearing Restriction: R lower extremity        R Lower Extremity: Weight Bearing as Tolerated       PAIN ASSESSMENT  Ratin  Location: right hip  Management Techniques: Activity promotion;Breathing  techniques;Relaxation;Repositioning;Other (Comment) (medicated)    COGNITION  Overall Cognitive Status:  WFL - within functional limits    RANGE OF MOTION AND STRENGTH ASSESSMENT  Upper extremity ROM and strength are within functional limits   Lower extremity ROM is within functional limits except right hip limited 0-90* and has hip precautions  Lower extremity strength is within functional limits for LLE, RLE limited at hip, 2/5    BALANCE  Static Sitting: Good  Dynamic Sitting: Fair  Static Standing: Fair -  Dynamic Standing: Poor      ACTIVITY TOLERANCE  Pulse: 56  Heart Rate Source: Monitor     BP: 133/75  BP Location: Right arm  BP Method: Automatic  Patient Position: Sitting    O2 WALK  Oxygen Therapy  SPO2% on Room Air at Rest: 98  SPO2% Ambulation on Room Air: 98    AM-PAC '6-Clicks' INPATIENT SHORT FORM - BASIC MOBILITY  How much difficulty does the patient currently have...  Patient Difficulty: Turning over in bed (including adjusting bedclothes, sheets and blankets)?: A Lot   Patient Difficulty: Sitting down on and standing up from a chair with arms (e.g., wheelchair, bedside commode, etc.): A Lot   Patient Difficulty: Moving from lying on back to sitting on the side of the bed?: A Lot   How much help from another person does the patient currently need...   Help from Another: Moving to and from a bed to a chair (including a wheelchair)?: A Little   Help from Another: Need to walk in hospital room?: A Little   Help from Another: Climbing 3-5 steps with a railing?: A Lot     AM-PAC Score:  Raw Score: 14   Approx Degree of Impairment: 61.29%   Standardized Score (AM-PAC Scale): 38.1   CMS Modifier (G-Code): CL    FUNCTIONAL ABILITY STATUS  Functional Mobility/Gait Assessment  Gait Assistance: Minimum assistance  Distance (ft): 15'x2  Assistive Device: Rolling walker  Pattern: R Decreased stance time;Uncompensated trendelenburg (cues for upright posture, step length)  Rolling: minimal assist to maintain hip  precautions  Supine to Sit: moderate assist to maintain hip prec  Sit to Supine: moderate assist  Sit to Stand: moderate assist    Exercise/Education Provided:  Bed mobility  Functional activity tolerated  Gait training  Neuromuscular re-educate  Posture  ROM  Strengthening  Transfer training  Applyiing hip precautions to all mobility    RN [place] approves participation in therapy session and pt was medicated. She is up in chair and agreeable to therapy session. She is able to verbalize 3/3 hip precautions but DOES NEED ASSIST AND SOME CUES to maintain during mobility. Education to stop and think about precautions PRIOR to mobilizing. She needs mod A consistently for sit to/from stand even from elevated seat heigh and has a posterior lean and needs time to correct to upright. She is able to walk and transfer with RW with minimal assistance but transitional bed and sit to stand mvmts are limiting. She moves slowly and is steady once she has her balance.  She is needing moderate assistance for bed mobility and for managing the abductor wedge. She reports not rolling/only sleeping on her back or in a recliner but she has dislocated in the recliner so she should be in her bed. Education about need for hands on assist currently and that she will likely need this at [place] as well. She reports her son will check on her and this is enough. Patient DOES NOT HAVE THE SKILLS TO RETURN HOME safely at this time. She would need to be modified I with ALL mobility for this to be a safe option. Alternatively, If son can provide 24 hr hands on care she may be able to go home with HHPT.      The patient's Approx Degree of Impairment: 61.29% has been calculated based on documentation in the [facility] '6 clicks' Inpatient Basic Mobility Short Form.  Research supports that patients with this level of impairment may benefit from BENITO and this is the current recommendation given mod level of assistnace needed.  Final disposition will be made by  interdisciplinary medical team.    Patient End of Session: Up in chair;Needs met;Call light within reach;RN aware of session/findings;All patient questions and concerns addressed;Ice applied;Discussed recommendations with /    CURRENT GOALS  Goals to be met by: 3/10/24  Patient Goal Patient's self-stated goal is: to go home, goals set for this   Goal #1 Patient is able to demonstrate supine - sit EOB @ level: modified independent   Goal #1   Current Status    Goal #2 Patient is able to demonstrate transfers Sit to/from Stand at assistance level: modified independent     Goal #2  Current Status    Goal #3 Patient is able to ambulate 100 feet with assistive device at assistance level: modified independent    Goal #3   Current Status    Goal #4 Patient will negotiate 5 stairs/one curb w/ assistive device and supervision   Goal #4   Current Status    Goal #5 Patient verbalizes and/or demonstrates all precautions and safety concerns independently   Goal #5   Current Status    Goal #6 Patient independently performs home exercise program for ROM/strengthening per the instructions provided in preparation for discharge.   Goal #6  Current Status      Patient Evaluation Complexity Level:  History High - 3 or more personal factors and/or co-morbidities   Examination of body systems Moderate - addressing a total of 3 or more elements   Clinical Presentation  Moderate - Evolving   Clinical Decision Making  Moderate Complexity     Therapeutic Activity:  26 minutes

## 2024-02-23 NOTE — OPERATIVE REPORT
[facility]  OPERATIVE REPORT    DATE OF SURGERY: 2/22/2024    PREOPERATIVE DIAGNOSIS:   Right Revision Total Hip Arthroplasty Recurrent Dislocation     POST-OPERATIVE DIAGNOSIS:   Right Revision Total Hip Arthroplasty Recurrent Dislocation     PROCEDURE:  Right Revision Total Hip Arthroplasty - Irrigation/Debridement and Modular Component Exchange (44316 - M 52 (Reduced Svcs))  Application of negative pressure incisional dressing    Location: [facility]    SURGEON: Omar A Behery, MD  Assistant(s): Fortino Fraser CSA    Anesthesia type: Spinal   Estimated Blood Loss: 150cc    Drains:  Prevena Incisional Wound Vac  Medium Hemovac Drain    Specimens: Frozen section, Fluid cell count, cultures, 3 tissue cultures    Findings:  Large hematoma with fascial dehiscence and retraction, no gross evidence of acetabular or femoral component loosening.   Frozen section negative for acute inflammation  Fluid Cell count 1.2K (35% PMNs)  Abductor tissue appeared attenuated with overall significant hip laxity.     Complications: None immediately apparent    Explants:  ZB 36mm lipped Acetabular Liner  36mm +3.5 delta ceramic femoral Head with titanium sleeve    Implants:  ZB Freedom Constrained 36mm Acetabular Liner  36mm +6 ZB Freedom Cobalt Chromium Femoral Head    Indication:     This is a 68 year old lady, with history of multiple prior right hip surgeries for complications including a two stage exchange exchange followed by reimplantation, and then a head/liner exchange for instability, who unfortunately sustained presented with recurrent instability and posterior dislocations. At this point she is 5.5months postop from implantation of her cup cage acetabular component, without radiographic migration. Surgical versus non-surgical options were discussed with the patient, and together we decided it is best to proceed with a revision total hip arthroplasty, irrigation/debridement (if frozen section / cell  count consistent with infection) with modular component exchange to a constrained liner with the goals of addressing her prosthetic instability. All risks and benefits of surgery including bleeding, infection, reucurrent dislocation, acetabular component loosening and gross failure, mal-prosthetic fracture, neurovascular injury, leg length or offset discrepancy, as well as medical and anesthesia-related complications were discussed with the patient / family, who elected to proceed with surgery.     Procedure in detail:    Following induction of anesthesia, the patient was positioned lateral decubitus on a peg board positioner. Perioperative IV antibiotics of Ancef and vancomycin were administered. Standard prep and drape was performed.   A longitudinal incision was made over the posterolateral aspect of the greater trochanter using the prior scar. Cautery was used to dissect down to the fascia. A breaux was used to expose the fascia and electrocautery was used to incise the fascia in line with the femur. Copious hematoma fluid was encountered superficial to the fascia, which contained a large 6cm area of dehiscence and retraction, some of the fluid which was aspirated and sent for cell count culture. There was significant scarring of the fascial layer to the deep tissues including capsule. Flaps were created deep to the fascia to facilitate later closure and the fascia was adequately mobilized. A charnley retractor was placed. There was no posterior capsular scar present and the implants were visualized immediately. Upon entering the joint, 3 total tissue cultures were taken as well as a frozen section. The hip was dislocated.   The 36mm head was disimpacted off of the trunnion using a bone tamp, lap to protect the trunnion and a mallet. The trunnion was well preserved. The stem was checked and noted to be well fixed and well positioned. A bone hook was used to lever the femur anteriorly, and the anterior acetabular  retractor was placed over the anterior wall. We then used an osteotome to break the cement around the liner which was cemented in adequate anteversion and inclination. The liner was removed using osteotomes and a round brur was used to fragment the cement mantle which was removed in a piece meal fashion to expose the inner aspect of the 70mm revision shell.   The cup construct was assessed and was fixed to the ilium and ischium without gross evidence of cup loosening.   The wound was then thoroughly irrigated as follows: 3L NS, followed by dilute peroxide solution, followed by 3 liters of NS, then again, dilute betadine and a final 3L of saline.   Due to the high risk of instability, a constrained liner was used as discussed preoperatively.   We sized the inner diameter of the revision shell using trial liners and planned to use a 36mm liner which fit well with adequate room for a cement mantle.   The liner was opened, and a pencil tip mihai was used to create grooves transversely and longitudinally on the back of the liner for cement fixation.   The cup was irrigated and dried and 1 batch of biomet plain cement was mixed with 2g of vancomycin and placed into the cup and used to coat the back of the liner. The liner was placed with increased anteversion and appropriate inclination and a poly impactor was used to hold the liner in place. Excess cement was removed and the we waited until the cement cured completely.   The final 36mm +6  was impacted onto the trunnion of the stem, and the head reduced into the liner with appropriate snap into the constraining mechanism.  The hip was reduced and noted to have excellent stability posteriorly and anteriorly.   Excellent hemostasis was achieved. A deep hemovac drain was placed, existing anterolaterally over the hip.  The fascia was closed with #1 PDS, and #2 quill, subcutaneous tissues closed in a layered fashion with #2 quill and 0-PDS. The subdermal layer was closed with  2-0 monoderm quill, and the skin  was closed with 2-0 nylon suture. A sterile negative pressure dressing was applied.   Sponge, needle and instrument counts were correct at the completion of the case. There were no immediate complications.     The patient was transferred to the post-anesthesia recovery unit (PACU) in stable condition.  There were no immediate complications.      POST-OPERATIVE PLAN  The patient will be permitted weight bearing as tolerated on the operative extremity.  The patient will be placed on posterior hip precautions for 3 months  The patient will receive broad spectrum antibiotics for 48 hours until cultures return and if negative, she will resume her home PO cefadroxil dosing.   Hemovac will be removed on POD2, if output <50cc per shift  The prevena dressing will be removed at 7 days postop and switched to a dry dressing.   Venous thromboembolic prophylaxis will consist of early mobilization, mechanical compressive devices, and Aspirin 81 BID.    The patient should be scheduled for follow up in 2 weeks for wound and radiographic evaluation.

## 2024-02-23 NOTE — OCCUPATIONAL THERAPY NOTE
OCCUPATIONAL THERAPY EVALUATION - INPATIENT     Room Number: 420/420-A  Evaluation Date: 2/23/2024  Type of Evaluation: Initial       Physician Order: IP Consult to Occupational Therapy  Reason for Therapy: ADL/IADL Dysfunction and Discharge Planning    OCCUPATIONAL THERAPY ASSESSMENT     Patient is a 68 year old female admitted 2/22/2024 for R COLTON revision for recurrent dislocation.  Prior to admission, pt was independent.  Patient is currently requiring up to max A for ADLs overall along with  mod A for STS, and mod A for functional transfer at RW level. Pt tolerated about 2 minutes of standing in supported standing.  Pt has the following impairments: posterior hip precautions, weakness, and decreased functional activity tolerance.    RN cleared pt for participation in OT session, which was completed in collaboration with PT. Upon arrival, pt was seated in bedside chair  and agreeable to activity. No visitors present during session. Gait belt used. Pt was left in chair and alarm was activated. Call light and all needs left in reach. Handoff given to RN.    Education provided  Educated pt about role of OT and hospital therapy process as well as posterior hip precautions and safety techniques and how to adhere to them during ADLs and functional transfers. Pt verbalized/demonstrated fair carryover stating this is her 9th sx.        Patient will benefit from continued skilled OT Services to promote return to prior level of function and safety with continuous assistance and gradual rehabilitative therapy     PLAN  OT Treatment Plan: Balance activities;Energy conservation/work simplification techniques;Continued evaluation;Compensatory technique education;Fine motor coordination activities;Neuromuscluar reeducation;Equipment eval/education;Patient/Family training;Patient/Family education;Cognitive reorientation;Endurance training;UE strengthening/ROM;Functional transfer training;Visual perceptual training;IADL  training;ADL training      OCCUPATIONAL THERAPY MEDICAL/SOCIAL HISTORY     Problem List  Principal Problem:    Prosthetic hip implant failure ([facility])  Active Problems:    Sjogren's disease ([facility])    ILD (interstitial lung disease) ([facility])    Essential hypertension    Obesity, morbid ([facility])    Preop testing      Past Medical History  Past Medical History:   Diagnosis Date    Achilles tendonitis     Arthritis     Asthma ([facility])     Atherosclerosis     with ectasia    Back problem     back surgery lumbar    Cancer ([facility]) 09/2004    rt breast dcis    Carpal tunnel syndrome 05/29/2009    bilateral    Cataract     Chronic foot pain     right    Diarrhea, unspecified     Duodenitis 12/18/2008    peptic    Dyslipidemia     Dysphagia     Essential hypertension     Exposure to radiation     Fasciitis     Fatigue     Food intolerance     Foot fracture, left 10/2008    left foot fx: excessive walking    H. pylori infection     Hammertoe     second toe right foot    Heart valve disease     Hemorrhoids     High blood pressure     History of blood transfusion 2023    no reactions [facility]    Hypercalcemia     IBS (irritable bowel syndrome)     Internal hemorrhoids 12/18/2008    Grade II    Irregular Z line of esophagus 12/18/2008    Localized primary carpometacarpal osteoarthritis 07/30/2012    Lupus ([facility])     Macular degeneration 12/07/2010    early signs, bilaterally, remained stable    Osteoarthritis     left thumb, severe    Osteomyelitis of right foot ([facility]) 01/2018    [facility] IDDr. Allan    Osteoporosis     Pain in joints     Pneumonia due to organism     Postmenopausal atrophic vaginitis     Pulmonary fibrosis ([facility]) 07/2018    referring to pulm, possible Sjogren's involvement?    Rheumatoid arthritis ([facility])     Rheumatoid arthritis(714.0)     Sicca syndrome ([facility]) 02/26/2009    Sjogren's syndrome ([facility]) 10/2007    Stress fracture of the metatarsals     right foot    Systemic lupus erythematosus ([facility]) 02/26/2009    Visual impairment      glasses    Vitamin D deficiency        Past Surgical History  Past Surgical History:   Procedure Laterality Date    ADENOIDECTOMY      APPENDECTOMY  age 8    [place]    BACK SURGERY      CARPAL TUNNEL RELEASE Bilateral     CATARACT      COLONOSCOPY      COLONOSCOPY      DERMATOLOGY SHAVE BIOPSY (D1K.1)  12/29/2011    Shave biopsy, skin, right shoulder    FOOT SURGERY  04/2013    revision right foot surgery    FOOT/TOES SURGERY PROC UNLISTED  09/2011    first MTM joint fusion with ORIF of second and third MTs with correction of the second hammertoe of the right foot by Dr. Hatfield    KNEE REPLACEMENT SURGERY      LUMPECTOMY RIGHT  2004    rt breast lumpectomy, [facility]    LUDY BIOPSY STEREO NODULE 1 SITE RIGHT (CPT=19081)  2004    OTHER SURGICAL HISTORY  01/11/2013    trigger thumb injections    RADIATION RIGHT  2004    MAMMOSITE    REVISION TOTAL HIP ARTHROPLASTY Right 02/22/2024    Right Revision Total Hip Arthroplasty - Irrigation/Debridement and Modular Component Exchange    SPINE SURGERY PROCEDURE UNLISTED      L4-5    TONSILLECTOMY      TOTAL HIP REPLACEMENT Right     TOTAL KNEE REPLACEMENT Left 05/24/2018    UPPER GI ENDOSCOPY,EXAM  12/18/2008       HOME SITUATION  Type of Home: House  Home Layout: Two level  Lives With: Alone     Toilet and Equipment: Comfort height toilet;Toilet riser  Shower/Tub and Equipment: Walk-in shower;Shower chair                     SUBJECTIVE  \"I have a high toilet at home.\"    OCCUPATIONAL THERAPY EXAMINATION      OBJECTIVE     Fall Risk: High fall risk    PAIN ASSESSMENT  Rating: 3  Location: R hip    Wound vac    RANGE OF MOTION   Upper extremity ROM is within functional limits     STRENGTH ASSESSMENT  Upper extremity strength is within functional limits     COORDINATION  Gross Motor: WFL   Fine Motor: WFL     ACTIVITIES OF DAILY LIVING ASSESSMENT  AM-PAC ‘6-Clicks’ Inpatient Daily Activity Short Form  How much help from another person does the patient currently  need…  -   Putting on and taking off regular lower body clothing?: A Lot  -   Bathing (including washing, rinsing, drying)?: A Lot  -   Toileting, which includes using toilet, bedpan or urinal? : A Lot  -   Putting on and taking off regular upper body clothing?: A Little  -   Taking care of personal grooming such as brushing teeth?: A Little  -   Eating meals?: None    AM-PAC Score:  Score: 16  Approx Degree of Impairment: 53.32%  Standardized Score (AM-PAC Scale): 35.96  CMS Modifier (G-Code): CK            FUNCTIONAL TRANSFER ASSESSMENT        Sit to stand: mod A  Toilet Transfer: mod A  Chair Transfer: mod A    FUNCTIONAL ADL ASSESSMENT  Overall max A    The patient's Approx Degree of Impairment: 53.32% has been calculated based on documentation in the [facility] '6 clicks' Inpatient Daily Activity Short Form.  Research supports that patients with this level of impairment may benefit from BENITO. Final disposition will be made by interdisciplinary medical team.    OT Goals  Patients self stated goal is: to go home     Patient will complete functional transfer with mod I  Comment:     Patient will complete toileting with mod I  Comment:     Patient will tolerate standing for 3 minutes in prep for adls with moed I   Comment:    Patient will complete item retrieval with mod I  Comment:          Goals  on: 3/23  Frequency: 3-5x/wk    Patient Evaluation Complexity Level:   Occupational Profile/Medical History MODERATE - Expanded review of history including review of medical or therapy record   Specific performance deficits impacting engagement in ADL/IADL MODERATE  3 - 5 performance deficits   Client Assessment/Performance Deficits MODERATE - Comorbidities and min to mod modifications of tasks    Clinical Decision Making MODERATE - Analysis of occupational profile, detailed assessments, several treatment options    Overall Complexity MODERATE     OT Session Time: 25 minutes  Self-Care Home Management: 15 minutes            Deedee Houston, [facility]  Inpatient Rehabilitation  Occupational Therapy  [phone]

## 2024-02-23 NOTE — ANESTHESIA POSTPROCEDURE EVALUATION
Patient: Ngozi Collazo    Procedure Summary       Date: 02/22/24 Room / Location: [facility] MAIN OR  / [facility] MAIN OR    Anesthesia Start: 1615 Anesthesia Stop: 1834    Procedure: Right revision total hip arthroplasty (Right) Diagnosis: (Right Total Hip Dislocation)    Surgeons: Behery, Omar Atef, MD Anesthesiologist: Fadia Cordova MD    Anesthesia Type: spinal, MAC ASA Status: 3            Anesthesia Type: spinal, MAC    Vitals Value Taken Time   /83 02/22/24 1834   Temp  02/22/24 1834   Pulse 84 02/22/24 1834   Resp 26 02/22/24 1834   SpO2 97 % 02/22/24 1834   Vitals shown include unfiled device data.    [facility] AN Post Evaluation:   Patient Evaluated in PACU  Patient Participation: complete - patient participated  Level of Consciousness: awake  Pain Management: adequate  Airway Patency:patent  Yes    Nausea/Vomiting: none  Cardiovascular Status: acceptable  Respiratory Status: acceptable  Postoperative Hydration acceptable      Fadia Cordova MD  2/22/2024 6:34 PM

## 2024-02-23 NOTE — PROGRESS NOTES
[facility]     ORTHO DAILY PROGRESS NOTE    Patient: Ngozi Collazo      Subjective:    Nothing acute overnight.  Pain in the operative extremity is well controlled.    Patient denies new onset numbness/tingling in the operative extremity.    Patient also denies chest pain / shortness of breath, nausea/vomiting.      Objective:      Vitals:    02/23/24 0355   BP: 119/63   Pulse: 69   Resp: 18   Temp: 97.8 °F (36.6 °C)       I/O last 3 completed shifts:  In: 300 [I.V.:100; IV PIGGYBACK:200]  Out: 335 [Urine:300; Drains:35]     Gen: awake, alert, not in acute distress    Abdomen nondistended, non-tender      Right Lower Extremity:   Hemovac output 35cc in last shift  Prevena dressing clean, dry, intact.  No output  Sensation intact to light touch in Sural/Saphenous/Tibial/Superficial Peroneal/Deep Peroneal and Tibial distributions.   Motor exam : firing TA/GSC at baseline  Vascular exam:  Cap refill ~2s in the toes. Foot warm and well perfused      Labs:      Lab Results   Component Value Date    HGB 10.1 (L) 02/23/2024    HGB 11.0 (L) 02/22/2024    HGB 10.4 (L) 02/13/2024         Lab Results   Component Value Date    INR 1.10 10/08/2023    INR 1.05 10/07/2023    INR 1.08 09/21/2023       OR Cx: NGTD    ASSESSMENT/PLAN: 68 year old yo female s/p right total hip arthroplasty revision for recurrent dislocation 2/22/2024     - Weight bearing status: WBAT RLE  - posterior hip precautions  - Mechanical DVT prophylaxis and chemoprophylaxis with ASA 81 BID   - 48 hours of perioperative antibiotics until cultures return negative, then resume home PO cefadroxil  - hemovac to be removed POD2, if output <50cc per shift  - PT for mobilization and motion  - Advance diet as tolerated. Discontinue IV fluids POD1 if tolerating diet.   - Multimodal pain control regimen ordered  - Disposition: Pending PT Evaluation    Omar Behery, MD  [facility]

## 2024-02-23 NOTE — PROGRESS NOTES
[facility]  Hospitalist Progress Note     Ngozi Collazo Patient Status:  Inpatient    1955  68 year old CSN [id number]   Location 420/420-A Attending David Mandel MD   Hosp Day # 1 PCP Irma Valera,      Assessment & Plan:   ----------------------------------  Failed right hip arthroplasty.  Due to recurrent dislocation.  Status post revision .  Pain well-controlled.  Has not yet worked with physical therapy.  -Pain control  -PT/OT  -IV antibiotics for 48 hours, then p.o.  -Monitor cultures    Other problems  Osteoarthritis  Obesity BMI 36  Pulmonary artery hypertension  Rheumatoid arthritis  Sjogren's syndrome  Hypertension  Dyslipidemia  IBS  Asthma  Interstitial lung disease    dvt prophylaxis: asa bid  code status: full code  dispo: home upon medical clearance patient has spent 10 months in rehab over the last year or so, she does not want to go back to rehab.  She plans to go home where she lives alone but her son lives very nearby    I personally reviewed the available laboratories, imaging including CBC, cultures. I discussed/will discuss the case with Ortho. I ordered laboratories, studies including CBC. I adjusted medications including Ancef. Medical decision making high, risk is high.    Subjective:   ----------------------------------  Pain well-controlled.  Has not worked with physical therapy year.  No chest pain or shortness of breath.  Tolerating diet.  Does not want to go to rehab      Objective:   Chief Complaint: No chief complaint on file.    ----------------------------------  Temp:  [97.6 °F (36.4 °C)-98.3 °F (36.8 °C)] 98.2 °F (36.8 °C)  Pulse:  [53-80] 55  Resp:  [10-23] 18  BP: (119-184)/(63-93) 129/77  SpO2:  [92 %-100 %] 94 %  Gen: A+Ox3.  No distress.   HEENT: NCAT, neck supple, no carotid bruit.  CV: RRR, S1S2, and intact distal pulses. No gallop, rub, murmur.  Pulm: Effort and breath sounds normal. No distress, wheezes, rales, rhonchi.  Abd: Soft, NTND, BS  normal, no mass, no HSM, no rebound/guarding.   Neuro: Normal reflexes, CN. Sensory/motor exams grossly normal deficit.   MS: No joint effusions.  No peripheral edema.  Right hip drains in place  Skin: Skin is warm and dry. No rashes, erythema, diaphoresis.   Psych: Normal mood and affect. Calm, cooperative    Labs:  Lab Results   Component Value Date    HGB 10.1 (L) 02/23/2024    WBC 4.5 02/23/2024    .0 02/23/2024     02/23/2024    K 4.4 02/23/2024    CREATSERUM 0.89 02/23/2024    INR 1.10 10/08/2023    BILIRUBTOTAL 0.3 06/03/2016    AST 16 02/13/2024    ALT 10 (L) 02/13/2024    TROP <0.045 03/29/2020          aspirin  81 mg Oral BID    sennosides  17.2 mg Oral Nightly    docusate sodium  100 mg Oral BID    famotidine  20 mg Oral BID    Or    famotidine  20 mg Intravenous BID    acetaminophen  1,000 mg Oral q6h    vancomycin  15 mg/kg Intravenous Q12H    ceFAZolin  2 g Intravenous Q8H    traMADol  50 mg Oral q6h    fluticasone furoate-vilanterol  1 puff Inhalation Daily    [Held by provider] Cevimeline HCl  30 mg Oral TID    hydroxychloroquine  200 mg Oral BID    metoprolol succinate ER  25 mg Oral Daily    pregabalin  50 mg Oral QAM    pregabalin  75 mg Oral Nightly     polyethylene glycol (PEG 3350), magnesium hydroxide, bisacodyl, fleet enema, ondansetron, metoclopramide, oxyCODONE **OR** oxyCODONE, albuterol  **Certification      PHYSICIAN Certification of Need for Inpatient Hospitalization - Initial Certification    Patient will require inpatient services that will reasonably be expected to span two midnight's based on the clinical documentation in H+P.   Based on patients current state of illness, I anticipate that, after discharge, patient will require TBD.

## 2024-02-23 NOTE — CM/SW NOTE
02/23/24 1400   CM/SW Referral Data   Referral Source Social Work (self-referral);Physician   Reason for Referral Discharge planning   Informant EMR   Medical Hx   Does patient have an established PCP? Yes   Patient Info   Patient's Current Mental Status at Time of Assessment Oriented;Alert   Patient Status Prior to Admission   Independent with ADLs and Mobility Yes   Services in place prior to admission Home Health Care   Discharge Needs   Anticipated D/C needs Home health care   Choice of Post-Acute Provider   Informed patient of right to choose their preferred provider Yes   List of appropriate post-acute services provided to patient/family with quality data Yes     SW performed chart review, pt is current with [facility] and would like to continue to use their services upon DC. Pt has been in and out of rehabs for the past 10 months and would like to go home upon this admission per MD's notes    Referral sent in aidin for [facility] - accepted/ reserved    [facility]  [address]  [place] [address]  Phone: [phone]  Fax: [phone]    PLAN: Home with [facility]    ROYCE Horne, MSW ext. [phone]

## 2024-02-23 NOTE — CONSULTS
[facility]    PATIENT'S NAME: PJ BROWN   ATTENDING PHYSICIAN: Omar Behery, MD   CONSULTING PHYSICIAN: Issac Lauren MD   PATIENT ACCOUNT#:   [id number]    LOCATION:  [address] [place] [address]  MEDICAL RECORD #:   [id number]       YOB: 1955  ADMISSION DATE:       02/22/2024      CONSULT DATE:  02/22/2024    REPORT OF CONSULTATION      REASON FOR ADMISSION:  Post right hip revision arthroplasty.    HISTORY OF PRESENT ILLNESS:  Patient is a 68-year-old  female with chronic osteoarthritis and chronic right hip pain.  She is status post right total hip arthroplasty.  She had a recent dislocation that required closed reduction on January 30, 2024.  Because of her prosthetic instability, she was scheduled today for above-mentioned procedure by her orthopedic surgeon, Dr. Omar Behery.  Preprocedure, she had spinal block, and postprocedure, transferred to PACU for further monitoring.      PAST MEDICAL HISTORY:  Generalized osteoarthritis, obesity, pulmonary artery hypertension, rheumatoid arthritis, Sjögren syndrome, hypertension, hyperlipidemia, irritable bowel syndrome, asthma, and interstitial lung disease.     PAST SURGICAL HISTORY:  She had initially a right total hip arthroplasty in 2022, required a revision with acetabular component in March 2023 complicated by prosthetic joint infection.  In April 2023, she had an I and D procedure and component exchange.  In May 2023, she had prosthetic explantation with antibiotic spacer implant.  In August 2023, she had revision of both components.  In September 2023, she had dislocation and required open revision after failed closed reduction under general anesthesia.  In October 2023, she had another dislocation and required closed reduction under general anesthesia, and in January 2024, she had another dislocation and required reduction under general anesthesia.  Also, she has history of right foot I and D procedure and right foot open reduction  and internal fixation, appendectomy, adenoidectomy, cataract procedure, right breast lumpectomy plus radiation therapy for breast cancer, lumbar laminectomy, left total knee arthroplasty.       MEDICATIONS:  Please see medication reconciliation list.     ALLERGIES:  No known drug allergies.     SOCIAL HISTORY:  Ex-tobacco user.  No current tobacco, alcohol, or drug use.  Lives with her family.  Requires assistance in her basic activities of daily living.     FAMILY HISTORY:  Father had heart disease.  Mother had diabetes mellitus type 2.      REVIEW OF SYSTEMS:  Currently resting in bed.  Some discomfort in her right hip.  No chest pain.  No shortness of breath.  Other 12-point review of systems is negative.      PHYSICAL EXAMINATION:    GENERAL:  Alert and oriented to time, place and person.  No acute distress.  Pain controlled.  VITAL SIGNS:  Temperature 97.8, pulse 58, respiratory rate 23, blood pressure 160/86, pulse ox 99% on room air.   HEENT:  Atraumatic.  Oropharynx clear.  Moist mucous membranes.  Normal hard and soft palate.  Eyes:  Anicteric sclerae.    NECK:  Supple.  No lymphadenopathy.  Trachea midline.  Full range of motion.   LUNGS:  Clear to auscultation bilaterally.  Normal respiratory effort.    HEART:  Regular rate and rhythm.  S1 and S2 auscultated.  No murmur.    ABDOMEN:  Soft, nondistended.  No tenderness.  Obese.  Positive bowel sounds.   EXTREMITIES:  No peripheral edema, clubbing or cyanosis.  Right hip dressing with Hemovac drain and wound VAC Prevena surgical drain.  NEUROLOGIC:  Motor and sensory intact.      ASSESSMENT AND PLAN:    1.   Recurrent right hip prosthetic dislocation, status post right hip total revision arthroplasty.  Spinal block.  Pain control.  Neurovascular checks.  Aspirin for DVT prophylaxis.  Physical and occupational therapy.  2.   Interstitial lung disease.  Continue home medications.    3.   Sjögren disease.  Continue home medications.   4.   Essential  hypertension.  Continue home medications and monitor.   5.   Morbid obesity.  Monitor respiratory and hemodynamic status.    Dictated By Issac Lauren MD  d: 02/22/2024 19:34:11  t: 02/22/2024 20:07:41  Job [id number]  FB/    cc: Omar Behery, MD

## 2024-02-23 NOTE — PLAN OF CARE
Alert and oriented x4. Right total hip arthoplasty. POD 1. Manzanares to be removed in the morning. Contact precaution for history of MRSA. Ancef and vancomycin IV. Abductor pillow in place. Right foot edema 1+, baseline. Hemovac q 8. Provena wound vac in place. Pain control. Plan for PT/OT possible BENITO  Problem: Patient Centered Care  Goal: Patient preferences are identified and integrated in the patient's plan of care  Description: Interventions:  - What would you like us to know as we care for you? Home alone  - Provide timely, complete, and accurate information to patient/family  - Incorporate patient and family knowledge, values, beliefs, and cultural backgrounds into the planning and delivery of care  - Encourage patient/family to participate in care and decision-making at the level they choose  - Honor patient and family perspectives and choices  Outcome: Progressing     Problem: PAIN - ADULT  Goal: Verbalizes/displays adequate comfort level or patient's stated pain goal  Description: INTERVENTIONS:  - Encourage pt to monitor pain and request assistance  - Assess pain using appropriate pain scale  - Administer analgesics based on type and severity of pain and evaluate response  - Implement non-pharmacological measures as appropriate and evaluate response  - Consider cultural and social influences on pain and pain management  - Manage/alleviate anxiety  - Utilize distraction and/or relaxation techniques  - Monitor for opioid side effects  - Notify MD/LIP if interventions unsuccessful or patient reports new pain  - Anticipate increased pain with activity and pre-medicate as appropriate  Outcome: Progressing     Problem: RISK FOR INFECTION - ADULT  Goal: Absence of fever/infection during anticipated neutropenic period  Description: INTERVENTIONS  - Monitor WBC  - Administer growth factors as ordered  - Implement neutropenic guidelines  Outcome: Progressing     Problem: SAFETY ADULT - FALL  Goal: Free from fall  injury  Description: INTERVENTIONS:  - Assess pt frequently for physical needs  - Identify cognitive and physical deficits and behaviors that affect risk of falls.  - [place] fall precautions as indicated by assessment.  - Educate pt/family on patient safety including physical limitations  - Instruct pt to call for assistance with activity based on assessment  - Modify environment to reduce risk of injury  - Provide assistive devices as appropriate  - Consider OT/PT consult to assist with strengthening/mobility  - Encourage toileting schedule  Outcome: Progressing     Problem: DISCHARGE PLANNING  Goal: Discharge to home or other facility with appropriate resources  Description: INTERVENTIONS:  - Identify barriers to discharge w/pt and caregiver  - Include patient/family/discharge partner in discharge planning  - Arrange for needed discharge resources and transportation as appropriate  - Identify discharge learning needs (meds, wound care, etc)  - Arrange for interpreters to assist at discharge as needed  - Consider post-discharge preferences of patient/family/discharge partner  - Complete POLST form as appropriate  - Assess patient's ability to be responsible for managing their own health  - Refer to Case Management Department for coordinating discharge planning if the patient needs post-hospital services based on physician/LIP order or complex needs related to functional status, cognitive ability or social support system  Outcome: Progressing

## 2024-02-24 LAB
DEPRECATED RDW RBC AUTO: 49.6 FL (ref 35.1–46.3)
ERYTHROCYTE [DISTWIDTH] IN BLOOD BY AUTOMATED COUNT: 14.8 % (ref 11–15)
HCT VFR BLD AUTO: 30.5 %
HGB BLD-MCNC: 9.9 G/DL
MCH RBC QN AUTO: 29.7 PG (ref 26–34)
MCHC RBC AUTO-ENTMCNC: 32.5 G/DL (ref 31–37)
MCV RBC AUTO: 91.6 FL
PLATELET # BLD AUTO: 149 10(3)UL (ref 150–450)
RBC # BLD AUTO: 3.33 X10(6)UL
WBC # BLD AUTO: 4.8 X10(3) UL (ref 4–11)

## 2024-02-24 PROCEDURE — 99233 SBSQ HOSP IP/OBS HIGH 50: CPT | Performed by: HOSPITALIST

## 2024-02-24 NOTE — PLAN OF CARE
Patient Aox4. IV abx given. Straight cath x1. Able to void, had large incontinence episode. Using stand and pivots to r/c for transfers, patient feels weaker. Scheduled Tramadol and Tylenol. SCDs and ASA for DVT prophylaxis. V/s stable, on RA. Dressing to right hip, intact. Hemovac in place. Call light within reach. Fall precautions. Plan for final PT evals and med clearance.     Problem: Patient Centered Care  Goal: Patient preferences are identified and integrated in the patient's plan of care  Description: Interventions:  - What would you like us to know as we care for you? Home alone  - Provide timely, complete, and accurate information to patient/family  - Incorporate patient and family knowledge, values, beliefs, and cultural backgrounds into the planning and delivery of care  - Encourage patient/family to participate in care and decision-making at the level they choose  - Honor patient and family perspectives and choices  Outcome: Progressing     Problem: PAIN - ADULT  Goal: Verbalizes/displays adequate comfort level or patient's stated pain goal  Description: INTERVENTIONS:  - Encourage pt to monitor pain and request assistance  - Assess pain using appropriate pain scale  - Administer analgesics based on type and severity of pain and evaluate response  - Implement non-pharmacological measures as appropriate and evaluate response  - Consider cultural and social influences on pain and pain management  - Manage/alleviate anxiety  - Utilize distraction and/or relaxation techniques  - Monitor for opioid side effects  - Notify MD/LIP if interventions unsuccessful or patient reports new pain  - Anticipate increased pain with activity and pre-medicate as appropriate  Outcome: Progressing     Problem: RISK FOR INFECTION - ADULT  Goal: Absence of fever/infection during anticipated neutropenic period  Description: INTERVENTIONS  - Monitor WBC  - Administer growth factors as ordered  - Implement neutropenic  guidelines  Outcome: Progressing     Problem: SAFETY ADULT - FALL  Goal: Free from fall injury  Description: INTERVENTIONS:  - Assess pt frequently for physical needs  - Identify cognitive and physical deficits and behaviors that affect risk of falls.  - [place] fall precautions as indicated by assessment.  - Educate pt/family on patient safety including physical limitations  - Instruct pt to call for assistance with activity based on assessment  - Modify environment to reduce risk of injury  - Provide assistive devices as appropriate  - Consider OT/PT consult to assist with strengthening/mobility  - Encourage toileting schedule  Outcome: Progressing     Problem: DISCHARGE PLANNING  Goal: Discharge to home or other facility with appropriate resources  Description: INTERVENTIONS:  - Identify barriers to discharge w/pt and caregiver  - Include patient/family/discharge partner in discharge planning  - Arrange for needed discharge resources and transportation as appropriate  - Identify discharge learning needs (meds, wound care, etc)  - Arrange for interpreters to assist at discharge as needed  - Consider post-discharge preferences of patient/family/discharge partner  - Complete POLST form as appropriate  - Assess patient's ability to be responsible for managing their own health  - Refer to Case Management Department for coordinating discharge planning if the patient needs post-hospital services based on physician/LIP order or complex needs related to functional status, cognitive ability or social support system  Outcome: Progressing

## 2024-02-24 NOTE — CM/SW NOTE
Request for authorization for [facility] for [facility]acute rehab.    Discharge Date: 2/25/2024  New or Return: NEW - Circumstances that warrant SNF placement:     COVID-19 related?  No

## 2024-02-24 NOTE — PLAN OF CARE
Patient alert and oriented. Prevena wound vac in place to right hip. Hemovac in place, output recorded. Hip precautions in place. Scheduled tramadol and tylenol managing pt pain. Oxy available PRN. SCDs and ASA for DVT prophy. Ambulating x1 with walker.     Pt unable to void, x1 straight cath this afternoon. Check void.    Problem: Patient Centered Care  Goal: Patient preferences are identified and integrated in the patient's plan of care  Description: Interventions:  - What would you like us to know as we care for you?   - Provide timely, complete, and accurate information to patient/family  - Incorporate patient and family knowledge, values, beliefs, and cultural backgrounds into the planning and delivery of care  - Encourage patient/family to participate in care and decision-making at the level they choose  - Honor patient and family perspectives and choices  Outcome: Progressing     Problem: PAIN - ADULT  Goal: Verbalizes/displays adequate comfort level or patient's stated pain goal  Description: INTERVENTIONS:  - Encourage pt to monitor pain and request assistance  - Assess pain using appropriate pain scale  - Administer analgesics based on type and severity of pain and evaluate response  - Implement non-pharmacological measures as appropriate and evaluate response  - Consider cultural and social influences on pain and pain management  - Manage/alleviate anxiety  - Utilize distraction and/or relaxation techniques  - Monitor for opioid side effects  - Notify MD/LIP if interventions unsuccessful or patient reports new pain  - Anticipate increased pain with activity and pre-medicate as appropriate  Outcome: Progressing     Problem: RISK FOR INFECTION - ADULT  Goal: Absence of fever/infection during anticipated neutropenic period  Description: INTERVENTIONS  - Monitor WBC  - Administer growth factors as ordered  - Implement neutropenic guidelines  Outcome: Progressing     Problem: SAFETY ADULT - FALL  Goal: Free  from fall injury  Description: INTERVENTIONS:  - Assess pt frequently for physical needs  - Identify cognitive and physical deficits and behaviors that affect risk of falls.  - [place] fall precautions as indicated by assessment.  - Educate pt/family on patient safety including physical limitations  - Instruct pt to call for assistance with activity based on assessment  - Modify environment to reduce risk of injury  - Provide assistive devices as appropriate  - Consider OT/PT consult to assist with strengthening/mobility  - Encourage toileting schedule  Outcome: Progressing     Problem: DISCHARGE PLANNING  Goal: Discharge to home or other facility with appropriate resources  Description: INTERVENTIONS:  - Identify barriers to discharge w/pt and caregiver  - Include patient/family/discharge partner in discharge planning  - Arrange for needed discharge resources and transportation as appropriate  - Identify discharge learning needs (meds, wound care, etc)  - Arrange for interpreters to assist at discharge as needed  - Consider post-discharge preferences of patient/family/discharge partner  - Complete POLST form as appropriate  - Assess patient's ability to be responsible for managing their own health  - Refer to Case Management Department for coordinating discharge planning if the patient needs post-hospital services based on physician/LIP order or complex needs related to functional status, cognitive ability or social support system  Outcome: Progressing

## 2024-02-24 NOTE — PROGRESS NOTES
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  Status post revision .  Pain well-controlled.  No more uncomfortable today.  Did not do very well in the last 24 hours with ambulation.  At this point given the lack of 24-hour supervision at home, she would benefit from subacute rehab which she is reluctant to do due to her extensive rehab history.  I did ask her to reconsider this which she will.  She is okay with social work proceeding with arrangements in case it is needed.  -Pain control  -PT/OT  -IV antibiotics for 48 hours, then p.o.  -Monitor cultures    Other problems  Osteoarthritis  Obesity BMI 36  Pulmonary artery hypertension  Rheumatoid arthritis  Sjogren's syndrome  Hypertension  Dyslipidemia  IBS  Asthma  Interstitial lung disease    dvt prophylaxis: asa bid  code status: full code  dispo: home upon medical clearance patient has spent 10 months in rehab over the last year or so, she does not want to go back to rehab.  She plans to go home where she lives alone but her son lives very nearby    I personally reviewed the available laboratories, imaging including CBC, cultures. I discussed/will discuss the case with Ortho. I ordered laboratories, studies including CBC. I adjusted medications including Ancef. Medical decision making high, risk is high.    Subjective:   ----------------------------------  Pain well-controlled.  Has not worked with physical therapy year.  No chest pain or shortness of breath.  Tolerating diet.  Does not want to go to rehab      Objective:   Chief Complaint: No chief complaint on file.    ----------------------------------  Temp:  [97.8 °F (36.6 °C)-99.3 °F (37.4 °C)] 98.8 °F  (37.1 °C)  Pulse:  [56-74] 68  Resp:  [16-18] 17  BP: (124-139)/(73-82) 130/75  SpO2:  [90 %-92 %] 90 %  Gen: A+Ox3.  No distress.   HEENT: NCAT, neck supple, no carotid bruit.  CV: RRR, S1S2, and intact distal pulses. No gallop, rub, murmur.  Pulm: Effort and breath sounds normal. No distress, wheezes, rales, rhonchi.  Abd: Soft, NTND, BS normal, no mass, no HSM, no rebound/guarding.   Neuro: Normal reflexes, CN. Sensory/motor exams grossly normal deficit.   MS: No joint effusions.  No peripheral edema.  Right hip drains in place  Skin: Skin is warm and dry. No rashes, erythema, diaphoresis.   Psych: Normal mood and affect. Calm, cooperative    Labs:  Lab Results   Component Value Date    HGB 9.9 (L) 02/24/2024    WBC 4.8 02/24/2024    .0 (L) 02/24/2024     02/23/2024    K 4.4 02/23/2024    CREATSERUM 0.89 02/23/2024    INR 1.10 10/08/2023    BILIRUBTOTAL 0.3 06/03/2016    AST 16 02/13/2024    ALT 10 (L) 02/13/2024    TROP <0.045 03/29/2020          aspirin  81 mg Oral BID    sennosides  17.2 mg Oral Nightly    docusate sodium  100 mg Oral BID    famotidine  20 mg Oral BID    Or    famotidine  20 mg Intravenous BID    acetaminophen  1,000 mg Oral q6h    ceFAZolin  2 g Intravenous Q8H    traMADol  50 mg Oral q6h    fluticasone furoate-vilanterol  1 puff Inhalation Daily    hydroxychloroquine  200 mg Oral BID    metoprolol succinate ER  25 mg Oral Daily    pregabalin  50 mg Oral QAM    pregabalin  75 mg Oral Nightly     polyethylene glycol (PEG 3350), magnesium hydroxide, bisacodyl, fleet enema, ondansetron, metoclopramide, oxyCODONE **OR** oxyCODONE, albuterol  **Certification      PHYSICIAN Certification of Need for Inpatient Hospitalization - Initial Certification    Patient will require inpatient services that will reasonably be expected to span two midnight's based on the clinical documentation in H+P.   Based on patients current state of illness, I anticipate that, after discharge, patient will  require TBD.

## 2024-02-24 NOTE — PHYSICAL THERAPY NOTE
PHYSICAL THERAPY HIP TREATMENT NOTE - INPATIENT    Room Number: 420/420-A            Presenting Problem: right hip revision, WBAT, posterior hip precautions  Co-Morbidities : multiple right hip revisions and dislocations, OA, obesity, pHTN, Sjogren's IBS, asthma    Problem List  Principal Problem:    Prosthetic hip implant failure (HCC)  Active Problems:    Sjogren's disease (HCC)    ILD (interstitial lung disease) (HCC)    Essential hypertension    Obesity, morbid (HCC)    Preop testing      PHYSICAL THERAPY ASSESSMENT   Patient demonstrates limited progress this session, goals  remain in progress.    Patient continues to function below baseline with bed mobility, transfers, gait, stair negotiation, maintaining seated position, standing prolonged periods, and performing household tasks.  Contributing factors to remaining limitations include decreased functional strength, decreased endurance/aerobic capacity, pain, decreased muscular endurance, and medical status.  Next session anticipate patient to progress bed mobility, transfers, gait, and stair negotiation.  Physical Therapy will continue to follow patient for duration of hospitalization.    Patient continues to benefit from continued skilled PT services: to promote return to prior level of function and safety with continuous assistance and gradual rehabilitative therapy .    PLAN  PT Treatment Plan: Bed mobility;Body mechanics;Patient education;Gait training;Strengthening;Transfer training;Balance training  Frequency (Obs): Daily    SUBJECTIVE  I was 10 moths at rehab    OBJECTIVE  Precautions: Hip abduction pillow;COLTON - posterior;Limb alert - right    WEIGHT BEARING RESTRICTION        R Lower Extremity: Weight Bearing as Tolerated       PAIN ASSESSMENT   Ratin  Location: R hip area  Management Techniques: Activity promotion;Body mechanics;Relaxation;Repositioning    BALANCE  Static Sitting: Good  Dynamic Sitting: Fair +  Static Standing: Fair  Dynamic  Standing: Poor +  ACTIVITY TOLERANCE                         O2 WALK       AM-PAC '6-Clicks' INPATIENT SHORT FORM - BASIC MOBILITY  How much difficulty does the patient currently have...  Patient Difficulty: Turning over in bed (including adjusting bedclothes, sheets and blankets)?: A Lot   Patient Difficulty: Sitting down on and standing up from a chair with arms (e.g., wheelchair, bedside commode, etc.): A Little   Patient Difficulty: Moving from lying on back to sitting on the side of the bed?: A Little   How much help from another person does the patient currently need...   Help from Another: Moving to and from a bed to a chair (including a wheelchair)?: A Little   Help from Another: Need to walk in hospital room?: A Little   Help from Another: Climbing 3-5 steps with a railing?: A Lot     AM-PAC Score:  Raw Score: 16   Approx Degree of Impairment: 54.16%   Standardized Score (AM-PAC Scale): 40.78   CMS Modifier (G-Code): CK    FUNCTIONAL ABILITY STATUS  Functional Mobility/Gait Assessment  Gait Assistance: Minimum assistance  Distance (ft): 45 ft  Assistive Device: Rolling walker  Pattern: Shuffle;R Decreased stance time  Stairs: Other (comment)  Rolling: min A  Supine to Sit: min A  Sit to Supine: min A  Sit to Stand: contact guard assist    Additional Information: Pt motivated. Reviewed Hip posterior precautions. Pt able to recall them. Pt needs occ cues for scooting  forward. Sitting florian on EOB. Pt sit to stand with CGA from elevated bed level. Pt amb with slow arsalan in room. Pt with swollen R foot. Focus on feet placement with amb and RW navigation. Pt left sitting in chair with all needs in reach.   Discussed pt needs after discharged.     The patient's Approx Degree of Impairment: 54.16% has been calculated based on documentation in the [facility] '6 clicks' Inpatient Basic Mobility Short Form.  Research supports that patients with this level of impairment may benefit from gradual rehabilitative  therapy..  Final disposition will be made by interdisciplinary medical team.    Exercises AM Session    Ankle Pumps     10 reps    Quad Sets 10 reps    Glut Sets 10 reps    Hip Abd/Add 10 reps    Heel slides 10 reps    Saq 0 reps    Sitting Knee Extension 10 reps    Standing heel/toe raises 0 reps    Hamstring Curls 0 reps    Forward, back steps 0 reps    Short Squats 0 reps      Patient End of Session: Up in chair;Needs met;Call light within reach;RN aware of session/findings;All patient questions and concerns addressed    CURRENT GOALS   Goals to be met by: 3/10/24  Patient Goal Patient's self-stated goal is: to go home, goals set for this   Goal #1 Patient is able to demonstrate supine - sit EOB @ level: modified independent   Goal #1   Current Status Min A   Goal #2 Patient is able to demonstrate transfers Sit to/from Stand at assistance level: modified independent     Goal #2  Current Status Sit to stand CGA    Goal #3 Patient is able to ambulate 100 feet with assistive device at assistance level: modified independent    Goal #3   Current Status Min A for 45 ft with RW   Goal #4 Patient will negotiate 5 stairs/one curb w/ assistive device and supervision   Goal #4   Current Status NT   Goal #5 Patient verbalizes and/or demonstrates all precautions and safety concerns independently   Goal #5   Current Status In progress   Goal #6 Patient independently performs home exercise program for ROM/strengthening per the instructions provided in preparation for discharge.   Goal #6  Current Status In progress       Gait Training: 15 minutes  Therapeutic Activity: 14 minutes

## 2024-02-25 LAB
DEPRECATED RDW RBC AUTO: 51.8 FL (ref 35.1–46.3)
ERYTHROCYTE [DISTWIDTH] IN BLOOD BY AUTOMATED COUNT: 15 % (ref 11–15)
HCT VFR BLD AUTO: 29.8 %
HGB BLD-MCNC: 9.3 G/DL
MCH RBC QN AUTO: 29.1 PG (ref 26–34)
MCHC RBC AUTO-ENTMCNC: 31.2 G/DL (ref 31–37)
MCV RBC AUTO: 93.1 FL
PLATELET # BLD AUTO: 135 10(3)UL (ref 150–450)
RBC # BLD AUTO: 3.2 X10(6)UL
WBC # BLD AUTO: 4.5 X10(3) UL (ref 4–11)

## 2024-02-25 PROCEDURE — 99232 SBSQ HOSP IP/OBS MODERATE 35: CPT | Performed by: HOSPITALIST

## 2024-02-25 NOTE — PHYSICAL THERAPY NOTE
PHYSICAL THERAPY HIP TREATMENT NOTE - INPATIENT    Room Number: 420/420-A            Presenting Problem: right hip revision, WBAT, posterior hip precautions  Co-Morbidities : multiple right hip revisions and dislocations, OA, obesity, pHTN, Sjogren's IBS, asthma    Problem List  Principal Problem:    Prosthetic hip implant failure (HCC)  Active Problems:    Sjogren's disease (HCC)    ILD (interstitial lung disease) (HCC)    Essential hypertension    Obesity, morbid (HCC)    Preop testing      PHYSICAL THERAPY ASSESSMENT   Patient demonstrates good  progress this session, goals  remain in progress.    Patient continues to function near baseline with bed mobility, transfers, gait, stair negotiation, and standing prolonged periods.  Contributing factors to remaining limitations include decreased functional strength, pain, decreased muscular endurance, and medical status.  Next session anticipate patient to progress bed mobility, transfers, gait, stair negotiation, and standing prolonged periods.  Physical Therapy will continue to follow patient for duration of hospitalization.    Patient continues to benefit from continued skilled PT services: at discharge to promote functional independence and safety with additional support and return home with home health PT.    PLAN  PT Treatment Plan: Bed mobility;Body mechanics;Endurance;Energy conservation;Patient education;Gait training;Neuromuscular re-educate;Range of motion;Strengthening;Stair training;Transfer training;Balance training  Frequency (Obs): Daily    SUBJECTIVE  \"I know the exercises I should do and I don't want to go to rehab and just lay in bed all day.\"     OBJECTIVE  Precautions: COLTON - posterior;Hip abduction pillow;Limb alert - right    WEIGHT BEARING RESTRICTION        R Lower Extremity: Weight Bearing as Tolerated       PAIN ASSESSMENT   Rating: 3  Location: R hip  Management Techniques: Activity promotion;Body mechanics    BALANCE  Static Sitting:  Good  Dynamic Sitting: Fair +  Static Standing: Fair -  Dynamic Standing: Fair -       AM-PAC '6-Clicks' INPATIENT SHORT FORM - BASIC MOBILITY  How much difficulty does the patient currently have...  Patient Difficulty: Turning over in bed (including adjusting bedclothes, sheets and blankets)?: A Little   Patient Difficulty: Sitting down on and standing up from a chair with arms (e.g., wheelchair, bedside commode, etc.): A Little   Patient Difficulty: Moving from lying on back to sitting on the side of the bed?: A Little   How much help from another person does the patient currently need...   Help from Another: Moving to and from a bed to a chair (including a wheelchair)?: A Little   Help from Another: Need to walk in hospital room?: A Little   Help from Another: Climbing 3-5 steps with a railing?: A Little     AM-PAC Score:  Raw Score: 18   Approx Degree of Impairment: 46.58%   Standardized Score (AM-PAC Scale): 43.63   CMS Modifier (G-Code): CK    FUNCTIONAL ABILITY STATUS  Functional Mobility/Gait Assessment  Gait Assistance: Contact guard assist  Distance (ft): 75 ft  Assistive Device: Rolling walker  Pattern: R Decreased stance time (decreased arsalan, step to pattern, stooped posture)  Stairs: Stairs  How Many Stairs: 4  Device: 1 Rail;Cane  Assist: Minimal assist  Pattern: Ascend and Descend  Ascend and Descend : Step to  Supine to Sit: contact guard assist  Sit to Supine: contact guard assist  Sit to Stand: CGA    Additional Information: Pt received in bedside chair at start of session, agreeable to participate. Pt demonstrate STS from chair with CGA and use of RW. Maintains post hip prec with min cueing during functional transfers. Ambulated 75 ft x 1 with RW and CGA as well. Step to pattern and decreased step length on R noted, overall steady pattern. Pt returned to chair and had seated rest break. Discussed discharge planning with patient regarding patients goals and home set up. Pt agreeable to trial  stair training this session. Utilized w/c to bring patient to gym. Pt completed 4 stairs with SR, SPC and min A. Demonstrated proper technique and completed descent laterally, maintained precautions. Pt returned to bedside chair at end of session. Extended discussion regarding return home vs rehab facility. Pt motivated to return home and feels she will be able to get more movement and activity in by returning home. Pt presently demonstrates improved mobility and also has support from son at home. Anticipate she would be safe returning home with HH therapy and support of son.     The patient's Approx Degree of Impairment: 46.58% has been calculated based on documentation in the [facility] '6 clicks' Inpatient Basic Mobility Short Form.  Research supports that patients with this level of impairment may benefit from HH therapy.     Final disposition will be made by interdisciplinary medical team.      Patient End of Session: Up in chair;Needs met;Call light within reach;RN aware of session/findings;All patient questions and concerns addressed    CURRENT GOALS       Goals to be met by: 3/10/24  Patient Goal Patient's self-stated goal is: to go home, goals set for this   Goal #1 Patient is able to demonstrate supine - sit EOB @ level: modified independent   Goal #1   Current Status Min A   Goal #2 Patient is able to demonstrate transfers Sit to/from Stand at assistance level: modified independent      Goal #2  Current Status Sit to stand CGA    Goal #3 Patient is able to ambulate 100 feet with assistive device at assistance level: modified independent    Goal #3   Current Status 75 ft with RW, CGA   Goal #4 Patient will negotiate 5 stairs/one curb w/ assistive device and supervision   Goal #4   Current Status 4 stairs with min A, SR and cane   Goal #5 Patient verbalizes and/or demonstrates all precautions and safety concerns independently   Goal #5   Current Status In progress   Goal #6 Patient independently performs home  exercise program for ROM/strengthening per the instructions provided in preparation for discharge.   Goal #6  Current Status In progress     Gait Trainin minutes  Therapeutic Activity: 20 minutes

## 2024-02-25 NOTE — PLAN OF CARE
Pt is A&O x4. Breathing on room air. Prevena wound vac in place. Voiding via Purewick. Up with one assist and walker. Pain is managed with scheduled Tylenol, Tramadol and Oxy prn. D.C. plan is BENITO. Call light within reach. Safety precaution in place.     Problem: Patient Centered Care  Goal: Patient preferences are identified and integrated in the patient's plan of care  Description: Interventions:  - What would you like us to know as we care for you?   - Provide timely, complete, and accurate information to patient/family  - Incorporate patient and family knowledge, values, beliefs, and cultural backgrounds into the planning and delivery of care  - Encourage patient/family to participate in care and decision-making at the level they choose  - Honor patient and family perspectives and choices  Outcome: Progressing     Problem: PAIN - ADULT  Goal: Verbalizes/displays adequate comfort level or patient's stated pain goal  Description: INTERVENTIONS:  - Encourage pt to monitor pain and request assistance  - Assess pain using appropriate pain scale  - Administer analgesics based on type and severity of pain and evaluate response  - Implement non-pharmacological measures as appropriate and evaluate response  - Consider cultural and social influences on pain and pain management  - Manage/alleviate anxiety  - Utilize distraction and/or relaxation techniques  - Monitor for opioid side effects  - Notify MD/LIP if interventions unsuccessful or patient reports new pain  - Anticipate increased pain with activity and pre-medicate as appropriate  Outcome: Progressing     Problem: RISK FOR INFECTION - ADULT  Goal: Absence of fever/infection during anticipated neutropenic period  Description: INTERVENTIONS  - Monitor WBC  - Administer growth factors as ordered  - Implement neutropenic guidelines  Outcome: Progressing     Problem: SAFETY ADULT - FALL  Goal: Free from fall injury  Description: INTERVENTIONS:  - Assess pt frequently  for physical needs  - Identify cognitive and physical deficits and behaviors that affect risk of falls.  - [place] fall precautions as indicated by assessment.  - Educate pt/family on patient safety including physical limitations  - Instruct pt to call for assistance with activity based on assessment  - Modify environment to reduce risk of injury  - Provide assistive devices as appropriate  - Consider OT/PT consult to assist with strengthening/mobility  - Encourage toileting schedule  Outcome: Progressing     Problem: DISCHARGE PLANNING  Goal: Discharge to home or other facility with appropriate resources  Description: INTERVENTIONS:  - Identify barriers to discharge w/pt and caregiver  - Include patient/family/discharge partner in discharge planning  - Arrange for needed discharge resources and transportation as appropriate  - Identify discharge learning needs (meds, wound care, etc)  - Arrange for interpreters to assist at discharge as needed  - Consider post-discharge preferences of patient/family/discharge partner  - Complete POLST form as appropriate  - Assess patient's ability to be responsible for managing their own health  - Refer to Case Management Department for coordinating discharge planning if the patient needs post-hospital services based on physician/LIP order or complex needs related to functional status, cognitive ability or social support system  Outcome: Progressing

## 2024-02-25 NOTE — PROGRESS NOTES
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  Status post revision .  Pain well-controlled.  No more uncomfortable today.  Did fairly well with PT yest, still hoping to go home if progress continues  -Pain control  -PT/OT  -abx per ortho  -Monitor cultures    Other problems  Osteoarthritis  Obesity BMI 36  Pulmonary artery hypertension  Rheumatoid arthritis  Sjogren's syndrome  Hypertension  Dyslipidemia  IBS  Asthma  Interstitial lung disease    dvt prophylaxis: asa bid  code status: full code  dispo: home upon medical clearance patient has spent 10 months in rehab over the last year or so, she does not want to go back to rehab but will if she has to, decision TBD      Subjective:   ----------------------------------  Pain well-controlled.  Did fairly well with PT yest. No other new complaints      Objective:   Chief Complaint: No chief complaint on file.    ----------------------------------  Temp:  [98 °F (36.7 °C)-98.4 °F (36.9 °C)] 98.1 °F (36.7 °C)  Pulse:  [63-67] 67  Resp:  [16] 16  BP: (133-155)/(68-89) 155/77  SpO2:  [92 %-95 %] 92 %  Gen: A+Ox3.  No distress.   HEENT: NCAT, neck supple, no carotid bruit.  CV: RRR, S1S2, and intact distal pulses. No gallop, rub, murmur.  Pulm: Effort and breath sounds normal. No distress, wheezes, rales, rhonchi.  Abd: Soft, NTND, BS normal, no mass, no HSM, no rebound/guarding.   Neuro: Normal reflexes, CN. Sensory/motor exams grossly normal deficit.   MS: No joint effusions.  No peripheral edema.  Right hip drains in place  Skin: Skin is warm and dry. No rashes, erythema, diaphoresis.   Psych: Normal mood and affect. Calm, cooperative    Labs:  Lab Results   Component  Value Date    HGB 9.3 (L) 02/25/2024    WBC 4.5 02/25/2024    .0 (L) 02/25/2024     02/23/2024    K 4.4 02/23/2024    CREATSERUM 0.89 02/23/2024    INR 1.10 10/08/2023    BILIRUBTOTAL 0.3 06/03/2016    AST 16 02/13/2024    ALT 10 (L) 02/13/2024    TROP <0.045 03/29/2020          aspirin  81 mg Oral BID    sennosides  17.2 mg Oral Nightly    docusate sodium  100 mg Oral BID    famotidine  20 mg Oral BID    Or    famotidine  20 mg Intravenous BID    acetaminophen  1,000 mg Oral q6h    traMADol  50 mg Oral q6h    fluticasone furoate-vilanterol  1 puff Inhalation Daily    hydroxychloroquine  200 mg Oral BID    metoprolol succinate ER  25 mg Oral Daily    pregabalin  50 mg Oral QAM    pregabalin  75 mg Oral Nightly     polyethylene glycol (PEG 3350), magnesium hydroxide, bisacodyl, fleet enema, ondansetron, metoclopramide, oxyCODONE **OR** oxyCODONE, albuterol  **Certification      PHYSICIAN Certification of Need for Inpatient Hospitalization - Initial Certification    Patient will require inpatient services that will reasonably be expected to span two midnight's based on the clinical documentation in H+P.   Based on patients current state of illness, I anticipate that, after discharge, patient will require TBD.

## 2024-02-25 NOTE — PLAN OF CARE
Patient alert and oriented. Prevena wound vac in place to right hip. Hemovac removed this afternoon, no output. Hip precautions in place.Oxy, tramadol and tylenol provided for pain management. SCDs and ASA for DVT prophy. Ambulating x1 with walker.   Voiding via purewick.    Plan is BENITO. Pt preference is [facility].    Problem: Patient Centered Care  Goal: Patient preferences are identified and integrated in the patient's plan of care  Description: Interventions:  - What would you like us to know as we care for you?   - Provide timely, complete, and accurate information to patient/family  - Incorporate patient and family knowledge, values, beliefs, and cultural backgrounds into the planning and delivery of care  - Encourage patient/family to participate in care and decision-making at the level they choose  - Honor patient and family perspectives and choices  Outcome: Progressing     Problem: PAIN - ADULT  Goal: Verbalizes/displays adequate comfort level or patient's stated pain goal  Description: INTERVENTIONS:  - Encourage pt to monitor pain and request assistance  - Assess pain using appropriate pain scale  - Administer analgesics based on type and severity of pain and evaluate response  - Implement non-pharmacological measures as appropriate and evaluate response  - Consider cultural and social influences on pain and pain management  - Manage/alleviate anxiety  - Utilize distraction and/or relaxation techniques  - Monitor for opioid side effects  - Notify MD/LIP if interventions unsuccessful or patient reports new pain  - Anticipate increased pain with activity and pre-medicate as appropriate  Outcome: Progressing     Problem: RISK FOR INFECTION - ADULT  Goal: Absence of fever/infection during anticipated neutropenic period  Description: INTERVENTIONS  - Monitor WBC  - Administer growth factors as ordered  - Implement neutropenic guidelines  Outcome: Progressing     Problem: SAFETY ADULT -  FALL  Goal: Free from fall injury  Description: INTERVENTIONS:  - Assess pt frequently for physical needs  - Identify cognitive and physical deficits and behaviors that affect risk of falls.  - [place] fall precautions as indicated by assessment.  - Educate pt/family on patient safety including physical limitations  - Instruct pt to call for assistance with activity based on assessment  - Modify environment to reduce risk of injury  - Provide assistive devices as appropriate  - Consider OT/PT consult to assist with strengthening/mobility  - Encourage toileting schedule  Outcome: Progressing     Problem: DISCHARGE PLANNING  Goal: Discharge to home or other facility with appropriate resources  Description: INTERVENTIONS:  - Identify barriers to discharge w/pt and caregiver  - Include patient/family/discharge partner in discharge planning  - Arrange for needed discharge resources and transportation as appropriate  - Identify discharge learning needs (meds, wound care, etc)  - Arrange for interpreters to assist at discharge as needed  - Consider post-discharge preferences of patient/family/discharge partner  - Complete POLST form as appropriate  - Assess patient's ability to be responsible for managing their own health  - Refer to Case Management Department for coordinating discharge planning if the patient needs post-hospital services based on physician/LIP order or complex needs related to functional status, cognitive ability or social support system  Outcome: Progressing

## 2024-02-26 PROCEDURE — 99232 SBSQ HOSP IP/OBS MODERATE 35: CPT | Performed by: HOSPITALIST

## 2024-02-26 RX ORDER — CEPHALEXIN 500 MG/1
500 CAPSULE ORAL EVERY 8 HOURS SCHEDULED
Status: DISCONTINUED | OUTPATIENT
Start: 2024-02-26 | End: 2024-02-27

## 2024-02-26 NOTE — PHYSICAL THERAPY NOTE
PHYSICAL THERAPY HIP TREATMENT NOTE - INPATIENT    Room Number: 420/420-A            Presenting Problem: right hip revision, WBAT, posterior hip precautions  Co-Morbidities : multiple right hip revisions and dislocations, OA, obesity, pHTN, Sjogren's IBS, asthma    Problem List  Principal Problem:    Prosthetic hip implant failure (HCC)  Active Problems:    Sjogren's disease (HCC)    ILD (interstitial lung disease) (HCC)    Essential hypertension    Obesity, morbid (HCC)    Preop testing      PHYSICAL THERAPY ASSESSMENT   Patient demonstrates good  progress this session, goals  remain in progress.    Patient continues to function below baseline with bed mobility, transfers, and gait.  Contributing factors to remaining limitations include decreased functional strength, decreased endurance/aerobic capacity, and pain.  Next session anticipate patient to progress bed mobility, transfers, and gait.  Physical Therapy will continue to follow patient for duration of hospitalization.    Patient continues to benefit from continued skilled PT services: to promote return to prior level of function and safety with continuous assistance and gradual rehabilitative therapy .    PLAN  PT Treatment Plan: Bed mobility;Body mechanics;Endurance;Gait training  Frequency (Obs): BID    SUBJECTIVE  Pt reports being ready for PT RX    OBJECTIVE  Precautions: COLTON - posterior    WEIGHT BEARING RESTRICTION        R Lower Extremity: Weight Bearing as Tolerated       PAIN ASSESSMENT   Ratin  Location: R hip  Management Techniques: Activity promotion;Body mechanics    BALANCE  Static Sitting: Good  Dynamic Sitting: Fair +  Static Standing: Fair  Dynamic Standing: Fair -  ACTIVITY TOLERANCE                         O2 WALK       AM-PAC '6-Clicks' INPATIENT SHORT FORM - BASIC MOBILITY  How much difficulty does the patient currently have...  Patient Difficulty: Turning over in bed (including adjusting bedclothes, sheets and blankets)?: A Little    Patient Difficulty: Sitting down on and standing up from a chair with arms (e.g., wheelchair, bedside commode, etc.): A Little   Patient Difficulty: Moving from lying on back to sitting on the side of the bed?: A Little   How much help from another person does the patient currently need...   Help from Another: Moving to and from a bed to a chair (including a wheelchair)?: A Little   Help from Another: Need to walk in hospital room?: A Little   Help from Another: Climbing 3-5 steps with a railing?: A Little     AM-PAC Score:  Raw Score: 18   Approx Degree of Impairment: 46.58%   Standardized Score (AM-PAC Scale): 43.63   CMS Modifier (G-Code): CK    FUNCTIONAL ABILITY STATUS  Functional Mobility/Gait Assessment  Gait Assistance: Supervision  Distance (ft): 2 x 50  Assistive Device: Rolling walker  Pattern: R Decreased stance time  Stairs: Stairs  How Many Stairs: 4  Device: 1 Rail;Cane  Assist: Moderate assist  Pattern: Ascend and Descend  Ascend and Descend : Step to  Rolling: minimal assist  Supine to Sit: minimal assist  Sit to Supine: minimal assist  Sit to Stand: minimal assist    Additional Information:Min a for bed mobility and transfer;extra time provided to complete task.EOB sitting balance activity with emphasis on core stabilization.Pt amb 2 x 50 ft with RW and CGa;provided cuing for gait pattern as well as for postural awareness.Navigated 4 stairs with mod a.There ex.    The patient's Approx Degree of Impairment: 46.58% has been calculated based on documentation in the [facility] '6 clicks' Inpatient Basic Mobility Short Form.  Research supports that patients with this level of impairment may benefit from Home with Home Health PT.  Final disposition will be made by interdisciplinary medical team.    Exercises AM Session PM Session   Ankle Pumps     20 reps  reps   Quad Sets 20 reps  reps   Glut Sets 20 reps  reps                                             Patient End of Session: Up in chair;Call light  within reach;RN aware of session/findings;All patient questions and concerns addressed    CURRENT GOALS   Patient Goal Patient's self-stated goal is: to go home, goals set for this   Goal #1 Patient is able to demonstrate supine - sit EOB @ level: modified independent   Goal #1   Current Status Min A   Goal #2 Patient is able to demonstrate transfers Sit to/from Stand at assistance level: modified independent      Goal #2  Current Status Sit to stand min a   Goal #3 Patient is able to ambulate 100 feet with assistive device at assistance level: modified independent    Goal #3   Current Status 2  x 50 ft with RW, CGA   Goal #4 Patient will negotiate 5 stairs/one curb w/ assistive device and supervision   Goal #4   Current Status 4 stairs with mod a A, HR and cane   Goal #5 Patient verbalizes and/or demonstrates all precautions and safety concerns independently   Goal #5   Current Status In progress   Goal #6 Patient independently performs home exercise program for ROM/strengthening per the instructions provided in preparation for discharge.   Goal #6  Current Status In progress     Gait Training: 15 minutes  Therapeutic Activity: 15 minutes  Neuromuscular Re-education:  minutes  Therapeutic Exercise: 15 minutes  Canalith Repositioning:  minutes  Manual Therapy:  minutes  Can add/delete any of these

## 2024-02-26 NOTE — PROGRESS NOTES
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  Status post revision .  Pain well-controlled.  No more uncomfortable today.  Doing a lot better in the last 48 hours.  Hoping to go home instead of rehab  -Pain control  -PT/OT  -keflex here, dc on cefadroxil per ortho  -Monitor cultures    Other problems  Osteoarthritis  Obesity BMI 36  Pulmonary artery hypertension  Rheumatoid arthritis  Sjogren's syndrome  Hypertension  Dyslipidemia  IBS  Asthma  Interstitial lung disease    dvt prophylaxis: asa bid  code status: full code  dispo: home upon medical clearance hopefully tomorrow      Subjective:   ----------------------------------  Pain well-controlled.  Did fairly well with PT yest. No other new complaints.  Getting around a lot better.  Feeling more confident.  She think she does not need to go to rehab      Objective:   Chief Complaint: No chief complaint on file.    ----------------------------------  Temp:  [97.8 °F (36.6 °C)-98.1 °F (36.7 °C)] 97.8 °F (36.6 °C)  Pulse:  [58-63] 63  Resp:  [16] 16  BP: (136-162)/(73-76) 140/76  SpO2:  [92 %-95 %] 95 %  Gen: A+Ox3.  No distress.   HEENT: NCAT, neck supple, no carotid bruit.  CV: RRR, S1S2, and intact distal pulses. No gallop, rub, murmur.  Pulm: Effort and breath sounds normal. No distress, wheezes, rales, rhonchi.  Abd: Soft, NTND, BS normal, no mass, no HSM, no rebound/guarding.   Neuro: Normal reflexes, CN. Sensory/motor exams grossly normal deficit.   MS: No joint effusions.  No peripheral edema.  Right hip drains in place  Skin: Skin is warm and dry. No rashes, erythema, diaphoresis.   Psych: Normal mood and affect. Calm, cooperative    Labs:  Lab Results    Component Value Date    HGB 9.3 (L) 02/25/2024    WBC 4.5 02/25/2024    .0 (L) 02/25/2024     02/23/2024    K 4.4 02/23/2024    CREATSERUM 0.89 02/23/2024    INR 1.10 10/08/2023    BILIRUBTOTAL 0.3 06/03/2016    AST 16 02/13/2024    ALT 10 (L) 02/13/2024    TROP <0.045 03/29/2020          aspirin  81 mg Oral BID    sennosides  17.2 mg Oral Nightly    docusate sodium  100 mg Oral BID    famotidine  20 mg Oral BID    Or    famotidine  20 mg Intravenous BID    acetaminophen  1,000 mg Oral q6h    traMADol  50 mg Oral q6h    fluticasone furoate-vilanterol  1 puff Inhalation Daily    hydroxychloroquine  200 mg Oral BID    metoprolol succinate ER  25 mg Oral Daily    pregabalin  50 mg Oral QAM    pregabalin  75 mg Oral Nightly     polyethylene glycol (PEG 3350), magnesium hydroxide, bisacodyl, fleet enema, ondansetron, metoclopramide, oxyCODONE **OR** oxyCODONE, albuterol  **Certification      PHYSICIAN Certification of Need for Inpatient Hospitalization - Initial Certification    Patient will require inpatient services that will reasonably be expected to span two midnight's based on the clinical documentation in H+P.   Based on patients current state of illness, I anticipate that, after discharge, patient will require TBD.

## 2024-02-26 NOTE — PHYSICAL THERAPY NOTE
PHYSICAL THERAPY HIP TREATMENT NOTE - INPATIENT    Room Number: 420/420-A            Presenting Problem: right hip revision, WBAT, posterior hip precautions  Co-Morbidities : multiple right hip revisions and dislocations, OA, obesity, pHTN, Sjogren's IBS, asthma    Problem List  Principal Problem:    Prosthetic hip implant failure (HCC)  Active Problems:    Sjogren's disease (HCC)    ILD (interstitial lung disease) (HCC)    Essential hypertension    Obesity, morbid (HCC)    Preop testing      PHYSICAL THERAPY ASSESSMENT   Patient demonstrates good  progress this session, goals  remain in progress.    Patient continues to function below baseline with bed mobility, transfers, and gait.  Contributing factors to remaining limitations include decreased functional strength, decreased endurance/aerobic capacity, and pain.  Next session anticipate patient to progress bed mobility, transfers, and gait.  Physical Therapy will continue to follow patient for duration of hospitalization.    Patient continues to benefit from continued skilled PT services: to promote return to prior level of function and safety with continuous assistance and gradual rehabilitative therapy .    PLAN  PT Treatment Plan: Endurance;Patient education;Strengthening;Stair training  Frequency (Obs): Daily    SUBJECTIVE  Pt reports being ready for PT RX    OBJECTIVE  Precautions: COLTON - posterior    WEIGHT BEARING RESTRICTION        R Lower Extremity: Weight Bearing as Tolerated       PAIN ASSESSMENT   Ratin  Location: R hip  Management Techniques: Activity promotion;Body mechanics    BALANCE  Static Sitting: Good  Dynamic Sitting: Fair +  Static Standing: Fair  Dynamic Standing: Fair -  ACTIVITY TOLERANCE                         O2 WALK       AM-PAC '6-Clicks' INPATIENT SHORT FORM - BASIC MOBILITY  How much difficulty does the patient currently have...  Patient Difficulty: Turning over in bed (including adjusting bedclothes, sheets and blankets)?: A  Little   Patient Difficulty: Sitting down on and standing up from a chair with arms (e.g., wheelchair, bedside commode, etc.): A Little   Patient Difficulty: Moving from lying on back to sitting on the side of the bed?: A Little   How much help from another person does the patient currently need...   Help from Another: Moving to and from a bed to a chair (including a wheelchair)?: A Little   Help from Another: Need to walk in hospital room?: A Little   Help from Another: Climbing 3-5 steps with a railing?: A Little     AM-PAC Score:  Raw Score: 18   Approx Degree of Impairment: 46.58%   Standardized Score (AM-PAC Scale): 43.63   CMS Modifier (G-Code): CK    FUNCTIONAL ABILITY STATUS  Functional Mobility/Gait Assessment  Gait Assistance: Contact guard assist  Distance (ft): 2 x 60  Assistive Device: Rolling walker  Pattern: R Decreased stance time  Stairs: Stairs  How Many Stairs: 4  Device: 1 Rail;Cane  Assist: Minimal assist  Pattern: Ascend and Descend  Ascend and Descend : Step to  Rolling: minimal assist  Supine to Sit: minimal assist  Sit to Supine: minimal assist  Sit to Stand: minimal assist    Additional Information:Min a for bed mobility and transfer;extra time provided to complete task.EOB sitting balance activity with emphasis on core stabilization.Pt amb 2 x 60 ft with RW and CGa;provided cuing for gait pattern as well as for postural awareness.Navigated 4 stairs with min a.There ex.    The patient's Approx Degree of Impairment: 46.58% has been calculated based on documentation in the [facility] '6 clicks' Inpatient Basic Mobility Short Form.  Research supports that patients with this level of impairment may benefit from Home with Home Health PT.  Final disposition will be made by interdisciplinary medical team.    Exercises AM Session PM Session   Ankle Pumps     20 reps  20 reps   Quad Sets 20 reps  20 reps   Glut Sets 20 reps 20  reps                                             Patient End of Session: Up  in chair;Call light within reach;RN aware of session/findings;All patient questions and concerns addressed    CURRENT GOALS   Patient Goal Patient's self-stated goal is: to go home, goals set for this   Goal #1 Patient is able to demonstrate supine - sit EOB @ level: modified independent   Goal #1   Current Status Min A   Goal #2 Patient is able to demonstrate transfers Sit to/from Stand at assistance level: modified independent      Goal #2  Current Status Sit to stand min a   Goal #3 Patient is able to ambulate 100 feet with assistive device at assistance level: modified independent    Goal #3   Current Status 2  x 60 ft with RW, CGA   Goal #4 Patient will negotiate 5 stairs/one curb w/ assistive device and supervision   Goal #4   Current Status 4 stairs with min a A, HR and cane   Goal #5 Patient verbalizes and/or demonstrates all precautions and safety concerns independently   Goal #5   Current Status In progress   Goal #6 Patient independently performs home exercise program for ROM/strengthening per the instructions provided in preparation for discharge.   Goal #6  Current Status In progress     Gait Training: 15 minutes  Therapeutic Activity: 15 minutes  Neuromuscular Re-education:  minutes  Therapeutic Exercise: 15 minutes  Canalith Repositioning:  minutes  Manual Therapy:  minutes  Can add/delete any of these

## 2024-02-26 NOTE — PLAN OF CARE
Pt is A&O x4. Breathing on room air. Prevena wound vac in place. Voiding freely. Up with one assist and walker. Pain is managed with scheduled Tylenol, Tramadol and Oxy prn. D.C. plan is return home with [facility]. Call light within reach. Safety precaution in place.      Problem: Patient Centered Care  Goal: Patient preferences are identified and integrated in the patient's plan of care  Description: Interventions:  - What would you like us to know as we care for you?   - Provide timely, complete, and accurate information to patient/family  - Incorporate patient and family knowledge, values, beliefs, and cultural backgrounds into the planning and delivery of care  - Encourage patient/family to participate in care and decision-making at the level they choose  - Honor patient and family perspectives and choices  Outcome: Progressing     Problem: PAIN - ADULT  Goal: Verbalizes/displays adequate comfort level or patient's stated pain goal  Description: INTERVENTIONS:  - Encourage pt to monitor pain and request assistance  - Assess pain using appropriate pain scale  - Administer analgesics based on type and severity of pain and evaluate response  - Implement non-pharmacological measures as appropriate and evaluate response  - Consider cultural and social influences on pain and pain management  - Manage/alleviate anxiety  - Utilize distraction and/or relaxation techniques  - Monitor for opioid side effects  - Notify MD/LIP if interventions unsuccessful or patient reports new pain  - Anticipate increased pain with activity and pre-medicate as appropriate  Outcome: Progressing     Problem: RISK FOR INFECTION - ADULT  Goal: Absence of fever/infection during anticipated neutropenic period  Description: INTERVENTIONS  - Monitor WBC  - Administer growth factors as ordered  - Implement neutropenic guidelines  Outcome: Progressing     Problem: SAFETY ADULT - FALL  Goal: Free from fall injury  Description: INTERVENTIONS:  - Assess pt  frequently for physical needs  - Identify cognitive and physical deficits and behaviors that affect risk of falls.  - [place] fall precautions as indicated by assessment.  - Educate pt/family on patient safety including physical limitations  - Instruct pt to call for assistance with activity based on assessment  - Modify environment to reduce risk of injury  - Provide assistive devices as appropriate  - Consider OT/PT consult to assist with strengthening/mobility  - Encourage toileting schedule  Outcome: Progressing     Problem: DISCHARGE PLANNING  Goal: Discharge to home or other facility with appropriate resources  Description: INTERVENTIONS:  - Identify barriers to discharge w/pt and caregiver  - Include patient/family/discharge partner in discharge planning  - Arrange for needed discharge resources and transportation as appropriate  - Identify discharge learning needs (meds, wound care, etc)  - Arrange for interpreters to assist at discharge as needed  - Consider post-discharge preferences of patient/family/discharge partner  - Complete POLST form as appropriate  - Assess patient's ability to be responsible for managing their own health  - Refer to Case Management Department for coordinating discharge planning if the patient needs post-hospital services based on physician/LIP order or complex needs related to functional status, cognitive ability or social support system  Outcome: Progressing

## 2024-02-26 NOTE — CM/SW NOTE
DERRICK followed up on DC planning.     Pt continues to improve and continues to state she wishes to return home instead of going to rehab    With pt's improvement and ability to have HHC at home, pt should be able to discharge home with [facility] once medically cleared    Addendum, 2/27/2024    Pt plans to leave today with [facility]. DERRICK notified [facility] about dishcarged    Orders/F2F entered    [facility]  [address]  [place] [address]  Phone: [phone]  Fax: [phone]    [place] called and notified pt need transportation  - scheduled at 4PM PCS form completed    PLAN: home with [facility]    ROYCE Horne, MSW ext. [phone]

## 2024-02-26 NOTE — PLAN OF CARE
Patient alert and oriented. Prevena wound vac in place to right hip. Hip precautions in place.Oxy, tramadol and tylenol provided for pain management. SCDs and ASA for DVT prophy. Ambulating x1 with walker.      Plan is [facility] vs [facility].            Problem: Patient Centered Care  Goal: Patient preferences are identified and integrated in the patient's plan of care  Description: Interventions:  - What would you like us to know as we care for you?   - Provide timely, complete, and accurate information to patient/family  - Incorporate patient and family knowledge, values, beliefs, and cultural backgrounds into the planning and delivery of care  - Encourage patient/family to participate in care and decision-making at the level they choose  - Honor patient and family perspectives and choices  Outcome: Progressing     Problem: PAIN - ADULT  Goal: Verbalizes/displays adequate comfort level or patient's stated pain goal  Description: INTERVENTIONS:  - Encourage pt to monitor pain and request assistance  - Assess pain using appropriate pain scale  - Administer analgesics based on type and severity of pain and evaluate response  - Implement non-pharmacological measures as appropriate and evaluate response  - Consider cultural and social influences on pain and pain management  - Manage/alleviate anxiety  - Utilize distraction and/or relaxation techniques  - Monitor for opioid side effects  - Notify MD/LIP if interventions unsuccessful or patient reports new pain  - Anticipate increased pain with activity and pre-medicate as appropriate  Outcome: Progressing     Problem: RISK FOR INFECTION - ADULT  Goal: Absence of fever/infection during anticipated neutropenic period  Description: INTERVENTIONS  - Monitor WBC  - Administer growth factors as ordered  - Implement neutropenic guidelines  Outcome: Progressing     Problem: SAFETY ADULT - FALL  Goal: Free from fall injury  Description: INTERVENTIONS:  - Assess pt frequently for physical  needs  - Identify cognitive and physical deficits and behaviors that affect risk of falls.  - [place] fall precautions as indicated by assessment.  - Educate pt/family on patient safety including physical limitations  - Instruct pt to call for assistance with activity based on assessment  - Modify environment to reduce risk of injury  - Provide assistive devices as appropriate  - Consider OT/PT consult to assist with strengthening/mobility  - Encourage toileting schedule  Outcome: Progressing     Problem: DISCHARGE PLANNING  Goal: Discharge to home or other facility with appropriate resources  Description: INTERVENTIONS:  - Identify barriers to discharge w/pt and caregiver  - Include patient/family/discharge partner in discharge planning  - Arrange for needed discharge resources and transportation as appropriate  - Identify discharge learning needs (meds, wound care, etc)  - Arrange for interpreters to assist at discharge as needed  - Consider post-discharge preferences of patient/family/discharge partner  - Complete POLST form as appropriate  - Assess patient's ability to be responsible for managing their own health  - Refer to Case Management Department for coordinating discharge planning if the patient needs post-hospital services based on physician/LIP order or complex needs related to functional status, cognitive ability or social support system  Outcome: Progressing

## 2024-02-26 NOTE — PLAN OF CARE
Post-op day #4. Prevena wound vac in place. Monitoring vital signs- stable at this time. No acute changes noted throughout shift. Tolerating diet. Voiding freely. SCDs, teds, and aspirin for DVT prophylaxis. Scheduled tramadol and tylenol for pain. PRN oxy. Up with standby assist and a walker. Encouraged frequent ambulation and use of incentive spirometer. Fall precautions maintained- bed alarm on, bed locked in lowest position, call light and personal belongings within reach, non-skid socks in place to bilateral feet. Frequent rounding by nursing staff. PT did not clear patient to discharge, plan for discharge home with home health tomorrow. Addressed additional questions.     Problem: Patient Centered Care  Goal: Patient preferences are identified and integrated in the patient's plan of care  Description: Interventions:  - What would you like us to know as we care for you?   - Provide timely, complete, and accurate information to patient/family  - Incorporate patient and family knowledge, values, beliefs, and cultural backgrounds into the planning and delivery of care  - Encourage patient/family to participate in care and decision-making at the level they choose  - Honor patient and family perspectives and choices  Outcome: Progressing     Problem: PAIN - ADULT  Goal: Verbalizes/displays adequate comfort level or patient's stated pain goal  Description: INTERVENTIONS:  - Encourage pt to monitor pain and request assistance  - Assess pain using appropriate pain scale  - Administer analgesics based on type and severity of pain and evaluate response  - Implement non-pharmacological measures as appropriate and evaluate response  - Consider cultural and social influences on pain and pain management  - Manage/alleviate anxiety  - Utilize distraction and/or relaxation techniques  - Monitor for opioid side effects  - Notify MD/LIP if interventions unsuccessful or patient reports new pain  - Anticipate increased pain with  activity and pre-medicate as appropriate  Outcome: Progressing     Problem: RISK FOR INFECTION - ADULT  Goal: Absence of fever/infection during anticipated neutropenic period  Description: INTERVENTIONS  - Monitor WBC  - Administer growth factors as ordered  - Implement neutropenic guidelines  Outcome: Progressing     Problem: SAFETY ADULT - FALL  Goal: Free from fall injury  Description: INTERVENTIONS:  - Assess pt frequently for physical needs  - Identify cognitive and physical deficits and behaviors that affect risk of falls.  - [place] fall precautions as indicated by assessment.  - Educate pt/family on patient safety including physical limitations  - Instruct pt to call for assistance with activity based on assessment  - Modify environment to reduce risk of injury  - Provide assistive devices as appropriate  - Consider OT/PT consult to assist with strengthening/mobility  - Encourage toileting schedule  Outcome: Progressing     Problem: DISCHARGE PLANNING  Goal: Discharge to home or other facility with appropriate resources  Description: INTERVENTIONS:  - Identify barriers to discharge w/pt and caregiver  - Include patient/family/discharge partner in discharge planning  - Arrange for needed discharge resources and transportation as appropriate  - Identify discharge learning needs (meds, wound care, etc)  - Arrange for interpreters to assist at discharge as needed  - Consider post-discharge preferences of patient/family/discharge partner  - Complete POLST form as appropriate  - Assess patient's ability to be responsible for managing their own health  - Refer to Case Management Department for coordinating discharge planning if the patient needs post-hospital services based on physician/LIP order or complex needs related to functional status, cognitive ability or social support system  Outcome: Progressing     Problem: Patient/Family Goals  Goal: Patient/Family Long Term Goal  Description: Patient's Long Term  Goal: to go home    Interventions:  - follow MD Orders  - update patient and family on plan of care  - discharge planning  - PT/OT  - monitor labs and vitals  - See additional Care Plan goals for specific interventions  Outcome: Progressing  Goal: Patient/Family Short Term Goal  Description: Patient's Short Term Goal: to have less pain    Interventions:   - pain medications as needed  - non-pharmacologic pain interventions  - ambulate  - See additional Care Plan goals for specific interventions  Outcome: Progressing

## 2024-02-27 VITALS
TEMPERATURE: 98 F | HEART RATE: 56 BPM | WEIGHT: 211 LBS | OXYGEN SATURATION: 92 % | SYSTOLIC BLOOD PRESSURE: 144 MMHG | BODY MASS INDEX: 36.02 KG/M2 | HEIGHT: 64 IN | DIASTOLIC BLOOD PRESSURE: 83 MMHG | RESPIRATION RATE: 18 BRPM

## 2024-02-27 PROCEDURE — 99239 HOSP IP/OBS DSCHRG MGMT >30: CPT | Performed by: HOSPITALIST

## 2024-02-27 RX ORDER — OXYCODONE HYDROCHLORIDE 5 MG/1
5 TABLET ORAL EVERY 4 HOURS PRN
Qty: 20 TABLET | Refills: 0 | Status: SHIPPED | OUTPATIENT
Start: 2024-02-27

## 2024-02-27 RX ORDER — CEFADROXIL 500 MG/1
500 CAPSULE ORAL 2 TIMES DAILY
Status: SHIPPED | COMMUNITY
Start: 2024-02-27

## 2024-02-27 NOTE — PLAN OF CARE
Patient alert and oriented x 4. Post op day 5. Oxy administered for pain as needed. Dressing is prevena wound vac. Up with 1x w/ walker. Voiding freely. Patients diet is general. Teds, SCDs and ASA for VTE prophylaxis. Vital signs monitored. Cough and deep breathe in addition to incentive spirometer. Bed in lowest position, call light within reach, and non skid socks in place for fall precautions. All needs within reach. Rounding done by nursing staff. Plan for discharge is home w/ HH at 4pm with [facility].     Patient cleared for discharge. After visit summary reviewed with patient. Aware to  medications at pharmacy on file. Personal belongings sent with patient. Transported via [facility].       Problem: Patient Centered Care  Goal: Patient preferences are identified and integrated in the patient's plan of care  Description: Interventions:  - What would you like us to know as we care for you? I live home alone  - Provide timely, complete, and accurate information to patient/family  - Incorporate patient and family knowledge, values, beliefs, and cultural backgrounds into the planning and delivery of care  - Encourage patient/family to participate in care and decision-making at the level they choose  - Honor patient and family perspectives and choices  Outcome: Progressing     Problem: PAIN - ADULT  Goal: Verbalizes/displays adequate comfort level or patient's stated pain goal  Description: INTERVENTIONS:  - Encourage pt to monitor pain and request assistance  - Assess pain using appropriate pain scale  - Administer analgesics based on type and severity of pain and evaluate response  - Implement non-pharmacological measures as appropriate and evaluate response  - Consider cultural and social influences on pain and pain management  - Manage/alleviate anxiety  - Utilize distraction and/or relaxation techniques  - Monitor for opioid side effects  - Notify MD/LIP if interventions unsuccessful or patient reports new  pain  - Anticipate increased pain with activity and pre-medicate as appropriate  Outcome: Progressing     Problem: RISK FOR INFECTION - ADULT  Goal: Absence of fever/infection during anticipated neutropenic period  Description: INTERVENTIONS  - Monitor WBC  - Administer growth factors as ordered  - Implement neutropenic guidelines  Outcome: Progressing     Problem: SAFETY ADULT - FALL  Goal: Free from fall injury  Description: INTERVENTIONS:  - Assess pt frequently for physical needs  - Identify cognitive and physical deficits and behaviors that affect risk of falls.  - [place] fall precautions as indicated by assessment.  - Educate pt/family on patient safety including physical limitations  - Instruct pt to call for assistance with activity based on assessment  - Modify environment to reduce risk of injury  - Provide assistive devices as appropriate  - Consider OT/PT consult to assist with strengthening/mobility  - Encourage toileting schedule  Outcome: Progressing     Problem: DISCHARGE PLANNING  Goal: Discharge to home or other facility with appropriate resources  Description: INTERVENTIONS:  - Identify barriers to discharge w/pt and caregiver  - Include patient/family/discharge partner in discharge planning  - Arrange for needed discharge resources and transportation as appropriate  - Identify discharge learning needs (meds, wound care, etc)  - Arrange for interpreters to assist at discharge as needed  - Consider post-discharge preferences of patient/family/discharge partner  - Complete POLST form as appropriate  - Assess patient's ability to be responsible for managing their own health  - Refer to Case Management Department for coordinating discharge planning if the patient needs post-hospital services based on physician/LIP order or complex needs related to functional status, cognitive ability or social support system  Outcome: Progressing     Problem: Patient/Family Goals  Goal: Patient/Family Long Term  Goal  Description: Patient's Long Term Goal: to be discharged    Interventions:  - get clearance  - See additional Care Plan goals for specific interventions  Outcome: Progressing  Goal: Patient/Family Short Term Goal  Description: Patient's Short Term Goal: Manage pain    Interventions:   - monitor and assess pain  - See additional Care Plan goals for specific interventions  Outcome: Progressing

## 2024-02-27 NOTE — PHYSICAL THERAPY NOTE
PHYSICAL THERAPY HIP TREATMENT NOTE - INPATIENT    Room Number: 420/420-A            Presenting Problem: right hip revision, WBAT, posterior hip precautions  Co-Morbidities : multiple right hip revisions and dislocations, OA, obesity, pHTN, Sjogren's IBS, asthma    Problem List  Principal Problem:    Prosthetic hip implant failure (HCC)  Active Problems:    Sjogren's disease (HCC)    ILD (interstitial lung disease) (HCC)    Essential hypertension    Obesity, morbid (HCC)    Preop testing      PHYSICAL THERAPY ASSESSMENT   Patient demonstrates good  progress this session, goals  remain in progress.    Patient continues to function below baseline with bed mobility, transfers, and gait.  Contributing factors to remaining limitations include decreased functional strength, decreased endurance/aerobic capacity, and pain.  Next session anticipate patient to progress bed mobility, transfers, and gait.  Physical Therapy will continue to follow patient for duration of hospitalization.    Patient continues to benefit from continued skilled PT services: to promote return to prior level of function and safety with continuous assistance and gradual rehabilitative therapy .    PLAN  PT Treatment Plan: Bed mobility;Body mechanics;Endurance;Patient education;Gait training  Frequency (Obs): Daily    SUBJECTIVE  Pt reports being ready for PT RX    OBJECTIVE  Precautions: [place] - posterior    WEIGHT BEARING RESTRICTION        R Lower Extremity: Weight Bearing as Tolerated       PAIN ASSESSMENT   Ratin  Location: R hip  Management Techniques: Activity promotion;Body mechanics    BALANCE  Static Sitting: Good  Dynamic Sitting: Fair +  Static Standing: Fair  Dynamic Standing: Fair -  ACTIVITY TOLERANCE                         O2 WALK       AM-PAC '6-Clicks' INPATIENT SHORT FORM - BASIC MOBILITY  How much difficulty does the patient currently have...  Patient Difficulty: Turning over in bed (including adjusting bedclothes, sheets and  blankets)?: A Little   Patient Difficulty: Sitting down on and standing up from a chair with arms (e.g., wheelchair, bedside commode, etc.): A Little   Patient Difficulty: Moving from lying on back to sitting on the side of the bed?: A Little   How much help from another person does the patient currently need...   Help from Another: Moving to and from a bed to a chair (including a wheelchair)?: A Little   Help from Another: Need to walk in hospital room?: A Little   Help from Another: Climbing 3-5 steps with a railing?: A Little     AM-PAC Score:  Raw Score: 18   Approx Degree of Impairment: 46.58%   Standardized Score (AM-PAC Scale): 43.63   CMS Modifier (G-Code): CK    FUNCTIONAL ABILITY STATUS  Functional Mobility/Gait Assessment  Gait Assistance: Contact guard assist  Distance (ft): 2 x 75  Assistive Device: Rolling walker  Pattern: R Decreased stance time  Stairs: Stairs  How Many Stairs: 4  Device: 1 Rail;Cane  Assist: Contact guard assist  Pattern: Ascend and Descend  Ascend and Descend : Step to  Rolling: minimal assist  Supine to Sit: minimal assist  Sit to Supine: minimal assist  Sit to Stand: minimal assist    Additional Information:Min a for bed mobility and transfer;extra time provided to complete task.EOB sitting balance activity with emphasis on core stabilization.Pt amb 2 x 75 ft with RW and CGa;provided cuing for gait pattern as well as for postural awareness.Navigated 4 stairs with CGA.There ex.    The patient's Approx Degree of Impairment: 46.58% has been calculated based on documentation in the [facility] '6 clicks' Inpatient Basic Mobility Short Form.  Research supports that patients with this level of impairment may benefit from Home with Home Health PT.HIP PRECAUTIONS REVIEWED;EDUCATION;PT RECALL 3/3 HIP PRECAUTIONS.  Final disposition will be made by interdisciplinary medical team.    Exercises AM Session PM Session   Ankle Pumps     20 reps  reps   Quad Sets 20 reps  reps   Glut Sets 20 reps   reps                                             Patient End of Session: Up in chair;Call light within reach;RN aware of session/findings;All patient questions and concerns addressed    CURRENT GOALS   Patient Goal Patient's self-stated goal is: to go home, goals set for this   Goal #1 Patient is able to demonstrate supine - sit EOB @ level: modified independent   Goal #1   Current Status Min A   Goal #2 Patient is able to demonstrate transfers Sit to/from Stand at assistance level: modified independent      Goal #2  Current Status Sit to stand min a   Goal #3 Patient is able to ambulate 100 feet with assistive device at assistance level: modified independent    Goal #3   Current Status 2  x 75 ft with RW, CGA   Goal #4 Patient will negotiate 5 stairs/one curb w/ assistive device and supervision   Goal #4   Current Status 4 stairs with  CGA, HR and cane   Goal #5 Patient verbalizes and/or demonstrates all precautions and safety concerns independently   Goal #5   Current Status In progress   Goal #6 Patient independently performs home exercise program for ROM/strengthening per the instructions provided in preparation for discharge.   Goal #6  Current Status In progress     Gait Training: 15 minutes  Therapeutic Activity: 15 minutes  Neuromuscular Re-education:  minutes  Therapeutic Exercise: 15 minutes  Canalith Repositioning:  minutes  Manual Therapy:  minutes  Can add/delete any of these

## 2024-02-27 NOTE — PROGRESS NOTES
[facility]     ORTHO DAILY PROGRESS NOTE    Patient: Ngozi Collazo      Subjective:    Nothing acute overnight.  Pain in the operative extremity is well controlled.    Patient denies new onset numbness/tingling in the operative extremity.    Patient also denies chest pain / shortness of breath, nausea/vomiting.    Ambulated yesterday with PT without significant pain.      Objective:      Vitals:    02/27/24 0412   BP: 147/76   Pulse: 62   Resp: 16   Temp: 97.7 °F (36.5 °C)       I/O last 3 completed shifts:  In: 530 [P.O.:530]  Out: 350 [Urine:350]     Gen: awake, alert, not in acute distress    Abdomen nondistended, non-tender      Right Lower Extremity:   Prevena output 0  Prevena dressing clean, dry, intact.  No output  Sensation intact to light touch in Sural/Saphenous/Tibial/Superficial Peroneal/Deep Peroneal and Tibial distributions.   Motor exam : firing TA/GSC at baseline  Vascular exam:  Cap refill ~2s in the toes. Foot warm and well perfused      Labs:      Lab Results   Component Value Date    HGB 9.3 (L) 02/25/2024    HGB 9.9 (L) 02/24/2024    HGB 10.1 (L) 02/23/2024         Lab Results   Component Value Date    INR 1.10 10/08/2023    INR 1.05 10/07/2023    INR 1.08 09/21/2023       OR Cx: NGTD    ASSESSMENT/PLAN: 68 year old yo female s/p right total hip arthroplasty revision for recurrent dislocation 2/22/2024     - Weight bearing status: WBAT RLE  - posterior hip precautions  - Mechanical DVT prophylaxis and chemoprophylaxis with ASA 81 BID   - 48 hours of perioperative antibiotics until cultures return negative, then resume home PO cefadroxil  - hemovac removed POD2  - PT for mobilization and motion  - Advance diet as tolerated. Discontinue IV fluids POD1 if tolerating diet.   - Multimodal pain control regimen ordered  - Disposition: Pending PT Evaluation. Home with home health. Ok for discharge from ortho perspective    Omar Behery, MD  [facility]

## 2024-02-27 NOTE — PLAN OF CARE
POD 7. AOX4 on RA. 1x walker. WBAt. ASA and TEDs. General diet. Voiding freely. Prevena wound vac. JAZZY Tylenol and Tramadol. No acute changes during shift. Plan for Home w/ HH.       Problem: Patient Centered Care  Goal: Patient preferences are identified and integrated in the patient's plan of care  Description: Interventions:  - What would you like us to know as we care for you?   - Provide timely, complete, and accurate information to patient/family  - Incorporate patient and family knowledge, values, beliefs, and cultural backgrounds into the planning and delivery of care  - Encourage patient/family to participate in care and decision-making at the level they choose  - Honor patient and family perspectives and choices  Outcome: Progressing     Problem: PAIN - ADULT  Goal: Verbalizes/displays adequate comfort level or patient's stated pain goal  Description: INTERVENTIONS:  - Encourage pt to monitor pain and request assistance  - Assess pain using appropriate pain scale  - Administer analgesics based on type and severity of pain and evaluate response  - Implement non-pharmacological measures as appropriate and evaluate response  - Consider cultural and social influences on pain and pain management  - Manage/alleviate anxiety  - Utilize distraction and/or relaxation techniques  - Monitor for opioid side effects  - Notify MD/LIP if interventions unsuccessful or patient reports new pain  - Anticipate increased pain with activity and pre-medicate as appropriate  Outcome: Progressing     Problem: RISK FOR INFECTION - ADULT  Goal: Absence of fever/infection during anticipated neutropenic period  Description: INTERVENTIONS  - Monitor WBC  - Administer growth factors as ordered  - Implement neutropenic guidelines  Outcome: Progressing     Problem: SAFETY ADULT - FALL  Goal: Free from fall injury  Description: INTERVENTIONS:  - Assess pt frequently for physical needs  - Identify cognitive and physical deficits and  behaviors that affect risk of falls.  - [place] fall precautions as indicated by assessment.  - Educate pt/family on patient safety including physical limitations  - Instruct pt to call for assistance with activity based on assessment  - Modify environment to reduce risk of injury  - Provide assistive devices as appropriate  - Consider OT/PT consult to assist with strengthening/mobility  - Encourage toileting schedule  Outcome: Progressing     Problem: DISCHARGE PLANNING  Goal: Discharge to home or other facility with appropriate resources  Description: INTERVENTIONS:  - Identify barriers to discharge w/pt and caregiver  - Include patient/family/discharge partner in discharge planning  - Arrange for needed discharge resources and transportation as appropriate  - Identify discharge learning needs (meds, wound care, etc)  - Arrange for interpreters to assist at discharge as needed  - Consider post-discharge preferences of patient/family/discharge partner  - Complete POLST form as appropriate  - Assess patient's ability to be responsible for managing their own health  - Refer to Case Management Department for coordinating discharge planning if the patient needs post-hospital services based on physician/LIP order or complex needs related to functional status, cognitive ability or social support system  Outcome: Progressing

## 2024-02-27 NOTE — DISCHARGE SUMMARY
[facility]     DISCHARGE SUMMARY     Ngozi Collazo Patient Status:  Inpatient    1955 MRN [id number]   Location [facility] 4W/SW/SE Attending David Mandel MD   Hosp Day # 5 PCP Irma Valera DO     DATE OF ADMISSION: 2024  DATE OF DISCHARGE:  24  DISPOSITION: home  CONDITION ON DISCHARGE: good    DISCHARGE DIAGNOSES:    Prosthetic hip implant failure (HCC)    Sjogren's disease (HCC)    ILD (interstitial lung disease) (HCC)    Essential hypertension    Obesity, morbid (HCC)    Preop testing    PROCEDURE:  Right Revision Total Hip Arthroplasty - Irrigation/Debridement and Modular Component Exchange (77987 - M 52 (Reduced Svcs))  Application of negative pressure incisional dressing    HISTORY OF PRESENT ILLNESS (COPIED FROM ADMISSION H&P)  This is a 68 year old lady, with history of multiple prior right hip surgeries for complications including a two stage exchange exchange followed by reimplantation, and then a head/liner exchange for instability, who unfortunately sustained presented with recurrent instability and posterior dislocations. At this point she is 5.5months postop from implantation of her cup cage acetabular component, without radiographic migration. Surgical versus non-surgical options were discussed with the patient, and together we decided it is best to proceed with a revision total hip arthroplasty, irrigation/debridement (if frozen section / cell count consistent with infection) with modular component exchange to a constrained liner with the goals of addressing her prosthetic instability. All risks and benefits of surgery including bleeding, infection, reucurrent dislocation, acetabular component loosening and gross failure, mal-prosthetic fracture, neurovascular injury, leg length or offset discrepancy, as well as medical and anesthesia-related complications were discussed with the patient / family, who elected to proceed with surgery.     [facility]  COURSE:  The patient had elective surgery as described above.  They did very well postoperatively.  Pain well controlled.  Worked well with physical therapy.  Cleared by physical therapy and surgery for discharge.    Patient understands that some of their meds including DVT prophylaxis, pain medications, antibiotics may have been prescribed outside of the [facility] computer system and may not be reflected in the AVS.    Patient understands return to the emergency room for increased pain, fever, discharge, shortness of breath, chest pain, new neurologic symptoms, other concerning symptoms.    PHYSICAL EXAM:  Temp:  [97.7 °F (36.5 °C)-98.5 °F (36.9 °C)] 98 °F (36.7 °C)  Pulse:  [56-62] 56  Resp:  [16-18] 18  BP: (139-147)/(60-88) 144/83  SpO2:  [92 %-95 %] 92 %  Gen: A+Ox3.  No distress.   HEENT: NCAT, neck supple, no carotid bruit.  CV: RRR, S1S2, and intact distal pulses. No gallop, rub, murmur.  Pulm: Effort and breath sounds normal. No distress, wheezes, rales, rhonchi.  Abd: Soft, NTND, BS normal, no mass, no HSM, no rebound/guarding.   Neuro: Normal reflexes, CN. Sensory/motor exams grossly normal deficit. Coordination  and gait normal.   MS: No joint effusions.  No peripheral edema. Incision c/d/i  Skin: Skin is warm and dry. No rashes, erythema, diaphoresis.   Psych: Normal mood and affect. Behavior and judgment normal.     DISCHARGE MEDICATIONS     Discharge Medications        START taking these medications        Instructions Prescription details   oxyCODONE 5 MG Tabs      Take 1 tablet (5 mg total) by mouth every 4 (four) hours as needed.   Quantity: 20 tablet  Refills: 0            CHANGE how you take these medications        Instructions Prescription details   albuterol 108 (90 Base) MCG/ACT Aers  Commonly known as: Ventolin HFA  What changed: See the new instructions.      USE 2 INHALATIONS ORALLY   EVERY 6 HOURS AS NEEDED FORWHEEZING (APPOINTMENT      NEEDED FOR MORE REFILLS)   Quantity: 3  Inhaler  Refills: 3     pregabalin 75 MG Caps  Commonly known as: Lyrica  What changed: Another medication with the same name was changed. Make sure you understand how and when to take each.      Take 1 capsule (75 mg total) by mouth nightly.   Refills: 0     pregabalin 50 MG Caps  Commonly known as: Lyrica  What changed:   how much to take  how to take this  when to take this  additional instructions      TAKE 1 CAPSULE EVERY       MORNING WITH 75MG IN THE   EVENING   Quantity: 90 capsule  Refills: 0            CONTINUE taking these medications        Instructions Prescription details   acetaminophen 500 MG Tabs  Commonly known as: Tylenol Extra Strength      Take 1 tablet (500 mg total) by mouth every 6 (six) hours as needed for Pain.   Refills: 0     Breo Ellipta 200-25 MCG/ACT Aepb  Generic drug: fluticasone furoate-vilanterol      USE 1 INHALATION ORALLY    ONCE DAILY AS DIRECTED   Quantity: 3 each  Refills: 3     cefadroxil 500 MG Caps  Commonly known as: DURICEF      Take 1 capsule (500 mg total) by mouth 2 (two) times daily.   Refills: 0     Cevimeline HCl 30 MG Caps  Commonly known as: EVOXAC      Take 1 capsule (30 mg total) by mouth 3 (three) times daily.   Quantity: 270 capsule  Refills: 2     EPINEPHrine 0.3 MG/0.3ML Soaj  Commonly known as: EpiPen 2-Farhan      Inject 0.3 mL (1 each total) as directed as needed.   Quantity: 1 each  Refills: 1     hydroxychloroquine 200 MG Tabs  Commonly known as: Plaquenil      Take 1 tablet (200 mg total) by mouth 2 (two) times daily.   Quantity: 180 tablet  Refills: 1     metoprolol succinate ER 25 MG Tb24  Commonly known as: Toprol XL      Take 1 tablet (25 mg total) by mouth daily.   Quantity: 90 tablet  Refills: 1     Naloxone HCl 4 MG/0.1ML Liqd      4 mg by Nasal route as needed. If patient remains unresponsive, repeat dose in other nostril 2-5 minutes after first dose.   Quantity: 1 kit  Refills: 0     PreviDent 5000 Dry Mouth 1.1 % Gel  Generic drug: Sodium  Fluoride      Place 1 Application onto teeth daily.   Refills: 0     sodium chloride 0.65 % Soln  Commonly known as: Saline Mist      2 sprays by Nasal route as needed for congestion.   Refills: 0     traMADol 50 MG Tabs  Commonly known as: Ultram      Take 1 tablet (50 mg total) by mouth every 6 (six) hours as needed for Pain.   Refills: 0            STOP taking these medications      Potassium Chloride ER 10 MEQ Tbcr  Commonly known as: K-DUR                  Where to Get Your Medications        Please  your prescriptions at the location directed by your doctor or nurse    Bring a paper prescription for each of these medications  oxyCODONE 5 MG Tabs         CONSULTANTS  Consultants         Provider   Role Specialty     Issac Lauren MD  Consulting Physician HOSPITALIST            FOLLOW UP:  Irma Valera DO  [address] [place] [id number]  [phone]    Schedule an appointment as soon as possible for a visit in 2 week(s)      The above plan and follow-up instructions were reviewed with the patient and they verbalized understanding and agreement.  They understand to return to the emergency room for any concerning signs or symptoms.  Greater than 30 minutes spent on discharge.  -----------------------    Hospital Discharge Diagnoses:  Recurrent hip dislocation    Lace+ Score: 66  59-90 High Risk  29-58 Medium Risk  0-28   Low Risk.    TCM Follow-Up Recommendation:  LACE > 58: High Risk of readmission after discharge from the hospital.

## 2024-02-28 ENCOUNTER — PATIENT OUTREACH (OUTPATIENT)
Dept: CASE MANAGEMENT | Age: 69
End: 2024-02-28

## 2024-02-28 ENCOUNTER — TELEPHONE (OUTPATIENT)
Dept: FAMILY MEDICINE CLINIC | Facility: CLINIC | Age: 69
End: 2024-02-28

## 2024-02-28 DIAGNOSIS — Z96.649 PROSTHETIC HIP IMPLANT FAILURE, INITIAL ENCOUNTER (HCC): ICD-10-CM

## 2024-02-28 DIAGNOSIS — M45.9 RHEUMATOID ARTHRITIS INVOLVING VERTEBRA, UNSPECIFIED WHETHER RHEUMATOID FACTOR PRESENT (HCC): ICD-10-CM

## 2024-02-28 DIAGNOSIS — M35.00 SJOGREN'S SYNDROME, WITH UNSPECIFIED ORGAN INVOLVEMENT (HCC): ICD-10-CM

## 2024-02-28 DIAGNOSIS — Z01.818 PRE-OP TESTING: ICD-10-CM

## 2024-02-28 DIAGNOSIS — Z02.9 ENCOUNTERS FOR UNSPECIFIED ADMINISTRATIVE PURPOSE: Primary | ICD-10-CM

## 2024-02-28 DIAGNOSIS — J84.9 ILD (INTERSTITIAL LUNG DISEASE) (HCC): ICD-10-CM

## 2024-02-28 DIAGNOSIS — T84.018A PROSTHETIC HIP IMPLANT FAILURE, INITIAL ENCOUNTER (HCC): ICD-10-CM

## 2024-02-28 PROCEDURE — 1111F DSCHRG MED/CURRENT MED MERGE: CPT

## 2024-02-28 NOTE — PROGRESS NOTES
Initial Post Discharge Follow Up   Discharge Date: 2/27/24  Contact Date: 2/28/2024    Consent Verification:  Assessment Completed With: Patient  HIPAA Verified?  Yes    Discharge Dx:     Prosthetic hip implant failure (HCC)    Sjogren's disease (HCC)    ILD (interstitial lung disease) (HCC)    Essential hypertension    Obesity, morbid (HCC)    Preop testing  PROCEDURE:  Right Revision Total Hip Arthroplasty - Irrigation/Debridement and Modular Component Exchange (50757 - M 52 (Reduced Svcs))  Application of negative pressure incisional dressing    General:   How have you been since your discharge from the hospital? Patient states she is doing well. Patient denies fever/chills, no shortness of breath, no chest pain, no pain radiating from chest to neck, jaw, shoulders, arms or upper back. Patient denies any abdominal pain, no nausea/vomiting. Patient reports she is doing better this afternoon than she was this morning. Patient reports when she woke up this morning her pain was around an 8/10. She took pain medication and pain is at a 2-3/10 this afternoon. Patient reports the Prevena negative pressure dressing in place. Patient denies any redness spreading outward from the dressing, no purulent drainage or foul smelling drainage. NCM instructed patient to change positions frequently, walk as tolerated, rest when needed, stay hydrated and continue to take up to ten deep breaths an hour while awake, or if watching television take a deep breath with every commercial. NCM reviewed discharge instructions, medications, S&S of infection/blood clots with patient, she verbalized understanding of these. Patient denies any further questions or needs at this time.  Do you have any pain since discharge?  Yes  Where: Right hip   Rating on pain scale 1-10, 10 being the worst pain you have ever experienced, what is current pain: 2-3/10 at present, 8/10 this morning  Alleviating factors: Pain medication, NCM reminded patient that it  is good to ice the hip area after physical therapy to keep swelling down and provide some pain relief.  Aggravating factors: no  Is the pain manageable at home? Yes  How well was your pain managed while in the hospital?   On a scale of 1-5   1- Very Poor and 5- Very well   Very Well  When you were leaving the hospital were your discharge instructions reviewed with you? Yes  How well were your discharge instructions explained to you?   On a scale of 1-5   1- Very Poor and 5- Very well   Very Well  Do you have any questions about your discharge instructions?  No  Before leaving the hospital was your diagnoses explained to you? Yes  Do you have any questions about your diagnoses? No  Are you able to perform normal daily activities of living as you have prior to your hospital stay (dressing, bathing, ambulating to the bathroom, etc)? yes  (NCM) Was patient given a different diet per AVS? no    Medications:   STOP taking:  Potassium Chloride ER 10 MEQ Tbcr (K-DUR)  Review your updated medication list below.  Current Outpatient Medications   Medication Sig Dispense Refill    oxyCODONE 5 MG Oral Tab Take 1 tablet (5 mg total) by mouth every 4 (four) hours as needed. 20 tablet 0    cefadroxil 500 MG Oral Cap Take 1 capsule (500 mg total) by mouth 2 (two) times daily.      traMADol 50 MG Oral Tab Take 1 tablet (50 mg total) by mouth every 6 (six) hours as needed for Pain.      acetaminophen 500 MG Oral Tab Take 1 tablet (500 mg total) by mouth every 6 (six) hours as needed for Pain.      metoprolol succinate ER 25 MG Oral Tablet 24 Hr Take 1 tablet (25 mg total) by mouth daily. 90 tablet 1    Naloxone HCl 4 MG/0.1ML Nasal Liquid 4 mg by Nasal route as needed. If patient remains unresponsive, repeat dose in other nostril 2-5 minutes after first dose. 1 kit 0    pregabalin 75 MG Oral Cap Take 1 capsule (75 mg total) by mouth nightly.      sodium chloride 0.65 % Nasal Solution 2 sprays by Nasal route as needed for congestion.       BREO ELLIPTA 200-25 MCG/ACT Inhalation Aerosol Powder, Breath Activated USE 1 INHALATION ORALLY    ONCE DAILY AS DIRECTED (Patient taking differently: Inhale 1 puff into the lungs daily.) 3 each 3    hydroxychloroquine 200 MG Oral Tab Take 1 tablet (200 mg total) by mouth 2 (two) times daily. 180 tablet 1    pregabalin 50 MG Oral Cap TAKE 1 CAPSULE EVERY       MORNING WITH 75MG IN THE   EVENING (Patient taking differently: Take 1 capsule (50 mg total) by mouth every morning.) 90 capsule 0    Cevimeline HCl 30 MG Oral Cap Take 1 capsule (30 mg total) by mouth 3 (three) times daily. 270 capsule 2    ALBUTEROL SULFATE  (90 Base) MCG/ACT Inhalation Aero Soln USE 2 INHALATIONS ORALLY   EVERY 6 HOURS AS NEEDED FORWHEEZING (APPOINTMENT      NEEDED FOR MORE REFILLS) (Patient taking differently: Inhale 2 puffs into the lungs every 4 (four) hours as needed for Wheezing or Shortness of Breath.) 3 Inhaler 3    EPINEPHrine (EPIPEN 2-RACHELE) 0.3 MG/0.3ML Injection Solution Auto-injector Inject 0.3 mL (1 each total) as directed as needed. 1 each 1    PREVIDENT 5000 DRY MOUTH 1.1 % Dental Gel Place 1 Application onto teeth daily.         Were there any changes to your current medication(s) noted on the AVS? Yes  If so, were these medication changes discussed with you prior to leaving the hospital? Yes  If a new medication was prescribed:    Was the new medication's purpose & side effects reviewed? Yes  Do you have any questions about your new medication? No  Did you  your discharge medications when you left the hospital? Yes  Let's go over your medications together to make sure we are not missing anything. Medications Reviewed  Are there any reasons that keep you from taking your medication as prescribed? No  Are you having any concerns with constipation? No  Did patient receive their flu shot (Sept-March)? Yes    Discharge medications reviewed/discussed/and reconciled against outpatient medications with patient.  Any  changes or updates to medications sent to PCP.  Patient Acknowledged     Referrals/orders at D/C:  Referrals/orders placed at D/C? yes  What services:   Home health, PT, and OT   (If HH was ordered) Has HH been set up?  Yes    If Yes: With Whom: Afia DEREK    DME ordered at D/C? No    Discharge orders, AVS reviewed and discussed with patient. Any changes or updates to orders sent to PCP.  Patient Acknowledged      SDOH:   Transportation:    Transportation Needs: No Transportation Needs (2/22/2024)    Transportation Needs     Lack of Transportation: No   Patient states that her son is the only one that would be able to bring her to appointments, however, he is working. Patient states she may have to take a Medicar.     Financial Strain:    Financial Resource Strain: Low Risk  (2/1/2024)    Financial Resource Strain     Difficulty of Paying Living Expenses: Not hard at all     Med Affordability: No        Diagnosis specifics:   Discharge instructions  PLACE ICE TO SURGICAL AREA 20-30 MINUTES ON/ 20-30 Minutes off. This is to help with pain & swelling.  Take your medications as prescribed by your doctor.  They have been sent to your pharmacy.  As you were instructed by physical therapy to exercise  Hip precautions, continue to do so after discharge  It is ok to bear weight as tolerated on the operative Extremity.    Call to confirm your follow up appointment with dr. BEHERY TO BE SEEN IN 2-3 WEEKS POSTOP. [phone]    Dressing:  You have a special dressing called a prevena dressing, over your incision.   This is a type of Negative pressure dressing that keeps the wound dry. It is connected to a cannister.   Make sure That the cannister is powered on and not out of battery.  THE DRESSING STAYS FOR 7 DAYS FROM SURGERY. THIS DRESSING SHOULD BE CHANGED TO AN AQUACEL AT 7 Days post-operatively.   If the prevena dressing has continuous and persistent drainage, call Your surgeon first before changing the dressing to an  aquacel dressing.  You may shower as soon as the aquacel dressing is placed instead of the prevena dressing over Your incision area.    IF THE DRESSING LEAKS OR COMES LOOSE BEFORE THE 7 DAY PERIOD CONTACT YOUR DOCTOR / SURGEON.    AFTER 14 DAYS POST OPERATIVELY, THE AQUACEL DRESSING IS REMOVED   Remove by pulling on the edge of the dressing and gently stretching it, then peel it off slowly like a bandaid.   If you have any Questions or concerns about the dressing contact your doctor.    Reasons to call your doctor:  TEMPERATURE .0 OR MORE  PERSISTANT NAUSEA OR VOMITTING - ESPECIALLY IF YOU ARE UNABLE TO EAT OR DRINK.  Increased level of pain or pain which is not controlled by your pain medicine.  Persistent drainage at anytime from your surgical area / incision.  PAIN, TENDERNESS OR SUDDEN SWELLING IN YOUR CALF REGION OF THE LOWER EXTREMITIES - THIS IS A Possible sign of a blood clot.  Any changes in sensation in your body in the area of your surgery = numbness or tingling.  Any changes in circulation in your body in the area of your surgery = changes in color either  Darker or very pale; or if area feels cold to the touch as compared to other areas.  ANY CHANGES IN FUNCTION IN YOUR BODY IN THE AREA OF YOUR SURGERY = CHANGE IN MOVEMENT - Unable to move or wiggle fingers, toes or foot or any other changes in normal body  Functioning.    For any of the above or any other questions or concerns call your doctor/ surgeon as soon as possible.  Omar Behery, MD MPH  Orthopaedic Surgeon, Adult Hip and Knee Reconstruction  [facility] at [address] [address]  Office: (P) [phone] (F) [phone]  Adminstrative Assistant: Gloria Sinclair    Follow up appointments:     TCC  Was TCC ordered: No    PCP (If no TCC appointment)  Does patient already have a PCP appointment scheduled? No  NCM Attempted to schedule PCP office TCM appointment with patient   If no appointment scheduled:  Explain, patient wants to see Dr Behery, ortho surgeon first, then schedule a TCM with Dr Valera. Message sent to MD's office.    Specialist    Does the patient have any other follow up appointment(s) needing to be scheduled? No  Does the patient need assistance scheduling appointment(s): No    Is there any reason as to why you cannot make your appointment(s)?  No     Needs post D/C:   Now that you are home, are there any needs or concerns you need addressed before your next visit with your PCP?  (DME, meds, questions, etc.): No    Interventions by NCM:   NCM reviewed medications, discharge instructions, S&S of infection/blood clots. Patient instructed to report any new or worsening symptoms, when to call the doctor and when to call 911. Patient verbalized understanding of these. NCM instructed patient to call PCP with any questions or needs, she states she will.    CCM referral placed:    No, not at this time.    BOOK BY DATE: 3/12/2024

## 2024-02-28 NOTE — TELEPHONE ENCOUNTER
Spoke to patient for TCM today.  Patient does not have an appointment scheduled at this time.  TCM appointment recommended by 3/5/2024 per lace score, as patient is a risk for readmission.  Please advise.    NCM Attempted to schedule PCP office TCM appointment with patient, patient wants to see Dr Behery, ortho surgeon first, then schedule a TCM with Dr Valera. Message sent to MD's office.    BOOK BY DATE (last date for TCM): 3/12/2024    Clinical staff:  Please notify Dr Valera of the above, then please call the patient to try to schedule a TCM appointment within the TCM timeframe.    Thank you!

## 2024-04-01 NOTE — TELEPHONE ENCOUNTER
PSR: Notify pt that Dr. Linda Dooley has sent Shonda Sahni prescription over to [facility]. She would like pt to f/u in 3 months. 01-Apr-2024 09:08

## 2024-04-18 ENCOUNTER — TELEPHONE (OUTPATIENT)
Dept: RHEUMATOLOGY | Facility: CLINIC | Age: 69
End: 2024-04-18

## 2024-04-18 DIAGNOSIS — M79.7 FIBROMYALGIA: ICD-10-CM

## 2024-04-18 DIAGNOSIS — M32.19 OTHER SYSTEMIC LUPUS ERYTHEMATOSUS WITH OTHER ORGAN INVOLVEMENT (HCC): ICD-10-CM

## 2024-04-18 DIAGNOSIS — R68.2 DRY MOUTH: ICD-10-CM

## 2024-04-18 DIAGNOSIS — M06.9 RHEUMATOID ARTHRITIS INVOLVING BOTH HANDS, UNSPECIFIED WHETHER RHEUMATOID FACTOR PRESENT (HCC): ICD-10-CM

## 2024-04-18 DIAGNOSIS — M35.00 SJOGREN'S SYNDROME WITHOUT EXTRAGLANDULAR INVOLVEMENT (HCC): ICD-10-CM

## 2024-04-18 NOTE — TELEPHONE ENCOUNTER
Future Appointments   Date Time Provider Department Center   5/24/2024 10:30 AM Irma Valera DO [facility] [address]   6/14/2024 10:30 AM Irma Valera DO [facility] [address]   11/25/2024  1:30 PM Melanie Mcconnell DO [id number] [address]     Last office visit: 11/30/2022    Last fill: 11/30/2022 180 tab, 1 refills (hydroxychloroquine) 90 cap, 0 refills (pregabalin)  7/26/2022 270 cap, 2 refills (cevimeline)    Pended medications and routed to Dr. Mcconnell for approval

## 2024-04-19 RX ORDER — HYDROXYCHLOROQUINE SULFATE 200 MG/1
200 TABLET, FILM COATED ORAL 2 TIMES DAILY
Qty: 180 TABLET | Refills: 0 | Status: SHIPPED | OUTPATIENT
Start: 2024-04-19

## 2024-04-19 RX ORDER — PREGABALIN 50 MG/1
50 CAPSULE ORAL EVERY MORNING
Qty: 90 CAPSULE | Refills: 0 | OUTPATIENT
Start: 2024-04-19

## 2024-04-19 RX ORDER — CEVIMELINE HYDROCHLORIDE 30 MG/1
30 CAPSULE ORAL 3 TIMES DAILY
Qty: 270 CAPSULE | Refills: 0 | Status: SHIPPED | OUTPATIENT
Start: 2024-04-19

## 2024-04-19 NOTE — TELEPHONE ENCOUNTER
Called pt. She stated that since her last appointment she had a hip replacement and ended up needing 4 more surgeries. She was in rehab for 10 months and was getting her medication that way. She has be out for about a month. I explained that Dr. Mcconnell hasn't seen her in such a long time that she doesn't feel comfortable prescribing medication, specifically the lyrica. Pt stated that she is fine not getting the lyrica, but still wondering about the others. I explained that I would ask Dr. Mcconnell just in case.

## 2024-04-19 NOTE — TELEPHONE ENCOUNTER
Spoke to patient regarding the below message:      Please let her know I'm sorry she had such a rough past year. I did refill the cevimeline and the plaquenil for her.    Melanie Mcconnell, DO  [facility]  4/19/2024       Patient verbalized understanding and denied any questions at this time. Patient thanked Dr sharon franco

## 2024-06-14 ENCOUNTER — LAB ENCOUNTER (OUTPATIENT)
Dept: LAB | Age: 69
End: 2024-06-14
Attending: FAMILY MEDICINE
Payer: MEDICARE

## 2024-06-14 ENCOUNTER — OFFICE VISIT (OUTPATIENT)
Dept: FAMILY MEDICINE CLINIC | Facility: CLINIC | Age: 69
End: 2024-06-14
Payer: MEDICARE

## 2024-06-14 VITALS
SYSTOLIC BLOOD PRESSURE: 120 MMHG | DIASTOLIC BLOOD PRESSURE: 78 MMHG | BODY MASS INDEX: 32.95 KG/M2 | HEART RATE: 79 BPM | HEIGHT: 64 IN | RESPIRATION RATE: 16 BRPM | WEIGHT: 193 LBS | OXYGEN SATURATION: 95 %

## 2024-06-14 DIAGNOSIS — M45.9 RHEUMATOID ARTHRITIS INVOLVING VERTEBRA, UNSPECIFIED WHETHER RHEUMATOID FACTOR PRESENT (HCC): Chronic | ICD-10-CM

## 2024-06-14 DIAGNOSIS — I10 ESSENTIAL HYPERTENSION: ICD-10-CM

## 2024-06-14 DIAGNOSIS — J42 CHRONIC BRONCHITIS, UNSPECIFIED CHRONIC BRONCHITIS TYPE (HCC): ICD-10-CM

## 2024-06-14 DIAGNOSIS — Z13.1 SCREENING FOR DIABETES MELLITUS (DM): ICD-10-CM

## 2024-06-14 DIAGNOSIS — I34.0 MILD MITRAL REGURGITATION: ICD-10-CM

## 2024-06-14 DIAGNOSIS — G89.29 OTHER CHRONIC PAIN: ICD-10-CM

## 2024-06-14 DIAGNOSIS — E55.9 VITAMIN D DEFICIENCY: ICD-10-CM

## 2024-06-14 DIAGNOSIS — Z00.00 ENCOUNTER FOR ANNUAL HEALTH EXAMINATION: Primary | ICD-10-CM

## 2024-06-14 DIAGNOSIS — E55.9 VITAMIN D DEFICIENCY: Chronic | ICD-10-CM

## 2024-06-14 DIAGNOSIS — M81.0 OSTEOPOROSIS, SENILE: Chronic | ICD-10-CM

## 2024-06-14 DIAGNOSIS — E53.8 B12 DEFICIENCY: ICD-10-CM

## 2024-06-14 DIAGNOSIS — M32.9 SYSTEMIC LUPUS ERYTHEMATOSUS, UNSPECIFIED SLE TYPE, UNSPECIFIED ORGAN INVOLVEMENT STATUS (HCC): Chronic | ICD-10-CM

## 2024-06-14 DIAGNOSIS — D63.8 ANEMIA OF CHRONIC DISEASE: ICD-10-CM

## 2024-06-14 DIAGNOSIS — I77.1 TORTUOUS AORTA (HCC): ICD-10-CM

## 2024-06-14 DIAGNOSIS — M35.00 SJOGREN'S SYNDROME, WITH UNSPECIFIED ORGAN INVOLVEMENT (HCC): Chronic | ICD-10-CM

## 2024-06-14 DIAGNOSIS — I70.90 ATHEROSCLEROSIS: ICD-10-CM

## 2024-06-14 DIAGNOSIS — Z91.030 BEE STING ALLERGY: ICD-10-CM

## 2024-06-14 DIAGNOSIS — T84.018A PROSTHETIC HIP IMPLANT FAILURE, INITIAL ENCOUNTER (HCC): ICD-10-CM

## 2024-06-14 DIAGNOSIS — M79.7 FIBROMYALGIA: Chronic | ICD-10-CM

## 2024-06-14 DIAGNOSIS — J84.9 INTERSTITIAL LUNG DISEASE (HCC): ICD-10-CM

## 2024-06-14 DIAGNOSIS — K58.8 OTHER IRRITABLE BOWEL SYNDROME: Chronic | ICD-10-CM

## 2024-06-14 DIAGNOSIS — Z96.649 PROSTHETIC HIP IMPLANT FAILURE, INITIAL ENCOUNTER (HCC): ICD-10-CM

## 2024-06-14 DIAGNOSIS — I27.20 PULMONARY HTN (HCC): ICD-10-CM

## 2024-06-14 DIAGNOSIS — Z87.81 HISTORY OF COMPRESSION FRACTURE OF SPINE: ICD-10-CM

## 2024-06-14 DIAGNOSIS — M35.00 SJOGREN'S SYNDROME WITHOUT EXTRAGLANDULAR INVOLVEMENT (HCC): ICD-10-CM

## 2024-06-14 DIAGNOSIS — D70.9 NEUTROPENIA, UNSPECIFIED TYPE (HCC): ICD-10-CM

## 2024-06-14 DIAGNOSIS — R68.2 DRY MOUTH: ICD-10-CM

## 2024-06-14 DIAGNOSIS — Z12.31 VISIT FOR SCREENING MAMMOGRAM: ICD-10-CM

## 2024-06-14 DIAGNOSIS — M47.816 LUMBAR FACET ARTHROPATHY: ICD-10-CM

## 2024-06-14 DIAGNOSIS — Z78.0 POSTMENOPAUSAL: ICD-10-CM

## 2024-06-14 DIAGNOSIS — R73.9 HYPERGLYCEMIA: ICD-10-CM

## 2024-06-14 DIAGNOSIS — Z98.1 HISTORY OF LUMBAR SPINAL FUSION: ICD-10-CM

## 2024-06-14 PROBLEM — S73.004A DISLOCATION OF RIGHT HIP, INITIAL ENCOUNTER (HCC): Status: RESOLVED | Noted: 2023-10-07 | Resolved: 2024-06-14

## 2024-06-14 PROBLEM — T84.59XS PROSTHETIC HIP INFECTION, SEQUELA: Status: RESOLVED | Noted: 2023-05-12 | Resolved: 2024-06-14

## 2024-06-14 PROBLEM — M16.11 PRIMARY OSTEOARTHRITIS OF RIGHT HIP: Status: RESOLVED | Noted: 2022-12-02 | Resolved: 2024-06-14

## 2024-06-14 PROBLEM — T84.029A DISLOCATION OF PROSTHETIC JOINT: Status: RESOLVED | Noted: 2023-09-13 | Resolved: 2024-06-14

## 2024-06-14 PROBLEM — R09.02 HYPOXIA: Status: RESOLVED | Noted: 2023-04-11 | Resolved: 2024-06-14

## 2024-06-14 PROBLEM — F17.210 SMOKING GREATER THAN 25 PACK YEARS: Status: RESOLVED | Noted: 2018-08-06 | Resolved: 2024-06-14

## 2024-06-14 PROBLEM — M25.559 PAIN IN HIP REGION AFTER HIP REPLACEMENT: Status: RESOLVED | Noted: 2023-03-06 | Resolved: 2024-06-14

## 2024-06-14 PROBLEM — J30.2 SEASONAL ALLERGIC REACTION: Status: RESOLVED | Noted: 2018-05-21 | Resolved: 2024-06-14

## 2024-06-14 PROBLEM — L03.115 CELLULITIS OF RIGHT LOWER EXTREMITY: Status: RESOLVED | Noted: 2023-04-11 | Resolved: 2024-06-14

## 2024-06-14 PROBLEM — E66.01 OBESITY, MORBID (HCC): Status: RESOLVED | Noted: 2022-01-06 | Resolved: 2024-06-14

## 2024-06-14 PROBLEM — S73.004A: Status: RESOLVED | Noted: 2023-05-12 | Resolved: 2024-06-14

## 2024-06-14 PROBLEM — Z01.818 PREOP TESTING: Status: RESOLVED | Noted: 2022-12-01 | Resolved: 2024-06-14

## 2024-06-14 PROBLEM — T84.029A DISLOCATION OF PROSTHETIC JOINT (HCC): Status: RESOLVED | Noted: 2023-09-13 | Resolved: 2024-06-14

## 2024-06-14 LAB
ALBUMIN SERPL-MCNC: 3.7 G/DL (ref 3.4–5)
ALBUMIN/GLOB SERPL: 0.8 {RATIO} (ref 1–2)
ALP LIVER SERPL-CCNC: 144 U/L
ALT SERPL-CCNC: 16 U/L
ANION GAP SERPL CALC-SCNC: 3 MMOL/L (ref 0–18)
AST SERPL-CCNC: 20 U/L (ref 15–37)
BASOPHILS # BLD AUTO: 0.1 X10(3) UL (ref 0–0.2)
BASOPHILS NFR BLD AUTO: 2.3 %
BILIRUB SERPL-MCNC: 0.4 MG/DL (ref 0.1–2)
BUN BLD-MCNC: 21 MG/DL (ref 9–23)
CALCIUM BLD-MCNC: 10 MG/DL (ref 8.5–10.1)
CHLORIDE SERPL-SCNC: 104 MMOL/L (ref 98–112)
CHOLEST SERPL-MCNC: 188 MG/DL (ref ?–200)
CO2 SERPL-SCNC: 31 MMOL/L (ref 21–32)
CREAT BLD-MCNC: 1 MG/DL
EGFRCR SERPLBLD CKD-EPI 2021: 61 ML/MIN/1.73M2 (ref 60–?)
EOSINOPHIL # BLD AUTO: 0.39 X10(3) UL (ref 0–0.7)
EOSINOPHIL NFR BLD AUTO: 9.1 %
ERYTHROCYTE [DISTWIDTH] IN BLOOD BY AUTOMATED COUNT: 14.6 %
EST. AVERAGE GLUCOSE BLD GHB EST-MCNC: 117 MG/DL (ref 68–126)
FASTING PATIENT LIPID ANSWER: NO
FASTING STATUS PATIENT QL REPORTED: NO
GLOBULIN PLAS-MCNC: 4.4 G/DL (ref 2.8–4.4)
GLUCOSE BLD-MCNC: 95 MG/DL (ref 70–99)
HBA1C MFR BLD: 5.7 % (ref ?–5.7)
HCT VFR BLD AUTO: 40.2 %
HDLC SERPL-MCNC: 81 MG/DL (ref 40–59)
HGB BLD-MCNC: 12.7 G/DL
IMM GRANULOCYTES # BLD AUTO: 0 X10(3) UL (ref 0–1)
IMM GRANULOCYTES NFR BLD: 0 %
LDLC SERPL CALC-MCNC: 90 MG/DL (ref ?–100)
LYMPHOCYTES # BLD AUTO: 1.02 X10(3) UL (ref 1–4)
LYMPHOCYTES NFR BLD AUTO: 23.9 %
MCH RBC QN AUTO: 28.2 PG (ref 26–34)
MCHC RBC AUTO-ENTMCNC: 31.6 G/DL (ref 31–37)
MCV RBC AUTO: 89.3 FL
MONOCYTES # BLD AUTO: 0.6 X10(3) UL (ref 0.1–1)
MONOCYTES NFR BLD AUTO: 14.1 %
NEUTROPHILS # BLD AUTO: 2.16 X10 (3) UL (ref 1.5–7.7)
NEUTROPHILS # BLD AUTO: 2.16 X10(3) UL (ref 1.5–7.7)
NEUTROPHILS NFR BLD AUTO: 50.6 %
NONHDLC SERPL-MCNC: 107 MG/DL (ref ?–130)
OSMOLALITY SERPL CALC.SUM OF ELEC: 289 MOSM/KG (ref 275–295)
PLATELET # BLD AUTO: 237 10(3)UL (ref 150–450)
POTASSIUM SERPL-SCNC: 3.8 MMOL/L (ref 3.5–5.1)
PROT SERPL-MCNC: 8.1 G/DL (ref 6.4–8.2)
RBC # BLD AUTO: 4.5 X10(6)UL
SODIUM SERPL-SCNC: 138 MMOL/L (ref 136–145)
T4 FREE SERPL-MCNC: 1 NG/DL (ref 0.8–1.7)
TRIGL SERPL-MCNC: 97 MG/DL (ref 30–149)
TSI SER-ACNC: 0.69 MIU/ML (ref 0.36–3.74)
VIT B12 SERPL-MCNC: 184 PG/ML (ref 193–986)
VIT D+METAB SERPL-MCNC: 67.2 NG/ML (ref 30–100)
VLDLC SERPL CALC-MCNC: 16 MG/DL (ref 0–30)
WBC # BLD AUTO: 4.3 X10(3) UL (ref 4–11)

## 2024-06-14 PROCEDURE — 82306 VITAMIN D 25 HYDROXY: CPT

## 2024-06-14 PROCEDURE — G0439 PPPS, SUBSEQ VISIT: HCPCS | Performed by: FAMILY MEDICINE

## 2024-06-14 PROCEDURE — 80053 COMPREHEN METABOLIC PANEL: CPT

## 2024-06-14 PROCEDURE — 36415 COLL VENOUS BLD VENIPUNCTURE: CPT

## 2024-06-14 PROCEDURE — 84439 ASSAY OF FREE THYROXINE: CPT

## 2024-06-14 PROCEDURE — 83036 HEMOGLOBIN GLYCOSYLATED A1C: CPT

## 2024-06-14 PROCEDURE — 99214 OFFICE O/P EST MOD 30 MIN: CPT | Performed by: FAMILY MEDICINE

## 2024-06-14 PROCEDURE — 82607 VITAMIN B-12: CPT

## 2024-06-14 PROCEDURE — 84443 ASSAY THYROID STIM HORMONE: CPT

## 2024-06-14 PROCEDURE — 80061 LIPID PANEL: CPT

## 2024-06-14 PROCEDURE — 85025 COMPLETE CBC W/AUTO DIFF WBC: CPT

## 2024-06-14 RX ORDER — ALBUTEROL SULFATE 90 UG/1
2 AEROSOL, METERED RESPIRATORY (INHALATION) EVERY 6 HOURS PRN
Qty: 3 EACH | Refills: 0 | Status: SHIPPED | OUTPATIENT
Start: 2024-06-14

## 2024-06-14 RX ORDER — HYDROCODONE BITARTRATE AND ACETAMINOPHEN 5; 325 MG/1; MG/1
1 TABLET ORAL EVERY 6 HOURS PRN
Qty: 60 TABLET | Refills: 0 | Status: SHIPPED | OUTPATIENT
Start: 2024-06-14

## 2024-06-14 RX ORDER — CEVIMELINE HYDROCHLORIDE 30 MG/1
30 CAPSULE ORAL 3 TIMES DAILY
Qty: 270 CAPSULE | Refills: 1 | Status: SHIPPED | OUTPATIENT
Start: 2024-06-14

## 2024-06-14 RX ORDER — FLUTICASONE FUROATE AND VILANTEROL 200; 25 UG/1; UG/1
1 POWDER RESPIRATORY (INHALATION) DAILY
Qty: 3 EACH | Refills: 3 | Status: SHIPPED | OUTPATIENT
Start: 2024-06-14

## 2024-06-14 RX ORDER — PREGABALIN 75 MG/1
75 CAPSULE ORAL NIGHTLY
Qty: 90 CAPSULE | Refills: 1 | Status: SHIPPED | OUTPATIENT
Start: 2024-06-14

## 2024-06-14 RX ORDER — PREGABALIN 50 MG/1
CAPSULE ORAL
Qty: 90 CAPSULE | Refills: 1 | Status: SHIPPED | OUTPATIENT
Start: 2024-06-14

## 2024-06-14 RX ORDER — EPINEPHRINE 0.3 MG/.3ML
0.3 INJECTION SUBCUTANEOUS AS NEEDED
Qty: 1 EACH | Refills: 1 | Status: SHIPPED | OUTPATIENT
Start: 2024-06-14

## 2024-06-14 NOTE — PATIENT INSTRUCTIONS
Ngozi Collazo's SCREENING SCHEDULE   Tests on this list are recommended by your physician but may not be covered, or covered at this frequency, by your insurer.   Please check with your insurance carrier before scheduling to verify coverage.   PREVENTATIVE SERVICES FREQUENCY &  COVERAGE DETAILS LAST COMPLETION DATE   Diabetes Screening    Fasting Blood Sugar /  Glucose    One screening every 12 months if never tested or if previously tested but not diagnosed with pre-diabetes   One screening every 6 months if diagnosed with pre-diabetes Lab Results   Component Value Date    GLUCOSE 88 10/25/2012    GLU 87 02/23/2024        Cardiovascular Disease Screening    Lipid Panel  Cholesterol  Lipoprotein (HDL)  Triglycerides Covered every 5 years for all Medicare beneficiaries without apparent signs or symptoms of cardiovascular disease Lab Results   Component Value Date    CHOLEST 198 04/08/2022    HDL 93 (H) 04/08/2022    LDL 91 04/08/2022    TRIG 77 04/08/2022         Electrocardiogram (EKG)   Covered if needed at Welcome to Medicare, and non-screening if indicated for medical reasons 02/14/2024      Ultrasound Screening for Abdominal Aortic Aneurysm (AAA) Covered once in a lifetime for one of the following risk factors   • Men who are 65-75 years old and have ever smoked   • Anyone with a family history -     Colorectal Cancer Screening  Covered for ages 50-85; only need ONE of the following:    Colonoscopy   Covered every 10 years    Covered every 2 years if patient is at high risk or previous colonoscopy was abnormal 01/04/2017    Health Maintenance   Topic Date Due   • Colorectal Cancer Screening  01/04/2027       Flexible Sigmoidoscopy   Covered every 4 years -    Fecal Occult Blood Test Covered annually 09/21/2023   Bone Density Screening    Bone density screening    Covered every 2 years after age 65 if diagnosed with risk of osteoporosis or estrogen deficiency.    Covered yearly for long-term glucocorticoid  medication use (Steroids) Last Dexa Scan:    XR DEXA BONE DENSITOMETRY (CPT=77080) 01/04/2021      No recommendations at this time   Pap and Pelvic    Pap   Covered every 2 years for women at normal risk; Annually if at high risk -  No recommendations at this time    Chlamydia Annually if high risk 09/21/2023  No recommendations at this time   Screening Mammogram    Mammogram     Recommend annually for all female patients aged 40 and older    One baseline mammogram covered for patients aged 35-39 02/21/2022    Health Maintenance   Topic Date Due   • Mammogram  02/21/2023       Immunizations    Influenza Covered once per flu season  Please get every year 10/07/2023  No recommendations at this time    Pneumococcal Each vaccine ([id number] & [id number]) covered once after 65 Prevnar 13: 11/12/2018    [id number]: 10/08/2020     No recommendations at this time    Hepatitis B One screening covered for patients with certain risk factors   -  No recommendations at this time    Tetanus Toxoid Not covered by Medicare Part B unless medically necessary (cut with metal); may be covered with your pharmacy prescription benefits -    Tetanus, Diptheria and Pertusis TD and TDaP Not covered by Medicare Part B -  No recommendations at this time    Zoster Not covered by Medicare Part B; may be covered with your pharmacy  prescription benefits 10/19/2016  Zoster Vaccines(1 of 2) due on 12/14/2016     Annual Monitoring of Persistent Medications (ACE/ARB, digoxin diuretics, anticonvulsants)    Potassium Annually Lab Results   Component Value Date    K 4.4 02/23/2024         Creatinine   Annually Lab Results   Component Value Date    CREATSERUM 0.89 02/23/2024         BUN Annually Lab Results   Component Value Date    BUN 10 02/23/2024       Drug Serum Conc Annually No results found for: \"DIGOXIN\", \"DIG\", \"VALP\"         Chronic Obstructive Pulmonary Disease (COPD)    Spirometry Annually Spirometry date: 07/01/2022

## 2024-06-14 NOTE — PROGRESS NOTES
Subjective:   Ngozi Collazo is a 68 year old female who presents for a Medicare Subsequent Annual Wellness visit (Pt already had Initial Annual Wellness) and scheduled follow up of multiple significant but stable problems.   Pt also here with    Systemic lupus erythematosus (HCC)    Vitamin D deficiency    Osteoporosis, senile    IBS (irritable bowel syndrome)    Rheumatoid arthritis (HCC)    Fibromyalgia    Atherosclerosis    Mild mitral regurgitation    Sjogren's disease (HCC)    Lumbar facet arthropathy    History of lumbar spinal fusion    Anemia of chronic disease    Neutropenia (HCC)    Hyperglycemia    Essential hypertension    Immunosuppressed status (HCC)    Tortuous aorta (HCC)    Chronic bronchitis, unspecified chronic bronchitis type (HCC)    Pulmonary HTN (HCC)    History of compression fracture of spine    Interstitial lung disease (HCC)    Prosthetic hip implant failure (HCC)       History/Other:   Fall Risk Assessment:   She has been screened for Falls and is High Risk. Fall Prevention information provided to patient in After Visit Summary.    Do you feel unsteady when standing or walking?: Yes  Do you worry about falling?: Yes  Have you fallen in the past year?: No     Cognitive Assessment:   She had a completely normal cognitive assessment - see flowsheet entries     Functional Ability/Status:   Ngozi Collazo has some abnormal functions as listed below:  She has difficulties Shopping for Groceries based on screening of functional status. She has Walking problems based on screening of functional status.       Depression Screening (PHQ-2/PHQ-9): PHQ-2 SCORE: 0  , done 6/7/2024        Advanced Directives: She does have a Living Will but we do NOT have it on file in Epic.    She does have a POA but we do NOT have it on file in Epic.    Discussed Advance Care Planning with patient (and family/surrogate if present). Standard forms made available to patient in After Visit Summary.      Patient Active  Problem List   Diagnosis    Systemic lupus erythematosus (HCC)- stable     Vitamin D deficiency- stable    Osteoporosis, senile- stable     IBS (irritable bowel syndrome)- stable     Rheumatoid arthritis (HCC)- stable     Fibromyalgia- stable     Atherosclerosis- stable     Mild mitral regurgitation- stable    Sjogren's disease (HCC)- stable     Lumbar facet arthropathy- stable     History of lumbar spinal fusion- stable     Anemia of chronic disease- stable     Neutropenia (HCC)- stable     Hyperglycemia- stable     Essential hypertension- stable     Immunosuppressed status ([facility])- stable     Tortuous aorta (HCC)- stable     Chronic bronchitis, unspecified chronic bronchitis type (HCC)- stable     Pulmonary HTN ([facility])- stable     History of compression fracture of spine- stable     Interstitial lung disease (HCC)- stable     Prosthetic hip implant failure (HCC)- stable      Allergies:  She is allergic to allergy, bee sting [bee venom], erythromycin base, and vicryl sutures.    Current Medications:  Outpatient Medications Marked as Taking for the 6/14/24 encounter (Office Visit) with Irma Valera, DO   Medication Sig    HYDROcodone-acetaminophen (NORCO) 5-325 MG Oral Tab Take 1 tablet by mouth every 6 (six) hours as needed for Pain.    EPINEPHrine (EPIPEN 2-RACHELE) 0.3 MG/0.3ML Injection Solution Auto-injector Inject 0.3 mL (1 each total) as directed as needed.    Cevimeline HCl 30 MG Oral Cap Take 1 capsule (30 mg total) by mouth 3 (three) times daily.    pregabalin 50 MG Oral Cap TAKE 1 CAPSULE EVERY       MORNING WITH 75MG IN THE   EVENING    fluticasone furoate-vilanterol (BREO ELLIPTA) 200-25 MCG/ACT Inhalation Aerosol Powder, Breath Activated Inhale 1 puff into the lungs daily.    pregabalin (LYRICA) 75 MG Oral Cap Take 1 capsule (75 mg total) by mouth at bedtime.    albuterol 108 (90 Base) MCG/ACT Inhalation Aero Soln Inhale 2 puffs into the lungs every 6 (six) hours as needed for Wheezing or Shortness of Breath.  Dispense spacer    hydroxychloroquine 200 MG Oral Tab Take 1 tablet (200 mg total) by mouth 2 (two) times daily.    cefadroxil 500 MG Oral Cap Take 1 capsule (500 mg total) by mouth 2 (two) times daily.    traMADol 50 MG Oral Tab Take 1 tablet (50 mg total) by mouth every 6 (six) hours as needed for Pain.    acetaminophen 500 MG Oral Tab Take 1 tablet (500 mg total) by mouth every 6 (six) hours as needed for Pain.    Naloxone HCl 4 MG/0.1ML Nasal Liquid 4 mg by Nasal route as needed. If patient remains unresponsive, repeat dose in other nostril 2-5 minutes after first dose.    pregabalin 75 MG Oral Cap Take 1 capsule (75 mg total) by mouth nightly.    sodium chloride 0.65 % Nasal Solution 2 sprays by Nasal route as needed for congestion.    ALBUTEROL SULFATE  (90 Base) MCG/ACT Inhalation Aero Soln USE 2 INHALATIONS ORALLY   EVERY 6 HOURS AS NEEDED FORWHEEZING (APPOINTMENT      NEEDED FOR MORE REFILLS) (Patient taking differently: Inhale 2 puffs into the lungs every 4 (four) hours as needed for Wheezing or Shortness of Breath.)    PREVIDENT 5000 DRY MOUTH 1.1 % Dental Gel Place 1 Application onto teeth daily.         Medical History:  She  has a past medical history of Achilles tendonitis, Arthritis, Asthma ([facility]), Atherosclerosis, Back problem, Cancer ([facility]) (09/2004), Carpal tunnel syndrome (05/29/2009), Cataract, Chronic foot pain, Diarrhea, unspecified, Duodenitis (12/18/2008), Dyslipidemia, Dysphagia, Essential hypertension, Exposure to radiation, Fasciitis, Fatigue, Food intolerance, Foot fracture, left (10/2008), H. pylori infection, Hammertoe, Heart valve disease, Hemorrhoids, High blood pressure, History of blood transfusion (2023), Hypercalcemia, IBS (irritable bowel syndrome), Internal hemorrhoids (12/18/2008), Irregular Z line of esophagus (12/18/2008), Localized primary carpometacarpal osteoarthritis (07/30/2012), Lupus ([facility]), Macular degeneration (12/07/2010), Osteoarthritis, Osteomyelitis of right  foot ([facility]) (01/2018), Osteoporosis, Pain in joints, Pneumonia due to organism, Postmenopausal atrophic vaginitis, Pulmonary fibrosis ([facility]) (07/2018), Rheumatoid arthritis ([facility]), Rheumatoid arthritis(714.0), Sicca syndrome ([facility]) (02/26/2009), Sjogren's syndrome ([facility]) (10/2007), Stress fracture of the metatarsals, Systemic lupus erythematosus ([facility]) (02/26/2009), Visual impairment, and Vitamin D deficiency.  Surgical History:  She  has a past surgical history that includes appendectomy (age 8); foot/toes surgery proc unlisted (09/2011); upper gi endoscopy,exam (12/18/2008); other surgical history (01/11/2013); radiation right (2004); lumpectomy right (2004); barry biopsy stereo nodule 1 site right (cpt=19081) (2004); total knee replacement (Left, 05/24/2018); tonsillectomy; adenoidectomy; carpal tunnel release (Bilateral); dermatology shave biopsy (d1k.1) (12/29/2011); foot surgery (04/2013); spine surgery procedure unlisted; back surgery; colonoscopy; knee replacement surgery; cataract; colonoscopy; total hip replacement (Right); and Revision total hip arthroplasty (Right, 02/22/2024).   Family History:  Her family history includes Abdominal Aortic Aneurysm in an other family member; Asthma in her mother and sister; Breast Cancer (age of onset: 48) in her self; DCIS in her self; Diabetes in her mother; Fibromyalgia in her sister; Heart Disorder in her father; No Known Problems in her brother and son; Other in her father and mother; Pulmonary Embolism in her father.  Social History:  She  reports that she quit smoking about 17 years ago. Her smoking use included cigarettes. She started smoking about 54 years ago. She has a 37.4 pack-year smoking history. She has never used smokeless tobacco. She reports that she does not currently use alcohol. She reports that she does not currently use drugs.    Tobacco:  She smoked tobacco in the past but quit greater than 12 months ago.  Social History     Tobacco Use   Smoking  Status Former    Current packs/day: 0.00    Average packs/day: 1 pack/day for 37.4 years (37.4 ttl pk-yrs)    Types: Cigarettes    Start date: 1970    Quit date: 2007    Years since quittin.0   Smokeless Tobacco Never   Tobacco Comments    quit in           CAGE Alcohol Screen:   CAGE screening score of 0 on 2024, showing low risk of alcohol abuse.    Patient Care Team:  Irma Valera DO as PCP - General (Family Medicine)  Vandana Koo MD as Consulting Physician (Anesthesiology)  Debra Lock APRN as Nurse Practitioner (Nurse Practitioner)  Sabina Dorsey PA-C as Physician Assistant (Physician Assistant)  Essence Carreon APRN as Nurse Practitioner (Nurse Practitioner)  Radames Thibodeaux MD as Consulting Physician (Anesthesiology Pain Medicine)  Yokasta Dawn MD (RHEUMATOLOGY)  Zachery Howell (SURGERY, ORTHOPEDIC)  Oppenheim, Robert A, MD (OPHTHALMOLOGY)  Phillip Real MD (ENDOCRINOLOGY)  Seipp, Regan, MD (NEPHROLOGY)  Estella Bolanos, CAROLINA (Physical Therapy)  Melanie Mcconnell DO (RHEUMATOLOGY)  Taty Rodgers MD (PULMONARY DISEASES)  Behery, Omar Atef, MD (Surgery, Orthopaedic)  Perico Klein MD (SURGERY, ORTHOPEDIC)    Review of Systems  GENERAL: feels well otherwise  SKIN: denies any unusual skin lesions  EYES: denies blurred vision or double vision  HEENT: denies nasal congestion, sinus pain or ST  LUNGS: denies shortness of breath with exertion  CARDIOVASCULAR: denies chest pain on exertion  GI: denies abdominal pain, denies heartburn  : denies dysuria, vaginal discharge or itching, no complaint of urinary incontinence   MUSCULOSKELETAL: denies back pain  NEURO: denies headaches  PSYCHE: denies depression or anxiety  HEMATOLOGIC: denies hx of anemia  ENDOCRINE: denies thyroid history  ALL/ASTHMA: denies asthma    Objective:   Physical Exam  General Appearance:  Alert, cooperative, no distress, appears stated age   Head:  Normocephalic, without obvious abnormality, atraumatic   Eyes:   PERRL, conjunctiva/corneas clear, EOM's intact both eyes   Ears:  Normal TM's and external ear canals, both ears   Nose: Nares normal, septum midline,mucosa normal, no drainage or sinus tenderness   Throat: Lips, mucosa, and tongue normal; teeth and gums normal   Neck: Supple, symmetrical, trachea midline, no adenopathy;  thyroid: not enlarged, symmetric, no tenderness/mass/nodules; no carotid bruit or JVD   Back:   Symmetric, no curvature, ROM normal, no CVA tenderness   Lungs:   Clear to auscultation bilaterally, respirations unlabored   Heart:  Regular rate and rhythm, S1 and S2 normal, no murmur, rub, or gallop   Abdomen:   Soft, non-tender, bowel sounds active all four quadrants,  no masses, no organomegaly   Pelvic: Deferred   Extremities: Extremities normal, atraumatic, no cyanosis or edema   Pulses: 2+ and symmetric   Skin: Skin color, texture, turgor normal, no rashes or lesions   Lymph nodes: Cervical, supraclavicular, and axillary nodes normal   Neurologic: Normal       /78   Pulse 79   Resp 16   Ht 5' 4\" (1.626 m)   Wt 193 lb (87.5 kg)   SpO2 95%   BMI 33.13 kg/m²  Estimated body mass index is 33.13 kg/m² as calculated from the following:    Height as of this encounter: 5' 4\" (1.626 m).    Weight as of this encounter: 193 lb (87.5 kg).    Medicare Hearing Assessment:   Hearing Screening    Screening Method: Whisper Test         Assessment & Plan:   Ngozi Collazo is a 68 year old female who presents for a Medicare Assessment.     1. Encounter for annual health examination (Primary)  2. Tortuous aorta (HCC)- stable monitor   -     Detailed, Mod Complex (96267)  3. Systemic lupus erythematosus, unspecified SLE type, unspecified organ involvement status (HCC)- stable monitor   -     Detailed, Mod Complex (90694)  4. Sjogren's syndrome, with unspecified organ involvement (HCC)- stable monitor   -     Detailed, Mod Complex (19037)  5. Rheumatoid arthritis involving vertebra, unspecified whether  rheumatoid factor present (HCC)- stable monitor   -     Detailed, Mod Complex (60525)  6. Pulmonary HTN (HCC)- stable monitor   -     Detailed, Mod Complex (07808)  7. Prosthetic hip implant failure, initial encounter (HCC)- stable monitor   -     Detailed, Mod Complex (12920)  8. Neutropenia, unspecified type (HCC)- stable monitor   -     Detailed, Mod Complex (67936)  9. Interstitial lung disease (HCC)- stable monitor   -     Detailed, Mod Complex (96977)  10. Chronic bronchitis, unspecified chronic bronchitis type (HCC)- stable monitor   -     Detailed, Mod Complex (53416)  11. Mild mitral regurgitation- stable monitor   -     Detailed, Mod Complex (73715)  12. Essential hypertension- stable monitor   -     Detailed, Mod Complex (99214)  -     Comp Metabolic Panel (14); Future; Expected date: 06/14/2024  -     Free T4, (Free Thyroxine); Future; Expected date: 06/14/2024  -     Assay, Thyroid Stim Hormone; Future; Expected date: 06/14/2024  -     Lipid Panel; Future; Expected date: 06/14/2024  13. Atherosclerosis- stable monitor   Overview:  with ectasia    Orders:  -     Detailed, Mod Complex (45832)  14. Vitamin D deficiency- stable monitor   -     Detailed, Mod Complex (26522)  -     Vitamin D; Future; Expected date: 06/14/2024  15. Osteoporosis, senile- stable monitor   -     Detailed, Mod Complex (53742)  16. Hyperglycemia- stable monitor   -     Detailed, Mod Complex (21881)  17. Lumbar facet arthropathy- stable monitor   Overview:  Cervical radiculitis  Orders:  -     Detailed, Mod Complex (78357)  18. History of compression fracture of spine- stable monitor   -     Detailed, Mod Complex (93085)  19. Other irritable bowel syndrome- stable monitor   -     Detailed, Mod Complex (52713)  20. History of lumbar spinal fusion- stable monitor   -     Detailed, Mod Complex (84429)  21. Fibromyalgia- stable monitor   -     Detailed, Mod Complex (77100)  -     Pregabalin; TAKE 1 CAPSULE EVERY       MORNING WITH 75MG  IN THE   EVENING  Dispense: 90 capsule; Refill: 1  -     Pregabalin; Take 1 capsule (75 mg total) by mouth at bedtime.  Dispense: 90 capsule; Refill: 1  22. Anemia of chronic disease- stable monitor   -     Detailed, Mod Complex (25270)  -     CBC With Differential With Platelet; Future; Expected date: 06/14/2024  23. Other chronic pain- trial of different medication  -     Detailed, Mod Complex (70222)  -     HYDROcodone-Acetaminophen; Take 1 tablet by mouth every 6 (six) hours as needed for Pain.  Dispense: 60 tablet; Refill: 0  24. Visit for screening mammogram  -     Detailed, Mod Complex (27375)  -     3D Mammogram Digital Screen, Bilateral (CPT=77067/86308); Future; Expected date: 06/14/2024  25. Postmenopausal  -     Detailed, Mod Complex (46521)  -     Dexa Scan/Bone Density screening (Screening allowed every 2 years); Future; Expected date: 06/14/2024  26. Screening for diabetes mellitus (DM)- check labs   -     Detailed, Mod Complex (59785)  -     Hemoglobin A1C; Future; Expected date: 06/14/2024  27. Bee sting allergy- rx refilled   -     EPINEPHrine; Inject 0.3 mL (1 each total) as directed as needed.  Dispense: 1 each; Refill: 1  28. Sjogren's syndrome without extraglandular involvement (HCC)- stable cpm   -     Cevimeline HCl; Take 1 capsule (30 mg total) by mouth 3 (three) times daily.  Dispense: 270 capsule; Refill: 1  29. Dry mouth- stable cpm   -     Cevimeline HCl; Take 1 capsule (30 mg total) by mouth 3 (three) times daily.  Dispense: 270 capsule; Refill: 1  30. B12 deficiency- stable cpm  -     Vitamin B12; Future; Expected date: 06/14/2024  Other orders  -     Fluticasone Furoate-Vilanterol; Inhale 1 puff into the lungs daily.  Dispense: 3 each; Refill: 3  -     Albuterol Sulfate HFA; Inhale 2 puffs into the lungs every 6 (six) hours as needed for Wheezing or Shortness of Breath. Dispense spacer  Dispense: 3 each; Refill: 0    The patient indicates understanding of these issues and agrees to  the plan.  Reinforced healthy diet, lifestyle, and exercise.    Return in 4 weeks (on 7/12/2024).     Irma Valera DO, 6/14/2024     Supplementary Documentation:   General Health:  In the past six months, have you lost more than 10 pounds without trying?: 2 - No  Has your appetite been poor?: No  Type of Diet: Balanced  How does the patient maintain a good energy level?: Appropriate Exercise  How would you describe your daily physical activity?: Moderate  How would you describe your current health state?: Good  How do you maintain positive mental well-being?: Puzzles;Visiting Family  On a scale of 0 to 10, with 0 being no pain and 10 being severe pain, what is your pain level?: 7 - (Severe)  In the past six months, have you experienced urine leakage?: 0-No  At any time do you feel concerned for the safety/well-being of yourself and/or your children, in your home or elsewhere?: No  Have you had any immunizations at another office such as Influenza, Hepatitis B, Tetanus, or Pneumococcal?: No         Ngozi Collazo's SCREENING SCHEDULE   Tests on this list are recommended by your physician but may not be covered, or covered at this frequency, by your insurer.   Please check with your insurance carrier before scheduling to verify coverage.   PREVENTATIVE SERVICES FREQUENCY &  COVERAGE DETAILS LAST COMPLETION DATE   Diabetes Screening    Fasting Blood Sugar /  Glucose    One screening every 12 months if never tested or if previously tested but not diagnosed with pre-diabetes   One screening every 6 months if diagnosed with pre-diabetes Lab Results   Component Value Date    GLUCOSE 88 10/25/2012    GLU 95 06/14/2024        Cardiovascular Disease Screening    Lipid Panel  Cholesterol  Lipoprotein (HDL)  Triglycerides Covered every 5 years for all Medicare beneficiaries without apparent signs or symptoms of cardiovascular disease Lab Results   Component Value Date    CHOLEST 188 06/14/2024    HDL 81 (H) 06/14/2024    LDL 90  06/14/2024    TRIG 97 06/14/2024         Electrocardiogram (EKG)   Covered if needed at Welcome to Medicare, and non-screening if indicated for medical reasons 02/14/2024      Ultrasound Screening for Abdominal Aortic Aneurysm (AAA) Covered once in a lifetime for one of the following risk factors    Men who are 65-75 years old and have ever smoked    Anyone with a family history -     Colorectal Cancer Screening  Covered for ages 50-85; only need ONE of the following:    Colonoscopy   Covered every 10 years    Covered every 2 years if patient is at high risk or previous colonoscopy was abnormal 01/04/2017    Health Maintenance   Topic Date Due    Colorectal Cancer Screening  01/04/2027       Flexible Sigmoidoscopy   Covered every 4 years -    Fecal Occult Blood Test Covered annually 09/21/2023   Bone Density Screening    Bone density screening    Covered every 2 years after age 65 if diagnosed with risk of osteoporosis or estrogen deficiency.    Covered yearly for long-term glucocorticoid medication use (Steroids) Last Dexa Scan:    XR DEXA BONE DENSITOMETRY (CPT=77080) 01/04/2021      No recommendations at this time   Pap and Pelvic    Pap   Covered every 2 years for women at normal risk; Annually if at high risk -  No recommendations at this time    Chlamydia Annually if high risk 09/21/2023  No recommendations at this time   Screening Mammogram    Mammogram     Recommend annually for all female patients aged 40 and older    One baseline mammogram covered for patients aged 35-39 02/21/2022    Health Maintenance   Topic Date Due    Mammogram  02/21/2023       Immunizations    Influenza Covered once per flu season  Please get every year 10/07/2023  No recommendations at this time    Pneumococcal Each vaccine ([id number] & [id number]) covered once after 65 Prevnar 13: 11/12/2018    [id number]: 10/08/2020     No recommendations at this time    Hepatitis B One screening covered for patients with certain risk factors    -  No recommendations at this time    Tetanus Toxoid Not covered by Medicare Part B unless medically necessary (cut with metal); may be covered with your pharmacy prescription benefits -    Tetanus, Diptheria and Pertusis TD and TDaP Not covered by Medicare Part B -  No recommendations at this time    Zoster Not covered by Medicare Part B; may be covered with your pharmacy  prescription benefits 10/19/2016  Zoster Vaccines(1 of 2) due on 12/14/2016     Annual Monitoring of Persistent Medications (ACE/ARB, digoxin diuretics, anticonvulsants)    Potassium Annually Lab Results   Component Value Date    K 3.8 06/14/2024         Creatinine   Annually Lab Results   Component Value Date    CREATSERUM 1.00 06/14/2024         BUN Annually Lab Results   Component Value Date    BUN 21 06/14/2024       Drug Serum Conc Annually No results found for: \"DIGOXIN\", \"DIG\", \"VALP\"           Chronic Obstructive Pulmonary Disease (COPD)    Spirometry Annually Spirometry date: 07/01/2022

## 2024-06-17 ENCOUNTER — TELEPHONE (OUTPATIENT)
Dept: FAMILY MEDICINE CLINIC | Facility: CLINIC | Age: 69
End: 2024-06-17

## 2024-06-17 DIAGNOSIS — T84.018A PROSTHETIC HIP IMPLANT FAILURE, INITIAL ENCOUNTER (HCC): Primary | ICD-10-CM

## 2024-06-17 DIAGNOSIS — Z96.649 PROSTHETIC HIP IMPLANT FAILURE, INITIAL ENCOUNTER (HCC): Primary | ICD-10-CM

## 2024-06-17 NOTE — TELEPHONE ENCOUNTER
Okay to discontinue Norco, and take Tramadol instead? Can she take TID? Please order if approved.

## 2024-06-17 NOTE — TELEPHONE ENCOUNTER
The norco is not working.  She wants to know if she can take tramadol 3 times a day?  It doesn't constipate her.  She would need a new rx if that is ok

## 2024-06-18 ENCOUNTER — TELEPHONE (OUTPATIENT)
Dept: FAMILY MEDICINE CLINIC | Facility: CLINIC | Age: 69
End: 2024-06-18

## 2024-06-18 DIAGNOSIS — E53.8 B12 DEFICIENCY: Primary | ICD-10-CM

## 2024-06-18 RX ORDER — TRAMADOL HYDROCHLORIDE 50 MG/1
50 TABLET ORAL 3 TIMES DAILY PRN
Qty: 90 TABLET | Refills: 0 | Status: SHIPPED | OUTPATIENT
Start: 2024-06-18

## 2024-06-18 NOTE — TELEPHONE ENCOUNTER
Patient had labs recently done.  Was advised to have B12 injections monthly.  Patient states that she read that Biotin can affect B12 and she has been using Biotin.  She wants to know if she should discontinue.    Please advise.

## 2024-06-18 NOTE — TELEPHONE ENCOUNTER
Patient states she will  do repeat B12 once she has held all biotin/ vitamin B vitamins for at least 3 days.  She is also inquiring about Tramadol script which is pending Dr Valera's signature as of yesterday. She is hoping to pick this medication up at [facility]. Thanks

## 2024-06-27 ENCOUNTER — MED REC SCAN ONLY (OUTPATIENT)
Facility: CLINIC | Age: 69
End: 2024-06-27

## 2024-07-12 DIAGNOSIS — M06.9 RHEUMATOID ARTHRITIS, INVOLVING UNSPECIFIED SITE, UNSPECIFIED WHETHER RHEUMATOID FACTOR PRESENT (HCC): Primary | ICD-10-CM

## 2024-07-12 RX ORDER — LEFLUNOMIDE 20 MG/1
20 TABLET ORAL DAILY
Qty: 90 TABLET | Refills: 0 | Status: SHIPPED | OUTPATIENT
Start: 2024-07-12

## 2024-07-12 NOTE — TELEPHONE ENCOUNTER
Last office visit: 11/30/2022    Next Rheum Apt:11/25/2024 Melanie Mcconnell DO    Last fill: unknown     Labs:   Lab Results   Component Value Date    CREATSERUM 1.00 06/14/2024    GFR 73 02/05/2018    ALKPHO 144 (H) 06/14/2024    AST 20 06/14/2024    ALT 16 06/14/2024    BILT 0.4 06/14/2024    TP 8.1 06/14/2024    ALB 3.7 06/14/2024       Lab Results   Component Value Date    WBC 4.3 06/14/2024    HGB 12.7 06/14/2024    .0 06/14/2024    NEPRELIM 2.16 06/14/2024    NEUTABS 5.39 04/13/2023    LYMPHABS 0.68 (L) 04/13/2023    NEUT 83 04/13/2023    LYMPH 11 04/13/2023    NEPERCENT 50.6 06/14/2024    LYPERCENT 23.9 06/14/2024    NE 2.16 06/14/2024    LYMABS 1.02 06/14/2024

## 2024-07-16 DIAGNOSIS — T84.018A PROSTHETIC HIP IMPLANT FAILURE, INITIAL ENCOUNTER (HCC): ICD-10-CM

## 2024-07-16 DIAGNOSIS — Z96.649 PROSTHETIC HIP IMPLANT FAILURE, INITIAL ENCOUNTER (HCC): ICD-10-CM

## 2024-07-16 RX ORDER — TRAMADOL HYDROCHLORIDE 50 MG/1
50 TABLET ORAL 3 TIMES DAILY PRN
Qty: 90 TABLET | Refills: 0 | Status: SHIPPED | OUTPATIENT
Start: 2024-07-16

## 2024-07-16 NOTE — TELEPHONE ENCOUNTER
Pt wants refill on Tramadol  50mg  TID.      Appt  Tuesday with Dr Valera.    Can we refill?    [facility] on file.    Call if any issues.

## 2024-07-23 ENCOUNTER — OFFICE VISIT (OUTPATIENT)
Dept: FAMILY MEDICINE CLINIC | Facility: CLINIC | Age: 69
End: 2024-07-23
Payer: MEDICARE

## 2024-07-23 ENCOUNTER — LAB ENCOUNTER (OUTPATIENT)
Dept: LAB | Age: 69
End: 2024-07-23
Attending: FAMILY MEDICINE
Payer: MEDICARE

## 2024-07-23 VITALS
WEIGHT: 206 LBS | HEIGHT: 64 IN | DIASTOLIC BLOOD PRESSURE: 86 MMHG | RESPIRATION RATE: 18 BRPM | OXYGEN SATURATION: 94 % | SYSTOLIC BLOOD PRESSURE: 132 MMHG | BODY MASS INDEX: 35.17 KG/M2 | HEART RATE: 74 BPM

## 2024-07-23 DIAGNOSIS — M79.89 CALF SWELLING: ICD-10-CM

## 2024-07-23 DIAGNOSIS — G89.4 CHRONIC PAIN SYNDROME: ICD-10-CM

## 2024-07-23 DIAGNOSIS — E53.8 B12 DEFICIENCY: Primary | ICD-10-CM

## 2024-07-23 LAB — D DIMER PPP FEU-MCNC: 2.01 UG/ML FEU (ref ?–0.71)

## 2024-07-23 PROCEDURE — 36415 COLL VENOUS BLD VENIPUNCTURE: CPT

## 2024-07-23 PROCEDURE — 99214 OFFICE O/P EST MOD 30 MIN: CPT | Performed by: FAMILY MEDICINE

## 2024-07-23 PROCEDURE — 96372 THER/PROPH/DIAG INJ SC/IM: CPT | Performed by: FAMILY MEDICINE

## 2024-07-23 PROCEDURE — 85379 FIBRIN DEGRADATION QUANT: CPT

## 2024-07-23 RX ORDER — CYANOCOBALAMIN 1000 UG/ML
1000 INJECTION, SOLUTION INTRAMUSCULAR; SUBCUTANEOUS ONCE
Status: COMPLETED | OUTPATIENT
Start: 2024-07-23 | End: 2024-07-23

## 2024-07-23 RX ADMIN — CYANOCOBALAMIN 1000 MCG: 1000 INJECTION, SOLUTION INTRAMUSCULAR; SUBCUTANEOUS at 13:22:00

## 2024-07-23 NOTE — PROGRESS NOTES
HPI:   Ngozi Collazo is a 69 year old female who presents for f/u on chronic pain, chronic right LE swelling     Patient denies chest pain, shortness of breath, dizziness, and lightheadedness. No exertional symptoms.  Pt reports she has had the swelling for years  Right hip surgery in 2022 and 4-5 subsequent surgeries for dislocation   Pain is better controlled and now more active  More right calf swelling   Pt not focused on salt intake     Taking tramadol tid       Current Outpatient Medications   Medication Sig Dispense Refill    leflunomide 20 MG Oral Tab Take 1 tablet (20 mg total) by mouth daily. 90 tablet 0    EPINEPHrine (EPIPEN 2-RACHELE) 0.3 MG/0.3ML Injection Solution Auto-injector Inject 0.3 mL (1 each total) as directed as needed. 1 each 1    Cevimeline HCl 30 MG Oral Cap Take 1 capsule (30 mg total) by mouth 3 (three) times daily. 270 capsule 1    pregabalin 50 MG Oral Cap TAKE 1 CAPSULE EVERY       MORNING WITH 75MG IN THE   EVENING 90 capsule 1    fluticasone furoate-vilanterol (BREO ELLIPTA) 200-25 MCG/ACT Inhalation Aerosol Powder, Breath Activated Inhale 1 puff into the lungs daily. 3 each 3    pregabalin (LYRICA) 75 MG Oral Cap Take 1 capsule (75 mg total) by mouth at bedtime. 90 capsule 1    albuterol 108 (90 Base) MCG/ACT Inhalation Aero Soln Inhale 2 puffs into the lungs every 6 (six) hours as needed for Wheezing or Shortness of Breath. Dispense spacer 3 each 0    hydroxychloroquine 200 MG Oral Tab Take 1 tablet (200 mg total) by mouth 2 (two) times daily. 180 tablet 0    cefadroxil 500 MG Oral Cap Take 1 capsule (500 mg total) by mouth 2 (two) times daily.      acetaminophen 500 MG Oral Tab Take 1 tablet (500 mg total) by mouth every 6 (six) hours as needed for Pain.      Naloxone HCl 4 MG/0.1ML Nasal Liquid 4 mg by Nasal route as needed. If patient remains unresponsive, repeat dose in other nostril 2-5 minutes after first dose. 1 kit 0    sodium chloride 0.65 % Nasal Solution 2 sprays by Nasal  route as needed for congestion.      ALBUTEROL SULFATE  (90 Base) MCG/ACT Inhalation Aero Soln USE 2 INHALATIONS ORALLY   EVERY 6 HOURS AS NEEDED FORWHEEZING (APPOINTMENT      NEEDED FOR MORE REFILLS) (Patient taking differently: Inhale 2 puffs into the lungs every 4 (four) hours as needed for Wheezing or Shortness of Breath.) 3 Inhaler 3    PREVIDENT 5000 DRY MOUTH 1.1 % Dental Gel Place 1 Application onto teeth daily.        traMADol 50 MG Oral Tab Take 1 tablet (50 mg total) by mouth 3 (three) times daily as needed for Pain. 90 tablet 0      Past Medical History:    Achilles tendonitis    Arthritis    Asthma (HCC)    Atherosclerosis    with ectasia    Back problem    back surgery lumbar    Cancer (HCC)    rt breast dcis    Carpal tunnel syndrome    bilateral    Cataract    Chronic foot pain    right    Diarrhea, unspecified    Duodenitis    peptic    Dyslipidemia    Dysphagia    Essential hypertension    Exposure to radiation    Fasciitis    Fatigue    Food intolerance    Foot fracture, left    left foot fx: excessive walking    H. pylori infection    Hammertoe    second toe right foot    Heart valve disease    Hemorrhoids    High blood pressure    History of blood transfusion    no reactions [facility]    Hypercalcemia    IBS (irritable bowel syndrome)    Internal hemorrhoids    Grade II    Irregular Z line of esophagus    Localized primary carpometacarpal osteoarthritis    Lupus (HCC)    Macular degeneration    early signs, bilaterally, remained stable    Osteoarthritis    left thumb, severe    Osteomyelitis of right foot ([facility])    Balaji ID, Dr. Allan    Osteoporosis    Pain in joints    Pneumonia due to organism    Postmenopausal atrophic vaginitis    Pulmonary fibrosis (HCC)    referring to pulm, possible Sjogren's involvement?    Rheumatoid arthritis (HCC)    Rheumatoid arthritis(714.0)    Sicca syndrome (HCC)    Sjogren's syndrome (HCC)    Stress fracture of the metatarsals    right foot    Systemic lupus  erythematosus (HCC)    Visual impairment    glasses    Vitamin D deficiency      Past Surgical History:   Procedure Laterality Date    Adenoidectomy      Appendectomy  age 8    [facility]    Back surgery      Carpal tunnel release Bilateral     Cataract      Colonoscopy      Colonoscopy      Dermatology shave biopsy (d1k.1)  2011    Shave biopsy, skin, right shoulder    Foot surgery  2013    revision right foot surgery    Foot/toes surgery proc unlisted  2011    first MTM joint fusion with ORIF of second and third MTs with correction of the second hammertoe of the right foot by Dr. Hatfield    Knee replacement surgery      Lumpectomy right      rt breast lumpectomy, [facility]    Divina biopsy stereo nodule 1 site right (cpt=19081)      Other surgical history  2013    trigger thumb injections    Radiation right  2004    MAMMOSITE    Revision total hip arthroplasty Right 2024    Right Revision Total Hip Arthroplasty - Irrigation/Debridement and Modular Component Exchange    Spine surgery procedure unlisted      L4-5    Tonsillectomy      Total hip replacement Right     Total knee replacement Left 2018    Upper gi endoscopy,exam  2008      Family History   Problem Relation Age of Onset    Heart Disorder Father     Other (Other) Father     Other (Pulmonary Embolism) Father     Diabetes Mother     Asthma Mother     Other (Other) Mother     Other (Abdominal Aortic Aneurysm) Other     Fibromyalgia Sister     Asthma Sister     Breast Cancer Self 50    DCIS Self     No Known Problems Son     No Known Problems Brother       Social History:   Social History     Socioeconomic History    Marital status:    Tobacco Use    Smoking status: Former     Current packs/day: 0.00     Average packs/day: 1 pack/day for 37.4 years (37.4 ttl pk-yrs)     Types: Cigarettes     Start date: 1970     Quit date: 2007     Years since quittin.1    Smokeless tobacco: Never     Tobacco comments:     quit in 2006   Vaping Use    Vaping status: Never Used   Substance and Sexual Activity    Alcohol use: Not Currently     Alcohol/week: 0.0 - 1.0 standard drinks of alcohol     Comment: Rare    Drug use: Not Currently     Comment: CBD gummies for pain   Other Topics Concern    Caffeine Concern Yes     Comment: 2-3 cups a day    Exercise No     Comment: minimal    Seat Belt Yes     Social Determinants of Health     Financial Resource Strain: Low Risk  (2/1/2024)    Financial Resource Strain     Difficulty of Paying Living Expenses: Not hard at all     Med Affordability: No   Food Insecurity: No Food Insecurity (2/22/2024)    Food Insecurity     Food Insecurity: Never true   Transportation Needs: No Transportation Needs (2/22/2024)    Transportation Needs     Lack of Transportation: No    [facility]    Social Connections   Housing Stability: Low Risk  (2/22/2024)    Housing Stability     Housing Instability: No        REVIEW OF SYSTEMS:   GENERAL: feels well otherwise  SKIN: denies any unusual skin lesions  EYES:denies blurred vision or double vision  HEENT: denies nasal congestion, sinus pain or ST  LUNGS: denies shortness of breath with exertion  CARDIOVASCULAR: denies chest pain on exertion  NEURO: denies headaches  PSYCHE: denies depression or anxiety  HEMATOLOGIC: denies hx of anemia  ENDOCRINE: denies thyroid history  ALL/ASTHMA: denies asthma      EXAM:   /86   Pulse 74   Resp 18   Ht 5' 4\" (1.626 m)   Wt 206 lb (93.4 kg)   SpO2 94%   BMI 35.36 kg/m²   Body mass index is 35.36 kg/m².   GENERAL: alert and oriented X 3, well developed, well nourished,in no apparent distress  NEURO: cranial nerves are intact,motor and sensory are grossly intact  LE: LE swelling bilaterally right calf swelling > left       ASSESSMENT AND PLAN:   Ngozi Collazo is a 69 year old female who presents with    1. B12 deficiency    -  cyanocobalamin (Vitamin B12) 1000 MCG/ML injection 1,000 mcg    3. Calf swelling    - Physical Therapy Referral - [facility]  - D-D[facility] [E]; Future    4. Chronic pain syndrome  cpm    Questions answered and patient indicates understanding of these issues and agrees to the plan.  Follow up in 1-2 weeks or sooner if needed .

## 2024-07-24 ENCOUNTER — TELEPHONE (OUTPATIENT)
Dept: FAMILY MEDICINE CLINIC | Facility: CLINIC | Age: 69
End: 2024-07-24

## 2024-07-24 ENCOUNTER — HOSPITAL ENCOUNTER (OUTPATIENT)
Dept: ULTRASOUND IMAGING | Facility: HOSPITAL | Age: 69
Discharge: HOME | End: 2024-07-24
Attending: FAMILY MEDICINE
Payer: MEDICARE

## 2024-07-24 DIAGNOSIS — M79.89 CALF SWELLING: ICD-10-CM

## 2024-07-24 DIAGNOSIS — R79.89 D-DIMER, ELEVATED: ICD-10-CM

## 2024-07-24 DIAGNOSIS — R79.89 D-DIMER, ELEVATED: Primary | ICD-10-CM

## 2024-07-24 PROCEDURE — 93971 EXTREMITY STUDY: CPT | Performed by: FAMILY MEDICINE

## 2024-07-24 NOTE — TELEPHONE ENCOUNTER
Called and informed pt of stat venous doppler result:  Impression   CONCLUSION:    1. No sonographic evidence for deep venous thrombosis involving the right lower extremity.

## 2024-07-24 NOTE — TELEPHONE ENCOUNTER
Dr. Valera ordered US VENOUS INSUFFICIENCY (REFLUX) RIGHT LOWER stat (this test would not be considered for STAT processing)  - order should be US venous doppler right lower estremity - STAT

## 2024-08-07 ENCOUNTER — TELEPHONE (OUTPATIENT)
Dept: RHEUMATOLOGY | Facility: CLINIC | Age: 69
End: 2024-08-07

## 2024-08-07 DIAGNOSIS — M35.00 SJOGREN'S SYNDROME WITHOUT EXTRAGLANDULAR INVOLVEMENT (HCC): ICD-10-CM

## 2024-08-07 DIAGNOSIS — Z96.649 PROSTHETIC HIP IMPLANT FAILURE, INITIAL ENCOUNTER (HCC): ICD-10-CM

## 2024-08-07 DIAGNOSIS — M06.9 RHEUMATOID ARTHRITIS INVOLVING BOTH HANDS, UNSPECIFIED WHETHER RHEUMATOID FACTOR PRESENT (HCC): ICD-10-CM

## 2024-08-07 DIAGNOSIS — T84.018A PROSTHETIC HIP IMPLANT FAILURE, INITIAL ENCOUNTER (HCC): ICD-10-CM

## 2024-08-07 DIAGNOSIS — M06.9 RHEUMATOID ARTHRITIS, INVOLVING UNSPECIFIED SITE, UNSPECIFIED WHETHER RHEUMATOID FACTOR PRESENT (HCC): ICD-10-CM

## 2024-08-07 DIAGNOSIS — M32.19 OTHER SYSTEMIC LUPUS ERYTHEMATOSUS WITH OTHER ORGAN INVOLVEMENT (HCC): ICD-10-CM

## 2024-08-07 RX ORDER — TRAMADOL HYDROCHLORIDE 50 MG/1
50 TABLET ORAL 4 TIMES DAILY PRN
Qty: 120 TABLET | Refills: 0 | Status: SHIPPED | OUTPATIENT
Start: 2024-08-07

## 2024-08-07 RX ORDER — HYDROXYCHLOROQUINE SULFATE 200 MG/1
200 TABLET, FILM COATED ORAL 2 TIMES DAILY
Qty: 180 TABLET | Refills: 0 | Status: SHIPPED | OUTPATIENT
Start: 2024-08-07

## 2024-08-07 NOTE — TELEPHONE ENCOUNTER
Patient called stating that she would like to have her quantity increased on her traMADol 50 MG Oral Tab do to she need to take more at night to sleep. Patient would like to increase to 4 times a day. Patient has enough medication to last until Sunday.     Please send to:[id number] [place], [place] - [address] AT [facility] [address] , [phone], [phone]       Please advise

## 2024-08-07 NOTE — TELEPHONE ENCOUNTER
Future Appointments   Date Time Provider Department Center   10/29/2024 12:00 PM Irma Valera DO [facility] [address] [facility] [address]   11/25/2024  1:30 PM Melanie Mcconnell DO [id number] [address]     Last office visit: 11/30/2022    Last fill: 4/19/2024 180 tab, 0 refills

## 2024-08-13 RX ORDER — LEFLUNOMIDE 20 MG/1
20 TABLET ORAL DAILY
Qty: 90 TABLET | Refills: 0 | OUTPATIENT
Start: 2024-08-13

## 2024-08-18 DIAGNOSIS — M06.9 RHEUMATOID ARTHRITIS, INVOLVING UNSPECIFIED SITE, UNSPECIFIED WHETHER RHEUMATOID FACTOR PRESENT (HCC): ICD-10-CM

## 2024-08-19 RX ORDER — LEFLUNOMIDE 20 MG/1
20 TABLET ORAL DAILY
Qty: 90 TABLET | Refills: 0 | OUTPATIENT
Start: 2024-08-19

## 2024-08-19 RX ORDER — ALBUTEROL SULFATE 90 UG/1
AEROSOL, METERED RESPIRATORY (INHALATION)
Qty: 54 G | Refills: 0 | Status: SHIPPED | OUTPATIENT
Start: 2024-08-19

## 2024-09-05 ENCOUNTER — HOSPITAL ENCOUNTER (INPATIENT)
Facility: HOSPITAL | Age: 69
LOS: 3 days | Discharge: NURSING, REHAB OR LONG-TERM CARE | End: 2024-09-09
Attending: EMERGENCY MEDICINE | Admitting: INTERNAL MEDICINE
Payer: MEDICARE

## 2024-09-05 ENCOUNTER — APPOINTMENT (OUTPATIENT)
Dept: CT IMAGING | Facility: HOSPITAL | Age: 69
End: 2024-09-05
Attending: EMERGENCY MEDICINE
Payer: MEDICARE

## 2024-09-05 ENCOUNTER — APPOINTMENT (OUTPATIENT)
Dept: GENERAL RADIOLOGY | Facility: HOSPITAL | Age: 69
End: 2024-09-05
Payer: MEDICARE

## 2024-09-05 DIAGNOSIS — M71.30 SYNOVIAL CYST: ICD-10-CM

## 2024-09-05 DIAGNOSIS — T84.018A PROSTHETIC HIP IMPLANT FAILURE, INITIAL ENCOUNTER (HCC): ICD-10-CM

## 2024-09-05 DIAGNOSIS — Z96.649 PROSTHETIC HIP IMPLANT FAILURE, INITIAL ENCOUNTER (HCC): ICD-10-CM

## 2024-09-05 DIAGNOSIS — Z96.649: ICD-10-CM

## 2024-09-05 DIAGNOSIS — T84.011A FAILURE OF LEFT TOTAL HIP ARTHROPLASTY, INITIAL ENCOUNTER (HCC): ICD-10-CM

## 2024-09-05 DIAGNOSIS — R26.9 GAIT DISTURBANCE: Primary | ICD-10-CM

## 2024-09-05 DIAGNOSIS — T84.018A: ICD-10-CM

## 2024-09-05 LAB
ALBUMIN SERPL-MCNC: 4.1 G/DL (ref 3.2–4.8)
ALBUMIN/GLOB SERPL: 1.5 {RATIO} (ref 1–2)
ALP LIVER SERPL-CCNC: 114 U/L
ALT SERPL-CCNC: 10 U/L
ANION GAP SERPL CALC-SCNC: 5 MMOL/L (ref 0–18)
AST SERPL-CCNC: 26 U/L (ref ?–34)
BASOPHILS # BLD AUTO: 0.06 X10(3) UL (ref 0–0.2)
BASOPHILS NFR BLD AUTO: 1.2 %
BILIRUB SERPL-MCNC: 0.4 MG/DL (ref 0.2–1.1)
BUN BLD-MCNC: 13 MG/DL (ref 9–23)
CALCIUM BLD-MCNC: 9.4 MG/DL (ref 8.7–10.4)
CHLORIDE SERPL-SCNC: 103 MMOL/L (ref 98–112)
CO2 SERPL-SCNC: 30 MMOL/L (ref 21–32)
CREAT BLD-MCNC: 0.83 MG/DL
CRP SERPL-MCNC: <0.4 MG/DL (ref ?–0.5)
EGFRCR SERPLBLD CKD-EPI 2021: 75 ML/MIN/1.73M2 (ref 60–?)
EOSINOPHIL # BLD AUTO: 0.14 X10(3) UL (ref 0–0.7)
EOSINOPHIL NFR BLD AUTO: 2.7 %
ERYTHROCYTE [DISTWIDTH] IN BLOOD BY AUTOMATED COUNT: 15.5 %
ERYTHROCYTE [SEDIMENTATION RATE] IN BLOOD: 62 MM/HR
GLOBULIN PLAS-MCNC: 2.8 G/DL (ref 2–3.5)
GLUCOSE BLD-MCNC: 98 MG/DL (ref 70–99)
HCT VFR BLD AUTO: 34.4 %
HGB BLD-MCNC: 11.3 G/DL
IMM GRANULOCYTES # BLD AUTO: 0.02 X10(3) UL (ref 0–1)
IMM GRANULOCYTES NFR BLD: 0.4 %
LACTATE SERPL-SCNC: 0.6 MMOL/L (ref 0.5–2)
LYMPHOCYTES # BLD AUTO: 0.78 X10(3) UL (ref 1–4)
LYMPHOCYTES NFR BLD AUTO: 15.1 %
MCH RBC QN AUTO: 29.4 PG (ref 26–34)
MCHC RBC AUTO-ENTMCNC: 32.8 G/DL (ref 31–37)
MCV RBC AUTO: 89.4 FL
MONOCYTES # BLD AUTO: 0.62 X10(3) UL (ref 0.1–1)
MONOCYTES NFR BLD AUTO: 12 %
NEUTROPHILS # BLD AUTO: 3.55 X10 (3) UL (ref 1.5–7.7)
NEUTROPHILS # BLD AUTO: 3.55 X10(3) UL (ref 1.5–7.7)
NEUTROPHILS NFR BLD AUTO: 68.6 %
OSMOLALITY SERPL CALC.SUM OF ELEC: 286 MOSM/KG (ref 275–295)
PLATELET # BLD AUTO: 210 10(3)UL (ref 150–450)
POTASSIUM SERPL-SCNC: 3.4 MMOL/L (ref 3.5–5.1)
PROT SERPL-MCNC: 6.9 G/DL (ref 5.7–8.2)
RBC # BLD AUTO: 3.85 X10(6)UL
SODIUM SERPL-SCNC: 138 MMOL/L (ref 136–145)
WBC # BLD AUTO: 5.2 X10(3) UL (ref 4–11)

## 2024-09-05 PROCEDURE — 73502 X-RAY EXAM HIP UNI 2-3 VIEWS: CPT

## 2024-09-05 PROCEDURE — 73700 CT LOWER EXTREMITY W/O DYE: CPT | Performed by: EMERGENCY MEDICINE

## 2024-09-05 PROCEDURE — 99223 1ST HOSP IP/OBS HIGH 75: CPT | Performed by: INTERNAL MEDICINE

## 2024-09-05 RX ORDER — ONDANSETRON 2 MG/ML
4 INJECTION INTRAMUSCULAR; INTRAVENOUS ONCE
Status: COMPLETED | OUTPATIENT
Start: 2024-09-05 | End: 2024-09-05

## 2024-09-05 RX ORDER — HYDROMORPHONE HYDROCHLORIDE 1 MG/ML
0.5 INJECTION, SOLUTION INTRAMUSCULAR; INTRAVENOUS; SUBCUTANEOUS EVERY 30 MIN PRN
Status: COMPLETED | OUTPATIENT
Start: 2024-09-05 | End: 2024-09-06

## 2024-09-05 NOTE — ED INITIAL ASSESSMENT (HPI)
Atraumatic R hip pain x 1 week, pt reports she is unable to bear weight, hx 9 hip dislocations, pt had hip replacement in February and and has not had any issues until now, swelling noted to R hip

## 2024-09-05 NOTE — ED QUICK NOTES
Patient has a history of right hip pain, replacement in February. Has been dislocated previously. States she's had right hip pain X1 week, worse the last 2 days. States this AM she wasn't able to walk due to the pain. Swelling to right hip and right leg is normal per patient.

## 2024-09-06 ENCOUNTER — APPOINTMENT (OUTPATIENT)
Dept: ULTRASOUND IMAGING | Facility: HOSPITAL | Age: 69
End: 2024-09-06
Attending: EMERGENCY MEDICINE
Payer: MEDICARE

## 2024-09-06 PROBLEM — E87.6 HYPOKALEMIA: Status: ACTIVE | Noted: 2024-09-06

## 2024-09-06 PROBLEM — T84.018A: Status: ACTIVE | Noted: 2024-09-06

## 2024-09-06 PROBLEM — Z96.649: Status: ACTIVE | Noted: 2024-09-06

## 2024-09-06 PROBLEM — M71.30 SYNOVIAL CYST: Status: ACTIVE | Noted: 2024-09-06

## 2024-09-06 PROCEDURE — 0S993ZZ DRAINAGE OF RIGHT HIP JOINT, PERCUTANEOUS APPROACH: ICD-10-PCS | Performed by: EMERGENCY MEDICINE

## 2024-09-06 PROCEDURE — 20611 DRAIN/INJ JOINT/BURSA W/US: CPT | Performed by: EMERGENCY MEDICINE

## 2024-09-06 PROCEDURE — 99232 SBSQ HOSP IP/OBS MODERATE 35: CPT | Performed by: INTERNAL MEDICINE

## 2024-09-06 RX ORDER — FLUTICASONE PROPIONATE AND SALMETEROL 250; 50 UG/1; UG/1
1 POWDER RESPIRATORY (INHALATION) 2 TIMES DAILY
Status: DISCONTINUED | OUTPATIENT
Start: 2024-09-06 | End: 2024-09-09

## 2024-09-06 RX ORDER — PREGABALIN 50 MG/1
50 CAPSULE ORAL EVERY MORNING
Status: DISCONTINUED | OUTPATIENT
Start: 2024-09-06 | End: 2024-09-06

## 2024-09-06 RX ORDER — ONDANSETRON 2 MG/ML
4 INJECTION INTRAMUSCULAR; INTRAVENOUS EVERY 6 HOURS PRN
Status: DISCONTINUED | OUTPATIENT
Start: 2024-09-06 | End: 2024-09-09

## 2024-09-06 RX ORDER — ONDANSETRON 2 MG/ML
4 INJECTION INTRAMUSCULAR; INTRAVENOUS EVERY 4 HOURS PRN
Status: ACTIVE | OUTPATIENT
Start: 2024-09-06 | End: 2024-09-06

## 2024-09-06 RX ORDER — ACETAMINOPHEN 325 MG/1
650 TABLET ORAL EVERY 4 HOURS PRN
Status: DISCONTINUED | OUTPATIENT
Start: 2024-09-06 | End: 2024-09-09

## 2024-09-06 RX ORDER — PREGABALIN 75 MG/1
75 CAPSULE ORAL NIGHTLY
Status: DISCONTINUED | OUTPATIENT
Start: 2024-09-06 | End: 2024-09-06

## 2024-09-06 RX ORDER — SENNOSIDES 8.6 MG
17.2 TABLET ORAL NIGHTLY PRN
Status: DISCONTINUED | OUTPATIENT
Start: 2024-09-06 | End: 2024-09-09

## 2024-09-06 RX ORDER — HYDROMORPHONE HYDROCHLORIDE 1 MG/ML
0.5 INJECTION, SOLUTION INTRAMUSCULAR; INTRAVENOUS; SUBCUTANEOUS EVERY 30 MIN PRN
Status: ACTIVE | OUTPATIENT
Start: 2024-09-06 | End: 2024-09-06

## 2024-09-06 RX ORDER — POLYETHYLENE GLYCOL 3350 17 G/17G
17 POWDER, FOR SOLUTION ORAL DAILY PRN
Status: DISCONTINUED | OUTPATIENT
Start: 2024-09-06 | End: 2024-09-09

## 2024-09-06 RX ORDER — POTASSIUM CHLORIDE 1500 MG/1
40 TABLET, EXTENDED RELEASE ORAL ONCE
Status: COMPLETED | OUTPATIENT
Start: 2024-09-06 | End: 2024-09-06

## 2024-09-06 RX ORDER — CEVIMELINE HYDROCHLORIDE 30 MG/1
30 CAPSULE ORAL 3 TIMES DAILY
Status: DISCONTINUED | OUTPATIENT
Start: 2024-09-06 | End: 2024-09-09

## 2024-09-06 RX ORDER — MELATONIN
6 NIGHTLY PRN
Status: DISCONTINUED | OUTPATIENT
Start: 2024-09-06 | End: 2024-09-09

## 2024-09-06 RX ORDER — ENOXAPARIN SODIUM 100 MG/ML
40 INJECTION SUBCUTANEOUS NIGHTLY
Status: DISCONTINUED | OUTPATIENT
Start: 2024-09-06 | End: 2024-09-09

## 2024-09-06 RX ORDER — HYDROCODONE BITARTRATE AND ACETAMINOPHEN 5; 325 MG/1; MG/1
2 TABLET ORAL EVERY 4 HOURS PRN
Status: DISCONTINUED | OUTPATIENT
Start: 2024-09-06 | End: 2024-09-06

## 2024-09-06 RX ORDER — HYDROCODONE BITARTRATE AND ACETAMINOPHEN 5; 325 MG/1; MG/1
1 TABLET ORAL EVERY 4 HOURS PRN
Status: DISCONTINUED | OUTPATIENT
Start: 2024-09-06 | End: 2024-09-06

## 2024-09-06 RX ORDER — HYDROXYCHLOROQUINE SULFATE 200 MG/1
200 TABLET, FILM COATED ORAL 2 TIMES DAILY
Status: DISCONTINUED | OUTPATIENT
Start: 2024-09-06 | End: 2024-09-09

## 2024-09-06 RX ORDER — ALBUTEROL SULFATE 90 UG/1
1 AEROSOL, METERED RESPIRATORY (INHALATION) EVERY 4 HOURS PRN
Status: DISCONTINUED | OUTPATIENT
Start: 2024-09-06 | End: 2024-09-09

## 2024-09-06 RX ORDER — HYDROCODONE BITARTRATE AND ACETAMINOPHEN 10; 325 MG/1; MG/1
1 TABLET ORAL EVERY 4 HOURS PRN
Status: DISCONTINUED | OUTPATIENT
Start: 2024-09-06 | End: 2024-09-09

## 2024-09-06 RX ORDER — MELATONIN
3 NIGHTLY PRN
Status: DISCONTINUED | OUTPATIENT
Start: 2024-09-06 | End: 2024-09-06

## 2024-09-06 RX ORDER — LEFLUNOMIDE 20 MG/1
20 TABLET ORAL DAILY
Status: DISCONTINUED | OUTPATIENT
Start: 2024-09-06 | End: 2024-09-09

## 2024-09-06 RX ORDER — HYDROCODONE BITARTRATE AND ACETAMINOPHEN 10; 325 MG/1; MG/1
2 TABLET ORAL EVERY 4 HOURS PRN
Status: DISCONTINUED | OUTPATIENT
Start: 2024-09-06 | End: 2024-09-09

## 2024-09-06 RX ORDER — TRAMADOL HYDROCHLORIDE 50 MG/1
50 TABLET ORAL 4 TIMES DAILY PRN
Status: DISCONTINUED | OUTPATIENT
Start: 2024-09-06 | End: 2024-09-09

## 2024-09-06 RX ORDER — BISACODYL 10 MG
10 SUPPOSITORY, RECTAL RECTAL
Status: DISCONTINUED | OUTPATIENT
Start: 2024-09-06 | End: 2024-09-09

## 2024-09-06 RX ORDER — METOCLOPRAMIDE HYDROCHLORIDE 5 MG/ML
5 INJECTION INTRAMUSCULAR; INTRAVENOUS EVERY 8 HOURS PRN
Status: DISCONTINUED | OUTPATIENT
Start: 2024-09-06 | End: 2024-09-09

## 2024-09-06 NOTE — PLAN OF CARE
US aspiration Right hip synovial cyst done this afternoon.  Telfa and tegaderm dry and intact to aspiration site.

## 2024-09-06 NOTE — PROGRESS NOTES
[facility]   part of [facility]     Hospitalist Progress Note     Ngozi Collazo Patient Status:  Inpatient    1955 MRN [id number]   Location [facility] 3SW-A Attending Helene Meyer MD   Hosp Day # 0 PCP Irma Valera DO     Chief Complaint: Hip Pain    Subjective:     Patient seen and examined   Feels comfortable at rest  Denies fever/chills at home    Objective:    Review of Systems:   A comprehensive review of systems was completed; pertinent positive and negatives stated in subjective.    Vital signs:  Temp:  [97.5 °F (36.4 °C)-98.6 °F (37 °C)] 97.6 °F (36.4 °C)  Pulse:  [61-94] 72  Resp:  [16-20] 16  BP: (120-158)/(65-78) 144/65  SpO2:  [93 %-97 %] 94 %    Physical Exam:    General: No acute distress  Respiratory: No wheezes, no rhonchi  Cardiovascular: S1, S2, regular rate and rhythm  Abdomen: Soft, Non-tender, non-distended, positive bowel sounds  Neuro: No new focal deficits.   Extremities: No edema      Diagnostic Data:    Labs:  Recent Labs   Lab 24  1700   WBC 5.2   HGB 11.3*   MCV 89.4   .0       Recent Labs   Lab 24  1700   GLU 98   BUN 13   CREATSERUM 0.83   CA 9.4   ALB 4.1      K 3.4*      CO2 30.0   ALKPHO 114   AST 26   ALT 10   BILT 0.4   TP 6.9       Estimated Creatinine Clearance: 53.7 mL/min (based on SCr of 0.83 mg/dL).    No results for input(s): \"TROP\", \"TROPHS\", \"CK\" in the last 168 hours.    No results for input(s): \"PTP\", \"INR\" in the last 168 hours.               Microbiology    No results found for this visit on 24.      Imaging: Reviewed in Epic.    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 and now with associated fluid collection   -no systemic signs of infection, can hold on antibiotics    -Ortho on consult  -IR drainage planned, send cultures      #COPD   -Breo     #Rheumatoid arthritis  #Sjogren's  #Lupus  -Plaquenil      #Breast cancer       Beronica Stubbs, DO    Supplementary Documentation:     Quality:  DVT Mechanical Prophylaxis:   SCDs,    DVT Pharmacologic Prophylaxis   Medication    enoxaparin (Lovenox) 40 MG/0.4ML SUBQ injection 40 mg                Code Status: Full Code  Manzanares: External urinary catheter in place  Manzanares Duration (in days):       The 21st Century Cures Act makes medical notes like these available to patients in the interest of transparency. Please be advised this is a medical document. Medical documents are intended to carry relevant information, facts as evident, and the clinical opinion of the practitioner. The medical note is intended as peer to peer communication and may appear blunt or direct. It is written in medical language and may contain abbreviations or verbiage that are unfamiliar.

## 2024-09-06 NOTE — H&P
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  She denies any fall or trauma.  No recent fever or chills, nausea or vomiting.    History/Other:    Past Medical History:  Past Medical History:    Achilles tendonitis    Arthritis    Asthma (HCC)    Atherosclerosis    with ectasia    Back problem    back surgery lumbar    Cancer (HCC)    rt breast dcis    Carpal tunnel syndrome    bilateral    Cataract    Chronic foot pain    right    Diarrhea, unspecified    Duodenitis    peptic    Dyslipidemia    Dysphagia    Essential hypertension    Exposure to radiation    Fasciitis    Fatigue    Food intolerance    Foot fracture, left    left foot fx: excessive walking    H. pylori infection    Hammertoe    second toe right foot    Heart valve disease    Hemorrhoids    High blood pressure    History of blood transfusion    no reactions [facility]    Hypercalcemia    IBS (irritable bowel syndrome)    Internal hemorrhoids    Grade II    Irregular Z line of esophagus    Localized primary carpometacarpal osteoarthritis    Lupus (HCC)    Macular degeneration    early signs, bilaterally, remained stable    Osteoarthritis    left thumb, severe    Osteomyelitis of right foot (HCC)    Balaji OSCAR, Dr. Allan    Osteoporosis    Pain in joints    Pneumonia due to organism    Postmenopausal atrophic vaginitis    Pulmonary fibrosis (HCC)    referring to pulm, possible  Sjogren's involvement?    Rheumatoid arthritis (HCC)    Rheumatoid arthritis(714.0)    Sicca syndrome (HCC)    Sjogren's syndrome (HCC)    Stress fracture of the metatarsals    right foot    Systemic lupus erythematosus (HCC)    Visual impairment    glasses    Vitamin D deficiency     Past Surgical History:   Past Surgical History:   Procedure Laterality Date    Adenoidectomy      Appendectomy  age 8    [facility]    Back surgery      Carpal tunnel release Bilateral     Cataract      Colonoscopy      Colonoscopy      Dermatology shave biopsy (d1k.1)  12/29/2011    Shave biopsy, skin, right shoulder    Foot surgery  04/2013    revision right foot surgery    Foot/toes surgery proc unlisted  09/2011    first MTM joint fusion with ORIF of second and third MTs with correction of the second hammertoe of the right foot by Dr. Hatfield    Knee replacement surgery      Lumpectomy right  2004    rt breast lumpectomy, [facility]    Divina biopsy stereo nodule 1 site right (cpt=19081)  2004    Other surgical history  01/11/2013    trigger thumb injections    Radiation right  2004    MAMMOSITE    Revision total hip arthroplasty Right 02/22/2024    Right Revision Total Hip Arthroplasty - Irrigation/Debridement and Modular Component Exchange    Spine surgery procedure unlisted      L4-5    Tonsillectomy      Total hip replacement Right     Total knee replacement Left 05/24/2018    Upper gi endoscopy,exam  12/18/2008      Family History:   Family History   Problem Relation Age of Onset    Heart Disorder Father     Other (Other) Father     Other (Pulmonary Embolism) Father     Diabetes Mother     Asthma Mother     Other (Other) Mother     Other (Abdominal Aortic Aneurysm) Other     Fibromyalgia Sister     Asthma Sister     Breast Cancer Self 50    DCIS Self     No Known Problems Son     No Known Problems Brother      Social History:    reports that she quit smoking about 17 years ago. Her smoking use included cigarettes. She  started smoking about 54 years ago. She has a 37.4 pack-year smoking history. She has never used smokeless tobacco. She reports that she does not currently use alcohol. She reports that she does not currently use drugs.     Allergies:   Allergies   Allergen Reactions    Allergy ANAPHYLAXIS and SWELLING     Bee Stings, Catgut Sutures      Bee Sting [Bee Venom] SWELLING     Bee sting    Erythromycin Base NAUSEA ONLY    Vicryl Sutures OTHER (SEE COMMENTS)     Cat gut sutures as a child, developed an infection       Medications:    No current facility-administered medications on file prior to encounter.     Current Outpatient Medications on File Prior to Encounter   Medication Sig Dispense Refill    hydroxychloroquine 200 MG Oral Tab Take 1 tablet (200 mg total) by mouth 2 (two) times daily. 180 tablet 0    ALBUTEROL 108 (90 Base) MCG/ACT Inhalation Aero Soln USE 2 INHALATIONS ORALLY   EVERY 6 HOURS AS NEEDED FORWHEEZING OR FOR SHORTNESS  OF BREATH. 54 g 0    traMADol 50 MG Oral Tab Take 1 tablet (50 mg total) by mouth 4 (four) times daily as needed for Pain. 120 tablet 0    leflunomide 20 MG Oral Tab Take 1 tablet (20 mg total) by mouth daily. 90 tablet 0    EPINEPHrine (EPIPEN 2-RACHELE) 0.3 MG/0.3ML Injection Solution Auto-injector Inject 0.3 mL (1 each total) as directed as needed. 1 each 1    Cevimeline HCl 30 MG Oral Cap Take 1 capsule (30 mg total) by mouth 3 (three) times daily. 270 capsule 1    pregabalin 50 MG Oral Cap TAKE 1 CAPSULE EVERY       MORNING WITH 75MG IN THE   EVENING (Patient taking differently: Take 1 capsule (50 mg total) by mouth every morning. TAKE 1 CAPSULE EVERY       MORNING WITH 75MG IN THE   EVENING) 90 capsule 1    fluticasone furoate-vilanterol (BREO ELLIPTA) 200-25 MCG/ACT Inhalation Aerosol Powder, Breath Activated Inhale 1 puff into the lungs daily. 3 each 3    pregabalin (LYRICA) 75 MG Oral Cap Take 1 capsule (75 mg total) by mouth at bedtime. 90 capsule 1    cefadroxil 500 MG Oral Cap  Take 1 capsule (500 mg total) by mouth 2 (two) times daily.      acetaminophen 500 MG Oral Tab Take 1 tablet (500 mg total) by mouth every 6 (six) hours as needed for Pain.      Naloxone HCl 4 MG/0.1ML Nasal Liquid 4 mg by Nasal route as needed. If patient remains unresponsive, repeat dose in other nostril 2-5 minutes after first dose. 1 kit 0    sodium chloride 0.65 % Nasal Solution 2 sprays by Nasal route as needed for congestion.      ALBUTEROL SULFATE  (90 Base) MCG/ACT Inhalation Aero Soln USE 2 INHALATIONS ORALLY   EVERY 6 HOURS AS NEEDED FORWHEEZING (APPOINTMENT      NEEDED FOR MORE REFILLS) (Patient taking differently: Inhale 2 puffs into the lungs every 4 (four) hours as needed for Wheezing or Shortness of Breath.) 3 Inhaler 3    PREVIDENT 5000 DRY MOUTH 1.1 % Dental Gel Place 1 Application onto teeth daily.           Review of Systems:   A comprehensive review of systems was completed.    Pertinent positives and negatives noted in the HPI.    Objective:   Physical Exam:    /71   Pulse 71   Temp 97.5 °F (36.4 °C) (Temporal)   Resp 18   Ht 5' 4\" (1.626 m)   Wt 195 lb (88.5 kg)   SpO2 95%   BMI 33.47 kg/m²   General: No acute distress, Alert  Respiratory: No rhonchi, no wheezes  Cardiovascular: S1, S2. Regular rate and rhythm  Abdomen: Soft, Non-tender, non-distended, positive bowel sounds  Neuro: No new focal deficits  Extremities: No edema      Results:    Labs:      Labs Last 24 Hours:    Recent Labs   Lab 09/05/24  1700   RBC 3.85   HGB 11.3*   HCT 34.4*   MCV 89.4   MCH 29.4   MCHC 32.8   RDW 15.5   NEPRELIM 3.55   WBC 5.2   .0       Recent Labs   Lab 09/05/24  1700   GLU 98   BUN 13   CREATSERUM 0.83   EGFRCR 75   CA 9.4   ALB 4.1      K 3.4*      CO2 30.0   ALKPHO 114   AST 26   ALT 10   BILT 0.4   TP 6.9       Lab Results   Component Value Date    PT 12.5 09/16/2011    INR 1.10 10/08/2023    INR 1.05 10/07/2023    INR 1.08 09/21/2023       No results for  input(s): \"TROP\", \"TROPHS\", \"CK\" in the last 168 hours.    No results for input(s): \"TROP\", \"PBNP\" in the last 168 hours.    No results for input(s): \"PCT\" in the last 168 hours.    Imaging: Imaging data reviewed in Epic.    Assessment & Plan:      # Failure of right hip total arthroplasty  - admit, pain control   - her primary ortho at [facility] notified and consulted -> plan for conservative Rx-> dc to rehab and surgery over the next couple of weeks    # Hypokalemia, replace     # Large synovial cyst within hip joint  - plan for IR eval in am for I&D to rule out infection per ortho rec    # RA  # ILD  # pulmonary HTN   - monitor volume status     # HTN  # dyslipidemia   - resume home meds once verified.     #Breast cancer       Plan of care discussed with patient and ER.     Helene Meyer MD    Supplementary Documentation:     The 21st Century Cures Act makes medical notes like these available to patients in the interest of transparency. Please be advised this is a medical document. Medical documents are intended to carry relevant information, facts as evident, and the clinical opinion of the practitioner. The medical note is intended as peer to peer communication and may appear blunt or direct. It is written in medical language and may contain abbreviations or verbiage that are unfamiliar.               **Certification      PHYSICIAN Certification of Need for Inpatient Hospitalization - Initial Certification    Patient will require inpatient services that will reasonably be expected to span two midnight's based on the clinical documentation in H+P.   Based on patients current state of illness, I anticipate that, after discharge, patient will require TBD.

## 2024-09-06 NOTE — CM/SW NOTE
09/06/24 1500   CM/SW Referral Data   Referral Source Social Work (self-referral)   Reason for Referral Discharge planning   Informant Patient   Patient Info   Patient's Current Mental Status at Time of Assessment Alert;Oriented   Patient's Home Environment [facility]   Patient lives with Alone   Patient Status Prior to Admission   Independent with ADLs and Mobility No   Pt. requires assistance with Housework;Driving   Services in place prior to admission DME/Supplies at home;jail Home Care   Type of DME/Supplies Wheeled Walker;Grab Bars;Raised Toilet Seat;Shower Chair   jail Home Care Provider Private Duty   Discharge Needs   Anticipated D/C needs To be determined       Sw met with pt to assess for eventual dc needs.  Pt is 68 yo female, admitted due to hip pain.  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    Pt reports she lives alone.  Has  every two weeks for household needs.  Uses delivery services for groceries since she has not been confident enough to drive.  Pt  uses walker to ambulate.    She has hx of [facility] with [facility] and [facility] at [facility].    Pt states orthopedic device they need for surgery will take two weeks to get iin.  She hopes she can go home with [facility]. Pt states she had fluid drained from hip today so hopes that will help with pain issues.    PT to see- they were waiting to get weight bearing orders.  Await their input.    Sw to follow closely for dc needs.    Anu Sotelo LCSW  /Discharge Planner

## 2024-09-06 NOTE — PLAN OF CARE
Patient alert and oriented x4. VSS on RA. Tele in NSR. Pain controlled with PO PRN pain meds. Swelling to right hip. Denies n/t. SCDs in place, ankle pumps encouraged. Voiding freely with purewick in place. Tolerating diet, no c/o n/v. Rolling side to side.     Plan: drainage of cyst, PT/OT eval

## 2024-09-06 NOTE — ED PROVIDER NOTES
Patient Seen in: [facility] Emergency Department      History     Chief Complaint   Patient presents with    Hip Pain     Stated Complaint: Hip Pain    Subjective:   HPI    69-year-old female presents to the emergency department with being unable to put weight on her right leg due to severe hip pain.  Patient states that she has had issues with recurrent dislocations of her right hip and does walk with a walker.  She takes tramadol for pain but today was unable to do any ambulation because it was too unbearable.  She states about 2 weeks ago she had noted some swelling still the lateral side of her leg and that has gotten worse.  She states that she has had issues with synovial cyst since she did have an infected synovial cyst in the past.  She denies any fevers.  No nausea or vomiting.  No other acute complaints    Objective:   No pertinent past medical history.            No pertinent past surgical history.              No pertinent social history.            Review of Systems   All other systems reviewed and are negative.      Positive for stated Chief Complaint: Hip Pain    Other systems are as noted in HPI.  Constitutional and vital signs reviewed.      All other systems reviewed and negative except as noted above.    Physical Exam     ED Triage Vitals [09/05/24 1431]   /78   Pulse 94   Resp 18   Temp 97.5 °F (36.4 °C)   Temp src Temporal   SpO2 94 %   O2 Device None (Room air)       Current Vitals:   Vital Signs  BP: 130/71  Pulse: 71  Resp: 18  Temp: 97.5 °F (36.4 °C)  Temp src: Temporal  MAP (mmHg): 89    Oxygen Therapy  SpO2: 95 %  O2 Device: None (Room air)            Physical Exam  Vitals and nursing note reviewed.   Constitutional:       Appearance: She is well-developed.   HENT:      Head: Normocephalic and atraumatic.   Cardiovascular:      Rate and Rhythm: Normal rate and regular rhythm.      Pulses: Normal pulses.      Heart sounds: Normal heart sounds.   Pulmonary:      Effort:  Pulmonary effort is normal.      Breath sounds: Normal breath sounds. No stridor. No wheezing.   Abdominal:      General: Bowel sounds are normal.      Palpations: Abdomen is soft.      Tenderness: There is no abdominal tenderness. There is no rebound.   Musculoskeletal:         General: Tenderness present. Normal range of motion.      Cervical back: Normal range of motion and neck supple.      Comments: Finger external rotation.  Patient has a large mass on the lateral side of her right hip no erythema no open wounds   Lymphadenopathy:      Cervical: No cervical adenopathy.   Skin:     General: Skin is warm and dry.      Capillary Refill: Capillary refill takes less than 2 seconds.      Coloration: Skin is not pale.   Neurological:      General: No focal deficit present.      Mental Status: She is alert and oriented to person, place, and time.      Cranial Nerves: No cranial nerve deficit.      Coordination: Coordination normal.              ED Course     Labs Reviewed   COMP METABOLIC PANEL (14) - Abnormal; Notable for the following components:       Result Value    Potassium 3.4 (*)     All other components within normal limits   CBC WITH DIFFERENTIAL WITH PLATELET - Abnormal; Notable for the following components:    HGB 11.3 (*)     HCT 34.4 (*)     Lymphocyte Absolute 0.78 (*)     All other components within normal limits   SED RATE, WESTERGREN (AUTOMATED) - Abnormal; Notable for the following components:    Sed Rate 62 (*)     All other components within normal limits   C-REACTIVE PROTEIN - Normal   LACTIC ACID, PLASMA - Normal   BLOOD CULTURE   BLOOD CULTURE             CT HIP(BONE) RIGHT (CPT=73700)    Addendum Date: 9/5/2024    ADDENDUM:  There are typographical errors within the report regarding the laterality of the study.  All the above described findings and abnormalities refer to the right hip and right hip prosthesis.  Dictated by (CST): Kevin Dale DO on 9/05/2024 at 8:10 PM     Finalized by (CST):  Kevin Dale,  on 9/05/2024 at 8:11 PM             Result Date: 9/5/2024  PROCEDURE:  CT HIP(BONE) RIGHT (CPT=73700)  COMPARISON:  [facility] , XR, XR HIP W OR WO PELVIS 2 OR 3 VIEWS, RIGHT (CPT=73502), 2/06/2024, 1:19 AM.  [facility] , XR, XR HIP W OR WO PELVIS 1 VIEW, RIGHT (CPT=73501), 2/06/2024, 2:18 AM.  [facility] , XR, XR HIP W OR WO PELVIS 2 OR 3 VIEWS, RIGHT (CPT=73502), 9/05/2024, 3:32 PM.  INDICATIONS:  History of right hip arthroplasty , presenting with pain of the right hip for 1 week.  Abnormal radiographs suggesting potential osteolysis along the inferior margin of the acetabular cup.  TECHNIQUE:  Multi-planar CT images were created without intravenous contrast. Shaded surface renderings are generated on an independent CT scanner workstation.  Dose reduction techniques were used. Dose information is transmitted to the ACR (American College of Radiology) NRDR (National Radiology Data Registry) which includes the Dose Index Registry  3-D RENDERING:  Three dimensional image processing was completed using a separate workstation under concurrent supervision. Images were archived.  PATIENT STATED HISTORY:(As transcribed by Technologist)  Right hip pain x 1 week, pt reports she is unable to bear weight, hx 9 hip dislocations. Hip replacement surgery 7 months ago.    FINDINGS:  There is a total left hip arthroplasty noted.  The prosthetic acetabular cup appears superiorly subluxed and posteriorly from its original fixation site with significant osteolysis of the underlying native acetabular cup consistent with hardware loosening and failure.  Of note, the prosthetic femoral head appears properly seated within the prosthetic acetabular cup.  There is a large surrounding fluid collection extending into the subcutaneous tissues along the lateral aspect of the right hip, suggesting a large synovial cyst contiguous with the hip joint measuring over 20 cm in maximal dimension.            CONCLUSION:  1. Total left hip  arthroplasty reveals posterior and superior subluxation of the prosthetic acetabular cup from its original fixation slight with significant osteolysis of the underlying native acetabular cup consistent with hardware loosening and failure. 2. There is a large low-density fluid collection extending from the left hip into the subcutaneous tissues along the left lateral aspect of the right hip suggesting a large synovial cyst contiguous with the hip joint measuring over 20 cm in maximal dimension.   LOCATION:  [facility]   Dictated by (CST): Kevin Dale DO on 9/05/2024 at 6:39 PM     Finalized by (CST): Kevin Dale DO on 9/05/2024 at 6:43 PM       XR HIP W OR WO PELVIS 2 OR 3 VIEWS, RIGHT (CPT=73502)    Result Date: 9/5/2024  PROCEDURE:  XR HIP W OR WO PELVIS 2 OR 3 VIEWS, RIGHT ( CPT=73502)  TECHNIQUE:  Unilateral 2 to 3 views of the hip and pelvis if performed.  COMPARISON:  [facility] , XR, XR HIP W OR WO PELVIS 1 VIEW, RIGHT (CPT=73501), 2/06/2024, 2:18 AM.  INDICATIONS:  Hip Pain  PATIENT STATED HISTORY: (As transcribed by Technologist)  pt states right hip pain.               CONCLUSION:  Postsurgical changes of right total hip arthroplasty.   There is osteolysis involving the inferior right acetabulum with superior subluxation of the femoral component concerning for dislocation/subluxation.  Acute infectious process cannot be excluded.   LOCATION:  [id number]   Dictated by ([facility]): Ivana Mensah MD on 9/05/2024 at 4:13 PM     Finalized by (CST): Ivana Mensah MD on 9/05/2024 at 4:15 PM              MDM      Patient had IV established and blood work obtained.  She is given a dose of Dilaudid and did feel better but still is uncomfortable.  She had some plain x-rays are reviewed the films she has arthroplasty and the large mass.  Reviewed radiology report there was concern about the potential subluxation and they also had stated a infectious process cannot be ruled out.  Subsequently she underwent a CT scan and this  revealed a large synovial cyst and incompetence of the hardware.  I contacted her orthopedist at [place] who knows her very well and states that this is a new change from previous radiographs when he had seen her most recently.  He states the revision will require specialized hardware that will take a minimum of 2 weeks.  Patient has had some issues with a infected synovial cyst in the past and states there was some concern that this could be infection as well we did a CBC which was normal she had a CRP which was negative but had an elevated sed rate.  This is of uncertain significance.  After discussion with the patient and with the hospitalist is felt the patient is best served being admitted and having IR drain and culture of the cyst.  If she continues to have gait disturbance that she cannot ambulate with her walker she may ultimately require SNF placement as well.  I reviewed all this with the patient and she is understanding and agreeable.  Further care and treatment per her admitting physicians  Admission disposition: 9/5/2024  9:52 PM                                        Medical Decision Making      Disposition and Plan     Clinical Impression:  1. Gait disturbance    2. Synovial cyst    3. Failure of recalled hardware of total hip arthroplasty, initial encounter ([facility])         Disposition:  Admit  9/5/2024  9:52 pm    Follow-up:  No follow-up provider specified.        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

## 2024-09-06 NOTE — PLAN OF CARE
NURSING ADMISSION NOTE      Patient admitted via Cart  Oriented to room.  Safety precautions initiated.  Bed in low position.  Call light in reach.  Hospitalist aware of arrival, admission devin and med rec done @ bedside.  Made comfortable, updated and agreeable on POC.    AxO4. VSS on 2L NC - baseline RA. SCDs applied. On tele-NSR. Denies nausea on arrival, voiding via purewick. LBM 9/5. C/o pain to RLE - RLE internally rotated, swollen w/ hx lymphedema, unable to bear weight - walker @ baseline. R pedal pulse per doppler. PRN pain meds available. PT/OT to see. Pending IR fluid drainage. Safety measures placed. Care ongoing.

## 2024-09-06 NOTE — ED QUICK NOTES
Orders for admission, patient is aware of plan and ready to go upstairs. Any questions, please call ED RN Smiley at extension [phone].     Patient Covid vaccination status: Fully vaccinated     COVID Test Ordered in ED: None    COVID Suspicion at Admission: N/A    Running Infusions:  None    Mental Status/LOC at time of transport: A&OX4.    Other pertinent information:   CIWA score: N/A   NIH score:  N/A

## 2024-09-06 NOTE — OCCUPATIONAL THERAPY NOTE
Attempted to see pt for skilled OT services this date. Pt is currently awaiting IR drainage. Will re-attempt post-op. Sandra Cruz, 09/06/24, 1:52 PM

## 2024-09-07 LAB — POTASSIUM SERPL-SCNC: 3.8 MMOL/L (ref 3.5–5.1)

## 2024-09-07 PROCEDURE — 99232 SBSQ HOSP IP/OBS MODERATE 35: CPT | Performed by: INTERNAL MEDICINE

## 2024-09-07 NOTE — PLAN OF CARE
A&Ox4, VSS on room air. Reporting moderate-severe pain to R hip, PO pain medication given with adequate relief. Up to chair for meals, tolerating well. Plan to DC to [facility] when cleared.

## 2024-09-07 NOTE — OCCUPATIONAL THERAPY NOTE
OCCUPATIONAL THERAPY EVALUATION - INPATIENT     Room Number: 359/359-A  Evaluation Date: 9/7/2024  Type of Evaluation: Initial  Presenting Problem: R hip pain with history COLTON with recurrent dislocations. CT R hip with large synovial cyst as well as posterior and superior subluxation of prosthetic acetabular cup consistent with hardware loosening and failure, 9/6 s/p US aspiration Right hip synovial cyst    Physician Order: IP Consult to Occupational Therapy  Reason for Therapy: ADL/IADL Dysfunction and Discharge Planning    OCCUPATIONAL THERAPY ASSESSMENT   Patient is currently functioning below baseline with toileting, bathing, lower body dressing, transfers, and dynamic standing balance. Prior to admission, patient's baseline is modified independent.  Patient is requiring minimum assistance/mod A as a result of the following impairments: decreased endurance and impaired standing balance. Occupational Therapy will continue to follow for duration of hospitalization.    Patient will benefit from continued skilled OT Services to promote return to prior level of function and safety with continuous assistance and gradual rehabilitative therapy      History Related to Current Admission: Patient is a 69 year old female admitted on 9/5/2024 with Presenting Problem: R hip pain with history COLTON with recurrent dislocations. CT R hip with large synovial cyst as well as posterior and superior subluxation of prosthetic acetabular cup consistent with hardware loosening and failure, 9/6 s/p US aspiration Right hip synovial cyst. Co-Morbidities : Multiple right hip revisions and dislocations, OA, HTN, Sjogren's IBS, asthma,R COLTON 12/2022,RA, fibromyalgia, OA, compression fractures spine    Recent admissions:  2/22 to 2/27/24 at [place] for Right Revision Total Hip Arthroplasty, Irrigation/Debridement and Modular Component Exchange-> [facility]  1/29 to 1/31/24 for Right hip dislocation,s/p Closed reduction completed -> home  OT/PT  *Multiple hospital admission and prolonged [facility] admission from March to December 2023    CT hip 9/5  CONCLUSION:    1. Total left hip arthroplasty reveals posterior and superior subluxation of the prosthetic acetabular cup from its original fixation slight with significant osteolysis of the underlying native acetabular cup consistent with hardware loosening and   failure.   2. There is a large low-density fluid collection extending from the left hip into the subcutaneous tissues along the left lateral aspect of the right hip suggesting a large synovial cyst contiguous with the hip joint measuring over 20 cm in maximal   dimension.          WEIGHT BEARING RESTRICTION  Weight Bearing Restriction: R lower extremity        R Lower Extremity: Weight Bearing as Tolerated       Recommendations for nursing staff:   Transfers: min A   Toileting location: bedside commode    EVALUATION SESSION:  Patient Start of Session: supine  FUNCTIONAL TRANSFER ASSESSMENT  Sit to Stand: Edge of Bed  Edge of Bed: Contact Guard Assist    BED MOBILITY  Supine to Sit : Independent       COGNITION  Overall Cognitive Status:  WFL - within functional limits    Upper Extremity   ROM: within functional limits   Strength: within functional limits     EDUCATION PROVIDED  Patient: Role of Occupational Therapy; Plan of Care; Functional Transfer Techniques; Fall Prevention; Posture/Positioning  Patient's Response to Education: Returned Demonstration    Equipment used: rw     Therapist comments: pleasant,motivated. Supine to sit independent level.  Per pt, no pain when at rest.   CGA to stand, audible popping noise noted.  Pt reported, \"I feel like it will be like a sponge if I try to step on it.\" Referring to R hip.  Chair was placed on her L side. Min A to take about 4 steps bed to chair.  Informed patient about use of bedside commode for now for safety. Verbalized understanding.    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  Son lives close by. Uses RW.    PAIN ASSESSMENT  Rating: Unable to rate  Location: none at rest, but \"feeling like sponge\" when standing  Management Techniques: Body mechanics    OBJECTIVE  Precautions: Bed/chair alarm (CT of R hip showing posterior and superior subluxation of prosthetic acetabular cup consistent with hardware loosening and failure, per chart, waiting for parts to arrive, [facility] surgery in 2 weeks)  Fall Risk: High fall risk      ASSESSMENTS    AM-PAC ‘6-Clicks’ Inpatient Daily Activity Short Form  -   Putting on and taking off regular lower body clothing?: A Lot  -   Bathing (including washing, rinsing, drying)?: A Lot  -   Toileting, which includes using toilet, bedpan or urinal? : A Little  -   Putting on and taking off regular upper body clothing?: None  -   Taking care of personal grooming such as brushing teeth?: None  -   Eating meals?: None    AM-PAC Score:  Score: 19  Approx Degree of Impairment: 42.8%  Standardized Score (AM-PAC Scale): 40.22    ADDITIONAL TESTS     NEUROLOGICAL FINDINGS      COGNITION ASSESSMENTS       PLAN  OT Treatment Plan: Energy conservation/work simplification techniques;Balance activities;ADL training;Functional transfer training;UE strengthening/ROM;Patient/Family education;Equipment eval/education;Compensatory technique education  Rehab Potential : Good  Frequency: 3x/week  Number of Visits to Meet Established  Goals: 5    ADL Goals   Patient will perform lower body dressing:  with supervision and with adaptive equipment PRN  Patient will perform toileting: with supervision    Functional Transfer Goals  Patient will transfer to toilet:  with supervision    UE Exercise Program Goal  Patient will be independent with bilateral AROM HEP (home exercise program).    Patient Evaluation Complexity Level:   Occupational Profile/Medical History MODERATE - Expanded review of history including review of medical or therapy record   Specific performance deficits impacting engagement in ADL/IADL MODERATE  3 - 5 performance deficits   Client Assessment/Performance Deficits MODERATE - Comorbidities and min to mod modifications of tasks    Clinical Decision Making LOW - Analysis of occupational profile, problem-focused assessments, limited treatment options    Overall Complexity LOW     OT Session Time: 20 minutes  Self-Care Home Management: 8 minutes  Therapeutic Activity: 2 minutes

## 2024-09-07 NOTE — PROGRESS NOTES
[facility]   part of [facility]     Hospitalist Progress Note     Ngozi Collazo Patient Status:  Inpatient    1955 MRN [id number]   Location [facility] 3SW-A Attending Helene Meyer MD   Hosp Day # 1 PCP Irma Valera DO     Chief Complaint: Hip Pain    Subjective:     Drainage yesterday  Feels well, no complaints     Objective:    Review of Systems:   A comprehensive review of systems was completed; pertinent positive and negatives stated in subjective.    Vital signs:  Temp:  [97.4 °F (36.3 °C)-99 °F (37.2 °C)] 98.1 °F (36.7 °C)  Pulse:  [61-76] 61  Resp:  [16-18] 18  BP: (110-133)/(60-91) 110/82  SpO2:  [90 %-97 %] 94 %    Physical Exam:    General: No acute distress  Respiratory: No wheezes, no rhonchi  Cardiovascular: S1, S2, regular rate and rhythm  Abdomen: Soft, Non-tender, non-distended, positive bowel sounds  Neuro: No new focal deficits.   Extremities: No edema      Diagnostic Data:    Labs:  Recent Labs   Lab 24  1700   WBC 5.2   HGB 11.3*   MCV 89.4   .0       Recent Labs   Lab 24  1700 24  0635   GLU 98  --    BUN 13  --    CREATSERUM 0.83  --    CA 9.4  --    ALB 4.1  --      --    K 3.4* 3.8     --    CO2 30.0  --    ALKPHO 114  --    AST 26  --    ALT 10  --    BILT 0.4  --    TP 6.9  --        Estimated Creatinine Clearance: 53.7 mL/min (based on SCr of 0.83 mg/dL).    No results for input(s): \"TROP\", \"TROPHS\", \"CK\" in the last 168 hours.    No results for input(s): \"PTP\", \"INR\" in the last 168 hours.               Microbiology    Hospital Encounter on 24   1. Blood Culture     Status: None (Preliminary result)    Collection Time: 24  8:29 PM    Specimen: Blood,peripheral   Result Value Ref Range    Blood Culture Result No Growth 1 Day N/A         Imaging: Reviewed in Epic.    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 and now with associated fluid collection   -no systemic signs of infection, can hold on antibiotics   -Ortho on consult  -s/p IR drainage -await cultures to rule out abscess  -plan for eventual discharge to [facility] and surgical intervention in 2 weeks    #Chronic foot infection on chronic cefadroxil - Keflex in place       #COPD   -Breo     #Rheumatoid arthritis  #Sjogren's  #Lupus  -Plaquenil      #Breast cancer       Beronica Stubbs, DO    Supplementary Documentation:     Quality:  DVT Mechanical Prophylaxis:   SCDs,    DVT Pharmacologic Prophylaxis   Medication    enoxaparin (Lovenox) 40 MG/0.4ML SUBQ injection 40 mg                Code Status: Full Code  Manzanares: External urinary catheter in place  Manzanares Duration (in days):       The 21st Century Cures Act makes medical notes like these available to patients in the interest of transparency. Please be advised this is a medical document. Medical documents are intended to carry relevant information, facts as evident, and the clinical opinion of the practitioner. The medical note is intended as peer to peer communication and may appear blunt or direct. It is written in medical language and may contain abbreviations or verbiage that are unfamiliar.

## 2024-09-07 NOTE — PHYSICAL THERAPY NOTE
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  Co-Morbidities : Multiple right hip revisions and dislocations, OA, HTN, Sjogren's IBS, asthma,R COLTON 12/2022,RA, fibromyalgia, OA, compression fractures spine  Reason for Therapy: Mobility Dysfunction and Discharge Planning    Recent Admissions:   2/22-2/27/24 ([facility]): right hip revision >>[facility]  1/29-1/31/24 ([facility]): closed reduction of R COLTON dislocation >[facility]    Initial hip replacement: Dec 2, 2022 > with 8 hospital ED visits/admissions for subsequent issues with hip (dislocations, revision surgeries, poly exchange, etc)    Imaging:   CT hip 9/5   1. Total left hip arthroplasty reveals posterior and superior subluxation of the prosthetic acetabular cup from its original fixation slight with significant osteolysis of the underlying native acetabular cup consistent with hardware loosening and   failure.   2. There is a large low-density fluid collection extending from the left hip into the subcutaneous tissues along the left lateral aspect of the right hip suggesting a large synovial cyst contiguous with the hip joint measuring over 20 cm in maximal   dimension.    PHYSICAL THERAPY ASSESSMENT   Patient is currently functioning below baseline with bed mobility, transfers, gait, stair negotiation, maintaining seated position, and standing prolonged periods.  Prior to admission, patient's baseline is Carola with RW.  Patient is requiring stand-by assist, contact guard assist, and minimal assist as a result of the following impairments: decreased functional strength, decreased endurance/aerobic capacity, pain, impaired static and dynamic standing balance, impaired coordination, impaired motor planning, decreased muscular endurance, and limited RLE ROM. Physical Therapy will  continue to follow for duration of hospitalization.      Patient will benefit from continued skilled PT Services to promote return to prior level of function and safety with continuous assistance and gradual rehabilitative therapy .    PLAN  PT Treatment Plan: Bed mobility;Body mechanics;Coordination;Endurance;Energy conservation;Patient education;Family education;Neuromuscular re-educate;Gait training;Range of motion;Strengthening;Stoop training;Stair training;Transfer training;Balance training  Rehab Potential : Good  Frequency (Obs): 3-5x/week  Number of Visits to Meet Established Goals: 5    CURRENT GOALS  Goal #1 Patient is able to demonstrate supine - sit EOB @ level: independent - goal met 9/7 AG   Goal #2 Patient is able to demonstrate transfers Sit to/from Stand at assistance level: supervision   Goal #3 Patient is able to ambulate 50 feet with assist device: walker - rolling at assistance level: supervision   Goal #4 Pt able to ascend/descend 3 steps with rail at supervision level.    Goal #5    Goal #6    Goal Comments: Goals established on 9/7/2024    PHYSICAL THERAPY MEDICAL/SOCIAL HISTORY  History related to current admission: Patient is a 69 year old female admitted on 9/5/2024 from home for R hip pain.  Pt diagnosed with subluxation of posterior and superior acetabular cup component of hip arthroplasty.    HOME SITUATION  Type of Home: House   Home Layout: Two level;Able to live on main level  Stairs to Enter : 3  Railing: Yes          Lives With: Alone  Drives: Yes  Patient Owned Equipment: Rolling walker;Cane;Wheelchair  Patient Regularly Uses: Reading glasses    Prior Level of Neola:   Per pt she reports living at home alone. Mainly been using a RW. Has 3 steps to enter the building with use of rail. She does report with the multiple dislocations and hip surgeries she has had, she has adapted her home to make it work for her.     SUBJECTIVE  \"It feels like a sponge. The last times it  dislocated, it has felt like this.\"    OBJECTIVE  Precautions: Bed/chair alarm (CT of R hip showing posterior and superior subluxation of prosthetic acetabular cup consistent with hardware loosening and failure, per chart, waiting for parts to arrive, [place] surgery in 2 weeks)  Fall Risk: High fall risk    WEIGHT BEARING RESTRICTION  Weight Bearing Restriction: R lower extremity        R Lower Extremity: Weight Bearing as Tolerated       PAIN ASSESSMENT  Rating: Unable to rate  Location: right hip  Management Techniques: Activity promotion;Body mechanics;Repositioning    COGNITION  Overall Cognitive Status:  WFL - within functional limits    RANGE OF MOTION AND STRENGTH ASSESSMENT  Upper extremity ROM and strength are within functional limits   Lower extremity ROM is within functional limits   Lower extremity strength is within functional limits     BALANCE  Static Sitting: Good  Dynamic Sitting: Fair +  Static Standing: Fair  Dynamic Standing: Fair -    ADDITIONAL TESTS                                    ACTIVITY TOLERANCE                         O2 WALK       NEUROLOGICAL FINDINGS                      AM-PAC '6-Clicks' INPATIENT SHORT FORM - BASIC MOBILITY  How much difficulty does the patient currently have...  Patient Difficulty: Turning over in bed (including adjusting bedclothes, sheets and blankets)?: None   Patient Difficulty: Sitting down on and standing up from a chair with arms (e.g., wheelchair, bedside commode, etc.): A Little   Patient Difficulty: Moving from lying on back to sitting on the side of the bed?: None   How much help from another person does the patient currently need...   Help from Another: Moving to and from a bed to a chair (including a wheelchair)?: A Little   Help from Another: Need to walk in hospital room?: A Little   Help from Another: Climbing 3-5 steps with a railing?: A Lot       AM-PAC Score:  Raw Score: 19   Approx Degree of Impairment: 41.77%   Standardized Score (AM-PAC  Scale): 45.44   CMS Modifier (G-Code): CK    FUNCTIONAL ABILITY STATUS  Gait Assessment   Functional Mobility/Gait Assessment  Gait Assistance: Not tested    Skilled Therapy Provided     Bed Mobility:  Rolling: NT  Supine to sit: indep   Sit to supine: Nt     Transfer Mobility:  Sit to stand: CGA to RW   Stand to sit: CGA  Gait = NT    Therapist's Comments:   Patient presents sitting up in bed. Discussed role and goal of physical therapy in hospital setting. Pt in agreement to session, eager to attempt weight bearing and get out of bed. Reports minor pain at rest, but is anticipating pain with movement and weight bearing.   Bed mobility completed independently. Once sitting EOB, able to scoot self forward indep. Sit to stand to RW at CGA. Once in standing, pt with incr difficulty standing on RLE secondary to pain. Multiple audible pops heard coming from pt's hip. Pt able to take a few lateral steps to left to reach bedside chair. Lateral steps with RW at [place]. Upright in chair at end of session. Discussed importance of continued out of bed mobility. Encouraged continued out of bed transfers to commode>chair with nursing staff and use of RW. Pt verbalizes understanding. RN aware.    Exercise/Education Provided:  Bed mobility  Body mechanics  Energy conservation  Functional activity tolerated  Posture  Transfer training    Patient End of Session: Up in chair;Needs met;Call light within reach;RN aware of session/findings;All patient questions and concerns addressed;SCDs in place;Alarm set;Discussed recommendations with /    Patient Evaluation Complexity Level:  History Moderate - 1 or 2 personal factors and/or co-morbidities   Examination of body systems Low -  addressing 1-2 elements   Clinical Presentation Low- Stable   Clinical Decision Making Low Complexity     PT Session Time: 30 minutes  Therapeutic Activity: 15 minutes

## 2024-09-07 NOTE — CM/SW NOTE
PT saw patient: Anticipated therapy need: Gradual Rehabilitative Therapy . [facility] referrals added to [facility].   SW placed phone call to patient. Patient choice, [facility]. Reserved in [facility].   Patient aware she needs a 3 midnight stay. Inpatient status 9/6.   Case sent to the [facility]. Needs PASRR.     Kayla Jarrell LCSW  /Discharge Planner

## 2024-09-07 NOTE — CM/SW NOTE
Department  notified of request for [facility] referrals started. Assigned CM/SW to follow up with pt/family on further discharge planning.     Jaz Mendez, DSC

## 2024-09-07 NOTE — PLAN OF CARE
Alert and oriented x 4. VSS. Pain controlled with prn norco. Limited movement in RLE. Doppler used for pedal pulse on R foot. Edema present. Telfa/tegaderm present on R hip from aspiration- c/d/I. Voiding without difficulty. Last BM 9/5/24. Tolerating diet. Repositioned in bed frequently for care and comfort. Plan is for pain control and PT/OT. Plan of care discussed with patient.

## 2024-09-08 LAB
ANION GAP SERPL CALC-SCNC: 6 MMOL/L (ref 0–18)
BUN BLD-MCNC: 14 MG/DL (ref 9–23)
CALCIUM BLD-MCNC: 9.1 MG/DL (ref 8.7–10.4)
CHLORIDE SERPL-SCNC: 102 MMOL/L (ref 98–112)
CO2 SERPL-SCNC: 29 MMOL/L (ref 21–32)
CREAT BLD-MCNC: 0.81 MG/DL
EGFRCR SERPLBLD CKD-EPI 2021: 78 ML/MIN/1.73M2 (ref 60–?)
GLUCOSE BLD-MCNC: 88 MG/DL (ref 70–99)
MAGNESIUM SERPL-MCNC: 2 MG/DL (ref 1.6–2.6)
OSMOLALITY SERPL CALC.SUM OF ELEC: 284 MOSM/KG (ref 275–295)
POTASSIUM SERPL-SCNC: 3.8 MMOL/L (ref 3.5–5.1)
SODIUM SERPL-SCNC: 137 MMOL/L (ref 136–145)

## 2024-09-08 PROCEDURE — 99232 SBSQ HOSP IP/OBS MODERATE 35: CPT | Performed by: INTERNAL MEDICINE

## 2024-09-08 NOTE — PLAN OF CARE
AxO4. VSS on RA. On tele-NSR. On lovenox. Tolerating diet, denies nausea. Voids via purewick. C/o discomfort/pain to R hip - relief with PRN norco and tramadol. R hip Puncture site from aspiration intact w/ scant drainage. Up mod w/ walker. PT rec [facility] - patient requesting to switch facility choice to [facility]. Pt supplied Cevimeline PO delivered to pharmacy for verification. Updated on POC. Safety measures placed. Care ongoing.

## 2024-09-08 NOTE — PROGRESS NOTES
[facility]   part of [facility]     Hospitalist Progress Note     Ngozi Collazo Patient Status:  Inpatient    1955 MRN [id number]   Location [facility] 3SW-A Attending Helene Meyer MD   Hosp Day # 2 PCP Irma Valera DO     Chief Complaint: Hip Pain    Subjective:     Right hip hurts at times but unchanged   No new complaints     Objective:    Review of Systems:   A comprehensive review of systems was completed; pertinent positive and negatives stated in subjective.    Vital signs:  Temp:  [97.8 °F (36.6 °C)-98.8 °F (37.1 °C)] 97.8 °F (36.6 °C)  Pulse:  [55-68] 55  Resp:  [18] 18  BP: (109-129)/(49-90) 120/60  SpO2:  [92 %-94 %] 93 %    Physical Exam:    General: No acute distress  Respiratory: No wheezes, no rhonchi  Cardiovascular: S1, S2, regular rate and rhythm  Abdomen: Soft, Non-tender, non-distended, positive bowel sounds  Neuro: No new focal deficits.   Extremities: No edema      Diagnostic Data:    Labs:  Recent Labs   Lab 24  1700   WBC 5.2   HGB 11.3*   MCV 89.4   .0       Recent Labs   Lab 24  1700 24  0635   GLU 98  --    BUN 13  --    CREATSERUM 0.83  --    CA 9.4  --    ALB 4.1  --      --    K 3.4* 3.8     --    CO2 30.0  --    ALKPHO 114  --    AST 26  --    ALT 10  --    BILT 0.4  --    TP 6.9  --        Estimated Creatinine Clearance: 53.7 mL/min (based on SCr of 0.83 mg/dL).    No results for input(s): \"TROP\", \"TROPHS\", \"CK\" in the last 168 hours.    No results for input(s): \"PTP\", \"INR\" in the last 168 hours.               Microbiology    Hospital Encounter on 24   1. Body Fluid Cult Aerobic and Anaerobic     Status: None (Preliminary result)    Collection Time: 24  2:33 PM    Specimen: Synovial fluid,hip; Body fluid, unspecified   Result Value Ref Range    Body Fluid Smear 1+ WBCs seen N/A    Body Fluid Smear No organisms seen N/A    Body Fluid Smear This is a cytocentrifuged smear. N/A   2. Blood Culture     Status: None  (Preliminary result)    Collection Time: 09/05/24  8:29 PM    Specimen: Blood,peripheral   Result Value Ref Range    Blood Culture Result No Growth 2 Days N/A         Imaging: Reviewed in Epic.    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 and surgical intervention in 2 weeks    #Chronic foot infection on chronic cefadroxil - Keflex in place       #COPD   -Breo     #Rheumatoid arthritis  #Sjogren's  #Lupus  -Plaquenil      #Breast cancer       Beronica Stubbs,     Supplementary Documentation:     Quality:  DVT Mechanical Prophylaxis:   SCDs,    DVT Pharmacologic Prophylaxis   Medication    enoxaparin (Lovenox) 40 MG/0.4ML SUBQ injection 40 mg                Code Status: Full Code  Manzanares: External urinary catheter in place  Manzanares Duration (in days):       The 21st Century Cures Act makes medical notes like these available to patients in the interest of transparency. Please be advised this is a medical document. Medical documents are intended to carry relevant information, facts as evident, and the clinical opinion of the practitioner. The medical note is intended as peer to peer communication and may appear blunt or direct. It is written in medical language and may contain abbreviations or verbiage that are unfamiliar.

## 2024-09-08 NOTE — CM/SW NOTE
SW received message from RN stating pt is requesting to speak with SW regarding discharge planning. SW spoke with pt via phone.     Pt stated she has changed her mind regarding [facility] facility. Pt stated she would prefer [facility] and has been to the facility in the past. [facility] is not on pt's [facility] choice list.     Referral sent to [facility] in [facility]. Pt will need one more medically necessary inpatient midnight to qualify for [facility] coverage under Medicare. PASRR completed and attached to [facility] referral. DERRICK will continue to follow.     ROYCE Richards  Discharge Planner

## 2024-09-08 NOTE — PLAN OF CARE
A&Ox4. VSS. On room air. . Telemetry monitoring - NSR. SCDs on BLE. Ankle pumps encouraged. Tolerating regular diet. Last BM 9/5. Voiding freely. Pain controlled. WBAT, ambulating with mod assist. Plan is to dc to [facility] when medically clear. Patient updated on plan of care. Safety precautions in place. Call light within reach.

## 2024-09-09 VITALS
OXYGEN SATURATION: 100 % | DIASTOLIC BLOOD PRESSURE: 70 MMHG | SYSTOLIC BLOOD PRESSURE: 130 MMHG | TEMPERATURE: 98 F | WEIGHT: 195 LBS | RESPIRATION RATE: 18 BRPM | HEART RATE: 73 BPM | BODY MASS INDEX: 33.29 KG/M2 | HEIGHT: 64 IN

## 2024-09-09 PROCEDURE — 99239 HOSP IP/OBS DSCHRG MGMT >30: CPT | Performed by: INTERNAL MEDICINE

## 2024-09-09 RX ORDER — HYDROCHLOROTHIAZIDE 25 MG/1
25 TABLET ORAL DAILY
COMMUNITY

## 2024-09-09 RX ORDER — TRAMADOL HYDROCHLORIDE 50 MG/1
50 TABLET ORAL 4 TIMES DAILY PRN
Qty: 30 TABLET | Refills: 0 | Status: SHIPPED | OUTPATIENT
Start: 2024-09-09

## 2024-09-09 RX ORDER — HYDROCODONE BITARTRATE AND ACETAMINOPHEN 10; 325 MG/1; MG/1
1 TABLET ORAL EVERY 6 HOURS PRN
Qty: 30 TABLET | Refills: 0 | Status: SHIPPED | OUTPATIENT
Start: 2024-09-09

## 2024-09-09 NOTE — CM/SW NOTE
Spoke with pt's RN who stated pt anticipated to be ready for DC to [facility] today.  Noted pt preference for [facility].  Contacted facility and left message requesting call back to confirm if pt can be accepted and beds available.  / to remain available for support and/or discharge planning.     Karrie Lainez, [facility]  Discharge Planner  [phone]

## 2024-09-09 NOTE — CM/SW NOTE
Spoke with Mag from [facility] who confirmed pt can be accepted and bed available today.  [facility] transport scheduled for 230pm.  PCS form completed and available for RN to print.  Updated pt at bedside and informed her of costs for transport.  Updated pt's RN.  No further needs at this time.  / to remain available for support and/or discharge planning.     [facility]  [phone]  Fax: [phone]    [facility]  [phone]    Karrie Lainez, [facility]  Discharge Planner  [phone]

## 2024-09-09 NOTE — DISCHARGE INSTRUCTIONS
Hip Dislocation  The hip is a ball-and-socket joint. The round head of the thighbone fits into the socket of the pelvis. A hip dislocation happens when the femur pops out of its socket. It takes a strong force to tear the ligaments and joint capsule that hold the joint in place. Sometimes a part of the thighbone or pelvis is also broken. Car accidents are the most common cause for hip dislocations.   Because of the strong forces involved, there is a high risk for complications after a hip dislocation. The complications include the following:   Injury to the sciatic nerve. This nerve passes just behind the hip joint. Hip dislocation can affect this nerve and cause immediate numbness and weakness in the lower leg.  Injury to the nearby blood vessels that supply nutrients to the femur. This can cause poor blood flow and permanent damage to this part of the femur. Symptoms of hip pain and limping may not show up until months or years later.  Injury to the joint cartilage that covers the joint surfaces.  This can be torn as the bone pops out of the joint. It can cause osteoarthritis to appear months or years later.  Your healthcare provider will check you for these problems and advise treatment when needed. Treatment depends on how severe the injury is. Torn ligaments take at least 6 weeks to heal. It may take 3 to 4 months or longer before you can return to full activity after a hip dislocation.   Home care  Follow your healthcare provider’s advice regarding weight bearing and use of crutches or a walker.  Take pain medicine as directed.  Weight-bearing with crutches can be tried with the approval of your healthcare provider. This will be after you are free of hip pain. This may take 1 to 2 weeks if there were no fractures.  Leg muscle strengthening exercises may be advised once you are pain-free and able to walk without crutches.    Follow-up care  Follow up with your healthcare provider, or as advised.   Call  911  Call 911 if you have:   Weakness, feel lightheaded, or faint  Shortness of breath or chest pain  Numbness or weakness in the affected leg  Injured leg becomes pale or cold  When to get medical advice  Call your healthcare provider right away if any of these occur:  Increasing hip pain  Increasing swelling, redness, or pain of the lower leg  Dustin last reviewed this educational content on 2/1/2022 © 2000-2023 [employer]. All rights reserved. This information is not intended as a substitute for professional medical care. Always follow your healthcare professional's instructions.

## 2024-09-09 NOTE — PROGRESS NOTES
AVS reviewed, IV dc'd, will dc at 3  pm to [facility] via [facility] , scriots for [place] & Tramadol in envelope.

## 2024-09-09 NOTE — DISCHARGE SUMMARY
[facility]     Ngozi Collazo Patient Status:  Inpatient    1955 MRN [id number]   Location [facility] 3SW-A Attending No att. 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 and now with associated fluid collection   -no systemic signs of infection, can hold on antibiotics   -s/p IR drainage -lower suspicion for abscess, Final cultures are pending  -plan for eventual discharge to [facility] and surgical intervention in 2 weeks     #Chronic foot infection on chronic cefadroxil - Keflex in place       #COPD   -Breo     #Rheumatoid arthritis  #Sjogren's  #Lupus  -Plaquenil      #Breast cancer     History of Present Illness:      Ngozi Collazo is a 69 year old female with prior history of right hip arthroplasty, 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  She denies any fall or trauma.  No recent fever or chills, nausea or vomiting.    Brief Synopsis: Patient was admitted, IR did drainage of fluid collection, patient remained without any systemic signs of infection, preliminarily cultures are without any bacterial growth, patient discharged to subacute rehab in stable condition.    Lace+ Score: 74  59-90 High Risk  29-58 Medium Risk  0-28   Low Risk       TCM Follow-Up Recommendation:  LACE > 58: High Risk of readmission after discharge from the hospital.      Procedures during hospitalization:   IR drainage of fluid collection in Right hip ~5cc serosanguinous appearing     Incidental or significant findings and recommendations (brief descriptions):  N/a    Lab/Test results pending at  Discharge:   Final Cultures    Consultants:  N/a    Discharge Medication List:     Discharge Medications        START taking these medications        Instructions Prescription details   HYDROcodone-acetaminophen  MG Tabs  Commonly known as: Norco      Take 1 tablet by mouth every 6 (six) hours as needed for Pain.   Quantity: 30 tablet  Refills: 0            CHANGE how you take these medications        Instructions Prescription details   albuterol 108 (90 Base) MCG/ACT Aers  Commonly known as: Ventolin HFA  What changed:   See the new instructions.  Another medication with the same name was removed. Continue taking this medication, and follow the directions you see here.      USE 2 INHALATIONS ORALLY   EVERY 6 HOURS AS NEEDED FORWHEEZING (APPOINTMENT      NEEDED FOR MORE REFILLS)   Quantity: 3 Inhaler  Refills: 3            CONTINUE taking these medications        Instructions Prescription details   acetaminophen 500 MG Tabs  Commonly known as: Tylenol Extra Strength      Take 1 tablet (500 mg total) by mouth every 6 (six) hours as needed for Pain.   Refills: 0     cefadroxil 500 MG Caps  Commonly known as: DURICEF      Take 1 capsule (500 mg total) by mouth 2 (two) times daily.   Refills: 0     Cevimeline HCl 30 MG Caps  Commonly known as: EVOXAC  Notes to patient: Take as directed      Take 1 capsule (30 mg total) by mouth 3 (three) times daily.   Quantity: 270 capsule  Refills: 1     EPINEPHrine 0.3 MG/0.3ML Soaj  Commonly known as: EpiPen 2-Farhan      Inject 0.3 mL (1 each total) as directed as needed.   Quantity: 1 each  Refills: 1     fluticasone furoate-vilanterol 200-25 MCG/ACT Aepb  Commonly known as: Breo Ellipta      Inhale 1 puff into the lungs daily.   Quantity: 3 each  Refills: 3     hydroCHLOROthiazide 25 MG Tabs      Take 1 tablet (25 mg total) by mouth daily.   Refills: 0     hydroxychloroquine 200 MG Tabs  Commonly known as: Plaquenil      Take 1 tablet (200 mg total) by mouth 2 (two) times  daily.   Quantity: 180 tablet  Refills: 0     leflunomide 20 MG Tabs  Commonly known as: Arava      Take 1 tablet (20 mg total) by mouth daily.   Quantity: 90 tablet  Refills: 0     Naloxone HCl 4 MG/0.1ML Liqd      4 mg by Nasal route as needed. If patient remains unresponsive, repeat dose in other nostril 2-5 minutes after first dose.   Quantity: 1 kit  Refills: 0     PreviDent 5000 Dry Mouth 1.1 % Gel  Generic drug: Sodium Fluoride      Place 1 Application onto teeth daily.   Refills: 0     sodium chloride 0.65 % Soln  Commonly known as: Saline Mist      2 sprays by Nasal route as needed for congestion.   Refills: 0     traMADol 50 MG Tabs  Commonly known as: Ultram      Take 1 tablet (50 mg total) by mouth 4 (four) times daily as needed for Pain.   Quantity: 30 tablet  Refills: 0            STOP taking these medications      pregabalin 50 MG Caps  Commonly known as: Lyrica        pregabalin 75 MG Caps  Commonly known as: Lyrica                  Where to Get Your Medications        Please  your prescriptions at the location directed by your doctor or nurse    Bring a paper prescription for each of these medications  HYDROcodone-acetaminophen  MG Tabs  traMADol 50 MG Tabs         [place]    Follow-up appointment:   Irma Valera DO  [address] [place] [address] [address]  [place] [address]  [phone]    Follow up in 1 week(s)      Behery, Omar Atef, MD  [address] [facility] [address]  [facility] [address]  [phone]    Follow up  As needed -Surgery scheduled in 2 weeks    Appointments for Next 30 Days 9/9/2024 - 10/9/2024      None            Vital signs:  Temp:  [98.1 °F (36.7 °C)-98.7 °F (37.1 °C)] 98.1 °F (36.7 °C)  Pulse:  [62-73] 73  Resp:  [18] 18  BP: (108-131)/(60-78) 130/70  SpO2:  [91 %-100 %] 100 %    Physical Exam:    General: No acute distress   Lungs: clear to auscultation  Cardiovascular: S1, S2  Abdomen:  Soft      -----------------------------------------------------------------------------------------------  PATIENT DISCHARGE INSTRUCTIONS: See electronic chart    Beronica Stubbs DO    Total time spent on discharge plannin minutes     The  [place] Cures Act makes medical notes like these available to patients in the interest of transparency. Please be advised this is a medical document. Medical documents are intended to carry relevant information, facts as evident, and the clinical opinion of the practitioner. The medical note is intended as peer to peer communication and may appear blunt or direct. It is written in medical language and may contain abbreviations or verbiage that are unfamiliar.

## 2024-09-09 NOTE — PLAN OF CARE
Patient A&Ox4, VSS on room air. Pain controlled with PO medication, see MAR. Up to chair for lunch, tolerated well. Voiding freely. LBM 9/05, laxatives given. Plan to DC to [facility] at 14:30. POC reviewed with patient.     13:50: Report given to Silvana PADILLA at [place]    14:00: Patients home med returned to patient.

## 2024-09-09 NOTE — PHYSICAL THERAPY NOTE
PHYSICAL THERAPY TREATMENT NOTE - INPATIENT    Room Number: 359/359-A     Session: 1     Number of Visits to Meet Established Goals: 5    Presenting Problem: R hip pain x 1 week >> posterior and superior subluxation of acetabular cup  Co-Morbidities : Multiple right hip revisions and dislocations, OA, HTN, Sjogren's IBS, asthma,R COLTON 12/2022,RA, fibromyalgia, OA, compression fractures spine    Recent Admissions:   2/22-2/27/24 ([facility]): right hip revision >>[facility]  1/29-1/31/24 ([facility]): closed reduction of R COLTON dislocation >[facility]    Initial hip replacement: Dec 2, 2022 > with 8 hospital ED visits/admissions for subsequent issues with hip (dislocations, revision surgeries, poly exchange, etc)    Imaging:   CT hip 9/5   1. Total left hip arthroplasty reveals posterior and superior subluxation of the prosthetic acetabular cup from its original fixation slight with significant osteolysis of the underlying native acetabular cup consistent with hardware loosening and failure. 2. There is a large low-density fluid collection extending from the left hip into the subcutaneous tissues along the left lateral aspect of the right hip suggesting a large synovial cyst contiguous with the hip joint measuring over 20 cm in maximal   dimension.    History related to current admission: Patient is a 69 year old female admitted on 9/5/2024 from home for R hip pain.  Pt diagnosed with subluxation of posterior and superior acetabular cup component of hip arthroplasty.     HOME SITUATION  Type of Home: House   Home Layout: Two level;Able to live on main level  Stairs to Enter : 3  Railing: Yes  Lives With: Alone  Drives: Yes  Patient Owned Equipment: Rolling walker;Cane;Wheelchair  Patient Regularly Uses: Reading glasses     Prior Level of [place]:   Per pt she reports living at home alone. Mainly been using a RW. Has 3 steps to enter the building with use of rail. She does report with the multiple dislocations and hip surgeries she has had,  she has adapted her home to make it work for her.     ASSESSMENT   Patient demonstrates fair progress this session, goals remain in progress.    Patient continues to function below baseline with bed mobility, transfers, gait, stair negotiation, maintaining seated position, and standing prolonged periods. Contributing factors to remaining limitations include decreased functional strength, decreased endurance/aerobic capacity, pain, impaired static and dynamic standing balance, impaired coordination, impaired motor planning, decreased muscular endurance, and limited RLE ROM.  Next session anticipate patient to progress transfers, gait, stair negotiation, maintaining seated position, and standing prolonged periods.  Physical Therapy will continue to follow patient for duration of hospitalization.    Patient continues to benefit from continued skilled PT services: to promote return to prior level of function and safety with continuous assistance and gradual rehabilitative therapy .    PLAN  PT Treatment Plan: Bed mobility;Body mechanics;Coordination;Endurance;Energy conservation;Patient education;Family education;Neuromuscular re-educate;Gait training;Range of motion;Strengthening;Stoop training;Stair training;Transfer training;Balance training  Rehab Potential : Good  Frequency (Obs): 3-5x/week    CURRENT GOALS  Goal #1 Patient is able to demonstrate supine - sit EOB @ level: independent - goal met 9/7 AG   Goal #2 Patient is able to demonstrate transfers Sit to/from Stand at assistance level: supervision   Goal #3 Patient is able to ambulate 50 feet with assist device: walker - rolling at assistance level: supervision   Goal #4 Pt able to ascend/descend 3 steps with rail at supervision level.    Goal #5     Goal #6     Goal Comments: Goals established on 9/7/2024 9/9/2024 all goals ongoing    SUBJECTIVE  \"They turned my foot in like that the last time they were fixing my hip.\"      OBJECTIVE  Precautions: Bed/chair  alarm (CT of R hip showing posterior and superior subluxation of prosthetic acetabular cup consistent with hardware loosening and failure, per chart, waiting for parts to arrive, [place] surgery in 2 weeks)    WEIGHT BEARING RESTRICTION  Weight Bearing Restriction: R lower extremity        R Lower Extremity: Weight Bearing as Tolerated       PAIN ASSESSMENT   Ratin  Location: right hip  Management Techniques: Activity promotion;Body mechanics;Repositioning    BALANCE                                                                                                                       Static Sitting: Good  Dynamic Sitting: Fair +           Static Standing: Fair  Dynamic Standing: Fair -    ACTIVITY TOLERANCE                         O2 WALK         AM-PAC '6-Clicks' INPATIENT SHORT FORM - BASIC MOBILITY  How much difficulty does the patient currently have...  Patient Difficulty: Turning over in bed (including adjusting bedclothes, sheets and blankets)?: None   Patient Difficulty: Sitting down on and standing up from a chair with arms (e.g., wheelchair, bedside commode, etc.): A Little   Patient Difficulty: Moving from lying on back to sitting on the side of the bed?: None   How much help from another person does the patient currently need...   Help from Another: Moving to and from a bed to a chair (including a wheelchair)?: A Little   Help from Another: Need to walk in hospital room?: A Little   Help from Another: Climbing 3-5 steps with a railing?: A Lot       AM-PAC Score:  Raw Score: 19   Approx Degree of Impairment: 41.77%   Standardized Score (AM-PAC Scale): 45.44   CMS Modifier (G-Code): CK    FUNCTIONAL ABILITY STATUS  Gait Assessment   Functional Mobility/Gait Assessment  Gait Assistance: Not tested    Skilled Therapy Provided    Bed Mobility:  Rolling: NT   Supine<>Sit: NT   Sit<>Supine: indep with HOB elevated     Transfer Mobility:  Sit<>Stand: supervision to RW   Stand<>Sit: supervision   Gait:  NT    Therapist's Comments:   Patient presents sitting up in bedside chair, requesting to return to bed. Sit to stand from bedside chair to RW at supervision. Took a few lateral steps to reach edge of bed with RW, continued audible popping from R hip. Sit to supine Carola. Completed therapeutic exercises while long sitting in bed. Encouraged continued out of bed transfers with nursing staff. Pt verbalizes understanding. RN aware and present in room at end of session.    THERAPEUTIC EXERCISES  Lower Extremity BLE- SAQ, glut squeezes, ankle pumps  LLE- SLR, heel slides      Upper Extremity na     Position Supine     Repetitions   10   Sets   1     Patient End of Session: In bed;Needs met;Call light within reach;RN aware of session/findings;All patient questions and concerns addressed;With  staff;Discussed recommendations with /    PT Session Time: 15 minutes  Therapeutic Activity: 5 minutes  Therapeutic Exercise: 8 minutes

## 2024-09-09 NOTE — PLAN OF CARE
Alert and oriented x4.  VSS.  On room air.  , Telemonitoring in progress.  SCD's on BLE, Tolerating diet.  Voiding per bsc.  Pain controlled with prn norco  and non-pharmalogical interventions.  Dressing to right hip c/d/i.  Ambulating with walker and standby assist.  Plan is pt/ot and pain management.  Bed in lowest position, call light within reach.

## 2024-09-09 NOTE — CONGREGATE LIVING REVIEW
[facility] Living Authorization    The [facility] Review Committee has reviewed this case and the patient IS APPROVED for discharge to a facility for Short Term Skilled once the following procedure is followed:     - The physician discharge instructions (contained within the VIN note for SNF) must inlcude the below appropriate and approved COVID instructions to the facility    For questions regarding CLRC approval process, please contact the CM assigned to the case.  For questions regarding RN discharge workflow, please contact the unit Clinical Leader.

## 2024-09-09 NOTE — OCCUPATIONAL THERAPY NOTE
OCCUPATIONAL THERAPY TREATMENT NOTE - INPATIENT     Room Number: 359/359-A  Session: 1   Number of Visits to Meet Established Goals: 5    Presenting Problem: R hip pain with history COLTON with recurrent dislocations. 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  Son lives close by. Uses RW.    ASSESSMENT   Patient demonstrates good  progress this session, goals remain in progress.    Patient continues to function below baseline with  ADLs and standing balance/tolerance .   Contributing factors to remaining limitations include decreased functional strength, decreased functional reach, decreased endurance, pain, and decreased muscular endurance.  Next session anticipate patient to progress toileting, lower body dressing, transfers, static standing balance, dynamic standing balance, and functional standing tolerance.  Occupational Therapy will continue to follow patient for duration of hospitalization.    Patient continues to benefit from continued skilled OT services: to promote return to prior level of function and safety with continuous assistance and gradual rehabilitative therapy.        OT Device Recommendations: 3-in-1 commode    History: Patient is a 69 year old female admitted on 9/5/2024 with Presenting Problem: R hip pain with history COLTON with recurrent dislocations. CT R hip with large synovial cyst as well as posterior and superior  subluxation of prosthetic acetabular cup consistent with hardware loosening and failure, 9/6 s/p US aspiration Right hip synovial cyst. Co-Morbidities : Multiple right hip revisions and dislocations, OA, HTN, Sjogren's IBS, asthma,R COLTON 12/2022,RA, fibromyalgia, OA, compression fractures spine     Recent admissions:  2/22 to 2/27/24 at [facility] for Right Revision Total Hip Arthroplasty, Irrigation/Debridement and Modular Component Exchange-> [facility]  1/29 to 1/31/24 for Right hip dislocation,s/p Closed reduction completed -> home OT/PT  *Multiple hospital admission and prolonged [facility] admission from March to December 2023     CT hip 9/5  CONCLUSION:    1. Total left hip arthroplasty reveals posterior and superior subluxation of the prosthetic acetabular cup from its original fixation slight with significant osteolysis of the underlying native acetabular cup consistent with hardware loosening and   failure.   2. There is a large low-density fluid collection extending from the left hip into the subcutaneous tissues along the left lateral aspect of the right hip suggesting a large synovial cyst contiguous with the hip joint measuring over 20 cm in maximal   dimension.     WEIGHT BEARING RESTRICTION  Weight Bearing Restriction: R lower extremity        R Lower Extremity: Weight Bearing as Tolerated       Recommendations for nursing staff:   Transfers: 1 person  Toileting location: bedside commode    TREATMENT SESSION:  Patient Start of Session: semi supine in bed  FUNCTIONAL TRANSFER ASSESSMENT  Sit to Stand: Edge of Bed  Edge of Bed: Contact Guard Assist    BED MOBILITY  Supine to Sit : Independent    BALANCE ASSESSMENT     FUNCTIONAL ADL ASSESSMENT       ACTIVITY TOLERANCE: WFL                         O2 SATURATIONS       EDUCATION PROVIDED  Patient: Role of Occupational Therapy; Plan of Care; Discharge Recommendations; Functional Transfer Techniques; Fall Prevention; Surgical Precautions; Posture/Positioning; Energy  Conservation; Proper Body Mechanics  Patient's Response to Education: Verbalized Understanding; Returned Demonstration      Equipment used: RW  Demonstrates functional use     THERAPEUTIC EXERCISES  Lower Extremity      Upper Extremity Shoulder raises  Forward punches  Elbow flexion/extension  Hand pumps     Position Sitting     Repetitions 10   Sets 2       Therapist comments: Pt received semi supine in bed, pleasant and cooperative for OT session. Pt agreeable for OOB activity in preparation for ADLs and improved functional transfers. Pt performs bed mobility independently while adhering to hip precautions. Step transfer with RW to bedside chair performed at CGA/SBA. Pt then engages in 2 sets of B UE exercises as listed above to increase strength, ROM, and activity tolerance required for increased ADL performance, functional transfers, and UE function.      Patient End of Session: Up in chair;Needs met;Call light within reach;RN aware of session/findings;All patient questions and concerns addressed;Alarm set    SUBJECTIVE  Pt pleasant and cooperative for OT session.     PAIN ASSESSMENT  Rating: Unable to rate  Location: R hip/LE  Management Techniques: Body mechanics     OBJECTIVE  Precautions: Bed/chair alarm (CT of R hip showing posterior and superior subluxation of prosthetic acetabular cup consistent with hardware loosening and failure, per chart, waiting for parts to arrive, [place] surgery in 2 weeks)    AM-PAC ‘6-Clicks’ Inpatient Daily Activity Short Form  -   Putting on and taking off regular lower body clothing?: A Lot  -   Bathing (including washing, rinsing, drying)?: A Lot  -   Toileting, which includes using toilet, bedpan or urinal? : A Little  -   Putting on and taking off regular upper body clothing?: None  -   Taking care of personal grooming such as brushing teeth?: None  -   Eating meals?: None    AM-PAC Score:  Score: 19  Approx Degree of Impairment: 42.8%  Standardized Score (AM-PAC Scale):  40.22    PLAN  OT Treatment Plan: Energy conservation/work simplification techniques;Balance activities;ADL training;Functional transfer training;UE strengthening/ROM;Patient/Family education;Equipment eval/education;Compensatory technique education  Rehab Potential : Good  Frequency: 3x/week    OT Goals:     All goals ongoing 09/09    ADL Goals   Patient will perform lower body dressing:  with supervision and with adaptive equipment PRN  Patient will perform toileting: with supervision     Functional Transfer Goals  Patient will transfer to toilet:  with supervision     UE Exercise Program Goal  Patient will be independent with bilateral AROM HEP (home exercise program).    OT Session Time: 25 minutes  Therapeutic Exercise: 25 minutes

## 2024-09-10 ENCOUNTER — TELEPHONE (OUTPATIENT)
Dept: FAMILY MEDICINE CLINIC | Facility: CLINIC | Age: 69
End: 2024-09-10

## 2024-09-10 NOTE — TELEPHONE ENCOUNTER
Dr. Valera,  Please review.Date of Admission: 9/5/2024  Date of Discharge:  9/9/2024      Discharge Disposition: 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

## 2024-09-10 NOTE — TELEPHONE ENCOUNTER
PT HAS FUP ON 10/29 WITH LE.  SHE IS NOW RESIDING IN [facility] AND BEING FOLLOWED BY PCP THERE.  DOES SHE STILL NEED TO COME IN OCTOBER?

## 2024-09-11 NOTE — TELEPHONE ENCOUNTER
Spoke to pt she states she plans on seeing Dr Valera in the future.  Pt states she is currently in "[employer].Pt states she will keep appt for now because she does not know if she will be home or not in October.  Pt states she will back and cancel later if she is still at "[employer].

## 2024-09-23 DIAGNOSIS — M06.9 RHEUMATOID ARTHRITIS, INVOLVING UNSPECIFIED SITE, UNSPECIFIED WHETHER RHEUMATOID FACTOR PRESENT (HCC): Primary | ICD-10-CM

## 2024-09-24 RX ORDER — LEFLUNOMIDE 20 MG/1
20 TABLET ORAL DAILY
Qty: 90 TABLET | Refills: 0 | Status: SHIPPED | OUTPATIENT
Start: 2024-09-24

## 2024-09-24 NOTE — TELEPHONE ENCOUNTER
Last office visit:  11/30/2022    Next Rheum Apt:11/25/2024 Enedina Melanie     Last fill: 7/12/2024  90 tabs, 0 refills     Labs:   Lab Results   Component Value Date    CREATSERUM 0.81 09/08/2024    GFR 73 02/05/2018    ALKPHO 114 09/05/2024    AST 26 09/05/2024    ALT 10 09/05/2024    BILT 0.4 09/05/2024    TP 6.9 09/05/2024    ALB 4.1 09/05/2024       Lab Results   Component Value Date    WBC 5.2 09/05/2024    HGB 11.3 (L) 09/05/2024    .0 09/05/2024    NEPRELIM 3.55 09/05/2024    NEUTABS 5.39 04/13/2023    LYMPHABS 0.68 (L) 04/13/2023    NEUT 83 04/13/2023    LYMPH 11 04/13/2023    NEPERCENT 68.6 09/05/2024    LYPERCENT 15.1 09/05/2024    NE 3.55 09/05/2024    LYMABS 0.78 (L) 09/05/2024

## 2024-11-05 DIAGNOSIS — M32.19 OTHER SYSTEMIC LUPUS ERYTHEMATOSUS WITH OTHER ORGAN INVOLVEMENT (HCC): ICD-10-CM

## 2024-11-05 DIAGNOSIS — M06.9 RHEUMATOID ARTHRITIS INVOLVING BOTH HANDS, UNSPECIFIED WHETHER RHEUMATOID FACTOR PRESENT (HCC): Primary | ICD-10-CM

## 2024-11-05 DIAGNOSIS — M35.00 SJOGREN'S SYNDROME WITHOUT EXTRAGLANDULAR INVOLVEMENT (HCC): ICD-10-CM

## 2024-11-05 RX ORDER — HYDROXYCHLOROQUINE SULFATE 200 MG/1
200 TABLET, FILM COATED ORAL 2 TIMES DAILY
Qty: 180 TABLET | Refills: 0 | OUTPATIENT
Start: 2024-11-05

## 2024-11-05 NOTE — TELEPHONE ENCOUNTER
Last office visit: 11/30/2024    Next Rheum Apt:6/30/2025 Enedina Melanie     Last fill: 8/7/2024  180 tabs, 0 refills     Labs:   Lab Results   Component Value Date    CREATSERUM 0.81 09/08/2024    GFR 73 02/05/2018    ALKPHO 114 09/05/2024    AST 26 09/05/2024    ALT 10 09/05/2024    BILT 0.4 09/05/2024    TP 6.9 09/05/2024    ALB 4.1 09/05/2024       Lab Results   Component Value Date    WBC 5.2 09/05/2024    HGB 11.3 (L) 09/05/2024    .0 09/05/2024    NEPRELIM 3.55 09/05/2024    NEUTABS 5.39 04/13/2023    LYMPHABS 0.68 (L) 04/13/2023    NEUT 83 04/13/2023    LYMPH 11 04/13/2023    NEPERCENT 68.6 09/05/2024    LYPERCENT 15.1 09/05/2024    NE 3.55 09/05/2024    LYMABS 0.78 (L) 09/05/2024

## 2024-11-07 NOTE — TELEPHONE ENCOUNTER
Phoned pt, notified that Dr. Mcconnell refused her hydroxychloroquine. Pt last seen in 2022- has cancelled multiple appts including upcoming on 11/25. Needs updated eye exam and labs to monitor for toxicities from leflunomide. Needs appt- remind of high risk meds and need for follow up. Pt voiced understanding. Has been in rehab facility, but will call once discharged to schedule fu appointment.

## 2024-11-13 DIAGNOSIS — M35.00 SJOGREN'S SYNDROME WITHOUT EXTRAGLANDULAR INVOLVEMENT (HCC): ICD-10-CM

## 2024-11-13 DIAGNOSIS — R68.2 DRY MOUTH: ICD-10-CM

## 2024-11-14 RX ORDER — CEVIMELINE HYDROCHLORIDE 30 MG/1
30 CAPSULE ORAL 3 TIMES DAILY
Qty: 270 CAPSULE | Refills: 0 | Status: SHIPPED | OUTPATIENT
Start: 2024-11-14

## 2024-11-14 NOTE — TELEPHONE ENCOUNTER
A refill request was received for:  Requested Prescriptions     Pending Prescriptions Disp Refills    CEVIMELINE HCL 30 MG Oral Cap [Pharmacy Med Name: CEVIMELINE CAP 30MG] 270 capsule 1     Sig: TAKE 1 CAPSULE 3 TIMES A   DAY       Last refill date:6/14/2024       Last office visit:7/23/2024  Follow up due:  Future Appointments   Date Time Provider Department Center   6/30/2025  1:30 PM Melanie Mcconnell,  [facility] [address]

## 2024-11-20 ENCOUNTER — TELEPHONE (OUTPATIENT)
Dept: FAMILY MEDICINE CLINIC | Facility: CLINIC | Age: 69
End: 2024-11-20

## 2024-11-20 NOTE — TELEPHONE ENCOUNTER
Surgery 11/25.  Lvm on 11/14 11/18- Spoke with son Sarthak  and said he would have the mom call to schedule.  Fawn #: [phone]  Fax in pre-op accordion

## 2024-11-20 NOTE — TELEPHONE ENCOUNTER
Spoke to pt states MD at [facility] cleared her for surgery    Spoke to nurse at [facility] [place]   [phone] (2nd floor nurses station)   She is going to verify with Dr Jain if he is clearing her for sx, had chest xray, EKG, and labs at [facility]

## 2024-12-05 DIAGNOSIS — M06.9 RHEUMATOID ARTHRITIS, INVOLVING UNSPECIFIED SITE, UNSPECIFIED WHETHER RHEUMATOID FACTOR PRESENT (HCC): Primary | ICD-10-CM

## 2024-12-05 RX ORDER — LEFLUNOMIDE 20 MG/1
20 TABLET ORAL DAILY
Qty: 90 TABLET | Refills: 0 | Status: SHIPPED | OUTPATIENT
Start: 2024-12-05

## 2024-12-05 NOTE — TELEPHONE ENCOUNTER
Last office visit: 11/30/22    Next Rheum Apt:6/30/2025 Enedina Melanie, DO    Last fill: 9/24/24    Labs:   Lab Results   Component Value Date    CREATSERUM 0.81 09/08/2024    GFR 73 02/05/2018    ALKPHO 114 09/05/2024    AST 26 09/05/2024    ALT 10 09/05/2024    BILT 0.4 09/05/2024    TP 6.9 09/05/2024    ALB 4.1 09/05/2024       Lab Results   Component Value Date    WBC 5.2 09/05/2024    HGB 11.3 (L) 09/05/2024    .0 09/05/2024    NEPRELIM 3.55 09/05/2024    NEUTABS 5.39 04/13/2023    LYMPHABS 0.68 (L) 04/13/2023    NEUT 83 04/13/2023    LYMPH 11 04/13/2023    NEPERCENT 68.6 09/05/2024    LYPERCENT 15.1 09/05/2024    NE 3.55 09/05/2024    LYMABS 0.78 (L) 09/05/2024

## 2024-12-12 ENCOUNTER — HOSPITAL ENCOUNTER (EMERGENCY)
Facility: HOSPITAL | Age: 69
Discharge: HOME | End: 2024-12-12
Attending: EMERGENCY MEDICINE
Payer: MEDICARE

## 2024-12-12 ENCOUNTER — APPOINTMENT (OUTPATIENT)
Dept: GENERAL RADIOLOGY | Facility: HOSPITAL | Age: 69
End: 2024-12-12
Attending: EMERGENCY MEDICINE
Payer: MEDICARE

## 2024-12-12 VITALS
OXYGEN SATURATION: 92 % | RESPIRATION RATE: 18 BRPM | TEMPERATURE: 98 F | DIASTOLIC BLOOD PRESSURE: 86 MMHG | SYSTOLIC BLOOD PRESSURE: 136 MMHG | HEART RATE: 75 BPM

## 2024-12-12 DIAGNOSIS — S73.004A CLOSED DISLOCATION OF RIGHT HIP, INITIAL ENCOUNTER (HCC): Primary | ICD-10-CM

## 2024-12-12 PROCEDURE — 27265 TREAT HIP DISLOCATION: CPT

## 2024-12-12 PROCEDURE — 73502 X-RAY EXAM HIP UNI 2-3 VIEWS: CPT | Performed by: EMERGENCY MEDICINE

## 2024-12-12 PROCEDURE — 99284 EMERGENCY DEPT VISIT MOD MDM: CPT

## 2024-12-12 PROCEDURE — 96374 THER/PROPH/DIAG INJ IV PUSH: CPT

## 2024-12-12 PROCEDURE — 96375 TX/PRO/DX INJ NEW DRUG ADDON: CPT

## 2024-12-12 PROCEDURE — 99285 EMERGENCY DEPT VISIT HI MDM: CPT

## 2024-12-12 RX ORDER — KETAMINE HYDROCHLORIDE 50 MG/ML
0.5 INJECTION, SOLUTION INTRAMUSCULAR; INTRAVENOUS ONCE
Status: COMPLETED | OUTPATIENT
Start: 2024-12-12 | End: 2024-12-12

## 2024-12-12 RX ORDER — HYDROCODONE BITARTRATE AND ACETAMINOPHEN 5; 325 MG/1; MG/1
2 TABLET ORAL ONCE
Status: COMPLETED | OUTPATIENT
Start: 2024-12-12 | End: 2024-12-12

## 2024-12-12 RX ORDER — MORPHINE SULFATE 2 MG/ML
2 INJECTION, SOLUTION INTRAMUSCULAR; INTRAVENOUS ONCE
Status: DISCONTINUED | OUTPATIENT
Start: 2024-12-12 | End: 2024-12-12

## 2024-12-12 NOTE — ED PROVIDER NOTES
Patient Seen in: [facility] Emergency Department      History     Chief Complaint   Patient presents with    Hip Pain     Stated Complaint:     Subjective:   HPI      69-year-old female status post Revision of right COLTON on 11/25/2024 presents to the ER for concern of right hip dislocation.  Patient reports while going to the bathroom she noted shortening in her right leg.  She does have pain in the right hip.  No falls, no focal weakness or numbness.    Objective:     Past Medical History:    Achilles tendonitis    Arthritis    Asthma (HCC)    Atherosclerosis    with ectasia    Back problem    back surgery lumbar    Cancer (HCC)    rt breast dcis    Carpal tunnel syndrome    bilateral    Cataract    Chronic foot pain    right    Diarrhea, unspecified    Duodenitis    peptic    Dyslipidemia    Dysphagia    Essential hypertension    Exposure to radiation    Fasciitis    Fatigue    Food intolerance    Foot fracture, left    left foot fx: excessive walking    H. pylori infection    Hammertoe    second toe right foot    Heart valve disease    Hemorrhoids    High blood pressure    History of blood transfusion    no reactions [facility]    Hypercalcemia    IBS (irritable bowel syndrome)    Internal hemorrhoids    Grade II    Irregular Z line of esophagus    Localized primary carpometacarpal osteoarthritis    Lupus    Macular degeneration    early signs, bilaterally, remained stable    Osteoarthritis    left thumb, severe    Osteomyelitis of right foot (HCC)    Balaji OSCAR, Dr. Allan    Osteoporosis    Pain in joints    Pneumonia due to organism    Postmenopausal atrophic vaginitis    Pulmonary fibrosis (HCC)    referring to pulm, possible Sjogren's involvement?    Rheumatoid arthritis (HCC)    Rheumatoid arthritis(714.0)    Sicca syndrome (HCC)    Sjogren's syndrome (HCC)    Stress fracture of the metatarsals    right foot    Systemic lupus erythematosus (HCC)    Visual impairment    glasses    Vitamin D deficiency               Past Surgical History:   Procedure Laterality Date    Adenoidectomy      Appendectomy  age 8    [place]    Back surgery      Carpal tunnel release Bilateral     Cataract      Colonoscopy      Colonoscopy      Dermatology shave biopsy (d1k.1)  12/29/2011    Shave biopsy, skin, right shoulder    Foot surgery  04/2013    revision right foot surgery    Foot/toes surgery proc unlisted  09/2011    first MTM joint fusion with ORIF of second and third MTs with correction of the second hammertoe of the right foot by Dr. Hatfield    Knee replacement surgery      Lumpectomy right  2004    rt breast lumpectomy, [facility]    Divina biopsy stereo nodule 1 site right (cpt=19081)  2004    Other surgical history  01/11/2013    trigger thumb injections    Radiation right  2004    MAMMOSITE    Revision total hip arthroplasty Right 02/22/2024    Right Revision Total Hip Arthroplasty - Irrigation/Debridement and Modular Component Exchange    Spine surgery procedure unlisted      L4-5    Tonsillectomy      Total hip replacement Right     Total knee replacement Left 05/24/2018    Upper gi endoscopy,exam  12/18/2008                No pertinent social history.                Physical Exam     ED Triage Vitals   BP 12/12/24 1244 144/80   Pulse 12/12/24 1244 84   Resp 12/12/24 1244 18   Temp 12/12/24 1245 98 °F (36.7 °C)   Temp src 12/12/24 1245 Oral   SpO2 12/12/24 1244 94 %   O2 Device 12/12/24 1244 None (Room air)       Current Vitals:   Vital Signs  BP: (!) 128/94  Pulse: 85  Resp: 16  Temp: 98 °F (36.7 °C)  Temp src: Oral    Oxygen Therapy  SpO2: 95 %  O2 Device: None (Room air)  ETCO2 (mmHg): 37 mmHg  O2 Flow Rate (L/min): 2 L/min        Physical Exam  Constitutional:       General: She is not in acute distress.  HENT:      Head: Normocephalic and atraumatic.   Cardiovascular:      Rate and Rhythm: Normal rate and regular rhythm.      Pulses:           Dorsalis pedis pulses are 1+ on the right side and 1+ on the left  side.   Pulmonary:      Effort: Pulmonary effort is normal. No respiratory distress.      Breath sounds: Normal breath sounds.   Musculoskeletal:      Comments: Right lower extremity shortened, able to dorsi and plantarflex bilaterally   Neurological:      Mental Status: She is alert.      Sensory: No sensory deficit.      Motor: No weakness.             ED Course   Labs Reviewed - No data to display         XR HIP W OR WO PELVIS 2 OR 3 VIEWS, RIGHT (CPT=73502)    Result Date: 12/12/2024  CONCLUSION:   1. Interval reduction of the previously seen right hip dislocation.  2. Persistent lucency around the acetabular component of the right hip arthroplasty also unchanged.  3. Severe degenerative changes within the left hip.     Dictated by (CST): Keith Key MD on 12/12/2024 at 3:36 PM     Finalized by (CST): Keith Key MD on 12/12/2024 at 3:38 PM          XR HIP W OR WO PELVIS 2 OR 3 VIEWS, RIGHT (CPT=73502)    Result Date: 12/12/2024  CONCLUSION:  1. Superior lateral dislocation of the prosthetic right femoral head in relation to the prosthetic right acetabulum.  See above.    Dictated by (CST): Dante Gee MD on 12/12/2024 at 1:21 PM     Finalized by (CST): Dante Gee MD on 12/12/2024 at 1:25 PM                MDM        The patient required sedation for closed reduction right hip.  The risks, benefits, and alternatives were discussed with the patient and/or the family who consented.  The patient had a screening history (including review of previous problems with anesthesia and history of heavy snoring or sleep apnea)  and exam completed and there are no contraindications to sedation.  ASA designation is ASA 2 - Patient with mild systemic disease with no functional limitations.  Mallampati score: II (hard and soft palate, upper portion of tonsils anduvula visible).  The patient was placed on monitors and was under constant nursing observation.  Under my direct supervision the patient was given  propofol and ketamine.  An appropriate level of sedation was achieved.  The patient remained hemodynamically stable with normal oxygen saturations during the procedure.  There were no complications related to the sedation.  Patient was observed until mental status returned to baseline.  Patient was subsequently deemed appropriate for discharge home with responsible caretaker.  The sedation lasted 10 minutes during which I was present.    Fracture/Dislocation reduction:   The right hip joint  was reduced in the usual fashion without complications.  Post reduction the patient's neurovascular exam is normal.  Post reduction x-ray demonstrates reduction of the joint to the anatomic position.   The procedure was performed by myself.          Medical Decision Making  Patient with hip dislocation on x-ray, discussed with patient's orthopedic surgeon, Dr. Behery who advises closed reduction in the ER. Sedated as above, successfully reduced.  Discharged with outpatient follow-up    Problems Addressed:  Closed dislocation of right hip, initial encounter (HCC): acute illness or injury    Amount and/or Complexity of Data Reviewed  Independent Historian: EMS  External Data Reviewed: labs.     Details: BMP from 11/29/2024 reviewed  Discussion of management or test interpretation with external provider(s): Discussed with orthopedic surgery    Risk  Parenteral controlled substances.        Disposition and Plan     Clinical Impression:  1. Closed dislocation of right hip, initial encounter (HCC)         Disposition:  Discharge  12/12/2024  3:42 pm    Follow-up:  [facility] Emergency Department  [address] [facility] [address]  [phone]  Follow up  As needed, If symptoms worsen    Irma Valera DO  [address] [facility] [address]  [facility] [address]  [phone]    Follow up      Behery, Omar Atef, MD  [address] [facility] [address]  [facility] [address]  [phone]    Call      We recommend that you schedule  follow up care with a primary care provider within the next three months to obtain basic health screening including reassessment of your blood pressure.      Medications Prescribed:  Current Discharge Medication List

## 2024-12-12 NOTE — ED PROVIDER NOTES
Impression  CONCLUSION:     1. Interval reduction of the previously seen right hip dislocation.     2. Persistent lucency around the acetabular component of the right hip arthroplasty also unchanged.     3. Severe degenerative changes within the left hip.       Discussed return precautions and follow up instructions with patient who voiced understanding and agreement with the plan. All questions answered.

## 2024-12-12 NOTE — DISCHARGE INSTRUCTIONS
Follow-up care  Follow up with your healthcare provider, or as advised.   Call 911  Call 911 or get care right away if any of the following occur:   Leg becomes pale or cold  Numbness or weakness in the affected leg  Shortness of breath or chest pain  Dizziness, weakness, or fainting  When to get medical advice  Contact your healthcare provider if any of these occur:  Increasing hip pain or deformity  Increasing swelling, redness, or pain of the lower leg

## 2024-12-12 NOTE — ED INITIAL ASSESSMENT (HPI)
Patient to ed via private ems co of right hip dislocation s/p right hip surgery. Patient reported was sitting on toilet when she noticed her right leg did not reach the ground. Hx of multiple hip dislocations per patient. Denies trauma, pain 6/10 with movement

## 2024-12-13 ENCOUNTER — TELEPHONE (OUTPATIENT)
Dept: ORTHOPEDICS CLINIC | Facility: CLINIC | Age: 69
End: 2024-12-13

## 2024-12-13 NOTE — TELEPHONE ENCOUNTER
Patient scheduled on [facility] with dr Parmar for  Recurring dislocations and pain right hip   Please advise if imaging is needed  Future Appointments   Date Time Provider Department Center   1/27/2025  3:00 PM Arturo Parmar MD [facility] [address]   6/30/2025  1:30 PM Melanie Mcconnell,  [facility] [address]

## 2025-01-23 ENCOUNTER — TELEPHONE (OUTPATIENT)
Dept: FAMILY MEDICINE CLINIC | Facility: CLINIC | Age: 70
End: 2025-01-23

## 2025-01-23 NOTE — TELEPHONE ENCOUNTER
Lucero from [facility] calling     Pt wants to go back on pregabalin because helps her nerve pain in her toe.    Pregabalin was discontinued on our list but pt had an old bottle and said its helping      HH wants to update her list if ok to start pregabalin again    Please advise

## 2025-01-24 ENCOUNTER — HOSPITAL ENCOUNTER (EMERGENCY)
Facility: HOSPITAL | Age: 70
Discharge: HOME | End: 2025-01-25
Attending: EMERGENCY MEDICINE
Payer: MEDICARE

## 2025-01-24 ENCOUNTER — APPOINTMENT (OUTPATIENT)
Dept: GENERAL RADIOLOGY | Facility: HOSPITAL | Age: 70
End: 2025-01-24
Attending: EMERGENCY MEDICINE
Payer: MEDICARE

## 2025-01-24 DIAGNOSIS — S73.004A HIP DISLOCATION, RIGHT, INITIAL ENCOUNTER (HCC): Primary | ICD-10-CM

## 2025-01-24 PROCEDURE — 99284 EMERGENCY DEPT VISIT MOD MDM: CPT

## 2025-01-24 PROCEDURE — 73502 X-RAY EXAM HIP UNI 2-3 VIEWS: CPT | Performed by: EMERGENCY MEDICINE

## 2025-01-24 PROCEDURE — 99285 EMERGENCY DEPT VISIT HI MDM: CPT

## 2025-01-24 PROCEDURE — 27265 TREAT HIP DISLOCATION: CPT

## 2025-01-24 RX ORDER — MORPHINE SULFATE 4 MG/ML
4 INJECTION, SOLUTION INTRAMUSCULAR; INTRAVENOUS EVERY 30 MIN PRN
Status: DISCONTINUED | OUTPATIENT
Start: 2025-01-24 | End: 2025-01-25

## 2025-01-24 NOTE — PLAN OF CARE
Pt a/o x3. On RA. Purewick in place. Cardiac diet. Plan to have surgery tomorrow with Dr. Jovanna Howell, NPO at midnight.       Problem: SKIN/TISSUE INTEGRITY - ADULT  Goal: Skin integrity remains intact  Description: INTERVENTIONS  - Assess and document risk factors for pressure ulcer development  - Assess and document skin integrity  - Monitor for areas of redness and/or skin breakdown  - Initiate interventions, skin care algorithm/standards of care as needed  Outcome: Progressing  Goal: Incision(s), wounds(s) or drain site(s) healing without S/S of infection  Description: INTERVENTIONS:  - Assess and document risk factors for pressure ulcer development  - Assess and document skin integrity  - Assess and document dressing/incision, wound bed, drain sites and surrounding tissue  - Implement wound care per orders  - Initiate isolation precautions as appropriate  - Initiate Pressure Ulcer prevention bundle as indicated  Outcome: Progressing     Problem: MUSCULOSKELETAL - ADULT  Goal: Return mobility to safest level of function  Description: INTERVENTIONS:  - Assess patient stability and activity tolerance for standing, transferring and ambulating w/ or w/o assistive devices  - Assist with transfers and ambulation using safe patient handling equipment as needed  - Ensure adequate protection for wounds/incisions during mobilization  - Obtain PT/OT consults as needed  - Advance activity as appropriate  - Communicate ordered activity level and limitations with patient/family  Outcome: Progressing  Goal: Maintain proper alignment of affected body part  Description: INTERVENTIONS:  - Support and protect limb and body alignment per provider's orders  - Instruct and reinforce with patient and family use of appropriate assistive device and precautions (e.g. spinal or hip dislocation precautions)  Outcome: Progressing     Problem: PAIN - ADULT  Goal: Verbalizes/displays adequate comfort level or patient's stated pain goal  Description: INTERVENTIONS:  - Encourage pt to monitor pain and request assistance  - Assess pain using appropriate pain scale  - Administer analgesics based on type and severity of pain and evaluate response  - Implement non-pharmacological measures as appropriate and evaluate response  - Consider cultural and social influences on pain and pain management  - Manage/alleviate anxiety  - Utilize distraction and/or relaxation techniques  - Monitor for opioid side effects  - Notify MD/LIP if interventions unsuccessful or patient reports new pain  - Anticipate increased pain with activity and pre-medicate as appropriate  Outcome: Progressing     Problem: SAFETY ADULT - FALL  Goal: Free from fall injury  Description: INTERVENTIONS:  - Assess pt frequently for physical needs  - Identify cognitive and physical deficits and behaviors that affect risk of falls.   - [place] fall precautions as indicated by assessment.  - Educate pt/family on patient safety including physical limitations  - Instruct pt to call for assistance with activity based on assessment  - Modify environment to reduce risk of injury  - Provide assistive devices as appropriate  - Consider OT/PT consult to assist with strengthening/mobility  - Encourage toileting schedule  Outcome: Progressing     Problem: DISCHARGE PLANNING  Goal: Discharge to home or other facility with appropriate resources  Description: INTERVENTIONS:  - Identify barriers to discharge w/pt and caregiver  - Include patient/family/discharge partner in discharge planning  - Arrange for needed discharge resources and transportation as appropriate  - Identify discharge learning needs (meds, wound care, etc)  - Arrange for interpreters to assist at discharge as needed  - Consider post-discharge preferences of patient/family/discharge partner  - Complete POLST form as appropriate  - Assess patient's ability to be responsible for managing their own health  - Refer to Case Management Department for coordinating discharge planning if the patient needs post-hospital services based on physician/LIP order or complex needs related to functional status, cognitive ability or social support system  Outcome: Progressing details…

## 2025-01-25 ENCOUNTER — APPOINTMENT (OUTPATIENT)
Dept: GENERAL RADIOLOGY | Facility: HOSPITAL | Age: 70
End: 2025-01-25
Attending: EMERGENCY MEDICINE
Payer: MEDICARE

## 2025-01-25 ENCOUNTER — TELEPHONE (OUTPATIENT)
Dept: CASE MANAGEMENT | Facility: HOSPITAL | Age: 70
End: 2025-01-25

## 2025-01-25 VITALS
RESPIRATION RATE: 18 BRPM | SYSTOLIC BLOOD PRESSURE: 107 MMHG | TEMPERATURE: 98 F | BODY MASS INDEX: 33 KG/M2 | DIASTOLIC BLOOD PRESSURE: 60 MMHG | OXYGEN SATURATION: 96 % | WEIGHT: 194 LBS | HEART RATE: 67 BPM

## 2025-01-25 DIAGNOSIS — R68.2 DRY MOUTH: ICD-10-CM

## 2025-01-25 DIAGNOSIS — M35.00 SJOGREN'S SYNDROME WITHOUT EXTRAGLANDULAR INVOLVEMENT (HCC): ICD-10-CM

## 2025-01-25 PROCEDURE — 73502 X-RAY EXAM HIP UNI 2-3 VIEWS: CPT | Performed by: EMERGENCY MEDICINE

## 2025-01-25 PROCEDURE — 96374 THER/PROPH/DIAG INJ IV PUSH: CPT

## 2025-01-25 PROCEDURE — 96375 TX/PRO/DX INJ NEW DRUG ADDON: CPT

## 2025-01-25 RX ORDER — HYDROCODONE BITARTRATE AND ACETAMINOPHEN 10; 325 MG/1; MG/1
1-2 TABLET ORAL EVERY 6 HOURS PRN
Qty: 10 TABLET | Refills: 0 | Status: SHIPPED | OUTPATIENT
Start: 2025-01-25 | End: 2025-01-30

## 2025-01-25 NOTE — ED INITIAL ASSESSMENT (HPI)
Pt arrives with c/o right hip dislocation. Pt took Ibuprofen @2100 and Norco @ 1730. Pt was getting up from a sitting position to stand. No fall.

## 2025-01-25 NOTE — CM/SW NOTE
Received call from patient's pharmacy stating that a new script for Marion was sent from ER provider, patient already has a script for Norco pending  from her orthopedic provider. Only able to fill one script. Permission to delete script as the other script has a greater quantity to last patient longer.

## 2025-01-25 NOTE — ED PROVIDER NOTES
Patient Seen in: [facility] Emergency Department      History     Chief Complaint   Patient presents with    Leg or Foot Injury     Stated Complaint: right hip dislocation    Subjective:   HPI      69-year-old female presenting with right hip dislocation.  Patient states that her right hip dislocated while she was try to get up off the chair she states this happened before she denies any other exacerbating relieving factors or associated symptoms she denies paresthesias    Objective:     Past Medical History:    Achilles tendonitis    Arthritis    Asthma (HCC)    Atherosclerosis    with ectasia    Back problem    back surgery lumbar    Cancer (HCC)    rt breast dcis    Carpal tunnel syndrome    bilateral    Cataract    Chronic foot pain    right    Diarrhea, unspecified    Duodenitis    peptic    Dyslipidemia    Dysphagia    Essential hypertension    Exposure to radiation    Fasciitis    Fatigue    Food intolerance    Foot fracture, left    left foot fx: excessive walking    H. pylori infection    Hammertoe    second toe right foot    Heart valve disease    Hemorrhoids    High blood pressure    History of blood transfusion    no reactions [facility]    Hypercalcemia    IBS (irritable bowel syndrome)    Internal hemorrhoids    Grade II    Irregular Z line of esophagus    Localized primary carpometacarpal osteoarthritis    Lupus    Macular degeneration    early signs, bilaterally, remained stable    Osteoarthritis    left thumb, severe    Osteomyelitis of right foot (HCC)    Dr. Sanjana Bernstein    Osteoporosis    Pain in joints    Pneumonia due to organism    Postmenopausal atrophic vaginitis    Pulmonary fibrosis (HCC)    referring to pulm, possible Sjogren's involvement?    Rheumatoid arthritis (HCC)    Rheumatoid arthritis(714.0)    Sicca syndrome (HCC)    Sjogren's syndrome (HCC)    Stress fracture of the metatarsals    right foot    Systemic lupus erythematosus (HCC)    Visual impairment    glasses    Vitamin  D deficiency              Past Surgical History:   Procedure Laterality Date    Adenoidectomy      Appendectomy  age 8    [place]    Back surgery      Carpal tunnel release Bilateral     Cataract      Colonoscopy      Colonoscopy      Dermatology shave biopsy (d1k.1)  2011    Shave biopsy, skin, right shoulder    Foot surgery  2013    revision right foot surgery    Foot/toes surgery proc unlisted  2011    first MTM joint fusion with ORIF of second and third MTs with correction of the second hammertoe of the right foot by Dr. Hatfield    Knee replacement surgery      Lumpectomy right      rt breast lumpectomy, [facility]    Divina biopsy stereo nodule 1 site right (cpt=19081)      Other surgical history  2013    trigger thumb injections    Radiation right  2004    MAMMOSITE    Revision total hip arthroplasty Right 2024    Right Revision Total Hip Arthroplasty - Irrigation/Debridement and Modular Component Exchange    Spine surgery procedure unlisted      L4-5    Tonsillectomy      Total hip replacement Right     Total knee replacement Left 2018    Upper gi endoscopy,exam  2008                Social History     Socioeconomic History    Marital status:    Tobacco Use    Smoking status: Former     Current packs/day: 0.00     Average packs/day: 1 pack/day for 37.4 years (37.4 ttl pk-yrs)     Types: Cigarettes     Start date: 1970     Quit date: 2007     Years since quittin.6    Smokeless tobacco: Never    Tobacco comments:     quit in    Vaping Use    Vaping status: Never Used   Substance and Sexual Activity    Alcohol use: Not Currently     Alcohol/week: 0.0 - 1.0 standard drinks of alcohol     Comment: Rare    Drug use: Not Currently     Comment: CBD gummies for pain   Other Topics Concern    Caffeine Concern Yes     Comment: 2-3 cups a day    Exercise No     Comment: minimal    Seat Belt Yes     Social Drivers of Health     Financial Resource  Strain: Low Risk  (2/1/2024)    Financial Resource Strain     Difficulty of Paying Living Expenses: Not hard at all     Med Affordability: No   Food Insecurity: No Food Insecurity (11/26/2024)    Received from [facility]    Food Insecurity     Currently or in the past 3 months, have you worried your food would run out before you had money to buy more?: No     In the past 12 months, have you run out of food or been unable to get more?: No   Transportation Needs: No Transportation Needs (11/26/2024)    Received from [facility]    Transportation Needs     Currently or in the past 3 months, has lack of transportation kept you from medical appointments, getting food or medicine, or providing care to a family member?: Unrecognized value     Medical Transportation Needs?: No    Received from [facility], [facility]    Social Connections   Housing Stability: Low Risk  (9/6/2024)    Housing Stability     Housing Instability: No                  Physical Exam     ED Triage Vitals [01/24/25 2337]   /89   Pulse 72   Resp 18   Temp 98.2 °F (36.8 °C)   Temp src Temporal   SpO2 97 %   O2 Device None (Room air)       Current Vitals:   Vital Signs  BP: 107/60  Pulse: 67  Resp: 18  Temp: 98.2 °F (36.8 °C)  Temp src: Temporal    Oxygen Therapy  SpO2: 96 %  O2 Device: Nasal cannula        Physical Exam  Awake alert patient appears in no distress HEENT exam is normal lungs are clear cardiovascular exam shows regular rhythm abdomen soft nontender extremity no COVID cyanosis there is 2+ pitting edema of the lower extremities neurovascular tact in the right lower extremity pain in the right hip Limited range of motion of the right hip    ED Course   Labs Reviewed - No data to display         Differential diagnosis includes dislocation, open fracture       MDM              Medical Decision Making  69-year-old female presenting with right hip dislocation.   IV established cardiac monitor shows a sinus rhythm pulse ox shows no signs of hypoxia.  Independent interpretation by ED physician of initial x-rays show anterior superior dislocation of the right hip.    The patient required sedation for closed reduction right hip.  The risks, benefits, and alternatives were discussed with the patient and/or the family who consented.  The patient had a screening history and exam completed and there are no contraindications to sedation.  The patient has been NPO for greater than 4 hours. ASA designation is ASA 2 - Patient with mild systemic disease with no functional limitations.  Mallampati score: II (hard and soft palate, upper portion of tonsils anduvula visible).  The patient was placed on monitors and was under constant nursing observation.  Under my direct supervision the patient was given propofol.  An appropriate level of sedation was achieved.  The patient remained hemodynamically stable with normal oxygen saturations during the procedure.  There were no complications related to the sedation.  Patient was observed until mental status returned to baseline.  Patient was subsequently deemed appropriate for discharge home with responsible caretaker.  The sedation lasted 16 minutes during which I was present.    The right hip was reduced using Leopoldo technique.  A palpable reduction was physically noted.  Independent interpretation by ED physician of Post reduction xrays revealed adequate hip reduction.   Hip brace was placed back on.  Patient will be discharged home    The patient was screened and evaluated during this visit.  As a treating physician attending to the patient, I determined, within reasonable clinical confidence and prior to discharge, that an emergency medical condition was not or was no longer present.  There was no indication for further evaluation, treatment or admission on an emergency basis.    The usual and customary discharge instructions were discussed  given the patient's ER course.  We discussed signs and symptoms that should prompt the patient's immediate return to the emergency department.  Reasonable over-the-counter and prescription treatment options and physician follow-up plan was discussed.  Patient was discharged home in good condition  This note was prepared using Dragon Medical voice recognition dictation software.  As a result errors may occur.  When identified to these areas have been corrected.  While every attempt is made to correct errors during dictation discrepancies may still exist.  Please contact if there are any errors          Problems Addressed:  Hip dislocation, right, initial encounter (HCC): acute illness or injury    Amount and/or Complexity of Data Reviewed  Radiology: ordered and independent interpretation performed. Decision-making details documented in ED Course.  ECG/medicine tests: ordered and independent interpretation performed. Decision-making details documented in ED Course.        Disposition and Plan     Clinical Impression:  1. Hip dislocation, right, initial encounter (HCC)         Disposition:  There is no disposition on file for this visit.  There is no disposition time on file for this visit.    Follow-up:  No follow-up provider specified.        Medications Prescribed:  Current Discharge Medication List              Supplementary Documentation:

## 2025-01-27 RX ORDER — CEVIMELINE HYDROCHLORIDE 30 MG/1
30 CAPSULE ORAL 3 TIMES DAILY
Qty: 270 CAPSULE | Refills: 0 | Status: SHIPPED | OUTPATIENT
Start: 2025-01-27

## 2025-01-27 NOTE — TELEPHONE ENCOUNTER
A refill request was received for:  Requested Prescriptions     Pending Prescriptions Disp Refills    CEVIMELINE HCL 30 MG Oral Cap [Pharmacy Med Name: CEVIMELINE CAP 30MG] 270 capsule 0     Sig: TAKE 1 CAPSULE 3 TIMES A   DAY       Last refill date:   11/14/2024    Last office visit: 7/23/2024    Follow up due: Coming in 2/23/2025 for appt  Future Appointments   Date Time Provider Department Center   2/3/2025  1:00 PM Irma Valera DO [facility] [address]   6/30/2025  1:30 PM Melanie Mcconnell DO [id number] [address]

## 2025-01-28 ENCOUNTER — TELEPHONE (OUTPATIENT)
Dept: FAMILY MEDICINE CLINIC | Facility: CLINIC | Age: 70
End: 2025-01-28

## 2025-01-28 NOTE — TELEPHONE ENCOUNTER
Nick calling to verify pt's med.  He was told by pt the aspirin and pantoprazole was to be discontinued.  Pls verify

## 2025-01-28 NOTE — TELEPHONE ENCOUNTER
LM for Nick to cb  I don't see either of these on current med list  Per chart pantoprazole was never rx'd by us--was under Dr Dawn ([facility])

## 2025-01-28 NOTE — ED INITIAL ASSESSMENT (HPI)
Patient here with c/o right hip pain.  Patient reports she stood up out of her wheel chair and felt her right hip pop.  Patient has dislocated her hip 8 times.  Patient Aox4, fentanyl given by EMS PTA.   What Type Of Note Output Would You Prefer (Optional)?: Standard Output How Severe Are Your Spot(S)?: mild Have Your Spot(S) Been Treated In The Past?: has not been treated Hpi Title: Evaluation of Skin Lesions

## 2025-01-28 NOTE — TELEPHONE ENCOUNTER
Nick from home health called back  Advised him of below.  He voiced understanding.  Reports pt informed him those were given post surgically in the past.  Per chart pt seeing LE next week. Can address any further changes at that time if needbe

## 2025-01-29 ENCOUNTER — APPOINTMENT (OUTPATIENT)
Dept: GENERAL RADIOLOGY | Facility: HOSPITAL | Age: 70
End: 2025-01-29
Attending: EMERGENCY MEDICINE
Payer: MEDICARE

## 2025-01-29 ENCOUNTER — HOSPITAL ENCOUNTER (EMERGENCY)
Facility: HOSPITAL | Age: 70
Discharge: STILL ADMITTED OR ADMINISTRATIVE | End: 2025-01-29
Attending: EMERGENCY MEDICINE
Payer: MEDICARE

## 2025-01-29 VITALS
RESPIRATION RATE: 16 BRPM | HEART RATE: 80 BPM | SYSTOLIC BLOOD PRESSURE: 137 MMHG | TEMPERATURE: 98 F | DIASTOLIC BLOOD PRESSURE: 68 MMHG | OXYGEN SATURATION: 97 %

## 2025-01-29 DIAGNOSIS — S73.004A HIP DISLOCATION, RIGHT, INITIAL ENCOUNTER (HCC): Primary | ICD-10-CM

## 2025-01-29 LAB
ALBUMIN SERPL-MCNC: 3.9 G/DL (ref 3.2–4.8)
ALBUMIN/GLOB SERPL: 1.4 {RATIO} (ref 1–2)
ALP LIVER SERPL-CCNC: 108 U/L
ALT SERPL-CCNC: 12 U/L
ANION GAP SERPL CALC-SCNC: 12 MMOL/L (ref 0–18)
ANTIBODY SCREEN: NEGATIVE
APTT PPP: 29.1 SECONDS (ref 23–36)
AST SERPL-CCNC: 30 U/L (ref ?–34)
ATRIAL RATE: 69 BPM
BASOPHILS # BLD AUTO: 0.08 X10(3) UL (ref 0–0.2)
BASOPHILS NFR BLD AUTO: 2 %
BILIRUB SERPL-MCNC: 0.3 MG/DL (ref 0.2–1.1)
BUN BLD-MCNC: 16 MG/DL (ref 9–23)
CALCIUM BLD-MCNC: 9.1 MG/DL (ref 8.7–10.6)
CHLORIDE SERPL-SCNC: 106 MMOL/L (ref 98–112)
CO2 SERPL-SCNC: 23 MMOL/L (ref 21–32)
CREAT BLD-MCNC: 0.88 MG/DL
EGFRCR SERPLBLD CKD-EPI 2021: 70 ML/MIN/1.73M2 (ref 60–?)
EOSINOPHIL # BLD AUTO: 0.17 X10(3) UL (ref 0–0.7)
EOSINOPHIL NFR BLD AUTO: 4.3 %
ERYTHROCYTE [DISTWIDTH] IN BLOOD BY AUTOMATED COUNT: 17.1 %
GLOBULIN PLAS-MCNC: 2.8 G/DL (ref 2–3.5)
GLUCOSE BLD-MCNC: 89 MG/DL (ref 70–99)
HCT VFR BLD AUTO: 30.4 %
HGB BLD-MCNC: 9.7 G/DL
IMM GRANULOCYTES # BLD AUTO: 0.01 X10(3) UL (ref 0–1)
IMM GRANULOCYTES NFR BLD: 0.3 %
INR BLD: 1.05 (ref 0.8–1.2)
LYMPHOCYTES # BLD AUTO: 0.78 X10(3) UL (ref 1–4)
LYMPHOCYTES NFR BLD AUTO: 19.8 %
MCH RBC QN AUTO: 27.2 PG (ref 26–34)
MCHC RBC AUTO-ENTMCNC: 31.9 G/DL (ref 31–37)
MCV RBC AUTO: 85.4 FL
MONOCYTES # BLD AUTO: 0.5 X10(3) UL (ref 0.1–1)
MONOCYTES NFR BLD AUTO: 12.7 %
NEUTROPHILS # BLD AUTO: 2.4 X10 (3) UL (ref 1.5–7.7)
NEUTROPHILS # BLD AUTO: 2.4 X10(3) UL (ref 1.5–7.7)
NEUTROPHILS NFR BLD AUTO: 60.9 %
OSMOLALITY SERPL CALC.SUM OF ELEC: 293 MOSM/KG (ref 275–295)
P AXIS: 35 DEGREES
P-R INTERVAL: 138 MS
PLATELET # BLD AUTO: 196 10(3)UL (ref 150–450)
PLATELETS.RETICULATED NFR BLD AUTO: 2.1 % (ref 0–7)
POTASSIUM SERPL-SCNC: 4.5 MMOL/L (ref 3.5–5.1)
PROT SERPL-MCNC: 6.7 G/DL (ref 5.7–8.2)
PROTHROMBIN TIME: 13.8 SECONDS (ref 11.6–14.8)
Q-T INTERVAL: 394 MS
QRS DURATION: 82 MS
QTC CALCULATION (BEZET): 422 MS
R AXIS: 11 DEGREES
RBC # BLD AUTO: 3.56 X10(6)UL
RH BLOOD TYPE: POSITIVE
SODIUM SERPL-SCNC: 141 MMOL/L (ref 136–145)
T AXIS: 48 DEGREES
VENTRICULAR RATE: 69 BPM
WBC # BLD AUTO: 3.9 X10(3) UL (ref 4–11)

## 2025-01-29 PROCEDURE — 27265 TREAT HIP DISLOCATION: CPT

## 2025-01-29 PROCEDURE — 96361 HYDRATE IV INFUSION ADD-ON: CPT

## 2025-01-29 PROCEDURE — 99285 EMERGENCY DEPT VISIT HI MDM: CPT

## 2025-01-29 PROCEDURE — 73501 X-RAY EXAM HIP UNI 1 VIEW: CPT | Performed by: EMERGENCY MEDICINE

## 2025-01-29 PROCEDURE — 85610 PROTHROMBIN TIME: CPT | Performed by: EMERGENCY MEDICINE

## 2025-01-29 PROCEDURE — 85025 COMPLETE CBC W/AUTO DIFF WBC: CPT | Performed by: EMERGENCY MEDICINE

## 2025-01-29 PROCEDURE — 73502 X-RAY EXAM HIP UNI 2-3 VIEWS: CPT | Performed by: EMERGENCY MEDICINE

## 2025-01-29 PROCEDURE — 86850 RBC ANTIBODY SCREEN: CPT | Performed by: EMERGENCY MEDICINE

## 2025-01-29 PROCEDURE — 96375 TX/PRO/DX INJ NEW DRUG ADDON: CPT

## 2025-01-29 PROCEDURE — 93010 ELECTROCARDIOGRAM REPORT: CPT

## 2025-01-29 PROCEDURE — 96374 THER/PROPH/DIAG INJ IV PUSH: CPT

## 2025-01-29 PROCEDURE — 93005 ELECTROCARDIOGRAM TRACING: CPT

## 2025-01-29 PROCEDURE — 96376 TX/PRO/DX INJ SAME DRUG ADON: CPT

## 2025-01-29 PROCEDURE — 86901 BLOOD TYPING SEROLOGIC RH(D): CPT | Performed by: EMERGENCY MEDICINE

## 2025-01-29 PROCEDURE — 80053 COMPREHEN METABOLIC PANEL: CPT | Performed by: EMERGENCY MEDICINE

## 2025-01-29 PROCEDURE — 85730 THROMBOPLASTIN TIME PARTIAL: CPT | Performed by: EMERGENCY MEDICINE

## 2025-01-29 PROCEDURE — 86900 BLOOD TYPING SEROLOGIC ABO: CPT | Performed by: EMERGENCY MEDICINE

## 2025-01-29 RX ORDER — MORPHINE SULFATE 4 MG/ML
4 INJECTION, SOLUTION INTRAMUSCULAR; INTRAVENOUS EVERY 30 MIN PRN
OUTPATIENT
Start: 2025-01-29 | End: 2025-01-29

## 2025-01-29 RX ORDER — MORPHINE SULFATE 4 MG/ML
4 INJECTION, SOLUTION INTRAMUSCULAR; INTRAVENOUS ONCE
Status: COMPLETED | OUTPATIENT
Start: 2025-01-29 | End: 2025-01-29

## 2025-01-29 RX ORDER — MORPHINE SULFATE 4 MG/ML
4 INJECTION, SOLUTION INTRAMUSCULAR; INTRAVENOUS EVERY 30 MIN PRN
Status: COMPLETED | OUTPATIENT
Start: 2025-01-29 | End: 2025-01-29

## 2025-01-29 RX ORDER — ONDANSETRON 2 MG/ML
4 INJECTION INTRAMUSCULAR; INTRAVENOUS ONCE
Status: COMPLETED | OUTPATIENT
Start: 2025-01-29 | End: 2025-01-29

## 2025-01-29 RX ORDER — ONDANSETRON 2 MG/ML
4 INJECTION INTRAMUSCULAR; INTRAVENOUS EVERY 4 HOURS PRN
OUTPATIENT
Start: 2025-01-29 | End: 2025-01-29

## 2025-01-29 RX ORDER — SODIUM CHLORIDE 9 MG/ML
INJECTION, SOLUTION INTRAVENOUS CONTINUOUS
OUTPATIENT
Start: 2025-01-29 | End: 2025-01-29

## 2025-01-29 RX ORDER — SODIUM CHLORIDE 9 MG/ML
125 INJECTION, SOLUTION INTRAVENOUS CONTINUOUS
Status: DISCONTINUED | OUTPATIENT
Start: 2025-01-29 | End: 2025-01-29

## 2025-01-29 NOTE — ED INITIAL ASSESSMENT (HPI)
Patient from home, was walking and felt right hip pop out. History of similar in the past, last seen Friday for same. Normally able to be reduced in ER under sedation

## 2025-01-29 NOTE — ED PROVIDER NOTES
Patient Seen in: [facility] Emergency Department      History     Chief Complaint   Patient presents with    Hip Pain     Stated Complaint: right hip dislocation, history of same    Subjective:   HPI       69-year-old female who was walking and she states she twisted wrong and felt her right hip pop out.  He had a similar history of hip dislocation was seen on the 24th for hip dislocation at that time.  The patient states that she has pain with movement she has no knee pain or foot pain denies any numbness or weakness.  Denies any other injuries.  Has a long history of hip dislocations.  Had surgery in February 2024 for right hip revision.  She has had previous hip dislocations from that.  She denies any numbness or weakness that is new for her.  No other complaints at this present time.  She did eat earlier today.  Objective:     Past Medical History:    Achilles tendonitis    Arthritis    Asthma (HCC)    Atherosclerosis    with ectasia    Back problem    back surgery lumbar    Cancer (HCC)    rt breast dcis    Carpal tunnel syndrome    bilateral    Cataract    Chronic foot pain    right    Diarrhea, unspecified    Duodenitis    peptic    Dyslipidemia    Dysphagia    Essential hypertension    Exposure to radiation    Fasciitis    Fatigue    Food intolerance    Foot fracture, left    left foot fx: excessive walking    H. pylori infection    Hammertoe    second toe right foot    Heart valve disease    Hemorrhoids    High blood pressure    History of blood transfusion    no reactions [facility]    Hypercalcemia    IBS (irritable bowel syndrome)    Internal hemorrhoids    Grade II    Irregular Z line of esophagus    Localized primary carpometacarpal osteoarthritis    Lupus    Macular degeneration    early signs, bilaterally, remained stable    Osteoarthritis    left thumb, severe    Osteomyelitis of right foot (HCC)    Balaji OSCAR, Dr. Allan    Osteoporosis    Pain in joints    Pneumonia due to organism     Postmenopausal atrophic vaginitis    Pulmonary fibrosis (HCC)    referring to pulm, possible Sjogren's involvement?    Rheumatoid arthritis (HCC)    Rheumatoid arthritis(714.0)    Sicca syndrome (HCC)    Sjogren's syndrome (HCC)    Stress fracture of the metatarsals    right foot    Systemic lupus erythematosus (HCC)    Visual impairment    glasses    Vitamin D deficiency              Past Surgical History:   Procedure Laterality Date    Adenoidectomy      Appendectomy  age 8    [place]    Back surgery      Carpal tunnel release Bilateral     Cataract      Colonoscopy      Colonoscopy      Dermatology shave biopsy (d1k.1)  2011    Shave biopsy, skin, right shoulder    Foot surgery  2013    revision right foot surgery    Foot/toes surgery proc unlisted  2011    first MTM joint fusion with ORIF of second and third MTs with correction of the second hammertoe of the right foot by Dr. Hatfield    Knee replacement surgery      Lumpectomy right      rt breast lumpectomy, [facility]    Divina biopsy stereo nodule 1 site right (cpt=19081)      Other surgical history  2013    trigger thumb injections    Radiation right  2004    MAMMOSITE    Revision total hip arthroplasty Right 2024    Right Revision Total Hip Arthroplasty - Irrigation/Debridement and Modular Component Exchange    Spine surgery procedure unlisted      L4-5    Tonsillectomy      Total hip replacement Right     Total knee replacement Left 2018    Upper gi endoscopy,exam  2008                Social History     Socioeconomic History    Marital status:    Tobacco Use    Smoking status: Former     Current packs/day: 0.00     Average packs/day: 1 pack/day for 37.4 years (37.4 ttl pk-yrs)     Types: Cigarettes     Start date: 1970     Quit date: 2007     Years since quittin.6    Smokeless tobacco: Never    Tobacco comments:     quit in    Vaping Use    Vaping status: Never Used   Substance and  Sexual Activity    Alcohol use: Not Currently     Alcohol/week: 0.0 - 1.0 standard drinks of alcohol     Comment: Rare    Drug use: Not Currently     Comment: CBD gummies for pain   Other Topics Concern    Caffeine Concern Yes     Comment: 2-3 cups a day    Exercise No     Comment: minimal    Seat Belt Yes     Social Drivers of Health     Financial Resource Strain: Low Risk  (2/1/2024)    Financial Resource Strain     Difficulty of Paying Living Expenses: Not hard at all     Med Affordability: No   Food Insecurity: No Food Insecurity (11/26/2024)    Received from [facility]    Food Insecurity     Currently or in the past 3 months, have you worried your food would run out before you had money to buy more?: No     In the past 12 months, have you run out of food or been unable to get more?: No   Transportation Needs: No Transportation Needs (11/26/2024)    Received from [facility]    Transportation Needs     Currently or in the past 3 months, has lack of transportation kept you from medical appointments, getting food or medicine, or providing care to a family member?: Unrecognized value     Medical Transportation Needs?: No    Received from [facility], [facility]    Social Connections   Housing Stability: Low Risk  (9/6/2024)    Housing Stability     Housing Instability: No                  Physical Exam     ED Triage Vitals [01/29/25 1053]   BP (!) 175/100   Pulse 78   Resp 16   Temp 98.1 °F (36.7 °C)   Temp src Temporal   SpO2 94 %   O2 Device None (Room air)       Current Vitals:   Vital Signs  BP: (!) 172/95  Pulse: 74  Resp: 17  Temp: 98.1 °F (36.7 °C)  Temp src: Temporal    Oxygen Therapy  SpO2: 100 %  O2 Device: Nasal cannula  ETCO2 (mmHg): 35 mmHg  O2 Flow Rate (L/min): 4 L/min        Physical Exam     General: .  Patient is in no respiratory distress.  The patient is in no respiratory distress    HEENT: Atraumatic, conjunctiva are  not pale.  There is no icterus.  Oral mucosa Is wet.  No facial trauma.  The neck is supple.    LUNGS: Clear to auscultation, there is no wheezing or retraction.  No crackles.    CV: Cardiovascular is regular without murmurs or rubs.    ABD: The abdomen is soft nondistended nontender.  There is no rebound.  There is no guarding.    EXT: There is good pulses bilaterally.  There is no calf tenderness.  There is no rash noted.  There is no edema  The patient has an obvious deformity with some external shortened leg with possible internal rotation.  There is pulses noted she has some chronic edema both both lower extremities which says is normal for her no calf pain there is good pulses sensory exam is normal she is able to move her foot.  But with movement of the right hip she does have some pain.  X-rays were done to rule out fracture, dislocation  NEURO: Alert and oriented x4.  Muscle strength and sensory exam is grossly normal.  And the patient is neurologically intact with no focal findings.    ED Course     Labs Reviewed   COMP METABOLIC PANEL (14)   CBC WITH DIFFERENTIAL WITH PLATELET   PROTHROMBIN TIME (PT)   PTT, ACTIVATED   TYPE AND SCREEN       X-rays were done to rule out fracture, dislocation  I personally reviewed the radiographs and my individual interpretation shows    Findings of hip dislocation    Also reviewed official report and it shows    XR HIP W OR WO PELVIS 1 VIEW, RIGHT (CPT=73501)    Result Date: 1/29/2025  PROCEDURE:  XR HIP W OR WO PELVIS 1 VIEW, RIGHT (CPT=73501)  TECHNIQUE:  Unilateral view of the hip.  COMPARISON:  [facility] , XR, XR HIP W OR WO PELVIS 2 OR 3 VIEWS, RIGHT (CPT=73502), 1/29/2025, 11:37 AM.  [facility] , XR, XR HIP W OR WO PELVIS 1 VIEW, RIGHT (CPT=73501), 2/06/2024, 2:18 AM.  INDICATIONS:  right hip dislocation, history of same  PATIENT STATED HISTORY: (As transcribed by Technologist)  Patient offered no additional history at this time    FINDINGS:  Persistent superior  dislocation of femoral component of right total hip prosthesis.  No fracture.            CONCLUSION:  Persistent right prosthetic hip joint dislocation.   LOCATION:  [facility]   Dictated by (CST): Jagjit Ferguson MD on 1/29/2025 at 1:37 PM     Finalized by (CST): Jagjit Ferguson MD on 1/29/2025 at 1:38 PM       XR HIP W OR WO PELVIS 2 OR 3 VIEWS, RIGHT (CPT=73502)    Result Date: 1/29/2025  PROCEDURE:  XR HIP W OR WO PELVIS 2 OR 3 VIEWS, RIGHT ( CPT=73502)  TECHNIQUE:  Unilateral 2 to 3 views of the hip and pelvis if performed.  COMPARISON:  [facility] , XR, XR HIP W OR WO PELVIS 2 OR 3 VIEWS, RIGHT (CPT=73502), 1/25/2025, 1:05 AM.  [facility] , XR, XR HIP W OR WO PELVIS 2 OR 3 VIEWS, RIGHT (CPT=73502), 1/24/2025, 11:53 PM.  INDICATIONS:  right hip dislocation, history of same  PATIENT STATED HISTORY: (As transcribed by Technologist)  Patient states having a right hip dislocation. Patient had right hip replaced in 2022. Patient states she has a history of dislocation since replacement was done, this has occcur fifteen times for  the right hip.     FINDINGS:  Right hip arthroplasty is noted.  There is superior displacement of the femoral component relative to the acetabular component.  Small metallic densities are scattered throughout the right hip region, similar to prior.  Probable fracture of an acetabular  screw, unchanged from prior.  No acute osseous fracture identified.  Severe degenerative changes of the left hip with bony remodeling, subchondral sclerosis, and loss of joint space.            CONCLUSION:  Right prosthetic hip joint dislocation.   LOCATION:  [facility]   Dictated by (CST): Mauricio Abbott MD on 1/29/2025 at 11:51 AM     Finalized by (CST): Mauricio Abbott MD on 1/29/2025 at 11:52 AM       Patient's case was discussed with the patient discussed the pathology sedation risk and benefits.    The patient required sedation for hip dislocation.  The risks, benefits, and alternatives were discussed with the patient and/or  the family who consented.  The patient had a screening history and exam completed and there are no contraindications to sedation.  The patient has been NPO for 2 hours. ASA designation is ASA 2 - Patient with mild systemic disease with no functional limitations.  Mallampati score: II (hard and soft palate, upper portion of tonsils anduvula visible).  The patient was placed on monitors and was under constant nursing observation.  Under my direct supervision the patient was given propofol..  An appropriate level of sedation was achieved.  The patient remained hemodynamically stable with normal oxygen saturations during the procedure.  There were no complications related to the sedation.  Patient was observed until mental status returned to baseline.  Patient was subsequently deemed appropriate for discharge home with responsible caretaker.  The sedation lasted 15 minutes during which I was present.       MDM   Attempted to reduce the hip it did seem to have a palpable pulse reduction.  And seem to be in better alignment with the foot in a straight position and the hip now not shortened.  She is neurovascular intact.  X-rays were done subsequently and did show that the hip is dislocated.  I actually attempted to reduce the hip again.  Under conscious sedation she tolerated the procedure without problems.  Procedure she was completely relaxed.  Completely out of it.  The hip was reduced and he will go in and out.   when she it did seem to be in a better position but with just moving her foot is to get an x-ray she is seem to have going out again.  The fact that she keeps going in and out multiple times.  She was seen recently for hip dislocation.  She has a hip brace by her.  I felt that since she has had multiple times and it keeps dislocating.  And end of the second account I did review the films it does show that the hip is distal dislocated.  Patient's case will be discussed with the hospitalist, the orthopedic  surgeon.  Patient will need to be admitted.  For possible internal or surgical reduction.  She is neurovascular tact afterwards.  Did discuss this case with the patient.  She was kept n.p.o. given IV fluids, pain meds.  Basic labs were ordered.  Discussed with the PA for covering for Dr. Behery.  He stated that one of the partners does not have privileges at at [facility] but has a [facility] he may have to be transferred.  She was kept NPO.  Discussed this case with Dr. Yanez.  If the patient needs to be admitted he will they will call the physician on-call.    I did discuss with good APN from [facility] covering for Dr. Blake they will take the patient.  They will try to find a direct admission bed.  They did want the complete AP and lateral I did talk to radiology they will see if they have not done it if they will do it.    Prelab testing was done for possible open reduction she was kept NPO.  IV fluids NPO.  Given IV meds.    The EKG shows normal sinus rhythm.  There is no acute ST elevations or ischemic findings.  The rest of the EKG including rate rhythm axis and intervals I agree with the EKG report . The rate is 69              Medical Decision Making      Disposition and Plan     Clinical Impression:  1. Hip dislocation, right, initial encounter (HCC)         Disposition:  Transfer to another facility  1/29/2025  2:35 pm    Follow-up:  No follow-up provider specified.        Medications Prescribed:  Current Discharge Medication List              Supplementary Documentation:

## 2025-01-29 NOTE — CM/SW NOTE
CM assistance requested to assist with transferring the patient to [facility].  Per Dorothy RN, pt was accepted by [facility].     CM called [facility] transfer line and spoke with Kayleen. Per Kayleen, they do not have any beds and people have been waiting a day and a half in the ER for a bed to open. Per Kayleen, currently there are over 100 patients in their ER.    Kayleen will page the ortho service and call this CM back. BERTHA faxed face sheet to [phone].    Kayleen from [facility] called requesting to speak to ER MD. Dr. Wheatley is off shift at this time. CM read MD note to Kayleen for clarification on what was done in the ER and what caused the dislocation. Per Kayleen, she is waiting to talk to Ortho to see what the plan will be. Kayleen will call CM back.

## 2025-01-30 NOTE — CM/SW NOTE
Pt called [facility] transfer center and spoke with Emigdio. Per Emigdio, no accepting MD at this time. CM advised that I was informed that Arvind who is Dr. April FAIRCHILD was accepting with the hospitalist admitting.    Emigdio will have Kayleen return CM call for clarification whether pt will be transferred or if she needs to be admitted to [facility]. CM to update the HUB x [phone] if bed is needed.    Hand off will be given to Radha PADILLA, [facility] CM. CM will call [facility] around 1830 requesting a decision since the patient has been in the ER for > 7 hours.

## 2025-01-30 NOTE — CM/SW NOTE
Kayleen/PAULA called, pt will be going to room [address] (ortho overflow). RN to call report at [phone]. PAULA arranging for the ambulance. Eta 60-90mins. Pt's Rn notified.

## 2025-02-10 ENCOUNTER — TELEPHONE (OUTPATIENT)
Dept: FAMILY MEDICINE CLINIC | Facility: CLINIC | Age: 70
End: 2025-02-10

## 2025-02-10 NOTE — TELEPHONE ENCOUNTER
Pt had PT this morning, was having a lot of joint pain, b/p reported was 150/82 and 158/84  Pt has no symptoms.  PT, per protocol, was reporting b/p- fyi

## 2025-02-11 NOTE — TELEPHONE ENCOUNTER
S/w pt regarding below  She reports PT is not there today to check her bp--they are coming tomorrow. She will have them call tomorrow with her bp. Pt confirms still asymptomatic.

## 2025-02-12 ENCOUNTER — TELEPHONE (OUTPATIENT)
Dept: FAMILY MEDICINE CLINIC | Facility: CLINIC | Age: 70
End: 2025-02-12

## 2025-02-14 ENCOUNTER — TELEPHONE (OUTPATIENT)
Dept: FAMILY MEDICINE CLINIC | Facility: CLINIC | Age: 70
End: 2025-02-14

## 2025-02-14 RX ORDER — METOPROLOL SUCCINATE 50 MG/1
50 TABLET, EXTENDED RELEASE ORAL NIGHTLY
Qty: 30 TABLET | Refills: 0 | Status: SHIPPED | OUTPATIENT
Start: 2025-02-14

## 2025-02-14 NOTE — TELEPHONE ENCOUNTER
Nick, Interim  [phone]    Pt BP at visit is 170/98, she plans to call office to schedule VV with Dr. Valera for FU

## 2025-02-14 NOTE — TELEPHONE ENCOUNTER
Patient informed and understanding verbalized script sent to [facility] per patient request.

## 2025-02-14 NOTE — TELEPHONE ENCOUNTER
HH nurse called patient BP today is 170/98.  I called patient she states she is asymptomatic denies chest pain,dizzyness or headache.Patient states she had hip revision 2 weeks ago and is still in some discomfort.Patient feels this is why her BP is elevated.Patient she states she ordered a BP cuff to use at home she has not received yet. Patient states she is not on a BP medication. Patient does have VV scheduled with Dr Valera  3/11/25.  Please advise.    I did instruct patient if she becomes symptomatic to go to ER for evaluation.Patient verbalized understanding.

## 2025-02-17 ENCOUNTER — PATIENT MESSAGE (OUTPATIENT)
Dept: FAMILY MEDICINE CLINIC | Facility: CLINIC | Age: 70
End: 2025-02-17

## 2025-02-17 DIAGNOSIS — I10 ESSENTIAL HYPERTENSION, BENIGN: Primary | ICD-10-CM

## 2025-02-17 DIAGNOSIS — D63.8 CHRONIC DISEASE ANEMIA: ICD-10-CM

## 2025-02-19 ENCOUNTER — TELEPHONE (OUTPATIENT)
Dept: FAMILY MEDICINE CLINIC | Facility: CLINIC | Age: 70
End: 2025-02-19

## 2025-02-19 NOTE — TELEPHONE ENCOUNTER
BP today is 150/80    On Tuesday bp was 108/68 after starting new metoprolol    Nick asking if Dr. Valera wants to change bp parameters for less call ins?

## 2025-02-19 NOTE — TELEPHONE ENCOUNTER
Please review previous message and advise  Patient started Metoprolol succinate 50 mg 2/14/25  Blood pressure today 150/80    Nick  nurse wants to know if you want to change parameters so they are not calling as much.  Please advise

## 2025-02-21 NOTE — TELEPHONE ENCOUNTER
Nick, home physical therapist, [facility] agency protocol calls for physician to be contacted if BP is above 140/90.  If Dr Valera wishes  to change this, please let hem know otherwise they will continue to call with any elevation over 140/90.

## 2025-02-27 ENCOUNTER — TELEPHONE (OUTPATIENT)
Dept: FAMILY MEDICINE CLINIC | Facility: CLINIC | Age: 70
End: 2025-02-27

## 2025-03-03 ENCOUNTER — TELEPHONE (OUTPATIENT)
Dept: FAMILY MEDICINE CLINIC | Facility: CLINIC | Age: 70
End: 2025-03-03

## 2025-03-03 DIAGNOSIS — M06.9 RHEUMATOID ARTHRITIS INVOLVING BOTH HANDS, UNSPECIFIED WHETHER RHEUMATOID FACTOR PRESENT (HCC): ICD-10-CM

## 2025-03-03 DIAGNOSIS — M35.00 SJOGREN'S SYNDROME WITHOUT EXTRAGLANDULAR INVOLVEMENT (HCC): ICD-10-CM

## 2025-03-03 DIAGNOSIS — M32.19 OTHER SYSTEMIC LUPUS ERYTHEMATOSUS WITH OTHER ORGAN INVOLVEMENT (HCC): ICD-10-CM

## 2025-03-03 RX ORDER — HYDROXYCHLOROQUINE SULFATE 200 MG/1
200 TABLET, FILM COATED ORAL 2 TIMES DAILY
Qty: 180 TABLET | Refills: 0 | OUTPATIENT
Start: 2025-03-03

## 2025-03-03 NOTE — TELEPHONE ENCOUNTER
Dr. Mcconnell refused her hydroxychloroquine. Pt last seen in 2022- has cancelled multiple appts . Needs updated eye exam and labs to monitor for toxicities from leflunomide. Needs appt- remind of high risk meds and need for follow up. Pt voiced understanding.

## 2025-03-03 NOTE — TELEPHONE ENCOUNTER
Pt called office, pt had refill request for Hydroxychloroquine and Leflunomide. LOV in 2022. Pt cancelled several appt. Explained appt required, eye monitoring and labs required prior to refill.     Pt states she has had numerous surgeries to her rt hip since 2022. Pt had her last surgery about 3 wks ago. Trouble with transportation and mobility.     Pt wondering if Dr. Mcconnell will fill her prescriptions. Explained importance for monitoring for toxicity   Future Appointments   Date Time Provider Department Center   3/11/2025  1:00 PM Irma Valera, DO [facility] [address] [facility] [address]   6/30/2025  1:30 PM Melanie Mcconnell, DO [id number] [address]

## 2025-03-05 ENCOUNTER — TELEPHONE (OUTPATIENT)
Facility: CLINIC | Age: 70
End: 2025-03-05

## 2025-03-05 ENCOUNTER — TELEPHONE (OUTPATIENT)
Dept: FAMILY MEDICINE CLINIC | Facility: CLINIC | Age: 70
End: 2025-03-05

## 2025-03-05 DIAGNOSIS — M06.9 RHEUMATOID ARTHRITIS, INVOLVING UNSPECIFIED SITE, UNSPECIFIED WHETHER RHEUMATOID FACTOR PRESENT (HCC): ICD-10-CM

## 2025-03-05 RX ORDER — LEFLUNOMIDE 20 MG/1
20 TABLET ORAL DAILY
Qty: 90 TABLET | Refills: 0 | Status: SHIPPED | OUTPATIENT
Start: 2025-03-05

## 2025-03-05 NOTE — TELEPHONE ENCOUNTER
Leflunomide      Last office visit: 11/30/2022    Next Rheum Apt:6/30/2025 Melanie Mcconnell DO    Last fill: 12/5/2024 90 tab, 0 refills    Labs:   Lab Results   Component Value Date    CREATSERUM 0.88 01/29/2025    GFR 73 02/05/2018    ALKPHO 108 01/29/2025    AST 30 01/29/2025    ALT 12 01/29/2025    BILT 0.3 01/29/2025    TP 6.7 01/29/2025    ALB 3.9 01/29/2025       Lab Results   Component Value Date    WBC 3.9 (L) 01/29/2025    HGB 9.7 (L) 01/29/2025    .0 01/29/2025    NEPRELIM 2.40 01/29/2025    NEUTABS 5.39 04/13/2023    LYMPHABS 0.68 (L) 04/13/2023    NEUT 83 04/13/2023    LYMPH 11 04/13/2023    NEPERCENT 60.9 01/29/2025    LYPERCENT 19.8 01/29/2025    NE 2.40 01/29/2025    LYMABS 0.78 (L) 01/29/2025

## 2025-03-06 DIAGNOSIS — M06.9 RHEUMATOID ARTHRITIS, INVOLVING UNSPECIFIED SITE, UNSPECIFIED WHETHER RHEUMATOID FACTOR PRESENT (HCC): ICD-10-CM

## 2025-03-06 RX ORDER — LEFLUNOMIDE 20 MG/1
20 TABLET ORAL DAILY
Qty: 90 TABLET | Refills: 0 | OUTPATIENT
Start: 2025-03-06

## 2025-03-07 NOTE — TELEPHONE ENCOUNTER
I spoke with the patient and she stated she was told that  did not send the script in for her but I explained that the script was sent in on 03/05/2025 for 90 tablets and 0 refills. I stressed the importance of her keeping her June apt for any further refills and she stated that she would be here

## 2025-03-07 NOTE — TELEPHONE ENCOUNTER
Patient called the office stating that she is for her Leflunomide refill at this time. She states that she has bee in and out of the Hospital for the last two yeas and she has had close to 15 surgeries. She states that she has had a lot of labs drawn recently and  should be able to use the labs that she had done. She states that she is in a wheel chair and it is very hard for her to get around and when she has to go out of the house it costs her $300 each time. She states that she needs the medication but she really cannot get out to do the labs?

## 2025-03-10 ENCOUNTER — TELEPHONE (OUTPATIENT)
Dept: FAMILY MEDICINE CLINIC | Facility: CLINIC | Age: 70
End: 2025-03-10

## 2025-03-10 NOTE — TELEPHONE ENCOUNTER
[facility]- [facility] reporting b/p 152/88, no symptoms prior to PT today    Pt has appointment tomorrow with Dr. Valera 3/11/25

## 2025-03-11 ENCOUNTER — TELEMEDICINE (OUTPATIENT)
Dept: FAMILY MEDICINE CLINIC | Facility: CLINIC | Age: 70
End: 2025-03-11
Payer: MEDICARE

## 2025-03-11 DIAGNOSIS — T84.018S PROSTHETIC HIP IMPLANT FAILURE, SEQUELA: ICD-10-CM

## 2025-03-11 DIAGNOSIS — M16.12 OSTEOARTHRITIS OF LEFT HIP, UNSPECIFIED OSTEOARTHRITIS TYPE: ICD-10-CM

## 2025-03-11 DIAGNOSIS — I10 ESSENTIAL HYPERTENSION, BENIGN: ICD-10-CM

## 2025-03-11 DIAGNOSIS — M45.9 RHEUMATOID ARTHRITIS INVOLVING VERTEBRA, UNSPECIFIED WHETHER RHEUMATOID FACTOR PRESENT (HCC): ICD-10-CM

## 2025-03-11 DIAGNOSIS — D50.9 IRON DEFICIENCY ANEMIA, UNSPECIFIED IRON DEFICIENCY ANEMIA TYPE: Primary | ICD-10-CM

## 2025-03-11 DIAGNOSIS — Z96.649 PROSTHETIC HIP IMPLANT FAILURE, SEQUELA: ICD-10-CM

## 2025-03-11 DIAGNOSIS — G89.4 CHRONIC PAIN SYNDROME: ICD-10-CM

## 2025-03-11 PROCEDURE — 99214 OFFICE O/P EST MOD 30 MIN: CPT | Performed by: FAMILY MEDICINE

## 2025-03-11 RX ORDER — HYDROCODONE BITARTRATE AND ACETAMINOPHEN 5; 325 MG/1; MG/1
1 TABLET ORAL EVERY 4 HOURS PRN
Qty: 180 TABLET | Refills: 0 | Status: SHIPPED | OUTPATIENT
Start: 2025-03-11

## 2025-03-11 RX ORDER — METOPROLOL SUCCINATE 50 MG/1
50 TABLET, EXTENDED RELEASE ORAL NIGHTLY
Qty: 90 TABLET | Refills: 0 | Status: SHIPPED | OUTPATIENT
Start: 2025-03-11 | End: 2025-03-11

## 2025-03-11 RX ORDER — METOPROLOL SUCCINATE 50 MG/1
50 TABLET, EXTENDED RELEASE ORAL NIGHTLY
Qty: 30 TABLET | Refills: 0 | Status: SHIPPED | OUTPATIENT
Start: 2025-03-11

## 2025-03-11 NOTE — PROGRESS NOTES
This visit is conducted using Telemedicine with live, interactive video and audio.    Telehealth outside of [facility]  Telehealth Verbal Consent   I conducted a telehealth visit with Ngozi Collazo today, 03/11/25, which was completed using two-way, real-time interactive audio and video communication. This has been done in good john to provide continuity of care in the best interest of the provider-patient relationship, due to the COVID -19 public health crisis/national emergency where restrictions of face-to-face office visits are ongoing. Every conscious effort was taken to allow for sufficient and adequate time to complete the visit.  The patient was made aware of the limitations of the telehealth visit, including treatment limitations as no physical exam could be performed.  The patient was advised to call 911 or to go to the ER in case there was an emergency.  The patient was also advised of the potential privacy & security concerns related to the telehealth platform.   The patient was made aware of where to find [facility]'s notice of privacy practices, telehealth consent form and other related consent forms and documents.  which are located on the [facility] website. The patient verbally agreed to telehealth consent form, related consents and the risks discussed.    Lastly, the patient confirmed that they were in [place].   Included in this visit, time may have been spent reviewing labs, medications, radiology tests and decision making. Appropriate medical decision-making and tests are ordered as detailed in the plan of care above.  Coding/billing information is submitted for this visit based on complexity of care and/or time spent for the visit.      HPI:   Ngozi Collazo is a 69 year old female who presents for f/u on chronic pain, chronic right hip dislocation     Right hip surgery in 2022 and 15 subsequent surgeries for dislocation ; last one February 2025   Pt has not walked in 2 years / in PT learning to walk again    Last one by Dr. Hoyt   Now with more pain of left hip - OA and IT band pain   Chronic pain - pt is taking Norco 5mg ; pt would like to continue this medication   Pt taking it every 4 hours     No constipation     Bp is normal after her PT at home sessions    Current Outpatient Medications   Medication Sig Dispense Refill    leflunomide 20 MG Oral Tab Take 1 tablet (20 mg total) by mouth daily. 90 tablet 0    hydroxychloroquine 200 MG Oral Tab Take 1 tablet (200 mg total) by mouth 2 (two) times daily. 180 tablet 0    metoprolol succinate ER 50 MG Oral Tablet 24 Hr Take 1 tablet (50 mg total) by mouth nightly. 30 tablet 0    CEVIMELINE HCL 30 MG Oral Cap TAKE 1 CAPSULE 3 TIMES A    capsule 0    HYDROcodone-acetaminophen  MG Oral Tab Take 1 tablet by mouth every 6 (six) hours as needed for Pain. 30 tablet 0    traMADol 50 MG Oral Tab Take 1 tablet (50 mg total) by mouth 4 (four) times daily as needed for Pain. 30 tablet 0    hydroCHLOROthiazide 25 MG Oral Tab Take 1 tablet (25 mg total) by mouth daily.      EPINEPHrine (EPIPEN 2-RACHELE) 0.3 MG/0.3ML Injection Solution Auto-injector Inject 0.3 mL (1 each total) as directed as needed. 1 each 1    fluticasone furoate-vilanterol (BREO ELLIPTA) 200-25 MCG/ACT Inhalation Aerosol Powder, Breath Activated Inhale 1 puff into the lungs daily. 3 each 3    cefadroxil 500 MG Oral Cap Take 1 capsule (500 mg total) by mouth 2 (two) times daily.      acetaminophen 500 MG Oral Tab Take 1 tablet (500 mg total) by mouth every 6 (six) hours as needed for Pain.      Naloxone HCl 4 MG/0.1ML Nasal Liquid 4 mg by Nasal route as needed. If patient remains unresponsive, repeat dose in other nostril 2-5 minutes after first dose. 1 kit 0    sodium chloride 0.65 % Nasal Solution 2 sprays by Nasal route as needed for congestion.      ALBUTEROL SULFATE  (90 Base) MCG/ACT Inhalation Aero Soln USE 2 INHALATIONS ORALLY   EVERY 6 HOURS AS NEEDED FORWHEEZING (APPOINTMENT      NEEDED  FOR MORE REFILLS) (Patient taking differently: Inhale 2 puffs into the lungs every 4 (four) hours as needed for Wheezing or Shortness of Breath.) 3 Inhaler 3    PREVIDENT 5000 DRY MOUTH 1.1 % Dental Gel Place 1 Application onto teeth daily.          Past Medical History:    Achilles tendonitis    Arthritis    Asthma (HCC)    Atherosclerosis    with ectasia    Back problem    back surgery lumbar    Cancer (HCC)    rt breast dcis    Carpal tunnel syndrome    bilateral    Cataract    Chronic foot pain    right    Diarrhea, unspecified    Duodenitis    peptic    Dyslipidemia    Dysphagia    Essential hypertension    Exposure to radiation    Fasciitis    Fatigue    Food intolerance    Foot fracture, left    left foot fx: excessive walking    H. pylori infection    Hammertoe    second toe right foot    Heart valve disease    Hemorrhoids    High blood pressure    History of blood transfusion    no reactions [facility]    Hypercalcemia    IBS (irritable bowel syndrome)    Internal hemorrhoids    Grade II    Irregular Z line of esophagus    Localized primary carpometacarpal osteoarthritis    Lupus    Macular degeneration    early signs, bilaterally, remained stable    Osteoarthritis    left thumb, severe    Osteomyelitis of right foot (HCC)    Balaji ID, Dr. Allan    Osteoporosis    Pain in joints    Pneumonia due to organism    Postmenopausal atrophic vaginitis    Pulmonary fibrosis (HCC)    referring to pulm, possible Sjogren's involvement?    Rheumatoid arthritis (HCC)    Rheumatoid arthritis(714.0)    Sicca syndrome (HCC)    Sjogren's syndrome (HCC)    Stress fracture of the metatarsals    right foot    Systemic lupus erythematosus (HCC)    Visual impairment    glasses    Vitamin D deficiency      Past Surgical History:   Procedure Laterality Date    Adenoidectomy      Appendectomy  age 8    [place]    Back surgery      Carpal tunnel release Bilateral     Cataract      Colonoscopy      Colonoscopy      Dermatology shave  biopsy (d1k.1)  2011    Shave biopsy, skin, right shoulder    Foot surgery  2013    revision right foot surgery    Foot/toes surgery proc unlisted  2011    first MTM joint fusion with ORIF of second and third MTs with correction of the second hammertoe of the right foot by Dr. Hatfield    Knee replacement surgery      Lumpectomy right      rt breast lumpectomy, [facility] biopsy stereo nodule 1 site right (cpt=19081)      Other surgical history  2013    trigger thumb injections    Radiation right  2004    MAMMOSITE    Revision total hip arthroplasty Right 2024    Right Revision Total Hip Arthroplasty - Irrigation/Debridement and Modular Component Exchange    Spine surgery procedure unlisted      L4-5    Tonsillectomy      Total hip replacement Right     Total knee replacement Left 2018    Upper gi endoscopy,exam  2008      Family History   Problem Relation Age of Onset    Heart Disorder Father     Other (Other) Father     Other (Pulmonary Embolism) Father     Diabetes Mother     Asthma Mother     Other (Other) Mother     Other (Abdominal Aortic Aneurysm) Other     Fibromyalgia Sister     Asthma Sister     Breast Cancer Self 50    DCIS Self     No Known Problems Son     No Known Problems Brother       Social History:   Social History     Socioeconomic History    Marital status:    Tobacco Use    Smoking status: Former     Current packs/day: 0.00     Average packs/day: 1 pack/day for 37.4 years (37.4 ttl pk-yrs)     Types: Cigarettes     Start date: 1970     Quit date: 2007     Years since quittin.7    Smokeless tobacco: Never    Tobacco comments:     quit in    Vaping Use    Vaping status: Never Used   Substance and Sexual Activity    Alcohol use: Not Currently     Alcohol/week: 0.0 - 1.0 standard drinks of alcohol     Comment: Rare    Drug use: Not Currently     Comment: CBD gummies for pain   Other Topics Concern    Caffeine Concern Yes      Comment: 2-3 cups a day    Exercise No     Comment: minimal    Seat Belt Yes     Social Drivers of Health     Food Insecurity: No Food Insecurity (11/26/2024)    Received from [facility]    Food Insecurity     Currently or in the past 3 months, have you worried your food would run out before you had money to buy more?: No     In the past 12 months, have you run out of food or been unable to get more?: No   Transportation Needs: No Transportation Needs (11/26/2024)    Received from [facility]    Transportation Needs     Currently or in the past 3 months, has lack of transportation kept you from medical appointments, getting food or medicine, or providing care to a family member?: Unrecognized value     Medical Transportation Needs?: No   Housing Stability: Low Risk  (9/6/2024)    Housing Stability     Housing Instability: No        REVIEW OF SYSTEMS:   GENERAL: feels well otherwise  SKIN: denies any unusual skin lesions  EYES:denies blurred vision or double vision  HEENT: denies nasal congestion, sinus pain or ST  LUNGS: denies shortness of breath with exertion  CARDIOVASCULAR: denies chest pain on exertion  NEURO: denies headaches  PSYCHE: denies depression or anxiety  HEMATOLOGIC: denies hx of anemia  ENDOCRINE: denies thyroid history  ALL/ASTHMA: denies asthma      EXAM:     alert, appears stated age and cooperative, Normocephalic, without obvious abnormality, atraumatic, lips, mucosa, and tongue normal; teeth and gums normal, Speaking in full sentences comfortably, Normal work of breathing, Skin color, texture, turgor normal. No rashes or lesions and age appropriate, normal, logical connections, person, place and time/date and no suicidal ideation      ASSESSMENT AND PLAN:   Ngozi Collazo is a 69 year old female who presents with    1. Iron deficiency anemia, unspecified iron deficiency anemia type    - RESIDENTIAL HOME HEALTH REFERRAL    2. Essential hypertension,  benign    - RESIDENTIAL HOME HEALTH REFERRAL  - metoprolol succinate ER 50 MG Oral Tablet 24 Hr; Take 1 tablet (50 mg total) by mouth nightly.  Dispense: 90 tablet; Refill: 0    3. Rheumatoid arthritis involving vertebra, unspecified whether rheumatoid factor present ([facility])    - RESIDENTIAL HOME HEALTH REFERRAL    4. Chronic pain syndrome    - HYDROcodone-acetaminophen 5-325 MG Oral Tab; Take 1 tablet by mouth every 4 (four) hours as needed for Pain.  Dispense: 180 tablet; Refill: 0  - RESIDENTIAL HOME HEALTH REFERRAL    5. Osteoarthritis of left hip, unspecified osteoarthritis type    - HYDROcodone-acetaminophen 5-325 MG Oral Tab; Take 1 tablet by mouth every 4 (four) hours as needed for Pain.  Dispense: 180 tablet; Refill: 0  - RESIDENTIAL HOME HEALTH REFERRAL    6. Prosthetic hip implant failure, sequela    - HYDROcodone-acetaminophen 5-325 MG Oral Tab; Take 1 tablet by mouth every 4 (four) hours as needed for Pain.  Dispense: 180 tablet; Refill: 0  - RESIDENTIAL HOME HEALTH REFERRAL    Questions answered and patient indicates understanding of these issues and agrees to the plan.  Follow up in 1 mo or sooner if needed .

## 2025-03-12 ENCOUNTER — TELEPHONE (OUTPATIENT)
Dept: FAMILY MEDICINE CLINIC | Facility: CLINIC | Age: 70
End: 2025-03-12

## 2025-03-12 NOTE — TELEPHONE ENCOUNTER
Spoke with patient and she states she did not  a recent Tramadol script.Patient states the last one was in February.Patient states she had visit with Dr Valera yesterday and it was her understanding that Dr Valera would be refilling Norco.Please advise

## 2025-03-12 NOTE — TELEPHONE ENCOUNTER
"Chief Complaint   Patient presents with    Gestational Diabetes        HPI   Abigail Kc is a 32 y.o. female had concerns including Gestational Diabetes.     Patient is now approximately 9 weeks gestation.  She continues to have significant nausea.  She denies any issues with initiation of insulin and is now taking 14 units at bedtime.  Value this morning was at goal.    The following portions of the patient's history were reviewed and updated as appropriate: allergies, current medications, and past social history.    Review of Systems   Gastrointestinal:  Positive for nausea.      /72 (BP Location: Left arm, Patient Position: Sitting, Cuff Size: Adult)   Pulse 64   Ht 160 cm (63\")   Wt 89.6 kg (197 lb 9.6 oz)   SpO2 99%   BMI 35.00 kg/m²      Physical Exam      Constitutional:  well developed; well nourished  no acute distress  appears stated age   ENT/Thyroid: not examined   Eyes: Conjunctiva: clear   Respiratory:  breathing is unlabored  clear to auscultation bilaterally   Cardiovascular:  regular rate and rhythm   Chest:  Not performed.   Abdomen: Not performed.   : Not performed.   Musculoskeletal: Not performed   Skin: not performed.   Neuro: mental status, speech normal   Psych: mood and affect are within normal limits       Labs/Imaging   Latest Reference Range & Units 09/05/24 12:14   Glucose 70 - 130 mg/dL 160 !   Hemoglobin A1C 4.5 - 5.7 % 5.1   !: Data is abnormal    Diagnoses and all orders for this visit:    1. Gestational diabetes mellitus (GDM), antepartum, gestational diabetes method of control unspecified (Primary)  -     POC Glycosylated Hemoglobin (Hb A1C)  -     POC Glucose, Blood    Currently in first trimester  Insulin initiated following last visit given persistent fasting hyperglycemia.  Most recent value was at goal.  Patient will continue Basaglar 14 units daily, she will increase by 2 units every other day if fasting values rise above goal.  Discussed with patient that " Ok for them to fill ; tell pt pharmacy was asking     it is possible she may require mealtime insulin later in pregnancy.  Values are currently at goal.  Continue monitoring.  Patient does report that glucose value in office today is approximately 30 minutes post lunch.  Hemoglobin A1c is 5.1  Reviewed instructions for glycemic monitoring and the monitoring of urine ketones.  Discussed that glucose values can become harder to control as pregnancy progresses. Reviewed need for routine follow up from now until delivery.   Patient instructed to send glucose data weekly via [employer].  Risks of hyperglycemia in pregnancy were reviewed, these include, but are not limited to: LGA infant, macrosomia, stillbirth,  hypoglycemia, hyperbilirubinemia, birth injury,  birth, respiratory complications following delivery, polyhydramnios, hypocalcemia, maternal preeclampsia.  Nutritional goals reviewed: Patient advised to eat 3 moderate sized meals daily as well as 2-4 snacks, including a bedtime snack. Discussed need for carbohydrate awareness. Patient given goals for carbohydrate intake for meals/snacks.  Glycemic Targets during pregnancy were reviewed, as follows: fasting glucose <95, 1 hour post prandial <140, 2 hour postprandial <120.  -Patient will return for follow up in 4-6 weeks. She was instructed to contact the office in the interim if glucoses not at target.     Return in about 4 weeks (around 10/3/2024) for Next scheduled follow up. The patient was instructed to contact the clinic with any interval questions or concerns.    Electronically signed by: Megan Hensley MD   Endocrinologist    Dictated Utilizing Dragon Dictation

## 2025-04-02 DIAGNOSIS — T84.018S PROSTHETIC HIP IMPLANT FAILURE, SEQUELA: ICD-10-CM

## 2025-04-02 DIAGNOSIS — G89.4 CHRONIC PAIN SYNDROME: ICD-10-CM

## 2025-04-02 DIAGNOSIS — Z96.649 PROSTHETIC HIP IMPLANT FAILURE, SEQUELA: ICD-10-CM

## 2025-04-02 DIAGNOSIS — M16.12 OSTEOARTHRITIS OF LEFT HIP, UNSPECIFIED OSTEOARTHRITIS TYPE: ICD-10-CM

## 2025-04-02 NOTE — TELEPHONE ENCOUNTER
Medication requested:   Medication Quantity Refills Start End   HYDROcodone-acetaminophen 5-325 MG Oral Tab                 Pharmacy name/location:    [id number] [address] AT [facility] [address]  & , [phone], [phone]       LOV:   GAGAN 3//11/25

## 2025-04-02 NOTE — TELEPHONE ENCOUNTER
Approve/deny  HYDROcodone-acetaminophen 5-325 MG Oral Tab   Last filled 3/11/25 for #180  Last visit video visit 3/11/25

## 2025-04-03 RX ORDER — HYDROCODONE BITARTRATE AND ACETAMINOPHEN 5; 325 MG/1; MG/1
1 TABLET ORAL EVERY 4 HOURS PRN
Qty: 180 TABLET | Refills: 0 | Status: SHIPPED | OUTPATIENT
Start: 2025-04-03

## 2025-04-15 ENCOUNTER — TELEPHONE (OUTPATIENT)
Dept: FAMILY MEDICINE CLINIC | Facility: CLINIC | Age: 70
End: 2025-04-15

## 2025-04-15 ENCOUNTER — TELEMEDICINE (OUTPATIENT)
Dept: FAMILY MEDICINE CLINIC | Facility: CLINIC | Age: 70
End: 2025-04-15
Payer: MEDICARE

## 2025-04-15 DIAGNOSIS — G89.4 CHRONIC PAIN SYNDROME: Primary | ICD-10-CM

## 2025-04-15 DIAGNOSIS — Z96.649 PROSTHETIC HIP IMPLANT FAILURE, SEQUELA: ICD-10-CM

## 2025-04-15 DIAGNOSIS — R06.83 SNORING: ICD-10-CM

## 2025-04-15 DIAGNOSIS — M16.12 OSTEOARTHRITIS OF LEFT HIP, UNSPECIFIED OSTEOARTHRITIS TYPE: ICD-10-CM

## 2025-04-15 DIAGNOSIS — T84.018S PROSTHETIC HIP IMPLANT FAILURE, SEQUELA: ICD-10-CM

## 2025-04-15 PROCEDURE — 99214 OFFICE O/P EST MOD 30 MIN: CPT | Performed by: FAMILY MEDICINE

## 2025-04-15 RX ORDER — HYDROCODONE BITARTRATE AND ACETAMINOPHEN 10; 325 MG/1; MG/1
1 TABLET ORAL EVERY 6 HOURS PRN
Qty: 60 TABLET | Refills: 0 | Status: SHIPPED | OUTPATIENT
Start: 2025-04-15 | End: 2025-04-17

## 2025-04-15 NOTE — PROGRESS NOTES
This visit is conducted using Telemedicine with live, interactive video and audio.    Telehealth outside of [facility]  Telehealth Verbal Consent   I conducted a telehealth visit with Ngozi Colalzo today, 04/15/25, which was completed using two-way, real-time interactive audio and video communication. This has been done in good john to provide continuity of care in the best interest of the provider-patient relationship, due to the COVID - public health crisis/national emergency where restrictions of face-to-face office visits are ongoing. Every conscious effort was taken to allow for sufficient and adequate time to complete the visit.  The patient was made aware of the limitations of the telehealth visit, including treatment limitations as no physical exam could be performed.  The patient was advised to call 911 or to go to the ER in case there was an emergency.  The patient was also advised of the potential privacy & security concerns related to the telehealth platform.   The patient was made aware of where to find [facility]'s notice of privacy practices, telehealth consent form and other related consent forms and documents.  which are located on the [facility] website. The patient verbally agreed to telehealth consent form, related consents and the risks discussed.    Lastly, the patient confirmed that they were in [place].   Included in this visit, time may have been spent reviewing labs, medications, radiology tests and decision making. Appropriate medical decision-making and tests are ordered as detailed in the plan of care above.  Coding/billing information is submitted for this visit based on complexity of care and/or time spent for the visit.       The following individual(s) verbally consented to be recorded using ambient AI listening technology and understand that they can each withdraw their consent to this listening technology at any point by asking the clinician to turn off or pause the recording:    Patient name:  Ngozi Collazo  Additional names:        HPI:   Ngozi Collazo is a 69 year old female who presents for f/u on chronic pain, chronic right hip dislocation     History of Present Illness  Ngozi Collazo is a 69-year-old female who presents with severe left hip pain.    Two weeks ago, during a post-surgery checkup for her right hip, an x-ray revealed that her left hip was not sitting in the socket and was positioned above it. The surgeon noted the severity of the condition verbally. She describes the pain as unbearable and is considering scheduling surgery for the left hip.    She is currently taking pain medication every four hours, sometimes more frequently due to the intensity of the pain. No constipation as a side effect. Home health services are ongoing, with a nurse visiting recently and physical therapy occurring once a week. The physical therapist advised her not to put weight on the left hip due to the pain experienced when attempting to walk, resulting in her using a wheelchair.    Her IT band pain has worsened, with swelling in her leg. Icing provides temporary relief, and physical therapy has been beneficial in the past. She is considering increasing her pain medication dosage due to the severity of the pain.    She experiences gastrointestinal discomfort that typically occurs in the spring. A gastroenterologist previously recommended Pepto-Bismol, which she has been taking. She has not been on an acid reducer and does not recall being diagnosed with GERD.    She is contemplating weight loss before surgery and has not gained weight recently. She is considering using a calorie-counting akiko to manage her diet.      Right hip surgery in 2022 and 15 subsequent surgeries for dislocation ; last one February 2025   Pt has not walked in 2 years / in PT learning to walk again   Last one by Dr. Hoyt   Chronic pain - pt is taking Norco 5mg ; pt would like to continue this medication   Pt taking it every 4 hours     No  constipation     Bp is normal after her PT at home sessions    Current Outpatient Medications   Medication Sig Dispense Refill    HYDROcodone-acetaminophen 5-325 MG Oral Tab Take 1 tablet by mouth every 4 (four) hours as needed for Pain. 180 tablet 0    METOPROLOL SUCCINATE ER 50 MG Oral Tablet 24 Hr TAKE 1 TABLET(50 MG) BY MOUTH EVERY NIGHT 30 tablet 0    leflunomide 20 MG Oral Tab Take 1 tablet (20 mg total) by mouth daily. 90 tablet 0    hydroxychloroquine 200 MG Oral Tab Take 1 tablet (200 mg total) by mouth 2 (two) times daily. 180 tablet 0    CEVIMELINE HCL 30 MG Oral Cap TAKE 1 CAPSULE 3 TIMES A    capsule 0    HYDROcodone-acetaminophen  MG Oral Tab Take 1 tablet by mouth every 6 (six) hours as needed for Pain. 30 tablet 0    traMADol 50 MG Oral Tab Take 1 tablet (50 mg total) by mouth 4 (four) times daily as needed for Pain. 30 tablet 0    hydroCHLOROthiazide 25 MG Oral Tab Take 1 tablet (25 mg total) by mouth daily.      EPINEPHrine (EPIPEN 2-RACHELE) 0.3 MG/0.3ML Injection Solution Auto-injector Inject 0.3 mL (1 each total) as directed as needed. 1 each 1    fluticasone furoate-vilanterol (BREO ELLIPTA) 200-25 MCG/ACT Inhalation Aerosol Powder, Breath Activated Inhale 1 puff into the lungs daily. 3 each 3    cefadroxil 500 MG Oral Cap Take 1 capsule (500 mg total) by mouth 2 (two) times daily.      acetaminophen 500 MG Oral Tab Take 1 tablet (500 mg total) by mouth every 6 (six) hours as needed for Pain.      Naloxone HCl 4 MG/0.1ML Nasal Liquid 4 mg by Nasal route as needed. If patient remains unresponsive, repeat dose in other nostril 2-5 minutes after first dose. 1 kit 0    sodium chloride 0.65 % Nasal Solution 2 sprays by Nasal route as needed for congestion.      ALBUTEROL SULFATE  (90 Base) MCG/ACT Inhalation Aero Soln USE 2 INHALATIONS ORALLY   EVERY 6 HOURS AS NEEDED FORWHEEZING (APPOINTMENT      NEEDED FOR MORE REFILLS) (Patient taking differently: Inhale 2 puffs into the lungs  every 4 (four) hours as needed for Wheezing or Shortness of Breath.) 3 Inhaler 3    PREVIDENT 5000 DRY MOUTH 1.1 % Dental Gel Place 1 Application onto teeth daily.          Past Medical History:    Achilles tendonitis    Arthritis    Asthma (HCC)    Atherosclerosis    with ectasia    Back problem    back surgery lumbar    Cancer (HCC)    rt breast dcis    Carpal tunnel syndrome    bilateral    Cataract    Chronic foot pain    right    Diarrhea, unspecified    Duodenitis    peptic    Dyslipidemia    Dysphagia    Essential hypertension    Exposure to radiation    Fasciitis    Fatigue    Food intolerance    Foot fracture, left    left foot fx: excessive walking    H. pylori infection    Hammertoe    second toe right foot    Heart valve disease    Hemorrhoids    High blood pressure    History of blood transfusion    no reactions [facility]    Hypercalcemia    IBS (irritable bowel syndrome)    Internal hemorrhoids    Grade II    Irregular Z line of esophagus    Localized primary carpometacarpal osteoarthritis    Lupus    Macular degeneration    early signs, bilaterally, remained stable    Osteoarthritis    left thumb, severe    Osteomyelitis of right foot (HCC)    Balaji OSCAR, Dr. Allan    Osteoporosis    Pain in joints    Pneumonia due to organism    Postmenopausal atrophic vaginitis    Pulmonary fibrosis (HCC)    referring to pulm, possible Sjogren's involvement?    Rheumatoid arthritis (HCC)    Rheumatoid arthritis(714.0)    Sicca syndrome (HCC)    Sjogren's syndrome (HCC)    Stress fracture of the metatarsals    right foot    Systemic lupus erythematosus (HCC)    Visual impairment    glasses    Vitamin D deficiency      Past Surgical History:   Procedure Laterality Date    Adenoidectomy      Appendectomy  age 8    [place]    Back surgery      Carpal tunnel release Bilateral     Cataract      Colonoscopy      Colonoscopy      Dermatology shave biopsy (d1k.1)  12/29/2011    Shave biopsy, skin, right shoulder    Foot  surgery  2013    revision right foot surgery    Foot/toes surgery proc unlisted  2011    first MTM joint fusion with ORIF of second and third MTs with correction of the second hammertoe of the right foot by Dr. Hatfield    Knee replacement surgery      Lumpectomy right      rt breast lumpectomy, [facility] biopsy stereo nodule 1 site right (cpt=19081)      Other surgical history  2013    trigger thumb injections    Radiation right  2004    MAMMOSITE    Revision total hip arthroplasty Right 2024    Right Revision Total Hip Arthroplasty - Irrigation/Debridement and Modular Component Exchange    Spine surgery procedure unlisted      L4-5    Tonsillectomy      Total hip replacement Right     Total knee replacement Left 2018    Upper gi endoscopy,exam  2008      Family History   Problem Relation Age of Onset    Heart Disorder Father     Other (Other) Father     Other (Pulmonary Embolism) Father     Diabetes Mother     Asthma Mother     Other (Other) Mother     Other (Abdominal Aortic Aneurysm) Other     Fibromyalgia Sister     Asthma Sister     Breast Cancer Self 50    DCIS Self     No Known Problems Son     No Known Problems Brother       Social History:   Social History     Socioeconomic History    Marital status:    Tobacco Use    Smoking status: Former     Current packs/day: 0.00     Average packs/day: 1 pack/day for 37.4 years (37.4 ttl pk-yrs)     Types: Cigarettes     Start date: 1970     Quit date: 2007     Years since quittin.8    Smokeless tobacco: Never    Tobacco comments:     quit in    Vaping Use    Vaping status: Never Used   Substance and Sexual Activity    Alcohol use: Not Currently     Alcohol/week: 0.0 - 1.0 standard drinks of alcohol     Comment: Rare    Drug use: Not Currently     Comment: CBD gummies for pain   Other Topics Concern    Caffeine Concern Yes     Comment: 2-3 cups a day    Exercise No     Comment: minimal    Seat  Belt Yes     Social Drivers of Health     Food Insecurity: No Food Insecurity (11/26/2024)    Received from [facility]    Food Insecurity     Currently or in the past 3 months, have you worried your food would run out before you had money to buy more?: No     In the past 12 months, have you run out of food or been unable to get more?: No   Transportation Needs: No Transportation Needs (11/26/2024)    Received from [facility]    Transportation Needs     Currently or in the past 3 months, has lack of transportation kept you from medical appointments, getting food or medicine, or providing care to a family member?: Unrecognized value     Medical Transportation Needs?: No   Housing Stability: Low Risk  (9/6/2024)    Housing Stability     Housing Instability: No        REVIEW OF SYSTEMS:   GENERAL: feels well otherwise  SKIN: denies any unusual skin lesions  EYES:denies blurred vision or double vision  HEENT: denies nasal congestion, sinus pain or ST  LUNGS: denies shortness of breath with exertion  CARDIOVASCULAR: denies chest pain on exertion  NEURO: denies headaches  PSYCHE: denies depression or anxiety  HEMATOLOGIC: denies hx of anemia  ENDOCRINE: denies thyroid history  ALL/ASTHMA: denies asthma      EXAM:     alert, appears stated age and cooperative, Normocephalic, without obvious abnormality, atraumatic, lips, mucosa, and tongue normal; teeth and gums normal, Speaking in full sentences comfortably, Normal work of breathing, Skin color, texture, turgor normal. No rashes or lesions and age appropriate, normal, logical connections, person, place and time/date and no suicidal ideation      ASSESSMENT AND PLAN:   Ngozi Collazo is a 69 year old female who presents with    Assessment & Plan  Left hip pain due to severe osteoarthritis  Severe left hip pain with improper joint alignment per recent x-ray. Condition has worsened, necessitating wheelchair use. Significant pain persists  despite analgesics every four hours. Surgical intervention planned when pain becomes intolerable. Caution advised with increased pain medication due to potential excessive acetaminophen intake.  - Contact orthopedic surgeon to schedule surgery  - Provide preoperative clearance as needed  - Increase pain medication to 10 mg for severe pain episodes, with caution regarding acetaminophen intake  - Continue home health services and physical therapy    IT band syndrome  Intermittent swelling and severe pain in the IT band. Physical therapy provides temporary relief, and icing is used for symptom management.  - Continue physical therapy  - Apply ice to the affected area as needed    Gastrointestinal discomfort  Stomach discomfort, particularly in spring. Pepto-Bismol recommended by gastroenterologist. No current use of acid reducers, and GERD not diagnosed. Consider trial of acid reducer if symptoms persist.  - Continue Pepto-Bismol  - Consider trial of acid reducer if symptoms persist    Weight management  Interested in weight loss prior to surgery. No recent weight gain. Inquired about [facility]gov[facility] but declined weight loss clinic referral due to cost and complexity. Home sleep apnea test ordered to explore potential coverage for weight loss medication.  - Start calorie counting using an akiko like [facility]  - Order home sleep apnea test to explore potential coverage for weight loss medication        1. Chronic pain syndrome    - HYDROcodone-acetaminophen  MG Oral Tab; Take 1 tablet by mouth every 6 (six) hours as needed for Pain.  Dispense: 60 tablet; Refill: 0    2. Osteoarthritis of left hip, unspecified osteoarthritis type    - HYDROcodone-acetaminophen  MG Oral Tab; Take 1 tablet by mouth every 6 (six) hours as needed for Pain.  Dispense: 60 tablet; Refill: 0    3. Prosthetic hip implant failure, sequela    - HYDROcodone-acetaminophen  MG Oral Tab; Take 1 tablet by mouth every 6 (six) hours as needed  for Pain.  Dispense: 60 tablet; Refill: 0    4. Snoring    - Home Sleep Apnea Test (Adult pt only) - Sleep consult required for Medicare pts  - General sleep study; Future    Questions answered and patient indicates understanding of these issues and agrees to the plan.  Follow up in 1 mo or sooner if needed .

## 2025-04-15 NOTE — TELEPHONE ENCOUNTER
"Observation Goals  -diagnostic tests and consults completed and resulted No (pending stool sample results)  -vital signs normal or at patient baseline Yes /49 (BP Location: Left arm)   Pulse 87   Temp 98.6  F (37  C) (Oral)   Resp 16   Ht 1.473 m (4' 10\")   Wt 71.7 kg (158 lb)   SpO2 99%   BMI 33.02 kg/m    " Please review and advise.

## 2025-04-15 NOTE — TELEPHONE ENCOUNTER
HYDROcodone-acetaminophen  MG Oral Tab     [facility] called and said They just dispense the 5-325 qty:180 on 4/9/25. They need clarification just to see if the patient can take 2 of the  5-325 until her next refill is due instead of giving her the  right now. Please advise.     [facility]   [id number] [address] [facility]

## 2025-04-16 NOTE — TELEPHONE ENCOUNTER
Pt wants to pick her med up from pharmacy at 3pm today.  She was told by pharmacy they need to speak with Dr. Valera first.

## 2025-04-16 NOTE — TELEPHONE ENCOUNTER
[facility] pharmacy called and patient informed.  Patient will use up the 5mg Norco first.

## 2025-04-17 ENCOUNTER — TELEPHONE (OUTPATIENT)
Dept: FAMILY MEDICINE CLINIC | Facility: CLINIC | Age: 70
End: 2025-04-17

## 2025-04-17 DIAGNOSIS — M16.12 OSTEOARTHRITIS OF LEFT HIP, UNSPECIFIED OSTEOARTHRITIS TYPE: ICD-10-CM

## 2025-04-17 DIAGNOSIS — T84.018S PROSTHETIC HIP IMPLANT FAILURE, SEQUELA: ICD-10-CM

## 2025-04-17 DIAGNOSIS — G89.4 CHRONIC PAIN SYNDROME: ICD-10-CM

## 2025-04-17 DIAGNOSIS — Z96.649 PROSTHETIC HIP IMPLANT FAILURE, SEQUELA: ICD-10-CM

## 2025-04-17 RX ORDER — HYDROCODONE BITARTRATE AND ACETAMINOPHEN 10; 325 MG/1; MG/1
1 TABLET ORAL EVERY 4 HOURS PRN
COMMUNITY
Start: 2025-04-17

## 2025-04-17 NOTE — TELEPHONE ENCOUNTER
Pt advised of below  Med list updated as historical entry  Encouraged stool softener as this may cause constipation  She voiced understanding

## 2025-04-17 NOTE — TELEPHONE ENCOUNTER
Pt advised of below. She voiced understanding. Has a call out to [facility] surgeon at [facility].     Asking if in the meantime can she take a dose at 4 hr julian vs q6? Reports pain is unbearable    Please advise if she can take q4-6hr vs q6hr?

## 2025-04-17 NOTE — TELEPHONE ENCOUNTER
Patient states medicine, HYDROcodone-acetaminophen  was recently switched from 5 to 10 mg and from 4 to 6 hours - patient states she has 2 hours to go before she can take her next dosage and she is in excruciating pain, please call back and advise what she can do.

## 2025-04-17 NOTE — TELEPHONE ENCOUNTER
I see notes from ov the other day    - Contact orthopedic surgeon to schedule surgery  - Provide preoperative clearance as needed  - Increase pain medication to 10 mg for severe pain episodes, with caution regarding acetaminophen intake    I don't think she has done this yet, anything to offer in the interim for pain? You rx'd  and pt feels it wears off in 2 hrs.   Please advise thx

## 2025-04-22 ENCOUNTER — TELEPHONE (OUTPATIENT)
Dept: FAMILY MEDICINE CLINIC | Facility: CLINIC | Age: 70
End: 2025-04-22

## 2025-04-22 NOTE — TELEPHONE ENCOUNTER
Per chart pt has appt 5/19 already but virtual, usually pre op needs to be within 30 days--can we just make that appt a pre op instead?     We have not rec'd any notification yet of details. They need to fax to us.

## 2025-04-22 NOTE — TELEPHONE ENCOUNTER
Patient called, states Surgery date is 6/19 for left hip. Patient wants to schedule a pre-op. Ok to add in somewhere?

## 2025-04-23 ENCOUNTER — TELEPHONE (OUTPATIENT)
Dept: FAMILY MEDICINE CLINIC | Facility: CLINIC | Age: 70
End: 2025-04-23

## 2025-04-23 DIAGNOSIS — G89.4 CHRONIC PAIN SYNDROME: ICD-10-CM

## 2025-04-23 DIAGNOSIS — T84.018S PROSTHETIC HIP IMPLANT FAILURE, SEQUELA: ICD-10-CM

## 2025-04-23 DIAGNOSIS — M16.12 OSTEOARTHRITIS OF LEFT HIP, UNSPECIFIED OSTEOARTHRITIS TYPE: ICD-10-CM

## 2025-04-23 DIAGNOSIS — Z96.649 PROSTHETIC HIP IMPLANT FAILURE, SEQUELA: ICD-10-CM

## 2025-04-23 RX ORDER — HYDROCODONE BITARTRATE AND ACETAMINOPHEN 10; 325 MG/1; MG/1
1 TABLET ORAL EVERY 4 HOURS PRN
Qty: 180 TABLET | Refills: 0 | Status: SHIPPED | OUTPATIENT
Start: 2025-04-23 | End: 2025-05-22

## 2025-04-23 NOTE — TELEPHONE ENCOUNTER
Pharmacy called regarding theHYDROcodone-acetaminophen  mg.  Is pt continuing on this dose?  She's almost out of the 5mg.  Pls advise

## 2025-04-23 NOTE — PROGRESS NOTES
DEXA shows declining / worsening bone mineral density. Please ask her to repeat her thyroid blood work again now.   Thanks  ALETA DC instructions

## 2025-04-23 NOTE — TELEPHONE ENCOUNTER
Approve/deny Norco 10 mg  Patient was previously doubling up on the 5 mg  5 mg Last filled 4/9/24 for #180

## 2025-05-19 DIAGNOSIS — M32.19 OTHER SYSTEMIC LUPUS ERYTHEMATOSUS WITH OTHER ORGAN INVOLVEMENT (HCC): ICD-10-CM

## 2025-05-19 DIAGNOSIS — M06.9 RHEUMATOID ARTHRITIS INVOLVING BOTH HANDS, UNSPECIFIED WHETHER RHEUMATOID FACTOR PRESENT (HCC): Primary | ICD-10-CM

## 2025-05-19 DIAGNOSIS — M35.00 SJOGREN'S SYNDROME WITHOUT EXTRAGLANDULAR INVOLVEMENT (HCC): ICD-10-CM

## 2025-05-19 NOTE — TELEPHONE ENCOUNTER
Last office visit: 4/18/2024    Next Rheum Apt:6/30/2025 Melanie Mcconnell DO    Last fill: hydroxychloroquine  11/21/2024  14 tablets     Labs:   Lab Results   Component Value Date    CREATSERUM 0.88 01/29/2025    GFR 73 02/05/2018    ALKPHO 108 01/29/2025    AST 30 01/29/2025    ALT 12 01/29/2025    BILT 0.3 01/29/2025    TP 6.7 01/29/2025    ALB 3.9 01/29/2025       Lab Results   Component Value Date    WBC 3.9 (L) 01/29/2025    HGB 9.7 (L) 01/29/2025    .0 01/29/2025    NEPRELIM 2.40 01/29/2025    NEUTABS 5.39 04/13/2023    LYMPHABS 0.68 (L) 04/13/2023    NEUT 83 04/13/2023    LYMPH 11 04/13/2023    NEPERCENT 60.9 01/29/2025    LYPERCENT 19.8 01/29/2025    NE 2.40 01/29/2025    LYMABS 0.78 (L) 01/29/2025

## 2025-05-22 ENCOUNTER — TELEPHONE (OUTPATIENT)
Dept: FAMILY MEDICINE CLINIC | Facility: CLINIC | Age: 70
End: 2025-05-22

## 2025-05-22 DIAGNOSIS — T84.018S PROSTHETIC HIP IMPLANT FAILURE, SEQUELA: ICD-10-CM

## 2025-05-22 DIAGNOSIS — G89.4 CHRONIC PAIN SYNDROME: ICD-10-CM

## 2025-05-22 DIAGNOSIS — M16.12 OSTEOARTHRITIS OF LEFT HIP, UNSPECIFIED OSTEOARTHRITIS TYPE: ICD-10-CM

## 2025-05-22 DIAGNOSIS — Z96.649 PROSTHETIC HIP IMPLANT FAILURE, SEQUELA: ICD-10-CM

## 2025-05-22 NOTE — TELEPHONE ENCOUNTER
Requesting   Requested Prescriptions     Pending Prescriptions Disp Refills    HYDROcodone-acetaminophen  MG Oral Tab 180 tablet 0     Sig: Take 1 tablet by mouth every 4 (four) hours as needed for Pain.       LOV: 4/15/25 telemed    Filled: 4/23/25 #180 with 0 refills    Future Appointments   Date Time Provider Department Center   6/2/2025  1:45 PM Irma Valera DO [facility] [address]   6/30/2025  1:30 PM Melanie Mcconnell DO [id number] [address]

## 2025-05-23 RX ORDER — HYDROCODONE BITARTRATE AND ACETAMINOPHEN 10; 325 MG/1; MG/1
1 TABLET ORAL EVERY 4 HOURS PRN
Qty: 180 TABLET | Refills: 0 | Status: SHIPPED | OUTPATIENT
Start: 2025-05-23

## 2025-05-28 RX ORDER — HYDROXYCHLOROQUINE SULFATE 200 MG/1
200 TABLET, FILM COATED ORAL 2 TIMES DAILY
Qty: 180 TABLET | Refills: 0 | OUTPATIENT
Start: 2025-05-28

## 2025-05-28 NOTE — TELEPHONE ENCOUNTER
Pharmacy called the office following up on medication, hydroxychloroquine. I stated prescription was sent for a 90 day supply on 3/4/25. Pharmacist confirmed and will be contacting pt. Refusing refill at this time.

## 2025-06-02 ENCOUNTER — OFFICE VISIT (OUTPATIENT)
Dept: FAMILY MEDICINE CLINIC | Facility: CLINIC | Age: 70
End: 2025-06-02
Payer: MEDICARE

## 2025-06-02 ENCOUNTER — LAB ENCOUNTER (OUTPATIENT)
Dept: LAB | Age: 70
End: 2025-06-02
Attending: FAMILY MEDICINE
Payer: MEDICARE

## 2025-06-02 ENCOUNTER — EKG ENCOUNTER (OUTPATIENT)
Dept: LAB | Age: 70
End: 2025-06-02
Attending: FAMILY MEDICINE
Payer: MEDICARE

## 2025-06-02 VITALS
HEART RATE: 58 BPM | DIASTOLIC BLOOD PRESSURE: 82 MMHG | RESPIRATION RATE: 16 BRPM | SYSTOLIC BLOOD PRESSURE: 136 MMHG | OXYGEN SATURATION: 97 %

## 2025-06-02 DIAGNOSIS — M45.9 RHEUMATOID ARTHRITIS INVOLVING VERTEBRA, UNSPECIFIED WHETHER RHEUMATOID FACTOR PRESENT (HCC): ICD-10-CM

## 2025-06-02 DIAGNOSIS — M35.00 SJOGREN'S SYNDROME, WITH UNSPECIFIED ORGAN INVOLVEMENT (HCC): Chronic | ICD-10-CM

## 2025-06-02 DIAGNOSIS — M16.12 OSTEOARTHRITIS OF LEFT HIP, UNSPECIFIED OSTEOARTHRITIS TYPE: ICD-10-CM

## 2025-06-02 DIAGNOSIS — I10 ESSENTIAL HYPERTENSION, BENIGN: ICD-10-CM

## 2025-06-02 DIAGNOSIS — I34.0 MILD MITRAL REGURGITATION: ICD-10-CM

## 2025-06-02 DIAGNOSIS — G89.4 CHRONIC PAIN SYNDROME: ICD-10-CM

## 2025-06-02 DIAGNOSIS — I77.1 TORTUOUS AORTA: ICD-10-CM

## 2025-06-02 DIAGNOSIS — I27.20 PULMONARY HTN (HCC): ICD-10-CM

## 2025-06-02 DIAGNOSIS — M32.9 SYSTEMIC LUPUS ERYTHEMATOSUS, UNSPECIFIED SLE TYPE, UNSPECIFIED ORGAN INVOLVEMENT STATUS (HCC): Chronic | ICD-10-CM

## 2025-06-02 DIAGNOSIS — Z01.818 PREOPERATIVE CLEARANCE: ICD-10-CM

## 2025-06-02 DIAGNOSIS — Z01.818 PREOPERATIVE CLEARANCE: Primary | ICD-10-CM

## 2025-06-02 DIAGNOSIS — D63.8 CHRONIC DISEASE ANEMIA: ICD-10-CM

## 2025-06-02 LAB
ALBUMIN SERPL-MCNC: 4.3 G/DL (ref 3.2–4.8)
ALBUMIN/GLOB SERPL: 1.5 {RATIO} (ref 1–2)
ALP LIVER SERPL-CCNC: 132 U/L (ref 55–142)
ALT SERPL-CCNC: 7 U/L (ref 10–49)
ANION GAP SERPL CALC-SCNC: 10 MMOL/L (ref 0–18)
AST SERPL-CCNC: 20 U/L (ref ?–34)
BASOPHILS # BLD AUTO: 0.1 X10(3) UL (ref 0–0.2)
BASOPHILS NFR BLD AUTO: 2 %
BILIRUB SERPL-MCNC: 0.4 MG/DL (ref 0.2–1.1)
BUN BLD-MCNC: 22 MG/DL (ref 9–23)
CALCIUM BLD-MCNC: 9.3 MG/DL (ref 8.7–10.6)
CHLORIDE SERPL-SCNC: 99 MMOL/L (ref 98–112)
CO2 SERPL-SCNC: 30 MMOL/L (ref 21–32)
CREAT BLD-MCNC: 0.9 MG/DL (ref 0.55–1.02)
DEPRECATED HBV CORE AB SER IA-ACNC: 55 NG/ML (ref 50–306)
EGFRCR SERPLBLD CKD-EPI 2021: 68 ML/MIN/1.73M2 (ref 60–?)
EOSINOPHIL # BLD AUTO: 0.28 X10(3) UL (ref 0–0.7)
EOSINOPHIL NFR BLD AUTO: 5.5 %
ERYTHROCYTE [DISTWIDTH] IN BLOOD BY AUTOMATED COUNT: 15.9 %
FASTING STATUS PATIENT QL REPORTED: NO
GLOBULIN PLAS-MCNC: 2.8 G/DL (ref 2–3.5)
GLUCOSE BLD-MCNC: 88 MG/DL (ref 70–99)
HCT VFR BLD AUTO: 33.8 % (ref 35–48)
HGB BLD-MCNC: 10.6 G/DL (ref 12–16)
IMM GRANULOCYTES # BLD AUTO: 0.01 X10(3) UL (ref 0–1)
IMM GRANULOCYTES NFR BLD: 0.2 %
LYMPHOCYTES # BLD AUTO: 1.16 X10(3) UL (ref 1–4)
LYMPHOCYTES NFR BLD AUTO: 22.7 %
MCH RBC QN AUTO: 27.9 PG (ref 26–34)
MCHC RBC AUTO-ENTMCNC: 31.4 G/DL (ref 31–37)
MCV RBC AUTO: 88.9 FL (ref 80–100)
MONOCYTES # BLD AUTO: 0.69 X10(3) UL (ref 0.1–1)
MONOCYTES NFR BLD AUTO: 13.5 %
NEUTROPHILS # BLD AUTO: 2.88 X10 (3) UL (ref 1.5–7.7)
NEUTROPHILS # BLD AUTO: 2.88 X10(3) UL (ref 1.5–7.7)
NEUTROPHILS NFR BLD AUTO: 56.1 %
OSMOLALITY SERPL CALC.SUM OF ELEC: 291 MOSM/KG (ref 275–295)
PLATELET # BLD AUTO: 244 10(3)UL (ref 150–450)
POTASSIUM SERPL-SCNC: 3.9 MMOL/L (ref 3.5–5.1)
PROT SERPL-MCNC: 7.1 G/DL (ref 5.7–8.2)
RBC # BLD AUTO: 3.8 X10(6)UL (ref 3.8–5.3)
SODIUM SERPL-SCNC: 139 MMOL/L (ref 136–145)
WBC # BLD AUTO: 5.1 X10(3) UL (ref 4–11)

## 2025-06-02 PROCEDURE — 99214 OFFICE O/P EST MOD 30 MIN: CPT | Performed by: FAMILY MEDICINE

## 2025-06-02 PROCEDURE — 82728 ASSAY OF FERRITIN: CPT

## 2025-06-02 PROCEDURE — 80053 COMPREHEN METABOLIC PANEL: CPT

## 2025-06-02 PROCEDURE — 85025 COMPLETE CBC W/AUTO DIFF WBC: CPT

## 2025-06-02 PROCEDURE — 36415 COLL VENOUS BLD VENIPUNCTURE: CPT

## 2025-06-02 RX ORDER — DOXYCYCLINE HYCLATE 100 MG
100 TABLET ORAL 2 TIMES DAILY
COMMUNITY
Start: 2024-12-09

## 2025-06-02 NOTE — PROGRESS NOTES
Ngozi Collazo is a 69 year old female who presents for preop clearance for   Left total hip arthroplastsy     Dr. Hoyt requesting clearance.  6/19/25  At [facility]   HPI:   Pt complains of chronic pain.  Pt denies any chest pain/sob/dizziness or lightheadedness at rest or with exertion.  No previous reaction or problems with anesthesia.    Wt Readings from Last 6 Encounters:   01/24/25 194 lb 0.1 oz (88 kg)   09/06/24 195 lb (88.5 kg)   07/23/24 206 lb (93.4 kg)   06/14/24 193 lb (87.5 kg)   02/22/24 211 lb (95.7 kg)   02/06/24 220 lb (99.8 kg)     There is no height or weight on file to calculate BMI.     Cholesterol, Total (mg/dL)   Date Value   06/14/2024 188   04/08/2022 198   12/07/2020 183   09/13/2006 220 (H)     CHOLESTEROL (mg/dL)   Date Value   04/29/2014 204 (H)   03/30/2012 196   01/18/2011 211 (H)     HDL Cholesterol (mg/dL)   Date Value   06/14/2024 81 (H)   04/08/2022 93 (H)   12/07/2020 93 (H)   09/13/2006 59     HDL CHOL (mg/dL)   Date Value   04/29/2014 65   03/30/2012 63   01/18/2011 72     LDL Cholesterol Calc (mg/dL)   Date Value   09/13/2006 134 (H)     LDL Cholesterol (mg/dL)   Date Value   06/14/2024 90   04/08/2022 91   12/07/2020 75     LDL CHOLESTROL (mg/dL)   Date Value   04/29/2014 102   03/30/2012 107 (H)   01/18/2011 111 (H)     Triglycerides (mg/dL)   Date Value   09/13/2006 134     AST (SGOT) (IU/L)   Date Value   06/03/2016 19   03/04/2016 17   11/19/2015 20     AST (U/L)   Date Value   01/29/2025 30   09/05/2024 26   06/14/2024 20   09/03/2020 25   06/30/2020 24   01/06/2020 15     ALT (SGPT) (IU/L)   Date Value   06/03/2016 11   03/04/2016 15   11/19/2015 16     ALT (U/L)   Date Value   01/29/2025 12   09/05/2024 10   06/14/2024 16   09/03/2020 14   06/30/2020 23   01/06/2020 10      Current Outpatient Medications   Medication Sig Dispense Refill    Doxycycline Hyclate 100 MG Oral Tab Take 1 tablet (100 mg total) by mouth 2 (two) times daily.      HYDROcodone-acetaminophen   MG Oral Tab Take 1 tablet by mouth every 4 (four) hours as needed for Pain. 180 tablet 0    METOPROLOL SUCCINATE ER 50 MG Oral Tablet 24 Hr TAKE 1 TABLET(50 MG) BY MOUTH EVERY NIGHT 30 tablet 0    leflunomide 20 MG Oral Tab Take 1 tablet (20 mg total) by mouth daily. 90 tablet 0    hydroxychloroquine 200 MG Oral Tab Take 1 tablet (200 mg total) by mouth 2 (two) times daily. 180 tablet 0    CEVIMELINE HCL 30 MG Oral Cap TAKE 1 CAPSULE 3 TIMES A    capsule 0    hydroCHLOROthiazide 25 MG Oral Tab Take 1 tablet (25 mg total) by mouth daily.      EPINEPHrine (EPIPEN 2-RACHELE) 0.3 MG/0.3ML Injection Solution Auto-injector Inject 0.3 mL (1 each total) as directed as needed. 1 each 1    fluticasone furoate-vilanterol (BREO ELLIPTA) 200-25 MCG/ACT Inhalation Aerosol Powder, Breath Activated Inhale 1 puff into the lungs daily. 3 each 3    cefadroxil 500 MG Oral Cap Take 1 capsule (500 mg total) by mouth 2 (two) times daily.      Naloxone HCl 4 MG/0.1ML Nasal Liquid 4 mg by Nasal route as needed. If patient remains unresponsive, repeat dose in other nostril 2-5 minutes after first dose. 1 kit 0    PREVIDENT 5000 DRY MOUTH 1.1 % Dental Gel Place 1 Application onto teeth daily.        traMADol 50 MG Oral Tab Take 1 tablet (50 mg total) by mouth 4 (four) times daily as needed for Pain. 30 tablet 0    acetaminophen 500 MG Oral Tab Take 1 tablet (500 mg total) by mouth every 6 (six) hours as needed for Pain. (Patient not taking: Reported on 6/2/2025)      sodium chloride 0.65 % Nasal Solution 2 sprays by Nasal route as needed for congestion.      ALBUTEROL SULFATE  (90 Base) MCG/ACT Inhalation Aero Soln USE 2 INHALATIONS ORALLY   EVERY 6 HOURS AS NEEDED FORWHEEZING (APPOINTMENT      NEEDED FOR MORE REFILLS) (Patient taking differently: Inhale 2 puffs into the lungs every 4 (four) hours as needed for Wheezing or Shortness of Breath.) 3 Inhaler 3      Past Medical History:    Achilles tendonitis    Arthritis     Asthma (HCC)    Atherosclerosis    with ectasia    Back problem    back surgery lumbar    Cancer (HCC)    rt breast dcis    Carpal tunnel syndrome    bilateral    Cataract    Chronic foot pain    right    Diarrhea, unspecified    Duodenitis    peptic    Dyslipidemia    Dysphagia    Essential hypertension    Exposure to radiation    Fasciitis    Fatigue    Food intolerance    Foot fracture, left    left foot fx: excessive walking    H. pylori infection    Hammertoe    second toe right foot    Heart valve disease    Hemorrhoids    High blood pressure    History of blood transfusion    no reactions [facility]    Hypercalcemia    IBS (irritable bowel syndrome)    Internal hemorrhoids    Grade II    Irregular Z line of esophagus    Localized primary carpometacarpal osteoarthritis    Lupus    Macular degeneration    early signs, bilaterally, remained stable    Osteoarthritis    left thumb, severe    Osteomyelitis of right foot (HCC)    Balaji ID, Dr. Allan    Osteoporosis    Pain in joints    Pneumonia due to organism    Postmenopausal atrophic vaginitis    Pulmonary fibrosis (HCC)    referring to pulm, possible Sjogren's involvement?    Rheumatoid arthritis (HCC)    Rheumatoid arthritis(714.0)    Sicca syndrome (HCC)    Sjogren's syndrome (HCC)    Stress fracture of the metatarsals    right foot    Systemic lupus erythematosus (HCC)    Visual impairment    glasses    Vitamin D deficiency      Past Surgical History:   Procedure Laterality Date    Adenoidectomy      Appendectomy  age 8    [place]    Back surgery      Carpal tunnel release Bilateral     Cataract      Colonoscopy      Colonoscopy      Dermatology shave biopsy (d1k.1)  12/29/2011    Shave biopsy, skin, right shoulder    Foot surgery  04/2013    revision right foot surgery    Foot/toes surgery proc unlisted  09/2011    first MTM joint fusion with ORIF of second and third MTs with correction of the second hammertoe of the right foot by Dr. Liu Méndez  replacement surgery      Lumpectomy right      rt breast lumpectomy, [facility]    Divina biopsy stereo nodule 1 site right (cpt=19081)      Other surgical history  2013    trigger thumb injections    Radiation right  2004    MAMMOSITE    Revision total hip arthroplasty Right 2024    Right Revision Total Hip Arthroplasty - Irrigation/Debridement and Modular Component Exchange    Spine surgery procedure unlisted      L4-5    Tonsillectomy      Total hip replacement Right     Total knee replacement Left 2018    Upper gi endoscopy,exam  2008      Family History   Problem Relation Age of Onset    Heart Disorder Father     Other (Other) Father     Other (Pulmonary Embolism) Father     Diabetes Mother     Asthma Mother     Other (Other) Mother     Other (Abdominal Aortic Aneurysm) Other     Fibromyalgia Sister     Asthma Sister     Breast Cancer Self 50    DCIS Self     No Known Problems Son     No Known Problems Brother       Social History:  Social History     Socioeconomic History    Marital status:    Tobacco Use    Smoking status: Former     Current packs/day: 0.00     Average packs/day: 1 pack/day for 37.4 years (37.4 ttl pk-yrs)     Types: Cigarettes     Start date: 1970     Quit date: 2007     Years since quittin.9    Smokeless tobacco: Never    Tobacco comments:     quit in    Vaping Use    Vaping status: Never Used   Substance and Sexual Activity    Alcohol use: Not Currently     Alcohol/week: 0.0 - 1.0 standard drinks of alcohol     Comment: Rare    Drug use: Not Currently     Comment: CBD gummies for pain   Other Topics Concern    Caffeine Concern Yes     Comment: 2-3 cups a day    Exercise No     Comment: minimal    Seat Belt Yes     Social Drivers of Health     Food Insecurity: No Food Insecurity (2024)    Received from [facility]    Food Insecurity     Currently or in the past 3 months, have you worried your food would run  out before you had money to buy more?: No     In the past 12 months, have you run out of food or been unable to get more?: No   Transportation Needs: No Transportation Needs (11/26/2024)    Received from [facility]    Transportation Needs     Currently or in the past 3 months, has lack of transportation kept you from medical appointments, getting food or medicine, or providing care to a family member?: No   Housing Stability: Low Risk  (9/6/2024)    Housing Stability     Housing Instability: No      Occ: retired    Exercise: minimal.  Diet: watches minimally     REVIEW OF SYSTEMS:   GENERAL: feels well otherwise  SKIN: denies any unusual skin lesions  EYES:denies blurred vision or double vision  HEENT: denies nasal congestion, sinus pain or ST  LUNGS: denies shortness of breath with exertion  CARDIOVASCULAR: denies chest pain on exertion  GI: denies abdominal pain,denies heartburn  : denies nocturia or changes in stream  MUSCULOSKELETAL: denies back pain  NEURO: denies headaches  PSYCHE: denies depression or anxiety  HEMATOLOGIC: denies hx of anemia  ENDOCRINE: denies thyroid history  ALL/ASTHMA: denies asthma    EXAM:   /82   Pulse 58   Resp 16   SpO2 97%   There is no height or weight on file to calculate BMI.   GENERAL: well developed, well nourished,in no apparent distress  SKIN: no rashes,no suspicious lesions  HEENT: atraumatic, normocephalic,ears and throat are clear  EYES:PERRLA, EOMI, normal optic disk,conjunctiva are clear  NECK: supple,no adenopathy,no bruits, no JVD  CHEST: no chest tenderness  BREAST: no dominant or suspicious mass  LUNGS: clear to auscultation  CARDIO: RRR without murmur  GI: good BS's,no masses, HSM or tenderness  MUSCULOSKELETAL: back is not tender,FROM of the back  EXTREMITIES: no cyanosis, clubbing or edema  NEURO: Oriented times three,cranial nerves are intact,motor and sensory are grossly intact    ASSESSMENT AND PLAN:   Ngozi Collazo is a 69 year  old female who presents for preop clearance.    1. Preop examination  Cleared after review of labs / EKG   - EKG 12-LEAD; Future  - CBC WITH DIFFERENTIAL WITH PLATELET; Future  - COMP METABOLIC PANEL (14); Future    2. Primary osteoarthritis of right hip    - EKG 12-LEAD; Future  - CBC WITH DIFFERENTIAL WITH PLATELET; Future  - COMP METABOLIC PANEL (14); Future    3. Other chronic pain    - EKG 12-LEAD; Future  - CBC WITH DIFFERENTIAL WITH PLATELET; Future  - COMP METABOLIC PANEL (14); Future    4. Rheumatoid arthritis involving vertebra, unspecified whether rheumatoid factor present (HCC)    - EKG 12-LEAD; Future  - CBC WITH DIFFERENTIAL WITH PLATELET; Future  - COMP METABOLIC PANEL (14); Future    5. Other systemic lupus erythematosus with other organ involvement (HCC)    - EKG 12-LEAD; Future  - CBC WITH DIFFERENTIAL WITH PLATELET; Future  - COMP METABOLIC PANEL (14); Future    6. Immunosuppressed status (HCC)    - EKG 12-LEAD; Future  - CBC WITH DIFFERENTIAL WITH PLATELET; Future  - COMP METABOLIC PANEL (14); Future    7. Sjogren's syndrome, with unspecified organ involvement (HCC)    - EKG 12-LEAD; Future  - CBC WITH DIFFERENTIAL WITH PLATELET; Future  - COMP METABOLIC PANEL (14); Future    8. Mild mitral regurgitation    - EKG 12-LEAD; Future  - CBC WITH DIFFERENTIAL WITH PLATELET; Future  - COMP METABOLIC PANEL (14); Future    9. Tortuous aorta (HCC)    - EKG 12-LEAD; Future  - CBC WITH DIFFERENTIAL WITH PLATELET; Future  - COMP METABOLIC PANEL (14); Future       Pt is moderate for mod-low risk procedure.   RTC 3 mo or sooner if needed.

## 2025-06-03 DIAGNOSIS — I10 ESSENTIAL HYPERTENSION, BENIGN: ICD-10-CM

## 2025-06-03 LAB
ATRIAL RATE: 60 BPM
P AXIS: 47 DEGREES
P-R INTERVAL: 156 MS
Q-T INTERVAL: 416 MS
QRS DURATION: 74 MS
QTC CALCULATION (BEZET): 416 MS
R AXIS: -11 DEGREES
T AXIS: 4 DEGREES
VENTRICULAR RATE: 60 BPM

## 2025-06-03 RX ORDER — METOPROLOL SUCCINATE 50 MG/1
50 TABLET, EXTENDED RELEASE ORAL NIGHTLY
Qty: 90 TABLET | Refills: 0 | Status: SHIPPED | OUTPATIENT
Start: 2025-06-03

## 2025-06-09 ENCOUNTER — TELEPHONE (OUTPATIENT)
Dept: FAMILY MEDICINE CLINIC | Facility: CLINIC | Age: 70
End: 2025-06-09

## 2025-06-09 NOTE — TELEPHONE ENCOUNTER
1. Preop examination  Cleared after review of labs / EKG   - EKG 12-LEAD; Future  - CBC WITH DIFFERENTIAL WITH PLATELET; Future  - COMP METABOLIC PANEL (14); Future

## 2025-06-09 NOTE — TELEPHONE ENCOUNTER
Pt is moderate for mod-low risk procedure.   RTC 3 mo or sooner if needed.    Needs to state cleared for surgery

## 2025-06-09 NOTE — TELEPHONE ENCOUNTER
Needs something that states pt is cleared for surgery, current note does not specifically state that.    Nela Wilcox NP [facility]  [phone] phone  [phone] fax

## 2025-06-10 DIAGNOSIS — M32.19 OTHER SYSTEMIC LUPUS ERYTHEMATOSUS WITH OTHER ORGAN INVOLVEMENT (HCC): ICD-10-CM

## 2025-06-10 DIAGNOSIS — M06.9 RHEUMATOID ARTHRITIS INVOLVING BOTH HANDS, UNSPECIFIED WHETHER RHEUMATOID FACTOR PRESENT (HCC): ICD-10-CM

## 2025-06-10 DIAGNOSIS — M35.00 SJOGREN'S SYNDROME WITHOUT EXTRAGLANDULAR INVOLVEMENT (HCC): ICD-10-CM

## 2025-06-10 RX ORDER — HYDROXYCHLOROQUINE SULFATE 200 MG/1
200 TABLET, FILM COATED ORAL 2 TIMES DAILY
Qty: 180 TABLET | Refills: 0 | Status: SHIPPED | OUTPATIENT
Start: 2025-06-10

## 2025-06-10 NOTE — TELEPHONE ENCOUNTER
Hydroxychloroquine 200 mg      Last office visit: 11/30/2022    Next Rheum Apt:6/30/2025 Melanie Mcconnell DO    Last fill: 3/4/2025 180 tab, 0 refills    Pended 30 day supply to get her until her next appointment

## 2025-06-12 DIAGNOSIS — M06.9 RHEUMATOID ARTHRITIS, INVOLVING UNSPECIFIED SITE, UNSPECIFIED WHETHER RHEUMATOID FACTOR PRESENT (HCC): ICD-10-CM

## 2025-06-13 RX ORDER — LEFLUNOMIDE 20 MG/1
20 TABLET ORAL DAILY
Qty: 90 TABLET | Refills: 0 | OUTPATIENT
Start: 2025-06-13

## 2025-06-13 NOTE — TELEPHONE ENCOUNTER
LOV: 11/20/22  Patient cancelled her June appt with Dr. Mcconnell. No future appt scheduled.   Future Appointments   Date Time Provider Department Center   9/16/2025 12:00 PM Irma Valera DO [facility] [address]

## 2025-06-15 DIAGNOSIS — M35.00 SJOGREN'S SYNDROME WITHOUT EXTRAGLANDULAR INVOLVEMENT (HCC): ICD-10-CM

## 2025-06-15 DIAGNOSIS — M32.19 OTHER SYSTEMIC LUPUS ERYTHEMATOSUS WITH OTHER ORGAN INVOLVEMENT (HCC): ICD-10-CM

## 2025-06-15 DIAGNOSIS — M06.9 RHEUMATOID ARTHRITIS INVOLVING BOTH HANDS, UNSPECIFIED WHETHER RHEUMATOID FACTOR PRESENT (HCC): ICD-10-CM

## 2025-06-16 RX ORDER — HYDROXYCHLOROQUINE SULFATE 200 MG/1
200 TABLET, FILM COATED ORAL 2 TIMES DAILY
Qty: 180 TABLET | Refills: 0 | OUTPATIENT
Start: 2025-06-16

## 2025-06-16 NOTE — TELEPHONE ENCOUNTER
Hydroxychloroquine 200 mg    Script sent to pharmacy on 6/10/2025. Sending pt my chart message to call pharmacy for refills.

## 2025-07-14 ENCOUNTER — MED REC SCAN ONLY (OUTPATIENT)
Facility: CLINIC | Age: 70
End: 2025-07-14

## 2025-07-23 ENCOUNTER — TELEPHONE (OUTPATIENT)
Dept: FAMILY MEDICINE CLINIC | Facility: CLINIC | Age: 70
End: 2025-07-23

## 2025-07-23 NOTE — TELEPHONE ENCOUNTER
Fina, Inter[facility] , [phone] ext [phone] referred by Alfredo Burns for  care, is Dr. Valera willing to sign off on her care

## 2025-08-19 ENCOUNTER — LAB REQUISITION (OUTPATIENT)
Dept: LAB | Facility: HOSPITAL | Age: 70
End: 2025-08-19

## 2025-08-19 DIAGNOSIS — T84.50XA INFECTION AND INFLAMMATORY REACTION DUE TO UNSPECIFIED INTERNAL JOINT PROSTHESIS, INITIAL ENCOUNTER: ICD-10-CM

## 2025-08-19 LAB
ALBUMIN SERPL-MCNC: 4 G/DL (ref 3.2–4.8)
ALBUMIN/GLOB SERPL: 1.4 (ref 1–2)
ALP LIVER SERPL-CCNC: 140 U/L (ref 55–142)
ALT SERPL-CCNC: 9 U/L (ref 10–49)
ANION GAP SERPL CALC-SCNC: 13 MMOL/L (ref 0–18)
AST SERPL-CCNC: 23 U/L (ref ?–34)
BASOPHILS # BLD AUTO: 0.08 X10(3) UL (ref 0–0.2)
BASOPHILS NFR BLD AUTO: 1.8 %
BILIRUB SERPL-MCNC: 0.2 MG/DL (ref 0.2–1.1)
BUN BLD-MCNC: 27 MG/DL (ref 9–23)
CALCIUM BLD-MCNC: 9.4 MG/DL (ref 8.7–10.6)
CHLORIDE SERPL-SCNC: 105 MMOL/L (ref 98–112)
CO2 SERPL-SCNC: 26 MMOL/L (ref 21–32)
CREAT BLD-MCNC: 1.08 MG/DL (ref 0.55–1.02)
CRP SERPL-MCNC: <0.5 MG/DL (ref ?–0.5)
EGFRCR SERPLBLD CKD-EPI 2021: 55 ML/MIN/1.73M2 (ref 60–?)
EOSINOPHIL # BLD AUTO: 0.32 X10(3) UL (ref 0–0.7)
EOSINOPHIL NFR BLD AUTO: 7.1 %
ERYTHROCYTE [DISTWIDTH] IN BLOOD BY AUTOMATED COUNT: 16 %
ERYTHROCYTE [SEDIMENTATION RATE] IN BLOOD: 38 MM/HR (ref 0–30)
GLOBULIN PLAS-MCNC: 2.8 G/DL (ref 2–3.5)
GLUCOSE BLD-MCNC: 107 MG/DL (ref 70–99)
HCT VFR BLD AUTO: 33.3 % (ref 35–48)
HGB BLD-MCNC: 10.4 G/DL (ref 12–16)
IMM GRANULOCYTES # BLD AUTO: 0 X10(3) UL (ref 0–1)
IMM GRANULOCYTES NFR BLD: 0 %
LYMPHOCYTES # BLD AUTO: 1.25 X10(3) UL (ref 1–4)
LYMPHOCYTES NFR BLD AUTO: 27.9 %
MCH RBC QN AUTO: 27.6 PG (ref 26–34)
MCHC RBC AUTO-ENTMCNC: 31.2 G/DL (ref 31–37)
MCV RBC AUTO: 88.3 FL (ref 80–100)
MONOCYTES # BLD AUTO: 0.58 X10(3) UL (ref 0.1–1)
MONOCYTES NFR BLD AUTO: 12.9 %
NEUTROPHILS # BLD AUTO: 2.25 X10 (3) UL (ref 1.5–7.7)
NEUTROPHILS # BLD AUTO: 2.25 X10(3) UL (ref 1.5–7.7)
NEUTROPHILS NFR BLD AUTO: 50.3 %
OSMOLALITY SERPL CALC.SUM OF ELEC: 304 MOSM/KG (ref 275–295)
PLATELET # BLD AUTO: 253 10(3)UL (ref 150–450)
POTASSIUM SERPL-SCNC: 3.5 MMOL/L (ref 3.5–5.1)
PROT SERPL-MCNC: 6.8 G/DL (ref 5.7–8.2)
RBC # BLD AUTO: 3.77 X10(6)UL (ref 3.8–5.3)
SODIUM SERPL-SCNC: 144 MMOL/L (ref 136–145)
WBC # BLD AUTO: 4.5 X10(3) UL (ref 4–11)

## 2025-08-19 PROCEDURE — 85652 RBC SED RATE AUTOMATED: CPT | Performed by: FAMILY MEDICINE

## 2025-08-19 PROCEDURE — 80053 COMPREHEN METABOLIC PANEL: CPT | Performed by: FAMILY MEDICINE

## 2025-08-19 PROCEDURE — 86140 C-REACTIVE PROTEIN: CPT | Performed by: FAMILY MEDICINE

## 2025-08-19 PROCEDURE — 85025 COMPLETE CBC W/AUTO DIFF WBC: CPT | Performed by: FAMILY MEDICINE

## (undated) DIAGNOSIS — M06.9 RHEUMATOID ARTHRITIS INVOLVING BOTH HANDS, UNSPECIFIED WHETHER RHEUMATOID FACTOR PRESENT (HCC): ICD-10-CM

## (undated) DIAGNOSIS — M79.7 FIBROMYALGIA: Primary | ICD-10-CM

## (undated) DIAGNOSIS — M47.816 ARTHRITIS OF FACET JOINT OF LUMBAR SPINE: Primary | ICD-10-CM

## (undated) DIAGNOSIS — M79.7 FIBROMYALGIA: ICD-10-CM

## (undated) DIAGNOSIS — M54.16 LUMBAR RADICULITIS: Primary | ICD-10-CM

## (undated) DIAGNOSIS — M47.816 LUMBAR FACET ARTHROPATHY: Primary | ICD-10-CM

## (undated) DIAGNOSIS — M35.00 SJOGREN'S SYNDROME WITHOUT EXTRAGLANDULAR INVOLVEMENT (HCC): ICD-10-CM

## (undated) DIAGNOSIS — M32.19 OTHER SYSTEMIC LUPUS ERYTHEMATOSUS WITH OTHER ORGAN INVOLVEMENT (HCC): ICD-10-CM

## (undated) DEVICE — AIRLIFE&#8482 MISTY MAX 10 NEBULIZER W 7 (2.1 M) CRUSH RESISTANT OXYGEN TUBING BAFFLED TEE ADAPTER, MOUTHPIECE: Brand: AIRLIFE

## (undated) DEVICE — BANDAID COVERLET 1X3

## (undated) DEVICE — 60 ML SYRINGE LUER-LOCK TIP: Brand: MONOJECT

## (undated) DEVICE — GLOVE GAMMEX PI HYBRID LF 8.0

## (undated) DEVICE — PILLOW FX 56X38X15CM MED HIP

## (undated) DEVICE — SOLUTION SURG DURAPREP 26ML

## (undated) DEVICE — ENCORE® LATEX MICRO SIZE 6.5, STERILE LATEX POWDER-FREE SURGICAL GLOVE: Brand: ENCORE

## (undated) DEVICE — SYRINGE 3ML LL TIP

## (undated) DEVICE — APPLICATOR SKIN PREP 26ML HI LT ORNG 2% CHG

## (undated) DEVICE — 2T11 #2 PDO 36 X 36: Brand: 2T11 #2 PDO 36 X 36

## (undated) DEVICE — TOWEL SURG OR 17X30IN BLUE

## (undated) DEVICE — 3M™ STERI-DRAPE™ U-DRAPE 1015: Brand: STERI-DRAPE™

## (undated) DEVICE — Device: Brand: STABLECUT®

## (undated) DEVICE — FORCEPS BX 100CM 1.8MM RJ STD

## (undated) DEVICE — SHEET,DRAPE,70X100,STERILE: Brand: MEDLINE

## (undated) DEVICE — BIPOLAR SEALER 23-112-1 AQM 6.0: Brand: AQUAMANTYS™

## (undated) DEVICE — HOOD: Brand: FLYTE

## (undated) DEVICE — PILLOW ABDUCTION HIP MED

## (undated) DEVICE — DRAPE SRG 70X60IN SPLT U IMPRV

## (undated) DEVICE — NEEDLE SPINAL 22X5 405148

## (undated) DEVICE — DRESSING FOAM 1IN 4MM H DISK CHG IMPREG AEGIS

## (undated) DEVICE — NEEDLE SPINAL 18X3-1/2 PINK.

## (undated) DEVICE — KIT COLLECTION COPAN ESWAB 1ML

## (undated) DEVICE — GAMMEX® NON-LATEX PI ORTHO SIZE 8.5, STERILE POLYISOPRENE POWDER-FREE SURGICAL GLOVE: Brand: GAMMEX

## (undated) DEVICE — SUT ETHILON 3-0 FS-1 669H

## (undated) DEVICE — Device

## (undated) DEVICE — GAMMEX® PI HYBRID SIZE 8, STERILE POWDER-FREE SURGICAL GLOVE, POLYISOPRENE AND NEOPRENE BLEND: Brand: GAMMEX

## (undated) DEVICE — DRAPE PACK CHEST

## (undated) DEVICE — COVER TABLE BACK PADDED HVY DU

## (undated) DEVICE — DRAPE CASSETTE X-RAY

## (undated) DEVICE — Device: Brand: JELCO

## (undated) DEVICE — TRAY SURESTEP 16 BARDEX DRAIN

## (undated) DEVICE — REMOVER DURAPREP 3M

## (undated) DEVICE — SUT PDS II 2-0 CT-2 Z333H

## (undated) DEVICE — CHLORAPREP ORANGE TINT 10.5ML

## (undated) DEVICE — 3.2MM X 18.3MM METAL CUTTING HELIOCOIDAL RASP

## (undated) DEVICE — SUT VICRYL 1-0 CTX J977H

## (undated) DEVICE — ABDOMINAL PAD: Brand: CURITY

## (undated) DEVICE — CONTAINER,SPECIMEN,OR STERILE,4OZ: Brand: MEDLINE

## (undated) DEVICE — HOOD, PEEL-AWAY: Brand: FLYTE

## (undated) DEVICE — 7.5MM ACORN

## (undated) DEVICE — SUT ETHIBOND 2 V-37 MX69G

## (undated) DEVICE — GLOVE SURG SENSICARE SZ 7-1/2

## (undated) DEVICE — 20 ML SYRINGE LUER-LOCK TIP: Brand: MONOJECT

## (undated) DEVICE — 25.4MM X 0.8MM METAL CUTTING DIAMOND DISC

## (undated) DEVICE — SYRINGE 35ML LL TIP

## (undated) DEVICE — LEUKOPLAST ELASTIC STERILE 25X75MM TAN 100 1"X3": Brand: LEUKOPLAST®

## (undated) DEVICE — SOL NACL IRRIG 0.9% 1000ML BTL

## (undated) DEVICE — 2DSM24 2-0 UND MONODERM 30X30: Brand: 2DSM24 2-0 UND MONODERM 30X30

## (undated) DEVICE — MEGADYNE 6.5IN BLADE MONO

## (undated) DEVICE — DECANTER SPIKE TRANSFLOW

## (undated) DEVICE — TOTAL HIP: Brand: MEDLINE INDUSTRIES, INC.

## (undated) DEVICE — HEWSON SUTURE RETRIEVER: Brand: HEWSON SUTURE RETRIEVER

## (undated) DEVICE — DRAPE ARTHROSCOPY KNEE

## (undated) DEVICE — 3M(TM) TEGADERM(TM) TRANSPARENT FILM DRESSING FRAME STYLE 1628: Brand: 3M™ TEGADERM™

## (undated) DEVICE — SOLUTION  .9 3000ML

## (undated) DEVICE — 1200CC GUARDIAN II: Brand: GUARDIAN

## (undated) DEVICE — BNDG COHESIVE W4INXL5YD TAN E

## (undated) DEVICE — SUT VICRYL 0 CP-1 J267H

## (undated) DEVICE — 6.0MM ACORN

## (undated) DEVICE — SOLUTION IRRIG 1000ML 0.9% NACL USP BTL

## (undated) DEVICE — KIT SPEC COLL 1ML WHT POLYPR TB W/ LIQ AMIES

## (undated) DEVICE — BOWLS UTILITY 16OZ

## (undated) DEVICE — HANDPIECE SET WITH HIGH FLOW TIP AND SUCTION TUBE: Brand: INTERPULSE

## (undated) DEVICE — DRAPE SHEET LARGE 76X55

## (undated) DEVICE — SOL  0.9% 1000ML

## (undated) DEVICE — SHEET, T, LAPAROTOMY, STERILE: Brand: MEDLINE

## (undated) DEVICE — 2.3MM X 19.0MM TAPERED ROUTER

## (undated) DEVICE — ABSORBABLE HEMOSTAT (OXIDIZED REGENERATED CELLULOSE, U.S.P.): Brand: SURGICEL

## (undated) DEVICE — DRAPE SUR W70XL100IN STD SMS POLYPR FULL SHT

## (undated) DEVICE — APPLICATOR CHLORAPREP 26ML

## (undated) DEVICE — TRAY CATH 16FR F INCLUDE BARDX IC COMPLT CARE

## (undated) DEVICE — 3M™ RED DOT™ MONITORING ELECTRODE WITH FOAM TAPE AND STICKY GEL, 50/BAG, 20/CASE, 72/PLT 2570: Brand: RED DOT™

## (undated) DEVICE — 3M™ STERI-DRAPE™ INSTRUMENT POUCH 1018: Brand: STERI-DRAPE™

## (undated) DEVICE — SUT ETHIBOND 5-0 V-40 MB46G

## (undated) DEVICE — ARTHROSCOPY: Brand: MEDLINE INDUSTRIES, INC.

## (undated) DEVICE — SUTURE PDS II 2-0 L27IN ABSRB VLT L26MM CT-2

## (undated) DEVICE — SOLUTION PREP 26ML 0.7% POVACRYLEX 74% ISO

## (undated) DEVICE — SUT MONOCRYL 3-0 PS-1 Y936H

## (undated) DEVICE — SPONGE PREMIERPRO 7X18X18

## (undated) DEVICE — PEN: MARKING STD PT 100/CS: Brand: MEDICAL ACTION INDUSTRIES

## (undated) DEVICE — DRAPE PACK CHEST & U BAR

## (undated) DEVICE — KIT DRN 1/8IN PVC 3 SPRG EVAC

## (undated) DEVICE — BLADE 20 SHRP BP SS SRG STRL

## (undated) DEVICE — SUTURE VCRL SZ 2-0 L27IN ABSRB UD L26MM CT-2

## (undated) DEVICE — WEBRIL COTTON UNDERCAST PADDING: Brand: WEBRIL

## (undated) DEVICE — BIT DRL 30MM 3.2MM RNGLC ACTB

## (undated) DEVICE — BACTISURE SOL WND CLNSG 1

## (undated) DEVICE — 35 ML SYRINGE LUER-LOCK TIP: Brand: MONOJECT

## (undated) DEVICE — INTENDED FOR TISSUE SEPARATION, AND OTHER PROCEDURES THAT REQUIRE A SHARP SURGICAL BLADE TO PUNCTURE OR CUT.: Brand: BARD-PARKER ® STAINLESS STEEL BLADES

## (undated) DEVICE — CONTAINER SPEC 4OZ POLYPR GRAD SCR LID LEAK

## (undated) DEVICE — PAIN TRAY: Brand: MEDLINE INDUSTRIES, INC.

## (undated) DEVICE — GLOVE SURG SENSICARE SZ 7

## (undated) DEVICE — SUT VICRYL 0 CT-1 J340H

## (undated) DEVICE — SYRINGE 10ML SLIP TIP

## (undated) DEVICE — NEEDLE SPNL 18GA L3.5IN PNK HUB SS RW FIT

## (undated) DEVICE — BIT DRL 20MM 3.2MM RNGLC ACTB

## (undated) DEVICE — IMPERVIOUS STOCKINETTE: Brand: DEROYAL

## (undated) DEVICE — GAMMEX® PI HYBRID SIZE 7.5, STERILE POWDER-FREE SURGICAL GLOVE, POLYISOPRENE AND NEOPRENE BLEND: Brand: GAMMEX

## (undated) DEVICE — 2T8 #2 PDO 24 X 24: Brand: 2T8 #2 PDO 24 X 24

## (undated) DEVICE — HOOD STERI-SHIELD 408-800-100

## (undated) DEVICE — MEGADYNE ELECTRODE ADULT PT RT

## (undated) DEVICE — SUTURE ETHILON 3-0 669H

## (undated) DEVICE — STERILE LATEX POWDER-FREE SURGICAL GLOVES WITH HYDROGEL COATING, SMOOTH FINISH, STRAIGHT FINGER: Brand: PROTEXIS

## (undated) DEVICE — PENCIL TELESCOPE MEGADYNE SE

## (undated) DEVICE — HYDROGEN PEROXIDE 4 OZ

## (undated) DEVICE — YANKAUER,FLEXIBLE HANDLE,REGLR CAPACITY: Brand: MEDLINE INDUSTRIES, INC.

## (undated) DEVICE — CLOSURE EXOFIN 1.0ML

## (undated) DEVICE — CAUTERY TIP BLADE EXTENSION

## (undated) DEVICE — SINGLE USE BIOPSY VALVE MAJ-210: Brand: SINGLE USE BIOPSY VALVE (STERILE)

## (undated) DEVICE — SPONGE LAP 4X18IN 7IN LOOP

## (undated) DEVICE — 3M™ TEGADERM™ TRANSPARENT FILM DRESSING, 1626W, 4 IN X 4-3/4 IN (10 CM X 12 CM), 50 EACH/CARTON, 4 CARTON/CASE: Brand: 3M™ TEGADERM™

## (undated) DEVICE — DRESSING TEGADERM SMALL 9534HP

## (undated) DEVICE — SPECIMEN TRAP LUKI

## (undated) DEVICE — SUTURE ETHILON 4-0 699G

## (undated) DEVICE — BLADE SAW W19XL90MM CUT THK1.27MM GTS OSC

## (undated) DEVICE — 60 ML SYRINGE REGULAR TIP: Brand: MONOJECT

## (undated) DEVICE — SINGLE USE SUCTION VALVE MAJ-209: Brand: SINGLE USE SUCTION VALVE (STERILE)

## (undated) DEVICE — PROXIMATE SKIN STAPLERS (35 WIDE) CONTAINS 35 STAINLESS STEEL STAPLES (FIXED HEAD): Brand: PROXIMATE

## (undated) DEVICE — SUT VICRYL 2-0 CT-2 J269H

## (undated) DEVICE — 450 ML BOTTLE OF 0.05% CHLORHEXIDINE GLUCONATE IN 99.95% STERILE WATER FOR IRRIGATION, USP AND APPLICATOR.: Brand: IRRISEPT ANTIMICROBIAL WOUND LAVAGE

## (undated) DEVICE — SUT PDS II 0 CT-1 Z340H

## (undated) DEVICE — SYRINGE MED 20ML CLR PLAS LUERLOCK TIP DISP

## (undated) DEVICE — MEDI-VAC SUCTION HANDLE REGULAR CAPACITY: Brand: CARDINAL HEALTH

## (undated) DEVICE — DRAPE SRG U 124X80IN U REINF

## (undated) DEVICE — GOWN SURG AERO BLUE PERF XLG

## (undated) DEVICE — GLOVE SURG SENSICARE SZ 6-1/2

## (undated) DEVICE — PREVENA PLUS PEEL & PLACE SYSTEM KIT- 35 CM: Brand: PREVENA  PLUS™ PEEL & PLACE™

## (undated) DEVICE — SOL  .9 3000ML

## (undated) DEVICE — SUTURE PDS II 0 L36IN ABSRB VLT L36MM CT-1

## (undated) DEVICE — SUTURE QUILL 2 L36CM ABSRB VLT L48MM CTX 1/2

## (undated) DEVICE — DRAPE SHEET LG

## (undated) DEVICE — GAMMEX® NON-LATEX PI ORTHO SIZE 6.5, STERILE POLYISOPRENE POWDER-FREE SURGICAL GLOVE: Brand: GAMMEX

## (undated) DEVICE — 1010 S-DRAPE TOWEL DRAPE 10/BX: Brand: STERI-DRAPE™

## (undated) DEVICE — COVER BK TBL L72IN BLU PD HVY DTY BK TBL CVR

## (undated) DEVICE — NEEDLE HPO 18GA 1.5IN ECLPS

## (undated) DEVICE — SYSTEM PREVENA PLUS CSTMIZBLE

## (undated) DEVICE — SUT ETHILON 2-0 FS 664H

## (undated) DEVICE — SUT MONOCRYL 2-0 CT-1 Y945H

## (undated) DEVICE — TUBING SUCT L12FT DIA6MM CLR N CNDTVE W/ MAXI

## (undated) DEVICE — DRAPE ORTH 60IN X 70IN POLY FLM ANCIL U RSST

## (undated) DEVICE — PLW ABD MED 22X15X6IN NLTX

## (undated) DEVICE — BONE SCREW 6.5X25 SELF-TAP
Type: IMPLANTABLE DEVICE | Site: HIP | Status: NON-FUNCTIONAL
Removed: 2023-03-07

## (undated) DEVICE — E-Z BUTTON SWITCH PENCIL

## (undated) DEVICE — MARKER SKIN 2 TIP

## (undated) DEVICE — HANDPIECE IRRIG BTTRY OPERATED TYP W/ SUCT HI

## (undated) DEVICE — CAUTERY TIP TEFLON MEGADYNE

## (undated) DEVICE — MEDI-VAC NON-CONDUCTIVE SUCTION TUBING: Brand: CARDINAL HEALTH

## (undated) DEVICE — IMPLANTABLE DEVICE
Type: IMPLANTABLE DEVICE | Site: HIP | Status: NON-FUNCTIONAL
Brand: AVENIR COMPLETE™
Removed: 2022-12-02

## (undated) DEVICE — 3M™ IOBAN™ 2 ANTIMICROBIAL INCISE DRAPE 6650EZ: Brand: IOBAN™ 2

## (undated) DEVICE — GAMMEX® PI HYBRID SIZE 8.5, STERILE POWDER-FREE SURGICAL GLOVE, POLYISOPRENE AND NEOPRENE BLEND: Brand: GAMMEX

## (undated) DEVICE — BOWL CEMENT MIX QUICK-VAC

## (undated) DEVICE — SKIN PREP TRAY 4 COMPARTM TRAY: Brand: MEDLINE INDUSTRIES, INC.

## (undated) DEVICE — BANDAGE ADH COVERLET 3X1IN

## (undated) DEVICE — IMMOBILIZER KNEE 1 SZ FIT MOST CUTAWAY TRIM

## (undated) DEVICE — Device: Brand: XPERIENCE

## (undated) DEVICE — SUT PDS II 1 CT-1 Z341H

## (undated) DEVICE — KIT EVAC 400CC DIA1/8IN H PAT 12.5IN 3 SPR

## (undated) DEVICE — BANDAGE COMP PREMPRO 5YDX4IN

## (undated) DEVICE — DRESSING AQUACEL AG SP 3.5X10

## (undated) DEVICE — 1016 S-DRAPE IRRIG POUCH 10/BOX: Brand: STERI-DRAPE™

## (undated) DEVICE — 2.3MM X 15.9MM TAPERED ROUTER

## (undated) DEVICE — SUT ETHBND 5 30IN V40 MULTPK

## (undated) DEVICE — COVER SGL STRL LGHT HNDL BLU

## (undated) DEVICE — DRAPE SUR W53XL77IN STD POLYPR SMS 3 QTR SHT

## (undated) DEVICE — RF CANNULA, CVD: Brand: VENOM

## (undated) DEVICE — SUT PDS II 1 CT-1 Z347H

## (undated) DEVICE — TOWEL,OR,DSP,ST,BLUE,DLX,2/PK,40PK/CS: Brand: MEDLINE

## (undated) DEVICE — ZIMMER® STERILE DISPOSABLE TOURNIQUET CUFF WITH PLC, DUAL PORT, SINGLE BLADDER, 42 IN. (107 CM)

## (undated) DEVICE — PACK CDS TOTAL HIP

## (undated) DEVICE — MASK ISOLATION

## (undated) DEVICE — TUBING IRR 16FT CNT WV 3 ASCP

## (undated) DEVICE — SUTURE ETHLN 3-0 30IN NABSRB BLK 24MM FS-1

## (undated) DEVICE — GLOVE GAMMEX PI ORTHO LF 8.5

## (undated) DEVICE — HOOD STERI-SHIELD 408-800-000

## (undated) DEVICE — BANDAGE COHESIVE W4INXL5YD TAN E POR SLF ADH

## (undated) DEVICE — MARKER SUR SKIN ST GENTIAN VLT STD REG TIP

## (undated) DEVICE — HANDLE SUCT REG CAP FLANGETIP SMOOTH YANK

## (undated) DEVICE — BLADE SHVR 13CM 4MM EXCLBR

## (undated) DEVICE — BONE SCREW 6.5X20 SELF-TAP: Type: IMPLANTABLE DEVICE | Site: HIP | Status: NON-FUNCTIONAL

## (undated) DEVICE — SOLUTION IRRIG 3000ML 0.9% NACL FLX CONT

## (undated) DEVICE — FILTERLINE NASAL ADULT O2/CO2

## (undated) DEVICE — 2T9 -0- UNDYED MONODERM 36X36: Brand: 2T9 -0- UNDYED MONODERM 36X36

## (undated) DEVICE — ENCORE® LATEX MICRO SIZE 8.5, STERILE LATEX POWDER-FREE SURGICAL GLOVE: Brand: ENCORE

## (undated) NOTE — LETTER
[address] [place] [address], [place]  Authorization for Surgical Operation and Procedure                                                                                           I hereby authorize Nayan Merritt MD, my physician and his/her assistants (if applicable), which may include medical students, residents, and/or fellows, to perform the following surgical operation/ procedure and administer such anesthesia as may be determined necessary by my physician: Operation/Procedure name (s) right hip closed reduction, possible head liner change on [place]   2. I recognize that during the surgical operation/procedure, unforeseen conditions may necessitate additional or different procedures than those listed above. I, therefore, further authorize and request that the above-named surgeon, assistants, or designees perform such procedures as are, in their judgment, necessary and desirable. 3.   My surgeon/physician has discussed prior to my surgery the potential benefits, risks and side effects of this procedure; the likelihood of achieving goals; and potential problems that might occur during recuperation. They also discussed reasonable alternatives to the procedure, including risks, benefits, and side effects related to the alternatives and risks related to not receiving this procedure. I have had all my questions answered and I acknowledge that no guarantee has been made as to the result that may be obtained. 4.   Should the need arise during my operation/procedure, which includes change of level of care prior to discharge, I also consent to the administration of blood and/or blood products. Further, I understand that despite careful testing and screening of blood or blood products by collecting agencies, I may still be subject to ill effects as a result of receiving a blood transfusion and/or blood products.   The following are some, but not all, of the potential risks that can occur: fever and allergic reactions, hemolytic reactions, transmission of diseases such as Hepatitis, AIDS and Cytomegalovirus (CMV) and fluid overload. In the event that I wish to have an autologous transfusion of my own blood, or a directed donor transfusion, I will discuss this with my physician. Check only if Refusing Blood or Blood Products  I understand refusal of blood or blood products as deemed necessary by my physician may have serious consequences to my condition to include possible death. I hereby assume responsibility for my refusal and release the hospital, its personnel, and my physicians from any responsibility for the consequences of my refusal.    o  Refuse   5. I authorize the use of any specimen, organs, tissues, body parts or foreign objects that may be removed from my body during the operation/procedure for diagnosis, research or teaching purposes and their subsequent disposal by hospital authorities. I also authorize the release of specimen test results and/or written reports to my treating physician on the hospital medical staff or other referring or consulting physicians involved in my care, at the discretion of the Pathologist or my treating physician. 6.   I consent to the photographing or videotaping of the operations or procedures to be performed, including appropriate portions of my body for medical, scientific, or educational purposes, provided my identity is not revealed by the pictures or by descriptive texts accompanying them. If the procedure has been photographed/videotaped, the surgeon will obtain the original picture, image, videotape or CD. The hospital will not be responsible for storage, release or maintenance of the picture, image, tape or CD.    7.   I consent to the presence of a  or observers in the operating room as deemed necessary by my physician or their designees.     8.   I recognize that in the event my procedure results in extended X-Ray/fluoroscopy time, I may develop a skin reaction. 9. If I have a Do Not Attempt Resuscitation (DNAR) order in place, that status will be suspended while in the operating room, procedural suite, and during the recovery period unless otherwise explicitly stated by me (or a person authorized to consent on my behalf). The surgeon or my attending physician will determine when the applicable recovery period ends for purposes of reinstating the DNAR order. 10. Patients having a sterilization procedure: I understand that if the procedure is successful the results will be permanent and it will therefore be impossible for me to inseminate, conceive, or bear children. I also understand that the procedure is intended to result in sterility, although the result has not been guaranteed. 11. I acknowledge that my physician has explained sedation/analgesia administration to me including the risk and benefits I consent to the administration of sedation/analgesia as may be necessary or desirable in the judgment of my physician. I CERTIFY THAT I HAVE READ AND FULLY UNDERSTAND THE ABOVE CONSENT TO OPERATION and/or OTHER PROCEDURE.     _________________________________________ _________________________________     ___________________________________  Signature of Patient     Signature of Responsible Person                   Printed Name of Responsible Person                              _________________________________________ ______________________________        ___________________________________  Signature of Witness         Date  Time         Relationship to Patient    STATEMENT OF PHYSICIAN My signature below affirms that prior to the time of the procedure; I have explained to the patient and/or his/her legal representative, the risks and benefits involved in the proposed treatment and any reasonable alternative to the proposed treatment.  I have also explained the risks and benefits involved in refusal of the proposed treatment and alternatives to the proposed treatment and have answered the patient's questions.  If I have a significant financial interest in a co-management agreement or a significant financial interest in any product or implant, or other significant relationship used in this procedure/surgery, I have disclosed this and had a discussion with my patient.     _______________________________________________________________ _____________________________  [facility] of Physician)                                                                                         (Date)                                   (Time)  Patient Name: Scott Arriloa    : 1955   Printed: 10/9/2023      Medical Record #: [id number]                                              Page 1 of 1

## (undated) NOTE — LETTER
Patient Name: Jana Evangelista  YOB: 1955          MRN :  [id number]  Date:  7/15/2021  Referring Physician:  Javed Barrera    Discharge Summary  Pt has attended 15 visits in Physical Therapy. Subjective:    Pain 1/10.  In left side of her LBP to · Patient will report improved ability to perform household chores without increased pain - partially Met  · No lumbar pain with left hip ROM - partially Met   · Patient will be able to squat without UE support - no pain - Met  · Independent with HEP - Met

## (undated) NOTE — LETTER
[address] [place] [address], [place]  Authorization for Surgical Operation and Procedure                                                                                           I hereby authorize Alon Marin MD, my physician and his/her assistants (if applicable), which may include medical students, residents, and/or fellows, to perform the following surgical operation/ procedure and administer such anesthesia as may be determined necessary by my physician: Operation/Procedure name (s) CLOSED VERSUS OPEN REDUCTION INTERNAL FIXATION OF RIGHT PROSTHETIC HIP, HEAD EXCHANGE, POSSIBLE LINER EXCHANGE on [address]   2. I recognize that during the surgical operation/procedure, unforeseen conditions may necessitate additional or different procedures than those listed above. I, therefore, further authorize and request that the above-named surgeon, assistants, or designees perform such procedures as are, in their judgment, necessary and desirable. 3.   My surgeon/physician has discussed prior to my surgery the potential benefits, risks and side effects of this procedure; the likelihood of achieving goals; and potential problems that might occur during recuperation. They also discussed reasonable alternatives to the procedure, including risks, benefits, and side effects related to the alternatives and risks related to not receiving this procedure. I have had all my questions answered and I acknowledge that no guarantee has been made as to the result that may be obtained. 4.   Should the need arise during my operation/procedure, which includes change of level of care prior to discharge, I also consent to the administration of blood and/or blood products. Further, I understand that despite careful testing and screening of blood or blood products by collecting agencies, I may still be subject to ill effects as a result of receiving a blood transfusion and/or blood products.   The following are some, but not all, of the potential risks that can occur: fever and allergic reactions, hemolytic reactions, transmission of diseases such as Hepatitis, AIDS and Cytomegalovirus (CMV) and fluid overload. In the event that I wish to have an autologous transfusion of my own blood, or a directed donor transfusion, I will discuss this with my physician. Check only if Refusing Blood or Blood Products  I understand refusal of blood or blood products as deemed necessary by my physician may have serious consequences to my condition to include possible death. I hereby assume responsibility for my refusal and release the hospital, its personnel, and my physicians from any responsibility for the consequences of my refusal.    o  Refuse   5. I authorize the use of any specimen, organs, tissues, body parts or foreign objects that may be removed from my body during the operation/procedure for diagnosis, research or teaching purposes and their subsequent disposal by hospital authorities. I also authorize the release of specimen test results and/or written reports to my treating physician on the hospital medical staff or other referring or consulting physicians involved in my care, at the discretion of the Pathologist or my treating physician. 6.   I consent to the photographing or videotaping of the operations or procedures to be performed, including appropriate portions of my body for medical, scientific, or educational purposes, provided my identity is not revealed by the pictures or by descriptive texts accompanying them. If the procedure has been photographed/videotaped, the surgeon will obtain the original picture, image, videotape or CD. The hospital will not be responsible for storage, release or maintenance of the picture, image, tape or CD.    7.   I consent to the presence of a  or observers in the operating room as deemed necessary by my physician or their designees.     8.   I recognize that in the event my procedure results in extended X-Ray/fluoroscopy time, I may develop a skin reaction. 9. If I have a Do Not Attempt Resuscitation (DNAR) order in place, that status will be suspended while in the operating room, procedural suite, and during the recovery period unless otherwise explicitly stated by me (or a person authorized to consent on my behalf). The surgeon or my attending physician will determine when the applicable recovery period ends for purposes of reinstating the DNAR order. 10. Patients having a sterilization procedure: I understand that if the procedure is successful the results will be permanent and it will therefore be impossible for me to inseminate, conceive, or bear children. I also understand that the procedure is intended to result in sterility, although the result has not been guaranteed. 11. I acknowledge that my physician has explained sedation/analgesia administration to me including the risk and benefits I consent to the administration of sedation/analgesia as may be necessary or desirable in the judgment of my physician. I CERTIFY THAT I HAVE READ AND FULLY UNDERSTAND THE ABOVE CONSENT TO OPERATION and/or OTHER PROCEDURE.     _________________________________________ _________________________________     ___________________________________  Signature of Patient     Signature of Responsible Person                   Printed Name of Responsible Person                              _________________________________________ ______________________________        ___________________________________  Signature of Witness         Date  Time         Relationship to Patient    STATEMENT OF PHYSICIAN My signature below affirms that prior to the time of the procedure; I have explained to the patient and/or his/her legal representative, the risks and benefits involved in the proposed treatment and any reasonable alternative to the proposed treatment.  I have also explained the risks and benefits involved in refusal of the proposed treatment and alternatives to the proposed treatment and have answered the patient's questions.  If I have a significant financial interest in a co-management agreement or a significant financial interest in any product or implant, or other significant relationship used in this procedure/surgery, I have disclosed this and had a discussion with my patient.     _______________________________________________________________ _____________________________  Gabriel Dick of Physician)                                                                                         (Date)                                   (Time)  Patient Name: Lindsey Moran    : 1955   Printed: 2023      Medical Record #: [id number]                                              Page 1 of 1

## (undated) NOTE — LETTER
12/12/18        [address] [facility] [address]      Dear Dalton Almeida,    [address] [facility] records indicate that you have outstanding lab work and or testing that was ordered for you and has not yet been completed:  Orders Placed This Encount

## (undated) NOTE — LETTER
Bridgette Mckeon [address]  [place], [address] [facility]    Consent for Operation  Date: __________________                                Time: _______________    1.  I authorize the performance upon Mamadou Brierfield the following operation:  Procedure(s) procedure has been videotaped, the surgeon will obtain the original videotape. The hospital will not be responsible for storage or maintenance of this tape.   7. For the purpose of advancing medical education, I consent to the admittance of observers to the STATEMENTS REQUIRING INSERTION OR COMPLETION WERE FILLED IN.     Signature of Patient:   ___________________________    When the patient is a minor or mentally incompetent to give consent:  Signature of person authorized to consent for patient: ____________ drugs/illegal medications). Failure to inform my anesthesiologist about these medicines may increase my risk of anesthetic complications. iv. If I am allergic to anything or have had a reaction to anesthesia before.   3. I understand how the anesthesia med I have discussed the procedure and information above with the patient (or patient’s representative) and answered their questions. The patient or their representative has agreed to have anesthesia services.     _______________________________________________

## (undated) NOTE — LETTER
[place] [address]  [address]  INFORMED CONSENT FOR TRANSFUSION OF BLOOD OR BLOOD PRODUCTS  My physician has informed me of the nature, purpose, benefits and risks of transfusion for blood and blood components that he/she may deem necessary during my treatment or hospitalization. He/she has also discussed alternatives to receiving blood from the voluntary blood supply with me, such as self-donation (autologous) and directed donation (blood donated by family or friends to be used specifically for me). I further understand that while the [address] [place] [address] will attempt to supply any autologous or directed donor blood prior to transfusion of blood from the routine blood supply, medical circumstances may require that other or additional blood components may be required for my care. In giving consent, I acknowledge that my physician has also informed me that despite careful screening and testing in accordance with national and regional regulations, there is still a small risk of transmission of infectious agents including hepatitis, HIV-1/2, cytomegalovirus and other viruses or diseases as yet unknown for which licensed definitive screening tests do not currently exist. Additionally, my physician has informed me of the potential for transfusion reactions not related to an infectious agent. [  ]  Check here for Recurring Outpatient Transfusion Therapy (valid for 1 year) In addition to the above, my physician has informed me that I shall receive numerous transfusions over a period of time and that these can lead to other increased risks. I hereby authorize the transfusion of blood and/or blood products to me as deemed necessary and ordered by physicians participating in my care.  My physician has given me the opportunity to ask questions and any questions asked have been answered to my satisfaction  __________________________________________ ______________________________________________  (Signature of Patient)                                                            (Responsible party in case of Minor,                                                                                                 Incompetent, or unconscious Patient)  ___________________________________________       ________ ______________________________________  (Relationship to Patient)                                                       (Signature of Witness)  ______________________________________________________________________________________________   (Date)                                                                           (Time)  REFUSAL OF CONSENT FOR BLOOD TRANSFUSIONS   Sign only if Refusing   [  ] I understand refusal of blood or blood products as deemed necessary by my physician may have serious consequences to my condition to include possible death.  I hereby assume responsibility for my refusal and release the hospital, its personnel, and my physicians from any responsibility for the consequences of my refusal.    ________________________________________________________________________________  (Signature of Patient)                                                         (Responsible Party/Relationship to Patient)    ________________________________________________________________________________  (Signature of Witness)                                                       (Date/Time)     Patient Name: John Medina     : 1955                 Printed: 2023      Medical Record #: [id number]                                 Page 1 of 1

## (undated) NOTE — LETTER
ASTHMA ACTION PLAN for Pam Johnson     : 1955     Date: 2018  Provider:  Deng Russo DO  Phone for doctor or clinic: [address] [facility] [address] [phone]    ACT Score: 15      You can use th Signatures:  Provider  Leatha Lai, DO   Patient Caretaker

## (undated) NOTE — LETTER
[address]  [address]  Authorization for Surgical Operation and Procedure     Date:___________                                                                                                         Time:__________  I hereby authorize Surgeon(s):  Chanel Cantu MD, my physician and his/her assistants (if applicable), which may include medical students, residents, and/or fellows, to perform the following surgical operation/ procedure and administer such anesthesia as may be determined necessary by my physician:  Operation/Procedure name (s) Procedure(s):  HIP CLOSED REDUCTION right on Ngozi MARTINS [address]   2.   I recognize that during the surgical operation/procedure, unforeseen conditions may necessitate additional or different procedures than those listed above.  I, therefore, further authorize and request that the above-named surgeon, assistants, or designees perform such procedures as are, in their judgment, necessary and desirable.    3.   My surgeon/physician has discussed prior to my surgery the potential benefits, risks and side effects of this procedure; the likelihood of achieving goals; and potential problems that might occur during recuperation.  They also discussed reasonable alternatives to the procedure, including risks, benefits, and side effects related to the alternatives and risks related to not receiving this procedure.  I have had all my questions answered and I acknowledge that no guarantee has been made as to the result that may be obtained.    4.   Should the need arise during my operation/procedure, which includes change of level of care prior to discharge, I also consent to the administration of blood and/or blood products.  Further, I understand that despite careful testing and screening of blood or blood products by collecting agencies, I may still be subject to ill effects as a result of receiving a blood transfusion and/or blood products.  The following  are some, but not all, of the potential risks that can occur: fever and allergic reactions, hemolytic reactions, transmission of diseases such as Hepatitis, AIDS and Cytomegalovirus (CMV) and fluid overload.  In the event that I wish to have an autologous transfusion of my own blood, or a directed donor transfusion, I will discuss this with my physician.  Check only if Refusing Blood or Blood Products  I understand refusal of blood or blood products as deemed necessary by my physician may have serious consequences to my condition to include possible death. I hereby assume responsibility for my refusal and release the hospital, its personnel, and my physicians from any responsibility for the consequences of my refusal.          o  Refuse      5.   I authorize the use of any specimen, organs, tissues, body parts or foreign objects that may be removed from my body during the operation/procedure for diagnosis, research or teaching purposes and their subsequent disposal by hospital authorities.  I also authorize the release of specimen test results and/or written reports to my treating physician on the hospital medical staff or other referring or consulting physicians involved in my care, at the discretion of the Pathologist or my treating physician.    6.   I consent to the photographing or videotaping of the operations or procedures to be performed, including appropriate portions of my body for medical, scientific, or educational purposes, provided my identity is not revealed by the pictures or by descriptive texts accompanying them.  If the procedure has been photographed/videotaped, the surgeon will obtain the original picture, image, videotape or CD.  The hospital will not be responsible for storage, release or maintenance of the picture, image, tape or CD.    7.   I consent to the presence of a  or observers in the operating room as deemed necessary by my physician or their designees.    8.   I  recognize that in the event my procedure results in extended X-Ray/fluoroscopy time, I may develop a skin reaction.    9. If I have a Do Not Attempt Resuscitation (DNAR) order in place, that status will be suspended while in the operating room, procedural suite, and during the recovery period unless otherwise explicitly stated by me (or a person authorized to consent on my behalf). The surgeon or my attending physician will determine when the applicable recovery period ends for purposes of reinstating the DNAR order.  10. Patients having a sterilization procedure: I understand that if the procedure is successful the results will be permanent and it will therefore be impossible for me to inseminate, conceive, or bear children.  I also understand that the procedure is intended to result in sterility, although the result has not been guaranteed.   11. I acknowledge that my physician has explained sedation/analgesia administration to me including the risk and benefits I consent to the administration of sedation/analgesia as may be necessary or desirable in the judgment of my physician.    I CERTIFY THAT I HAVE READ AND FULLY UNDERSTAND THE ABOVE CONSENT TO OPERATION and/or OTHER PROCEDURE.    _________________________________________  __________________________________  Signature of Patient     Signature of Responsible Person         ___________________________________         Printed Name of Responsible Person           _________________________________                 Relationship to Patient  _________________________________________  ______________________________  Signature of Witness          Date  Time      Patient Name: Ngozi Collazo     : 1955                 Printed: 2024     Medical Record #: [id number]                     Page [address] of [address] [place], [place] [address], [place]  [address]    Consent for Anesthesia    I, Ngozi Collazo agree to be cared  for by an anesthesiologist, who is specially trained to monitor me and give me medicine to put me to sleep or keep me comfortable during my procedure    I understand that my anesthesiologist is not an employee or agent of [facility] or [employer]. He or she works for [employer].    As the patient asking for anesthesia services, I agree to:  Allow the anesthesiologist (anesthesia doctor) to give me medicine and do additional procedures as necessary. Some examples are: Starting or using an “IV” to give me medicine, fluids or blood during my procedure, and having a breathing tube placed to help me breathe when I’m asleep (intubation). In the event that my heart stops working properly, I understand that my anesthesiologist will make every effort to sustain my life, unless otherwise directed by [facility] Do Not Resuscitate documents.  Tell my anesthesia doctor before my procedure:  If I am pregnant.  The last time that I ate or drank.  All of the medicines I take (including prescriptions, herbal supplements, and pills I can buy without a prescription (including street drugs/illegal medications). Failure to inform my anesthesiologist about these medicines may increase my risk of anesthetic complications.  If I am allergic to anything or have had a reaction to anesthesia before.  I understand how the anesthesia medicine will help me (benefits).  I understand that with any type of anesthesia medicine there are risks:  The most common risks are: nausea, vomiting, sore throat, muscle soreness, damage to my eyes, mouth, or teeth (from breathing tube placement).  Rare risks include: remembering what happened during my procedure, allergic reactions to medications, injury to my airway, heart, lungs, vision, nerves, or muscles and in extremely rare instances death.  My doctor has explained to me other choices available to me for my care (alternatives).  Pregnant Patients (“epidural”):  I  understand that the risks of having an epidural (medicine given into my back to help control pain during labor), include itching, low blood pressure, difficulty urinating, headache or slowing of the baby’s heart. Very rare risks include infection, bleeding, seizure, irregular heart rhythms and nerve injury.  Regional Anesthesia (“spinal”, “epidural”, & “nerve blocks”):  I understand that rare but potential complications include headache, bleeding, infection, seizure, irregular heart rhythms, and nerve injury.    I can change my mind about having anesthesia services at any time before I get the medicine.    _____________________________________________________________________________  Patient (or Representative) Signature/Relationship to Patient  Date   Time    _____________________________________________________________________________   Name (if used)    Language/Organization   Time    _____________________________________________________________________________  Anesthesiologist Signature     Date   Time  I have discussed the procedure and information above with the patient (or patient’s representative) and answered their questions. The patient or their representative has agreed to have anesthesia services.    _____________________________________________________________________________  Witness        Date   Time  I have verified that the signature is that of the patient or patient’s representative, and that it was signed before the procedure  Patient Name: Ngozi Collazo     : 1955                 Printed: 2024     Medical Record #: [id number]                     Page 2 of 2

## (undated) NOTE — MR AVS SNAPSHOT
[facility] [address] [place] [address] [address] [facility]  Jennifer [address] [id number] [phone]               Thank you for choosing us for your health care visit with Sondra Thomason DO.   We are glad to serve you and happy to provide you wi organ involvement ([facility] [M32.19], Rheumatoid arthritis involving multiple sites with positive rheumatoid factor ([facility].) [M05.79], Preoperative clearance [Z01.818]           PTT, Activated [E]    Complete by:  Jun 06, 2017 (Approximate)    Assoc Dx:  Acute me trouble walking. There may be popping, clicking, joint locking or inability to completely straighten the knee. Ligaments of the knee may also be injured. A torn meniscus is diagnosed by physical exam and X-rays.  In the case of a severe injury, the knee ma splint. If you have to wear a hook-and-loop knee brace, you can open it to apply the ice pack, or heat, directly to the knee. Never put ice directly on the skin. Always wrap the ice in a towel or other type of cloth.   · You may use over-the-counter pain me move freely. But living with a worn or injured joint can make an active lifestyle painful. Arthroscopy can be used to visualize, diagnose and, in many cases, treat your joint problem.  After arthroscopy, you may be able to return to many of the activities y BP Pulse Temp Height Weight BMI    116/78 mmHg 66 98 °F (36.7 °C) (Temporal) 65\" 218 lb 36.28 kg/m2         Current Medications          This list is accurate as of: 6/6/17 10:08 AM.  Always use your most recent med list.                acyclovir 400 MG pregabalin 75 MG Caps   1 TABLET EVERY AM, 2 TABLETS EVERY PM   Commonly known as:  LYRICA           PROBIOTIC DAILY Caps   Take by mouth. Quinapril HCl 20 MG Tabs   Take 1 tablet (20 mg total) by mouth nightly.    Commonly known as:  ACCUPRIL

## (undated) NOTE — Clinical Note
Please fax copy of note to harley- Dr. Keren Haley.      Jennifer Garcia, DO  [facility]  3/29/2022

## (undated) NOTE — ED AVS SNAPSHOT
Ms. Karin Hall   MRN: [id number]    Department:  [facility] in [place]   Date of Visit:  7/29/2018           Disclosure     Insurance plans vary and the physician(s) referred by the ER may not be covered by your plan.  Please conta tell this physician (or your personal doctor if your instructions are to return to your personal doctor) about any new or lasting problems. The primary care or specialist physician will see patients referred from the [facility] Emergency Department.  Ascencion Rubio

## (undated) NOTE — LETTER
[facility] [address], [address]  Consent for Procedure/Sedation    Date:     Time:       1.  I authorize the performance upon Jonathan Gutierrez the following:  THORACIC NINE KYPHOPLASTY     2. I authorize Dr. Arturo Bernheim  (and whomever is de ________________________________    ___________________    Witness: _________________________      Date: ___________________    Printed: 10/18/2017   12:29 PM  Patient Name: Rachel Paredes        : 1955       Medical Record #: [id number]

## (undated) NOTE — Clinical Note
Please send for Prolia approval. Pt due this month and was seeing another rheum. Please make priority.      Sue Rick,   [facility]  3/11/2021

## (undated) NOTE — IP AVS SNAPSHOT
[facility]    [address], [place], [place] ~ [phone]                Discharge Summary   6/14/2017    Ms. Mamadou Trinidad           Admission Information        Provider Department    6/14/2017 Jade Serrato MD [facility] Pre- Commonly known as:  TEMOVATE   Apply to the AA of hands BID x 2 weeks then PRN flares       Diclofenac Sodium 1 % Gel   Commonly known as:  VOLTAREN   Apply 4 g topically 4 (four) times daily.        EPINEPHrine 0.3 MG/0.3ML Soaj   Commonly known as:  EPIPE · If you had a nerve block for your surgery, take extra care not to put any pressure on your arm or hand for 24 hours    It is normal:  · For you to have a sore throat if you had a breathing tube during surgery (while you were asleep!).  The sore throat peter stamped envelope. The questionnaire should take no longer than a few minutes to complete and your answers are private. Your health and feedback are important to us!     If you receive a NSQIP questionnaire, and have questions please contact:   Francis Otoole 0.45 (H) (06/07/17)  0.10    (03/16/17)  45.2 (03/16/17)  35.9 (03/16/17)  10.2 (03/16/17)  7.1   (03/16/17)  2.21 (03/16/17)  1.76 (03/16/17)  0.50 (03/16/17)  0.35 (H) (03/16/17)  1.95      Metabolic Lab Results  (Last result in the past 90 days)    [facility] [email or web address]. If you've recently had a stay at the Hospital you can access your discharge instructions in [employer]-Pulse by going to Visits < Admission Summaries.  If you've been to the Emergency Department or your doctor's office, you can view yo

## (undated) NOTE — MR AVS SNAPSHOT
[facility] [address] [place] [address] [address], Susan. \Hospital Has a New Name and Outlook.\""  [address] [phone]               Thank you for choosing us for your health care visit with Molly Fabian DO.   We are glad to serve you and happy to provide you wi procedure. IF A CHILD is scheduled for this procedure, parents should be advised that they will not be able to accompany the child in the testing room.  Parents will remain in the MRI waiting area and be reunited with the child upon completion of the MR [phone]            May 19, 2017 12:00 PM   FOLLOW UP with Nikita Delgado MD   RHEUMATOLOGY [facility] at [address] [place] [address])    [address] [place] [address]   [phone]            Aug 24, 2017 10:00 AM   FOLLOW UP with Nikita Delgado, is the diastolic pressure. A normal blood pressure is less than 120 over less than 80.  High blood pressure is when either the top number is 140 or higher, or the bottom number is 90 or higher.  This must be the result when taking your blood pressure a numb Missing doses may cause your blood pressure get out of control. · Consider buying an automatic blood pressure machine. You can get one of these at most pharmacies. Use this to watch your blood pressure at home. Give the results to your provider.   Follow-u Many things can cause knee pain. You may have more than one cause.  Some of these include:  · Overuse of the knee joint  · The kneecap doesn’t line up with the tissue around it  · Damage to small nerves in the area  · Damage to the ligament-like structure t · Icing the outside of your knee when it causes you pain  · Taking over-the-counter pain medicine  · Wearing a knee brace or taping your knee to support it  · Wearing special shoe inserts to help keep your feet in the proper alignment  · Doing special exer Cat gut sutures                Today's Vital Signs     BP Pulse Temp Height Weight BMI    138/80 mmHg 76 98 °F (36.7 °C) (Temporal) 66\" 217 lb 35.04 kg/m2         Current Medications          This list is accurate as of: 3/22/17 11:59 PM.  Always use you methylPREDNISolone 4 MG Tabs   Instructions equivalent to MDP   Commonly known as:  MEDROL           MULTIVITAMINS OR TABS    1 po qday           pregabalin 75 MG Caps   1 TABLET EVERY AM, 2 TABLETS EVERY PM   Commonly known as:  Sade Gamboa discharge instructions in [employer] by going to Visits < Admission Summaries. If you've been to the Emergency Department or your doctor's office, you can view your past visit information in [employer] by going to Visits < Visit Summaries. [employer] questions?

## (undated) NOTE — MR AVS SNAPSHOT
[facility] [address] [place] [address] 700 [facility]  Jennifer Yancey [address] [phone]               Thank you for choosing us for your health care visit with Shayla Smart DO.   We are glad to serve you and happy to provide you wi Current Medications          This list is accurate as of: 2/8/17 11:59 PM.  Always use your most recent med list.                acyclovir 400 MG Tabs   Take 1 tablet (400 mg total) by mouth daily.    Commonly known as:  ZOVIRAX           Acyclovir 5 % Crea 1 po qday           pregabalin 75 MG Caps   1 TABLET EVERY AM, 2 TABLETS EVERY PM   Commonly known as:  LYRICA           PROBIOTIC DAILY Caps   Take by mouth.            Quinapril HCl 20 MG Tabs   TAKE 1 TABLET BY MOUTH NIGHTLY( REPLACES 10MG DOSE)   Common

## (undated) NOTE — LETTER
Bridgette Mckeon [address] [address]  [place], [address] [facility]    Consent for Operation  Date: __________________                                Time: _______________    1.  I authorize the performance upon Rachel Paredes the following operation:  Procedure(s) procedure has been videotaped, the surgeon will obtain the original videotape. The hospital will not be responsible for storage or maintenance of this tape.   7. For the purpose of advancing medical education, I consent to the admittance of observers to the STATEMENTS REQUIRING INSERTION OR COMPLETION WERE FILLED IN.     Signature of Patient:   ___________________________    When the patient is a minor or mentally incompetent to give consent:  Signature of person authorized to consent for patient: ____________ drugs/illegal medications). Failure to inform my anesthesiologist about these medicines may increase my risk of anesthetic complications. iv. If I am allergic to anything or have had a reaction to anesthesia before.   3. I understand how the anesthesia med I have discussed the procedure and information above with the patient (or patient’s representative) and answered their questions. The patient or their representative has agreed to have anesthesia services.     _______________________________________________

## (undated) NOTE — LETTER
[facility] [address], [place], [place]  [address]  INFORMED CONSENT FOR TRANSFUSION OF BLOOD OR BLOOD PRODUCTS  My physician has informed me of the nature, purpose, benefits and risks of transfusion for blood and blood components that he/she may deem necessary during my treatment or hospitalization. He/she has also discussed alternatives to receiving blood from the voluntary blood supply with me, such as self-donation (autologous) and directed donation (blood donated by family or friends to be used specifically for me). I further understand that while the [address] [place] [address] will attempt to supply any autologous or directed donor blood prior to transfusion of blood from the routine blood supply, medical circumstances may require that other or additional blood components may be required for my care. In giving consent, I acknowledge that my physician has also informed me that despite careful screening and testing in accordance with national and regional regulations, there is still a small risk of transmission of infectious agents including hepatitis, HIV-1/2, cytomegalovirus and other viruses or diseases as yet unknown for which licensed definitive screening tests do not currently exist. Additionally, my physician has informed me of the potential for transfusion reactions not related to an infectious agent. [  ]  Check here for Recurring Outpatient Transfusion Therapy (valid for 1 year) In addition to the above, my physician has informed me that I shall receive numerous transfusions over a period of time and that these can lead to other increased risks. I hereby authorize the transfusion of blood and/or blood products to me as deemed necessary and ordered by physicians participating in my care.  My physician has given me the opportunity to ask questions and any questions asked have been answered to my satisfaction  __________________________________________ ______________________________________________  (Signature of Patient)                                                            (Responsible party in case of Minor,                                                                                                 Incompetent, or unconscious Patient)  ___________________________________________       ________ ______________________________________  (Relationship to Patient)                                                       (Signature of Witness)  ______________________________________________________________________________________________   (Date)                                                                           (Time)  REFUSAL OF CONSENT FOR BLOOD TRANSFUSIONS   Sign only if Refusing   [  ] I understand refusal of blood or blood products as deemed necessary by my physician may have serious consequences to my condition to include possible death.  I hereby assume responsibility for my refusal and release the hospital, its personnel, and my physicians from any responsibility for the consequences of my refusal.    ________________________________________________________________________________  (Signature of Patient)                                                         (Responsible Party/Relationship to Patient)    ________________________________________________________________________________  (Signature of Witness)                                                       (Date/Time)     Patient Name: Maria Guadalupe Hastings     : 1955                 Printed: 2023      Medical Record #: [id number]                                 Page 1 of 1

## (undated) NOTE — Clinical Note
Dr. Kelvin Bazzi! Hope you are doing well! Thank you for referring Ms. Tricia Mast for rheumatologic evaluation. Please see the discussion portion of my note and let me know if you have any questions.      Scotty Garcia, DO  [facility]  3/15/2021

## (undated) NOTE — Clinical Note
TERENCEI. Patient completed TCM. Patient does not have HFU appt scheduled at this time. NCM attempted to schedule TCM/HFU however, no availability within the timeframe recommended.TE to PCP office re: appointment. Thank you.

## (undated) NOTE — LETTER
No information on file. Authorization for Imaging Procedure  Date of Procedure:     1. I hereby authorize ____________________, my physician and his/her assistants (if applicable), which may include medical students, residents, and/or fellows, to perform the following procedure and administer such anesthesia as may be determined necessary by my physician: Sara Mohanedwar Donovan on [facility]. 2.  I recognize that during the procedure, unforeseen conditions may necessitate additional or different procedures than those listed above. I, therefore, further authorize and request that the above-named physician, assistants, or designees perform such procedures as are, in their judgment, necessary and desirable. 3.  My physician has discussed prior to my procedure the potential benefits, risks and side effects of this procedure; the likelihood of achieving goals; and potential problems that might occur during recuperation. They also discussed reasonable alternatives to the procedure, including risks, benefits, and side effects related to the alternatives and risks related to not receiving this procedure. I have had all my questions answered and I acknowledge that no guarantee has been made as to the result that may be obtained. 4.  Should the need arise during my procedure, which includes change of level of care prior to discharge, I also consent to the administration of blood and/or blood products. Further, I understand that despite careful testing and screening of blood or blood products by collecting agencies, I may still be subject to ill effects as a result of receiving a blood transfusion and/or blood products. The following are some, but not all, of the potential risks that can occur: fever and allergic reactions, hemolytic reactions, transmission of diseases such as Hepatitis, AIDS and Cytomegalovirus (CMV) and fluid overload.  In the event that I wish to have an autologous transfusion of my own blood, or a directed donor transfusion, I will discuss this with my physician. Check only if Refusing Blood or Blood Products  I understand refusal of blood or blood products as deemed necessary by my physician may have serious consequences to my condition to include possible death. I hereby assume responsibility for my refusal and release the hospital, its personnel, and my physicians from any responsibility for the consequences of my refusal.   [  ] Patient Refuses Blood      5. I authorize the use of any specimen, organs, tissues, body parts or foreign objects that may be removed from my body during the procedure for diagnosis, research or teaching purposes and their subsequent disposal by hospital authorities. I also authorize the release of specimen test results and/or written reports to my treating physician on the hospital medical staff or other referring or consulting physicians involved in my care, at the discretion of the Pathologist or my treating physician. 6.  I consent to the photographing or videotaping of the procedures to be performed, including appropriate portions of my body for medical, scientific, or educational purposes, provided my identity is not revealed by the pictures or by descriptive texts accompanying them. If the procedure has been photographed/videotaped, the physician will obtain the original picture, image, videotape or CD. The hospital will not be responsible for storage, release or maintenance of the picture, image, tape or CD.   7.  I consent to the presence of a  or observers in the operating room as deemed necessary by my physician or their designees. 8.  I recognize that in the event my procedure results in extended X-Ray/fluoroscopy time, I may develop a skin reaction. 9.   If I have a Do Not Attempt Resuscitation (DNAR) order in place, that status will be suspended while in the operating room, procedural suite, and during the recovery period unless otherwise explicitly stated by me (or a person authorized to consent on my behalf). The performing physician or my attending physician will determine when the applicable recovery period ends for purposes of reinstating the DNAR order. 10.  I acknowledge that my physician has explained sedation/analgesia administration to me including the risk and benefits I consent to the administration of sedation/analgesia as may be necessary or desirable in the judgment of my physician. I CERTIFY THAT I HAVE READ AND FULLY UNDERSTAND THE ABOVE CONSENT FOR THE PROCEDURE. Signature of Patient: _____________________________________________________________  Responsible person in case of minor, unconscious: ____________________________________  Relationship to patient:  __________________________________________________________  Signature of Witness: _______________________________Date: _________Time: __________    Statement of Physician: My signature below affirms that prior to the time of the procedure, I have explained to the patient and/or her guardian, the risks and benefits involved in the proposed treatment and any reasonable alternative to the proposed treatment. I have also explained the risks and benefits involved in the refusal of the proposed treatment and have answered the patient's questions. If I have a significant financial interest in a co-management agreement or a significant financial interest in any product or implant, or other significant relationship used in the procedure/surgery, I have disclosed this and had a discussion with my patient.   Signature of Physician:   _________________________________Date:_____________Time:________    Patient Name: Thea Cross : 1955  Printed: 2023   Medical Record #: [id number]

## (undated) NOTE — LETTER
ASTHMA ACTION PLAN for Rachel Paredes     : 1955     Date: 3/4/2019  Provider:  Opal Dennis MD  Phone for doctor or clinic: ED [facility], [address], [place]  [address]  [place] [address] [phone]    ACT Score: 21 on the phone and mailed copy to patient or submitted via [address]      Signatures:  Provider  Sherron Belle MD   Patient Caretaker

## (undated) NOTE — LETTER
[address]  Authorization for Surgical Operation and Procedure                                                                                           1. I hereby authorize Jasvir Hayes MD, my physician and his/her assistants (if applicable), which may include medical students, residents, and/or fellows, to perform the following surgical operation/ procedure and administer such anesthesia as may be determined necessary by my physician: Operation/Procedure name (s) right hip acetabular component revision on [place]   2. I recognize that during the surgical operation/procedure, unforeseen conditions may necessitate additional or different procedures than those listed above. I, therefore, further authorize and request that the above-named surgeon, assistants, or designees perform such procedures as are, in their judgment, necessary and desirable. 3.   My surgeon/physician has discussed prior to my surgery the potential benefits, risks and side effects of this procedure; the likelihood of achieving goals; and potential problems that might occur during recuperation. They also discussed reasonable alternatives to the procedure, including risks, benefits, and side effects related to the alternatives and risks related to not receiving this procedure. I have had all my questions answered and I acknowledge that no guarantee has been made as to the result that may be obtained. 4.   Should the need arise during my operation/procedure, which includes change of level of care prior to discharge, I also consent to the administration of blood and/or blood products. Further, I understand that despite careful testing and screening of blood or blood products by collecting agencies, I may still be subject to ill effects as a result of receiving a blood transfusion and/or blood products.   The following are some, but not all, of the potential risks that can occur: fever and allergic reactions, hemolytic reactions, transmission of diseases such as Hepatitis, AIDS and Cytomegalovirus (CMV) and fluid overload. In the event that I wish to have an autologous transfusion of my own blood, or a directed donor transfusion, I will discuss this with my physician. Check only if Refusing Blood or Blood Products  I understand refusal of blood or blood products as deemed necessary by my physician may have serious consequences to my condition to include possible death. I hereby assume responsibility for my refusal and release the hospital, its personnel, and my physicians from any responsibility for the consequences of my refusal.    o  Refuse   5. I authorize the use of any specimen, organs, tissues, body parts or foreign objects that may be removed from my body during the operation/procedure for diagnosis, research or teaching purposes and their subsequent disposal by hospital authorities. I also authorize the release of specimen test results and/or written reports to my treating physician on the hospital medical staff or other referring or consulting physicians involved in my care, at the discretion of the Pathologist or my treating physician. 6.   I consent to the photographing or videotaping of the operations or procedures to be performed, including appropriate portions of my body for medical, scientific, or educational purposes, provided my identity is not revealed by the pictures or by descriptive texts accompanying them. If the procedure has been photographed/videotaped, the surgeon will obtain the original picture, image, videotape or CD. The hospital will not be responsible for storage, release or maintenance of the picture, image, tape or CD.    7.   I consent to the presence of a  or observers in the operating room as deemed necessary by my physician or their designees.     8.   I recognize that in the event my procedure results in extended X-Ray/fluoroscopy time, I may develop a skin reaction. 9. If I have a Do Not Attempt Resuscitation (DNAR) order in place, that status will be suspended while in the operating room, procedural suite, and during the recovery period unless otherwise explicitly stated by me (or a person authorized to consent on my behalf). The surgeon or my attending physician will determine when the applicable recovery period ends for purposes of reinstating the DNAR order. 10. Patients having a sterilization procedure: I understand that if the procedure is successful the results will be permanent and it will therefore be impossible for me to inseminate, conceive, or bear children. I also understand that the procedure is intended to result in sterility, although the result has not been guaranteed. 11. I acknowledge that my physician has explained sedation/analgesia administration to me including the risk and benefits I consent to the administration of sedation/analgesia as may be necessary or desirable in the judgment of my physician. I CERTIFY THAT I HAVE READ AND FULLY UNDERSTAND THE ABOVE CONSENT TO OPERATION and/or OTHER PROCEDURE.     _________________________________________ _________________________________     ___________________________________  Signature of Patient     Signature of Responsible Person                   Printed Name of Responsible Person                              _________________________________________ ______________________________        ___________________________________  Signature of Witness         Date  Time         Relationship to Patient    STATEMENT OF PHYSICIAN My signature below affirms that prior to the time of the procedure; I have explained to the patient and/or his/her legal representative, the risks and benefits involved in the proposed treatment and any reasonable alternative to the proposed treatment.  I have also explained the risks and benefits involved in refusal of the proposed treatment and alternatives to the proposed treatment and have answered the patient's questions.  If I have a significant financial interest in a co-management agreement or a significant financial interest in any product or implant, or other significant relationship used in this procedure/surgery, I have disclosed this and had a discussion with my patient.     _______________________________________________________________ _____________________________  Lidia Valladares of Physician)                                                                                         (Date)                                   (Time)  Patient Name: Colby Waller    : 1955   Printed: 3/7/2023      Medical Record #: [id number]                                              Page 1 of 1

## (undated) NOTE — LETTER
07/25/19        [address]  [facility] [id number]      Dear Bartolo Contreras,    [address] [facility] records indicate that you have outstanding lab work and or testing that was ordered for you and has not yet been completed:  Orders Placed This Encount

## (undated) NOTE — LETTER
[place] [facility] [address], [place], [place]  [address]  INFORMED CONSENT FOR TRANSFUSION OF BLOOD OR BLOOD PRODUCTS  My physician has informed me of the nature, purpose, benefits and risks of transfusion for blood and blood components that he/she may deem necessary during my treatment or hospitalization. He/she has also discussed alternatives to receiving blood from the voluntary blood supply with me, such as self-donation (autologous) and directed donation (blood donated by family or friends to be used specifically for me). I further understand that while the [address] [place] [address] will attempt to supply any autologous or directed donor blood prior to transfusion of blood from the routine blood supply, medical circumstances may require that other or additional blood components may be required for my care. In giving consent, I acknowledge that my physician has also informed me that despite careful screening and testing in accordance with national and regional regulations, there is still a small risk of transmission of infectious agents including hepatitis, HIV-1/2, cytomegalovirus and other viruses or diseases as yet unknown for which licensed definitive screening tests do not currently exist. Additionally, my physician has informed me of the potential for transfusion reactions not related to an infectious agent. [  ]  Check here for Recurring Outpatient Transfusion Therapy (valid for 1 year) In addition to the above, my physician has informed me that I shall receive numerous transfusions over a period of time and that these can lead to other increased risks. I hereby authorize the transfusion of blood and/or blood products to me as deemed necessary and ordered by physicians participating in my care.  My physician has given me the opportunity to ask questions and any questions asked have been answered to my satisfaction  __________________________________________ ______________________________________________  (Signature of Patient)                                                            (Responsible party in case of Minor,                                                                                                 Incompetent, or unconscious Patient)  ___________________________________________       ________ ______________________________________  (Relationship to Patient)                                                       (Signature of Witness)  ______________________________________________________________________________________________   (Date)                                                                           (Time)  REFUSAL OF CONSENT FOR BLOOD TRANSFUSIONS   Sign only if Refusing   [  ] I understand refusal of blood or blood products as deemed necessary by my physician may have serious consequences to my condition to include possible death.  I hereby assume responsibility for my refusal and release the hospital, its personnel, and my physicians from any responsibility for the consequences of my refusal.    ________________________________________________________________________________  (Signature of Patient)                                                         (Responsible Party/Relationship to Patient)    ________________________________________________________________________________  (Signature of Witness)                                                       (Date/Time)     Patient Name: Sherry Land     : 1955                 Printed: 2023      Medical Record #: [id number]                                 Page 1 of 1

## (undated) NOTE — LETTER
[facility] [facility] [address], [place] [facility]  [address]  INFORMED CONSENT FOR TRANSFUSION OF BLOOD OR BLOOD PRODUCTS  My physician has informed me of the nature, purpose, benefits and risks of transfusion for blood and blood components that he/she may deem necessary during my treatment or hospitalization. He/she has also discussed alternatives to receiving blood from the voluntary blood supply with me, such as self-donation (autologous) and directed donation (blood donated by family or friends to be used specifically for me). I further understand that while the [address] [place] [address] will attempt to supply any autologous or directed donor blood prior to transfusion of blood from the routine blood supply, medical circumstances may require that other or additional blood components may be required for my care. In giving consent, I acknowledge that my physician has also informed me that despite careful screening and testing in accordance with national and regional regulations, there is still a small risk of transmission of infectious agents including hepatitis, HIV-1/2, cytomegalovirus and other viruses or diseases as yet unknown for which licensed definitive screening tests do not currently exist. Additionally, my physician has informed me of the potential for transfusion reactions not related to an infectious agent. [  ]  Check here for Recurring Outpatient Transfusion Therapy (valid for 1 year) In addition to the above, my physician has informed me that I shall receive numerous transfusions over a period of time and that these can lead to other increased risks. I hereby authorize the transfusion of blood and/or blood products to me as deemed necessary and ordered by physicians participating in my care.  My physician has given me the opportunity to ask questions and any questions asked have been answered to my satisfaction  __________________________________________ ______________________________________________  (Signature of Patient)                                                            (Responsible party in case of Minor,                                                                                                 Incompetent, or unconscious Patient)  ___________________________________________       ________ ______________________________________  (Relationship to Patient)                                                       (Signature of Witness)  ______________________________________________________________________________________________   (Date)                                                                           (Time)  REFUSAL OF CONSENT FOR BLOOD TRANSFUSIONS   Sign only if Refusing   [  ] I understand refusal of blood or blood products as deemed necessary by my physician may have serious consequences to my condition to include possible death.  I hereby assume responsibility for my refusal and release the hospital, its personnel, and my physicians from any responsibility for the consequences of my refusal.    ________________________________________________________________________________  (Signature of Patient)                                                         (Responsible Party/Relationship to Patient)    ________________________________________________________________________________  (Signature of Witness)                                                       (Date/Time)     Patient Name: Trang Mcdonald     : 1955                 Printed: 4/15/2023      Medical Record #: [id number]                                 Page 1 of 1

## (undated) NOTE — LETTER
[facility] [address], [address] [place] [address]    Consent for Anesthesia   1.    Geovany Cheng agree to be cared for by an anesthesiologist, who is specially trained to monitor me and give me medicine to put me to sleep or keep me comfortable vision, nerves, or muscles and in extremely rare instances death. 5. My doctor has explained to me other choices available to me for my care (alternatives).   6. Pregnant Patients (“epidural”):  I understand that the risks of having an epidural (medicine g

## (undated) NOTE — ED AVS SNAPSHOT
Lee Ann Sagastume   MRN: [id number]    Department:  THE [facility] Emergency Department in [place]   Date of Visit:  7/12/2019           Disclosure     Insurance plans vary and the physician(s) referred by the ER may not be covered by your plan.  Please contact y tell this physician (or your personal doctor if your instructions are to return to your personal doctor) about any new or lasting problems. The primary care or specialist physician will see patients referred from the [facility] Emergency Department.  Karey Pickering

## (undated) NOTE — LETTER
[place] [facility] [address], [place], [place]  [address]  INFORMED CONSENT FOR TRANSFUSION OF BLOOD OR BLOOD PRODUCTS  My physician has informed me of the nature, purpose, benefits and risks of transfusion for blood and blood components that he/she may deem necessary during my treatment or hospitalization. He/she has also discussed alternatives to receiving blood from the voluntary blood supply with me, such as self-donation (autologous) and directed donation (blood donated by family or friends to be used specifically for me). I further understand that while the [address] [place] [address] will attempt to supply any autologous or directed donor blood prior to transfusion of blood from the routine blood supply, medical circumstances may require that other or additional blood components may be required for my care. In giving consent, I acknowledge that my physician has also informed me that despite careful screening and testing in accordance with national and regional regulations, there is still a small risk of transmission of infectious agents including hepatitis, HIV-1/2, cytomegalovirus and other viruses or diseases as yet unknown for which licensed definitive screening tests do not currently exist. Additionally, my physician has informed me of the potential for transfusion reactions not related to an infectious agent. [  ]  Check here for Recurring Outpatient Transfusion Therapy (valid for 1 year) In addition to the above, my physician has informed me that I shall receive numerous transfusions over a period of time and that these can lead to other increased risks. I hereby authorize the transfusion of blood and/or blood products to me as deemed necessary and ordered by physicians participating in my care.  My physician has given me the opportunity to ask questions and any questions asked have been answered to my satisfaction  __________________________________________ ______________________________________________  (Signature of Patient)                                                            (Responsible party in case of Minor,                                                                                                 Incompetent, or unconscious Patient)  ___________________________________________       ________ ______________________________________  (Relationship to Patient)                                                       (Signature of Witness)  ______________________________________________________________________________________________   (Date)                                                                           (Time)  REFUSAL OF CONSENT FOR BLOOD TRANSFUSIONS   Sign only if Refusing   [  ] I understand refusal of blood or blood products as deemed necessary by my physician may have serious consequences to my condition to include possible death.  I hereby assume responsibility for my refusal and release the hospital, its personnel, and my physicians from any responsibility for the consequences of my refusal.    ________________________________________________________________________________  (Signature of Patient)                                                         (Responsible Party/Relationship to Patient)    ________________________________________________________________________________  (Signature of Witness)                                                       (Date/Time)     Patient Name: Lindsey Moran     : 1955                 Printed: 3/7/2023      Medical Record #: [id number]                                 Page 1 of 1